# Patient Record
Sex: MALE | Race: WHITE | Employment: OTHER | URBAN - METROPOLITAN AREA
[De-identification: names, ages, dates, MRNs, and addresses within clinical notes are randomized per-mention and may not be internally consistent; named-entity substitution may affect disease eponyms.]

---

## 2017-01-02 PROBLEM — J18.9 PNEUMONIA: Status: ACTIVE | Noted: 2017-01-02

## 2017-01-03 PROBLEM — D50.9 IRON DEFICIENCY ANEMIA: Status: ACTIVE | Noted: 2017-01-03

## 2017-01-03 PROBLEM — J18.9 PNEUMONIA: Status: RESOLVED | Noted: 2017-01-02 | Resolved: 2017-01-03

## 2017-01-03 PROBLEM — E87.1 HYPONATREMIA: Status: ACTIVE | Noted: 2017-01-03

## 2017-01-04 ENCOUNTER — CARE COORDINATOR VISIT (OUTPATIENT)
Dept: CASE MANAGEMENT | Age: 62
End: 2017-01-04

## 2017-01-19 ENCOUNTER — EPISODE CHANGES (OUTPATIENT)
Dept: CASE MANAGEMENT | Age: 62
End: 2017-01-19

## 2017-02-06 ENCOUNTER — OFFICE VISIT (OUTPATIENT)
Dept: CARDIOLOGY CLINIC | Age: 62
End: 2017-02-06

## 2017-02-06 VITALS
HEIGHT: 72 IN | HEART RATE: 80 BPM | DIASTOLIC BLOOD PRESSURE: 70 MMHG | SYSTOLIC BLOOD PRESSURE: 110 MMHG | OXYGEN SATURATION: 99 %

## 2017-02-06 DIAGNOSIS — N28.9 RENAL INSUFFICIENCY: Chronic | ICD-10-CM

## 2017-02-06 DIAGNOSIS — I10 ESSENTIAL HYPERTENSION: Chronic | ICD-10-CM

## 2017-02-06 DIAGNOSIS — R06.02 SOB (SHORTNESS OF BREATH): Primary | ICD-10-CM

## 2017-02-06 DIAGNOSIS — R07.1 CHEST PAIN ON BREATHING: ICD-10-CM

## 2017-02-06 PROBLEM — N17.9 AKI (ACUTE KIDNEY INJURY) (HCC): Status: ACTIVE | Noted: 2017-02-06

## 2017-02-06 PROBLEM — R07.9 CHEST PAIN: Status: ACTIVE | Noted: 2017-02-06

## 2017-02-06 PROCEDURE — G8598 ASA/ANTIPLAT THER USED: HCPCS | Performed by: INTERNAL MEDICINE

## 2017-02-06 PROCEDURE — G8484 FLU IMMUNIZE NO ADMIN: HCPCS | Performed by: INTERNAL MEDICINE

## 2017-02-06 PROCEDURE — 3017F COLORECTAL CA SCREEN DOC REV: CPT | Performed by: INTERNAL MEDICINE

## 2017-02-06 PROCEDURE — 1036F TOBACCO NON-USER: CPT | Performed by: INTERNAL MEDICINE

## 2017-02-06 PROCEDURE — 99214 OFFICE O/P EST MOD 30 MIN: CPT | Performed by: INTERNAL MEDICINE

## 2017-02-06 PROCEDURE — G8419 CALC BMI OUT NRM PARAM NOF/U: HCPCS | Performed by: INTERNAL MEDICINE

## 2017-02-06 PROCEDURE — G8427 DOCREV CUR MEDS BY ELIG CLIN: HCPCS | Performed by: INTERNAL MEDICINE

## 2017-02-06 PROCEDURE — 1111F DSCHRG MED/CURRENT MED MERGE: CPT | Performed by: INTERNAL MEDICINE

## 2017-02-06 RX ORDER — ATORVASTATIN CALCIUM 10 MG/1
10 TABLET, FILM COATED ORAL DAILY
Status: ON HOLD | COMMUNITY
End: 2017-02-21 | Stop reason: HOSPADM

## 2017-02-06 RX ORDER — PANTOPRAZOLE SODIUM 40 MG/1
40 TABLET, DELAYED RELEASE ORAL DAILY
COMMUNITY
End: 2017-06-23 | Stop reason: SDUPTHER

## 2017-02-14 ENCOUNTER — TELEPHONE (OUTPATIENT)
Dept: CARDIOLOGY CLINIC | Age: 62
End: 2017-02-14

## 2017-02-24 ENCOUNTER — TELEPHONE (OUTPATIENT)
Dept: CARDIOLOGY CLINIC | Age: 62
End: 2017-02-24

## 2017-02-24 ENCOUNTER — CARE COORDINATION (OUTPATIENT)
Dept: INTERNAL MEDICINE | Age: 62
End: 2017-02-24

## 2017-02-24 ENCOUNTER — CARE COORDINATION (OUTPATIENT)
Dept: CARE COORDINATION | Age: 62
End: 2017-02-24

## 2017-02-24 ENCOUNTER — TELEPHONE (OUTPATIENT)
Dept: INTERNAL MEDICINE | Age: 62
End: 2017-02-24

## 2017-02-28 ENCOUNTER — OFFICE VISIT (OUTPATIENT)
Dept: CARDIOLOGY CLINIC | Age: 62
End: 2017-02-28

## 2017-02-28 VITALS
HEART RATE: 76 BPM | HEIGHT: 72 IN | RESPIRATION RATE: 22 BRPM | OXYGEN SATURATION: 90 % | WEIGHT: 189 LBS | SYSTOLIC BLOOD PRESSURE: 90 MMHG | DIASTOLIC BLOOD PRESSURE: 48 MMHG | BODY MASS INDEX: 25.6 KG/M2

## 2017-02-28 DIAGNOSIS — I50.22 CHRONIC SYSTOLIC HEART FAILURE (HCC): Primary | ICD-10-CM

## 2017-02-28 DIAGNOSIS — D50.0 IRON DEFICIENCY ANEMIA DUE TO CHRONIC BLOOD LOSS: ICD-10-CM

## 2017-02-28 DIAGNOSIS — I95.2 HYPOTENSION DUE TO DRUGS: ICD-10-CM

## 2017-02-28 DIAGNOSIS — I25.10 CORONARY ARTERY DISEASE INVOLVING NATIVE CORONARY ARTERY OF NATIVE HEART WITHOUT ANGINA PECTORIS: ICD-10-CM

## 2017-02-28 DIAGNOSIS — I42.9 CARDIOMYOPATHY (HCC): ICD-10-CM

## 2017-02-28 PROCEDURE — G8598 ASA/ANTIPLAT THER USED: HCPCS | Performed by: NURSE PRACTITIONER

## 2017-02-28 PROCEDURE — 1036F TOBACCO NON-USER: CPT | Performed by: NURSE PRACTITIONER

## 2017-02-28 PROCEDURE — 1111F DSCHRG MED/CURRENT MED MERGE: CPT | Performed by: NURSE PRACTITIONER

## 2017-02-28 PROCEDURE — G8427 DOCREV CUR MEDS BY ELIG CLIN: HCPCS | Performed by: NURSE PRACTITIONER

## 2017-02-28 PROCEDURE — 3017F COLORECTAL CA SCREEN DOC REV: CPT | Performed by: NURSE PRACTITIONER

## 2017-02-28 PROCEDURE — G8419 CALC BMI OUT NRM PARAM NOF/U: HCPCS | Performed by: NURSE PRACTITIONER

## 2017-02-28 PROCEDURE — G8484 FLU IMMUNIZE NO ADMIN: HCPCS | Performed by: NURSE PRACTITIONER

## 2017-02-28 PROCEDURE — 99214 OFFICE O/P EST MOD 30 MIN: CPT | Performed by: NURSE PRACTITIONER

## 2017-02-28 RX ORDER — ISOSORBIDE DINITRATE 20 MG/1
10 TABLET ORAL 3 TIMES DAILY
Qty: 90 TABLET | Refills: 3 | Status: SHIPPED
Start: 2017-02-28 | End: 2017-06-22 | Stop reason: SDUPTHER

## 2017-03-01 ENCOUNTER — HOSPITAL ENCOUNTER (OUTPATIENT)
Dept: OTHER | Age: 62
Discharge: HOME | End: 2017-03-31
Attending: INTERNAL MEDICINE | Admitting: INTERNAL MEDICINE

## 2017-03-01 ENCOUNTER — TELEPHONE (OUTPATIENT)
Dept: CARDIOLOGY CLINIC | Age: 62
End: 2017-03-01

## 2017-03-03 ENCOUNTER — TELEPHONE (OUTPATIENT)
Dept: CARDIOLOGY CLINIC | Age: 62
End: 2017-03-03

## 2017-03-03 RX ORDER — CEFUROXIME AXETIL 500 MG/1
500 TABLET ORAL 2 TIMES DAILY
Qty: 14 TABLET | Refills: 0 | Status: SHIPPED | OUTPATIENT
Start: 2017-03-03 | End: 2017-03-10

## 2017-03-03 RX ORDER — SPIRONOLACTONE 25 MG/1
12.5 TABLET ORAL DAILY
Qty: 30 TABLET | Refills: 3 | Status: SHIPPED
Start: 2017-03-03 | End: 2017-06-22 | Stop reason: SDUPTHER

## 2017-03-06 ENCOUNTER — TELEPHONE (OUTPATIENT)
Dept: CARDIOLOGY CLINIC | Age: 62
End: 2017-03-06

## 2017-03-06 LAB — PRO-BNP: 3073 PG/ML (ref 0–124)

## 2017-03-07 ENCOUNTER — TELEPHONE (OUTPATIENT)
Dept: CARDIOLOGY CLINIC | Age: 62
End: 2017-03-07

## 2017-03-08 ENCOUNTER — TELEPHONE (OUTPATIENT)
Dept: CARDIOLOGY CLINIC | Age: 62
End: 2017-03-08

## 2017-03-08 LAB
ANION GAP SERPL CALCULATED.3IONS-SCNC: 15 MMOL/L (ref 3–16)
BUN BLDV-MCNC: 91 MG/DL (ref 7–20)
CALCIUM SERPL-MCNC: 8.8 MG/DL (ref 8.3–10.6)
CHLORIDE BLD-SCNC: 90 MMOL/L (ref 99–110)
CO2: 26 MMOL/L (ref 21–32)
CREAT SERPL-MCNC: 1.3 MG/DL (ref 0.8–1.3)
GFR AFRICAN AMERICAN: >60
GFR NON-AFRICAN AMERICAN: 56
GLUCOSE BLD-MCNC: 213 MG/DL (ref 70–99)
POTASSIUM SERPL-SCNC: 4.5 MMOL/L (ref 3.5–5.1)
SODIUM BLD-SCNC: 131 MMOL/L (ref 136–145)

## 2017-03-09 ENCOUNTER — TELEPHONE (OUTPATIENT)
Dept: CARDIOLOGY CLINIC | Age: 62
End: 2017-03-09

## 2017-03-13 ENCOUNTER — HOSPITAL ENCOUNTER (OUTPATIENT)
Dept: ONCOLOGY | Age: 62
Discharge: HOME | End: 2017-03-31
Attending: NURSE PRACTITIONER | Admitting: NURSE PRACTITIONER

## 2017-03-13 ENCOUNTER — OFFICE VISIT (OUTPATIENT)
Dept: CARDIOLOGY CLINIC | Age: 62
End: 2017-03-13

## 2017-03-13 VITALS — HEART RATE: 80 BPM | SYSTOLIC BLOOD PRESSURE: 116 MMHG | TEMPERATURE: 95.8 F | DIASTOLIC BLOOD PRESSURE: 62 MMHG

## 2017-03-13 VITALS
OXYGEN SATURATION: 98 % | RESPIRATION RATE: 28 BRPM | SYSTOLIC BLOOD PRESSURE: 126 MMHG | HEART RATE: 96 BPM | DIASTOLIC BLOOD PRESSURE: 60 MMHG | HEIGHT: 72 IN

## 2017-03-13 DIAGNOSIS — I42.9 CARDIOMYOPATHY (HCC): ICD-10-CM

## 2017-03-13 DIAGNOSIS — D64.9 CHRONIC ANEMIA: ICD-10-CM

## 2017-03-13 DIAGNOSIS — I95.2 HYPOTENSION DUE TO DRUGS: ICD-10-CM

## 2017-03-13 DIAGNOSIS — I50.22 CHRONIC SYSTOLIC HEART FAILURE (HCC): Primary | ICD-10-CM

## 2017-03-13 DIAGNOSIS — I25.10 CORONARY ARTERY DISEASE INVOLVING NATIVE CORONARY ARTERY OF NATIVE HEART WITHOUT ANGINA PECTORIS: ICD-10-CM

## 2017-03-13 LAB
ANION GAP SERPL CALCULATED.3IONS-SCNC: 12 MMOL/L (ref 3–16)
BASOPHILS ABSOLUTE: 0.1 K/UL (ref 0–0.2)
BASOPHILS RELATIVE PERCENT: 0.3 %
BUN BLDV-MCNC: 88 MG/DL (ref 7–20)
CALCIUM SERPL-MCNC: 9.1 MG/DL (ref 8.3–10.6)
CHLORIDE BLD-SCNC: 91 MMOL/L (ref 99–110)
CO2: 28 MMOL/L (ref 21–32)
CREAT SERPL-MCNC: 1.2 MG/DL (ref 0.8–1.3)
EOSINOPHILS ABSOLUTE: 0.1 K/UL (ref 0–0.6)
EOSINOPHILS RELATIVE PERCENT: 0.6 %
GFR AFRICAN AMERICAN: >60
GFR NON-AFRICAN AMERICAN: >60
GLUCOSE BLD-MCNC: 199 MG/DL (ref 70–99)
HCT VFR BLD CALC: 38.9 % (ref 40.5–52.5)
HEMOGLOBIN: 12.8 G/DL (ref 13.5–17.5)
LYMPHOCYTES ABSOLUTE: 1.1 K/UL (ref 1–5.1)
LYMPHOCYTES RELATIVE PERCENT: 6.4 %
MCH RBC QN AUTO: 28.1 PG (ref 26–34)
MCHC RBC AUTO-ENTMCNC: 32.9 G/DL (ref 31–36)
MCV RBC AUTO: 85.4 FL (ref 80–100)
MONOCYTES ABSOLUTE: 0.6 K/UL (ref 0–1.3)
MONOCYTES RELATIVE PERCENT: 3.9 %
NEUTROPHILS ABSOLUTE: 14.9 K/UL (ref 1.7–7.7)
NEUTROPHILS RELATIVE PERCENT: 88.8 %
PDW BLD-RTO: 16.2 % (ref 12.4–15.4)
PLATELET # BLD: 186 K/UL (ref 135–450)
PMV BLD AUTO: 6.9 FL (ref 5–10.5)
POTASSIUM SERPL-SCNC: 5.1 MMOL/L (ref 3.5–5.1)
PRO-BNP: 2409 PG/ML (ref 0–124)
RBC # BLD: 4.55 M/UL (ref 4.2–5.9)
SODIUM BLD-SCNC: 131 MMOL/L (ref 136–145)
WBC # BLD: 16.8 K/UL (ref 4–11)

## 2017-03-13 PROCEDURE — G8427 DOCREV CUR MEDS BY ELIG CLIN: HCPCS | Performed by: NURSE PRACTITIONER

## 2017-03-13 PROCEDURE — 3017F COLORECTAL CA SCREEN DOC REV: CPT | Performed by: NURSE PRACTITIONER

## 2017-03-13 PROCEDURE — 1036F TOBACCO NON-USER: CPT | Performed by: NURSE PRACTITIONER

## 2017-03-13 PROCEDURE — G8484 FLU IMMUNIZE NO ADMIN: HCPCS | Performed by: NURSE PRACTITIONER

## 2017-03-13 PROCEDURE — 1111F DSCHRG MED/CURRENT MED MERGE: CPT | Performed by: NURSE PRACTITIONER

## 2017-03-13 PROCEDURE — G8419 CALC BMI OUT NRM PARAM NOF/U: HCPCS | Performed by: NURSE PRACTITIONER

## 2017-03-13 PROCEDURE — G8598 ASA/ANTIPLAT THER USED: HCPCS | Performed by: NURSE PRACTITIONER

## 2017-03-13 PROCEDURE — 99214 OFFICE O/P EST MOD 30 MIN: CPT | Performed by: NURSE PRACTITIONER

## 2017-03-13 RX ORDER — SODIUM CHLORIDE 0.9 % (FLUSH) 0.9 %
SYRINGE (ML) INJECTION
Status: DISPENSED
Start: 2017-03-13 | End: 2017-03-13

## 2017-03-13 RX ORDER — SODIUM CHLORIDE 9 MG/ML
INJECTION, SOLUTION INTRAVENOUS
Status: DISPENSED
Start: 2017-03-13 | End: 2017-03-13

## 2017-03-13 RX ORDER — CEFUROXIME AXETIL 500 MG/1
500 TABLET ORAL 2 TIMES DAILY
COMMUNITY
End: 2017-03-30 | Stop reason: ALTCHOICE

## 2017-03-14 ENCOUNTER — TELEPHONE (OUTPATIENT)
Dept: CARDIOLOGY CLINIC | Age: 62
End: 2017-03-14

## 2017-03-17 ENCOUNTER — TELEPHONE (OUTPATIENT)
Dept: INTERNAL MEDICINE | Age: 62
End: 2017-03-17

## 2017-03-20 ENCOUNTER — TELEPHONE (OUTPATIENT)
Dept: CARDIOLOGY CLINIC | Age: 62
End: 2017-03-20

## 2017-03-20 LAB
ANION GAP SERPL CALCULATED.3IONS-SCNC: 13 MMOL/L (ref 3–16)
BASOPHILS ABSOLUTE: 0 K/UL (ref 0–0.2)
BASOPHILS RELATIVE PERCENT: 0.1 %
BUN BLDV-MCNC: 69 MG/DL (ref 7–20)
CALCIUM SERPL-MCNC: 8.1 MG/DL (ref 8.3–10.6)
CHLORIDE BLD-SCNC: 90 MMOL/L (ref 99–110)
CO2: 28 MMOL/L (ref 21–32)
CREAT SERPL-MCNC: 1.2 MG/DL (ref 0.8–1.3)
EOSINOPHILS ABSOLUTE: 0 K/UL (ref 0–0.6)
EOSINOPHILS RELATIVE PERCENT: 0.1 %
GFR AFRICAN AMERICAN: >60
GFR NON-AFRICAN AMERICAN: >60
GLUCOSE BLD-MCNC: 227 MG/DL (ref 70–99)
HCT VFR BLD CALC: 34.5 % (ref 40.5–52.5)
HEMOGLOBIN: 11.2 G/DL (ref 13.5–17.5)
LYMPHOCYTES ABSOLUTE: 0.6 K/UL (ref 1–5.1)
LYMPHOCYTES RELATIVE PERCENT: 4.3 %
MCH RBC QN AUTO: 27.8 PG (ref 26–34)
MCHC RBC AUTO-ENTMCNC: 32.5 G/DL (ref 31–36)
MCV RBC AUTO: 85.6 FL (ref 80–100)
MONOCYTES ABSOLUTE: 0.5 K/UL (ref 0–1.3)
MONOCYTES RELATIVE PERCENT: 3.6 %
NEUTROPHILS ABSOLUTE: 13.5 K/UL (ref 1.7–7.7)
NEUTROPHILS RELATIVE PERCENT: 91.9 %
PDW BLD-RTO: 16.5 % (ref 12.4–15.4)
PLATELET # BLD: 154 K/UL (ref 135–450)
PMV BLD AUTO: 6.9 FL (ref 5–10.5)
POTASSIUM SERPL-SCNC: 4.6 MMOL/L (ref 3.5–5.1)
PRO-BNP: 3469 PG/ML (ref 0–124)
RBC # BLD: 4.04 M/UL (ref 4.2–5.9)
SODIUM BLD-SCNC: 131 MMOL/L (ref 136–145)
WBC # BLD: 14.7 K/UL (ref 4–11)

## 2017-03-21 ENCOUNTER — HOSPITAL ENCOUNTER (OUTPATIENT)
Dept: ONCOLOGY | Age: 62
Discharge: HOME | End: 2017-03-21
Admitting: NURSE PRACTITIONER

## 2017-03-21 ENCOUNTER — HOSPITAL ENCOUNTER (OUTPATIENT)
Dept: SURGERY | Age: 62
Discharge: HOME | End: 2017-03-21
Admitting: UROLOGY

## 2017-03-21 RX ORDER — SODIUM CHLORIDE 9 MG/ML
INJECTION, SOLUTION INTRAVENOUS
Status: DISPENSED
Start: 2017-03-21 | End: 2017-03-21

## 2017-03-21 RX ORDER — SODIUM CHLORIDE 0.9 % (FLUSH) 0.9 %
SYRINGE (ML) INJECTION
Status: DISPENSED
Start: 2017-03-21 | End: 2017-03-21

## 2017-03-22 ENCOUNTER — TELEPHONE (OUTPATIENT)
Dept: CARDIOLOGY CLINIC | Age: 62
End: 2017-03-22

## 2017-03-22 RX ORDER — FUROSEMIDE 40 MG/1
40 TABLET ORAL DAILY
Qty: 30 TABLET | Refills: 2 | COMMUNITY
Start: 2017-03-22 | End: 2017-06-22 | Stop reason: SDUPTHER

## 2017-03-23 ENCOUNTER — TELEPHONE (OUTPATIENT)
Dept: CARDIOLOGY CLINIC | Age: 62
End: 2017-03-23

## 2017-03-24 ENCOUNTER — TELEPHONE (OUTPATIENT)
Dept: CARDIOLOGY CLINIC | Age: 62
End: 2017-03-24

## 2017-03-24 ENCOUNTER — HOSPITAL ENCOUNTER (OUTPATIENT)
Dept: ONCOLOGY | Age: 62
Discharge: HOME | End: 2017-03-24
Admitting: NURSE PRACTITIONER

## 2017-03-24 VITALS
HEART RATE: 94 BPM | SYSTOLIC BLOOD PRESSURE: 132 MMHG | TEMPERATURE: 95.6 F | OXYGEN SATURATION: 99 % | RESPIRATION RATE: 26 BRPM | DIASTOLIC BLOOD PRESSURE: 38 MMHG

## 2017-04-01 ENCOUNTER — HOSPITAL ENCOUNTER (OUTPATIENT)
Dept: OTHER | Age: 62
Discharge: HOME | End: 2017-04-30
Attending: INTERNAL MEDICINE | Admitting: INTERNAL MEDICINE

## 2017-04-03 ENCOUNTER — TELEPHONE (OUTPATIENT)
Dept: CARDIOLOGY CLINIC | Age: 62
End: 2017-04-03

## 2017-04-04 PROBLEM — N31.9 NEUROGENIC BLADDER: Status: ACTIVE | Noted: 2017-04-04

## 2017-04-06 ENCOUNTER — CARE COORDINATION (OUTPATIENT)
Dept: CARE COORDINATION | Age: 62
End: 2017-04-06

## 2017-04-07 ENCOUNTER — TELEPHONE (OUTPATIENT)
Dept: CARDIOLOGY CLINIC | Age: 62
End: 2017-04-07

## 2017-04-10 LAB
ANION GAP SERPL CALCULATED.3IONS-SCNC: 17 MMOL/L (ref 3–16)
BUN BLDV-MCNC: 87 MG/DL (ref 7–20)
CALCIUM SERPL-MCNC: 8.6 MG/DL (ref 8.3–10.6)
CHLORIDE BLD-SCNC: 93 MMOL/L (ref 99–110)
CO2: 24 MMOL/L (ref 21–32)
CREAT SERPL-MCNC: 1.9 MG/DL (ref 0.8–1.3)
GFR AFRICAN AMERICAN: 43
GFR NON-AFRICAN AMERICAN: 36
GLUCOSE BLD-MCNC: 48 MG/DL (ref 70–99)
POTASSIUM SERPL-SCNC: 4.8 MMOL/L (ref 3.5–5.1)
PRO-BNP: 2172 PG/ML (ref 0–124)
SODIUM BLD-SCNC: 134 MMOL/L (ref 136–145)

## 2017-04-11 ENCOUNTER — HOSPITAL ENCOUNTER (OUTPATIENT)
Dept: SURGERY | Age: 62
Discharge: HOME | End: 2017-03-21
Attending: UROLOGY | Admitting: UROLOGY

## 2017-04-13 ENCOUNTER — TELEPHONE (OUTPATIENT)
Dept: CARDIOLOGY CLINIC | Age: 62
End: 2017-04-13

## 2017-04-17 LAB
ANION GAP SERPL CALCULATED.3IONS-SCNC: 14 MMOL/L (ref 3–16)
BUN BLDV-MCNC: 75 MG/DL (ref 7–20)
CALCIUM SERPL-MCNC: 8.6 MG/DL (ref 8.3–10.6)
CHLORIDE BLD-SCNC: 94 MMOL/L (ref 99–110)
CO2: 25 MMOL/L (ref 21–32)
CREAT SERPL-MCNC: 1.6 MG/DL (ref 0.8–1.3)
GFR AFRICAN AMERICAN: 53
GFR NON-AFRICAN AMERICAN: 44
GLUCOSE BLD-MCNC: 240 MG/DL (ref 70–99)
POTASSIUM SERPL-SCNC: 4.7 MMOL/L (ref 3.5–5.1)
PRO-BNP: 1303 PG/ML (ref 0–124)
SODIUM BLD-SCNC: 133 MMOL/L (ref 136–145)

## 2017-05-08 ENCOUNTER — TELEPHONE (OUTPATIENT)
Dept: CARDIOLOGY CLINIC | Age: 62
End: 2017-05-08

## 2017-05-09 ENCOUNTER — TELEPHONE (OUTPATIENT)
Dept: CARDIOLOGY CLINIC | Age: 62
End: 2017-05-09

## 2017-05-09 DIAGNOSIS — I10 ESSENTIAL HYPERTENSION: Primary | Chronic | ICD-10-CM

## 2017-05-09 DIAGNOSIS — N28.9 RENAL INSUFFICIENCY: Chronic | ICD-10-CM

## 2017-05-09 DIAGNOSIS — I25.10 CORONARY ARTERY DISEASE INVOLVING NATIVE CORONARY ARTERY OF NATIVE HEART WITHOUT ANGINA PECTORIS: ICD-10-CM

## 2017-05-09 DIAGNOSIS — I42.8 NON-ISCHEMIC CARDIOMYOPATHY (HCC): ICD-10-CM

## 2017-05-12 ENCOUNTER — TELEPHONE (OUTPATIENT)
Dept: INTERNAL MEDICINE | Age: 62
End: 2017-05-12

## 2017-05-15 ENCOUNTER — TELEPHONE (OUTPATIENT)
Dept: CARDIOLOGY CLINIC | Age: 62
End: 2017-05-15

## 2017-05-15 DIAGNOSIS — N28.9 RENAL INSUFFICIENCY: Primary | Chronic | ICD-10-CM

## 2017-05-15 DIAGNOSIS — I50.22 CHRONIC SYSTOLIC HEART FAILURE (HCC): ICD-10-CM

## 2017-05-16 ENCOUNTER — HOSPITAL ENCOUNTER (OUTPATIENT)
Dept: WOUND CARE | Age: 62
Discharge: HOME | End: 2017-05-16
Attending: SURGERY | Admitting: SURGERY

## 2017-05-16 ENCOUNTER — TELEPHONE (OUTPATIENT)
Dept: CARDIOLOGY CLINIC | Age: 62
End: 2017-05-16

## 2017-05-16 ENCOUNTER — HOSPITAL ENCOUNTER (OUTPATIENT)
Dept: ONCOLOGY | Age: 62
Discharge: HOME | End: 2017-05-31
Admitting: INTERNAL MEDICINE

## 2017-05-16 VITALS — DIASTOLIC BLOOD PRESSURE: 51 MMHG | RESPIRATION RATE: 20 BRPM | SYSTOLIC BLOOD PRESSURE: 102 MMHG | HEART RATE: 90 BPM

## 2017-05-16 PROCEDURE — 99203 OFFICE O/P NEW LOW 30 MIN: CPT | Performed by: SURGERY

## 2017-05-16 PROCEDURE — 11042 DBRDMT SUBQ TIS 1ST 20SQCM/<: CPT | Performed by: SURGERY

## 2017-05-16 RX ORDER — SODIUM CHLORIDE 0.9 % (FLUSH) 0.9 %
10 SYRINGE (ML) INJECTION PRN
Status: DISCONTINUED | OUTPATIENT
Start: 2017-05-16 | End: 2017-05-18 | Stop reason: HOSPADM

## 2017-05-16 RX ORDER — LIDOCAINE HYDROCHLORIDE 10 MG/ML
5 INJECTION, SOLUTION EPIDURAL; INFILTRATION; INTRACAUDAL; PERINEURAL ONCE
Status: DISCONTINUED | OUTPATIENT
Start: 2017-05-16 | End: 2017-05-18 | Stop reason: HOSPADM

## 2017-05-16 RX ORDER — LIDOCAINE HYDROCHLORIDE 40 MG/ML
SOLUTION TOPICAL ONCE
Status: DISCONTINUED | OUTPATIENT
Start: 2017-05-16 | End: 2017-05-17 | Stop reason: HOSPADM

## 2017-05-16 RX ORDER — SODIUM CHLORIDE 0.9 % (FLUSH) 0.9 %
10 SYRINGE (ML) INJECTION EVERY 12 HOURS SCHEDULED
Status: DISCONTINUED | OUTPATIENT
Start: 2017-05-16 | End: 2017-05-18 | Stop reason: HOSPADM

## 2017-05-23 ENCOUNTER — HOSPITAL ENCOUNTER (OUTPATIENT)
Dept: WOUND CARE | Age: 62
Discharge: HOME | End: 2017-05-23
Attending: SURGERY | Admitting: SURGERY

## 2017-05-23 VITALS — TEMPERATURE: 96.8 F | DIASTOLIC BLOOD PRESSURE: 59 MMHG | SYSTOLIC BLOOD PRESSURE: 115 MMHG

## 2017-05-23 PROCEDURE — 11042 DBRDMT SUBQ TIS 1ST 20SQCM/<: CPT | Performed by: SURGERY

## 2017-05-23 RX ORDER — LIDOCAINE HYDROCHLORIDE 40 MG/ML
SOLUTION TOPICAL ONCE
Status: DISCONTINUED | OUTPATIENT
Start: 2017-05-23 | End: 2017-05-24 | Stop reason: HOSPADM

## 2017-05-29 ENCOUNTER — HOSPITAL ENCOUNTER (OUTPATIENT)
Dept: OTHER | Age: 62
Discharge: HOME | End: 2017-05-29
Attending: INTERNAL MEDICINE | Admitting: INTERNAL MEDICINE

## 2017-05-29 LAB
ANION GAP SERPL CALCULATED.3IONS-SCNC: 16 MMOL/L (ref 3–16)
BUN BLDV-MCNC: 63 MG/DL (ref 7–20)
CALCIUM SERPL-MCNC: 8.6 MG/DL (ref 8.3–10.6)
CHLORIDE BLD-SCNC: 96 MMOL/L (ref 99–110)
CO2: 26 MMOL/L (ref 21–32)
CREAT SERPL-MCNC: 1.4 MG/DL (ref 0.8–1.3)
GFR AFRICAN AMERICAN: >60
GFR NON-AFRICAN AMERICAN: 51
GLUCOSE BLD-MCNC: 141 MG/DL (ref 70–99)
POTASSIUM SERPL-SCNC: 3.9 MMOL/L (ref 3.5–5.1)
PRO-BNP: 892 PG/ML (ref 0–124)
SODIUM BLD-SCNC: 138 MMOL/L (ref 136–145)

## 2017-06-01 ENCOUNTER — HOSPITAL ENCOUNTER (OUTPATIENT)
Dept: OTHER | Age: 62
Discharge: HOME | End: 2017-06-30
Attending: INTERNAL MEDICINE | Admitting: INTERNAL MEDICINE

## 2017-06-05 LAB
ANION GAP SERPL CALCULATED.3IONS-SCNC: 15 MMOL/L (ref 3–16)
BUN BLDV-MCNC: 66 MG/DL (ref 7–20)
CALCIUM SERPL-MCNC: 9 MG/DL (ref 8.3–10.6)
CHLORIDE BLD-SCNC: 91 MMOL/L (ref 99–110)
CO2: 27 MMOL/L (ref 21–32)
CREAT SERPL-MCNC: 1.7 MG/DL (ref 0.8–1.3)
GFR AFRICAN AMERICAN: 49
GFR NON-AFRICAN AMERICAN: 41
GLUCOSE BLD-MCNC: 172 MG/DL (ref 70–99)
POTASSIUM SERPL-SCNC: 4.7 MMOL/L (ref 3.5–5.1)
PRO-BNP: 829 PG/ML (ref 0–124)
SODIUM BLD-SCNC: 133 MMOL/L (ref 136–145)

## 2017-06-06 ENCOUNTER — HOSPITAL ENCOUNTER (OUTPATIENT)
Dept: WOUND CARE | Age: 62
Discharge: HOME | End: 2017-06-06
Attending: SURGERY | Admitting: SURGERY

## 2017-06-06 PROCEDURE — 11042 DBRDMT SUBQ TIS 1ST 20SQCM/<: CPT | Performed by: SURGERY

## 2017-06-06 RX ORDER — LIDOCAINE HYDROCHLORIDE 40 MG/ML
SOLUTION TOPICAL ONCE
Status: DISCONTINUED | OUTPATIENT
Start: 2017-06-06 | End: 2017-06-07 | Stop reason: HOSPADM

## 2017-06-12 LAB
ANION GAP SERPL CALCULATED.3IONS-SCNC: 15 MMOL/L (ref 3–16)
BUN BLDV-MCNC: 74 MG/DL (ref 7–20)
CALCIUM SERPL-MCNC: 9.1 MG/DL (ref 8.3–10.6)
CHLORIDE BLD-SCNC: 95 MMOL/L (ref 99–110)
CO2: 27 MMOL/L (ref 21–32)
CREAT SERPL-MCNC: 1.6 MG/DL (ref 0.8–1.3)
GFR AFRICAN AMERICAN: 53
GFR NON-AFRICAN AMERICAN: 44
GLUCOSE BLD-MCNC: 129 MG/DL (ref 70–99)
POTASSIUM SERPL-SCNC: 4.7 MMOL/L (ref 3.5–5.1)
PRO-BNP: 794 PG/ML (ref 0–124)
SODIUM BLD-SCNC: 137 MMOL/L (ref 136–145)

## 2017-06-19 LAB
ANION GAP SERPL CALCULATED.3IONS-SCNC: 14 MMOL/L (ref 3–16)
BUN BLDV-MCNC: 77 MG/DL (ref 7–20)
CALCIUM SERPL-MCNC: 8.7 MG/DL (ref 8.3–10.6)
CHLORIDE BLD-SCNC: 97 MMOL/L (ref 99–110)
CO2: 27 MMOL/L (ref 21–32)
CREAT SERPL-MCNC: 1.7 MG/DL (ref 0.8–1.3)
GFR AFRICAN AMERICAN: 49
GFR NON-AFRICAN AMERICAN: 41
GLUCOSE BLD-MCNC: 168 MG/DL (ref 70–99)
POTASSIUM SERPL-SCNC: 4.3 MMOL/L (ref 3.5–5.1)
PRO-BNP: 1001 PG/ML (ref 0–124)
SODIUM BLD-SCNC: 138 MMOL/L (ref 136–145)

## 2017-06-20 ENCOUNTER — HOSPITAL ENCOUNTER (OUTPATIENT)
Dept: WOUND CARE | Age: 62
Discharge: HOME | End: 2017-06-20
Attending: SURGERY | Admitting: SURGERY

## 2017-06-20 VITALS — DIASTOLIC BLOOD PRESSURE: 55 MMHG | HEART RATE: 76 BPM | SYSTOLIC BLOOD PRESSURE: 95 MMHG

## 2017-06-20 PROCEDURE — 11042 DBRDMT SUBQ TIS 1ST 20SQCM/<: CPT | Performed by: SURGERY

## 2017-06-20 RX ORDER — LIDOCAINE HYDROCHLORIDE 40 MG/ML
SOLUTION TOPICAL ONCE
Status: DISCONTINUED | OUTPATIENT
Start: 2017-06-20 | End: 2017-06-21 | Stop reason: HOSPADM

## 2017-06-23 RX ORDER — CITALOPRAM 20 MG/1
20 TABLET ORAL DAILY
Qty: 30 TABLET | Refills: 0
Start: 2017-06-23 | End: 2017-06-26 | Stop reason: SDUPTHER

## 2017-06-23 RX ORDER — PEN NEEDLE, DIABETIC 31 G X1/4"
1 NEEDLE, DISPOSABLE MISCELLANEOUS DAILY
Qty: 100 EACH | Refills: 3
Start: 2017-06-23 | End: 2017-06-26 | Stop reason: SDUPTHER

## 2017-06-23 RX ORDER — CLOPIDOGREL BISULFATE 75 MG/1
75 TABLET ORAL DAILY
Qty: 30 TABLET | Refills: 0 | Status: SHIPPED | OUTPATIENT
Start: 2017-06-23 | End: 2017-07-27

## 2017-06-23 RX ORDER — CARVEDILOL 25 MG/1
25 TABLET ORAL 2 TIMES DAILY WITH MEALS
Qty: 60 TABLET | Refills: 0 | Status: SHIPPED | OUTPATIENT
Start: 2017-06-23 | End: 2017-07-27 | Stop reason: SDUPTHER

## 2017-06-23 RX ORDER — FINASTERIDE 5 MG/1
5 TABLET, FILM COATED ORAL DAILY
Qty: 30 TABLET | Refills: 0
Start: 2017-06-23 | End: 2017-06-26 | Stop reason: SDUPTHER

## 2017-06-23 RX ORDER — FUROSEMIDE 40 MG/1
40 TABLET ORAL DAILY
Qty: 30 TABLET | Refills: 0 | Status: SHIPPED | OUTPATIENT
Start: 2017-06-23 | End: 2018-01-16 | Stop reason: DRUGHIGH

## 2017-06-23 RX ORDER — IPRATROPIUM BROMIDE AND ALBUTEROL SULFATE 2.5; .5 MG/3ML; MG/3ML
3 SOLUTION RESPIRATORY (INHALATION) EVERY 4 HOURS PRN
Qty: 360 ML | Refills: 0
Start: 2017-06-23 | End: 2017-06-26 | Stop reason: SDUPTHER

## 2017-06-23 RX ORDER — SPIRONOLACTONE 25 MG/1
12.5 TABLET ORAL DAILY
Qty: 30 TABLET | Refills: 0 | Status: SHIPPED | OUTPATIENT
Start: 2017-06-23 | End: 2017-07-27 | Stop reason: SDUPTHER

## 2017-06-23 RX ORDER — PRAVASTATIN SODIUM 40 MG
40 TABLET ORAL NIGHTLY
Qty: 30 TABLET | Refills: 0 | Status: SHIPPED | OUTPATIENT
Start: 2017-06-23 | End: 2017-07-27 | Stop reason: SDUPTHER

## 2017-06-23 RX ORDER — ISOSORBIDE DINITRATE 20 MG/1
10 TABLET ORAL 3 TIMES DAILY
Qty: 90 TABLET | Refills: 0 | Status: SHIPPED | OUTPATIENT
Start: 2017-06-23 | End: 2017-07-27 | Stop reason: SINTOL

## 2017-06-23 RX ORDER — INSULIN GLARGINE 100 [IU]/ML
40 INJECTION, SOLUTION SUBCUTANEOUS NIGHTLY
Qty: 1 VIAL | Refills: 0
Start: 2017-06-23 | End: 2017-06-26 | Stop reason: SDUPTHER

## 2017-06-23 RX ORDER — HYDRALAZINE HYDROCHLORIDE 50 MG/1
50 TABLET, FILM COATED ORAL EVERY 8 HOURS SCHEDULED
Qty: 90 TABLET | Refills: 0 | Status: SHIPPED | OUTPATIENT
Start: 2017-06-23 | End: 2017-07-27 | Stop reason: SDUPTHER

## 2017-06-23 RX ORDER — GABAPENTIN 300 MG/1
600 CAPSULE ORAL 2 TIMES DAILY
Qty: 90 CAPSULE | Refills: 0
Start: 2017-06-23 | End: 2017-06-26 | Stop reason: SDUPTHER

## 2017-06-23 RX ORDER — PANTOPRAZOLE SODIUM 40 MG/1
40 TABLET, DELAYED RELEASE ORAL DAILY
Qty: 30 TABLET | Refills: 0
Start: 2017-06-23 | End: 2017-06-26 | Stop reason: SDUPTHER

## 2017-06-26 LAB
BUN BLDV-MCNC: 74 MG/DL (ref 8–26)
CALCIUM SERPL-MCNC: 8.9 MG/DL (ref 8.5–10.5)
CHLORIDE BLD-SCNC: 95 MEQ/L (ref 101–111)
CO2: 26 MEQ/L (ref 24–36)
CREAT SERPL-MCNC: 1.45 MG/DL (ref 0.64–1.27)
GFR AFRICAN AMERICAN: 59 ML/MIN/1.73 SQ METER
GFR NON-AFRICAN AMERICAN: 49 ML/MIN/1.73 SQ METER
GLUCOSE BLD-MCNC: 184 MG/DL (ref 70–99)
OSMOLALITY CALCULATION: 295 MOSM/KG (ref 280–300)
POTASSIUM SERPL-SCNC: 4.8 MEQ/L (ref 3.6–5.1)
SODIUM BLD-SCNC: 134 MEQ/L (ref 135–145)

## 2017-06-26 RX ORDER — PEN NEEDLE, DIABETIC 31 G X1/4"
1 NEEDLE, DISPOSABLE MISCELLANEOUS DAILY
Qty: 100 EACH | Refills: 3 | Status: SHIPPED | OUTPATIENT
Start: 2017-06-26

## 2017-06-26 RX ORDER — PANTOPRAZOLE SODIUM 40 MG/1
40 TABLET, DELAYED RELEASE ORAL DAILY
Qty: 30 TABLET | Refills: 1 | Status: SHIPPED | OUTPATIENT
Start: 2017-06-26 | End: 2017-08-17 | Stop reason: SDUPTHER

## 2017-06-26 RX ORDER — FINASTERIDE 5 MG/1
5 TABLET, FILM COATED ORAL DAILY
Qty: 30 TABLET | Refills: 1 | Status: SHIPPED | OUTPATIENT
Start: 2017-06-26 | End: 2017-08-17 | Stop reason: SDUPTHER

## 2017-06-26 RX ORDER — INSULIN GLARGINE 100 [IU]/ML
40 INJECTION, SOLUTION SUBCUTANEOUS NIGHTLY
Qty: 1 VIAL | Refills: 1 | Status: SHIPPED | OUTPATIENT
Start: 2017-06-26 | End: 2018-04-17 | Stop reason: SDUPTHER

## 2017-06-26 RX ORDER — IPRATROPIUM BROMIDE AND ALBUTEROL SULFATE 2.5; .5 MG/3ML; MG/3ML
3 SOLUTION RESPIRATORY (INHALATION) EVERY 4 HOURS PRN
Qty: 360 ML | Refills: 1 | Status: SHIPPED | OUTPATIENT
Start: 2017-06-26 | End: 2017-10-24 | Stop reason: SDUPTHER

## 2017-06-26 RX ORDER — GABAPENTIN 300 MG/1
600 CAPSULE ORAL 2 TIMES DAILY
Qty: 90 CAPSULE | Refills: 1 | Status: SHIPPED | OUTPATIENT
Start: 2017-06-26 | End: 2017-08-17 | Stop reason: SDUPTHER

## 2017-06-26 RX ORDER — CITALOPRAM 20 MG/1
20 TABLET ORAL DAILY
Qty: 30 TABLET | Refills: 1 | Status: SHIPPED | OUTPATIENT
Start: 2017-06-26 | End: 2017-08-17 | Stop reason: SDUPTHER

## 2017-06-27 ENCOUNTER — HOSPITAL ENCOUNTER (OUTPATIENT)
Dept: WOUND CARE | Age: 62
Discharge: HOME | End: 2017-06-27
Attending: SURGERY | Admitting: SURGERY

## 2017-06-27 PROCEDURE — 99213 OFFICE O/P EST LOW 20 MIN: CPT | Performed by: SURGERY

## 2017-06-27 RX ORDER — LIDOCAINE HYDROCHLORIDE 40 MG/ML
SOLUTION TOPICAL ONCE
Status: DISCONTINUED | OUTPATIENT
Start: 2017-06-27 | End: 2017-06-28 | Stop reason: HOSPADM

## 2017-06-30 ENCOUNTER — OFFICE VISIT (OUTPATIENT)
Dept: VASCULAR SURGERY | Age: 62
End: 2017-06-30

## 2017-06-30 VITALS
WEIGHT: 170 LBS | SYSTOLIC BLOOD PRESSURE: 126 MMHG | BODY MASS INDEX: 23.03 KG/M2 | HEIGHT: 72 IN | DIASTOLIC BLOOD PRESSURE: 62 MMHG

## 2017-06-30 DIAGNOSIS — I70.235 ATHEROSCLEROSIS OF NATIVE ARTERIES OF RIGHT LEG WITH ULCERATION OF OTHER PART OF FOOT (HCC): Primary | ICD-10-CM

## 2017-06-30 PROCEDURE — 3017F COLORECTAL CA SCREEN DOC REV: CPT | Performed by: SURGERY

## 2017-06-30 PROCEDURE — G8420 CALC BMI NORM PARAMETERS: HCPCS | Performed by: SURGERY

## 2017-06-30 PROCEDURE — 1036F TOBACCO NON-USER: CPT | Performed by: SURGERY

## 2017-06-30 PROCEDURE — G8598 ASA/ANTIPLAT THER USED: HCPCS | Performed by: SURGERY

## 2017-06-30 PROCEDURE — 99213 OFFICE O/P EST LOW 20 MIN: CPT | Performed by: SURGERY

## 2017-06-30 PROCEDURE — G8427 DOCREV CUR MEDS BY ELIG CLIN: HCPCS | Performed by: SURGERY

## 2017-07-01 ENCOUNTER — HOSPITAL ENCOUNTER (OUTPATIENT)
Dept: OTHER | Age: 62
Discharge: HOME | End: 2017-07-31
Attending: INTERNAL MEDICINE | Admitting: INTERNAL MEDICINE

## 2017-07-03 ENCOUNTER — TELEPHONE (OUTPATIENT)
Dept: CARDIOLOGY CLINIC | Age: 62
End: 2017-07-03

## 2017-07-03 LAB
ANION GAP SERPL CALCULATED.3IONS-SCNC: 13 MMOL/L (ref 3–16)
BUN BLDV-MCNC: 84 MG/DL (ref 7–20)
CALCIUM SERPL-MCNC: 9 MG/DL (ref 8.3–10.6)
CHLORIDE BLD-SCNC: 96 MMOL/L (ref 99–110)
CO2: 28 MMOL/L (ref 21–32)
CREAT SERPL-MCNC: 1.4 MG/DL (ref 0.8–1.3)
GFR AFRICAN AMERICAN: >60
GFR NON-AFRICAN AMERICAN: 51
GLUCOSE BLD-MCNC: 114 MG/DL (ref 70–99)
POTASSIUM SERPL-SCNC: 4.1 MMOL/L (ref 3.5–5.1)
PRO-BNP: 651 PG/ML (ref 0–124)
SODIUM BLD-SCNC: 137 MMOL/L (ref 136–145)

## 2017-07-10 LAB
ANION GAP SERPL CALCULATED.3IONS-SCNC: 16 MMOL/L (ref 3–16)
BUN BLDV-MCNC: 75 MG/DL (ref 7–20)
CALCIUM SERPL-MCNC: 8.9 MG/DL (ref 8.3–10.6)
CHLORIDE BLD-SCNC: 93 MMOL/L (ref 99–110)
CO2: 25 MMOL/L (ref 21–32)
CREAT SERPL-MCNC: 2 MG/DL (ref 0.8–1.3)
GFR AFRICAN AMERICAN: 41
GFR NON-AFRICAN AMERICAN: 34
GLUCOSE BLD-MCNC: 233 MG/DL (ref 70–99)
POTASSIUM SERPL-SCNC: 4.2 MMOL/L (ref 3.5–5.1)
PRO-BNP: 547 PG/ML (ref 0–124)
SODIUM BLD-SCNC: 134 MMOL/L (ref 136–145)

## 2017-07-11 ENCOUNTER — HOSPITAL ENCOUNTER (OUTPATIENT)
Dept: WOUND CARE | Age: 62
Discharge: HOME | End: 2017-07-11
Attending: SURGERY | Admitting: SURGERY

## 2017-07-11 VITALS — RESPIRATION RATE: 20 BRPM | HEART RATE: 78 BPM | SYSTOLIC BLOOD PRESSURE: 118 MMHG | DIASTOLIC BLOOD PRESSURE: 63 MMHG

## 2017-07-11 RX ORDER — LIDOCAINE HYDROCHLORIDE 40 MG/ML
SOLUTION TOPICAL ONCE
Status: DISCONTINUED | OUTPATIENT
Start: 2017-07-11 | End: 2017-07-12 | Stop reason: HOSPADM

## 2017-07-14 ENCOUNTER — TELEPHONE (OUTPATIENT)
Dept: CARDIOLOGY CLINIC | Age: 62
End: 2017-07-14

## 2017-07-14 DIAGNOSIS — I50.22 CHRONIC SYSTOLIC HEART FAILURE (HCC): Primary | ICD-10-CM

## 2017-07-17 LAB
ANION GAP SERPL CALCULATED.3IONS-SCNC: 13 MMOL/L (ref 3–16)
BUN BLDV-MCNC: 80 MG/DL (ref 7–20)
CALCIUM SERPL-MCNC: 9.1 MG/DL (ref 8.3–10.6)
CHLORIDE BLD-SCNC: 95 MMOL/L (ref 99–110)
CO2: 28 MMOL/L (ref 21–32)
CREAT SERPL-MCNC: 1.8 MG/DL (ref 0.8–1.3)
GFR AFRICAN AMERICAN: 46
GFR NON-AFRICAN AMERICAN: 38
GLUCOSE BLD-MCNC: 118 MG/DL (ref 70–99)
POTASSIUM SERPL-SCNC: 4.5 MMOL/L (ref 3.5–5.1)
PRO-BNP: 689 PG/ML (ref 0–124)
SODIUM BLD-SCNC: 136 MMOL/L (ref 136–145)

## 2017-07-18 ENCOUNTER — HOSPITAL ENCOUNTER (OUTPATIENT)
Dept: WOUND CARE | Age: 62
Discharge: HOME | End: 2017-07-18
Attending: SURGERY | Admitting: SURGERY

## 2017-07-18 VITALS — DIASTOLIC BLOOD PRESSURE: 74 MMHG | HEART RATE: 78 BPM | SYSTOLIC BLOOD PRESSURE: 139 MMHG

## 2017-07-18 DIAGNOSIS — I50.21 ACUTE SYSTOLIC HEART FAILURE (HCC): Primary | ICD-10-CM

## 2017-07-18 PROCEDURE — 11042 DBRDMT SUBQ TIS 1ST 20SQCM/<: CPT | Performed by: SURGERY

## 2017-07-18 RX ORDER — LIDOCAINE HYDROCHLORIDE 40 MG/ML
SOLUTION TOPICAL ONCE
Status: DISCONTINUED | OUTPATIENT
Start: 2017-07-18 | End: 2017-07-19 | Stop reason: HOSPADM

## 2017-07-24 LAB
ANION GAP SERPL CALCULATED.3IONS-SCNC: 14 MMOL/L (ref 3–16)
BUN BLDV-MCNC: 71 MG/DL (ref 7–20)
CALCIUM SERPL-MCNC: 8.9 MG/DL (ref 8.3–10.6)
CHLORIDE BLD-SCNC: 93 MMOL/L (ref 99–110)
CO2: 27 MMOL/L (ref 21–32)
CREAT SERPL-MCNC: 1.5 MG/DL (ref 0.8–1.3)
GFR AFRICAN AMERICAN: 57
GFR NON-AFRICAN AMERICAN: 47
GLUCOSE BLD-MCNC: 202 MG/DL (ref 70–99)
POTASSIUM SERPL-SCNC: 4.8 MMOL/L (ref 3.5–5.1)
PRO-BNP: 666 PG/ML (ref 0–124)
SODIUM BLD-SCNC: 134 MMOL/L (ref 136–145)

## 2017-07-25 ENCOUNTER — HOSPITAL ENCOUNTER (OUTPATIENT)
Dept: WOUND CARE | Age: 62
Discharge: HOME | End: 2017-07-25
Attending: SURGERY | Admitting: SURGERY

## 2017-07-27 ENCOUNTER — OFFICE VISIT (OUTPATIENT)
Dept: CARDIOLOGY CLINIC | Age: 62
End: 2017-07-27

## 2017-07-27 VITALS
HEIGHT: 72 IN | RESPIRATION RATE: 28 BRPM | DIASTOLIC BLOOD PRESSURE: 50 MMHG | HEART RATE: 76 BPM | OXYGEN SATURATION: 99 % | SYSTOLIC BLOOD PRESSURE: 96 MMHG

## 2017-07-27 DIAGNOSIS — I95.2 HYPOTENSION DUE TO DRUGS: ICD-10-CM

## 2017-07-27 DIAGNOSIS — I50.22 CHRONIC SYSTOLIC HEART FAILURE (HCC): Primary | ICD-10-CM

## 2017-07-27 DIAGNOSIS — I42.9 CARDIOMYOPATHY (HCC): ICD-10-CM

## 2017-07-27 DIAGNOSIS — I25.10 CORONARY ARTERY DISEASE INVOLVING NATIVE CORONARY ARTERY OF NATIVE HEART WITHOUT ANGINA PECTORIS: ICD-10-CM

## 2017-07-27 PROCEDURE — G8427 DOCREV CUR MEDS BY ELIG CLIN: HCPCS | Performed by: NURSE PRACTITIONER

## 2017-07-27 PROCEDURE — G8420 CALC BMI NORM PARAMETERS: HCPCS | Performed by: NURSE PRACTITIONER

## 2017-07-27 PROCEDURE — 1036F TOBACCO NON-USER: CPT | Performed by: NURSE PRACTITIONER

## 2017-07-27 PROCEDURE — 99214 OFFICE O/P EST MOD 30 MIN: CPT | Performed by: NURSE PRACTITIONER

## 2017-07-27 PROCEDURE — 3017F COLORECTAL CA SCREEN DOC REV: CPT | Performed by: NURSE PRACTITIONER

## 2017-07-27 PROCEDURE — G8598 ASA/ANTIPLAT THER USED: HCPCS | Performed by: NURSE PRACTITIONER

## 2017-07-27 RX ORDER — SPIRONOLACTONE 25 MG/1
12.5 TABLET ORAL DAILY
Qty: 15 TABLET | Refills: 2 | Status: SHIPPED | OUTPATIENT
Start: 2017-07-27 | End: 2018-04-02 | Stop reason: SDUPTHER

## 2017-07-27 RX ORDER — PRAVASTATIN SODIUM 40 MG
40 TABLET ORAL NIGHTLY
Qty: 30 TABLET | Refills: 3 | Status: SHIPPED | OUTPATIENT
Start: 2017-07-27 | End: 2017-11-29 | Stop reason: SDUPTHER

## 2017-07-27 RX ORDER — HYDRALAZINE HYDROCHLORIDE 50 MG/1
50 TABLET, FILM COATED ORAL EVERY 8 HOURS SCHEDULED
Qty: 90 TABLET | Refills: 3 | Status: SHIPPED | OUTPATIENT
Start: 2017-07-27 | End: 2017-11-29 | Stop reason: SDUPTHER

## 2017-07-27 RX ORDER — CARVEDILOL 25 MG/1
25 TABLET ORAL 2 TIMES DAILY WITH MEALS
Qty: 60 TABLET | Refills: 3 | Status: SHIPPED | OUTPATIENT
Start: 2017-07-27 | End: 2017-11-29 | Stop reason: SDUPTHER

## 2017-07-27 RX ORDER — ISOSORBIDE DINITRATE 20 MG/1
10 TABLET ORAL 3 TIMES DAILY
Qty: 90 TABLET | Refills: 0 | Status: CANCELLED | OUTPATIENT
Start: 2017-07-27

## 2017-07-31 LAB
ANION GAP SERPL CALCULATED.3IONS-SCNC: 13 MMOL/L (ref 3–16)
BUN BLDV-MCNC: 65 MG/DL (ref 7–20)
CALCIUM SERPL-MCNC: 8.8 MG/DL (ref 8.3–10.6)
CHLORIDE BLD-SCNC: 97 MMOL/L (ref 99–110)
CO2: 28 MMOL/L (ref 21–32)
CREAT SERPL-MCNC: 1.6 MG/DL (ref 0.8–1.3)
GFR AFRICAN AMERICAN: 53
GFR NON-AFRICAN AMERICAN: 44
GLUCOSE BLD-MCNC: 73 MG/DL (ref 70–99)
POTASSIUM SERPL-SCNC: 4.6 MMOL/L (ref 3.5–5.1)
PRO-BNP: 805 PG/ML (ref 0–124)
SODIUM BLD-SCNC: 138 MMOL/L (ref 136–145)

## 2017-08-01 ENCOUNTER — HOSPITAL ENCOUNTER (OUTPATIENT)
Dept: OTHER | Age: 62
Discharge: HOME | End: 2017-08-31
Attending: INTERNAL MEDICINE | Admitting: INTERNAL MEDICINE

## 2017-08-01 ENCOUNTER — HOSPITAL ENCOUNTER (OUTPATIENT)
Dept: VASCULAR LAB | Age: 62
Discharge: HOME | End: 2017-08-01
Attending: SURGERY | Admitting: SURGERY

## 2017-08-01 ENCOUNTER — HOSPITAL ENCOUNTER (OUTPATIENT)
Dept: WOUND CARE | Age: 62
Discharge: HOME | End: 2017-08-02
Attending: SURGERY | Admitting: SURGERY

## 2017-08-01 VITALS — DIASTOLIC BLOOD PRESSURE: 62 MMHG | HEART RATE: 72 BPM | TEMPERATURE: 96.9 F | SYSTOLIC BLOOD PRESSURE: 105 MMHG

## 2017-08-01 DIAGNOSIS — I70.235 ATHEROSCLEROSIS OF NATIVE ARTERIES OF RIGHT LEG WITH ULCERATION OF OTHER PART OF FOOT (HCC): ICD-10-CM

## 2017-08-01 PROCEDURE — 99214 OFFICE O/P EST MOD 30 MIN: CPT | Performed by: SURGERY

## 2017-08-01 RX ORDER — LIDOCAINE HYDROCHLORIDE 40 MG/ML
SOLUTION TOPICAL ONCE
Status: DISCONTINUED | OUTPATIENT
Start: 2017-08-01 | End: 2017-08-03 | Stop reason: HOSPADM

## 2017-08-07 LAB
ANION GAP SERPL CALCULATED.3IONS-SCNC: 12 MMOL/L (ref 3–16)
BUN BLDV-MCNC: 66 MG/DL (ref 7–20)
CALCIUM SERPL-MCNC: 9 MG/DL (ref 8.3–10.6)
CHLORIDE BLD-SCNC: 95 MMOL/L (ref 99–110)
CO2: 28 MMOL/L (ref 21–32)
CREAT SERPL-MCNC: 1.5 MG/DL (ref 0.8–1.3)
GFR AFRICAN AMERICAN: 57
GFR NON-AFRICAN AMERICAN: 47
GLUCOSE BLD-MCNC: 180 MG/DL (ref 70–99)
POTASSIUM SERPL-SCNC: 4.7 MMOL/L (ref 3.5–5.1)
PRO-BNP: 492 PG/ML (ref 0–124)
SODIUM BLD-SCNC: 135 MMOL/L (ref 136–145)

## 2017-08-10 ENCOUNTER — TELEPHONE (OUTPATIENT)
Dept: CARDIOLOGY CLINIC | Age: 62
End: 2017-08-10

## 2017-08-10 DIAGNOSIS — I50.22 CHRONIC SYSTOLIC HEART FAILURE (HCC): Primary | ICD-10-CM

## 2017-08-14 ENCOUNTER — HOSPITAL ENCOUNTER (OUTPATIENT)
Dept: OTHER | Age: 62
Discharge: HOME | End: 2017-08-14
Attending: INTERNAL MEDICINE | Admitting: INTERNAL MEDICINE

## 2017-08-14 LAB
ANION GAP SERPL CALCULATED.3IONS-SCNC: 14 MMOL/L (ref 3–16)
BUN BLDV-MCNC: 68 MG/DL (ref 7–20)
CALCIUM SERPL-MCNC: 9 MG/DL (ref 8.3–10.6)
CHLORIDE BLD-SCNC: 91 MMOL/L (ref 99–110)
CO2: 28 MMOL/L (ref 21–32)
CREAT SERPL-MCNC: 1.4 MG/DL (ref 0.8–1.3)
GFR AFRICAN AMERICAN: >60
GFR NON-AFRICAN AMERICAN: 51
GLUCOSE BLD-MCNC: 145 MG/DL (ref 70–99)
POTASSIUM SERPL-SCNC: 4.4 MMOL/L (ref 3.5–5.1)
PRO-BNP: 570 PG/ML (ref 0–124)
SODIUM BLD-SCNC: 133 MMOL/L (ref 136–145)

## 2017-08-15 ENCOUNTER — HOSPITAL ENCOUNTER (OUTPATIENT)
Dept: ONCOLOGY | Age: 62
Discharge: HOME | End: 2017-08-31
Attending: INTERNAL MEDICINE | Admitting: INTERNAL MEDICINE

## 2017-08-15 ENCOUNTER — HOSPITAL ENCOUNTER (OUTPATIENT)
Dept: ONCOLOGY | Age: 62
Discharge: HOME | End: 2017-08-15
Admitting: INTERNAL MEDICINE

## 2017-08-15 ENCOUNTER — HOSPITAL ENCOUNTER (OUTPATIENT)
Dept: WOUND CARE | Age: 62
Discharge: HOME | End: 2017-08-15
Attending: SURGERY | Admitting: SURGERY

## 2017-08-15 VITALS
TEMPERATURE: 97.2 F | SYSTOLIC BLOOD PRESSURE: 135 MMHG | HEART RATE: 76 BPM | DIASTOLIC BLOOD PRESSURE: 65 MMHG | RESPIRATION RATE: 18 BRPM

## 2017-08-15 PROCEDURE — 99214 OFFICE O/P EST MOD 30 MIN: CPT | Performed by: SURGERY

## 2017-08-15 RX ORDER — SODIUM CHLORIDE 0.9 % (FLUSH) 0.9 %
10 SYRINGE (ML) INJECTION EVERY 12 HOURS SCHEDULED
Status: DISCONTINUED | OUTPATIENT
Start: 2017-08-15 | End: 2017-08-17 | Stop reason: HOSPADM

## 2017-08-15 RX ORDER — LIDOCAINE HYDROCHLORIDE 40 MG/ML
SOLUTION TOPICAL ONCE
Status: DISCONTINUED | OUTPATIENT
Start: 2017-08-15 | End: 2017-08-16 | Stop reason: HOSPADM

## 2017-08-15 RX ORDER — SODIUM CHLORIDE 0.9 % (FLUSH) 0.9 %
10 SYRINGE (ML) INJECTION PRN
Status: DISCONTINUED | OUTPATIENT
Start: 2017-08-15 | End: 2017-08-17 | Stop reason: HOSPADM

## 2017-08-17 RX ORDER — GABAPENTIN 300 MG/1
CAPSULE ORAL
Qty: 90 CAPSULE | Refills: 1 | Status: SHIPPED | OUTPATIENT
Start: 2017-08-17 | End: 2017-10-24 | Stop reason: SDUPTHER

## 2017-08-17 RX ORDER — FINASTERIDE 5 MG/1
TABLET, FILM COATED ORAL
Qty: 30 TABLET | Refills: 1 | Status: SHIPPED | OUTPATIENT
Start: 2017-08-17 | End: 2017-10-24 | Stop reason: SDUPTHER

## 2017-08-17 RX ORDER — PANTOPRAZOLE SODIUM 40 MG/1
TABLET, DELAYED RELEASE ORAL
Qty: 30 TABLET | Refills: 1 | Status: SHIPPED | OUTPATIENT
Start: 2017-08-17 | End: 2017-10-24 | Stop reason: SDUPTHER

## 2017-08-17 RX ORDER — CITALOPRAM 20 MG/1
TABLET ORAL
Qty: 30 TABLET | Refills: 1 | Status: SHIPPED | OUTPATIENT
Start: 2017-08-17 | End: 2017-10-24 | Stop reason: SDUPTHER

## 2017-08-22 LAB
ANION GAP SERPL CALCULATED.3IONS-SCNC: 15 MMOL/L (ref 3–16)
BUN BLDV-MCNC: 63 MG/DL (ref 7–20)
CALCIUM SERPL-MCNC: 9.2 MG/DL (ref 8.3–10.6)
CHLORIDE BLD-SCNC: 96 MMOL/L (ref 99–110)
CO2: 27 MMOL/L (ref 21–32)
CREAT SERPL-MCNC: 1.4 MG/DL (ref 0.8–1.3)
GFR AFRICAN AMERICAN: >60
GFR NON-AFRICAN AMERICAN: 51
GLUCOSE BLD-MCNC: 134 MG/DL (ref 70–99)
POTASSIUM SERPL-SCNC: 4.7 MMOL/L (ref 3.5–5.1)
PRO-BNP: 705 PG/ML (ref 0–124)
SODIUM BLD-SCNC: 138 MMOL/L (ref 136–145)

## 2017-08-25 ENCOUNTER — TELEPHONE (OUTPATIENT)
Dept: VASCULAR SURGERY | Age: 62
End: 2017-08-25

## 2017-08-28 ENCOUNTER — TELEPHONE (OUTPATIENT)
Dept: CARDIOLOGY CLINIC | Age: 62
End: 2017-08-28

## 2017-08-28 ENCOUNTER — TELEPHONE (OUTPATIENT)
Dept: VASCULAR SURGERY | Age: 62
End: 2017-08-28

## 2017-08-28 DIAGNOSIS — I70.90 ATHEROSCLEROSIS: ICD-10-CM

## 2017-08-28 DIAGNOSIS — I73.9 PERIPHERAL VASCULAR DISEASE, UNSPECIFIED (HCC): Primary | ICD-10-CM

## 2017-08-28 DIAGNOSIS — I74.4 EMBOLISM AND THROMBOSIS OF ARTERIES OF EXTREMITIES (HCC): ICD-10-CM

## 2017-09-01 LAB
APTT: 33.8 SECONDS (ref 23.6–34)
BASOPHILS ABSOLUTE: 0 %
BASOPHILS ABSOLUTE: 0 THOU/MCL (ref 0.01–0.07)
BUN BLDV-MCNC: 63 MG/DL (ref 8–26)
CALCIUM SERPL-MCNC: 8.6 MG/DL (ref 8.5–10.5)
CHLORIDE BLD-SCNC: 94 MEQ/L (ref 101–111)
CO2: 28 MEQ/L (ref 24–36)
CREAT SERPL-MCNC: 1.72 MG/DL (ref 0.64–1.27)
EOSINOPHILS ABSOLUTE: 0.4 THOU/MCL (ref 0.03–0.45)
EOSINOPHILS RELATIVE PERCENT: 4 %
GFR AFRICAN AMERICAN: 49 ML/MIN/1.73 SQ METER
GFR NON-AFRICAN AMERICAN: 40 ML/MIN/1.73 SQ METER
GLUCOSE BLD-MCNC: 166 MG/DL (ref 70–99)
HCT VFR BLD CALC: 36 % (ref 40–50)
HEMOGLOBIN: 12.3 G/DL (ref 13.5–16.5)
INR BLD: 1.1 (ref 0.8–1.2)
LYMPHOCYTES ABSOLUTE: 2 THOU/MCL (ref 1–4)
LYMPHOCYTES RELATIVE PERCENT: 21 %
MCH RBC QN AUTO: 31 PG (ref 27–33)
MCHC RBC AUTO-ENTMCNC: 35 G/DL (ref 32–36)
MCV RBC AUTO: 89 FL (ref 82–97)
MONOCYTES # BLD: 7 %
MONOCYTES ABSOLUTE: 0.7 THOU/MCL (ref 0.2–0.9)
NEUTROPHILS ABSOLUTE: 6.8 THOU/MCL (ref 1.8–7.7)
OSMOLALITY CALCULATION: 283 MOSM/KG (ref 280–300)
PDW BLD-RTO: 12.8 %
PLATELET # BLD: 162 THOU/MCL (ref 140–375)
POTASSIUM SERPL-SCNC: 4.8 MEQ/L (ref 3.6–5.1)
PROTHROMBIN TIME: 13.6 SECONDS (ref 11.7–14.2)
RBC # BLD: 3.98 MIL/MCL (ref 4.4–5.8)
SEG NEUTROPHILS: 68 %
SODIUM BLD-SCNC: 130 MEQ/L (ref 135–145)
WBC # BLD: 9.9 THOU/MCL (ref 3.6–10.5)

## 2017-09-12 ENCOUNTER — HOSPITAL ENCOUNTER (OUTPATIENT)
Dept: WOUND CARE | Age: 62
Discharge: HOME | End: 2017-09-12
Attending: SURGERY | Admitting: SURGERY

## 2017-09-12 VITALS — HEART RATE: 72 BPM | RESPIRATION RATE: 16 BRPM | SYSTOLIC BLOOD PRESSURE: 119 MMHG | DIASTOLIC BLOOD PRESSURE: 59 MMHG

## 2017-09-12 PROCEDURE — 99213 OFFICE O/P EST LOW 20 MIN: CPT | Performed by: SURGERY

## 2017-09-12 RX ORDER — LIDOCAINE HYDROCHLORIDE 40 MG/ML
SOLUTION TOPICAL ONCE
Status: DISCONTINUED | OUTPATIENT
Start: 2017-09-12 | End: 2017-09-13 | Stop reason: HOSPADM

## 2017-09-19 ENCOUNTER — HOSPITAL ENCOUNTER (OUTPATIENT)
Dept: WOUND CARE | Age: 62
Discharge: HOME | End: 2017-09-19
Attending: SURGERY | Admitting: SURGERY

## 2017-09-19 VITALS — HEART RATE: 79 BPM | TEMPERATURE: 98.5 F | SYSTOLIC BLOOD PRESSURE: 139 MMHG | DIASTOLIC BLOOD PRESSURE: 63 MMHG

## 2017-09-19 PROCEDURE — 15275 SKIN SUB GRAFT FACE/NK/HF/G: CPT | Performed by: SURGERY

## 2017-09-19 RX ORDER — LIDOCAINE HYDROCHLORIDE 40 MG/ML
SOLUTION TOPICAL ONCE
Status: DISCONTINUED | OUTPATIENT
Start: 2017-09-19 | End: 2017-09-20 | Stop reason: HOSPADM

## 2017-09-26 ENCOUNTER — TELEPHONE (OUTPATIENT)
Dept: CARDIOLOGY CLINIC | Age: 62
End: 2017-09-26

## 2017-09-26 ENCOUNTER — HOSPITAL ENCOUNTER (OUTPATIENT)
Dept: WOUND CARE | Age: 62
Discharge: HOME | End: 2017-09-26
Attending: SURGERY | Admitting: SURGERY

## 2017-09-26 VITALS — SYSTOLIC BLOOD PRESSURE: 112 MMHG | HEART RATE: 75 BPM | DIASTOLIC BLOOD PRESSURE: 68 MMHG | TEMPERATURE: 97.5 F

## 2017-09-26 PROCEDURE — 99212 OFFICE O/P EST SF 10 MIN: CPT | Performed by: SURGERY

## 2017-09-26 RX ORDER — LIDOCAINE HYDROCHLORIDE 40 MG/ML
SOLUTION TOPICAL ONCE
Status: DISCONTINUED | OUTPATIENT
Start: 2017-09-26 | End: 2017-09-27 | Stop reason: HOSPADM

## 2017-10-01 ENCOUNTER — HOSPITAL ENCOUNTER (OUTPATIENT)
Dept: OTHER | Age: 62
Discharge: HOME | End: 2017-10-31
Attending: INTERNAL MEDICINE | Admitting: INTERNAL MEDICINE

## 2017-10-03 ENCOUNTER — HOSPITAL ENCOUNTER (OUTPATIENT)
Dept: WOUND CARE | Age: 62
Discharge: HOME | End: 2017-10-03
Attending: SURGERY | Admitting: SURGERY

## 2017-10-03 VITALS — RESPIRATION RATE: 16 BRPM | HEART RATE: 67 BPM | SYSTOLIC BLOOD PRESSURE: 146 MMHG | DIASTOLIC BLOOD PRESSURE: 68 MMHG

## 2017-10-03 PROCEDURE — 15275 SKIN SUB GRAFT FACE/NK/HF/G: CPT | Performed by: SURGERY

## 2017-10-03 RX ORDER — LIDOCAINE HYDROCHLORIDE 40 MG/ML
SOLUTION TOPICAL ONCE
Status: DISCONTINUED | OUTPATIENT
Start: 2017-10-03 | End: 2017-10-04 | Stop reason: HOSPADM

## 2017-10-03 NOTE — IP AVS SNAPSHOT
After Visit Summary  (Discharge Instructions)    Medication List for Home    Based on the information you provided to us as well as any changes during this visit, the following is your updated medication list.  Compare this with your prescription bottles at home. If you have any questions or concerns, contact your primary care physician's office. Daily Medication List (This medication list can be shared with any healthcare provider who is helping you manage your medications)      ASK your doctor about these medications if you have questions        Last Dose    Next Dose Due AM NOON PM NIGHT    acetaminophen 325 MG tablet   Commonly known as:  TYLENOL   Take 2 tablets by mouth every 4 hours as needed for Pain or Fever                                         aspirin 81 MG chewable tablet   Take 81 mg by mouth daily.                                          carvedilol 25 MG tablet   Commonly known as:  COREG   Take 1 tablet by mouth 2 times daily (with meals)                                         citalopram 20 MG tablet   Commonly known as:  CELEXA   Take 1 tablet by mouth daily                                         ferrous sulfate 325 (65 Fe) MG tablet   Take 1 tablet by mouth daily (with breakfast)                                         finasteride 5 MG tablet   Commonly known as:  PROSCAR   Take 1 tablet by mouth daily                                         fluticasone 50 MCG/ACT nasal spray   Commonly known as:  FLONASE   1 spray by Nasal route daily                                         furosemide 40 MG tablet   Commonly known as:  LASIX   Take 1 tablet by mouth daily                                         gabapentin 300 MG capsule   Commonly known as:  NEURONTIN   Take 2 capsules by mouth 2 times daily                                         glucose 40 % Gel   Commonly known as:  GLUTOSE   Take 15 g by mouth as needed (low BS) hydrALAZINE 50 MG tablet   Commonly known as:  APRESOLINE   Take 1 tablet by mouth every 8 hours                                         insulin glargine 100 UNIT/ML injection vial   Commonly known as:  LANTUS   Inject 40 Units into the skin nightly                                         insulin lispro 100 UNIT/ML pen   Commonly known as:  HUMALOG   Inject 0-12 Units into the skin 3 times daily (with meals)                                         insulin lispro 100 UNIT/ML pen   Commonly known as:  HUMALOG   Inject 0-6 Units into the skin nightly                                         ipratropium-albuterol 0.5-2.5 (3) MG/3ML Soln nebulizer solution   Commonly known as:  DUONEB   Inhale 3 mLs into the lungs every 4 hours as needed for Shortness of Breath                                         nitroGLYCERIN 0.4 MG SL tablet   Commonly known as:  NITROSTAT   Place 0.4 mg under the tongue every 5 minutes as needed for Chest pain. OXYGEN   Inhale 2 L into the lungs as needed                                         pantoprazole 40 MG tablet   Commonly known as:  PROTONIX   Take 1 tablet by mouth daily                                         Pen Needles 31G X 6 MM Misc   1 each by Does not apply route daily                                         pravastatin 40 MG tablet   Commonly known as:  PRAVACHOL   Take 1 tablet by mouth nightly                                         spironolactone 25 MG tablet   Commonly known as:  ALDACTONE   Take 0.5 tablets by mouth daily                                         vitamin D 2000 units Caps capsule   Take  by mouth daily.                                          vitamin E 400 UNIT capsule   Take 400 Units by mouth daily                                                 Allergies as of 10/3/2017        Reactions    Lisinopril Other (See Comments)    Renal failure      Immunizations as of 10/3/2017     Name Date Dose VIS Date Route Influenza Virus Vaccine 10/1/2014 -- -- --    External: Patient reported    Influenza, Intradermal, Preservative free 2015 0.1 mL 2015 Intradermal    Influenza, Quadv, 3 yrs and older, IM, Preservative Free 2016 0.5 mL 2015 Intramuscular    Pneumococcal Polysaccharide ([id number]) 3/31/2017 0.5 mL 2015 Intramuscular      Last Vitals          Most Recent Value    Temp      Pulse  67    Resp  16    BP  (!)  146/68         After Visit Summary    This summary was created for you. Thank you for entrusting your care to us. The following information includes details about your hospital/visit stay along with steps you should take to help with your recovery once you leave the hospital.  In this packet, you will find information about the topics listed below:    · Instructions about your medications including a list of your home medications  · A summary of your hospital visit  · Follow-up appointments once you have left the hospital  · Your care plan at home      You may receive a survey regarding the care you received during your stay. Your input is valuable to us. We encourage you to complete and return your survey in the envelope provided. We hope you will choose us in the future for your healthcare needs. Patient Information     Patient Name CATHI Lancaster 1955      Care Provided at:     Name Address [address] [phone]            Your Visit    Here you will find information about your visit, including the reason for your visit. Please take this sheet with you when you visit your doctor or other health care provider in the future. It will help determine the best possible medical care for you at that time. If you have any questions once you leave the hospital, please call the department phone number listed below.         Why you were here     Your primary diagnosis was:  Not on File 3. If diabetic, good glucose control      Follow up with Dr Pramod Pagan In 1 week in the wound care center.       Call [phone] for any questions or concerns.       Your  is Ari: Alysia Amato Se Information: Should you experience any significant changes in your wound(s) or have questions about your wound care, please contact the [facility] IS [phone] Monday - Thursday 8:00 am - 4:00 pm and Friday 8:00 am - 1:00pm. If you need help with your wound outside these hours and cannot wait until we are again available, contact your PCP or go to the hospital emergency room.       PLEASE NOTE: IF YOU ARE UNABLE TO [address], CONTINUE TO USE THE SUPPLIES YOU HAVE AVAILABLE UNTIL YOU ARE AB[facility]. IT IS MOST IMPORTANT TO KEEP THE WOUND COVERED AT ALL TIMES.      You Next Appointment is:      Date__________ Time____________  Isidore Silence  Essential steps to Wound Healing       Arterial/Venous Studies ordered on:  Debridement (See Flowsheet)  Culture sent on:  Edema addressed:  Offloading addressed: See Nurses Note Plan of Care  Pain Control:  Host Factors Optimized (DM Control, nutrition, cardiac etc...): Paraplegic    Important information for a smoker       SMOKING: QUIT SMOKING. THIS IS THE MOST IMPORTANT ACTION YOU CAN TAKE TO IMPROVE YOUR CURRENT AND FUTURE HEALTH. [facility] ([phone])    Smoking harms nonsmokers. When nonsmokers are around people who smoke, they absorb nicotine, carbon monoxide, and other ingredients of tobacco smoke. DO NOT SMOKE AROUND CHILDREN     Children exposed to secondhand smoke are at an increased risk of:  Sudden Infant Death Syndrome (SIDS), acute respiratory infections, inflammation of the middle ear, and severe asthma.   Over a longer time, it causes heart Patient Signature/Responsible Adult:____________________    Clinician Signature:_____________________    Date:_____________________    Time:_____________________

## 2017-10-03 NOTE — PROGRESS NOTES
Gustavo Florentino  AGE: 57 y.o. GENDER: male  : 1955  TODAY'S DATE:  10/3/2017    Chief Complaint   Patient presents with    Wound Check     Pressure ulcer coccyx anf right heel          Skin Substitute Applied:       Theraskin 6 sq/cm            Performed by: Jose D Vital MD    Wound Type:diabetic    Consent obtained: Yes    Time out taken: Yes     Fenestrated: Yes    Instrument(s) N/A      [] Mesher Utilized    All  Guidelines Followed    Skin Substitute was Applied to Wound Number(s): Wound #: 4      Pressure Ulcer 17 Coccyx wound open (Active)   Number of days:186       Pressure Ulcer 17 Heel Right  on 17, it now appears stage II ->unstageable (Active)   Number of days:238       Pressure Ulcer 17 Heel Right #4 (Active)   Joanne-wound Assessment Clean 10/3/2017 11:08 AM   Joanne-Wound Moisture Dark edges;Dry 8/15/2017 10:55 AM   Joanne-Wound Color Pink 8/15/2017 10:55 AM   Pressure Ulcer Staging Stage III 8/15/2017 10:55 AM   Wound Assessment Granulation tissue; Red 10/3/2017 11:08 AM   Wound Length (cm) 1.5 cm 10/3/2017 11:08 AM   Wound Width (cm) 1.7 cm 10/3/2017 11:08 AM   Wound Depth (cm)  0.1 10/3/2017 11:08 AM   Calculated Wound Size (cm^2) (l*w) 2.55 cm^2 10/3/2017 11:08 AM   Change in Wound Size % (l*w) 62.05 10/3/2017 11:08 AM   Dressing Status Changed 2017 12:02 PM   Dressing/Treatment Dry dressing 2017 10:58 AM   Wound Cleansed Rinsed/Irrigated with saline 10/3/2017 11:08 AM   Necrotic Type Black Eschar 2017 10:53 AM   Necrotic Amount Large: % 2017 10:53 AM   Drainage Amount Scant 10/3/2017 11:08 AM   Drainage Description Serosanguinous 10/3/2017 11:08 AM   Odor Mild 10/3/2017 11:08 AM   [place]%Wound Bed 100 10/3/2017 11:08 AM   Red%Wound Bed 0 10/3/2017 11:08 AM   Yellow%Wound Bed 0 10/3/2017 11:08 AM   Black%Wound Bed 0 10/3/2017 11:08 AM   Purple%Wound Bed 0 2017 10:58 AM   Time out Yes 2017 11:42 AM   Op First Treatment Date 05/16/17 5/16/2017 10:54 AM   Number of days:140       Pressure Ulcer 05/16/17 Coccyx #5 (Active)   Joanne-wound Assessment Excoriated;Pink 10/3/2017 11:08 AM   Joanne-Wound Moisture Macerated 9/26/2017 10:58 AM   Pressure Ulcer Staging Stage III 10/3/2017 11:08 AM   Wound Assessment Granulation tissue; Red 10/3/2017 11:08 AM   Wound Length (cm) 0.5 cm 10/3/2017 11:08 AM   Wound Width (cm) 0.7 cm 10/3/2017 11:08 AM   Wound Depth (cm)  0.1 10/3/2017 11:08 AM   Calculated Wound Size (cm^2) (l*w) 0.35 cm^2 10/3/2017 11:08 AM   Change in Wound Size % (l*w) 96.2 10/3/2017 11:08 AM   Dressing Status Changed 8/1/2017 12:02 PM   Dressing/Treatment Dry dressing 8/15/2017 10:55 AM   Wound Cleansed Rinsed/Irrigated with saline 10/3/2017 11:08 AM   Drainage Amount Small 10/3/2017 11:08 AM   Drainage Description Serosanguinous 10/3/2017 11:08 AM   Odor None 9/19/2017 10:28 AM   [place]%Wound Bed 100 9/26/2017 10:58 AM   Red%Wound Bed 0 9/26/2017 10:58 AM   Yellow%Wound Bed 0 9/26/2017 10:58 AM   Black%Wound Bed 0 9/26/2017 10:58 AM   Purple%Wound Bed 0 9/26/2017 10:58 AM   Time out Yes 7/18/2017 11:42 AM   Op First Treatment Date 05/16/17 5/16/2017 10:54 AM   Number of days:140       Wound 03/30/17 Other (Comment) Scrotum Right area of necrotic appearing tissue, purulent drainage from lower scrotum. (Active)   Number of days:186         Total Surface Area Covered 2.55 sq/cm     Amount Wasted 0 sq/cm    Reason for Waste n/a      Was the Product Layered  No     Secured: Yes    Secured With: x [x]Steri Strips    []Sutures     []Staples []Other    Procedural Pain: 0/10     Post Procedural Pain: 0 / 10    Response to Treatment:  Well tolerated by patient. 57-year-old male paraplegic who is also diabetic and is seen in follow-up for her right calcaneal ulceration. The ulceration is making excellent progress recently. A new Theraskin graft was placed. Follow-up in 1 week.

## 2017-10-03 NOTE — PLAN OF CARE
NAME:  Chad Florentino  YOB: 1955  MEDICAL RECORD NUMBER:  [id number]  DATE:  10/3/2017    Dressing applied per orders. Discharge instructions given. Patient verbalized understanding. Return to [facility] in 1 week.   Called/faxed orders to [address] 1\" x 1\" placed by Dr Zaid Tee Diagnosis/Problem    [x] Knowledge deficit related to risk of pressure ulcer delopment    [x] Knowledge deficit related to disease process     [x] Potential for impaired skin integrity    [x] Impaired tissue integrity    Goals    [x] Pt will remain free from pressure ulcer     [] Goal met          [x] Goal not met     [x] Patient will verbalize understanding of importance of pressure relief devices         [x]  Goal met          [] Goal not met    Nursing Interventions/Evaluation    [x] Ensure appropriate offloading    [x] Assess offloading    [x] Assess immobility    [x] Offloading type prevalon boot, offloading mattress    Diagnostic/Treatment activity    [x] Pressure reduction device ordered/used   [] Debridement      Electronically signed by Zuleyma Escobedo RN on 10/3/2017 at 11:36 AM

## 2017-10-10 ENCOUNTER — HOSPITAL ENCOUNTER (OUTPATIENT)
Dept: WOUND CARE | Age: 62
Discharge: HOME | End: 2017-10-10
Attending: SURGERY | Admitting: SURGERY

## 2017-10-10 ENCOUNTER — OFFICE VISIT (OUTPATIENT)
Dept: CARDIOLOGY CLINIC | Age: 62
End: 2017-10-10

## 2017-10-10 VITALS
HEIGHT: 72 IN | WEIGHT: 180 LBS | BODY MASS INDEX: 24.38 KG/M2 | SYSTOLIC BLOOD PRESSURE: 114 MMHG | DIASTOLIC BLOOD PRESSURE: 58 MMHG | HEART RATE: 72 BPM

## 2017-10-10 VITALS — RESPIRATION RATE: 16 BRPM | DIASTOLIC BLOOD PRESSURE: 65 MMHG | SYSTOLIC BLOOD PRESSURE: 133 MMHG | HEART RATE: 68 BPM

## 2017-10-10 DIAGNOSIS — I10 ESSENTIAL HYPERTENSION: Chronic | ICD-10-CM

## 2017-10-10 DIAGNOSIS — R06.02 SOB (SHORTNESS OF BREATH): ICD-10-CM

## 2017-10-10 DIAGNOSIS — I50.22 CHRONIC SYSTOLIC HEART FAILURE (HCC): Primary | ICD-10-CM

## 2017-10-10 DIAGNOSIS — I42.8 NON-ISCHEMIC CARDIOMYOPATHY (HCC): ICD-10-CM

## 2017-10-10 DIAGNOSIS — I25.10 CORONARY ARTERY DISEASE INVOLVING NATIVE CORONARY ARTERY OF NATIVE HEART WITHOUT ANGINA PECTORIS: ICD-10-CM

## 2017-10-10 DIAGNOSIS — E78.00 PURE HYPERCHOLESTEROLEMIA: Chronic | ICD-10-CM

## 2017-10-10 DIAGNOSIS — N18.30 CKD (CHRONIC KIDNEY DISEASE) STAGE 3, GFR 30-59 ML/MIN (HCC): ICD-10-CM

## 2017-10-10 DIAGNOSIS — D50.9 IRON DEFICIENCY ANEMIA, UNSPECIFIED IRON DEFICIENCY ANEMIA TYPE: ICD-10-CM

## 2017-10-10 DIAGNOSIS — R73.9 HYPERGLYCEMIA: ICD-10-CM

## 2017-10-10 PROCEDURE — 99213 OFFICE O/P EST LOW 20 MIN: CPT | Performed by: SURGERY

## 2017-10-10 PROCEDURE — G8420 CALC BMI NORM PARAMETERS: HCPCS | Performed by: INTERNAL MEDICINE

## 2017-10-10 PROCEDURE — 99214 OFFICE O/P EST MOD 30 MIN: CPT | Performed by: INTERNAL MEDICINE

## 2017-10-10 PROCEDURE — 1036F TOBACCO NON-USER: CPT | Performed by: INTERNAL MEDICINE

## 2017-10-10 PROCEDURE — G8598 ASA/ANTIPLAT THER USED: HCPCS | Performed by: INTERNAL MEDICINE

## 2017-10-10 PROCEDURE — G8427 DOCREV CUR MEDS BY ELIG CLIN: HCPCS | Performed by: INTERNAL MEDICINE

## 2017-10-10 PROCEDURE — 3017F COLORECTAL CA SCREEN DOC REV: CPT | Performed by: INTERNAL MEDICINE

## 2017-10-10 PROCEDURE — G8484 FLU IMMUNIZE NO ADMIN: HCPCS | Performed by: INTERNAL MEDICINE

## 2017-10-10 RX ORDER — LIDOCAINE HYDROCHLORIDE 40 MG/ML
SOLUTION TOPICAL ONCE
Status: DISCONTINUED | OUTPATIENT
Start: 2017-10-10 | End: 2017-10-11 | Stop reason: HOSPADM
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
## 2017-10-10 NOTE — PROGRESS NOTES
mouth daily ) 30 tablet 0    glucose (GLUTOSE) 40 % GEL Take 15 g by mouth as needed (low BS) 45 g 1    OXYGEN Inhale 2 L into the lungs as needed      acetaminophen (TYLENOL) 325 MG tablet Take 2 tablets by mouth every 4 hours as needed for Pain or Fever 120 tablet 3    insulin lispro (HUMALOG) 100 UNIT/ML pen Inject 0-12 Units into the skin 3 times daily (with meals) 5 Pen 3    fluticasone (FLONASE) 50 MCG/ACT nasal spray 1 spray by Nasal route daily (Patient taking differently: 1 spray by Nasal route daily Indications: when needed ) 1 Bottle 3    ferrous sulfate 325 (65 FE) MG tablet Take 1 tablet by mouth daily (with breakfast) 30 tablet 3    vitamin E 400 UNIT capsule Take 400 Units by mouth daily      nitroGLYCERIN (NITROSTAT) 0.4 MG SL tablet Place 0.4 mg under the tongue every 5 minutes as needed for Chest pain.  Cholecalciferol (VITAMIN D) 2000 UNITS CAPS capsule Take  by mouth daily.  aspirin 81 MG chewable tablet Take 81 mg by mouth daily. No current facility-administered medications on file prior to encounter. REVIEW OF SYSTEMS    Pertinent items are noted in HPI.       Objective:      /65   Pulse 68   Resp 16     PHYSICAL EXAM    General Appearance: alert and oriented to person, place and time, well-developed and well-nourished, in no acute distress  Skin: warm and dry, no rash or erythema  Head: normocephalic and atraumatic  Neck: neck supple and non tender without mass, no thyromegaly or thyroid nodules, no cervical lymphadenopathy   Abdomen: soft, non-tender, non-distended, normal bowel sounds, no masses or organomegaly both ulcerations clean  Assessment:     Patient Active Problem List   Diagnosis    Arthritis    Diabetes type 2, uncontrolled ([facility] [address]    Essential hypertension    Paraplegia (HCC)    Hyperlipidemia    GERD (gastroesophageal reflux disease)    Loose stools    Renal insufficiency    Chronic anemia    Right hand pain    Renal stones    Colitis    Degenerative arthritis of finger    Hyperkalemia    Neck pain    Left shoulder pain    Arthritis of left acromioclavicular joint    Tendinitis of left rotator cuff    Complete tear of left rotator cuff    Pressure ulcer, stage 3 (HCC)    Sacral wound    Heel ulcer (HCC)    Pressure ulcer, heel    Atherosclerosis of native arteries of right leg with ulceration of other part of foot    Pressure ulcer of coccygeal region, stage 3 (HCC)    Pressure ulcer, sacrum    Pressure ulcer, stage 2    Chronic left shoulder pain    Urinary tract infection with hematuria due to chronic urinary catheter    CKD (chronic kidney disease) stage 3, GFR 30-59 ml/min    Chronic systolic heart failure (HCC)    Leukocytosis    Acute on chronic combined systolic (congestive) and diastolic (congestive) heart failure (HCC)    CHEMO (acute kidney injury) (HCC)    Elevated troponin    Acute right-sided CHF (congestive heart failure)    Acute cystitis with hematuria    Acute renal failure (HCC)    Acute pulmonary edema (HCC)    Pulmonary nodule    Mediastinal lymphadenopathy    Abnormal CT scan    Acute systolic heart failure (HCC)    Coronary artery disease involving native coronary artery of native heart without angina pectoris    SOB (shortness of breath)    Acute on chronic respiratory failure with hypoxia and hypercapnia (HCC)    Aspiration into airway    Coronary artery disease due to lipid rich plaque    Acute renal failure superimposed on stage 4 chronic kidney disease (HCC)    CHF (congestive heart failure), NYHA class I (HCC)    Pneumonia due to organism    Hyponatremia    Acute on chronic respiratory failure with hypoxia (HCC)    Acute on chronic diastolic CHF (congestive heart failure), NYHA class 1 (HCC)    Iron deficiency anemia    Pleural effusion    Collapsed lung    Non-ischemic cardiomyopathy (HCC)    Chest pain    CHEMO (acute kidney injury) ([address]    Diaphragm paralysis    (cm^2) (l*w) 0.16 cm^2 10/10/2017 10:29 AM   Change in Wound Size % (l*w) 98.26 10/10/2017 10:29 AM   Dressing Status Changed 8/1/2017 12:02 PM   Dressing/Treatment Dry dressing 8/15/2017 10:55 AM   Wound Cleansed Rinsed/Irrigated with saline 10/10/2017 10:29 AM   Drainage Amount Small 10/10/2017 10:29 AM   Drainage Description Serosanguinous 10/10/2017 10:29 AM   Odor None 9/19/2017 10:28 AM   [place]%Wound Bed 100 10/10/2017 10:29 AM   Red%Wound Bed 0 10/10/2017 10:29 AM   Yellow%Wound Bed 0 10/10/2017 10:29 AM   Black%Wound Bed 0 10/10/2017 10:29 AM   Purple%Wound Bed 0 10/10/2017 10:29 AM   Time out Yes 7/18/2017 11:42 AM   Op First Treatment Date 05/16/17 5/16/2017 10:54 AM   Number of days: 15     58-year-old male paraplegic seen in follow-up for a sacral ulceration as well as a right calcaneal ulceration. He had a Theraskin graft placed to the right heel last week. Both areas are progressing very well. Plan:     Continue current dressing changes. Follow-up in 1 week.     Discharge Treatment          Written Patient Discharge Instructions Given            Electronically signed by Srikanth Serna MD on 10/10/2017 at 10:51 AM

## 2017-10-13 ENCOUNTER — TELEPHONE (OUTPATIENT)
Dept: CARDIOLOGY CLINIC | Age: 62
End: 2017-10-13

## 2017-10-16 LAB
ANION GAP SERPL CALCULATED.3IONS-SCNC: 11 MMOL/L (ref 3–16)
BUN BLDV-MCNC: 51 MG/DL (ref 7–20)
CALCIUM SERPL-MCNC: 9.2 MG/DL (ref 8.3–10.6)
CHLORIDE BLD-SCNC: 97 MMOL/L (ref 99–110)
CHOLESTEROL, TOTAL: 166 MG/DL (ref 0–199)
CO2: 31 MMOL/L (ref 21–32)
CREAT SERPL-MCNC: 1.2 MG/DL (ref 0.8–1.3)
FERRITIN: 420.7 NG/ML (ref 30–400)
GFR AFRICAN AMERICAN: >60
GFR NON-AFRICAN AMERICAN: >60
GLUCOSE BLD-MCNC: 137 MG/DL (ref 70–99)
HDLC SERPL-MCNC: 43 MG/DL (ref 40–60)
IRON: 66 UG/DL (ref 59–158)
LDL CHOLESTEROL CALCULATED: 94 MG/DL
POTASSIUM SERPL-SCNC: 4.6 MMOL/L (ref 3.5–5.1)
PRO-BNP: 614 PG/ML (ref 0–124)
SODIUM BLD-SCNC: 139 MMOL/L (ref 136–145)
TOTAL IRON BINDING CAPACITY: 216 UG/DL (ref 260–445)
TRIGL SERPL-MCNC: 144 MG/DL (ref 0–150)
TSH REFLEX: 0.91 UIU/ML (ref 0.27–4.2)
VLDLC SERPL CALC-MCNC: 29 MG/DL

## 2017-10-17 ENCOUNTER — HOSPITAL ENCOUNTER (OUTPATIENT)
Dept: WOUND CARE | Age: 62
Discharge: HOME | End: 2017-10-17
Attending: SURGERY | Admitting: SURGERY

## 2017-10-17 VITALS — RESPIRATION RATE: 16 BRPM | DIASTOLIC BLOOD PRESSURE: 63 MMHG | HEART RATE: 72 BPM | SYSTOLIC BLOOD PRESSURE: 105 MMHG

## 2017-10-17 LAB
ESTIMATED AVERAGE GLUCOSE: 122.6 MG/DL
HBA1C MFR BLD: 5.9 %

## 2017-10-17 PROCEDURE — 99213 OFFICE O/P EST LOW 20 MIN: CPT | Performed by: SURGERY

## 2017-10-17 RX ORDER — LIDOCAINE HYDROCHLORIDE 40 MG/ML
SOLUTION TOPICAL ONCE
Status: DISCONTINUED | OUTPATIENT
Start: 2017-10-17 | End: 2017-10-18 | Stop reason: HOSPADM

## 2017-10-17 NOTE — PLAN OF CARE
Problem: Wound:  Goal: Will show signs of wound healing; wound closure and no evidence of infection  Will show signs of wound healing; wound closure and no evidence of infection   Outcome: Ongoing  Discharge instructions given. Patient verbalized understanding. Return to [facility] in 1 week.   Called/faxed orders to [facility]  Will continue to hold Theraskin    [] antibiotics    [] X-ray     [] Culture   [] Debridement      [] HBO Evaluation    [] LABS   [] Vascular Studies []
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
## 2017-10-24 ENCOUNTER — HOSPITAL ENCOUNTER (OUTPATIENT)
Dept: WOUND CARE | Age: 62
Discharge: HOME | End: 2017-10-24
Attending: SURGERY | Admitting: SURGERY

## 2017-10-24 VITALS
RESPIRATION RATE: 16 BRPM | TEMPERATURE: 97.4 F | SYSTOLIC BLOOD PRESSURE: 100 MMHG | HEART RATE: 79 BPM | DIASTOLIC BLOOD PRESSURE: 58 MMHG

## 2017-10-24 PROCEDURE — 11042 DBRDMT SUBQ TIS 1ST 20SQCM/<: CPT | Performed by: SURGERY

## 2017-10-24 RX ORDER — LIDOCAINE HYDROCHLORIDE 40 MG/ML
SOLUTION TOPICAL ONCE
Status: DISCONTINUED | OUTPATIENT
Start: 2017-10-24 | End: 2017-10-25 | Stop reason: HOSPADM

## 2017-10-24 NOTE — PROGRESS NOTES
Gustavo Florentino  AGE: 57 y.o. GENDER: male  : 1955  TODAY'S DATE:  10/24/2017    Chief Complaint   Patient presents with    Wound Check     coccyx         HISTORY of PRESENT ILLNESS HPI     Anita Ibanez is a 57 y.o. male who presents today for wound evaluation. History of Wound: Sacral wound  Wound Pain:  none  Severity:  0 / 10   Wound Type:  pressure  Modifying Factors:  none  Associated Signs/Symptoms:  none    Procedure Note    Performed by: Srikanth Serna MD    Consent obtained: Yes    Time out taken:  Yes    Pain Control: Anesthetic  Anesthetic: 4% Topical Xylocaine     Debridement:Excisional Debridement    Using curette the wound was sharply debrided    down through and including the removal of subcutaneous tissue.         Devitalized Tissue Debrided:  necrotic/eschar    Pre Debridement Measurements:  Are located in the Wound Documentation Flow Sheet    Wound #: 5     Post  Debridement Measurements:  Pressure Ulcer 17 Heel Right #4 (Active)   Joanne-wound Assessment Clean;Dry 10/24/2017  9:58 AM   Joanne-Wound Moisture Dry 10/24/2017  9:58 AM   Joanne-Wound Color Pink 10/24/2017  9:58 AM   Pressure Ulcer Staging Stage III 10/24/2017  9:58 AM   Wound Assessment Granulation tissue 10/24/2017  9:58 AM   Wound Length (cm) 0.5 cm 10/24/2017  9:58 AM   Wound Width (cm) 0.6 cm 10/24/2017  9:58 AM   Wound Depth (cm)  0.1 10/24/2017  9:58 AM   Calculated Wound Size (cm^2) (l*w) 0.3 cm^2 10/24/2017  9:58 AM   Change in Wound Size % (l*w) 95.54 10/24/2017  9:58 AM   Dressing Status Changed 2017 12:02 PM   Dressing/Treatment Dry dressing;Non adherent 10/10/2017 10:29 AM   Wound Cleansed Rinsed/Irrigated with saline 10/24/2017  9:58 AM   Necrotic Type Black Eschar 2017 10:53 AM   Necrotic Amount Large: % 2017 10:53 AM   Drainage Amount Scant 10/24/2017  9:58 AM   Drainage Description Serosanguinous 10/24/2017  9:58 AM   Odor None 10/24/2017  9:58 AM   [place]%Wound Bed 0 10/24/2017  9:58 AM Red%Wound Bed 100 10/24/2017  9:58 AM   Yellow%Wound Bed 0 10/24/2017  9:58 AM   Black%Wound Bed 0 10/24/2017  9:58 AM   Purple%Wound Bed 0 10/24/2017  9:58 AM   Time out Yes 7/18/2017 11:42 AM   Op First Treatment Date 05/16/17 5/16/2017 10:54 AM   Number of days: 160       Pressure Ulcer 05/16/17 Coccyx #5 (Active)   Joanne-wound Assessment White 10/24/2017  9:58 AM   Joanne-Wound Moisture Clean 10/24/2017  9:58 AM   Pressure Ulcer Staging Stage III 10/24/2017  9:58 AM   Wound Assessment Bleeding 10/24/2017 10:09 AM   Wound Length (cm) 3 cm 10/24/2017 10:09 AM   Wound Width (cm) 2 cm 10/24/2017 10:09 AM   Wound Depth (cm)  0.1 10/24/2017 10:09 AM   Calculated Wound Size (cm^2) (l*w) 6 cm^2 10/24/2017 10:09 AM   Change in Wound Size % (l*w) 34.78 10/24/2017 10:09 AM   Dressing Status Changed 8/1/2017 12:02 PM   Dressing/Treatment Dry dressing; Other (Comment) 10/10/2017 10:29 AM   Wound Cleansed Rinsed/Irrigated with saline 10/24/2017 10:09 AM   Drainage Amount Moderate 10/24/2017 10:09 AM   Drainage Description Serosanguinous 10/24/2017  9:58 AM   Odor None 10/24/2017  9:58 AM   [place]%Wound Bed 0 10/17/2017 10:43 AM   Red%Wound Bed 0 10/17/2017 10:43 AM   Yellow%Wound Bed 100 10/17/2017 10:43 AM   Black%Wound Bed 0 10/17/2017 10:43 AM   Purple%Wound Bed 0 10/17/2017 10:43 AM   Time out Yes 10/24/2017 10:09 AM   Op First Treatment Date 05/16/17 5/16/2017 10:54 AM   Number of days: 160           Total Surface Area Debrided:  6 sq cm     Bleeding:  Minimal    Hemostasis Achieved:  by pressure    Procedural Pain:  0  / 10     Post Procedural Pain:  0 / 10     Response to treatment:  Well tolerated by patient. The nature of the patient's condition was explained in depth. The patient was informed that their compliance to the treatment plan is paramount to successful healing and prevention of further ulceration and/or infection     Treatment Plan:   75-year-old male who is seen in follow-up for multiple wounds.   The

## 2017-10-25 RX ORDER — FINASTERIDE 5 MG/1
TABLET, FILM COATED ORAL
Qty: 30 TABLET | Refills: 1 | Status: SHIPPED | OUTPATIENT
Start: 2017-10-25 | End: 2017-12-26 | Stop reason: SDUPTHER

## 2017-10-25 RX ORDER — CITALOPRAM 20 MG/1
TABLET ORAL
Qty: 30 TABLET | Refills: 1 | Status: SHIPPED | OUTPATIENT
Start: 2017-10-25 | End: 2017-12-26 | Stop reason: SDUPTHER

## 2017-10-25 RX ORDER — PANTOPRAZOLE SODIUM 40 MG/1
TABLET, DELAYED RELEASE ORAL
Qty: 30 TABLET | Refills: 1 | Status: SHIPPED | OUTPATIENT
Start: 2017-10-25 | End: 2017-12-26 | Stop reason: SDUPTHER

## 2017-10-25 RX ORDER — GABAPENTIN 300 MG/1
CAPSULE ORAL
Qty: 90 CAPSULE | Refills: 1 | Status: SHIPPED | OUTPATIENT
Start: 2017-10-25 | End: 2017-12-26 | Stop reason: SDUPTHER

## 2017-10-25 RX ORDER — IPRATROPIUM BROMIDE AND ALBUTEROL SULFATE 2.5; .5 MG/3ML; MG/3ML
3 SOLUTION RESPIRATORY (INHALATION) EVERY 4 HOURS PRN
Qty: 360 ML | Refills: 1 | Status: SHIPPED | OUTPATIENT
Start: 2017-10-25 | End: 2018-02-06 | Stop reason: SDUPTHER

## 2017-10-30 ENCOUNTER — TELEPHONE (OUTPATIENT)
Dept: CARDIOLOGY CLINIC | Age: 62
End: 2017-10-30

## 2017-10-30 NOTE — TELEPHONE ENCOUNTER
Needs to have this office call [place] to re-certify his oxygen . He got set up with oxygen the last time he was in the hospital . He needs oxygen for when he sits up .

## 2017-10-31 ENCOUNTER — HOSPITAL ENCOUNTER (OUTPATIENT)
Dept: WOUND CARE | Age: 62
Discharge: HOME | End: 2017-10-31
Attending: SURGERY | Admitting: SURGERY

## 2017-10-31 VITALS
DIASTOLIC BLOOD PRESSURE: 62 MMHG | TEMPERATURE: 97 F | RESPIRATION RATE: 16 BRPM | HEART RATE: 72 BPM | SYSTOLIC BLOOD PRESSURE: 108 MMHG

## 2017-10-31 PROCEDURE — 15271 SKIN SUB GRAFT TRNK/ARM/LEG: CPT | Performed by: SURGERY

## 2017-10-31 RX ORDER — LIDOCAINE HYDROCHLORIDE 40 MG/ML
SOLUTION TOPICAL ONCE
Status: DISCONTINUED | OUTPATIENT
Start: 2017-10-31 | End: 2017-11-01 | Stop reason: HOSPADM

## 2017-10-31 NOTE — PLAN OF CARE
Problem: Wound:  Goal: Will show signs of wound healing; wound closure and no evidence of infection  Will show signs of wound healing; wound closure and no evidence of infection   Outcome: Ongoing  Discharge instructions given. Patient verbalized understanding. Return to [facility] in 1 week.   Called/faxed orders to  [phone] placed to coccyx 6 sq cm    [] antibiotics    [] X-ray     [] Culture   [] Debridement      [] HBO Evaluation    [] LABS   [] Vascular Studies []

## 2017-10-31 NOTE — TELEPHONE ENCOUNTER
When I spoke with [facility], I asked her if it was the normal marquez walk , she said yes.

## 2017-10-31 NOTE — PROGRESS NOTES
Gustavo MELVIN Chadd  AGE: 57 y.o. GENDER: male  : 1955  TODAY'S DATE:  10/31/2017    Chief Complaint   Patient presents with    Wound Check     buttock / right heel pressure           Skin Substitute Applied:       Theraskin 6 sq/cm            Performed by: Ellis Copeland MD    Wound Type:pressure    Consent obtained: Yes    Time out taken: Yes     Fenestrated: Yes    Instrument(s) N/A      [] Mesher Utilized    All  Guidelines Followed    Skin Substitute was Applied to Wound Number(s): Wound #: 5      Pressure Ulcer 17 Heel Right #4 (Active)   Joanne-wound Assessment Clean;Dry 10/31/2017 10:38 AM   Joanne-Wound Moisture Dry 10/31/2017 10:38 AM   Joanne-Wound Color Pink 10/31/2017 10:38 AM   Pressure Ulcer Staging Stage III 10/31/2017 10:38 AM   Wound Assessment Granulation tissue 10/31/2017 10:38 AM   Wound Length (cm) 0.3 cm 10/31/2017 10:38 AM   Wound Width (cm) 0.4 cm 10/31/2017 10:38 AM   Wound Depth (cm)  0.1 10/31/2017 10:38 AM   Calculated Wound Size (cm^2) (l*w) 0.12 cm^2 10/31/2017 10:38 AM   Change in Wound Size % (l*w) 98.21 10/31/2017 10:38 AM   Dressing Status Changed 2017 12:02 PM   Dressing/Treatment Dry dressing;Non adherent 10/10/2017 10:29 AM   Wound Cleansed Rinsed/Irrigated with saline 10/31/2017 10:38 AM   Necrotic Type Black Eschar 2017 10:53 AM   Necrotic Amount Large: % 2017 10:53 AM   Drainage Amount Small 10/31/2017 10:38 AM   Drainage Description Sanguinous 10/31/2017 10:38 AM   Odor None 10/24/2017  9:58 AM   [place]%Wound Bed 0 10/31/2017 10:38 AM   Red%Wound Bed 100 10/31/2017 10:38 AM   Yellow%Wound Bed 0 10/31/2017 10:38 AM   Black%Wound Bed 0 10/31/2017 10:38 AM   Purple%Wound Bed 0 10/31/2017 10:38 AM   Time out Yes 2017 11:42 AM   Op First Treatment Date 17 10:54 AM   Number of days: 168       Pressure Ulcer 17 Coccyx #5 (Active)   Joanne-wound Assessment Pink; White 10/31/2017 10:38 AM   Joanne-Wound Texture Scarring 10/31/2017 10:38 AM   Joanne-Wound Moisture Macerated 10/31/2017 10:38 AM   Pressure Ulcer Staging Stage III 10/31/2017 10:38 AM   Wound Assessment Pink;Yellow 10/31/2017 10:38 AM   Wound Length (cm) 2.5 cm 10/31/2017 10:38 AM   Wound Width (cm) 1.5 cm 10/31/2017 10:38 AM   Wound Depth (cm)  0.1 10/31/2017 10:38 AM   Calculated Wound Size (cm^2) (l*w) 3.75 cm^2 10/31/2017 10:38 AM   Change in Wound Size % (l*w) 59.24 10/31/2017 10:38 AM   Dressing Status Changed 8/1/2017 12:02 PM   Dressing/Treatment Dry dressing; Other (Comment) 10/10/2017 10:29 AM   Wound Cleansed Rinsed/Irrigated with saline 10/31/2017 10:38 AM   Drainage Amount Moderate 10/31/2017 10:38 AM   Drainage Description Serosanguinous 10/31/2017 10:38 AM   Odor None 10/31/2017 10:38 AM   [place]%Wound Bed 50 10/31/2017 10:38 AM   Red%Wound Bed 0 10/31/2017 10:38 AM   Yellow%Wound Bed 50 10/31/2017 10:38 AM   Black%Wound Bed 0 10/31/2017 10:38 AM   Purple%Wound Bed 0 10/31/2017 10:38 AM   Time out Yes 10/24/2017 10:09 AM   Op First Treatment Date 05/16/17 5/16/2017 10:54 AM   Number of days: 168         Total Surface Area Covered 6 sq/cm     Amount Wasted 0 sq/cm    Reason for Waste n/a      Was the Product Layered  No     Secured: Yes    Secured With: x [x]Steri Strips    []Sutures     []Staples []Other    Procedural Pain: 0/10     Post Procedural Pain: 0 / 10    Response to Treatment:  Well tolerated by patient. 57year old male with a pressure ulceration of the sacrum. A Theraskin graft was placed  The R calcaneal ulcer has healed. The R scrotal wound is still open. Purulent drainage was cultured. Will await culture results. Follow up in one week.

## 2017-10-31 NOTE — TELEPHONE ENCOUNTER
I spoke with Zhou Mckeon, she said that she could not addend MARIEL chart and mentioned that there was mention that pt is on O2 in chart. Muna Marinellio said that we will just wait for MARIEL to get back to address. When I told Ashleigh Laurent of what the chart said, she said that that wording would be sufficient and that I should fax it over to her. Faxed last 2 office notes- receipt confirmed. She sent new form with cheat sheet on how to fill out.  Will be in your folder

## 2017-10-31 NOTE — TELEPHONE ENCOUNTER
I spoke with Terrial Delay at [facility] and she verified that Chaparro Richard is one of their pts. She stated that she could fax over the form and she also said that testing is necessary. I will put in your folder upon receipt.

## 2017-11-01 ENCOUNTER — HOSPITAL ENCOUNTER (OUTPATIENT)
Dept: OTHER | Age: 62
Discharge: HOME | End: 2017-11-30
Attending: INTERNAL MEDICINE | Admitting: INTERNAL MEDICINE

## 2017-11-03 LAB
GRAM STAIN RESULT: ABNORMAL
ORGANISM: ABNORMAL
WOUND/ABSCESS: ABNORMAL

## 2017-11-07 ENCOUNTER — HOSPITAL ENCOUNTER (OUTPATIENT)
Dept: WOUND CARE | Age: 62
Discharge: HOME | End: 2017-11-07
Attending: SURGERY | Admitting: SURGERY

## 2017-11-07 VITALS
DIASTOLIC BLOOD PRESSURE: 61 MMHG | TEMPERATURE: 97 F | SYSTOLIC BLOOD PRESSURE: 126 MMHG | BODY MASS INDEX: 24.26 KG/M2 | HEART RATE: 76 BPM | WEIGHT: 178.9 LBS

## 2017-11-07 PROCEDURE — 99213 OFFICE O/P EST LOW 20 MIN: CPT | Performed by: SURGERY

## 2017-11-07 RX ORDER — LIDOCAINE HYDROCHLORIDE 40 MG/ML
SOLUTION TOPICAL ONCE
Status: DISCONTINUED | OUTPATIENT
Start: 2017-11-07 | End: 2017-11-08 | Stop reason: HOSPADM
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
## 2017-11-14 ENCOUNTER — HOSPITAL ENCOUNTER (OUTPATIENT)
Dept: WOUND CARE | Age: 62
Discharge: HOME | End: 2017-11-14
Attending: SURGERY | Admitting: SURGERY

## 2017-11-14 VITALS — HEART RATE: 77 BPM | SYSTOLIC BLOOD PRESSURE: 122 MMHG | TEMPERATURE: 97.7 F | DIASTOLIC BLOOD PRESSURE: 65 MMHG

## 2017-11-14 PROCEDURE — 99213 OFFICE O/P EST LOW 20 MIN: CPT | Performed by: SURGERY

## 2017-11-14 RX ORDER — LIDOCAINE HYDROCHLORIDE 40 MG/ML
SOLUTION TOPICAL ONCE
Status: DISCONTINUED | OUTPATIENT
Start: 2017-11-14 | End: 2017-11-15 | Stop reason: HOSPADM
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
## 2017-11-14 NOTE — PLAN OF CARE
Problem: Wound:  Goal: Will show signs of wound healing; wound closure and no evidence of infection  Will show signs of wound healing; wound closure and no evidence of infection   Outcome: Ongoing  Discharge instructions given. Patient verbalized understanding. Return to [facility] in 1 week.   [facility]    [] antibiotics    [] X-ray     [] Culture   [] Debridement      [] HBO Evaluation    [] LABS   [] Vascular Studies []

## 2017-11-21 ENCOUNTER — HOSPITAL ENCOUNTER (OUTPATIENT)
Dept: WOUND CARE | Age: 62
Discharge: HOME | End: 2017-11-21
Attending: SURGERY | Admitting: SURGERY

## 2017-11-21 VITALS
RESPIRATION RATE: 16 BRPM | HEART RATE: 77 BPM | SYSTOLIC BLOOD PRESSURE: 120 MMHG | DIASTOLIC BLOOD PRESSURE: 62 MMHG | TEMPERATURE: 98 F

## 2017-11-21 PROCEDURE — 99213 OFFICE O/P EST LOW 20 MIN: CPT | Performed by: SURGERY

## 2017-11-21 RX ORDER — LIDOCAINE HYDROCHLORIDE 40 MG/ML
SOLUTION TOPICAL ONCE
Status: DISCONTINUED | OUTPATIENT
Start: 2017-11-21 | End: 2017-11-22 | Stop reason: HOSPADM

## 2017-11-21 NOTE — PROGRESS NOTES
anemia    Pleural effusion    Collapsed lung    Non-ischemic cardiomyopathy (HCC)    Chest pain    CHEMO (acute kidney injury) ([address]    Diaphragm paralysis    Chronic pulmonary aspiration    Scrotal abscess    Neurogenic bladder    Diabetic ulcer of right heel associated with type 2 diabetes mellitus, with fat layer exposed ([address]    Wound, open, scrotum or testes    Skin ulcer of sacrum with fat layer exposed ([address]       Pressure Ulcer 05/16/17 Heel Right #4 (Active)   Joanne-wound Assessment Clean;Dry 11/7/2017 10:10 AM   Joanne-Wound Moisture Dry,Scaly 11/7/2017 10:10 AM   Joanne-Wound Color Pink 11/7/2017 10:10 AM   Pressure Ulcer Staging Stage III 11/21/2017  9:59 AM   Wound Assessment Dry;Epithelialization 11/21/2017  9:59 AM   Wound Length (cm) 0 cm 11/21/2017  9:59 AM   Wound Width (cm) 0 cm 11/21/2017  9:59 AM   Wound Depth (cm)  0 11/21/2017  9:59 AM   Calculated Wound Size (cm^2) (l*w) 0 cm^2 11/21/2017  9:59 AM   Change in Wound Size % (l*w) 100 11/21/2017  9:59 AM   Dressing Status Changed 8/1/2017 12:02 PM   Dressing/Treatment Dry dressing 11/14/2017 10:42 AM   Wound Cleansed Rinsed/Irrigated with saline 11/21/2017  9:59 AM   Necrotic Type Black Eschar 5/16/2017 10:53 AM   Necrotic Amount Large: % 5/16/2017 10:53 AM   Drainage Amount None 11/21/2017  9:59 AM   Drainage Description Serosanguinous 11/14/2017 10:42 AM   Odor None 10/24/2017  9:58 AM   [place]%Wound Bed 100 11/7/2017 10:10 AM   Red%Wound Bed 0 11/7/2017 10:10 AM   Yellow%Wound Bed 0 11/7/2017 10:10 AM   Black%Wound Bed 0 11/7/2017 10:10 AM   Purple%Wound Bed 0 11/7/2017 10:10 AM   Time out Yes 7/18/2017 11:42 AM   Op First Treatment Date 05/16/17 5/16/2017 10:54 AM   Number of days: 189       Pressure Ulcer 05/16/17 Coccyx #5 (Active)   Joanne-wound Assessment Pink 11/21/2017  9:59 AM   Joanne-Wound Texture Scarring 11/14/2017 10:42 AM   Joanne-Wound Moisture Macerated 11/14/2017 10:42 AM   Pressure Ulcer Staging Stage III 11/21/2017  9:59

## 2017-11-29 RX ORDER — PRAVASTATIN SODIUM 40 MG
40 TABLET ORAL NIGHTLY
Qty: 30 TABLET | Refills: 3 | Status: SHIPPED | OUTPATIENT
Start: 2017-11-29 | End: 2018-03-29 | Stop reason: SDUPTHER

## 2017-11-29 RX ORDER — HYDRALAZINE HYDROCHLORIDE 50 MG/1
50 TABLET, FILM COATED ORAL EVERY 8 HOURS SCHEDULED
Qty: 90 TABLET | Refills: 3 | Status: SHIPPED | OUTPATIENT
Start: 2017-11-29 | End: 2018-03-29 | Stop reason: SDUPTHER

## 2017-11-29 RX ORDER — CARVEDILOL 25 MG/1
25 TABLET ORAL 2 TIMES DAILY WITH MEALS
Qty: 60 TABLET | Refills: 3 | Status: ON HOLD | OUTPATIENT
Start: 2017-11-29 | End: 2018-01-15 | Stop reason: HOSPADM

## 2017-12-04 ENCOUNTER — HOSPITAL ENCOUNTER (OUTPATIENT)
Dept: OTHER | Age: 62
Discharge: HOME | End: 2017-12-31
Attending: INTERNAL MEDICINE | Admitting: INTERNAL MEDICINE

## 2017-12-04 LAB
ANION GAP SERPL CALCULATED.3IONS-SCNC: 11 MMOL/L (ref 3–16)
BUN BLDV-MCNC: 63 MG/DL (ref 7–20)
CALCIUM SERPL-MCNC: 8.8 MG/DL (ref 8.3–10.6)
CHLORIDE BLD-SCNC: 97 MMOL/L (ref 99–110)
CO2: 28 MMOL/L (ref 21–32)
CREAT SERPL-MCNC: 1.4 MG/DL (ref 0.8–1.3)
GFR AFRICAN AMERICAN: >60
GFR NON-AFRICAN AMERICAN: 51
GLUCOSE BLD-MCNC: 214 MG/DL (ref 70–99)
POTASSIUM SERPL-SCNC: 4.7 MMOL/L (ref 3.5–5.1)
PRO-BNP: 581 PG/ML (ref 0–124)
SODIUM BLD-SCNC: 136 MMOL/L (ref 136–145)

## 2017-12-05 ENCOUNTER — HOSPITAL ENCOUNTER (OUTPATIENT)
Dept: WOUND CARE | Age: 62
Discharge: HOME | End: 2017-12-05
Attending: SURGERY | Admitting: SURGERY

## 2017-12-05 VITALS
BODY MASS INDEX: 23.43 KG/M2 | DIASTOLIC BLOOD PRESSURE: 57 MMHG | HEART RATE: 73 BPM | WEIGHT: 173 LBS | TEMPERATURE: 97.6 F | HEIGHT: 72 IN | SYSTOLIC BLOOD PRESSURE: 103 MMHG

## 2017-12-05 PROCEDURE — 99213 OFFICE O/P EST LOW 20 MIN: CPT | Performed by: SURGERY

## 2017-12-05 RX ORDER — LIDOCAINE HYDROCHLORIDE 40 MG/ML
SOLUTION TOPICAL ONCE
Status: DISCONTINUED | OUTPATIENT
Start: 2017-12-05 | End: 2017-12-06 | Stop reason: HOSPADM
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
## 2017-12-19 ENCOUNTER — HOSPITAL ENCOUNTER (OUTPATIENT)
Dept: WOUND CARE | Age: 62
Discharge: HOME | End: 2017-12-19
Attending: SURGERY | Admitting: SURGERY

## 2017-12-19 VITALS
TEMPERATURE: 97 F | SYSTOLIC BLOOD PRESSURE: 127 MMHG | HEART RATE: 72 BPM | DIASTOLIC BLOOD PRESSURE: 63 MMHG | RESPIRATION RATE: 16 BRPM

## 2017-12-19 PROCEDURE — 99213 OFFICE O/P EST LOW 20 MIN: CPT | Performed by: SURGERY

## 2017-12-19 RX ORDER — LIDOCAINE HYDROCHLORIDE 40 MG/ML
SOLUTION TOPICAL ONCE
Status: DISCONTINUED | OUTPATIENT
Start: 2017-12-19 | End: 2017-12-20 | Stop reason: HOSPADM
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
## 2017-12-26 RX ORDER — FINASTERIDE 5 MG/1
TABLET, FILM COATED ORAL
Qty: 30 TABLET | Refills: 1 | Status: SHIPPED | OUTPATIENT
Start: 2017-12-26 | End: 2018-03-09 | Stop reason: SDUPTHER

## 2017-12-26 RX ORDER — GABAPENTIN 300 MG/1
CAPSULE ORAL
Qty: 90 CAPSULE | Refills: 1 | Status: SHIPPED | OUTPATIENT
Start: 2017-12-26 | End: 2018-03-09 | Stop reason: SDUPTHER

## 2017-12-26 RX ORDER — CITALOPRAM 20 MG/1
TABLET ORAL
Qty: 30 TABLET | Refills: 1 | Status: SHIPPED | OUTPATIENT
Start: 2017-12-26 | End: 2018-03-09 | Stop reason: SDUPTHER

## 2017-12-26 RX ORDER — PANTOPRAZOLE SODIUM 40 MG/1
TABLET, DELAYED RELEASE ORAL
Qty: 30 TABLET | Refills: 1 | Status: SHIPPED | OUTPATIENT
Start: 2017-12-26 | End: 2018-03-09 | Stop reason: SDUPTHER

## 2018-01-01 ENCOUNTER — HOSPITAL ENCOUNTER (OUTPATIENT)
Dept: OTHER | Age: 63
Discharge: HOME | End: 2018-01-31
Attending: INTERNAL MEDICINE | Admitting: INTERNAL MEDICINE

## 2018-01-07 PROBLEM — J10.1 INFLUENZA A: Status: ACTIVE | Noted: 2018-01-07

## 2018-01-09 ENCOUNTER — HOSPITAL ENCOUNTER (OUTPATIENT)
Dept: WOUND CARE | Age: 63
Discharge: HOME | End: 2018-01-02
Attending: SURGERY | Admitting: SURGERY

## 2018-01-19 ENCOUNTER — TELEPHONE (OUTPATIENT)
Dept: PULMONOLOGY | Age: 63
End: 2018-01-19

## 2018-01-19 NOTE — TELEPHONE ENCOUNTER
Ronnie Moreno wanted to inform radha that she called the patient but had to leave a message.  I did inform her that he was still admitted to the hospital. If u need to speak with her she canbe reached at [phone]

## 2018-01-19 NOTE — TELEPHONE ENCOUNTER
I spoke with Arben Conde and gave her the phone # for [facility] for the pt's Stacy Hale if they have any problems over the weekend

## 2018-01-26 ENCOUNTER — TELEPHONE (OUTPATIENT)
Dept: INTERNAL MEDICINE | Age: 63
End: 2018-01-26

## 2018-01-26 ENCOUNTER — TELEPHONE (OUTPATIENT)
Dept: PULMONOLOGY | Age: 63
End: 2018-01-26

## 2018-01-26 NOTE — TELEPHONE ENCOUNTER
Jens Lim from [facility] called to let you know that, the Mr. Florentino has the machine. She checked on them this morning and everything is fine.

## 2018-02-02 ENCOUNTER — OFFICE VISIT (OUTPATIENT)
Dept: PULMONOLOGY | Age: 63
End: 2018-02-02

## 2018-02-02 ENCOUNTER — HOSPITAL ENCOUNTER (OUTPATIENT)
Dept: OTHER | Age: 63
Discharge: HOME | End: 2018-02-02
Attending: INTERNAL MEDICINE | Admitting: INTERNAL MEDICINE

## 2018-02-02 VITALS
SYSTOLIC BLOOD PRESSURE: 108 MMHG | BODY MASS INDEX: 20.61 KG/M2 | WEIGHT: 152 LBS | RESPIRATION RATE: 26 BRPM | HEART RATE: 76 BPM | OXYGEN SATURATION: 95 % | DIASTOLIC BLOOD PRESSURE: 56 MMHG

## 2018-02-02 DIAGNOSIS — Z87.01 H/O: PNEUMONIA: ICD-10-CM

## 2018-02-02 DIAGNOSIS — T17.500A MUCUS PLUGGING OF BRONCHI: ICD-10-CM

## 2018-02-02 DIAGNOSIS — Z87.01 H/O: PNEUMONIA: Primary | ICD-10-CM

## 2018-02-02 PROCEDURE — G8427 DOCREV CUR MEDS BY ELIG CLIN: HCPCS | Performed by: INTERNAL MEDICINE

## 2018-02-02 PROCEDURE — 1036F TOBACCO NON-USER: CPT | Performed by: INTERNAL MEDICINE

## 2018-02-02 PROCEDURE — 3017F COLORECTAL CA SCREEN DOC REV: CPT | Performed by: INTERNAL MEDICINE

## 2018-02-02 PROCEDURE — G8484 FLU IMMUNIZE NO ADMIN: HCPCS | Performed by: INTERNAL MEDICINE

## 2018-02-02 PROCEDURE — G8420 CALC BMI NORM PARAMETERS: HCPCS | Performed by: INTERNAL MEDICINE

## 2018-02-02 PROCEDURE — 99214 OFFICE O/P EST MOD 30 MIN: CPT | Performed by: INTERNAL MEDICINE

## 2018-02-02 PROCEDURE — G8598 ASA/ANTIPLAT THER USED: HCPCS | Performed by: INTERNAL MEDICINE

## 2018-02-02 PROCEDURE — 1111F DSCHRG MED/CURRENT MED MERGE: CPT | Performed by: INTERNAL MEDICINE

## 2018-02-02 ASSESSMENT — ENCOUNTER SYMPTOMS
BLOOD IN STOOL: 0
WHEEZING: 0
CHEST TIGHTNESS: 0
ABDOMINAL PAIN: 0
VOICE CHANGE: 0
ABDOMINAL DISTENTION: 0
COUGH: 1
CONSTIPATION: 0
DIARRHEA: 0
SINUS PRESSURE: 0
SORE THROAT: 0
CHOKING: 0
STRIDOR: 0
SHORTNESS OF BREATH: 0
ANAL BLEEDING: 0
RHINORRHEA: 0
APNEA: 0
BACK PAIN: 0

## 2018-02-02 NOTE — PROGRESS NOTES
Procedure Laterality Date    APPENDECTOMY      BRONCHOSCOPY  01/17/2018    CARDIAC SURGERY      AORTA 1982 1995    CHOLECYSTECTOMY      CORONARY ANGIOPLASTY WITH STENT PLACEMENT      X1    CORONARY ANGIOPLASTY WITH STENT PLACEMENT      X2 FEMORAL    CYSTOSCOPY Bilateral 12/02/2016    cysto bilateral retrogrades, ball exchange    GASTROSTOMY TUBE PLACEMENT  01/17/2017    LITHOTRIPSY      OTHER SURGICAL HISTORY  2/24/15    right sided percutaneous nephrolithotomy     OTHER SURGICAL HISTORY  04/04/2017    suprapubic cath placed     SKIN GRAFT      MANY    TONSILLECTOMY       Family History   Problem Relation Age of Onset    Heart Disease Mother     Diabetes Father     Heart Disease Father     Cancer Father     Cancer Brother     Cancer Brother     Substance Abuse Brother        Review of Systems:  Review of Systems   Constitutional: Negative for activity change, appetite change, fatigue and fever. HENT: Negative for congestion, ear discharge, ear pain, postnasal drip, rhinorrhea, sinus pressure, sneezing, sore throat, tinnitus and voice change. Respiratory: Positive for cough. Negative for apnea, choking, chest tightness, shortness of breath, wheezing and stridor. Cardiovascular: Negative for chest pain, palpitations and leg swelling. Gastrointestinal: Negative for abdominal distention, abdominal pain, anal bleeding, blood in stool, constipation and diarrhea. Musculoskeletal: Negative for arthralgias, back pain and gait problem. Skin: Negative for pallor and rash. Allergic/Immunologic: Negative for environmental allergies. Neurological: Negative for dizziness, tremors, seizures, syncope, speech difficulty, weakness, light-headedness, numbness and headaches. Psychiatric/Behavioral: Negative for sleep disturbance. Vitals:    02/02/18 1103   BP: (!) 108/56   Pulse: 76   Resp: 26   SpO2: 95%   Weight: 152 lb (68.9 kg)     Body mass index is 20.61 kg/m².      Wt Readings from Last 3 Encounters:   02/02/18 152 lb (68.9 kg)   01/25/18 151 lb 14.4 oz (68.9 kg)   01/15/18 169 lb 1.5 oz (76.7 kg)     BP Readings from Last 3 Encounters:   02/02/18 (!) 108/56   01/25/18 (!) 143/74   01/15/18 126/61         Physical Exam   Constitutional: He is oriented to person, place, and time. He appears well-developed and well-nourished. No distress. HENT:   Mouth/Throat: Oropharynx is clear and moist. No oropharyngeal exudate. Cardiovascular: Normal rate, regular rhythm, normal heart sounds and intact distal pulses. No murmur heard. Pulmonary/Chest: Breath sounds normal. No respiratory distress. He has no wheezes. He has no rales. He exhibits no tenderness. Abdominal: He exhibits no distension and no mass. There is no tenderness. There is no rebound and no guarding. Musculoskeletal: He exhibits no edema or deformity. Neurological: He is alert and oriented to person, place, and time. He displays normal reflexes. No cranial nerve deficit. He exhibits normal muscle tone. Coordination normal.   Skin: No rash noted. He is not diaphoretic. No erythema. No pallor. Health Maintenance   Topic Date Due    Hepatitis C screen  1955    HIV screen  10/02/1968    DTaP/Tdap/Td vaccine (1 - Tdap) 10/02/1972    Diabetic foot exam  04/28/2015    Diabetic microalbuminuria test  04/28/2015    Diabetic retinal exam  04/28/2015    Flu vaccine (1) 09/01/2017    Lipid screen  10/16/2018    A1C test (Diabetic or Prediabetic)  01/16/2019    Potassium monitoring  01/25/2019    Creatinine monitoring  01/25/2019    Colon cancer screen colonoscopy  04/28/2024    Zostavax vaccine  Addressed    Pneumococcal med risk  Completed          Assessment/Plan:    1. H/O: pneumonia  XR CHEST STANDARD (2 VW)   2. Mucus plugging of bronchi  XR CHEST STANDARD (2 VW)      Patient noted to have aspiration pneumonia, now on a modified dysphagia 3 diet which he is tolerating well.   No evidence of bronchospasm

## 2018-02-07 RX ORDER — IPRATROPIUM BROMIDE AND ALBUTEROL SULFATE 2.5; .5 MG/3ML; MG/3ML
3 SOLUTION RESPIRATORY (INHALATION) EVERY 4 HOURS PRN
Qty: 360 ML | Refills: 1 | Status: SHIPPED | OUTPATIENT
Start: 2018-02-07 | End: 2018-04-24 | Stop reason: SDUPTHER

## 2018-03-02 ENCOUNTER — HOSPITAL ENCOUNTER (OUTPATIENT)
Dept: OTHER | Age: 63
Discharge: HOME | End: 2018-03-31
Attending: INTERNAL MEDICINE | Admitting: INTERNAL MEDICINE

## 2018-03-02 LAB
ANION GAP SERPL CALCULATED.3IONS-SCNC: 12 MMOL/L (ref 3–16)
BASOPHILS ABSOLUTE: 0 K/UL (ref 0–0.2)
BASOPHILS RELATIVE PERCENT: 0.4 %
BUN BLDV-MCNC: 59 MG/DL (ref 7–20)
CALCIUM SERPL-MCNC: 9 MG/DL (ref 8.3–10.6)
CHLORIDE BLD-SCNC: 98 MMOL/L (ref 99–110)
CO2: 26 MMOL/L (ref 21–32)
CREAT SERPL-MCNC: 1.4 MG/DL (ref 0.8–1.3)
EOSINOPHILS ABSOLUTE: 0.5 K/UL (ref 0–0.6)
EOSINOPHILS RELATIVE PERCENT: 5.5 %
GFR AFRICAN AMERICAN: >60
GFR NON-AFRICAN AMERICAN: 51
GLUCOSE BLD-MCNC: 197 MG/DL (ref 70–99)
HCT VFR BLD CALC: 35.1 % (ref 40.5–52.5)
HEMOGLOBIN: 11.8 G/DL (ref 13.5–17.5)
LYMPHOCYTES ABSOLUTE: 1.8 K/UL (ref 1–5.1)
LYMPHOCYTES RELATIVE PERCENT: 18.3 %
MCH RBC QN AUTO: 30 PG (ref 26–34)
MCHC RBC AUTO-ENTMCNC: 33.6 G/DL (ref 31–36)
MCV RBC AUTO: 89.3 FL (ref 80–100)
MONOCYTES ABSOLUTE: 0.7 K/UL (ref 0–1.3)
MONOCYTES RELATIVE PERCENT: 6.9 %
NEUTROPHILS ABSOLUTE: 6.6 K/UL (ref 1.7–7.7)
NEUTROPHILS RELATIVE PERCENT: 68.9 %
PDW BLD-RTO: 14.7 % (ref 12.4–15.4)
PLATELET # BLD: 171 K/UL (ref 135–450)
PMV BLD AUTO: 8 FL (ref 5–10.5)
POTASSIUM SERPL-SCNC: 4.7 MMOL/L (ref 3.5–5.1)
PRO-BNP: 571 PG/ML (ref 0–124)
RBC # BLD: 3.93 M/UL (ref 4.2–5.9)
SODIUM BLD-SCNC: 136 MMOL/L (ref 136–145)
WBC # BLD: 9.6 K/UL (ref 4–11)

## 2018-03-05 ENCOUNTER — TELEPHONE (OUTPATIENT)
Dept: INTERNAL MEDICINE | Age: 63
End: 2018-03-05

## 2018-03-05 NOTE — TELEPHONE ENCOUNTER
Shannon Whiting called to get a verbal order for OT. They will see the patient for 1 time weekly for 4 weeks for upper body weakness. Please call.

## 2018-03-09 RX ORDER — CITALOPRAM 20 MG/1
TABLET ORAL
Qty: 30 TABLET | Refills: 1 | Status: SHIPPED | OUTPATIENT
Start: 2018-03-09 | End: 2018-05-17 | Stop reason: SDUPTHER

## 2018-03-09 RX ORDER — GABAPENTIN 300 MG/1
CAPSULE ORAL
Qty: 90 CAPSULE | Refills: 1 | Status: SHIPPED | OUTPATIENT
Start: 2018-03-09 | End: 2018-05-17 | Stop reason: SDUPTHER

## 2018-03-09 RX ORDER — FINASTERIDE 5 MG/1
TABLET, FILM COATED ORAL
Qty: 30 TABLET | Refills: 1 | Status: SHIPPED | OUTPATIENT
Start: 2018-03-09 | End: 2018-05-17 | Stop reason: SDUPTHER

## 2018-03-09 RX ORDER — PANTOPRAZOLE SODIUM 40 MG/1
TABLET, DELAYED RELEASE ORAL
Qty: 30 TABLET | Refills: 1 | Status: SHIPPED | OUTPATIENT
Start: 2018-03-09 | End: 2018-05-17 | Stop reason: SDUPTHER

## 2018-03-29 ENCOUNTER — TELEPHONE (OUTPATIENT)
Dept: CARDIOLOGY CLINIC | Age: 63
End: 2018-03-29

## 2018-03-29 RX ORDER — HYDRALAZINE HYDROCHLORIDE 50 MG/1
50 TABLET, FILM COATED ORAL EVERY 8 HOURS SCHEDULED
Qty: 90 TABLET | Refills: 5 | Status: SHIPPED | OUTPATIENT
Start: 2018-03-29 | End: 2018-10-10 | Stop reason: SDUPTHER

## 2018-03-29 RX ORDER — PRAVASTATIN SODIUM 40 MG
40 TABLET ORAL NIGHTLY
Qty: 30 TABLET | Refills: 6 | Status: SHIPPED | OUTPATIENT
Start: 2018-03-29 | End: 2018-11-08 | Stop reason: SDUPTHER

## 2018-03-30 LAB
ANION GAP SERPL CALCULATED.3IONS-SCNC: 11 MMOL/L (ref 3–16)
ATYPICAL LYMPHOCYTE RELATIVE PERCENT: 4 % (ref 0–6)
BASOPHILS ABSOLUTE: 0 K/UL (ref 0–0.2)
BASOPHILS RELATIVE PERCENT: 0 %
BUN BLDV-MCNC: 62 MG/DL (ref 7–20)
CALCIUM SERPL-MCNC: 8.9 MG/DL (ref 8.3–10.6)
CHLORIDE BLD-SCNC: 96 MMOL/L (ref 99–110)
CO2: 29 MMOL/L (ref 21–32)
CREAT SERPL-MCNC: 1.5 MG/DL (ref 0.8–1.3)
EOSINOPHILS ABSOLUTE: 0.8 K/UL (ref 0–0.6)
EOSINOPHILS RELATIVE PERCENT: 7 %
GFR AFRICAN AMERICAN: 57
GFR NON-AFRICAN AMERICAN: 47
GLUCOSE BLD-MCNC: 183 MG/DL (ref 70–99)
HCT VFR BLD CALC: 38 % (ref 40.5–52.5)
HEMOGLOBIN: 12.6 G/DL (ref 13.5–17.5)
LYMPHOCYTES ABSOLUTE: 3.3 K/UL (ref 1–5.1)
LYMPHOCYTES RELATIVE PERCENT: 23 %
MCH RBC QN AUTO: 29.5 PG (ref 26–34)
MCHC RBC AUTO-ENTMCNC: 33.1 G/DL (ref 31–36)
MCV RBC AUTO: 89.2 FL (ref 80–100)
MONOCYTES ABSOLUTE: 0.2 K/UL (ref 0–1.3)
MONOCYTES RELATIVE PERCENT: 2 %
NEUTROPHILS ABSOLUTE: 7.7 K/UL (ref 1.7–7.7)
NEUTROPHILS RELATIVE PERCENT: 64 %
PDW BLD-RTO: 14.5 % (ref 12.4–15.4)
PLATELET # BLD: 206 K/UL (ref 135–450)
PLATELET SLIDE REVIEW: ADEQUATE
PMV BLD AUTO: 7.9 FL (ref 5–10.5)
POTASSIUM SERPL-SCNC: 4.9 MMOL/L (ref 3.5–5.1)
PRO-BNP: 770 PG/ML (ref 0–124)
RBC # BLD: 4.26 M/UL (ref 4.2–5.9)
RBC # BLD: NORMAL 10*6/UL
SLIDE REVIEW: ABNORMAL
SODIUM BLD-SCNC: 136 MMOL/L (ref 136–145)
WBC # BLD: 12.1 K/UL (ref 4–11)

## 2018-04-01 ENCOUNTER — HOSPITAL ENCOUNTER (OUTPATIENT)
Dept: OTHER | Age: 63
Discharge: HOME | End: 2018-04-30
Attending: INTERNAL MEDICINE | Admitting: INTERNAL MEDICINE

## 2018-04-02 RX ORDER — SPIRONOLACTONE 25 MG/1
12.5 TABLET ORAL DAILY
Qty: 15 TABLET | Refills: 0 | Status: SHIPPED | OUTPATIENT
Start: 2018-04-02 | End: 2018-05-17 | Stop reason: SDUPTHER

## 2018-04-02 RX ORDER — FUROSEMIDE 20 MG/1
20 TABLET ORAL DAILY
Qty: 30 TABLET | Refills: 0 | Status: SHIPPED | OUTPATIENT
Start: 2018-04-02 | End: 2018-05-17 | Stop reason: SDUPTHER

## 2018-04-11 PROBLEM — J10.1 INFLUENZA A: Status: RESOLVED | Noted: 2018-01-07 | Resolved: 2018-04-11

## 2018-04-17 RX ORDER — INSULIN GLARGINE 100 [IU]/ML
40 INJECTION, SOLUTION SUBCUTANEOUS NIGHTLY
Qty: 12 ML | Refills: 1 | Status: SHIPPED | OUTPATIENT
Start: 2018-04-17 | End: 2018-06-22 | Stop reason: SDUPTHER

## 2018-04-24 RX ORDER — IPRATROPIUM BROMIDE AND ALBUTEROL SULFATE 2.5; .5 MG/3ML; MG/3ML
3 SOLUTION RESPIRATORY (INHALATION) EVERY 4 HOURS PRN
Qty: 360 ML | Refills: 1 | Status: SHIPPED | OUTPATIENT
Start: 2018-04-24 | End: 2018-08-02 | Stop reason: SDUPTHER

## 2018-04-27 LAB
ANION GAP SERPL CALCULATED.3IONS-SCNC: 11 MMOL/L (ref 3–16)
BASOPHILS ABSOLUTE: 0.1 K/UL (ref 0–0.2)
BASOPHILS RELATIVE PERCENT: 0.5 %
BUN BLDV-MCNC: 56 MG/DL (ref 7–20)
CALCIUM SERPL-MCNC: 9.3 MG/DL (ref 8.3–10.6)
CHLORIDE BLD-SCNC: 96 MMOL/L (ref 99–110)
CO2: 30 MMOL/L (ref 21–32)
CREAT SERPL-MCNC: 1.5 MG/DL (ref 0.8–1.3)
EOSINOPHILS ABSOLUTE: 0.5 K/UL (ref 0–0.6)
EOSINOPHILS RELATIVE PERCENT: 4.4 %
GFR AFRICAN AMERICAN: 57
GFR NON-AFRICAN AMERICAN: 47
GLUCOSE BLD-MCNC: 199 MG/DL (ref 70–99)
HCT VFR BLD CALC: 39.3 % (ref 40.5–52.5)
HEMOGLOBIN: 13 G/DL (ref 13.5–17.5)
LYMPHOCYTES ABSOLUTE: 2 K/UL (ref 1–5.1)
LYMPHOCYTES RELATIVE PERCENT: 18.8 %
MCH RBC QN AUTO: 29.2 PG (ref 26–34)
MCHC RBC AUTO-ENTMCNC: 33 G/DL (ref 31–36)
MCV RBC AUTO: 88.5 FL (ref 80–100)
MONOCYTES ABSOLUTE: 0.8 K/UL (ref 0–1.3)
MONOCYTES RELATIVE PERCENT: 7.4 %
NEUTROPHILS ABSOLUTE: 7.4 K/UL (ref 1.7–7.7)
NEUTROPHILS RELATIVE PERCENT: 68.9 %
PDW BLD-RTO: 15.1 % (ref 12.4–15.4)
PLATELET # BLD: 172 K/UL (ref 135–450)
PMV BLD AUTO: 7.7 FL (ref 5–10.5)
POTASSIUM SERPL-SCNC: 5.5 MMOL/L (ref 3.5–5.1)
RBC # BLD: 4.44 M/UL (ref 4.2–5.9)
SODIUM BLD-SCNC: 137 MMOL/L (ref 136–145)
WBC # BLD: 10.7 K/UL (ref 4–11)

## 2018-05-01 ENCOUNTER — HOSPITAL ENCOUNTER (OUTPATIENT)
Dept: OTHER | Age: 63
Discharge: HOME | End: 2018-05-31
Attending: INTERNAL MEDICINE | Admitting: INTERNAL MEDICINE

## 2018-05-07 ENCOUNTER — OFFICE VISIT (OUTPATIENT)
Dept: CARDIOLOGY CLINIC | Age: 63
End: 2018-05-07

## 2018-05-07 VITALS
WEIGHT: 165 LBS | SYSTOLIC BLOOD PRESSURE: 118 MMHG | BODY MASS INDEX: 22.35 KG/M2 | HEART RATE: 80 BPM | HEIGHT: 72 IN | DIASTOLIC BLOOD PRESSURE: 52 MMHG

## 2018-05-07 DIAGNOSIS — I10 ESSENTIAL HYPERTENSION: ICD-10-CM

## 2018-05-07 DIAGNOSIS — N18.30 CKD (CHRONIC KIDNEY DISEASE) STAGE 3, GFR 30-59 ML/MIN (HCC): ICD-10-CM

## 2018-05-07 DIAGNOSIS — I42.8 NON-ISCHEMIC CARDIOMYOPATHY (HCC): ICD-10-CM

## 2018-05-07 DIAGNOSIS — I25.10 CORONARY ARTERY DISEASE INVOLVING NATIVE CORONARY ARTERY OF NATIVE HEART WITHOUT ANGINA PECTORIS: ICD-10-CM

## 2018-05-07 DIAGNOSIS — I50.22 CHRONIC SYSTOLIC HEART FAILURE (HCC): Primary | ICD-10-CM

## 2018-05-07 DIAGNOSIS — R06.02 SOB (SHORTNESS OF BREATH): ICD-10-CM

## 2018-05-07 PROCEDURE — 3017F COLORECTAL CA SCREEN DOC REV: CPT | Performed by: INTERNAL MEDICINE

## 2018-05-07 PROCEDURE — 1036F TOBACCO NON-USER: CPT | Performed by: INTERNAL MEDICINE

## 2018-05-07 PROCEDURE — G8598 ASA/ANTIPLAT THER USED: HCPCS | Performed by: INTERNAL MEDICINE

## 2018-05-07 PROCEDURE — G8427 DOCREV CUR MEDS BY ELIG CLIN: HCPCS | Performed by: INTERNAL MEDICINE

## 2018-05-07 PROCEDURE — 99214 OFFICE O/P EST MOD 30 MIN: CPT | Performed by: INTERNAL MEDICINE

## 2018-05-07 PROCEDURE — G8420 CALC BMI NORM PARAMETERS: HCPCS | Performed by: INTERNAL MEDICINE

## 2018-05-07 RX ORDER — GABAPENTIN 300 MG/1
600 CAPSULE ORAL 2 TIMES DAILY
COMMUNITY
End: 2018-07-17 | Stop reason: SDUPTHER

## 2018-05-11 ENCOUNTER — TELEPHONE (OUTPATIENT)
Dept: INTERNAL MEDICINE | Age: 63
End: 2018-05-11

## 2018-05-17 RX ORDER — GABAPENTIN 300 MG/1
CAPSULE ORAL
Qty: 90 CAPSULE | Refills: 1 | Status: SHIPPED | OUTPATIENT
Start: 2018-05-17 | End: 2018-07-17 | Stop reason: SDUPTHER

## 2018-05-17 RX ORDER — CITALOPRAM 20 MG/1
TABLET ORAL
Qty: 30 TABLET | Refills: 1 | Status: SHIPPED | OUTPATIENT
Start: 2018-05-17 | End: 2018-07-17 | Stop reason: SDUPTHER

## 2018-05-17 RX ORDER — SPIRONOLACTONE 25 MG/1
12.5 TABLET ORAL DAILY
Qty: 90 TABLET | Refills: 0 | Status: SHIPPED | OUTPATIENT
Start: 2018-05-17 | End: 2018-08-14 | Stop reason: SDUPTHER

## 2018-05-17 RX ORDER — PANTOPRAZOLE SODIUM 40 MG/1
TABLET, DELAYED RELEASE ORAL
Qty: 30 TABLET | Refills: 1 | Status: SHIPPED | OUTPATIENT
Start: 2018-05-17 | End: 2018-07-17 | Stop reason: SDUPTHER

## 2018-05-17 RX ORDER — FUROSEMIDE 20 MG/1
20 TABLET ORAL DAILY
Qty: 90 TABLET | Refills: 1 | Status: SHIPPED | OUTPATIENT
Start: 2018-05-17 | End: 2018-11-12 | Stop reason: ALTCHOICE

## 2018-05-17 RX ORDER — FINASTERIDE 5 MG/1
TABLET, FILM COATED ORAL
Qty: 30 TABLET | Refills: 1 | Status: SHIPPED | OUTPATIENT
Start: 2018-05-17 | End: 2018-07-17 | Stop reason: SDUPTHER

## 2018-05-25 LAB
ANION GAP SERPL CALCULATED.3IONS-SCNC: 12 MMOL/L (ref 3–16)
BUN BLDV-MCNC: 59 MG/DL (ref 7–20)
CALCIUM SERPL-MCNC: 8.7 MG/DL (ref 8.3–10.6)
CHLORIDE BLD-SCNC: 96 MMOL/L (ref 99–110)
CO2: 28 MMOL/L (ref 21–32)
CREAT SERPL-MCNC: 1.6 MG/DL (ref 0.8–1.3)
GFR AFRICAN AMERICAN: 53
GFR NON-AFRICAN AMERICAN: 44
GLUCOSE BLD-MCNC: 206 MG/DL (ref 70–99)
POTASSIUM SERPL-SCNC: 5.3 MMOL/L (ref 3.5–5.1)
PRO-BNP: 559 PG/ML (ref 0–124)
SODIUM BLD-SCNC: 136 MMOL/L (ref 136–145)

## 2018-06-01 ENCOUNTER — HOSPITAL ENCOUNTER (OUTPATIENT)
Dept: OTHER | Age: 63
Discharge: HOME | End: 2018-06-30
Attending: INTERNAL MEDICINE | Admitting: INTERNAL MEDICINE

## 2018-06-22 RX ORDER — INSULIN GLARGINE 100 [IU]/ML
40 INJECTION, SOLUTION SUBCUTANEOUS NIGHTLY
Qty: 12 ML | Refills: 1 | Status: SHIPPED | OUTPATIENT
Start: 2018-06-22 | End: 2018-08-24 | Stop reason: SDUPTHER

## 2018-07-02 ENCOUNTER — HOSPITAL ENCOUNTER (OUTPATIENT)
Dept: OTHER | Age: 63
Discharge: HOME | End: 2018-07-31
Attending: INTERNAL MEDICINE | Admitting: INTERNAL MEDICINE

## 2018-07-02 LAB
ANION GAP SERPL CALCULATED.3IONS-SCNC: 11 MMOL/L (ref 3–16)
BUN BLDV-MCNC: 62 MG/DL (ref 7–20)
CALCIUM SERPL-MCNC: 8.9 MG/DL (ref 8.3–10.6)
CHLORIDE BLD-SCNC: 99 MMOL/L (ref 99–110)
CO2: 29 MMOL/L (ref 21–32)
CREAT SERPL-MCNC: 1.8 MG/DL (ref 0.8–1.3)
GFR AFRICAN AMERICAN: 46
GFR NON-AFRICAN AMERICAN: 38
GLUCOSE BLD-MCNC: 242 MG/DL (ref 70–99)
POTASSIUM SERPL-SCNC: 4.7 MMOL/L (ref 3.5–5.1)
PRO-BNP: 684 PG/ML (ref 0–124)
SODIUM BLD-SCNC: 139 MMOL/L (ref 136–145)

## 2018-07-06 RX ORDER — GABAPENTIN 300 MG/1
CAPSULE ORAL
Qty: 90 CAPSULE | Refills: 1 | OUTPATIENT
Start: 2018-07-06 | End: 2018-08-05

## 2018-07-17 RX ORDER — CITALOPRAM 20 MG/1
TABLET ORAL
Qty: 30 TABLET | Refills: 5 | Status: SHIPPED | OUTPATIENT
Start: 2018-07-17 | End: 2019-03-14 | Stop reason: SDUPTHER

## 2018-07-17 RX ORDER — FINASTERIDE 5 MG/1
TABLET, FILM COATED ORAL
Qty: 30 TABLET | Refills: 5 | Status: SHIPPED | OUTPATIENT
Start: 2018-07-17 | End: 2019-03-14 | Stop reason: SDUPTHER

## 2018-07-17 RX ORDER — GABAPENTIN 300 MG/1
600 CAPSULE ORAL 2 TIMES DAILY
Qty: 120 CAPSULE | Refills: 5 | Status: SHIPPED | OUTPATIENT
Start: 2018-07-17 | End: 2018-12-05 | Stop reason: SDUPTHER

## 2018-07-17 RX ORDER — PANTOPRAZOLE SODIUM 40 MG/1
TABLET, DELAYED RELEASE ORAL
Qty: 30 TABLET | Refills: 5 | Status: SHIPPED | OUTPATIENT
Start: 2018-07-17 | End: 2019-03-14 | Stop reason: SDUPTHER

## 2018-07-20 RX ORDER — PEN NEEDLE, DIABETIC 31 GX5/16"
NEEDLE, DISPOSABLE MISCELLANEOUS
Refills: 3 | OUTPATIENT
Start: 2018-07-20

## 2018-08-01 ENCOUNTER — HOSPITAL ENCOUNTER (OUTPATIENT)
Dept: OTHER | Age: 63
Discharge: HOME | End: 2018-08-31
Attending: INTERNAL MEDICINE | Admitting: INTERNAL MEDICINE

## 2018-08-02 ENCOUNTER — OFFICE VISIT (OUTPATIENT)
Dept: PULMONOLOGY | Age: 63
End: 2018-08-02

## 2018-08-02 VITALS
OXYGEN SATURATION: 99 % | DIASTOLIC BLOOD PRESSURE: 59 MMHG | SYSTOLIC BLOOD PRESSURE: 118 MMHG | RESPIRATION RATE: 16 BRPM | HEART RATE: 75 BPM

## 2018-08-02 DIAGNOSIS — Z87.01 H/O: PNEUMONIA: ICD-10-CM

## 2018-08-02 DIAGNOSIS — T17.500A MUCUS PLUGGING OF BRONCHI: Primary | ICD-10-CM

## 2018-08-02 PROCEDURE — 3017F COLORECTAL CA SCREEN DOC REV: CPT | Performed by: INTERNAL MEDICINE

## 2018-08-02 PROCEDURE — G8598 ASA/ANTIPLAT THER USED: HCPCS | Performed by: INTERNAL MEDICINE

## 2018-08-02 PROCEDURE — 99213 OFFICE O/P EST LOW 20 MIN: CPT | Performed by: INTERNAL MEDICINE

## 2018-08-02 PROCEDURE — 1036F TOBACCO NON-USER: CPT | Performed by: INTERNAL MEDICINE

## 2018-08-02 PROCEDURE — G8420 CALC BMI NORM PARAMETERS: HCPCS | Performed by: INTERNAL MEDICINE

## 2018-08-02 PROCEDURE — G8427 DOCREV CUR MEDS BY ELIG CLIN: HCPCS | Performed by: INTERNAL MEDICINE

## 2018-08-02 RX ORDER — IPRATROPIUM BROMIDE AND ALBUTEROL SULFATE 2.5; .5 MG/3ML; MG/3ML
3 SOLUTION RESPIRATORY (INHALATION) EVERY 4 HOURS PRN
Qty: 360 ML | Refills: 5 | Status: SHIPPED | OUTPATIENT
Start: 2018-08-02 | End: 2019-06-20 | Stop reason: SDUPTHER

## 2018-08-02 ASSESSMENT — ENCOUNTER SYMPTOMS
ABDOMINAL DISTENTION: 0
STRIDOR: 0
SINUS PRESSURE: 0
ANAL BLEEDING: 0
ABDOMINAL PAIN: 0
BACK PAIN: 0
VOICE CHANGE: 0
SHORTNESS OF BREATH: 0
CONSTIPATION: 0
WHEEZING: 0
CHEST TIGHTNESS: 0
CHOKING: 0
BLOOD IN STOOL: 0
DIARRHEA: 0
RHINORRHEA: 0
APNEA: 0
SORE THROAT: 0
COUGH: 1

## 2018-08-03 LAB
ANION GAP SERPL CALCULATED.3IONS-SCNC: 14 MMOL/L (ref 3–16)
BUN BLDV-MCNC: 49 MG/DL (ref 7–20)
CALCIUM SERPL-MCNC: 8.8 MG/DL (ref 8.3–10.6)
CHLORIDE BLD-SCNC: 98 MMOL/L (ref 99–110)
CO2: 26 MMOL/L (ref 21–32)
CREAT SERPL-MCNC: 1.5 MG/DL (ref 0.8–1.3)
GFR AFRICAN AMERICAN: 57
GFR NON-AFRICAN AMERICAN: 47
GLUCOSE BLD-MCNC: 181 MG/DL (ref 70–99)
POTASSIUM SERPL-SCNC: 4.7 MMOL/L (ref 3.5–5.1)
PRO-BNP: 801 PG/ML (ref 0–124)
SODIUM BLD-SCNC: 138 MMOL/L (ref 136–145)

## 2018-08-07 ENCOUNTER — HOSPITAL ENCOUNTER (OUTPATIENT)
Dept: WOUND CARE | Age: 63
Discharge: HOME | End: 2018-08-07
Attending: SURGERY | Admitting: SURGERY

## 2018-08-07 VITALS — DIASTOLIC BLOOD PRESSURE: 62 MMHG | SYSTOLIC BLOOD PRESSURE: 121 MMHG | HEART RATE: 75 BPM

## 2018-08-07 DIAGNOSIS — G82.20 PARAPLEGIA (HCC): ICD-10-CM

## 2018-08-07 DIAGNOSIS — L89.893 PRESSURE ULCER OF RIGHT LEG, STAGE III (HCC): ICD-10-CM

## 2018-08-07 DIAGNOSIS — S31.000A WOUND OF SACRAL REGION, INITIAL ENCOUNTER: ICD-10-CM

## 2018-08-07 RX ORDER — LIDOCAINE HYDROCHLORIDE 40 MG/ML
SOLUTION TOPICAL ONCE
Status: DISCONTINUED | OUTPATIENT
Start: 2018-08-07 | End: 2018-08-08 | Stop reason: HOSPADM

## 2018-08-09 LAB
GRAM STAIN RESULT: ABNORMAL
ORGANISM: ABNORMAL
WOUND/ABSCESS: ABNORMAL
WOUND/ABSCESS: ABNORMAL

## 2018-08-10 RX ORDER — CEFUROXIME AXETIL 250 MG/1
250 TABLET ORAL EVERY 6 HOURS
Qty: 28 TABLET | Refills: 0 | Status: SHIPPED | OUTPATIENT
Start: 2018-08-10 | End: 2018-08-17

## 2018-08-14 ENCOUNTER — TELEPHONE (OUTPATIENT)
Dept: CARDIOLOGY CLINIC | Age: 63
End: 2018-08-14

## 2018-08-14 RX ORDER — CARVEDILOL 12.5 MG/1
12.5 TABLET ORAL 2 TIMES DAILY WITH MEALS
Qty: 60 TABLET | Refills: 11 | Status: ON HOLD | OUTPATIENT
Start: 2018-08-14 | End: 2018-12-20 | Stop reason: HOSPADM

## 2018-08-14 NOTE — TELEPHONE ENCOUNTER
Medication Refill    When was your last appointment with cardiology?  (if 1year or longer, please schedule an appointment)    Medication needing refilled:coreg     Doseage of the medication:12.5     How are you taking this medication (QD, BID, TID, QID, PRN):    Patient want a 30 or 90 day supply called in:60    Which Pharmacy are we sending the medication to:[facility]   Pharmacy Phone number:    Pharmacy Fax number:

## 2018-08-15 RX ORDER — SPIRONOLACTONE 25 MG/1
TABLET ORAL
Qty: 90 TABLET | Refills: 0 | Status: SHIPPED | OUTPATIENT
Start: 2018-08-15 | End: 2018-11-12 | Stop reason: ALTCHOICE

## 2018-08-16 ENCOUNTER — HOSPITAL ENCOUNTER (OUTPATIENT)
Dept: WOUND CARE | Age: 63
Discharge: HOME | End: 2018-08-16
Attending: INTERNAL MEDICINE | Admitting: INTERNAL MEDICINE

## 2018-08-16 VITALS — HEART RATE: 83 BPM | DIASTOLIC BLOOD PRESSURE: 58 MMHG | RESPIRATION RATE: 18 BRPM | SYSTOLIC BLOOD PRESSURE: 109 MMHG

## 2018-08-16 PROCEDURE — 11042 DBRDMT SUBQ TIS 1ST 20SQCM/<: CPT | Performed by: INTERNAL MEDICINE

## 2018-08-16 RX ORDER — LIDOCAINE HYDROCHLORIDE 40 MG/ML
SOLUTION TOPICAL ONCE
Status: DISCONTINUED | OUTPATIENT
Start: 2018-08-16 | End: 2018-08-17 | Stop reason: HOSPADM

## 2018-08-16 NOTE — PROGRESS NOTES
right sided percutaneous nephrolithotomy     OTHER SURGICAL HISTORY  04/04/2017    suprapubic cath placed     SKIN GRAFT      MANY    TONSILLECTOMY         FAMILY HISTORY    Family History   Problem Relation Age of Onset    Heart Disease Mother     Diabetes Father     Heart Disease Father     Cancer Father     Cancer Brother     Cancer Brother     Substance Abuse Brother        SOCIAL HISTORY    Social History   Substance Use Topics    Smoking status: Former Smoker     Years: 10.00     Quit date: 6/17/1982    Smokeless tobacco: Never Used    Alcohol use No       ALLERGIES    Allergies   Allergen Reactions    Lisinopril Other (See Comments)     Renal failure       MEDICATIONS    Current Outpatient Prescriptions on File Prior to Encounter   Medication Sig Dispense Refill    spironolactone (ALDACTONE) 25 MG tablet 0.5 tab po daily, 5 days a week 90 tablet 0    carvedilol (COREG) 12.5 MG tablet Take 1 tablet by mouth 2 times daily (with meals) 60 tablet 11    cefUROXime (CEFTIN) 250 MG tablet Take 1 tablet by mouth every 6 hours for 7 days 28 tablet 0    ipratropium-albuterol (DUONEB) 0.5-2.5 (3) MG/3ML SOLN nebulizer solution Inhale 3 mLs into the lungs every 4 hours as needed for Shortness of Breath DX code J47.1 bronchiectasis 360 mL 5    pantoprazole (PROTONIX) 40 MG tablet Take 1 tablet by mouth daily 30 tablet 5    finasteride (PROSCAR) 5 MG tablet Take 1 tablet by mouth daily 30 tablet 5    citalopram (CELEXA) 20 MG tablet Take 1 tablet by mouth daily 30 tablet 5    gabapentin (NEURONTIN) 300 MG capsule Take 2 capsules by mouth 2 times daily for 30 days. . 120 capsule 5    LANTUS SOLOSTAR 100 UNIT/ML injection pen Inject 40 Units into the skin nightly 12 mL 1    furosemide (LASIX) 20 MG tablet Take 1 tablet by mouth daily 90 tablet 1    pravastatin (PRAVACHOL) 40 MG tablet Take 1 tablet by mouth nightly 30 tablet 6    hydrALAZINE (APRESOLINE) 50 MG tablet Take 1 tablet by mouth every 8 hours 90 tablet 5    insulin lispro (HUMALOG) 100 UNIT/ML pen Inject 0-6 Units into the skin nightly 5 Pen 1    Insulin Pen Needle (PEN NEEDLES) 31G X 6 MM MISC 1 each by Does not apply route daily 100 each 3    glucose (GLUTOSE) 40 % GEL Take 15 g by mouth as needed (low BS) 45 g 1    OXYGEN Inhale 2 L into the lungs as needed      acetaminophen (TYLENOL) 325 MG tablet Take 2 tablets by mouth every 4 hours as needed for Pain or Fever 120 tablet 3    insulin lispro (HUMALOG) 100 UNIT/ML pen Inject 0-12 Units into the skin 3 times daily (with meals) 5 Pen 3    fluticasone (FLONASE) 50 MCG/ACT nasal spray 1 spray by Nasal route daily 1 Bottle 3    ferrous sulfate 325 (65 FE) MG tablet Take 1 tablet by mouth daily (with breakfast) 30 tablet 3    vitamin E 400 UNIT capsule Take 400 Units by mouth daily      Cholecalciferol (VITAMIN D) 2000 UNITS CAPS capsule Take  by mouth daily.  aspirin 81 MG chewable tablet Take 81 mg by mouth daily.  nitroGLYCERIN (NITROSTAT) 0.4 MG SL tablet Place 0.4 mg under the tongue every 5 minutes as needed for Chest pain. No current facility-administered medications on file prior to encounter. REVIEW OF SYSTEMS    Pertinent items are noted in HPI.       Objective:      BP (!) 109/58   Pulse 83   Resp 18     PHYSICAL EXAM    General Appearance: alert and oriented to person, place and time, well-developed and well-nourished, in no acute distress  Skin: warm and dry  Head: normocephalic and atraumatic  Eyes: extraocular eye movements intact and conjunctivae normal  Extremities: no cyanosis and no clubbing  Musculoskeletal: normal range of motion, no joint swelling, deformity or tenderness      Assessment:     Patient Active Problem List   Diagnosis    Arthritis    Diabetes type 2, uncontrolled ([address]    Essential hypertension    [facility]aplegia ([facility]    Hyperlipidemia    GERD (gastroesophageal reflux disease)    Loose stools    Renal insufficiency    Chronic anemia    Right hand pain    Renal stones    Colitis    Degenerative arthritis of finger    Hyperkalemia    Neck pain    Left shoulder pain    Arthritis of left acromioclavicular joint    Tendinitis of left rotator cuff    Complete tear of left rotator cuff    Pressure ulcer, stage 3 (HCC)    Sacral wound    Heel ulcer (HCC)    Decubitus ulcer of right heel, stage 3 (HCC)    Atherosclerosis of native arteries of right leg with ulceration of other part of foot    Pressure ulcer of coccygeal region, stage 3 (HCC)    Decubitus ulcer of sacral region, stage 3 (HCC)    Pressure ulcer, stage 2    Chronic left shoulder pain    Urinary tract infection with hematuria due to chronic urinary catheter    Chronic kidney disease    Chronic systolic heart failure (HCC)    Leukocytosis    Systolic and diastolic CHF, acute on chronic (HCC)    CHEMO (acute kidney injury) (HCC)    Elevated troponin    Acute right-sided CHF (congestive heart failure) (HCC)    Acute cystitis with hematuria    Acute renal failure (HCC)    Acute pulmonary edema (HCC)    Pulmonary nodule    Mediastinal lymphadenopathy    Abnormal CT scan    Acute systolic heart failure (HCC)    Coronary artery disease involving native coronary artery of native heart without angina pectoris    SOB (shortness of breath)    Acute on chronic respiratory failure with hypoxia and hypercapnia (HCC)    Aspiration into airway    Coronary artery disease due to lipid rich plaque    Acute renal failure superimposed on stage 4 chronic kidney disease (HCC)    CHF (congestive heart failure), NYHA class I (HCC)    Pneumonia due to organism    Hyponatremia    Acute on chronic respiratory failure with hypoxia (HCC)    Chronic diastolic heart failure (HCC)    Iron deficiency anemia    Pleural effusion    Collapsed lung    Non-ischemic cardiomyopathy (HCC)    Chest pain    CHEMO (acute kidney injury) ([address]    Diaphragm paralysis    Chronic pulmonary aspiration    Scrotal abscess    Neurogenic bladder    Diabetic ulcer of right heel associated with type 2 diabetes mellitus, with fat layer exposed ([address]    Wound, open, scrotum or testes    Skin ulcer of sacrum with fat layer exposed ([address]    Acute respiratory failure with hypoxia (HCC)    Hemidiaphragm paralysis    Mucus plugging of bronchi    Bronchiectasis with acute exacerbation (HCC)    Pressure ulcer of right leg, stage III ([address]       Procedure Note    Performed by: Magali Mccabe DO    Consent obtained: Yes    Time out taken:  Yes    Pain Control: Anesthetic  Anesthetic: 4% Lidocaine Liquid Topical     Debridement:Excisional Debridement    Using curette the wound was sharply debrided    down through and including the removal of subcutaneous tissue. Devitalized Tissue Debrided:  biofilm, slough and exudate    Pre Debridement Measurements:  Are located in the Wound Documentation Flow Sheet    Wound #: 9 and 10     Post  Debridement Measurements:  Pressure Ulcer 03/30/17 Coccyx wound open (Active)   Number of days: 503       Pressure Ulcer 02/06/17 Heel Right  on 03/30/17, it now appears stage II ->unstageable (Active)   Number of days: 555       Wound 03/30/17 Other (Comment) Scrotum Right area of necrotic appearing tissue, purulent drainage from lower scrotum. (Active)   Number of days: 503       Wound 08/07/18 Knee Right #9 (Active)   Wound Image   8/7/2018  2:38 PM   Wound Type Wound 8/16/2018 10:20 AM   Wound Other 8/16/2018 10:20 AM   Dressing/Treatment Alginate;Dry dressing;Silver dressing 8/7/2018  2:38 PM   Wound Cleansed Rinsed/Irrigated with saline 8/16/2018 10:20 AM   Wound Length (cm) 2 cm 8/16/2018 10:20 AM   Wound Width (cm) 2.4 cm 8/16/2018 10:20 AM   Wound Depth (cm)  0.1 8/16/2018 10:20 AM   Calculated Wound Size (cm^2) (l*w) 4.8 cm^2 8/16/2018 10:20 AM   Change in Wound Size % (l*w) 12.73 8/16/2018 10:20 AM   Wound Assessment Black; Brown;Pink 8/16/2018 10:20

## 2018-08-23 ENCOUNTER — HOSPITAL ENCOUNTER (OUTPATIENT)
Dept: WOUND CARE | Age: 63
Discharge: HOME | End: 2018-08-23
Attending: INTERNAL MEDICINE | Admitting: INTERNAL MEDICINE

## 2018-08-23 VITALS
RESPIRATION RATE: 18 BRPM | DIASTOLIC BLOOD PRESSURE: 65 MMHG | HEART RATE: 77 BPM | TEMPERATURE: 97.2 F | SYSTOLIC BLOOD PRESSURE: 134 MMHG

## 2018-08-23 PROCEDURE — 11042 DBRDMT SUBQ TIS 1ST 20SQCM/<: CPT | Performed by: INTERNAL MEDICINE

## 2018-08-23 RX ORDER — LIDOCAINE HYDROCHLORIDE 40 MG/ML
SOLUTION TOPICAL ONCE
Status: DISCONTINUED | OUTPATIENT
Start: 2018-08-23 | End: 2018-08-24 | Stop reason: HOSPADM

## 2018-08-23 NOTE — PLAN OF CARE
Problem: Wound:  Goal: Will show signs of wound healing; wound closure and no evidence of infection  Will show signs of wound healing; wound closure and no evidence of infection   Outcome: Ongoing  Discharge instructions given. Patient verbalized understanding. Return to [address] [place] [address],[facility] Floor in 2 weeks.     [] antibiotics    [] X-ray     [] Culture   [x] Debridement      [] HBO Evaluation    [] LABS   [] Vascular Studies []

## 2018-08-23 NOTE — PROGRESS NOTES
right sided percutaneous nephrolithotomy     OTHER SURGICAL HISTORY  04/04/2017    suprapubic cath placed     SKIN GRAFT      MANY    TONSILLECTOMY         FAMILY HISTORY    Family History   Problem Relation Age of Onset    Heart Disease Mother     Diabetes Father     Heart Disease Father     Cancer Father     Cancer Brother     Cancer Brother     Substance Abuse Brother        SOCIAL HISTORY    Social History   Substance Use Topics    Smoking status: Former Smoker     Years: 10.00     Quit date: 6/17/1982    Smokeless tobacco: Never Used    Alcohol use No       ALLERGIES    Allergies   Allergen Reactions    Lisinopril Other (See Comments)     Renal failure       MEDICATIONS    Current Outpatient Prescriptions on File Prior to Encounter   Medication Sig Dispense Refill    spironolactone (ALDACTONE) 25 MG tablet 0.5 tab po daily, 5 days a week 90 tablet 0    carvedilol (COREG) 12.5 MG tablet Take 1 tablet by mouth 2 times daily (with meals) 60 tablet 11    ipratropium-albuterol (DUONEB) 0.5-2.5 (3) MG/3ML SOLN nebulizer solution Inhale 3 mLs into the lungs every 4 hours as needed for Shortness of Breath DX code J47.1 bronchiectasis 360 mL 5    pantoprazole (PROTONIX) 40 MG tablet Take 1 tablet by mouth daily 30 tablet 5    finasteride (PROSCAR) 5 MG tablet Take 1 tablet by mouth daily 30 tablet 5    citalopram (CELEXA) 20 MG tablet Take 1 tablet by mouth daily 30 tablet 5    LANTUS SOLOSTAR 100 UNIT/ML injection pen Inject 40 Units into the skin nightly 12 mL 1    furosemide (LASIX) 20 MG tablet Take 1 tablet by mouth daily 90 tablet 1    pravastatin (PRAVACHOL) 40 MG tablet Take 1 tablet by mouth nightly 30 tablet 6    hydrALAZINE (APRESOLINE) 50 MG tablet Take 1 tablet by mouth every 8 hours 90 tablet 5    insulin lispro (HUMALOG) 100 UNIT/ML pen Inject 0-6 Units into the skin nightly 5 Pen 1    Insulin Pen Needle (PEN NEEDLES) 31G X 6 MM MISC 1 each by Does not apply route daily 100 each 3    OXYGEN Inhale 2 L into the lungs as needed      acetaminophen (TYLENOL) 325 MG tablet Take 2 tablets by mouth every 4 hours as needed for Pain or Fever 120 tablet 3    insulin lispro (HUMALOG) 100 UNIT/ML pen Inject 0-12 Units into the skin 3 times daily (with meals) 5 Pen 3    fluticasone (FLONASE) 50 MCG/ACT nasal spray 1 spray by Nasal route daily 1 Bottle 3    ferrous sulfate 325 (65 FE) MG tablet Take 1 tablet by mouth daily (with breakfast) 30 tablet 3    vitamin E 400 UNIT capsule Take 400 Units by mouth daily      Cholecalciferol (VITAMIN D) 2000 UNITS CAPS capsule Take  by mouth daily.  aspirin 81 MG chewable tablet Take 81 mg by mouth daily.  gabapentin (NEURONTIN) 300 MG capsule Take 2 capsules by mouth 2 times daily for 30 days. . 120 capsule 5    glucose (GLUTOSE) 40 % GEL Take 15 g by mouth as needed (low BS) 45 g 1    nitroGLYCERIN (NITROSTAT) 0.4 MG SL tablet Place 0.4 mg under the tongue every 5 minutes as needed for Chest pain. No current facility-administered medications on file prior to encounter. REVIEW OF SYSTEMS    Pertinent items are noted in HPI.       Objective:      /65   Pulse 77   Temp 97.2 °F (36.2 °C) (Oral)   Resp 18     PHYSICAL EXAM    General Appearance: alert and oriented to person, place and time, well-developed and well-nourished, in no acute distress  Skin: warm and dry  Head: normocephalic and atraumatic  Eyes: extraocular eye movements intact and conjunctivae normal  Extremities: no cyanosis and no clubbing  Musculoskeletal: normal range of motion, no joint swelling, deformity or tenderness      Assessment:     Patient Active Problem List   Diagnosis    Arthritis    Diabetes type 2, uncontrolled ([facility] [address] [facility]    Essential hypertension    Paraplegia (HCC)    Hyperlipidemia    GERD (gastroesophageal reflux disease)    Loose stools    Renal insufficiency    Chronic anemia    Right hand pain    Renal stones    Colitis    Degenerative arthritis of finger    Hyperkalemia    Neck pain    Left shoulder pain    Arthritis of left acromioclavicular joint    Tendinitis of left rotator cuff    Complete tear of left rotator cuff    Pressure ulcer, stage 3 (HCC)    Sacral wound    Heel ulcer (HCC)    Decubitus ulcer of right heel, stage 3 (HCC)    Atherosclerosis of native arteries of right leg with ulceration of other part of foot    Pressure ulcer of coccygeal region, stage 3 (HCC)    Decubitus ulcer of sacral region, stage 3 (HCC)    Pressure ulcer, stage 2    Chronic left shoulder pain    Urinary tract infection with hematuria due to chronic urinary catheter    Chronic kidney disease    Chronic systolic heart failure (HCC)    Leukocytosis    Systolic and diastolic CHF, acute on chronic (HCC)    CHEMO (acute kidney injury) (HCC)    Elevated troponin    Acute right-sided CHF (congestive heart failure) (HCC)    Acute cystitis with hematuria    Acute renal failure (HCC)    Acute pulmonary edema (HCC)    Pulmonary nodule    Mediastinal lymphadenopathy    Abnormal CT scan    Acute systolic heart failure (HCC)    Coronary artery disease involving native coronary artery of native heart without angina pectoris    SOB (shortness of breath)    Acute on chronic respiratory failure with hypoxia and hypercapnia (HCC)    Aspiration into airway    Coronary artery disease due to lipid rich plaque    Acute renal failure superimposed on stage 4 chronic kidney disease (HCC)    CHF (congestive heart failure), NYHA class I (HCC)    Pneumonia due to organism    Hyponatremia    Acute on chronic respiratory failure with hypoxia (HCC)    Chronic diastolic heart failure (HCC)    Iron deficiency anemia    Pleural effusion    Collapsed lung    Non-ischemic cardiomyopathy (HCC)    Chest pain    CHEMO (acute kidney injury) ([address]    Diaphragm paralysis    Chronic pulmonary aspiration    Scrotal abscess    Neurogenic bladder  Diabetic ulcer of right heel associated with type 2 diabetes mellitus, with fat layer exposed ([address]    Wound, open, scrotum or testes    Skin ulcer of sacrum with fat layer exposed ([address]    Acute respiratory failure with hypoxia (HCC)    Hemidiaphragm paralysis    Mucus plugging of bronchi    Bronchiectasis with acute exacerbation (HCC)    Pressure ulcer of right leg, stage III ([address]       Procedure Note    Performed by: Jeannette Choi DO    Consent obtained: Yes    Time out taken:  Yes    Pain Control: Anesthetic  Anesthetic: 4% Lidocaine Liquid Topical     Debridement:Excisional Debridement    Using curette the wound was sharply debrided    down through and including the removal of subcutaneous tissue. Devitalized Tissue Debrided:  biofilm, slough and exudate    Pre Debridement Measurements:  Are located in the Wound Documentation Flow Sheet    Wound #: 9 and 10     Post  Debridement Measurements:  Pressure Ulcer 03/30/17 Coccyx wound open (Active)   Number of days: 503       Pressure Ulcer 02/06/17 Heel Right  on 03/30/17, it now appears stage II ->unstageable (Active)   Number of days: 555       Wound 03/30/17 Other (Comment) Scrotum Right area of necrotic appearing tissue, purulent drainage from lower scrotum. (Active)   Number of days: 503       Wound 08/07/18 Knee Right #9 (Active)   Wound Image   8/7/2018  2:38 PM   Wound Type Wound 8/16/2018 10:20 AM   Wound Other 8/16/2018 10:20 AM   Dressing/Treatment Alginate;Dry dressing;Silver dressing 8/7/2018  2:38 PM   Wound Cleansed Rinsed/Irrigated with saline 8/16/2018 10:20 AM   Wound Length (cm) 2 cm 8/16/2018 10:20 AM   Wound Width (cm) 2.4 cm 8/16/2018 10:20 AM   Wound Depth (cm)  0.1 8/16/2018 10:20 AM   Calculated Wound Size (cm^2) (l*w) 4.8 cm^2 8/16/2018 10:20 AM   Change in Wound Size % (l*w) 12.73 8/16/2018 10:20 AM   Wound Assessment Black; Brown;Pink 8/16/2018 10:20 AM   Drainage Amount Moderate 8/16/2018 10:20 AM   Drainage

## 2018-08-24 ENCOUNTER — TELEPHONE (OUTPATIENT)
Dept: INTERNAL MEDICINE | Age: 63
End: 2018-08-24

## 2018-08-29 ENCOUNTER — HOSPITAL ENCOUNTER (OUTPATIENT)
Dept: WOUND CARE | Age: 63
Discharge: HOME | End: 2018-08-29
Attending: SURGERY | Admitting: SURGERY

## 2018-08-29 VITALS
RESPIRATION RATE: 18 BRPM | DIASTOLIC BLOOD PRESSURE: 69 MMHG | SYSTOLIC BLOOD PRESSURE: 135 MMHG | HEART RATE: 80 BPM | TEMPERATURE: 97 F

## 2018-08-29 PROCEDURE — 11045 DBRDMT SUBQ TISS EACH ADDL: CPT | Performed by: SURGERY

## 2018-08-29 PROCEDURE — 11042 DBRDMT SUBQ TIS 1ST 20SQCM/<: CPT | Performed by: SURGERY

## 2018-08-29 RX ORDER — LIDOCAINE HYDROCHLORIDE 40 MG/ML
SOLUTION TOPICAL ONCE
Status: DISCONTINUED | OUTPATIENT
Start: 2018-08-29 | End: 2018-08-30 | Stop reason: HOSPADM

## 2018-08-29 NOTE — PROGRESS NOTES
[address] Procedure Note    Sanjana Shyanne New  AGE: 57 y.o. GENDER: male    : 1955  TODAY'S DATE: 2018    Chief Complaint   Patient presents with    Wound Check     buttocks, right knee        History of Present Illness     Sasha Espinoza is a 57 y.o. male who presents today for wound evaluation. History of Wound: pressure wound located on the sacrum and right knee. Wound Pain:  none  Severity:  0 / 10   Wound Type:  pressure  Modifying Factors:  chronic pressure, decreased mobility, arterial insufficiency and paraplegia  Associated Signs/Symptoms:  numbness    Procedure Note:     Performed by: Soledad Jett MD    Consent obtained: Yes    Time out taken: Yes    Pain Control: Anesthetic  Anesthetic: 4% Lidocaine Liquid Topical     Debridement: Excisional Debridement    Using curette the wound was sharply debrided    down through and including the removal of epidermis, dermis and subcutaneous tissue. Devitalized Tissue Debrided: fibrin, biofilm and slough    Pre Debridement Measurements:  Are located in the Wound Documentation Flow Sheet     Wound #: 9 and 10     Post  Debridement Measurements:  Pressure Ulcer 17 Coccyx wound open (Active)   Number of days: 516       Pressure Ulcer 17 Heel Right  on 17, it now appears stage II ->unstageable (Active)   Number of days: 568       Wound 17 Other (Comment) Scrotum Right area of necrotic appearing tissue, purulent drainage from lower scrotum.   (Active)   Number of days: 516       Wound 18 Knee Right #9 (Active)   Wound Image   2018  2:38 PM   Wound Type Wound 2018 10:28 AM   Wound Other 2018 10:28 AM   Dressing/Treatment Dry dressing;Alginate 2018 11:28 AM   Wound Cleansed Rinsed/Irrigated with saline 2018 10:43 AM   Wound Length (cm) 2.5 cm 2018 10:43 AM   Wound Width (cm) 2.7 cm 2018 10:43 AM   Wound Depth (cm)  0.2 2018 10:43 AM   Calculated Wound Size

## 2018-09-01 ENCOUNTER — HOSPITAL ENCOUNTER (OUTPATIENT)
Dept: OTHER | Age: 63
Discharge: HOME | End: 2018-09-02
Attending: INTERNAL MEDICINE | Admitting: INTERNAL MEDICINE

## 2018-09-04 LAB
ANION GAP SERPL CALCULATED.3IONS-SCNC: 12 MMOL/L (ref 3–16)
BUN BLDV-MCNC: 66 MG/DL (ref 7–20)
CALCIUM SERPL-MCNC: 9.2 MG/DL (ref 8.3–10.6)
CHLORIDE BLD-SCNC: 98 MMOL/L (ref 99–110)
CO2: 26 MMOL/L (ref 21–32)
CREAT SERPL-MCNC: 1.6 MG/DL (ref 0.8–1.3)
GFR AFRICAN AMERICAN: 53
GFR NON-AFRICAN AMERICAN: 44
GLUCOSE BLD-MCNC: 200 MG/DL (ref 70–99)
POTASSIUM SERPL-SCNC: 5.3 MMOL/L (ref 3.5–5.1)
PRO-BNP: 918 PG/ML (ref 0–124)
SODIUM BLD-SCNC: 136 MMOL/L (ref 136–145)

## 2018-09-05 ENCOUNTER — HOSPITAL ENCOUNTER (OUTPATIENT)
Dept: WOUND CARE | Age: 63
Discharge: HOME | End: 2018-09-05
Attending: SURGERY | Admitting: SURGERY

## 2018-09-05 VITALS
SYSTOLIC BLOOD PRESSURE: 125 MMHG | TEMPERATURE: 97.3 F | DIASTOLIC BLOOD PRESSURE: 66 MMHG | RESPIRATION RATE: 16 BRPM | HEART RATE: 77 BPM

## 2018-09-05 PROCEDURE — 11042 DBRDMT SUBQ TIS 1ST 20SQCM/<: CPT | Performed by: SURGERY

## 2018-09-05 PROCEDURE — 11045 DBRDMT SUBQ TISS EACH ADDL: CPT | Performed by: SURGERY

## 2018-09-05 RX ORDER — LIDOCAINE HYDROCHLORIDE 40 MG/ML
SOLUTION TOPICAL ONCE
Status: DISCONTINUED | OUTPATIENT
Start: 2018-09-05 | End: 2018-09-06 | Stop reason: HOSPADM

## 2018-09-05 ASSESSMENT — PAIN SCALES - GENERAL: PAINLEVEL_OUTOF10: 0

## 2018-09-05 NOTE — PROGRESS NOTES
9/5/2018 11:05 AM   Change in Wound Size % (l*w) 9.09 9/5/2018 11:05 AM   Wound Assessment Bleeding 9/5/2018 11:05 AM   Drainage Amount Moderate 9/5/2018 11:05 AM   Drainage Description Serosanguinous 9/5/2018 10:34 AM   Joanne-wound Assessment Dry 9/5/2018 10:34 AM   [place]%Wound Bed 0 9/5/2018 10:34 AM   Red%Wound Bed 90 9/5/2018 10:34 AM   Yellow%Wound Bed 10 9/5/2018 10:34 AM   Black%Wound Bed 0 9/5/2018 10:34 AM   Purple%Wound Bed 0 9/5/2018 10:34 AM   Other%Wound Bed 0 9/5/2018 10:34 AM   Time out Yes 9/5/2018 11:05 AM   Op First Treatment Date 08/07/18 8/7/2018  2:38 PM   Number of days: 29       Wound 08/07/18 Coccyx Mid #10 (Active)   Wound Image   9/5/2018 10:34 AM   Wound Type Wound 9/5/2018 10:34 AM   Wound Pressure Stage  3 9/5/2018 10:34 AM   Dressing/Treatment Alginate;Dry dressing 9/5/2018 11:46 AM   Wound Cleansed Rinsed/Irrigated with saline 9/5/2018 11:05 AM   Wound Length (cm) 4.5 cm 9/5/2018 11:05 AM   Wound Width (cm) 3.5 cm 9/5/2018 11:05 AM   Wound Depth (cm)  1 9/5/2018 11:05 AM   Calculated Wound Size (cm^2) (l*w) 15.75 cm^2 9/5/2018 11:05 AM   Change in Wound Size % (l*w) -31.25 9/5/2018 11:05 AM   Distance Tunneling (cm) 2 cm 9/5/2018 10:34 AM   Tunneling Position ___ O'Clock 1100 9/5/2018 10:34 AM   Wound Assessment Bleeding 9/5/2018 11:05 AM   Drainage Amount Moderate 9/5/2018 11:05 AM   Drainage Description Serosanguinous; Yellow 9/5/2018 10:34 AM   Odor None 9/5/2018 10:34 AM   Joanne-wound Assessment White 8/29/2018 10:28 AM   [place]%Wound Bed 30 9/5/2018 10:34 AM   Red%Wound Bed 0 9/5/2018 10:34 AM   Yellow%Wound Bed 70 9/5/2018 10:34 AM   Black%Wound Bed 0 9/5/2018 10:34 AM   Purple%Wound Bed 0 9/5/2018 10:34 AM   Other%Wound Bed 0 9/5/2018 10:34 AM   Time out Yes 9/5/2018 11:05 AM   Op First Treatment Date 08/07/18 8/7/2018  2:41 PM   Number of days: 29       Total Surface Area Debrided:  20.75 sq cm     Bleeding:  Minimal    Hemostasis Achieved:  by pressure    Procedural Pain:  1  / 10 Post Procedural Pain:  1 / 10     Response to treatment:  Well tolerated by patient. Assessment:     Wound looks stable. (improved, worse or stable)    Patient tolerated procedure well and was given proper instruction. The nature of the patient's condition was explained in depth. The patient was informed that their compliance to the treatment plan is paramount to successful healing and prevention of further ulceration and/or infection       Plan:     Treatment Plan: With each dressing change, rinse wounds with 0.9% Saline. (May use wound wash or soft contact solution. Both can be purchased at a local drug store). If unable to obtain saline, may use a gentle soap and water. Dressing care: Right knee, Coccyx-  Alginate ag (loosely pack into deep part of Coccyx wound), mepilex border- change every 2 days. We have ordered a NPWT for your Coccyx wound. Once you receive the NPWT start using it on  Your Coccyx- Skin prep to param-wound, drape around wound to protect good skin, NPWT continuous at 120 mmHg or 125 mmHg (depending on the type of NPWT device), change Monday, Wednesday, & Friday. Home care to change. Keep pillow between knees when lying on you side. Rotate position frequently. Drink Glucerna. We went an order to [facility] for a Low air loss mattress, they may be contacting you regarding this. Derick Banda 6  Melissa Leong MD, FACS  9/5/2018  4:37 PM

## 2018-09-05 NOTE — PLAN OF CARE
Problem: Wound:  Goal: Will show signs of wound healing; wound closure and no evidence of infection  Will show signs of wound healing; wound closure and no evidence of infection   Outcome: Ongoing  Discharge instructions given. Patient verbalized understanding. Return to [facility] in 1 week.   Called/faxed orders to [facility], Patti'MITALI singh  Pt now has a pressure alternating mattress, order sent today to [facility] for low air loss mattress    [] antibiotics    [] X-ray     [] Culture   [x] Debridement      [] HBO Evaluation    [] LABS   [] Vascular Studies []

## 2018-09-12 ENCOUNTER — HOSPITAL ENCOUNTER (OUTPATIENT)
Dept: WOUND CARE | Age: 63
Discharge: HOME | End: 2018-09-12
Attending: SURGERY | Admitting: SURGERY

## 2018-09-12 VITALS — DIASTOLIC BLOOD PRESSURE: 75 MMHG | SYSTOLIC BLOOD PRESSURE: 143 MMHG | HEART RATE: 86 BPM | RESPIRATION RATE: 16 BRPM

## 2018-09-12 PROCEDURE — 11042 DBRDMT SUBQ TIS 1ST 20SQCM/<: CPT | Performed by: SURGERY

## 2018-09-12 RX ORDER — LIDOCAINE HYDROCHLORIDE 40 MG/ML
SOLUTION TOPICAL ONCE
Status: DISCONTINUED | OUTPATIENT
Start: 2018-09-12 | End: 2018-09-13 | Stop reason: HOSPADM

## 2018-09-19 ENCOUNTER — HOSPITAL ENCOUNTER (OUTPATIENT)
Dept: WOUND CARE | Age: 63
Discharge: HOME | End: 2018-09-19
Attending: SURGERY | Admitting: SURGERY

## 2018-09-19 VITALS — DIASTOLIC BLOOD PRESSURE: 68 MMHG | RESPIRATION RATE: 16 BRPM | SYSTOLIC BLOOD PRESSURE: 145 MMHG | HEART RATE: 92 BPM

## 2018-09-19 PROCEDURE — 11045 DBRDMT SUBQ TISS EACH ADDL: CPT | Performed by: SURGERY

## 2018-09-19 PROCEDURE — 11042 DBRDMT SUBQ TIS 1ST 20SQCM/<: CPT | Performed by: SURGERY

## 2018-09-19 RX ORDER — LIDOCAINE HYDROCHLORIDE 40 MG/ML
SOLUTION TOPICAL ONCE
Status: DISCONTINUED | OUTPATIENT
Start: 2018-09-19 | End: 2018-09-20 | Stop reason: HOSPADM

## 2018-09-19 NOTE — PLAN OF CARE
Problem: Wound:  Goal: Will show signs of wound healing; wound closure and no evidence of infection  Will show signs of wound healing; wound closure and no evidence of infection   Outcome: Ongoing  Discharge instructions given. Patient verbalized understanding. Return to [facility] in 1 week.   Called/faxed orders to [facility]- LMN for low air loss mattress and notes    [] antibiotics    [] X-ray     [] Culture   [x] Debridement      [] HBO Evaluation    [] LABS   [] Vascular Studies []

## 2018-09-19 NOTE — PROGRESS NOTES
[address] Procedure Note    Adithya Oiler New  AGE: 57 y.o. GENDER: male    : 1955  TODAY'S DATE: 2018    Chief Complaint   Patient presents with    Wound Check     coccyx, leg        History of Present Illness     Eldridge Jennifer is a 57 y.o. male who presents today for wound evaluation. History of Wound: pressure wound located on the sacrum and right knee. .   Wound Pain:  none  Severity:  0 / 10   Wound Type:  pressure  Modifying Factors:  chronic pressure, decreased mobility, arterial insufficiency and paraplegia  Associated Signs/Symptoms:  numbness    Procedure Note:     Performed by: Arsalan Moreno MD    Consent obtained: Yes    Time out taken: Yes    Pain Control: Anesthetic  Anesthetic: 4% Lidocaine Liquid Topical     Debridement: Excisional Debridement    Using curette the wound was sharply debrided    down through and including the removal of epidermis, dermis and subcutaneous tissue.         Devitalized Tissue Debrided: fibrin, biofilm and slough    Pre Debridement Measurements:  Are located in the Wound Documentation Flow Sheet     Wound #: 9 and 10     Post  Debridement Measurements:  Pressure Ulcer 17 Coccyx wound open (Active)   Number of days: 537       Pressure Ulcer 17 Heel Right  on 17, it now appears stage II ->unstageable (Active)   Number of days: 589       Wound 18 Knee Right #9 (Active)   Wound Image   2018  2:38 PM   Wound Type Wound 2018  9:33 AM   Wound Pressure Stage  3 2018  9:33 AM   Dressing/Treatment Dry dressing;Alginate 2018 11:46 AM   Wound Cleansed Rinsed/Irrigated with saline 2018  9:45 AM   Wound Length (cm) 2.1 cm 2018  9:45 AM   Wound Width (cm) 2.3 cm 2018  9:45 AM   Wound Depth (cm)  0.1 2018  9:45 AM   Calculated Wound Size (cm^2) (l*w) 4.83 cm^2 2018  9:45 AM   Change in Wound Size % (l*w) 12.18 2018  9:45 AM   Wound Assessment Bleeding 2018  9:45 AM   Drainage Amount Moderate 9/19/2018  9:45 AM   Drainage Description Sanguinous 9/19/2018  9:33 AM   Odor Mild 9/19/2018  9:33 AM   Joanne-wound Assessment Dry;Clean 9/19/2018  9:33 AM   [place]%Wound Bed 0 9/19/2018  9:33 AM   Red%Wound Bed 100 9/19/2018  9:33 AM   Yellow%Wound Bed 0 9/19/2018  9:33 AM   Black%Wound Bed 0 9/19/2018  9:33 AM   Purple%Wound Bed 0 9/19/2018  9:33 AM   Other%Wound Bed 0 9/19/2018  9:33 AM   Time out Yes 9/19/2018  9:45 AM   Op First Treatment Date 08/07/18 8/7/2018  2:38 PM   Number of days: 42       Wound 08/07/18 Coccyx Mid #10 (Active)   Wound Image   9/5/2018 10:34 AM   Wound Type Wound 9/19/2018  9:33 AM   Wound Pressure Stage  4 9/19/2018  9:33 AM   Dressing/Treatment Alginate;Dry dressing 9/5/2018 11:46 AM   Wound Cleansed Rinsed/Irrigated with saline 9/19/2018  9:45 AM   Wound Length (cm) 7 cm 9/19/2018  9:45 AM   Wound Width (cm) 3.5 cm 9/19/2018  9:45 AM   Wound Depth (cm)  1 9/19/2018  9:45 AM   Calculated Wound Size (cm^2) (l*w) 24.5 cm^2 9/19/2018  9:45 AM   Change in Wound Size % (l*w) -104.17 9/19/2018  9:45 AM   Distance Tunneling (cm) 2 cm 9/5/2018 10:34 AM   Tunneling Position ___ O'Clock 1100 9/5/2018 10:34 AM   Undermining Starts ___ O'Clock 0 9/19/2018  9:33 AM   Undermining Ends___ O'Clock 0 9/19/2018  9:33 AM   Undermining Maxium Distance (cm) 0 9/19/2018  9:33 AM   Wound Assessment Bleeding 9/19/2018  9:45 AM   Drainage Amount Moderate 9/19/2018  9:45 AM   Drainage Description Purulent 9/19/2018  9:33 AM   Odor Strong 9/19/2018  9:33 AM   Joanne-wound Assessment White 9/19/2018  9:33 AM   [place]%Wound Bed 0 9/19/2018  9:33 AM   Red%Wound Bed 0 9/19/2018  9:33 AM   Yellow%Wound Bed 0 9/19/2018  9:33 AM   Black%Wound Bed 0 9/19/2018  9:33 AM   Purple%Wound Bed 0 9/19/2018  9:33 AM   Other%Wound Bed 100 9/19/2018  9:33 AM   Time out Yes 9/19/2018  9:45 AM   Op First Treatment Date 08/07/18 8/7/2018  2:41 PM   Number of days: 42       Total Surface Area Debrided:  29.33 sq cm

## 2018-09-26 ENCOUNTER — HOSPITAL ENCOUNTER (OUTPATIENT)
Dept: WOUND CARE | Age: 63
Discharge: HOME | End: 2018-09-26
Payer: MEDICARE

## 2018-09-26 VITALS
DIASTOLIC BLOOD PRESSURE: 67 MMHG | RESPIRATION RATE: 16 BRPM | TEMPERATURE: 97.3 F | HEART RATE: 89 BPM | SYSTOLIC BLOOD PRESSURE: 133 MMHG

## 2018-09-26 PROCEDURE — 11045 DBRDMT SUBQ TISS EACH ADDL: CPT

## 2018-09-26 PROCEDURE — 11042 DBRDMT SUBQ TIS 1ST 20SQCM/<: CPT

## 2018-09-26 PROCEDURE — 11045 DBRDMT SUBQ TISS EACH ADDL: CPT | Performed by: SURGERY

## 2018-09-26 PROCEDURE — 11042 DBRDMT SUBQ TIS 1ST 20SQCM/<: CPT | Performed by: SURGERY

## 2018-09-26 RX ORDER — LIDOCAINE HYDROCHLORIDE 40 MG/ML
SOLUTION TOPICAL ONCE
Status: DISCONTINUED | OUTPATIENT
Start: 2018-09-26 | End: 2018-09-27 | Stop reason: HOSPADM

## 2018-09-26 NOTE — PROGRESS NOTES
[address] Procedure Note    Adithya Oiler New  AGE: 57 y.o. GENDER: male    : 1955  TODAY'S DATE: 2018    Chief Complaint   Patient presents with    Wound Check     coccyx, knee        History of Present Illness     Shickshinny Jennifer is a 57 y.o. male who presents today for wound evaluation. History of Wound: pressure wound located on the sacrum and right knee. Wound Pain:  none  Severity:  1 / 10   Wound Type:  pressure  Modifying Factors:  chronic pressure, decreased mobility, arterial insufficiency and paraplegia  Associated Signs/Symptoms:  numbness    Procedure Note:     Performed by: Arsalan Moreno MD    Consent obtained: Yes    Time out taken: Yes    Pain Control:   none necessary, insensate    Debridement: Excisional Debridement    Using curette the wound was sharply debrided    down through and including the removal of epidermis, dermis and subcutaneous tissue.         Devitalized Tissue Debrided: fibrin, biofilm and slough    Pre Debridement Measurements:  Are located in the Wound Documentation Flow Sheet     Wound #: 9 and 10     Post  Debridement Measurements:  Pressure Ulcer 17 Coccyx wound open (Active)   Number of days: 544       Pressure Ulcer 17 Heel Right  on 17, it now appears stage II ->unstageable (Active)   Number of days: 596       Wound 18 Knee Right #9 (Active)   Wound Image   2018  2:38 PM   Wound Type Wound 2018  9:36 AM   Wound Pressure Stage  3 2018  9:36 AM   Dressing/Treatment Dry dressing;Silver dressing 2018 10:25 AM   Wound Cleansed Rinsed/Irrigated with saline 2018  9:36 AM   Wound Length (cm) 1.5 cm 2018 10:05 AM   Wound Width (cm) 2 cm 2018 10:05 AM   Wound Depth (cm)  0.1 2018 10:05 AM   Calculated Wound Size (cm^2) (l*w) 3 cm^2 2018 10:05 AM   Change in Wound Size % (l*w) 45.45 2018 10:05 AM   Wound Assessment Bleeding 2018 10:05 AM   Drainage Amount Moderate

## 2018-10-03 ENCOUNTER — HOSPITAL ENCOUNTER (OUTPATIENT)
Age: 63
Setting detail: SPECIMEN
Discharge: HOME | End: 2018-10-03
Payer: MEDICARE

## 2018-10-03 ENCOUNTER — HOSPITAL ENCOUNTER (OUTPATIENT)
Dept: WOUND CARE | Age: 63
Discharge: HOME | End: 2018-10-03
Payer: MEDICARE

## 2018-10-03 VITALS
DIASTOLIC BLOOD PRESSURE: 69 MMHG | BODY MASS INDEX: 23.46 KG/M2 | WEIGHT: 173 LBS | TEMPERATURE: 96.9 F | HEART RATE: 70 BPM | SYSTOLIC BLOOD PRESSURE: 134 MMHG

## 2018-10-03 LAB
ANION GAP SERPL CALCULATED.3IONS-SCNC: 8 MMOL/L (ref 3–16)
BUN BLDV-MCNC: 62 MG/DL (ref 7–20)
CALCIUM SERPL-MCNC: 9 MG/DL (ref 8.3–10.6)
CHLORIDE BLD-SCNC: 100 MMOL/L (ref 99–110)
CO2: 26 MMOL/L (ref 21–32)
CREAT SERPL-MCNC: 2.1 MG/DL (ref 0.8–1.3)
GFR AFRICAN AMERICAN: 39
GFR NON-AFRICAN AMERICAN: 32
GLUCOSE BLD-MCNC: 229 MG/DL (ref 70–99)
POTASSIUM SERPL-SCNC: 5.9 MMOL/L (ref 3.5–5.1)
PRO-BNP: 1456 PG/ML (ref 0–124)
SODIUM BLD-SCNC: 134 MMOL/L (ref 136–145)

## 2018-10-03 PROCEDURE — 80048 BASIC METABOLIC PNL TOTAL CA: CPT

## 2018-10-03 PROCEDURE — 11042 DBRDMT SUBQ TIS 1ST 20SQCM/<: CPT

## 2018-10-03 PROCEDURE — 36415 COLL VENOUS BLD VENIPUNCTURE: CPT

## 2018-10-03 PROCEDURE — 11045 DBRDMT SUBQ TISS EACH ADDL: CPT

## 2018-10-03 PROCEDURE — 83880 ASSAY OF NATRIURETIC PEPTIDE: CPT

## 2018-10-03 PROCEDURE — 11045 DBRDMT SUBQ TISS EACH ADDL: CPT | Performed by: SURGERY

## 2018-10-03 PROCEDURE — 11042 DBRDMT SUBQ TIS 1ST 20SQCM/<: CPT | Performed by: SURGERY

## 2018-10-03 RX ORDER — LIDOCAINE HYDROCHLORIDE 40 MG/ML
SOLUTION TOPICAL ONCE
Status: DISCONTINUED | OUTPATIENT
Start: 2018-10-03 | End: 2018-10-04 | Stop reason: HOSPADM

## 2018-10-03 NOTE — PROGRESS NOTES
Minimal    Hemostasis Achieved:  by pressure    Procedural Pain:  1  / 10     Post Procedural Pain:  1 / 10     Response to treatment:  Well tolerated by patient. Assessment:     Wound looks improved. (improved, worse or stable)    Patient tolerated procedure well and was given proper instruction. The nature of the patient's condition was explained in depth. The patient was informed that their compliance to the treatment plan is paramount to successful healing and prevention of further ulceration and/or infection       Plan:     Treatment Plan: With each dressing change, rinse wounds with 0.9% Saline. (May use wound wash or soft contact solution. Both can be purchased at a local drug store). If unable to obtain saline, may use a gentle soap and water. Dressing care: Right knee- Alginate ag, dry dressing- change every 2 days. Coccyx- Wet to dry in wound center. At home- Coccyx- Skin prep to param-wound, drape around wound and under foam for bridge to protect good skin, NPWT continuous at 120 mmHg or 125 mmHg (depending on the type of NPWT device), change Monday, Wednesday, & Friday. Home care to change. Keep pillow between knees when lying on you side. Rotate position frequently. Drink Glucerna. Continue to use the new low air loss mattress. Derick Banda 6  Aparna Washington MD, FACS  10/3/2018  9:56 AM

## 2018-10-05 ENCOUNTER — TELEPHONE (OUTPATIENT)
Dept: CARDIOLOGY CLINIC | Age: 63
End: 2018-10-05

## 2018-10-08 ENCOUNTER — TELEPHONE (OUTPATIENT)
Dept: CARDIOLOGY CLINIC | Age: 63
End: 2018-10-08

## 2018-10-08 NOTE — TELEPHONE ENCOUNTER
Calling to adv that pt bp was high today. Wife adv trhat [facility] called her at work to adv that the first reading was 207/? and last reading was 160/80. Wife would like to know what MARIEL would like pt to do.  Please call to adv thank you

## 2018-10-10 ENCOUNTER — HOSPITAL ENCOUNTER (OUTPATIENT)
Dept: WOUND CARE | Age: 63
Discharge: HOME | End: 2018-10-10
Attending: SURGERY
Payer: MEDICARE

## 2018-10-10 VITALS
DIASTOLIC BLOOD PRESSURE: 66 MMHG | BODY MASS INDEX: 23.46 KG/M2 | TEMPERATURE: 96.8 F | SYSTOLIC BLOOD PRESSURE: 134 MMHG | HEART RATE: 81 BPM | WEIGHT: 173 LBS

## 2018-10-10 PROCEDURE — 11045 DBRDMT SUBQ TISS EACH ADDL: CPT

## 2018-10-10 PROCEDURE — 11042 DBRDMT SUBQ TIS 1ST 20SQCM/<: CPT | Performed by: SURGERY

## 2018-10-10 PROCEDURE — 11042 DBRDMT SUBQ TIS 1ST 20SQCM/<: CPT

## 2018-10-10 RX ORDER — HYDRALAZINE HYDROCHLORIDE 50 MG/1
50 TABLET, FILM COATED ORAL EVERY 8 HOURS SCHEDULED
Qty: 90 TABLET | Refills: 5 | Status: SHIPPED | OUTPATIENT
Start: 2018-10-10 | End: 2019-04-03 | Stop reason: ALTCHOICE

## 2018-10-10 RX ORDER — LIDOCAINE HYDROCHLORIDE 40 MG/ML
SOLUTION TOPICAL ONCE
Status: DISCONTINUED | OUTPATIENT
Start: 2018-10-10 | End: 2018-10-11 | Stop reason: HOSPADM

## 2018-10-10 NOTE — PROGRESS NOTES
[address] Procedure Note    Mark Gonzalez New  AGE: 70 y.o. GENDER: male    : 1955  TODAY'S DATE: 10/10/2018    Chief Complaint   Patient presents with    Wound Check     right knee, buttock  F/U        History of Present Illness     James Arriaga is a 70 y.o. male who presents today for wound evaluation. History of Wound: pressure wound located on the sacrum and right knee   Wound Pain:  none  Severity:  1 / 10   Wound Type:  pressure  Modifying Factors: chronic pressure, decreased mobility, arterial insufficiency and paraplegia  Associated Signs/Symptoms:  numbness    Procedure Note:     Performed by: Jacobo Chamberlain MD    Consent obtained: Yes    Time out taken: Yes    Pain Control: Anesthetic  Anesthetic: 4% Lidocaine Liquid Topical     Debridement: Excisional Debridement    Using curette the wound was sharply debrided    down through and including the removal of epidermis, dermis and subcutaneous tissue.         Devitalized Tissue Debrided: fibrin, biofilm and slough    Pre Debridement Measurements:  Are located in the Wound Documentation Flow Sheet     Wound #: 9 and 10     Post  Debridement Measurements:  Pressure Ulcer 17 Coccyx wound open (Active)   Number of days: 558       Pressure Ulcer 17 Heel Right  on 17, it now appears stage II ->unstageable (Active)   Number of days: 610       Wound 18 Knee Right #9 (Active)   Wound Image   2018  2:38 PM   Wound Type Wound 10/10/2018  9:30 AM   Wound Pressure Stage  3 10/10/2018  9:30 AM   Dressing/Treatment Dry dressing;Silicone dressing  10:18 AM   Wound Cleansed Rinsed/Irrigated with saline 10/10/2018  9:45 AM   Wound Length (cm) 1.5 cm 10/10/2018  9:45 AM   Wound Width (cm) 1.8 cm 10/10/2018  9:45 AM   Wound Depth (cm)  0.1 10/10/2018  9:45 AM   Calculated Wound Size (cm^2) (l*w) 2.7 cm^2 10/10/2018  9:45 AM   Change in Wound Size % (l*w) 50.91 10/10/2018  9:45 AM   Wound Assessment Bleeding 8/7/2018  2:41 PM   Number of days: 63       Total Surface Area Debrided:  20.72 sq cm     Bleeding:  Minimal    Hemostasis Achieved:  by pressure    Procedural Pain:  1  / 10     Post Procedural Pain:  1 / 10     Response to treatment:  Well tolerated by patient. Assessment:     Wound looks improved. (improved, worse or stable)    Patient tolerated procedure well and was given proper instruction. The nature of the patient's condition was explained in depth. The patient was informed that their compliance to the treatment plan is paramount to successful healing and prevention of further ulceration and/or infection       Plan:     Treatment Plan: With each dressing change, rinse wounds with 0.9% Saline. (May use wound wash or soft contact solution. Both can be purchased at a local drug store). If unable to obtain saline, may use a gentle soap and water. Dressing care: Right knee- Alginate ag, dry dressing- change every 2 days. Coccyx- Wet to dry in wound center. At home- Coccyx- Skin prep to param-wound, drape around wound and under foam for bridge to protect good skin, NPWT continuous at 120 mmHg or 125 mmHg (depending on the type of NPWT device), change Monday, Wednesday, & Friday. Home care to change. Keep pillow between knees when lying on you side. Rotate position frequently. Drink Glucerna. Continue to use the new low air loss mattress. Derick Banda 6  Maye Henderson MD, FACS  10/10/2018  9:52 AM

## 2018-10-11 RX ORDER — INSULIN GLARGINE 100 [IU]/ML
40 INJECTION, SOLUTION SUBCUTANEOUS NIGHTLY
Qty: 12 ML | Refills: 1 | Status: SHIPPED | OUTPATIENT
Start: 2018-10-11 | End: 2019-03-06 | Stop reason: SDUPTHER

## 2018-10-17 ENCOUNTER — HOSPITAL ENCOUNTER (OUTPATIENT)
Dept: WOUND CARE | Age: 63
Discharge: HOME | End: 2018-10-17
Attending: SURGERY
Payer: MEDICARE

## 2018-10-17 VITALS — DIASTOLIC BLOOD PRESSURE: 62 MMHG | HEART RATE: 81 BPM | RESPIRATION RATE: 16 BRPM | SYSTOLIC BLOOD PRESSURE: 116 MMHG

## 2018-10-17 PROCEDURE — 11045 DBRDMT SUBQ TISS EACH ADDL: CPT

## 2018-10-17 PROCEDURE — 11042 DBRDMT SUBQ TIS 1ST 20SQCM/<: CPT | Performed by: SURGERY

## 2018-10-17 PROCEDURE — 11042 DBRDMT SUBQ TIS 1ST 20SQCM/<: CPT

## 2018-10-17 PROCEDURE — 11045 DBRDMT SUBQ TISS EACH ADDL: CPT | Performed by: SURGERY

## 2018-10-17 RX ORDER — LIDOCAINE HYDROCHLORIDE 40 MG/ML
SOLUTION TOPICAL ONCE
Status: DISCONTINUED | OUTPATIENT
Start: 2018-10-17 | End: 2018-10-18 | Stop reason: HOSPADM

## 2018-10-17 NOTE — PROGRESS NOTES
AM   Drainage Amount Moderate 10/17/2018 10:02 AM   Drainage Description Serosanguinous 10/17/2018  9:26 AM   Odor None 10/17/2018  9:26 AM   Joanne-wound Assessment Dry 10/17/2018  9:26 AM   [place]%Wound Bed 0 10/17/2018  9:26 AM   Red%Wound Bed 100 10/17/2018  9:26 AM   Yellow%Wound Bed 0 10/17/2018  9:26 AM   Black%Wound Bed 0 10/17/2018  9:26 AM   Purple%Wound Bed 0 10/17/2018  9:26 AM   Other%Wound Bed 0 10/17/2018  9:26 AM   Time out Yes 10/17/2018 10:02 AM   Op First Treatment Date 08/07/18 8/7/2018  2:38 PM   Number of days: 70       Wound 08/07/18 Coccyx Mid #10 (Active)   Wound Image   9/5/2018 10:34 AM   Wound Type Wound 10/17/2018  9:26 AM   Wound Pressure Stage  4 10/17/2018  9:26 AM   Dressing/Treatment Dry dressing;Moist to dry 10/3/2018 10:18 AM   Wound Cleansed Rinsed/Irrigated with saline 10/17/2018 10:02 AM   Wound Length (cm) 4.9 cm 10/17/2018 10:02 AM   Wound Width (cm) 4.1 cm 10/17/2018 10:02 AM   Wound Depth (cm)  1.5 10/17/2018 10:02 AM   Calculated Wound Size (cm^2) (l*w) 20.09 cm^2 10/17/2018 10:02 AM   Change in Wound Size % (l*w) -67.42 10/17/2018 10:02 AM   Distance Tunneling (cm) 0.8 cm 10/3/2018  9:36 AM   Tunneling Position ___ O'Clock 0900 10/3/2018  9:36 AM   Undermining Starts ___ O'Clock 12 10/17/2018  9:26 AM   Undermining Ends___ O'Clock 12 10/17/2018  9:26 AM   Undermining Maxium Distance (cm) 0.5 10/17/2018  9:26 AM   Wound Assessment Bleeding 10/17/2018 10:02 AM   Drainage Amount Moderate 10/17/2018 10:02 AM   Drainage Description Serosanguinous 10/17/2018  9:26 AM   Odor Strong 9/26/2018  9:36 AM   Joanne-wound Assessment Excoriated;Pink 10/17/2018  9:26 AM   [place]%Wound Bed 90 10/17/2018  9:26 AM   Red%Wound Bed 0 10/17/2018  9:26 AM   Yellow%Wound Bed 10 10/17/2018  9:26 AM   Black%Wound Bed 0 10/17/2018  9:26 AM   Purple%Wound Bed 0 10/17/2018  9:26 AM   Other%Wound Bed 0 10/17/2018  9:26 AM   Time out Yes 10/17/2018 10:02 AM   Op First Treatment Date 08/07/18 8/7/2018  2:41 PM Number of days: 70       Total Surface Area Debrided:  23.09 sq cm     Bleeding:  Minimal    Hemostasis Achieved:  by pressure    Procedural Pain:  1  / 10     Post Procedural Pain:  1 / 10     Response to treatment:  Well tolerated by patient. Assessment:     Wound looks stable. (improved, worse or stable)    Patient tolerated procedure well and was given proper instruction. The nature of the patient's condition was explained in depth. The patient was informed that their compliance to the treatment plan is paramount to successful healing and prevention of further ulceration and/or infection       Plan:     Treatment Plan: With each dressing change, rinse wounds with 0.9% Saline. (May use wound wash or soft contact solution. Both can be purchased at a local drug store). If unable to obtain saline, may use a gentle soap and water. Dressing care: Right knee- Alginate ag, dry dressing- change every 2 days. Coccyx- Wet to dry in wound center. At home- Coccyx- Skin prep to param-wound, drape around wound and under foam for bridge to protect good skin, NPWT continuous at 120 mmHg or 125 mmHg (depending on the type of NPWT device), change Monday, Wednesday, & Friday. Home care to change. Keep pillow between knees when lying on you side. Rotate position frequently. Drink Glucerna. Continue to use the new low air loss mattress. Derick Banda 6  [facility] MD, FACS  10/17/2018  10:07 AM

## 2018-10-18 ENCOUNTER — TELEPHONE (OUTPATIENT)
Dept: CARDIOLOGY CLINIC | Age: 63
End: 2018-10-18

## 2018-10-22 LAB
ANION GAP SERPL CALCULATED.3IONS-SCNC: 16 MMOL/L (ref 3–16)
BUN BLDV-MCNC: 49 MG/DL (ref 7–20)
CALCIUM SERPL-MCNC: 9 MG/DL (ref 8.3–10.6)
CHLORIDE BLD-SCNC: 100 MMOL/L (ref 99–110)
CO2: 21 MMOL/L (ref 21–32)
CREAT SERPL-MCNC: 1.9 MG/DL (ref 0.8–1.3)
GFR AFRICAN AMERICAN: 43
GFR NON-AFRICAN AMERICAN: 36
GLUCOSE BLD-MCNC: 137 MG/DL (ref 70–99)
POTASSIUM SERPL-SCNC: 5.8 MMOL/L (ref 3.5–5.1)
PRO-BNP: 992 PG/ML (ref 0–124)
SODIUM BLD-SCNC: 137 MMOL/L (ref 136–145)

## 2018-10-24 ENCOUNTER — HOSPITAL ENCOUNTER (OUTPATIENT)
Dept: WOUND CARE | Age: 63
Discharge: HOME | End: 2018-10-24
Attending: SURGERY
Payer: MEDICARE

## 2018-10-24 PROCEDURE — 11042 DBRDMT SUBQ TIS 1ST 20SQCM/<: CPT

## 2018-10-24 PROCEDURE — 11045 DBRDMT SUBQ TISS EACH ADDL: CPT

## 2018-10-24 PROCEDURE — 11045 DBRDMT SUBQ TISS EACH ADDL: CPT | Performed by: SURGERY

## 2018-10-24 PROCEDURE — 11042 DBRDMT SUBQ TIS 1ST 20SQCM/<: CPT | Performed by: SURGERY

## 2018-10-24 RX ORDER — LIDOCAINE HYDROCHLORIDE 40 MG/ML
SOLUTION TOPICAL ONCE
Status: DISCONTINUED | OUTPATIENT
Start: 2018-10-24 | End: 2018-10-25 | Stop reason: HOSPADM

## 2018-10-24 NOTE — PROGRESS NOTES
9: 57 AM   Drainage Amount Moderate 10/24/2018  9:57 AM   Drainage Description Serosanguinous 10/24/2018  9:43 AM   Odor None 10/24/2018  9:43 AM   Joanne-wound Assessment Dry 10/17/2018  9:26 AM   [place]%Wound Bed 0 10/24/2018  9:43 AM   Red%Wound Bed 100 10/24/2018  9:43 AM   Yellow%Wound Bed 0 10/24/2018  9:43 AM   Black%Wound Bed 0 10/24/2018  9:43 AM   Purple%Wound Bed 0 10/24/2018  9:43 AM   Other%Wound Bed 0 10/24/2018  9:43 AM   Time out Yes 10/24/2018  9:57 AM   Op First Treatment Date 08/07/18 8/7/2018  2:38 PM   Number of days: 77       Wound 08/07/18 Coccyx Mid #10 (Active)   Wound Image   9/5/2018 10:34 AM   Wound Type Wound 10/24/2018  9:43 AM   Wound Pressure Stage  4 10/24/2018  9:43 AM   Dressing/Treatment Dry dressing;Moist to dry 10/3/2018 10:18 AM   Wound Cleansed Rinsed/Irrigated with saline 10/24/2018  9:57 AM   Wound Length (cm) 5.1 cm 10/24/2018  9:57 AM   Wound Width (cm) 3.5 cm 10/24/2018  9:57 AM   Wound Depth (cm)  1 10/24/2018  9:57 AM   Calculated Wound Size (cm^2) (l*w) 17.85 cm^2 10/24/2018  9:57 AM   Change in Wound Size % (l*w) -48.75 10/24/2018  9:57 AM   Distance Tunneling (cm) 0.8 cm 10/3/2018  9:36 AM   Tunneling Position ___ O'Clock 0900 10/3/2018  9:36 AM   Undermining Starts ___ O'Clock 1300 10/24/2018  9:43 AM   Undermining Ends___ O'Clock 1500 10/24/2018  9:43 AM   Undermining Maxium Distance (cm) 2.0 10/24/2018  9:43 AM   Wound Assessment Bleeding 10/24/2018  9:57 AM   Drainage Amount Moderate 10/24/2018  9:57 AM   Drainage Description Serosanguinous 10/17/2018  9:26 AM   Odor Strong 9/26/2018  9:36 AM   Joanne-wound Assessment Excoriated;Pink 10/24/2018  9:43 AM   [place]%Wound Bed 90 10/24/2018  9:43 AM   Red%Wound Bed 0 10/24/2018  9:43 AM   Yellow%Wound Bed 10 10/24/2018  9:43 AM   Black%Wound Bed 0 10/24/2018  9:43 AM   Purple%Wound Bed 0 10/24/2018  9:43 AM   Other%Wound Bed 0 10/24/2018  9:43 AM   Time out Yes 10/24/2018  9:57 AM   Op First Treatment Date 08/07/18 8/7/2018

## 2018-10-25 ENCOUNTER — TELEPHONE (OUTPATIENT)
Dept: CARDIOLOGY CLINIC | Age: 63
End: 2018-10-25

## 2018-10-31 ENCOUNTER — HOSPITAL ENCOUNTER (OUTPATIENT)
Dept: WOUND CARE | Age: 63
Discharge: HOME | End: 2018-10-31
Attending: SURGERY
Payer: MEDICARE

## 2018-10-31 VITALS — DIASTOLIC BLOOD PRESSURE: 62 MMHG | HEART RATE: 75 BPM | RESPIRATION RATE: 16 BRPM | SYSTOLIC BLOOD PRESSURE: 118 MMHG

## 2018-10-31 PROCEDURE — 11042 DBRDMT SUBQ TIS 1ST 20SQCM/<: CPT | Performed by: SURGERY

## 2018-10-31 PROCEDURE — 11042 DBRDMT SUBQ TIS 1ST 20SQCM/<: CPT

## 2018-10-31 RX ORDER — LIDOCAINE HYDROCHLORIDE 40 MG/ML
SOLUTION TOPICAL ONCE
Status: DISCONTINUED | OUTPATIENT
Start: 2018-10-31 | End: 2018-11-01 | Stop reason: HOSPADM

## 2018-11-07 ENCOUNTER — HOSPITAL ENCOUNTER (OUTPATIENT)
Age: 63
Setting detail: SPECIMEN
Discharge: HOME | End: 2018-11-07
Payer: MEDICARE

## 2018-11-07 LAB
ANION GAP SERPL CALCULATED.3IONS-SCNC: 11 MMOL/L (ref 3–16)
BUN BLDV-MCNC: 60 MG/DL (ref 7–20)
CALCIUM SERPL-MCNC: 8.9 MG/DL (ref 8.3–10.6)
CHLORIDE BLD-SCNC: 102 MMOL/L (ref 99–110)
CO2: 25 MMOL/L (ref 21–32)
CREAT SERPL-MCNC: 2 MG/DL (ref 0.8–1.3)
GFR AFRICAN AMERICAN: 41
GFR NON-AFRICAN AMERICAN: 34
GLUCOSE BLD-MCNC: 105 MG/DL (ref 70–99)
POTASSIUM SERPL-SCNC: 5.5 MMOL/L (ref 3.5–5.1)
PRO-BNP: 948 PG/ML (ref 0–124)
SODIUM BLD-SCNC: 138 MMOL/L (ref 136–145)

## 2018-11-07 PROCEDURE — 80048 BASIC METABOLIC PNL TOTAL CA: CPT

## 2018-11-07 PROCEDURE — 83880 ASSAY OF NATRIURETIC PEPTIDE: CPT

## 2018-11-07 PROCEDURE — 36415 COLL VENOUS BLD VENIPUNCTURE: CPT

## 2018-11-08 RX ORDER — PRAVASTATIN SODIUM 40 MG
40 TABLET ORAL NIGHTLY
Qty: 30 TABLET | Refills: 0 | Status: SHIPPED | OUTPATIENT
Start: 2018-11-08 | End: 2018-12-24 | Stop reason: SDUPTHER

## 2018-11-08 NOTE — TELEPHONE ENCOUNTER
RX APPROVAL:      Refill:   Requested Prescriptions     Pending Prescriptions Disp Refills    pravastatin (PRAVACHOL) 40 MG tablet [Pharmacy Med Name: PRAVASTATIN NA 40 MG TAB 40 TAB] 30 tablet 6     Sig: Take 1 tablet by mouth nightly      Last OV: 5/7/18  Last EKG:    Last Labs:  Lab Results   Component Value Date    CHOL 166 10/16/2017    TRIG 144 10/16/2017    HDL 43 10/16/2017    HDL 43 01/06/2012    LDLCALC 94 10/16/2017    LABVLDL 29 10/16/2017     Lab Results   Component Value Date    ALT 15 01/07/2018    AST 43 01/07/2018       Plan and labs reviewed

## 2018-11-12 ENCOUNTER — OFFICE VISIT (OUTPATIENT)
Dept: CARDIOLOGY CLINIC | Age: 63
End: 2018-11-12
Payer: MEDICARE

## 2018-11-12 VITALS
HEART RATE: 72 BPM | SYSTOLIC BLOOD PRESSURE: 100 MMHG | WEIGHT: 165 LBS | DIASTOLIC BLOOD PRESSURE: 50 MMHG | BODY MASS INDEX: 22.38 KG/M2

## 2018-11-12 DIAGNOSIS — I42.8 NON-ISCHEMIC CARDIOMYOPATHY (HCC): Chronic | ICD-10-CM

## 2018-11-12 DIAGNOSIS — E78.00 PURE HYPERCHOLESTEROLEMIA: Chronic | ICD-10-CM

## 2018-11-12 DIAGNOSIS — R06.02 SOB (SHORTNESS OF BREATH): Chronic | ICD-10-CM

## 2018-11-12 DIAGNOSIS — I10 ESSENTIAL HYPERTENSION: Chronic | ICD-10-CM

## 2018-11-12 DIAGNOSIS — I50.32 CHRONIC DIASTOLIC HEART FAILURE (HCC): Primary | ICD-10-CM

## 2018-11-12 DIAGNOSIS — I25.10 CORONARY ARTERY DISEASE INVOLVING NATIVE CORONARY ARTERY OF NATIVE HEART WITHOUT ANGINA PECTORIS: Chronic | ICD-10-CM

## 2018-11-12 PROBLEM — N17.9 AKI (ACUTE KIDNEY INJURY) (HCC): Status: RESOLVED | Noted: 2017-02-06 | Resolved: 2018-11-12

## 2018-11-12 PROCEDURE — 1101F PT FALLS ASSESS-DOCD LE1/YR: CPT | Performed by: INTERNAL MEDICINE

## 2018-11-12 PROCEDURE — G8598 ASA/ANTIPLAT THER USED: HCPCS | Performed by: INTERNAL MEDICINE

## 2018-11-12 PROCEDURE — 1036F TOBACCO NON-USER: CPT | Performed by: INTERNAL MEDICINE

## 2018-11-12 PROCEDURE — 99214 OFFICE O/P EST MOD 30 MIN: CPT | Performed by: INTERNAL MEDICINE

## 2018-11-12 PROCEDURE — 1123F ACP DISCUSS/DSCN MKR DOCD: CPT | Performed by: INTERNAL MEDICINE

## 2018-11-12 PROCEDURE — G8420 CALC BMI NORM PARAMETERS: HCPCS | Performed by: INTERNAL MEDICINE

## 2018-11-12 PROCEDURE — G8484 FLU IMMUNIZE NO ADMIN: HCPCS | Performed by: INTERNAL MEDICINE

## 2018-11-12 PROCEDURE — 3017F COLORECTAL CA SCREEN DOC REV: CPT | Performed by: INTERNAL MEDICINE

## 2018-11-12 PROCEDURE — G8427 DOCREV CUR MEDS BY ELIG CLIN: HCPCS | Performed by: INTERNAL MEDICINE

## 2018-11-12 PROCEDURE — 4040F PNEUMOC VAC/ADMIN/RCVD: CPT | Performed by: INTERNAL MEDICINE

## 2018-11-12 RX ORDER — GABAPENTIN 300 MG/1
600 CAPSULE ORAL 2 TIMES DAILY
Status: ON HOLD | COMMUNITY
End: 2019-02-15 | Stop reason: HOSPADM

## 2018-11-12 NOTE — PATIENT INSTRUCTIONS
Stable cardiac status at this time. RTO in 2-6 months with echo  Stop lasix and spironolactone and we will re consider them after 2 weeks labs. Bmp, bnp standing labs every 2 weeks for 2 months hen go back to monthly.

## 2018-11-12 NOTE — PROGRESS NOTES
Noted  Neurological/Psychiatric:  · Alert and oriented in all spheres  · Moves all extremities well  · Exhibits normal gait balance and coordination  · No abnormalities of mood, affect, memory, mentation, or behavior are noted    Current Outpatient Prescriptions   Medication Sig Dispense Refill    gabapentin (NEURONTIN) 300 MG capsule Take 600 mg by mouth 2 times daily. Robbi Torres pravastatin (PRAVACHOL) 40 MG tablet Take 1 tablet by mouth nightly 30 tablet 0    LANTUS SOLOSTAR 100 UNIT/ML injection pen Inject 40 Units into the skin nightly 12 mL 1    hydrALAZINE (APRESOLINE) 50 MG tablet Take 1 tablet by mouth every 8 hours 90 tablet 5    spironolactone (ALDACTONE) 25 MG tablet 0.5 tab po daily, 5 days a week (Patient taking differently: Take 12.5 mg by mouth Twice a Week ) 90 tablet 0    carvedilol (COREG) 12.5 MG tablet Take 1 tablet by mouth 2 times daily (with meals) 60 tablet 11    ipratropium-albuterol (DUONEB) 0.5-2.5 (3) MG/3ML SOLN nebulizer solution Inhale 3 mLs into the lungs every 4 hours as needed for Shortness of Breath DX code J47.1 bronchiectasis 360 mL 5    pantoprazole (PROTONIX) 40 MG tablet Take 1 tablet by mouth daily 30 tablet 5    finasteride (PROSCAR) 5 MG tablet Take 1 tablet by mouth daily 30 tablet 5    citalopram (CELEXA) 20 MG tablet Take 1 tablet by mouth daily 30 tablet 5    furosemide (LASIX) 20 MG tablet Take 1 tablet by mouth daily (Patient taking differently: Take 20 mg by mouth Twice a Week ) 90 tablet 1    glucose (GLUTOSE) 40 % GEL Take 15 g by mouth as needed (low BS) 45 g 1    OXYGEN Inhale 2 L into the lungs as needed      acetaminophen (TYLENOL) 325 MG tablet Take 2 tablets by mouth every 4 hours as needed for Pain or Fever 120 tablet 3    insulin lispro (HUMALOG) 100 UNIT/ML pen Inject 0-12 Units into the skin 3 times daily (with meals) 5 Pen 3    fluticasone (FLONASE) 50 MCG/ACT nasal spray 1 spray by Nasal route daily 1 Bottle 3    ferrous sulfate 325 (65 FE)

## 2018-11-14 ENCOUNTER — HOSPITAL ENCOUNTER (OUTPATIENT)
Dept: WOUND CARE | Age: 63
Discharge: HOME | End: 2018-11-14
Attending: SURGERY

## 2018-11-19 ENCOUNTER — HOSPITAL ENCOUNTER (OUTPATIENT)
Age: 63
Setting detail: SPECIMEN
Discharge: HOME | End: 2018-11-19
Payer: MEDICARE

## 2018-11-19 LAB
ANION GAP SERPL CALCULATED.3IONS-SCNC: 11 MMOL/L (ref 3–16)
BUN BLDV-MCNC: 56 MG/DL (ref 7–20)
CALCIUM SERPL-MCNC: 8.7 MG/DL (ref 8.3–10.6)
CHLORIDE BLD-SCNC: 100 MMOL/L (ref 99–110)
CO2: 25 MMOL/L (ref 21–32)
CREAT SERPL-MCNC: 1.8 MG/DL (ref 0.8–1.3)
GFR AFRICAN AMERICAN: 46
GFR NON-AFRICAN AMERICAN: 38
GLUCOSE BLD-MCNC: 151 MG/DL (ref 70–99)
POTASSIUM SERPL-SCNC: 5.6 MMOL/L (ref 3.5–5.1)
PRO-BNP: 989 PG/ML (ref 0–124)
SODIUM BLD-SCNC: 136 MMOL/L (ref 136–145)

## 2018-11-19 PROCEDURE — 83880 ASSAY OF NATRIURETIC PEPTIDE: CPT

## 2018-11-19 PROCEDURE — 36415 COLL VENOUS BLD VENIPUNCTURE: CPT

## 2018-11-19 PROCEDURE — 80048 BASIC METABOLIC PNL TOTAL CA: CPT

## 2018-11-21 ENCOUNTER — HOSPITAL ENCOUNTER (OUTPATIENT)
Dept: WOUND CARE | Age: 63
Discharge: HOME | End: 2018-11-21
Attending: SURGERY
Payer: MEDICARE

## 2018-11-21 VITALS
RESPIRATION RATE: 16 BRPM | TEMPERATURE: 97 F | DIASTOLIC BLOOD PRESSURE: 64 MMHG | HEART RATE: 82 BPM | SYSTOLIC BLOOD PRESSURE: 133 MMHG

## 2018-11-21 PROCEDURE — 11045 DBRDMT SUBQ TISS EACH ADDL: CPT | Performed by: SURGERY

## 2018-11-21 PROCEDURE — 15272 SKIN SUB GRAFT T/A/L ADD-ON: CPT

## 2018-11-21 PROCEDURE — 11042 DBRDMT SUBQ TIS 1ST 20SQCM/<: CPT | Performed by: SURGERY

## 2018-11-21 PROCEDURE — 11042 DBRDMT SUBQ TIS 1ST 20SQCM/<: CPT

## 2018-11-21 RX ORDER — LIDOCAINE HYDROCHLORIDE 40 MG/ML
SOLUTION TOPICAL ONCE
Status: DISCONTINUED | OUTPATIENT
Start: 2018-11-21 | End: 2018-11-22 | Stop reason: HOSPADM

## 2018-11-28 ENCOUNTER — HOSPITAL ENCOUNTER (OUTPATIENT)
Dept: WOUND CARE | Age: 63
Discharge: HOME | End: 2018-11-28
Attending: SURGERY

## 2018-11-28 ENCOUNTER — TELEPHONE (OUTPATIENT)
Dept: CARDIOLOGY CLINIC | Age: 63
End: 2018-11-28

## 2018-11-28 LAB
ANION GAP SERPL CALCULATED.3IONS-SCNC: 11 MMOL/L (ref 6–18)
B-TYPE NATRIURETIC PEPTIDE: 115 PG/ML (ref 0–72)
BASOPHILS ABSOLUTE: 0 THOU/MCL (ref 0–0.2)
BASOPHILS ABSOLUTE: 1 %
BUN BLDV-MCNC: 54 MG/DL (ref 8–26)
CALCIUM SERPL-MCNC: 8.3 MG/DL (ref 8.5–10.5)
CHLORIDE BLD-SCNC: 100 MEQ/L (ref 101–111)
CO2: 25 MMOL/L (ref 24–36)
CREAT SERPL-MCNC: 2 MG/DL (ref 0.64–1.27)
EOSINOPHILS ABSOLUTE: 0.3 THOU/MCL (ref 0.03–0.45)
EOSINOPHILS RELATIVE PERCENT: 4 %
GFR AFRICAN AMERICAN: 38 ML/MIN/1.73 M2
GFR NON-AFRICAN AMERICAN: 33 ML/MIN/1.73 M2
GLUCOSE BLD-MCNC: 94 MG/DL (ref 70–99)
HCT VFR BLD CALC: 31.4 % (ref 40–50)
HEMOGLOBIN: 10.7 G/DL (ref 13.5–16.5)
LYMPHOCYTES ABSOLUTE: 1.3 THOU/MCL (ref 1–4)
LYMPHOCYTES RELATIVE PERCENT: 18 %
MCH RBC QN AUTO: 30.5 PG (ref 27–33)
MCHC RBC AUTO-ENTMCNC: 34.2 G/DL (ref 32–36)
MCV RBC AUTO: 89.1 FL (ref 82–97)
MONOCYTES # BLD: 7 %
MONOCYTES ABSOLUTE: 0.5 THOU/MCL (ref 0.2–0.9)
NEUTROPHILS ABSOLUTE: 5.4 THOU/MCL (ref 1.8–7.7)
PDW BLD-RTO: 16.8 %
PLATELET # BLD: 173 THOU/MCL (ref 140–375)
PMV BLD AUTO: 7.7 FL (ref 7.4–11.5)
POTASSIUM SERPL-SCNC: 5.1 MEQ/L (ref 3.6–5.1)
RBC # BLD: 3.52 MIL/MCL (ref 4.4–5.8)
SEG NEUTROPHILS: 70 %
SODIUM BLD-SCNC: 136 MEQ/L (ref 135–145)
WBC # BLD: 7.5 THOU/MCL (ref 3.6–10.5)

## 2018-12-03 ENCOUNTER — HOSPITAL ENCOUNTER (OUTPATIENT)
Age: 63
Setting detail: SPECIMEN
Discharge: HOME | DRG: 291 | End: 2018-12-03
Payer: MEDICARE

## 2018-12-03 LAB
ANION GAP SERPL CALCULATED.3IONS-SCNC: 9 MMOL/L (ref 3–16)
BUN BLDV-MCNC: 69 MG/DL (ref 7–20)
CALCIUM SERPL-MCNC: 9.1 MG/DL (ref 8.3–10.6)
CHLORIDE BLD-SCNC: 100 MMOL/L (ref 99–110)
CO2: 28 MMOL/L (ref 21–32)
CREAT SERPL-MCNC: 2.1 MG/DL (ref 0.8–1.3)
GFR AFRICAN AMERICAN: 39
GFR NON-AFRICAN AMERICAN: 32
GLUCOSE BLD-MCNC: 289 MG/DL (ref 70–99)
POTASSIUM SERPL-SCNC: 5.9 MMOL/L (ref 3.5–5.1)
PRO-BNP: 2589 PG/ML (ref 0–124)
SODIUM BLD-SCNC: 137 MMOL/L (ref 136–145)

## 2018-12-03 PROCEDURE — 80048 BASIC METABOLIC PNL TOTAL CA: CPT

## 2018-12-03 PROCEDURE — 83880 ASSAY OF NATRIURETIC PEPTIDE: CPT

## 2018-12-03 PROCEDURE — 36415 COLL VENOUS BLD VENIPUNCTURE: CPT

## 2018-12-05 ENCOUNTER — HOSPITAL ENCOUNTER (OUTPATIENT)
Dept: WOUND CARE | Age: 63
Discharge: HOME | DRG: 291 | End: 2018-12-05
Payer: MEDICARE

## 2018-12-05 ENCOUNTER — APPOINTMENT (OUTPATIENT)
Dept: GENERAL RADIOLOGY | Age: 63
DRG: 291 | End: 2018-12-05
Payer: MEDICARE

## 2018-12-05 ENCOUNTER — HOSPITAL ENCOUNTER (INPATIENT)
Age: 63
LOS: 2 days | Discharge: NURSING, REHAB OR LONG-TERM CARE | DRG: 291 | End: 2018-12-07
Attending: EMERGENCY MEDICINE | Admitting: INTERNAL MEDICINE
Payer: MEDICARE

## 2018-12-05 VITALS
SYSTOLIC BLOOD PRESSURE: 118 MMHG | HEART RATE: 82 BPM | DIASTOLIC BLOOD PRESSURE: 53 MMHG | OXYGEN SATURATION: 98 % | RESPIRATION RATE: 20 BRPM

## 2018-12-05 DIAGNOSIS — I50.9 CONGESTIVE HEART FAILURE, UNSPECIFIED HF CHRONICITY, UNSPECIFIED HEART FAILURE TYPE (HCC): Primary | ICD-10-CM

## 2018-12-05 PROBLEM — R13.10 DYSPHAGIA: Status: ACTIVE | Noted: 2018-12-05

## 2018-12-05 PROBLEM — Z93.1 S/P PERCUTANEOUS ENDOSCOPIC GASTROSTOMY (PEG) TUBE PLACEMENT (HCC): Status: ACTIVE | Noted: 2018-12-05

## 2018-12-05 PROBLEM — I25.2 HISTORY OF MYOCARDIAL INFARCTION: Status: ACTIVE | Noted: 2018-12-05

## 2018-12-05 PROBLEM — N18.30 CKD (CHRONIC KIDNEY DISEASE) STAGE 3, GFR 30-59 ML/MIN (HCC): Status: ACTIVE | Noted: 2018-12-05

## 2018-12-05 PROBLEM — N18.9 ACUTE KIDNEY INJURY SUPERIMPOSED ON CHRONIC KIDNEY DISEASE (HCC): Status: ACTIVE | Noted: 2018-12-05

## 2018-12-05 PROBLEM — Z86.718 HISTORY OF DVT (DEEP VEIN THROMBOSIS): Status: ACTIVE | Noted: 2018-12-05

## 2018-12-05 PROBLEM — N17.9 ACUTE KIDNEY INJURY SUPERIMPOSED ON CHRONIC KIDNEY DISEASE (HCC): Status: ACTIVE | Noted: 2018-12-05

## 2018-12-05 PROBLEM — I50.33 ACUTE ON CHRONIC DIASTOLIC CHF (CONGESTIVE HEART FAILURE), NYHA CLASS 3 (HCC): Status: ACTIVE | Noted: 2018-12-05

## 2018-12-05 LAB
ALBUMIN SERPL-MCNC: 3.5 G/DL (ref 3.4–5)
ANION GAP SERPL CALCULATED.3IONS-SCNC: 10 MMOL/L (ref 3–16)
ANION GAP SERPL CALCULATED.3IONS-SCNC: 11 MMOL/L (ref 3–16)
BASOPHILS ABSOLUTE: 0 K/UL (ref 0–0.2)
BASOPHILS RELATIVE PERCENT: 0.6 %
BUN BLDV-MCNC: 68 MG/DL (ref 7–20)
BUN BLDV-MCNC: 69 MG/DL (ref 7–20)
CALCIUM SERPL-MCNC: 8.8 MG/DL (ref 8.3–10.6)
CALCIUM SERPL-MCNC: 9 MG/DL (ref 8.3–10.6)
CHLORIDE BLD-SCNC: 96 MMOL/L (ref 99–110)
CHLORIDE BLD-SCNC: 99 MMOL/L (ref 99–110)
CO2: 27 MMOL/L (ref 21–32)
CO2: 27 MMOL/L (ref 21–32)
CREAT SERPL-MCNC: 2 MG/DL (ref 0.8–1.3)
CREAT SERPL-MCNC: 2.2 MG/DL (ref 0.8–1.3)
EKG ATRIAL RATE: 75 BPM
EKG DIAGNOSIS: NORMAL
EKG P AXIS: 29 DEGREES
EKG P-R INTERVAL: 262 MS
EKG Q-T INTERVAL: 410 MS
EKG QRS DURATION: 104 MS
EKG QTC CALCULATION (BAZETT): 457 MS
EKG R AXIS: 34 DEGREES
EKG T AXIS: 112 DEGREES
EKG VENTRICULAR RATE: 75 BPM
EOSINOPHILS ABSOLUTE: 0.3 K/UL (ref 0–0.6)
EOSINOPHILS RELATIVE PERCENT: 3.6 %
GFR AFRICAN AMERICAN: 37
GFR AFRICAN AMERICAN: 41
GFR NON-AFRICAN AMERICAN: 30
GFR NON-AFRICAN AMERICAN: 34
GLUCOSE BLD-MCNC: 187 MG/DL (ref 70–99)
GLUCOSE BLD-MCNC: 191 MG/DL (ref 70–99)
GLUCOSE BLD-MCNC: 205 MG/DL (ref 70–99)
GLUCOSE BLD-MCNC: 228 MG/DL (ref 70–99)
HCT VFR BLD CALC: 32.3 % (ref 40.5–52.5)
HEMOGLOBIN: 10.4 G/DL (ref 13.5–17.5)
IRON SATURATION: 30 % (ref 20–50)
IRON: 42 UG/DL (ref 59–158)
LACTIC ACID: 1.4 MMOL/L (ref 0.4–2)
LYMPHOCYTES ABSOLUTE: 1.1 K/UL (ref 1–5.1)
LYMPHOCYTES RELATIVE PERCENT: 15 %
MCH RBC QN AUTO: 29.2 PG (ref 26–34)
MCHC RBC AUTO-ENTMCNC: 32.4 G/DL (ref 31–36)
MCV RBC AUTO: 90.4 FL (ref 80–100)
MONOCYTES ABSOLUTE: 0.5 K/UL (ref 0–1.3)
MONOCYTES RELATIVE PERCENT: 7.2 %
NEUTROPHILS ABSOLUTE: 5.5 K/UL (ref 1.7–7.7)
NEUTROPHILS RELATIVE PERCENT: 73.6 %
PDW BLD-RTO: 16.6 % (ref 12.4–15.4)
PERFORMED ON: ABNORMAL
PERFORMED ON: ABNORMAL
PLATELET # BLD: 160 K/UL (ref 135–450)
PMV BLD AUTO: 7 FL (ref 5–10.5)
POTASSIUM SERPL-SCNC: 5 MMOL/L (ref 3.5–5.1)
POTASSIUM SERPL-SCNC: 5.4 MMOL/L (ref 3.5–5.1)
PRO-BNP: 2121 PG/ML (ref 0–124)
RBC # BLD: 3.57 M/UL (ref 4.2–5.9)
SODIUM BLD-SCNC: 134 MMOL/L (ref 136–145)
SODIUM BLD-SCNC: 136 MMOL/L (ref 136–145)
TOTAL IRON BINDING CAPACITY: 142 UG/DL (ref 260–445)
TROPONIN: 0.05 NG/ML
WBC # BLD: 7.5 K/UL (ref 4–11)

## 2018-12-05 PROCEDURE — 36415 COLL VENOUS BLD VENIPUNCTURE: CPT

## 2018-12-05 PROCEDURE — 71046 X-RAY EXAM CHEST 2 VIEWS: CPT

## 2018-12-05 PROCEDURE — 83605 ASSAY OF LACTIC ACID: CPT

## 2018-12-05 PROCEDURE — 1200000000 HC SEMI PRIVATE

## 2018-12-05 PROCEDURE — 99212 OFFICE O/P EST SF 10 MIN: CPT | Performed by: SURGERY

## 2018-12-05 PROCEDURE — 6360000002 HC RX W HCPCS: Performed by: INTERNAL MEDICINE

## 2018-12-05 PROCEDURE — 85025 COMPLETE CBC W/AUTO DIFF WBC: CPT

## 2018-12-05 PROCEDURE — 80048 BASIC METABOLIC PNL TOTAL CA: CPT

## 2018-12-05 PROCEDURE — 99285 EMERGENCY DEPT VISIT HI MDM: CPT

## 2018-12-05 PROCEDURE — 2580000003 HC RX 258: Performed by: INTERNAL MEDICINE

## 2018-12-05 PROCEDURE — 6370000000 HC RX 637 (ALT 250 FOR IP): Performed by: INTERNAL MEDICINE

## 2018-12-05 PROCEDURE — 83540 ASSAY OF IRON: CPT

## 2018-12-05 PROCEDURE — 93010 ELECTROCARDIOGRAM REPORT: CPT | Performed by: INTERNAL MEDICINE

## 2018-12-05 PROCEDURE — 87040 BLOOD CULTURE FOR BACTERIA: CPT

## 2018-12-05 PROCEDURE — 93005 ELECTROCARDIOGRAM TRACING: CPT | Performed by: EMERGENCY MEDICINE

## 2018-12-05 PROCEDURE — 2700000000 HC OXYGEN THERAPY PER DAY

## 2018-12-05 PROCEDURE — 83550 IRON BINDING TEST: CPT

## 2018-12-05 PROCEDURE — 84484 ASSAY OF TROPONIN QUANT: CPT

## 2018-12-05 PROCEDURE — 94640 AIRWAY INHALATION TREATMENT: CPT

## 2018-12-05 PROCEDURE — 83880 ASSAY OF NATRIURETIC PEPTIDE: CPT

## 2018-12-05 PROCEDURE — 94760 N-INVAS EAR/PLS OXIMETRY 1: CPT

## 2018-12-05 PROCEDURE — 82040 ASSAY OF SERUM ALBUMIN: CPT

## 2018-12-05 PROCEDURE — 83036 HEMOGLOBIN GLYCOSYLATED A1C: CPT

## 2018-12-05 PROCEDURE — 99223 1ST HOSP IP/OBS HIGH 75: CPT | Performed by: INTERNAL MEDICINE

## 2018-12-05 PROCEDURE — 99211 OFF/OP EST MAY X REQ PHY/QHP: CPT

## 2018-12-05 PROCEDURE — 6360000002 HC RX W HCPCS: Performed by: PHYSICIAN ASSISTANT

## 2018-12-05 RX ORDER — FERROUS SULFATE 325(65) MG
325 TABLET ORAL
Status: DISCONTINUED | OUTPATIENT
Start: 2018-12-06 | End: 2018-12-07 | Stop reason: HOSPADM

## 2018-12-05 RX ORDER — PRAVASTATIN SODIUM 40 MG
40 TABLET ORAL NIGHTLY
Status: DISCONTINUED | OUTPATIENT
Start: 2018-12-05 | End: 2018-12-07 | Stop reason: HOSPADM

## 2018-12-05 RX ORDER — VITAMIN E 268 MG
400 CAPSULE ORAL DAILY
Status: DISCONTINUED | OUTPATIENT
Start: 2018-12-06 | End: 2018-12-07 | Stop reason: HOSPADM

## 2018-12-05 RX ORDER — DEXTROSE MONOHYDRATE 50 MG/ML
100 INJECTION, SOLUTION INTRAVENOUS PRN
Status: DISCONTINUED | OUTPATIENT
Start: 2018-12-05 | End: 2018-12-07 | Stop reason: HOSPADM

## 2018-12-05 RX ORDER — DEXTROSE MONOHYDRATE 25 G/50ML
12.5 INJECTION, SOLUTION INTRAVENOUS PRN
Status: DISCONTINUED | OUTPATIENT
Start: 2018-12-05 | End: 2018-12-07 | Stop reason: HOSPADM

## 2018-12-05 RX ORDER — FLUTICASONE PROPIONATE 50 MCG
1 SPRAY, SUSPENSION (ML) NASAL DAILY
Status: DISCONTINUED | OUTPATIENT
Start: 2018-12-05 | End: 2018-12-07 | Stop reason: HOSPADM

## 2018-12-05 RX ORDER — SODIUM CHLORIDE 0.9 % (FLUSH) 0.9 %
10 SYRINGE (ML) INJECTION EVERY 12 HOURS SCHEDULED
Status: DISCONTINUED | OUTPATIENT
Start: 2018-12-05 | End: 2018-12-07 | Stop reason: HOSPADM

## 2018-12-05 RX ORDER — ONDANSETRON 2 MG/ML
4 INJECTION INTRAMUSCULAR; INTRAVENOUS EVERY 6 HOURS PRN
Status: DISCONTINUED | OUTPATIENT
Start: 2018-12-05 | End: 2018-12-07 | Stop reason: HOSPADM

## 2018-12-05 RX ORDER — FUROSEMIDE 10 MG/ML
40 INJECTION INTRAMUSCULAR; INTRAVENOUS 2 TIMES DAILY
Status: DISCONTINUED | OUTPATIENT
Start: 2018-12-05 | End: 2018-12-07

## 2018-12-05 RX ORDER — IPRATROPIUM BROMIDE AND ALBUTEROL SULFATE 2.5; .5 MG/3ML; MG/3ML
1 SOLUTION RESPIRATORY (INHALATION)
Status: DISCONTINUED | OUTPATIENT
Start: 2018-12-05 | End: 2018-12-05

## 2018-12-05 RX ORDER — IPRATROPIUM BROMIDE AND ALBUTEROL SULFATE 2.5; .5 MG/3ML; MG/3ML
3 SOLUTION RESPIRATORY (INHALATION) EVERY 4 HOURS PRN
Status: DISCONTINUED | OUTPATIENT
Start: 2018-12-05 | End: 2018-12-07 | Stop reason: HOSPADM

## 2018-12-05 RX ORDER — CITALOPRAM 20 MG/1
20 TABLET ORAL DAILY
Status: DISCONTINUED | OUTPATIENT
Start: 2018-12-06 | End: 2018-12-07 | Stop reason: HOSPADM

## 2018-12-05 RX ORDER — CARVEDILOL 6.25 MG/1
12.5 TABLET ORAL 2 TIMES DAILY WITH MEALS
Status: DISCONTINUED | OUTPATIENT
Start: 2018-12-05 | End: 2018-12-07 | Stop reason: HOSPADM

## 2018-12-05 RX ORDER — FINASTERIDE 5 MG/1
5 TABLET, FILM COATED ORAL DAILY
Status: DISCONTINUED | OUTPATIENT
Start: 2018-12-06 | End: 2018-12-07 | Stop reason: HOSPADM

## 2018-12-05 RX ORDER — GABAPENTIN 300 MG/1
600 CAPSULE ORAL 2 TIMES DAILY
Status: DISCONTINUED | OUTPATIENT
Start: 2018-12-05 | End: 2018-12-07 | Stop reason: HOSPADM

## 2018-12-05 RX ORDER — ACETAMINOPHEN 325 MG/1
650 TABLET ORAL EVERY 4 HOURS PRN
Status: DISCONTINUED | OUTPATIENT
Start: 2018-12-05 | End: 2018-12-05

## 2018-12-05 RX ORDER — SODIUM CHLORIDE 0.9 % (FLUSH) 0.9 %
10 SYRINGE (ML) INJECTION PRN
Status: DISCONTINUED | OUTPATIENT
Start: 2018-12-05 | End: 2018-12-07 | Stop reason: HOSPADM

## 2018-12-05 RX ORDER — DOCUSATE SODIUM 100 MG/1
100 CAPSULE, LIQUID FILLED ORAL DAILY
COMMUNITY

## 2018-12-05 RX ORDER — HEPARIN SODIUM 5000 [USP'U]/ML
5000 INJECTION, SOLUTION INTRAVENOUS; SUBCUTANEOUS EVERY 8 HOURS SCHEDULED
Status: DISCONTINUED | OUTPATIENT
Start: 2018-12-05 | End: 2018-12-07 | Stop reason: HOSPADM

## 2018-12-05 RX ORDER — NICOTINE POLACRILEX 4 MG
15 LOZENGE BUCCAL PRN
Status: DISCONTINUED | OUTPATIENT
Start: 2018-12-05 | End: 2018-12-07 | Stop reason: HOSPADM

## 2018-12-05 RX ORDER — ACETAMINOPHEN 325 MG/1
650 TABLET ORAL EVERY 4 HOURS PRN
Status: DISCONTINUED | OUTPATIENT
Start: 2018-12-05 | End: 2018-12-07 | Stop reason: HOSPADM

## 2018-12-05 RX ORDER — LORAZEPAM 0.5 MG/1
0.5 TABLET ORAL EVERY 8 HOURS PRN
Status: ON HOLD | COMMUNITY
End: 2018-12-20 | Stop reason: HOSPADM

## 2018-12-05 RX ORDER — IPRATROPIUM BROMIDE AND ALBUTEROL SULFATE 2.5; .5 MG/3ML; MG/3ML
1 SOLUTION RESPIRATORY (INHALATION) 3 TIMES DAILY
Status: DISCONTINUED | OUTPATIENT
Start: 2018-12-05 | End: 2018-12-07 | Stop reason: HOSPADM

## 2018-12-05 RX ORDER — ASPIRIN 81 MG/1
81 TABLET, CHEWABLE ORAL DAILY
Status: DISCONTINUED | OUTPATIENT
Start: 2018-12-05 | End: 2018-12-07 | Stop reason: HOSPADM

## 2018-12-05 RX ORDER — FUROSEMIDE 10 MG/ML
40 INJECTION INTRAMUSCULAR; INTRAVENOUS ONCE
Status: COMPLETED | OUTPATIENT
Start: 2018-12-05 | End: 2018-12-05

## 2018-12-05 RX ORDER — HYDRALAZINE HYDROCHLORIDE 25 MG/1
50 TABLET, FILM COATED ORAL EVERY 8 HOURS SCHEDULED
Status: DISCONTINUED | OUTPATIENT
Start: 2018-12-05 | End: 2018-12-07 | Stop reason: HOSPADM

## 2018-12-05 RX ORDER — NITROGLYCERIN 0.4 MG/1
0.4 TABLET SUBLINGUAL EVERY 5 MIN PRN
Status: DISCONTINUED | OUTPATIENT
Start: 2018-12-05 | End: 2018-12-07 | Stop reason: HOSPADM

## 2018-12-05 RX ADMIN — FUROSEMIDE 40 MG: 10 INJECTION, SOLUTION INTRAMUSCULAR; INTRAVENOUS at 11:43

## 2018-12-05 RX ADMIN — IPRATROPIUM BROMIDE AND ALBUTEROL SULFATE 3 ML: .5; 3 SOLUTION RESPIRATORY (INHALATION) at 17:18

## 2018-12-05 RX ADMIN — Medication 10 ML: at 22:08

## 2018-12-05 RX ADMIN — IPRATROPIUM BROMIDE AND ALBUTEROL SULFATE 1 AMPULE: .5; 3 SOLUTION RESPIRATORY (INHALATION) at 21:12

## 2018-12-05 RX ADMIN — ASPIRIN 81 MG 81 MG: 81 TABLET ORAL at 16:50

## 2018-12-05 RX ADMIN — INSULIN LISPRO 2 UNITS: 100 INJECTION, SOLUTION INTRAVENOUS; SUBCUTANEOUS at 16:51

## 2018-12-05 RX ADMIN — GABAPENTIN 600 MG: 300 CAPSULE ORAL at 21:59

## 2018-12-05 RX ADMIN — PRAVASTATIN SODIUM 40 MG: 40 TABLET ORAL at 21:59

## 2018-12-05 RX ADMIN — CARVEDILOL 12.5 MG: 6.25 TABLET, FILM COATED ORAL at 16:50

## 2018-12-05 RX ADMIN — FUROSEMIDE 40 MG: 10 INJECTION, SOLUTION INTRAMUSCULAR; INTRAVENOUS at 17:45

## 2018-12-05 RX ADMIN — HYDRALAZINE HYDROCHLORIDE 50 MG: 25 TABLET, FILM COATED ORAL at 21:59

## 2018-12-05 RX ADMIN — HYDRALAZINE HYDROCHLORIDE 50 MG: 25 TABLET, FILM COATED ORAL at 16:50

## 2018-12-05 RX ADMIN — INSULIN LISPRO 2 UNITS: 100 INJECTION, SOLUTION INTRAVENOUS; SUBCUTANEOUS at 22:00

## 2018-12-05 ASSESSMENT — ENCOUNTER SYMPTOMS
VOMITING: 0
NAUSEA: 0
DIARRHEA: 0
CONSTIPATION: 0
CHEST TIGHTNESS: 0
SHORTNESS OF BREATH: 1
COUGH: 0
ABDOMINAL PAIN: 0

## 2018-12-05 ASSESSMENT — PAIN SCALES - GENERAL
PAINLEVEL_OUTOF10: 0
PAINLEVEL_OUTOF10: 0
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
## 2018-12-06 LAB
ALBUMIN SERPL-MCNC: 3.2 G/DL (ref 3.4–5)
ANION GAP SERPL CALCULATED.3IONS-SCNC: 8 MMOL/L (ref 3–16)
BACTERIA: ABNORMAL /HPF
BASOPHILS ABSOLUTE: 0 K/UL (ref 0–0.2)
BASOPHILS RELATIVE PERCENT: 0.4 %
BILIRUBIN URINE: NEGATIVE
BLOOD, URINE: NEGATIVE
BUN BLDV-MCNC: 74 MG/DL (ref 7–20)
CALCIUM SERPL-MCNC: 8.8 MG/DL (ref 8.3–10.6)
CHLORIDE BLD-SCNC: 103 MMOL/L (ref 99–110)
CLARITY: ABNORMAL
CO2: 27 MMOL/L (ref 21–32)
COLOR: YELLOW
CREAT SERPL-MCNC: 2 MG/DL (ref 0.8–1.3)
EOSINOPHILS ABSOLUTE: 0.3 K/UL (ref 0–0.6)
EOSINOPHILS RELATIVE PERCENT: 4.5 %
EPITHELIAL CELLS, UA: 1 /HPF (ref 0–5)
ESTIMATED AVERAGE GLUCOSE: 108.3 MG/DL
GFR AFRICAN AMERICAN: 41
GFR NON-AFRICAN AMERICAN: 34
GLUCOSE BLD-MCNC: 169 MG/DL (ref 70–99)
GLUCOSE BLD-MCNC: 172 MG/DL (ref 70–99)
GLUCOSE BLD-MCNC: 228 MG/DL (ref 70–99)
GLUCOSE BLD-MCNC: 252 MG/DL (ref 70–99)
GLUCOSE BLD-MCNC: 262 MG/DL (ref 70–99)
GLUCOSE BLD-MCNC: 285 MG/DL (ref 70–99)
GLUCOSE URINE: NEGATIVE MG/DL
HBA1C MFR BLD: 5.4 %
HCT VFR BLD CALC: 30.7 % (ref 40.5–52.5)
HEMOGLOBIN: 10.3 G/DL (ref 13.5–17.5)
HYALINE CASTS: 6 /LPF (ref 0–8)
KETONES, URINE: NEGATIVE MG/DL
LEFT VENTRICULAR EJECTION FRACTION HIGH VALUE: 65 %
LEFT VENTRICULAR EJECTION FRACTION MODE: NORMAL
LEUKOCYTE ESTERASE, URINE: ABNORMAL
LV EF: 60 %
LVEF MODALITY: NORMAL
LYMPHOCYTES ABSOLUTE: 1.5 K/UL (ref 1–5.1)
LYMPHOCYTES RELATIVE PERCENT: 20.6 %
MCH RBC QN AUTO: 30 PG (ref 26–34)
MCHC RBC AUTO-ENTMCNC: 33.6 G/DL (ref 31–36)
MCV RBC AUTO: 89.5 FL (ref 80–100)
MICROSCOPIC EXAMINATION: YES
MONOCYTES ABSOLUTE: 0.6 K/UL (ref 0–1.3)
MONOCYTES RELATIVE PERCENT: 7.7 %
NEUTROPHILS ABSOLUTE: 4.8 K/UL (ref 1.7–7.7)
NEUTROPHILS RELATIVE PERCENT: 66.8 %
NITRITE, URINE: POSITIVE
PDW BLD-RTO: 16.8 % (ref 12.4–15.4)
PERFORMED ON: ABNORMAL
PH UA: 5
PLATELET # BLD: 154 K/UL (ref 135–450)
PMV BLD AUTO: 7.1 FL (ref 5–10.5)
POTASSIUM REFLEX MAGNESIUM: 4.9 MMOL/L (ref 3.5–5.1)
PROTEIN UA: NEGATIVE MG/DL
RBC # BLD: 3.43 M/UL (ref 4.2–5.9)
RBC UA: 2 /HPF (ref 0–4)
SODIUM BLD-SCNC: 138 MMOL/L (ref 136–145)
SPECIFIC GRAVITY UA: 1.01
UROBILINOGEN, URINE: 0.2 E.U./DL
WBC # BLD: 7.2 K/UL (ref 4–11)
WBC UA: 67 /HPF (ref 0–5)

## 2018-12-06 PROCEDURE — 6370000000 HC RX 637 (ALT 250 FOR IP): Performed by: INTERNAL MEDICINE

## 2018-12-06 PROCEDURE — 2580000003 HC RX 258: Performed by: INTERNAL MEDICINE

## 2018-12-06 PROCEDURE — 94640 AIRWAY INHALATION TREATMENT: CPT

## 2018-12-06 PROCEDURE — 92526 ORAL FUNCTION THERAPY: CPT

## 2018-12-06 PROCEDURE — 6360000002 HC RX W HCPCS: Performed by: INTERNAL MEDICINE

## 2018-12-06 PROCEDURE — 80048 BASIC METABOLIC PNL TOTAL CA: CPT

## 2018-12-06 PROCEDURE — 93306 TTE W/DOPPLER COMPLETE: CPT

## 2018-12-06 PROCEDURE — G8978 MOBILITY CURRENT STATUS: HCPCS

## 2018-12-06 PROCEDURE — 36415 COLL VENOUS BLD VENIPUNCTURE: CPT

## 2018-12-06 PROCEDURE — 81001 URINALYSIS AUTO W/SCOPE: CPT

## 2018-12-06 PROCEDURE — 82040 ASSAY OF SERUM ALBUMIN: CPT

## 2018-12-06 PROCEDURE — 97162 PT EVAL MOD COMPLEX 30 MIN: CPT

## 2018-12-06 PROCEDURE — 97530 THERAPEUTIC ACTIVITIES: CPT

## 2018-12-06 PROCEDURE — 99233 SBSQ HOSP IP/OBS HIGH 50: CPT | Performed by: INTERNAL MEDICINE

## 2018-12-06 PROCEDURE — G8987 SELF CARE CURRENT STATUS: HCPCS

## 2018-12-06 PROCEDURE — 1200000000 HC SEMI PRIVATE

## 2018-12-06 PROCEDURE — 2700000000 HC OXYGEN THERAPY PER DAY

## 2018-12-06 PROCEDURE — G8996 SWALLOW CURRENT STATUS: HCPCS

## 2018-12-06 PROCEDURE — G8979 MOBILITY GOAL STATUS: HCPCS

## 2018-12-06 PROCEDURE — 94760 N-INVAS EAR/PLS OXIMETRY 1: CPT

## 2018-12-06 PROCEDURE — G8988 SELF CARE GOAL STATUS: HCPCS

## 2018-12-06 PROCEDURE — G8997 SWALLOW GOAL STATUS: HCPCS

## 2018-12-06 PROCEDURE — 85025 COMPLETE CBC W/AUTO DIFF WBC: CPT

## 2018-12-06 PROCEDURE — 92610 EVALUATE SWALLOWING FUNCTION: CPT

## 2018-12-06 RX ADMIN — ASPIRIN 81 MG 81 MG: 81 TABLET ORAL at 10:16

## 2018-12-06 RX ADMIN — CITALOPRAM HYDROBROMIDE 20 MG: 20 TABLET ORAL at 10:15

## 2018-12-06 RX ADMIN — IPRATROPIUM BROMIDE AND ALBUTEROL SULFATE 1 AMPULE: .5; 3 SOLUTION RESPIRATORY (INHALATION) at 08:20

## 2018-12-06 RX ADMIN — IPRATROPIUM BROMIDE AND ALBUTEROL SULFATE 1 AMPULE: .5; 3 SOLUTION RESPIRATORY (INHALATION) at 19:30

## 2018-12-06 RX ADMIN — INSULIN LISPRO 3 UNITS: 100 INJECTION, SOLUTION INTRAVENOUS; SUBCUTANEOUS at 22:07

## 2018-12-06 RX ADMIN — CARVEDILOL 12.5 MG: 6.25 TABLET, FILM COATED ORAL at 10:15

## 2018-12-06 RX ADMIN — INSULIN LISPRO 4 UNITS: 100 INJECTION, SOLUTION INTRAVENOUS; SUBCUTANEOUS at 12:51

## 2018-12-06 RX ADMIN — GABAPENTIN 600 MG: 300 CAPSULE ORAL at 22:05

## 2018-12-06 RX ADMIN — HYDRALAZINE HYDROCHLORIDE 50 MG: 25 TABLET, FILM COATED ORAL at 22:05

## 2018-12-06 RX ADMIN — FUROSEMIDE 40 MG: 10 INJECTION, SOLUTION INTRAMUSCULAR; INTRAVENOUS at 17:20

## 2018-12-06 RX ADMIN — CARVEDILOL 12.5 MG: 6.25 TABLET, FILM COATED ORAL at 17:19

## 2018-12-06 RX ADMIN — FINASTERIDE 5 MG: 5 TABLET, FILM COATED ORAL at 10:15

## 2018-12-06 RX ADMIN — HYDRALAZINE HYDROCHLORIDE 50 MG: 25 TABLET, FILM COATED ORAL at 15:35

## 2018-12-06 RX ADMIN — IRON SUCROSE 200 MG: 20 INJECTION, SOLUTION INTRAVENOUS at 15:35

## 2018-12-06 RX ADMIN — VITAMIN E CAP 100 UNIT 400 UNITS: 100 CAP at 10:15

## 2018-12-06 RX ADMIN — HYDRALAZINE HYDROCHLORIDE 50 MG: 25 TABLET, FILM COATED ORAL at 06:31

## 2018-12-06 RX ADMIN — Medication 10 ML: at 10:17

## 2018-12-06 RX ADMIN — FLUTICASONE PROPIONATE 1 SPRAY: 50 SPRAY, METERED NASAL at 10:14

## 2018-12-06 RX ADMIN — INSULIN LISPRO 2 UNITS: 100 INJECTION, SOLUTION INTRAVENOUS; SUBCUTANEOUS at 10:17

## 2018-12-06 RX ADMIN — Medication 10 ML: at 22:07

## 2018-12-06 RX ADMIN — INSULIN LISPRO 6 UNITS: 100 INJECTION, SOLUTION INTRAVENOUS; SUBCUTANEOUS at 17:32

## 2018-12-06 RX ADMIN — PRAVASTATIN SODIUM 40 MG: 40 TABLET ORAL at 22:05

## 2018-12-06 RX ADMIN — FERROUS SULFATE TAB 325 MG (65 MG ELEMENTAL FE) 325 MG: 325 (65 FE) TAB at 10:14

## 2018-12-06 RX ADMIN — GABAPENTIN 600 MG: 300 CAPSULE ORAL at 10:15

## 2018-12-06 RX ADMIN — FUROSEMIDE 40 MG: 10 INJECTION, SOLUTION INTRAMUSCULAR; INTRAVENOUS at 10:16

## 2018-12-06 ASSESSMENT — PAIN SCALES - GENERAL
PAINLEVEL_OUTOF10: 0

## 2018-12-07 ENCOUNTER — HOSPITAL ENCOUNTER (INPATIENT)
Age: 63
LOS: 14 days | Discharge: HOME HEALTH | DRG: 947 | End: 2018-12-21
Attending: PHYSICAL MEDICINE & REHABILITATION | Admitting: PHYSICAL MEDICINE & REHABILITATION
Payer: MEDICARE

## 2018-12-07 VITALS
SYSTOLIC BLOOD PRESSURE: 143 MMHG | HEIGHT: 72 IN | WEIGHT: 171.9 LBS | DIASTOLIC BLOOD PRESSURE: 74 MMHG | BODY MASS INDEX: 23.28 KG/M2 | TEMPERATURE: 97.4 F | RESPIRATION RATE: 18 BRPM | OXYGEN SATURATION: 95 % | HEART RATE: 89 BPM

## 2018-12-07 PROBLEM — R53.81 DEBILITY: Status: ACTIVE | Noted: 2018-12-07

## 2018-12-07 LAB
ANION GAP SERPL CALCULATED.3IONS-SCNC: 9 MMOL/L (ref 3–16)
BASOPHILS ABSOLUTE: 0 K/UL (ref 0–0.2)
BASOPHILS RELATIVE PERCENT: 0.6 %
BUN BLDV-MCNC: 78 MG/DL (ref 7–20)
CALCIUM SERPL-MCNC: 8.7 MG/DL (ref 8.3–10.6)
CHLORIDE BLD-SCNC: 101 MMOL/L (ref 99–110)
CO2: 27 MMOL/L (ref 21–32)
CREAT SERPL-MCNC: 2 MG/DL (ref 0.8–1.3)
EOSINOPHILS ABSOLUTE: 0.4 K/UL (ref 0–0.6)
EOSINOPHILS RELATIVE PERCENT: 5.5 %
GFR AFRICAN AMERICAN: 41
GFR NON-AFRICAN AMERICAN: 34
GLUCOSE BLD-MCNC: 132 MG/DL (ref 70–99)
GLUCOSE BLD-MCNC: 145 MG/DL (ref 70–99)
GLUCOSE BLD-MCNC: 237 MG/DL (ref 70–99)
GLUCOSE BLD-MCNC: 240 MG/DL (ref 70–99)
HCT VFR BLD CALC: 30.6 % (ref 40.5–52.5)
HEMOGLOBIN: 10.2 G/DL (ref 13.5–17.5)
LYMPHOCYTES ABSOLUTE: 1.4 K/UL (ref 1–5.1)
LYMPHOCYTES RELATIVE PERCENT: 18.8 %
MCH RBC QN AUTO: 29.8 PG (ref 26–34)
MCHC RBC AUTO-ENTMCNC: 33.3 G/DL (ref 31–36)
MCV RBC AUTO: 89.5 FL (ref 80–100)
MONOCYTES ABSOLUTE: 0.6 K/UL (ref 0–1.3)
MONOCYTES RELATIVE PERCENT: 8.2 %
NEUTROPHILS ABSOLUTE: 4.9 K/UL (ref 1.7–7.7)
NEUTROPHILS RELATIVE PERCENT: 66.9 %
PDW BLD-RTO: 16.5 % (ref 12.4–15.4)
PERFORMED ON: ABNORMAL
PLATELET # BLD: 162 K/UL (ref 135–450)
PMV BLD AUTO: 7 FL (ref 5–10.5)
POTASSIUM REFLEX MAGNESIUM: 4.7 MMOL/L (ref 3.5–5.1)
RBC # BLD: 3.42 M/UL (ref 4.2–5.9)
SODIUM BLD-SCNC: 137 MMOL/L (ref 136–145)
WBC # BLD: 7.3 K/UL (ref 4–11)

## 2018-12-07 PROCEDURE — 6370000000 HC RX 637 (ALT 250 FOR IP): Performed by: PHYSICAL MEDICINE & REHABILITATION

## 2018-12-07 PROCEDURE — 85025 COMPLETE CBC W/AUTO DIFF WBC: CPT

## 2018-12-07 PROCEDURE — 6370000000 HC RX 637 (ALT 250 FOR IP): Performed by: INTERNAL MEDICINE

## 2018-12-07 PROCEDURE — 94640 AIRWAY INHALATION TREATMENT: CPT

## 2018-12-07 PROCEDURE — 6360000002 HC RX W HCPCS: Performed by: INTERNAL MEDICINE

## 2018-12-07 PROCEDURE — 99233 SBSQ HOSP IP/OBS HIGH 50: CPT | Performed by: INTERNAL MEDICINE

## 2018-12-07 PROCEDURE — 94760 N-INVAS EAR/PLS OXIMETRY 1: CPT

## 2018-12-07 PROCEDURE — 36415 COLL VENOUS BLD VENIPUNCTURE: CPT

## 2018-12-07 PROCEDURE — 2700000000 HC OXYGEN THERAPY PER DAY

## 2018-12-07 PROCEDURE — 80048 BASIC METABOLIC PNL TOTAL CA: CPT

## 2018-12-07 PROCEDURE — 2580000003 HC RX 258: Performed by: INTERNAL MEDICINE

## 2018-12-07 PROCEDURE — 1280000000 HC REHAB R&B

## 2018-12-07 RX ORDER — IPRATROPIUM BROMIDE AND ALBUTEROL SULFATE 2.5; .5 MG/3ML; MG/3ML
1 SOLUTION RESPIRATORY (INHALATION) 3 TIMES DAILY
Status: DISCONTINUED | OUTPATIENT
Start: 2018-12-07 | End: 2018-12-11

## 2018-12-07 RX ORDER — HEPARIN SODIUM 5000 [USP'U]/ML
5000 INJECTION, SOLUTION INTRAVENOUS; SUBCUTANEOUS EVERY 8 HOURS SCHEDULED
Status: CANCELLED | OUTPATIENT
Start: 2018-12-07

## 2018-12-07 RX ORDER — IPRATROPIUM BROMIDE AND ALBUTEROL SULFATE 2.5; .5 MG/3ML; MG/3ML
1 SOLUTION RESPIRATORY (INHALATION) 3 TIMES DAILY
Status: CANCELLED | OUTPATIENT
Start: 2018-12-07

## 2018-12-07 RX ORDER — SODIUM CHLORIDE 0.9 % (FLUSH) 0.9 %
10 SYRINGE (ML) INJECTION PRN
Status: CANCELLED | OUTPATIENT
Start: 2018-12-07

## 2018-12-07 RX ORDER — TRAZODONE HYDROCHLORIDE 50 MG/1
50 TABLET ORAL NIGHTLY PRN
Status: CANCELLED | OUTPATIENT
Start: 2018-12-07

## 2018-12-07 RX ORDER — IPRATROPIUM BROMIDE AND ALBUTEROL SULFATE 2.5; .5 MG/3ML; MG/3ML
3 SOLUTION RESPIRATORY (INHALATION) EVERY 4 HOURS PRN
Status: DISCONTINUED | OUTPATIENT
Start: 2018-12-07 | End: 2018-12-18

## 2018-12-07 RX ORDER — VITAMIN E 268 MG
400 CAPSULE ORAL DAILY
Status: DISCONTINUED | OUTPATIENT
Start: 2018-12-08 | End: 2018-12-21 | Stop reason: HOSPADM

## 2018-12-07 RX ORDER — FERROUS SULFATE 325(65) MG
325 TABLET ORAL
Status: CANCELLED | OUTPATIENT
Start: 2018-12-08

## 2018-12-07 RX ORDER — HYDRALAZINE HYDROCHLORIDE 25 MG/1
50 TABLET, FILM COATED ORAL EVERY 8 HOURS PRN
Status: DISCONTINUED | OUTPATIENT
Start: 2018-12-07 | End: 2018-12-21 | Stop reason: HOSPADM

## 2018-12-07 RX ORDER — FUROSEMIDE 20 MG/1
20 TABLET ORAL 2 TIMES DAILY
Status: DISCONTINUED | OUTPATIENT
Start: 2018-12-07 | End: 2018-12-07 | Stop reason: HOSPADM

## 2018-12-07 RX ORDER — GABAPENTIN 300 MG/1
600 CAPSULE ORAL 2 TIMES DAILY
Status: CANCELLED | OUTPATIENT
Start: 2018-12-07

## 2018-12-07 RX ORDER — DEXTROSE MONOHYDRATE 50 MG/ML
100 INJECTION, SOLUTION INTRAVENOUS PRN
Status: DISCONTINUED | OUTPATIENT
Start: 2018-12-07 | End: 2018-12-21 | Stop reason: HOSPADM

## 2018-12-07 RX ORDER — DIPHENHYDRAMINE HCL 25 MG
25 TABLET ORAL EVERY 6 HOURS PRN
Status: DISCONTINUED | OUTPATIENT
Start: 2018-12-07 | End: 2018-12-21 | Stop reason: HOSPADM

## 2018-12-07 RX ORDER — NITROGLYCERIN 0.4 MG/1
0.4 TABLET SUBLINGUAL EVERY 5 MIN PRN
Status: DISCONTINUED | OUTPATIENT
Start: 2018-12-07 | End: 2018-12-21 | Stop reason: HOSPADM

## 2018-12-07 RX ORDER — IPRATROPIUM BROMIDE AND ALBUTEROL SULFATE 2.5; .5 MG/3ML; MG/3ML
3 SOLUTION RESPIRATORY (INHALATION) EVERY 4 HOURS PRN
Status: CANCELLED | OUTPATIENT
Start: 2018-12-07

## 2018-12-07 RX ORDER — SODIUM CHLORIDE 0.9 % (FLUSH) 0.9 %
10 SYRINGE (ML) INJECTION EVERY 12 HOURS SCHEDULED
Status: DISCONTINUED | OUTPATIENT
Start: 2018-12-07 | End: 2018-12-08

## 2018-12-07 RX ORDER — SODIUM CHLORIDE 0.9 % (FLUSH) 0.9 %
10 SYRINGE (ML) INJECTION EVERY 12 HOURS SCHEDULED
Status: CANCELLED | OUTPATIENT
Start: 2018-12-07

## 2018-12-07 RX ORDER — HYDRALAZINE HYDROCHLORIDE 25 MG/1
50 TABLET, FILM COATED ORAL EVERY 8 HOURS SCHEDULED
Status: CANCELLED | OUTPATIENT
Start: 2018-12-07

## 2018-12-07 RX ORDER — DEXTROSE MONOHYDRATE 50 MG/ML
100 INJECTION, SOLUTION INTRAVENOUS PRN
Status: CANCELLED | OUTPATIENT
Start: 2018-12-07

## 2018-12-07 RX ORDER — CARVEDILOL 6.25 MG/1
12.5 TABLET ORAL 2 TIMES DAILY WITH MEALS
Status: CANCELLED | OUTPATIENT
Start: 2018-12-07

## 2018-12-07 RX ORDER — FINASTERIDE 5 MG/1
5 TABLET, FILM COATED ORAL DAILY
Status: DISCONTINUED | OUTPATIENT
Start: 2018-12-08 | End: 2018-12-21 | Stop reason: HOSPADM

## 2018-12-07 RX ORDER — PRAVASTATIN SODIUM 40 MG
40 TABLET ORAL NIGHTLY
Status: CANCELLED | OUTPATIENT
Start: 2018-12-07

## 2018-12-07 RX ORDER — NICOTINE POLACRILEX 4 MG
15 LOZENGE BUCCAL PRN
Status: DISCONTINUED | OUTPATIENT
Start: 2018-12-07 | End: 2018-12-21 | Stop reason: HOSPADM

## 2018-12-07 RX ORDER — ASPIRIN 81 MG/1
81 TABLET, CHEWABLE ORAL DAILY
Status: CANCELLED | OUTPATIENT
Start: 2018-12-08

## 2018-12-07 RX ORDER — CITALOPRAM 20 MG/1
20 TABLET ORAL DAILY
Status: CANCELLED | OUTPATIENT
Start: 2018-12-08

## 2018-12-07 RX ORDER — CITALOPRAM 20 MG/1
20 TABLET ORAL DAILY
Status: DISCONTINUED | OUTPATIENT
Start: 2018-12-08 | End: 2018-12-21 | Stop reason: HOSPADM

## 2018-12-07 RX ORDER — FINASTERIDE 5 MG/1
5 TABLET, FILM COATED ORAL DAILY
Status: CANCELLED | OUTPATIENT
Start: 2018-12-08

## 2018-12-07 RX ORDER — TRAZODONE HYDROCHLORIDE 50 MG/1
50 TABLET ORAL NIGHTLY PRN
Status: DISCONTINUED | OUTPATIENT
Start: 2018-12-07 | End: 2018-12-21 | Stop reason: HOSPADM

## 2018-12-07 RX ORDER — FUROSEMIDE 20 MG/1
20 TABLET ORAL 2 TIMES DAILY
Status: CANCELLED | OUTPATIENT
Start: 2018-12-07

## 2018-12-07 RX ORDER — DEXTROSE MONOHYDRATE 25 G/50ML
12.5 INJECTION, SOLUTION INTRAVENOUS PRN
Status: CANCELLED | OUTPATIENT
Start: 2018-12-07

## 2018-12-07 RX ORDER — HEPARIN SODIUM 5000 [USP'U]/ML
5000 INJECTION, SOLUTION INTRAVENOUS; SUBCUTANEOUS EVERY 8 HOURS SCHEDULED
Status: DISCONTINUED | OUTPATIENT
Start: 2018-12-07 | End: 2018-12-07

## 2018-12-07 RX ORDER — ACETAMINOPHEN 325 MG/1
650 TABLET ORAL EVERY 4 HOURS PRN
Status: CANCELLED | OUTPATIENT
Start: 2018-12-07

## 2018-12-07 RX ORDER — NICOTINE POLACRILEX 4 MG
15 LOZENGE BUCCAL PRN
Status: CANCELLED | OUTPATIENT
Start: 2018-12-07

## 2018-12-07 RX ORDER — ACETAMINOPHEN 325 MG/1
650 TABLET ORAL EVERY 4 HOURS PRN
Status: DISCONTINUED | OUTPATIENT
Start: 2018-12-07 | End: 2018-12-21 | Stop reason: HOSPADM

## 2018-12-07 RX ORDER — FUROSEMIDE 20 MG/1
20 TABLET ORAL 2 TIMES DAILY
Status: DISCONTINUED | OUTPATIENT
Start: 2018-12-08 | End: 2018-12-15

## 2018-12-07 RX ORDER — ONDANSETRON 4 MG/1
4 TABLET, ORALLY DISINTEGRATING ORAL EVERY 8 HOURS PRN
Status: DISCONTINUED | OUTPATIENT
Start: 2018-12-07 | End: 2018-12-21 | Stop reason: HOSPADM

## 2018-12-07 RX ORDER — SPIRONOLACTONE 25 MG/1
25 TABLET ORAL DAILY
Status: ON HOLD | COMMUNITY
End: 2018-12-20 | Stop reason: HOSPADM

## 2018-12-07 RX ORDER — SODIUM CHLORIDE 0.9 % (FLUSH) 0.9 %
10 SYRINGE (ML) INJECTION PRN
Status: DISCONTINUED | OUTPATIENT
Start: 2018-12-07 | End: 2018-12-21 | Stop reason: HOSPADM

## 2018-12-07 RX ORDER — PRAVASTATIN SODIUM 40 MG
40 TABLET ORAL NIGHTLY
Status: DISCONTINUED | OUTPATIENT
Start: 2018-12-07 | End: 2018-12-21 | Stop reason: HOSPADM

## 2018-12-07 RX ORDER — HYDRALAZINE HYDROCHLORIDE 25 MG/1
50 TABLET, FILM COATED ORAL EVERY 8 HOURS PRN
Status: CANCELLED | OUTPATIENT
Start: 2018-12-07

## 2018-12-07 RX ORDER — CARVEDILOL 6.25 MG/1
12.5 TABLET ORAL 2 TIMES DAILY WITH MEALS
Status: DISCONTINUED | OUTPATIENT
Start: 2018-12-08 | End: 2018-12-17

## 2018-12-07 RX ORDER — DIPHENHYDRAMINE HCL 25 MG
25 TABLET ORAL EVERY 6 HOURS PRN
Status: CANCELLED | OUTPATIENT
Start: 2018-12-07

## 2018-12-07 RX ORDER — ASPIRIN 81 MG/1
81 TABLET, CHEWABLE ORAL DAILY
Status: DISCONTINUED | OUTPATIENT
Start: 2018-12-08 | End: 2018-12-21 | Stop reason: HOSPADM

## 2018-12-07 RX ORDER — FUROSEMIDE 20 MG/1
20 TABLET ORAL 2 TIMES DAILY
Qty: 2 TABLET | Refills: 0 | Status: ON HOLD
Start: 2018-12-07 | End: 2018-12-20 | Stop reason: HOSPADM

## 2018-12-07 RX ORDER — GABAPENTIN 300 MG/1
600 CAPSULE ORAL 2 TIMES DAILY
Status: DISCONTINUED | OUTPATIENT
Start: 2018-12-07 | End: 2018-12-21 | Stop reason: HOSPADM

## 2018-12-07 RX ORDER — FERROUS SULFATE 325(65) MG
325 TABLET ORAL
Status: DISCONTINUED | OUTPATIENT
Start: 2018-12-08 | End: 2018-12-21 | Stop reason: HOSPADM

## 2018-12-07 RX ORDER — ONDANSETRON 4 MG/1
4 TABLET, ORALLY DISINTEGRATING ORAL EVERY 8 HOURS PRN
Status: CANCELLED | OUTPATIENT
Start: 2018-12-07

## 2018-12-07 RX ORDER — DEXTROSE MONOHYDRATE 25 G/50ML
12.5 INJECTION, SOLUTION INTRAVENOUS PRN
Status: DISCONTINUED | OUTPATIENT
Start: 2018-12-07 | End: 2018-12-21 | Stop reason: HOSPADM

## 2018-12-07 RX ORDER — FLUTICASONE PROPIONATE 50 MCG
1 SPRAY, SUSPENSION (ML) NASAL DAILY
Status: CANCELLED | OUTPATIENT
Start: 2018-12-08

## 2018-12-07 RX ORDER — HYDRALAZINE HYDROCHLORIDE 25 MG/1
50 TABLET, FILM COATED ORAL EVERY 8 HOURS SCHEDULED
Status: DISCONTINUED | OUTPATIENT
Start: 2018-12-07 | End: 2018-12-21 | Stop reason: HOSPADM

## 2018-12-07 RX ORDER — FLUTICASONE PROPIONATE 50 MCG
1 SPRAY, SUSPENSION (ML) NASAL DAILY
Status: DISCONTINUED | OUTPATIENT
Start: 2018-12-08 | End: 2018-12-08

## 2018-12-07 RX ORDER — NITROGLYCERIN 0.4 MG/1
0.4 TABLET SUBLINGUAL EVERY 5 MIN PRN
Status: CANCELLED | OUTPATIENT
Start: 2018-12-07

## 2018-12-07 RX ADMIN — FINASTERIDE 5 MG: 5 TABLET, FILM COATED ORAL at 09:16

## 2018-12-07 RX ADMIN — VITAMIN E CAP 100 UNIT 400 UNITS: 100 CAP at 09:15

## 2018-12-07 RX ADMIN — FLUTICASONE PROPIONATE 1 SPRAY: 50 SPRAY, METERED NASAL at 09:41

## 2018-12-07 RX ADMIN — INSULIN LISPRO 2 UNITS: 100 INJECTION, SOLUTION INTRAVENOUS; SUBCUTANEOUS at 09:19

## 2018-12-07 RX ADMIN — IPRATROPIUM BROMIDE AND ALBUTEROL SULFATE 1 AMPULE: .5; 3 SOLUTION RESPIRATORY (INHALATION) at 22:01

## 2018-12-07 RX ADMIN — CARVEDILOL 12.5 MG: 6.25 TABLET, FILM COATED ORAL at 09:41

## 2018-12-07 RX ADMIN — IPRATROPIUM BROMIDE AND ALBUTEROL SULFATE 1 AMPULE: .5; 3 SOLUTION RESPIRATORY (INHALATION) at 14:09

## 2018-12-07 RX ADMIN — CITALOPRAM HYDROBROMIDE 20 MG: 20 TABLET ORAL at 09:41

## 2018-12-07 RX ADMIN — Medication 10 ML: at 09:16

## 2018-12-07 RX ADMIN — GABAPENTIN 600 MG: 300 CAPSULE ORAL at 09:16

## 2018-12-07 RX ADMIN — INSULIN LISPRO 4 UNITS: 100 INJECTION, SOLUTION INTRAVENOUS; SUBCUTANEOUS at 13:07

## 2018-12-07 RX ADMIN — FUROSEMIDE 40 MG: 10 INJECTION, SOLUTION INTRAMUSCULAR; INTRAVENOUS at 09:41

## 2018-12-07 RX ADMIN — GABAPENTIN 600 MG: 300 CAPSULE ORAL at 21:15

## 2018-12-07 RX ADMIN — ASPIRIN 81 MG 81 MG: 81 TABLET ORAL at 09:16

## 2018-12-07 RX ADMIN — FERROUS SULFATE TAB 325 MG (65 MG ELEMENTAL FE) 325 MG: 325 (65 FE) TAB at 09:41

## 2018-12-07 RX ADMIN — HYDRALAZINE HYDROCHLORIDE 50 MG: 25 TABLET, FILM COATED ORAL at 15:12

## 2018-12-07 RX ADMIN — PRAVASTATIN SODIUM 40 MG: 40 TABLET ORAL at 21:15

## 2018-12-07 RX ADMIN — HYDRALAZINE HYDROCHLORIDE 50 MG: 25 TABLET, FILM COATED ORAL at 21:15

## 2018-12-07 RX ADMIN — INSULIN LISPRO 2 UNITS: 100 INJECTION, SOLUTION INTRAVENOUS; SUBCUTANEOUS at 21:11

## 2018-12-07 RX ADMIN — IPRATROPIUM BROMIDE AND ALBUTEROL SULFATE 1 AMPULE: .5; 3 SOLUTION RESPIRATORY (INHALATION) at 07:08

## 2018-12-07 RX ADMIN — HYDRALAZINE HYDROCHLORIDE 50 MG: 25 TABLET, FILM COATED ORAL at 06:40

## 2018-12-07 ASSESSMENT — PAIN SCALES - GENERAL
PAINLEVEL_OUTOF10: 0

## 2018-12-08 LAB
ANION GAP SERPL CALCULATED.3IONS-SCNC: 11 MMOL/L (ref 3–16)
BASOPHILS ABSOLUTE: 0 K/UL (ref 0–0.2)
BASOPHILS RELATIVE PERCENT: 0.6 %
BUN BLDV-MCNC: 80 MG/DL (ref 7–20)
CALCIUM SERPL-MCNC: 8.8 MG/DL (ref 8.3–10.6)
CHLORIDE BLD-SCNC: 99 MMOL/L (ref 99–110)
CO2: 28 MMOL/L (ref 21–32)
CREAT SERPL-MCNC: 2.1 MG/DL (ref 0.8–1.3)
EOSINOPHILS ABSOLUTE: 0.4 K/UL (ref 0–0.6)
EOSINOPHILS RELATIVE PERCENT: 5.9 %
GFR AFRICAN AMERICAN: 39
GFR NON-AFRICAN AMERICAN: 32
GLUCOSE BLD-MCNC: 196 MG/DL (ref 70–99)
GLUCOSE BLD-MCNC: 203 MG/DL (ref 70–99)
GLUCOSE BLD-MCNC: 256 MG/DL (ref 70–99)
GLUCOSE BLD-MCNC: 283 MG/DL (ref 70–99)
GLUCOSE BLD-MCNC: 292 MG/DL (ref 70–99)
HCT VFR BLD CALC: 31.3 % (ref 40.5–52.5)
HEMOGLOBIN: 10.3 G/DL (ref 13.5–17.5)
LYMPHOCYTES ABSOLUTE: 1.3 K/UL (ref 1–5.1)
LYMPHOCYTES RELATIVE PERCENT: 18.9 %
MCH RBC QN AUTO: 29.1 PG (ref 26–34)
MCHC RBC AUTO-ENTMCNC: 32.8 G/DL (ref 31–36)
MCV RBC AUTO: 89 FL (ref 80–100)
MONOCYTES ABSOLUTE: 0.6 K/UL (ref 0–1.3)
MONOCYTES RELATIVE PERCENT: 8.8 %
NEUTROPHILS ABSOLUTE: 4.7 K/UL (ref 1.7–7.7)
NEUTROPHILS RELATIVE PERCENT: 65.8 %
PDW BLD-RTO: 16.5 % (ref 12.4–15.4)
PERFORMED ON: ABNORMAL
PLATELET # BLD: 159 K/UL (ref 135–450)
PMV BLD AUTO: 6.6 FL (ref 5–10.5)
POTASSIUM REFLEX MAGNESIUM: 4.7 MMOL/L (ref 3.5–5.1)
RBC # BLD: 3.52 M/UL (ref 4.2–5.9)
SODIUM BLD-SCNC: 138 MMOL/L (ref 136–145)
WBC # BLD: 7.1 K/UL (ref 4–11)

## 2018-12-08 PROCEDURE — 97166 OT EVAL MOD COMPLEX 45 MIN: CPT

## 2018-12-08 PROCEDURE — 1280000000 HC REHAB R&B

## 2018-12-08 PROCEDURE — 80048 BASIC METABOLIC PNL TOTAL CA: CPT

## 2018-12-08 PROCEDURE — 92526 ORAL FUNCTION THERAPY: CPT

## 2018-12-08 PROCEDURE — 97530 THERAPEUTIC ACTIVITIES: CPT

## 2018-12-08 PROCEDURE — 36415 COLL VENOUS BLD VENIPUNCTURE: CPT

## 2018-12-08 PROCEDURE — 97163 PT EVAL HIGH COMPLEX 45 MIN: CPT

## 2018-12-08 PROCEDURE — 94760 N-INVAS EAR/PLS OXIMETRY 1: CPT

## 2018-12-08 PROCEDURE — 92523 SPEECH SOUND LANG COMPREHEN: CPT

## 2018-12-08 PROCEDURE — 6370000000 HC RX 637 (ALT 250 FOR IP): Performed by: PHYSICAL MEDICINE & REHABILITATION

## 2018-12-08 PROCEDURE — 94640 AIRWAY INHALATION TREATMENT: CPT

## 2018-12-08 PROCEDURE — 85025 COMPLETE CBC W/AUTO DIFF WBC: CPT

## 2018-12-08 PROCEDURE — 92610 EVALUATE SWALLOWING FUNCTION: CPT

## 2018-12-08 RX ORDER — FLUTICASONE PROPIONATE 50 MCG
1 SPRAY, SUSPENSION (ML) NASAL NIGHTLY
Status: DISCONTINUED | OUTPATIENT
Start: 2018-12-08 | End: 2018-12-21 | Stop reason: HOSPADM

## 2018-12-08 RX ADMIN — GABAPENTIN 600 MG: 300 CAPSULE ORAL at 11:02

## 2018-12-08 RX ADMIN — CARVEDILOL 12.5 MG: 6.25 TABLET, FILM COATED ORAL at 11:02

## 2018-12-08 RX ADMIN — CITALOPRAM HYDROBROMIDE 20 MG: 20 TABLET ORAL at 11:02

## 2018-12-08 RX ADMIN — INSULIN LISPRO 6 UNITS: 100 INJECTION, SOLUTION INTRAVENOUS; SUBCUTANEOUS at 17:35

## 2018-12-08 RX ADMIN — IPRATROPIUM BROMIDE AND ALBUTEROL SULFATE 1 AMPULE: .5; 3 SOLUTION RESPIRATORY (INHALATION) at 14:01

## 2018-12-08 RX ADMIN — FLUTICASONE PROPIONATE 1 SPRAY: 50 SPRAY, METERED NASAL at 21:00

## 2018-12-08 RX ADMIN — CARVEDILOL 12.5 MG: 6.25 TABLET, FILM COATED ORAL at 17:34

## 2018-12-08 RX ADMIN — HYDRALAZINE HYDROCHLORIDE 50 MG: 25 TABLET, FILM COATED ORAL at 22:22

## 2018-12-08 RX ADMIN — HYDRALAZINE HYDROCHLORIDE 50 MG: 25 TABLET, FILM COATED ORAL at 14:25

## 2018-12-08 RX ADMIN — INSULIN LISPRO 4 UNITS: 100 INJECTION, SOLUTION INTRAVENOUS; SUBCUTANEOUS at 08:34

## 2018-12-08 RX ADMIN — INSULIN LISPRO 3 UNITS: 100 INJECTION, SOLUTION INTRAVENOUS; SUBCUTANEOUS at 22:23

## 2018-12-08 RX ADMIN — FINASTERIDE 5 MG: 5 TABLET, FILM COATED ORAL at 11:02

## 2018-12-08 RX ADMIN — ASPIRIN 81 MG CHEWABLE TABLET 81 MG: 81 TABLET CHEWABLE at 11:01

## 2018-12-08 RX ADMIN — VITAMIN E CAP 400 UNIT 400 UNITS: 400 CAP at 11:01

## 2018-12-08 RX ADMIN — GABAPENTIN 600 MG: 300 CAPSULE ORAL at 22:14

## 2018-12-08 RX ADMIN — FERROUS SULFATE TAB 325 MG (65 MG ELEMENTAL FE) 325 MG: 325 (65 FE) TAB at 11:02

## 2018-12-08 RX ADMIN — IPRATROPIUM BROMIDE AND ALBUTEROL SULFATE 1 AMPULE: .5; 3 SOLUTION RESPIRATORY (INHALATION) at 20:48

## 2018-12-08 RX ADMIN — FUROSEMIDE 20 MG: 20 TABLET ORAL at 11:02

## 2018-12-08 RX ADMIN — INSULIN LISPRO 6 UNITS: 100 INJECTION, SOLUTION INTRAVENOUS; SUBCUTANEOUS at 12:23

## 2018-12-08 RX ADMIN — FUROSEMIDE 20 MG: 20 TABLET ORAL at 17:34

## 2018-12-08 RX ADMIN — PRAVASTATIN SODIUM 40 MG: 40 TABLET ORAL at 22:14

## 2018-12-08 RX ADMIN — IPRATROPIUM BROMIDE AND ALBUTEROL SULFATE 1 AMPULE: .5; 3 SOLUTION RESPIRATORY (INHALATION) at 07:31

## 2018-12-08 ASSESSMENT — PAIN DESCRIPTION - ORIENTATION: ORIENTATION: RIGHT

## 2018-12-08 ASSESSMENT — PAIN DESCRIPTION - PAIN TYPE: TYPE: CHRONIC PAIN

## 2018-12-08 ASSESSMENT — PAIN SCALES - GENERAL: PAINLEVEL_OUTOF10: 2

## 2018-12-08 ASSESSMENT — PAIN DESCRIPTION - LOCATION: LOCATION: SHOULDER
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
## 2018-12-08 NOTE — PROGRESS NOTES
Speech Language Pathology  Facility/Department: [facility] ACUTE REHAB UNIT   BEDSIDE SWALLOW EVALUATION    NAME: Apolonio Brunner  : 1955  MRN: [id number]    ADMISSION DATE: 2018  ADMITTING DIAGNOSIS: has Arthritis; Diabetes type 2, uncontrolled ([address] Essential hypertension; Paraplegia ([address] Hyperlipidemia; GERD (gastroesophageal reflux disease); Loose stools; Renal insufficiency; Chronic anemia; Right hand pain; Renal stones; Colitis; Degenerative arthritis of finger; Hyperkalemia; Neck pain; Left shoulder pain; Arthritis of left acromioclavicular joint; Tendinitis of left rotator cuff; Complete tear of left rotator cuff; Pressure ulcer, stage 3 ([address] Sacral wound; Heel ulcer ([address] Decubitus ulcer of right heel, stage 3 ([address] Atherosclerosis of native arteries of right leg with ulceration of other part of foot; Pressure ulcer of coccygeal region, stage 3 ([address] Sacral decubitus ulcer, stage IV ([address] Pressure ulcer, stage 2; Chronic left shoulder pain; Urinary tract infection with hematuria due to chronic urinary catheter; Chronic kidney disease; Chronic systolic heart failure ([address] Leukocytosis; CHEMO (acute kidney injury) ([address] Acute cystitis with hematuria; Acute pulmonary edema ([address] Pulmonary nodule; Mediastinal lymphadenopathy; Abnormal CT scan; Coronary artery disease involving native coronary artery of native heart without angina pectoris; SOB (shortness of breath); Aspiration into airway; Coronary artery disease due to lipid rich plaque; Pneumonia due to organism; Hyponatremia; Acute on chronic respiratory failure with hypoxia ([address] Chronic diastolic heart failure ([address] Iron deficiency anemia; Non-ischemic cardiomyopathy ([address] Chest pain; Diaphragm paralysis; Chronic pulmonary aspiration; Neurogenic bladder; Diabetic ulcer of right heel associated with type 2 diabetes mellitus, with fat layer exposed ([address]  Wound, open, scrotum or testes; Skin ulcer of sacrum with fat layer exposed environment. Impression  Dysphagia Diagnosis: Mild oral stage dysphagia;Mild pharyngeal stage dysphagia  Dysphagia Outcome Severity Scale: Level 5: Mild dysphagia- Distant supervision. May need one diet consistency restricted     Pt greeted upright in chair, alert and cooperative. Pt is oriented x5. Pt follows commands. [facility] reveals some missing teeth, adequate strength and coordination. Volitional swallow is mildly delayed with mildly reduced laryngeal elevation/excursion via digital palpation. Volitional cough is strong. Pt is on 1L via NC with no noted s/s of respiratory distress. Pt vocal quality is hoarse/breathy. Pt reports this is baseline. Pt provided a variety of PO trials to assess swallow function. Thin and nectar thick liquids provided each revealing s/s of premature bolus loss to pharynx prior to delayed swallow initiation. No observed s/s of aspiration noted. Puree, soft and regular solids were tolerated well with noted prolonged mastication of regular solids. With increased time, pt able to effectively form bolus for transit. Pt has no new PNA despite significant dysphagia history. Pt also reports tolerating meds well with puree. SLP recommends Regular textured diet with Thin liquids, no straws, meds whole in puree. If respiratory status declines, repeat MBS will be indicated. Treatment to assess tolerance 1-3 sessions as needed. Treatment Plan  Requires SLP Intervention: Yes  Duration/Frequency of Treatment: 30 mins daily for 1-3 follow up to ensure diet tolerance. No cog treatment indicated. D/C Recommendations: To be determined       Recommended Diet and Intervention  Diet Solids Recommendation: Regular  Liquid Consistency Recommendation:  Thin  Recommended Form of Meds: Whole with puree  Therapeutic Interventions: Patient/Family education;Diet tolerance monitoring    Compensatory Swallowing Strategies  Compensatory Swallowing Strategies: No straws;Eat/Feed slowly;Upright as Pharyngeal Phase Dysfunction   Pharyngeal Phase  Pharyngeal Phase: Exceptions  Indicators of Pharyngeal Phase Dysfunction  Delayed Swallow: All  Decreased Laryngeal Elevation: All  Pharyngeal Phase   Pharyngeal: Pt presents with a mild pharyngeal dysphagia characterized by delayed swallow initiation and reduced laryngeal elevation/excursion via digital palpation. Prognosis  Prognosis  Prognosis for safe diet advancement: good  Individuals consulted  Consulted and agree with results and recommendations: Patient    Education  Patient Education: Pt educated on role of SLP in swallow rehab and recommendations for 1-3 followup for diet tolerance.   Patient Education Response: Verbalizes understanding         Therapy Time  SLP Individual Minutes  Time In: 1000  Time Out: 1675  Minutes: [address] [facility] #[id number]  Speech Language Pathologist  12/8/2018 3:32 PM
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
## 2018-12-09 LAB
ANION GAP SERPL CALCULATED.3IONS-SCNC: 10 MMOL/L (ref 3–16)
BASOPHILS ABSOLUTE: 0 K/UL (ref 0–0.2)
BASOPHILS RELATIVE PERCENT: 0.6 %
BUN BLDV-MCNC: 89 MG/DL (ref 7–20)
CALCIUM SERPL-MCNC: 8.8 MG/DL (ref 8.3–10.6)
CHLORIDE BLD-SCNC: 100 MMOL/L (ref 99–110)
CO2: 28 MMOL/L (ref 21–32)
CREAT SERPL-MCNC: 1.9 MG/DL (ref 0.8–1.3)
EOSINOPHILS ABSOLUTE: 0.4 K/UL (ref 0–0.6)
EOSINOPHILS RELATIVE PERCENT: 5 %
GFR AFRICAN AMERICAN: 43
GFR NON-AFRICAN AMERICAN: 36
GLUCOSE BLD-MCNC: 198 MG/DL (ref 70–99)
GLUCOSE BLD-MCNC: 214 MG/DL (ref 70–99)
GLUCOSE BLD-MCNC: 219 MG/DL (ref 70–99)
GLUCOSE BLD-MCNC: 220 MG/DL (ref 70–99)
GLUCOSE BLD-MCNC: 248 MG/DL (ref 70–99)
HCT VFR BLD CALC: 31 % (ref 40.5–52.5)
HEMOGLOBIN: 10.3 G/DL (ref 13.5–17.5)
LYMPHOCYTES ABSOLUTE: 1.3 K/UL (ref 1–5.1)
LYMPHOCYTES RELATIVE PERCENT: 16.9 %
MCH RBC QN AUTO: 29.3 PG (ref 26–34)
MCHC RBC AUTO-ENTMCNC: 33.1 G/DL (ref 31–36)
MCV RBC AUTO: 88.4 FL (ref 80–100)
MONOCYTES ABSOLUTE: 0.7 K/UL (ref 0–1.3)
MONOCYTES RELATIVE PERCENT: 8.9 %
NEUTROPHILS ABSOLUTE: 5.2 K/UL (ref 1.7–7.7)
NEUTROPHILS RELATIVE PERCENT: 68.6 %
PDW BLD-RTO: 16.6 % (ref 12.4–15.4)
PERFORMED ON: ABNORMAL
PLATELET # BLD: 156 K/UL (ref 135–450)
PMV BLD AUTO: 7 FL (ref 5–10.5)
POTASSIUM REFLEX MAGNESIUM: 4.4 MMOL/L (ref 3.5–5.1)
RBC # BLD: 3.5 M/UL (ref 4.2–5.9)
SODIUM BLD-SCNC: 138 MMOL/L (ref 136–145)
WBC # BLD: 7.7 K/UL (ref 4–11)

## 2018-12-09 PROCEDURE — 85025 COMPLETE CBC W/AUTO DIFF WBC: CPT

## 2018-12-09 PROCEDURE — 2700000000 HC OXYGEN THERAPY PER DAY

## 2018-12-09 PROCEDURE — 94760 N-INVAS EAR/PLS OXIMETRY 1: CPT

## 2018-12-09 PROCEDURE — 36415 COLL VENOUS BLD VENIPUNCTURE: CPT

## 2018-12-09 PROCEDURE — 1280000000 HC REHAB R&B

## 2018-12-09 PROCEDURE — 6370000000 HC RX 637 (ALT 250 FOR IP): Performed by: PHYSICAL MEDICINE & REHABILITATION

## 2018-12-09 PROCEDURE — 80048 BASIC METABOLIC PNL TOTAL CA: CPT

## 2018-12-09 PROCEDURE — 94640 AIRWAY INHALATION TREATMENT: CPT

## 2018-12-09 RX ADMIN — FUROSEMIDE 20 MG: 20 TABLET ORAL at 17:48

## 2018-12-09 RX ADMIN — HYDRALAZINE HYDROCHLORIDE 50 MG: 25 TABLET, FILM COATED ORAL at 13:31

## 2018-12-09 RX ADMIN — ASPIRIN 81 MG CHEWABLE TABLET 81 MG: 81 TABLET CHEWABLE at 08:31

## 2018-12-09 RX ADMIN — CARVEDILOL 12.5 MG: 6.25 TABLET, FILM COATED ORAL at 17:48

## 2018-12-09 RX ADMIN — GABAPENTIN 600 MG: 300 CAPSULE ORAL at 08:31

## 2018-12-09 RX ADMIN — FINASTERIDE 5 MG: 5 TABLET, FILM COATED ORAL at 08:31

## 2018-12-09 RX ADMIN — INSULIN LISPRO 4 UNITS: 100 INJECTION, SOLUTION INTRAVENOUS; SUBCUTANEOUS at 12:03

## 2018-12-09 RX ADMIN — IPRATROPIUM BROMIDE AND ALBUTEROL SULFATE 1 AMPULE: .5; 3 SOLUTION RESPIRATORY (INHALATION) at 20:07

## 2018-12-09 RX ADMIN — HYDRALAZINE HYDROCHLORIDE 50 MG: 25 TABLET, FILM COATED ORAL at 22:45

## 2018-12-09 RX ADMIN — FUROSEMIDE 20 MG: 20 TABLET ORAL at 08:31

## 2018-12-09 RX ADMIN — IPRATROPIUM BROMIDE AND ALBUTEROL SULFATE 1 AMPULE: .5; 3 SOLUTION RESPIRATORY (INHALATION) at 15:30

## 2018-12-09 RX ADMIN — INSULIN LISPRO 4 UNITS: 100 INJECTION, SOLUTION INTRAVENOUS; SUBCUTANEOUS at 08:32

## 2018-12-09 RX ADMIN — FERROUS SULFATE TAB 325 MG (65 MG ELEMENTAL FE) 325 MG: 325 (65 FE) TAB at 08:31

## 2018-12-09 RX ADMIN — GABAPENTIN 600 MG: 300 CAPSULE ORAL at 22:45

## 2018-12-09 RX ADMIN — INSULIN LISPRO 4 UNITS: 100 INJECTION, SOLUTION INTRAVENOUS; SUBCUTANEOUS at 17:31

## 2018-12-09 RX ADMIN — CARVEDILOL 12.5 MG: 6.25 TABLET, FILM COATED ORAL at 08:30

## 2018-12-09 RX ADMIN — HYDRALAZINE HYDROCHLORIDE 50 MG: 25 TABLET, FILM COATED ORAL at 06:09

## 2018-12-09 RX ADMIN — PRAVASTATIN SODIUM 40 MG: 40 TABLET ORAL at 22:49

## 2018-12-09 RX ADMIN — VITAMIN E CAP 400 UNIT 400 UNITS: 400 CAP at 08:31

## 2018-12-09 RX ADMIN — IPRATROPIUM BROMIDE AND ALBUTEROL SULFATE 1 AMPULE: .5; 3 SOLUTION RESPIRATORY (INHALATION) at 08:40

## 2018-12-09 RX ADMIN — CITALOPRAM HYDROBROMIDE 20 MG: 20 TABLET ORAL at 08:31

## 2018-12-09 RX ADMIN — INSULIN LISPRO 2 UNITS: 100 INJECTION, SOLUTION INTRAVENOUS; SUBCUTANEOUS at 22:47

## 2018-12-09 RX ADMIN — FLUTICASONE PROPIONATE 1 SPRAY: 50 SPRAY, METERED NASAL at 22:45

## 2018-12-09 ASSESSMENT — PAIN SCALES - GENERAL: PAINLEVEL_OUTOF10: 0

## 2018-12-10 LAB
ANION GAP SERPL CALCULATED.3IONS-SCNC: 12 MMOL/L (ref 3–16)
BASOPHILS ABSOLUTE: 0 K/UL (ref 0–0.2)
BASOPHILS RELATIVE PERCENT: 0.7 %
BLOOD CULTURE, ROUTINE: NORMAL
BUN BLDV-MCNC: 95 MG/DL (ref 7–20)
CALCIUM SERPL-MCNC: 8.8 MG/DL (ref 8.3–10.6)
CHLORIDE BLD-SCNC: 101 MMOL/L (ref 99–110)
CO2: 27 MMOL/L (ref 21–32)
CREAT SERPL-MCNC: 1.8 MG/DL (ref 0.8–1.3)
CULTURE, BLOOD 2: NORMAL
EOSINOPHILS ABSOLUTE: 0.3 K/UL (ref 0–0.6)
EOSINOPHILS RELATIVE PERCENT: 5 %
GFR AFRICAN AMERICAN: 46
GFR NON-AFRICAN AMERICAN: 38
GLUCOSE BLD-MCNC: 215 MG/DL (ref 70–99)
GLUCOSE BLD-MCNC: 220 MG/DL (ref 70–99)
GLUCOSE BLD-MCNC: 238 MG/DL (ref 70–99)
GLUCOSE BLD-MCNC: 293 MG/DL (ref 70–99)
GLUCOSE BLD-MCNC: 319 MG/DL (ref 70–99)
HCT VFR BLD CALC: 29.3 % (ref 40.5–52.5)
HEMOGLOBIN: 9.8 G/DL (ref 13.5–17.5)
LYMPHOCYTES ABSOLUTE: 1.3 K/UL (ref 1–5.1)
LYMPHOCYTES RELATIVE PERCENT: 20.3 %
MCH RBC QN AUTO: 30.1 PG (ref 26–34)
MCHC RBC AUTO-ENTMCNC: 33.3 G/DL (ref 31–36)
MCV RBC AUTO: 90.2 FL (ref 80–100)
MONOCYTES ABSOLUTE: 0.6 K/UL (ref 0–1.3)
MONOCYTES RELATIVE PERCENT: 8.7 %
NEUTROPHILS ABSOLUTE: 4.3 K/UL (ref 1.7–7.7)
NEUTROPHILS RELATIVE PERCENT: 65.3 %
PDW BLD-RTO: 16.9 % (ref 12.4–15.4)
PERFORMED ON: ABNORMAL
PLATELET # BLD: 147 K/UL (ref 135–450)
PMV BLD AUTO: 7.3 FL (ref 5–10.5)
POTASSIUM REFLEX MAGNESIUM: 4.4 MMOL/L (ref 3.5–5.1)
RBC # BLD: 3.24 M/UL (ref 4.2–5.9)
SODIUM BLD-SCNC: 140 MMOL/L (ref 136–145)
WBC # BLD: 6.6 K/UL (ref 4–11)

## 2018-12-10 PROCEDURE — 97530 THERAPEUTIC ACTIVITIES: CPT

## 2018-12-10 PROCEDURE — 6370000000 HC RX 637 (ALT 250 FOR IP): Performed by: PHYSICAL MEDICINE & REHABILITATION

## 2018-12-10 PROCEDURE — 1280000000 HC REHAB R&B

## 2018-12-10 PROCEDURE — 94640 AIRWAY INHALATION TREATMENT: CPT

## 2018-12-10 PROCEDURE — 36415 COLL VENOUS BLD VENIPUNCTURE: CPT

## 2018-12-10 PROCEDURE — 97535 SELF CARE MNGMENT TRAINING: CPT

## 2018-12-10 PROCEDURE — 2700000000 HC OXYGEN THERAPY PER DAY

## 2018-12-10 PROCEDURE — 85025 COMPLETE CBC W/AUTO DIFF WBC: CPT

## 2018-12-10 PROCEDURE — 94760 N-INVAS EAR/PLS OXIMETRY 1: CPT

## 2018-12-10 PROCEDURE — 92526 ORAL FUNCTION THERAPY: CPT

## 2018-12-10 PROCEDURE — 97110 THERAPEUTIC EXERCISES: CPT

## 2018-12-10 PROCEDURE — 80048 BASIC METABOLIC PNL TOTAL CA: CPT

## 2018-12-10 RX ADMIN — ACETAMINOPHEN 650 MG: 325 TABLET, FILM COATED ORAL at 22:07

## 2018-12-10 RX ADMIN — FLUTICASONE PROPIONATE 1 SPRAY: 50 SPRAY, METERED NASAL at 22:06

## 2018-12-10 RX ADMIN — CARVEDILOL 12.5 MG: 6.25 TABLET, FILM COATED ORAL at 17:26

## 2018-12-10 RX ADMIN — INSULIN LISPRO 6 UNITS: 100 INJECTION, SOLUTION INTRAVENOUS; SUBCUTANEOUS at 17:25

## 2018-12-10 RX ADMIN — IPRATROPIUM BROMIDE AND ALBUTEROL SULFATE 1 AMPULE: .5; 3 SOLUTION RESPIRATORY (INHALATION) at 09:39

## 2018-12-10 RX ADMIN — CARVEDILOL 12.5 MG: 6.25 TABLET, FILM COATED ORAL at 08:22

## 2018-12-10 RX ADMIN — HYDRALAZINE HYDROCHLORIDE 50 MG: 25 TABLET, FILM COATED ORAL at 14:58

## 2018-12-10 RX ADMIN — GABAPENTIN 600 MG: 300 CAPSULE ORAL at 08:22

## 2018-12-10 RX ADMIN — FUROSEMIDE 20 MG: 20 TABLET ORAL at 08:22

## 2018-12-10 RX ADMIN — VITAMIN E CAP 400 UNIT 400 UNITS: 400 CAP at 08:22

## 2018-12-10 RX ADMIN — GABAPENTIN 600 MG: 300 CAPSULE ORAL at 22:07

## 2018-12-10 RX ADMIN — FERROUS SULFATE TAB 325 MG (65 MG ELEMENTAL FE) 325 MG: 325 (65 FE) TAB at 08:22

## 2018-12-10 RX ADMIN — PRAVASTATIN SODIUM 40 MG: 40 TABLET ORAL at 22:08

## 2018-12-10 RX ADMIN — INSULIN LISPRO 9 UNITS: 100 INJECTION, SOLUTION INTRAVENOUS; SUBCUTANEOUS at 12:12

## 2018-12-10 RX ADMIN — CITALOPRAM HYDROBROMIDE 20 MG: 20 TABLET ORAL at 08:22

## 2018-12-10 RX ADMIN — IPRATROPIUM BROMIDE AND ALBUTEROL SULFATE 1 AMPULE: .5; 3 SOLUTION RESPIRATORY (INHALATION) at 21:40

## 2018-12-10 RX ADMIN — ASPIRIN 81 MG CHEWABLE TABLET 81 MG: 81 TABLET CHEWABLE at 08:22

## 2018-12-10 RX ADMIN — IPRATROPIUM BROMIDE AND ALBUTEROL SULFATE 1 AMPULE: .5; 3 SOLUTION RESPIRATORY (INHALATION) at 14:05

## 2018-12-10 RX ADMIN — INSULIN GLARGINE 10 UNITS: 100 INJECTION, SOLUTION SUBCUTANEOUS at 22:11

## 2018-12-10 RX ADMIN — INSULIN LISPRO 4 UNITS: 100 INJECTION, SOLUTION INTRAVENOUS; SUBCUTANEOUS at 08:25

## 2018-12-10 RX ADMIN — FINASTERIDE 5 MG: 5 TABLET, FILM COATED ORAL at 08:22

## 2018-12-10 RX ADMIN — INSULIN LISPRO 6 UNITS: 100 INJECTION, SOLUTION INTRAVENOUS; SUBCUTANEOUS at 22:12

## 2018-12-10 RX ADMIN — HYDRALAZINE HYDROCHLORIDE 50 MG: 25 TABLET, FILM COATED ORAL at 06:54

## 2018-12-10 RX ADMIN — HYDRALAZINE HYDROCHLORIDE 50 MG: 25 TABLET, FILM COATED ORAL at 22:08

## 2018-12-10 RX ADMIN — FUROSEMIDE 20 MG: 20 TABLET ORAL at 17:26

## 2018-12-10 ASSESSMENT — PAIN DESCRIPTION - DESCRIPTORS: DESCRIPTORS: TENDER

## 2018-12-10 ASSESSMENT — PAIN SCALES - GENERAL
PAINLEVEL_OUTOF10: 0
PAINLEVEL_OUTOF10: 2

## 2018-12-10 ASSESSMENT — PAIN DESCRIPTION - LOCATION: LOCATION: SHOULDER

## 2018-12-10 ASSESSMENT — PAIN DESCRIPTION - PAIN TYPE: TYPE: ACUTE PAIN
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
## 2018-12-10 NOTE — PROGRESS NOTES
on 2L O2 NC, pt saturations marco to 96%  Safety Devices  Safety Devices in place: Yes  Type of devices: Call light within reach;Nurse notified; All fall risk precautions in place; Left in chair;Left in bed;Bed alarm in place       Plan   Plan  Times per week: 75 minutes 5 out of 7 days per week  Times per day: Daily  Specific instructions for Next Treatment: co tx transfers  Current Treatment Recommendations: Functional Mobility Training, Endurance Training, Safety Education & Training, Self-Care / ADL, Neuromuscular Re-education, Balance Training, Patient/Caregiver Education & Training, Equipment Evaluation, Education, & procurement, Strengthening     Goals  Short term goals  Time Frame for Short term goals: 10 days  Short term goal 1: Mod A x1 for supine>sit  Short term goal 2: Min A for sitting balance in order to dress sitting EOB  Short term goal 3: Max A x1 for slide baord transfer  Short term goal 4: Min A for sponge bathing seated EOB  Long term goals  Time Frame for Long term goals : 3 weeks  Long term goal 1: Mod I sitting balance for ADL completion steated EOB  Long term goal 2: Min A functional transfers using slide board  Long term goal 3: CGA for bed mobility to decrease caregiver burden   Long term goal 4: Min A bathing from shower chair  Patient Goals   Patient goals : to improve mobility and decrease burden on wife       Therapy Time   Individual Concurrent Group Co-treatment   Time In       1018   Time Out       1103   Minutes       45        Timed Code Treatment Minutes: 45 minutes     Second Session Therapy Time:   Individual Concurrent Group Co-treatment   Time In    2135   Time Out    1445   Minutes    30     Timed Code Treatment Minutes:  45 + 30     Total Treatment Minutes:  75 minutes       Michael GRANADOS/L, [place] #[id number]

## 2018-12-10 NOTE — PROGRESS NOTES
Nicki Masters  12/10/2018  [id number]    Chief Complaint: Debility    Subjective:   No significant weekend events. No current complaints. Labs reviewed. Glucose above goal.    ROS: No CP, SOB, dyspnea    Objective:  Patient Vitals for the past 24 hrs:   BP Temp Temp src Pulse Resp SpO2 Weight   12/10/18 0940 - - - - 16 - -   12/10/18 0815 (!) 141/60 98 °F (36.7 °C) Oral 82 16 93 % -   12/10/18 0652 (!) 141/76 - - 82 - - -   12/10/18 0608 - - - - - - 181 lb 7 oz (82.3 kg)   12/09/18 2230 (!) 157/64 98.9 °F (37.2 °C) Oral 84 18 94 % -   12/09/18 2008 - - - - - 92 % -   12/09/18 1530 - - - - 16 93 % -     Gen: No distress, pleasant. Resting in bed  HEENT: Normocephalic, atraumatic. CV: Regular rate and rhythm. No MRG  Resp: No respiratory distress. CTAB  Abd: Soft, nontender nondistended  Ext: No edema. Chronic BLE atrophy  Neuro: Alert, oriented, appropriately interactive. Laboratory data: Available via EMR. 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  Per reports, he was recently adjusted on his home diuretic medications. Cipriano Gabriel was noted to have a CHF exacerbation and has been diuresing well.  Over the past 2 years, he has had difficulty transferring due to a left rotator cuff injury and has remained relatively bedbound and Ettie [place] lift for transfers. Brand In[place] to his decline, he was transferring independently, driving, and actively volunteering.   Objective     Bed to Chair: Dependent/Total (Mod. A x2 with use of the slideboard)     OT  PT Equipment Recommendations  Equipment Needed: Yes  Other: D/t torn RTC on the L, CHF with stents placed, Pt. increaseing in age, would consider possibility for a power w/c     Assessment ulcer of right heel associated with type 2 diabetes mellitus, with fat layer exposed ([address]    Wound, open, scrotum or testes    Skin ulcer of sacrum with fat layer exposed ([address]    Mucus plugging of bronchi    Pressure ulcer of right leg, stage III ([facility])    CKD (chronic kidney disease) stage 3, GFR 30-59 ml/min ([facility])    Acute kidney injury superimposed on chronic kidney disease ([address]    History of myocardial infarction    History of DVT (deep vein thrombosis)    Dysphagia    S/P percutaneous endoscopic gastrostomy (PEG) tube placement ([facility])    Acute on chronic diastolic CHF (congestive heart failure), NYHA class 4 ([facility])    Congestive heart failure ([address]    Debility       Plan:   Debility: 2/2 CHF exacerbation. PT/OT     T7 paraplegia: Pressure relief, bladder care for SPT, daily digital stimulation at night for bowels.     CHF exacerbation: Diuresis per cardiology.  ASA, Coreg, Lasix     Decubitus ulcers: Wound care following.  Wound VAC to sacrum.  Pressure relief.     CHEMO on CKD: Nephro following       HTN: Coreg, hydralazine     DM: SSI, - lantus 10 (40)     Neuropathy: Gabapentin     HLD: Pravastatin     Depression: Celexa     Bowels: Per protocol  Bladder: Per protocol   Sleep: Trazodone provided prn. Pain: Tramadol  DVT PPx: None indicated    Kerrie Isbell MD 12/10/2018, 9:55 AM    * This document was created using dictation software. While all precautions were taken to ensure accuracy, errors may have occurred. Please disregard any typographical errors.

## 2018-12-11 LAB
GLUCOSE BLD-MCNC: 220 MG/DL (ref 70–99)
GLUCOSE BLD-MCNC: 223 MG/DL (ref 70–99)
GLUCOSE BLD-MCNC: 269 MG/DL (ref 70–99)
GLUCOSE BLD-MCNC: 292 MG/DL (ref 70–99)
PERFORMED ON: ABNORMAL

## 2018-12-11 PROCEDURE — 97535 SELF CARE MNGMENT TRAINING: CPT

## 2018-12-11 PROCEDURE — 6370000000 HC RX 637 (ALT 250 FOR IP): Performed by: PHYSICAL MEDICINE & REHABILITATION

## 2018-12-11 PROCEDURE — 92526 ORAL FUNCTION THERAPY: CPT

## 2018-12-11 PROCEDURE — 97530 THERAPEUTIC ACTIVITIES: CPT

## 2018-12-11 PROCEDURE — 94640 AIRWAY INHALATION TREATMENT: CPT

## 2018-12-11 PROCEDURE — 2700000000 HC OXYGEN THERAPY PER DAY

## 2018-12-11 PROCEDURE — 1280000000 HC REHAB R&B

## 2018-12-11 PROCEDURE — 94760 N-INVAS EAR/PLS OXIMETRY 1: CPT

## 2018-12-11 RX ORDER — IPRATROPIUM BROMIDE AND ALBUTEROL SULFATE 2.5; .5 MG/3ML; MG/3ML
1 SOLUTION RESPIRATORY (INHALATION) 3 TIMES DAILY
Status: DISCONTINUED | OUTPATIENT
Start: 2018-12-11 | End: 2018-12-18

## 2018-12-11 RX ADMIN — IPRATROPIUM BROMIDE AND ALBUTEROL SULFATE 1 AMPULE: .5; 3 SOLUTION RESPIRATORY (INHALATION) at 13:42

## 2018-12-11 RX ADMIN — FINASTERIDE 5 MG: 5 TABLET, FILM COATED ORAL at 08:53

## 2018-12-11 RX ADMIN — INSULIN LISPRO 6 UNITS: 100 INJECTION, SOLUTION INTRAVENOUS; SUBCUTANEOUS at 08:54

## 2018-12-11 RX ADMIN — FLUTICASONE PROPIONATE 1 SPRAY: 50 SPRAY, METERED NASAL at 22:58

## 2018-12-11 RX ADMIN — INSULIN LISPRO 6 UNITS: 100 INJECTION, SOLUTION INTRAVENOUS; SUBCUTANEOUS at 17:07

## 2018-12-11 RX ADMIN — ACETAMINOPHEN 650 MG: 325 TABLET, FILM COATED ORAL at 23:09

## 2018-12-11 RX ADMIN — CITALOPRAM HYDROBROMIDE 20 MG: 20 TABLET ORAL at 08:52

## 2018-12-11 RX ADMIN — FUROSEMIDE 20 MG: 20 TABLET ORAL at 17:07

## 2018-12-11 RX ADMIN — IPRATROPIUM BROMIDE AND ALBUTEROL SULFATE 1 AMPULE: .5; 3 SOLUTION RESPIRATORY (INHALATION) at 22:16

## 2018-12-11 RX ADMIN — PRAVASTATIN SODIUM 40 MG: 40 TABLET ORAL at 22:58

## 2018-12-11 RX ADMIN — HYDRALAZINE HYDROCHLORIDE 50 MG: 25 TABLET, FILM COATED ORAL at 06:26

## 2018-12-11 RX ADMIN — FUROSEMIDE 20 MG: 20 TABLET ORAL at 08:53

## 2018-12-11 RX ADMIN — GABAPENTIN 600 MG: 300 CAPSULE ORAL at 08:53

## 2018-12-11 RX ADMIN — CARVEDILOL 12.5 MG: 6.25 TABLET, FILM COATED ORAL at 08:53

## 2018-12-11 RX ADMIN — HYDRALAZINE HYDROCHLORIDE 50 MG: 25 TABLET, FILM COATED ORAL at 15:19

## 2018-12-11 RX ADMIN — HYDRALAZINE HYDROCHLORIDE 50 MG: 25 TABLET, FILM COATED ORAL at 22:58

## 2018-12-11 RX ADMIN — IPRATROPIUM BROMIDE AND ALBUTEROL SULFATE 1 AMPULE: .5; 3 SOLUTION RESPIRATORY (INHALATION) at 05:53

## 2018-12-11 RX ADMIN — VITAMIN E CAP 400 UNIT 400 UNITS: 400 CAP at 08:53

## 2018-12-11 RX ADMIN — GABAPENTIN 600 MG: 300 CAPSULE ORAL at 22:58

## 2018-12-11 RX ADMIN — CARVEDILOL 12.5 MG: 6.25 TABLET, FILM COATED ORAL at 17:07

## 2018-12-11 RX ADMIN — FERROUS SULFATE TAB 325 MG (65 MG ELEMENTAL FE) 325 MG: 325 (65 FE) TAB at 08:52

## 2018-12-11 RX ADMIN — INSULIN LISPRO 3 UNITS: 100 INJECTION, SOLUTION INTRAVENOUS; SUBCUTANEOUS at 23:06

## 2018-12-11 RX ADMIN — ASPIRIN 81 MG CHEWABLE TABLET 81 MG: 81 TABLET CHEWABLE at 08:52

## 2018-12-11 RX ADMIN — INSULIN LISPRO 9 UNITS: 100 INJECTION, SOLUTION INTRAVENOUS; SUBCUTANEOUS at 12:40

## 2018-12-11 ASSESSMENT — PAIN SCALES - GENERAL
PAINLEVEL_OUTOF10: 0
PAINLEVEL_OUTOF10: 3

## 2018-12-11 NOTE — PROGRESS NOTES
puree  Compensatory Swallowing Strategies: No straws, Eat/Feed slowly, Upright as possible for all oral intake, Small bites/sips     Plan:     Frequency:  5days/week   30 minutes/day  Discharge Recommendations:   Barriers: Impulsivity with meals  Discharge Recommendations:  [] Home independently  [] Home with assistance []  24 hour supervision  [] SNF [x] Other: See PT/OT  Continued SLP Treatment:  [] Yes [x] No [] TBD based on progress while on ARU [] Vital Stim indicated [] Other:   Estimated discharge date: TBD    Type of Total Treatment Minutes   Session 1     Time In 0815   Time Out 0845   Timed Code Minutes  0   Individual Treatment Minutes  30   Co-Treatment Minutes     Group Treatment Minutes     Concurrent Treatment Minutes       TOTAL DAILY MINUTES:  30    Electronically Signed by     Patricia Conway #[id number]  Speech Language Pathologist

## 2018-12-11 NOTE — PROGRESS NOTES
2 diabetes mellitus, with fat layer exposed ([address]    Wound, open, scrotum or testes    Skin ulcer of sacrum with fat layer exposed ([address]    Mucus plugging of bronchi    Pressure ulcer of right leg, stage III [facility]    CKD (chronic kidney disease) stage 3, GFR 30-59 ml/min ([facility]    Acute kidney injury superimposed on chronic kidney disease ([address]    History of myocardial infarction    History of DVT (deep vein thrombosis)    Dysphagia    S/P percutaneous endoscopic gastrostomy (PEG) tube placement ([facility])    Acute on chronic diastolic CHF (congestive heart failure), NYHA class 4 ([facility])    Congestive heart failure ([address]    Debility       Plan:   Debility: 2/2 CHF exacerbation. PT/OT     T7 paraplegia: Pressure relief, bladder care for SPT, daily digital stimulation at night for bowels.     CHF exacerbation: Diuresis per cardiology.  ASA, Coreg, Lasix     Decubitus ulcers: Wound care following.  Wound VAC to sacrum.  Pressure relief.     CHEMO on CKD: Nephro following       HTN: Coreg, hydralazine     DM: SSI, restarted lantus 10->15 (40)     Neuropathy: Gabapentin     HLD: Pravastatin     Depression: Celexa     Bowels: Per protocol  Bladder: Per protocol   Sleep: Trazodone provided prn. Pain: Tramadol  DVT PPx: None indicated    Josue Coles MD 12/11/2018, 9:11 AM    * This document was created using dictation software. While all precautions were taken to ensure accuracy, errors may have occurred. Please disregard any typographical errors.
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
## 2018-12-12 ENCOUNTER — HOSPITAL ENCOUNTER (OUTPATIENT)
Dept: WOUND CARE | Age: 63
Discharge: HOME | End: 2018-12-12

## 2018-12-12 LAB
ANION GAP SERPL CALCULATED.3IONS-SCNC: 9 MMOL/L (ref 3–16)
BUN BLDV-MCNC: 98 MG/DL (ref 7–20)
CALCIUM SERPL-MCNC: 8.8 MG/DL (ref 8.3–10.6)
CHLORIDE BLD-SCNC: 98 MMOL/L (ref 99–110)
CO2: 29 MMOL/L (ref 21–32)
CREAT SERPL-MCNC: 1.6 MG/DL (ref 0.8–1.3)
GFR AFRICAN AMERICAN: 53
GFR NON-AFRICAN AMERICAN: 44
GLUCOSE BLD-MCNC: 191 MG/DL (ref 70–99)
GLUCOSE BLD-MCNC: 225 MG/DL (ref 70–99)
GLUCOSE BLD-MCNC: 226 MG/DL (ref 70–99)
GLUCOSE BLD-MCNC: 265 MG/DL (ref 70–99)
GLUCOSE BLD-MCNC: 276 MG/DL (ref 70–99)
PERFORMED ON: ABNORMAL
POTASSIUM SERPL-SCNC: 4.3 MMOL/L (ref 3.5–5.1)
SODIUM BLD-SCNC: 136 MMOL/L (ref 136–145)

## 2018-12-12 PROCEDURE — 6370000000 HC RX 637 (ALT 250 FOR IP): Performed by: PHYSICAL MEDICINE & REHABILITATION

## 2018-12-12 PROCEDURE — 80048 BASIC METABOLIC PNL TOTAL CA: CPT

## 2018-12-12 PROCEDURE — 94640 AIRWAY INHALATION TREATMENT: CPT

## 2018-12-12 PROCEDURE — 97530 THERAPEUTIC ACTIVITIES: CPT

## 2018-12-12 PROCEDURE — 97535 SELF CARE MNGMENT TRAINING: CPT

## 2018-12-12 PROCEDURE — 1280000000 HC REHAB R&B

## 2018-12-12 PROCEDURE — 90670 PCV13 VACCINE IM: CPT | Performed by: PHYSICAL MEDICINE & REHABILITATION

## 2018-12-12 PROCEDURE — 94760 N-INVAS EAR/PLS OXIMETRY 1: CPT

## 2018-12-12 PROCEDURE — 97110 THERAPEUTIC EXERCISES: CPT

## 2018-12-12 PROCEDURE — G0009 ADMIN PNEUMOCOCCAL VACCINE: HCPCS | Performed by: PHYSICAL MEDICINE & REHABILITATION

## 2018-12-12 PROCEDURE — 6360000002 HC RX W HCPCS: Performed by: PHYSICAL MEDICINE & REHABILITATION

## 2018-12-12 PROCEDURE — 36415 COLL VENOUS BLD VENIPUNCTURE: CPT

## 2018-12-12 PROCEDURE — 2700000000 HC OXYGEN THERAPY PER DAY

## 2018-12-12 RX ADMIN — INSULIN LISPRO 5 UNITS: 100 INJECTION, SOLUTION INTRAVENOUS; SUBCUTANEOUS at 20:38

## 2018-12-12 RX ADMIN — FUROSEMIDE 20 MG: 20 TABLET ORAL at 17:20

## 2018-12-12 RX ADMIN — GABAPENTIN 600 MG: 300 CAPSULE ORAL at 20:25

## 2018-12-12 RX ADMIN — ACETAMINOPHEN 650 MG: 325 TABLET, FILM COATED ORAL at 20:30

## 2018-12-12 RX ADMIN — ASPIRIN 81 MG CHEWABLE TABLET 81 MG: 81 TABLET CHEWABLE at 08:02

## 2018-12-12 RX ADMIN — INSULIN LISPRO 6 UNITS: 100 INJECTION, SOLUTION INTRAVENOUS; SUBCUTANEOUS at 08:12

## 2018-12-12 RX ADMIN — CITALOPRAM HYDROBROMIDE 20 MG: 20 TABLET ORAL at 08:01

## 2018-12-12 RX ADMIN — HYDRALAZINE HYDROCHLORIDE 50 MG: 25 TABLET, FILM COATED ORAL at 05:48

## 2018-12-12 RX ADMIN — CARVEDILOL 12.5 MG: 6.25 TABLET, FILM COATED ORAL at 08:01

## 2018-12-12 RX ADMIN — CARVEDILOL 12.5 MG: 6.25 TABLET, FILM COATED ORAL at 17:20

## 2018-12-12 RX ADMIN — INSULIN LISPRO 6 UNITS: 100 INJECTION, SOLUTION INTRAVENOUS; SUBCUTANEOUS at 17:28

## 2018-12-12 RX ADMIN — IPRATROPIUM BROMIDE AND ALBUTEROL SULFATE 1 AMPULE: .5; 3 SOLUTION RESPIRATORY (INHALATION) at 20:49

## 2018-12-12 RX ADMIN — GABAPENTIN 600 MG: 300 CAPSULE ORAL at 08:01

## 2018-12-12 RX ADMIN — HYDRALAZINE HYDROCHLORIDE 50 MG: 25 TABLET, FILM COATED ORAL at 15:07

## 2018-12-12 RX ADMIN — PNEUMOCOCCAL 13-VALENT CONJUGATE VACCINE 0.5 ML: 2.2; 2.2; 2.2; 2.2; 2.2; 4.4; 2.2; 2.2; 2.2; 2.2; 2.2; 2.2; 2.2 INJECTION, SUSPENSION INTRAMUSCULAR at 08:05

## 2018-12-12 RX ADMIN — IPRATROPIUM BROMIDE AND ALBUTEROL SULFATE 1 AMPULE: .5; 3 SOLUTION RESPIRATORY (INHALATION) at 13:03

## 2018-12-12 RX ADMIN — FERROUS SULFATE TAB 325 MG (65 MG ELEMENTAL FE) 325 MG: 325 (65 FE) TAB at 08:02

## 2018-12-12 RX ADMIN — HYDRALAZINE HYDROCHLORIDE 50 MG: 25 TABLET, FILM COATED ORAL at 20:25

## 2018-12-12 RX ADMIN — TRAZODONE HYDROCHLORIDE 50 MG: 50 TABLET ORAL at 22:12

## 2018-12-12 RX ADMIN — INSULIN LISPRO 3 UNITS: 100 INJECTION, SOLUTION INTRAVENOUS; SUBCUTANEOUS at 12:35

## 2018-12-12 RX ADMIN — FLUTICASONE PROPIONATE 1 SPRAY: 50 SPRAY, METERED NASAL at 20:25

## 2018-12-12 RX ADMIN — IPRATROPIUM BROMIDE AND ALBUTEROL SULFATE 1 AMPULE: .5; 3 SOLUTION RESPIRATORY (INHALATION) at 05:25

## 2018-12-12 RX ADMIN — PRAVASTATIN SODIUM 40 MG: 40 TABLET ORAL at 20:30

## 2018-12-12 RX ADMIN — FUROSEMIDE 20 MG: 20 TABLET ORAL at 08:02

## 2018-12-12 RX ADMIN — VITAMIN E CAP 400 UNIT 400 UNITS: 400 CAP at 08:02

## 2018-12-12 RX ADMIN — FINASTERIDE 5 MG: 5 TABLET, FILM COATED ORAL at 08:01

## 2018-12-12 ASSESSMENT — PAIN SCALES - GENERAL
PAINLEVEL_OUTOF10: 3
PAINLEVEL_OUTOF10: 0

## 2018-12-12 NOTE — PROGRESS NOTES
Patient resting in bed eating breakfast. Assessment completed. Vital signs stable. Denies any pain. Will continue to monitor. Call light in reach.

## 2018-12-12 NOTE — PROGRESS NOTES
Nephrology Attending  Progress Note        SUMMARY :  We are following this patient for CHEMO  on CKD stage 1  70 y.o. male with CKD 3 (presumed DN/HTN/previous CHEMO; baseline Cr 1.8), admitted for acute on chronic CHF exacerbation after stopping diuretics, complicated by CHEMO on CKD    SUBJECTIVE:   Patient progress reviewed.  The patient feels better , no dyspnea     Physical Exam:    VITALS:  /62   Pulse 70   Temp 97.6 °F (36.4 °C) (Oral)   Resp 16   Ht 6' (1.829 m)   Wt 181 lb 7 oz (82.3 kg)   SpO2 97%   BMI 24.61 kg/m²   BLOOD PRESSURE RANGE:  Systolic (99SOM), WGV:944 , Min:122 , ORX:034   ; Diastolic (63VEF), HBI:27, Min:61, Max:73    24HR INTAKE/OUTPUT:      Intake/Output Summary (Last 24 hours) at 12/12/18 1058  Last data filed at 12/12/18 0733   Gross per 24 hour   Intake              120 ml   Output             1950 ml   Net            -1830 ml       Constitutional:  Alert, oriented x 3  Pallor +, No icterus   JVP not raised   Cardiovascular/Edema: RRR, No murmur/ rub   Respiratory:  B/ L air entry, Normal breath sounds, No crackles   Gastrointestinal: soft, bowel sounds +, Suprapubic catheter   Other: Paraparesis     DATA:    CBC with Differential:    Lab Results   Component Value Date    WBC 6.6 12/10/2018    RBC 3.24 12/10/2018    HGB 9.8 12/10/2018    HCT 29.3 12/10/2018     12/10/2018    MCV 90.2 12/10/2018    MCH 30.1 12/10/2018    MCHC 33.3 12/10/2018    RDW 16.9 12/10/2018    NRBC CANCELED 10/22/2014    NRBC CANCELED 10/22/2014    SEGSPCT 76.1 10/22/2014    BANDSPCT 2 01/16/2018    BLASTSPCT CANCELED 10/22/2014    METASPCT 1 04/03/2017    LYMPHOPCT 20.3 12/10/2018    PROMYELOPCT CANCELED 10/22/2014    MONOPCT 8.7 12/10/2018    MYELOPCT 1 03/31/2017    EOSPCT 4.6 07/11/2011    BASOPCT 0.7 12/10/2018    MONOSABS 0.6 12/10/2018    LYMPHSABS 1.3 12/10/2018    EOSABS 0.3 12/10/2018    BASOSABS 0.0 12/10/2018    DIFFTYPE Auto 07/11/2011     CMP:    Lab Results   Component Value Date  12/12/2018    K 4.3 12/12/2018    K 4.4 12/10/2018    CL 98 12/12/2018    CO2 29 12/12/2018    BUN 98 12/12/2018    CREATININE 1.6 12/12/2018    GFRAA 53 12/12/2018    GFRAA >60 05/13/2013    AGRATIO 0.9 01/07/2018    LABGLOM 44 12/12/2018    LABGLOM 58 10/15/2015    GLUCOSE 276 12/12/2018    PROT 7.4 01/07/2018    PROT 7.2 01/06/2012    LABALBU 3.2 12/06/2018    CALCIUM 8.8 12/12/2018    BILITOT 0.5 01/07/2018    ALKPHOS 69 01/07/2018    AST 43 01/07/2018    ALT 15 01/07/2018     Magnesium:    Lab Results   Component Value Date    MG 2.20 01/11/2018     Phosphorus:    Lab Results   Component Value Date    PHOS 2.7 01/14/2018     Uric Acid:  No results found for: LABURIC, URICACID  Last 3 Troponin:    Lab Results   Component Value Date    TROPONINI 0.05 12/05/2018    TROPONINI 0.09 01/16/2018    TROPONINI 0.07 01/16/2018     U/A:    Lab Results   Component Value Date    COLORU YELLOW 12/06/2018    PROTEINU Negative 12/06/2018    PHUR 5.0 12/06/2018    WBCUA 67 12/06/2018    RBCUA 2 12/06/2018    RBCUA 3+ 05/12/2016    YEAST Present 01/10/2018    BACTERIA 2+ 12/06/2018    CLARITYU CLOUDY 12/06/2018    SPECGRAV 1.011 12/06/2018    LEUKOCYTESUR LARGE 12/06/2018    UROBILINOGEN 0.2 12/06/2018    BILIRUBINUR Negative 12/06/2018    BILIRUBINUR NEGATIVE 05/12/2016    BLOODU Negative 12/06/2018    GLUCOSEU Negative 12/06/2018    GLUCOSEU >=1000 05/17/2011    AMORPHOUS 4+ 11/18/2016         IMPRESSION/RECOMMENDATIONS:      Diagnosis and Plan     1. CHEMO  Cr 1.6, lower   2. CKD stage 3  Baseline Cr 1.5   3. HTN   Stable   4.  Paraparesis   T 7 vertebrae   Check to see if he really needs to be in isolation       Emanuel Fix

## 2018-12-12 NOTE — PROGRESS NOTES
goals  Time Frame for Short term goals: 10 days  Short term goal 1: Supine to sit with Min A  Short term goal 2: Sit to supine with Mod A  Short term goal 3: EOB to w/c via slideboard with Mod A x 1  Short term goal 4: Propel w/c x 50' with SBA: goal met  Long term goals  Time Frame for Long term goals : 3 weeks  Long term goal 1: Bed to/from w/c with SBA via slideboard  Long term goal 2: Propel w/c x 150' with Mod I  Long term goal 3: Examine the possibility of power w/c (d/t improved seating needed, CHF, RTC tear, aging)  Long term goal 4: Car transfer with Min A  Long term goal 5: Pressure reliefs with Jose Carlos  Patient Goals   Patient goals :      Plan    Plan  Times per week: Pt. to be seen 90 minutes, 5 out of the 7 days/week  Current Treatment Recommendations: Strengthening, Balance Training, Functional Mobility Training, Transfer Training, Endurance Training, Wheelchair Mobility Training, Safety Education & Training, Patient/Caregiver Education & Training, Equipment Evaluation, Education, & procurement, Home Exercise Program, Neuromuscular Re-education, ROM  Safety Devices  Type of devices: Call light within reach, Chair alarm in place  Restraints  Initially in place: No     Therapy Time   Individual Concurrent Group Co-treatment   Time In       0830   Time Out       0915   Minutes       45        Second Session Therapy Time:   Individual Concurrent Group Co-treatment   Time In       1115   Time Out       1200   Minutes       45     Timed Code Treatment Minutes:  45,45    Total Treatment Minutes:  Jordan [address] PT [id number],  C/NDT  Dyana Deshpande, PT

## 2018-12-13 LAB
GLUCOSE BLD-MCNC: 177 MG/DL (ref 70–99)
GLUCOSE BLD-MCNC: 202 MG/DL (ref 70–99)
GLUCOSE BLD-MCNC: 240 MG/DL (ref 70–99)
GLUCOSE BLD-MCNC: 338 MG/DL (ref 70–99)
PERFORMED ON: ABNORMAL

## 2018-12-13 PROCEDURE — 6370000000 HC RX 637 (ALT 250 FOR IP): Performed by: PHYSICAL MEDICINE & REHABILITATION

## 2018-12-13 PROCEDURE — 97110 THERAPEUTIC EXERCISES: CPT

## 2018-12-13 PROCEDURE — 97535 SELF CARE MNGMENT TRAINING: CPT

## 2018-12-13 PROCEDURE — 97530 THERAPEUTIC ACTIVITIES: CPT

## 2018-12-13 PROCEDURE — 94760 N-INVAS EAR/PLS OXIMETRY 1: CPT

## 2018-12-13 PROCEDURE — 2700000000 HC OXYGEN THERAPY PER DAY

## 2018-12-13 PROCEDURE — 94640 AIRWAY INHALATION TREATMENT: CPT

## 2018-12-13 PROCEDURE — 1280000000 HC REHAB R&B

## 2018-12-13 RX ADMIN — CARVEDILOL 12.5 MG: 6.25 TABLET, FILM COATED ORAL at 10:58

## 2018-12-13 RX ADMIN — GABAPENTIN 600 MG: 300 CAPSULE ORAL at 10:32

## 2018-12-13 RX ADMIN — INSULIN LISPRO 6 UNITS: 100 INJECTION, SOLUTION INTRAVENOUS; SUBCUTANEOUS at 17:25

## 2018-12-13 RX ADMIN — IPRATROPIUM BROMIDE AND ALBUTEROL SULFATE 1 AMPULE: .5; 3 SOLUTION RESPIRATORY (INHALATION) at 20:24

## 2018-12-13 RX ADMIN — FUROSEMIDE 20 MG: 20 TABLET ORAL at 10:58

## 2018-12-13 RX ADMIN — HYDRALAZINE HYDROCHLORIDE 50 MG: 25 TABLET, FILM COATED ORAL at 14:06

## 2018-12-13 RX ADMIN — ACETAMINOPHEN 650 MG: 325 TABLET, FILM COATED ORAL at 21:42

## 2018-12-13 RX ADMIN — HYDRALAZINE HYDROCHLORIDE 50 MG: 25 TABLET, FILM COATED ORAL at 06:03

## 2018-12-13 RX ADMIN — HYDRALAZINE HYDROCHLORIDE 50 MG: 25 TABLET, FILM COATED ORAL at 21:35

## 2018-12-13 RX ADMIN — TRAZODONE HYDROCHLORIDE 50 MG: 50 TABLET ORAL at 22:00

## 2018-12-13 RX ADMIN — CARVEDILOL 12.5 MG: 6.25 TABLET, FILM COATED ORAL at 17:24

## 2018-12-13 RX ADMIN — INSULIN LISPRO 6 UNITS: 100 INJECTION, SOLUTION INTRAVENOUS; SUBCUTANEOUS at 12:39

## 2018-12-13 RX ADMIN — ASPIRIN 81 MG CHEWABLE TABLET 81 MG: 81 TABLET CHEWABLE at 10:33

## 2018-12-13 RX ADMIN — FUROSEMIDE 20 MG: 20 TABLET ORAL at 17:24

## 2018-12-13 RX ADMIN — IPRATROPIUM BROMIDE AND ALBUTEROL SULFATE 1 AMPULE: .5; 3 SOLUTION RESPIRATORY (INHALATION) at 05:29

## 2018-12-13 RX ADMIN — GABAPENTIN 600 MG: 300 CAPSULE ORAL at 22:00

## 2018-12-13 RX ADMIN — FLUTICASONE PROPIONATE 1 SPRAY: 50 SPRAY, METERED NASAL at 21:34

## 2018-12-13 RX ADMIN — IPRATROPIUM BROMIDE AND ALBUTEROL SULFATE 1 AMPULE: .5; 3 SOLUTION RESPIRATORY (INHALATION) at 12:31

## 2018-12-13 RX ADMIN — PRAVASTATIN SODIUM 40 MG: 40 TABLET ORAL at 21:34

## 2018-12-13 RX ADMIN — FINASTERIDE 5 MG: 5 TABLET, FILM COATED ORAL at 10:32

## 2018-12-13 RX ADMIN — CITALOPRAM HYDROBROMIDE 20 MG: 20 TABLET ORAL at 10:32

## 2018-12-13 RX ADMIN — INSULIN LISPRO 6 UNITS: 100 INJECTION, SOLUTION INTRAVENOUS; SUBCUTANEOUS at 21:45

## 2018-12-13 RX ADMIN — FERROUS SULFATE TAB 325 MG (65 MG ELEMENTAL FE) 325 MG: 325 (65 FE) TAB at 10:32

## 2018-12-13 RX ADMIN — VITAMIN E CAP 400 UNIT 400 UNITS: 400 CAP at 10:32

## 2018-12-13 ASSESSMENT — PAIN SCALES - GENERAL: PAINLEVEL_OUTOF10: 4

## 2018-12-13 NOTE — PROGRESS NOTES
mobility  Supine to Sit: Contact guard assistance  Sit to Supine: Dependent/Total (Mod A  + Min A )  Scooting: Minimal assistance    Transfers  Slide Board: Dependent/Total (Min A x2)      Additional Activities Comment  Additional Activities: Pt completed 6 min on seated stepper UE use only      Second Session: Pt seated in w/c upon entry, pleasant and agreeable to OT session. Pt self propelled w/c to therapy gym for further activity. Pt completed slide transfer from w/c>EOM w/ Mod A x2 (verbal cues for hand placement during transfer). Pt completed seated push ups w/ push up blocks at EOM 5 reps x2 to address UB strength for transfer completion. Pt completed UE stretching w/ therapy ball (trunk extension stretch, shoulder abduction/adduction stretch). Pt transferred from EOM>w/c w/ Mod A x2. Self propelled w/c back to room, completed slide board transfer w/ Max A x1 + Mod A x1 (pt continues to require verbal cues for hand placement during transfer). Pt supine in bed at end of session, heel protector boots applied, bed alarm on, needs within reach. Pt remained on 2L O2 throughout session, pt educated on pursed lip breathing techniques. Assessment   Performance deficits / Impairments: Decreased functional mobility ; Decreased ADL status; Decreased strength;Decreased high-level IADLs;Decreased sensation;Decreased endurance  Assessment: Pt presents with the above deficit which are impacting his occupational performance. Pt would benefit from intensive rehabilitation program to improve pt independence. and decrease caregiver burden.    Treatment Diagnosis: decreased independence with ADL related to T7 spinal cord injury  Patient Education: Role of OT, ARU schedule, safe completion of transfers, pressure relief   REQUIRES OT FOLLOW UP: Yes  Activity Tolerance  Activity Tolerance: Patient Tolerated treatment well  Activity Tolerance: Pt on 2L O2 throughout session  Safety Devices  Safety Devices in place: Yes  Type of

## 2018-12-13 NOTE — PROGRESS NOTES
Nephrology Attending  Progress Note        SUMMARY :  We are following this patient for CHEMO  on CKD stage 1  70 y.o. male with CKD 3 (presumed DN/HTN/previous CHEMO; baseline Cr 1.8), admitted for acute on chronic CHF exacerbation after stopping diuretics, complicated by CHEMO on CKD    SUBJECTIVE:   Patient progress reviewed.  The patient feels better , no dyspnea     Physical Exam:    VITALS:  /69   Pulse 80   Temp 98 °F (36.7 °C)   Resp 18   Ht 6' (1.829 m)   Wt 166 lb 8 oz (75.5 kg)   SpO2 91%   BMI 22.58 kg/m²   BLOOD PRESSURE RANGE:  Systolic (06NNB), XBT:100 , Min:131 , CCS:345   ; Diastolic (67BTS), NCT:70, Min:67, Max:84    24HR INTAKE/OUTPUT:      Intake/Output Summary (Last 24 hours) at 12/13/18 1129  Last data filed at 12/13/18 0542   Gross per 24 hour   Intake                0 ml   Output             1100 ml   Net            -1100 ml       Constitutional:  Alert, oriented x 3  Pallor +, No icterus   JVP not raised   Cardiovascular/Edema: RRR, No murmur/ rub   Respiratory:  B/ L air entry, Normal breath sounds, No crackles   Gastrointestinal: soft, bowel sounds +, Suprapubic catheter   Other: Paraparesis     DATA:    CBC with Differential:    Lab Results   Component Value Date    WBC 6.6 12/10/2018    RBC 3.24 12/10/2018    HGB 9.8 12/10/2018    HCT 29.3 12/10/2018     12/10/2018    MCV 90.2 12/10/2018    MCH 30.1 12/10/2018    MCHC 33.3 12/10/2018    RDW 16.9 12/10/2018    NRBC CANCELED 10/22/2014    NRBC CANCELED 10/22/2014    SEGSPCT 76.1 10/22/2014    BANDSPCT 2 01/16/2018    BLASTSPCT CANCELED 10/22/2014    METASPCT 1 04/03/2017    LYMPHOPCT 20.3 12/10/2018    PROMYELOPCT CANCELED 10/22/2014    MONOPCT 8.7 12/10/2018    MYELOPCT 1 03/31/2017    EOSPCT 4.6 07/11/2011    BASOPCT 0.7 12/10/2018    MONOSABS 0.6 12/10/2018    LYMPHSABS 1.3 12/10/2018    EOSABS 0.3 12/10/2018    BASOSABS 0.0 12/10/2018    DIFFTYPE Auto 07/11/2011     CMP:    Lab Results   Component Value Date     12/12/2018    K 4.3 12/12/2018    K 4.4 12/10/2018    CL 98 12/12/2018    CO2 29 12/12/2018    BUN 98 12/12/2018    CREATININE 1.6 12/12/2018    GFRAA 53 12/12/2018    GFRAA >60 05/13/2013    AGRATIO 0.9 01/07/2018    LABGLOM 44 12/12/2018    LABGLOM 58 10/15/2015    GLUCOSE 276 12/12/2018    PROT 7.4 01/07/2018    PROT 7.2 01/06/2012    LABALBU 3.2 12/06/2018    CALCIUM 8.8 12/12/2018    BILITOT 0.5 01/07/2018    ALKPHOS 69 01/07/2018    AST 43 01/07/2018    ALT 15 01/07/2018     Magnesium:    Lab Results   Component Value Date    MG 2.20 01/11/2018     Phosphorus:    Lab Results   Component Value Date    PHOS 2.7 01/14/2018     Uric Acid:  No results found for: LABURIC, URICACID  Last 3 Troponin:    Lab Results   Component Value Date    TROPONINI 0.05 12/05/2018    TROPONINI 0.09 01/16/2018    TROPONINI 0.07 01/16/2018     U/A:    Lab Results   Component Value Date    COLORU YELLOW 12/06/2018    PROTEINU Negative 12/06/2018    PHUR 5.0 12/06/2018    WBCUA 67 12/06/2018    RBCUA 2 12/06/2018    RBCUA 3+ 05/12/2016    YEAST Present 01/10/2018    BACTERIA 2+ 12/06/2018    CLARITYU CLOUDY 12/06/2018    SPECGRAV 1.011 12/06/2018    LEUKOCYTESUR LARGE 12/06/2018    UROBILINOGEN 0.2 12/06/2018    BILIRUBINUR Negative 12/06/2018    BILIRUBINUR NEGATIVE 05/12/2016    BLOODU Negative 12/06/2018    GLUCOSEU Negative 12/06/2018    GLUCOSEU >=1000 05/17/2011    AMORPHOUS 4+ 11/18/2016         IMPRESSION/RECOMMENDATIONS:      Diagnosis and Plan     1. CHEMO  Cr 1.6,  2. CKD stage 3  Baseline Cr 1.5   3. HTN   Stable   4.  Paraparesis   T 7 vertebrae   Check to see if he really needs to be in isolation   Next lab 12/14      Moreno Dooley

## 2018-12-13 NOTE — PROGRESS NOTES
Cris Aguiar  12/13/2018  [id number]    Chief Complaint: Debility    Subjective:   No overnight events. No current complaints. Asking about getting another WC.     ROS: No CP, SOB, dyspnea    Objective:  Patient Vitals for the past 24 hrs:   BP Temp Pulse Resp SpO2 Weight   12/13/18 0603 (!) 146/77 - - - - -   12/13/18 0529 - - - 18 98 % -   12/13/18 0500 - - - - - 166 lb 8 oz (75.5 kg)   12/12/18 2049 - - - 18 96 % -   12/12/18 1958 (!) 164/67 97.8 °F (36.6 °C) 83 18 98 % -   12/12/18 1720 (!) 160/84 - 78 - - -   12/12/18 1500 (!) 148/80 - 76 - - -   12/12/18 1307 - - - - 97 % -     Gen: No distress, pleasant. Resting in bed  HEENT: Normocephalic, atraumatic. CV: Regular rate and rhythm. No MRG  Resp: No respiratory distress. CTAB. Decreased inspiratory volume. O2 per NC. Abd: Soft, nontender nondistended  GI: WV with appropriate seal and minimal drainage. Ext: No edema. Chronic BLE atrophy  Neuro: Alert, oriented, appropriately interactive. Laboratory data: Available via EMR. 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  Per reports, he was recently adjusted on his home diuretic medications. Mu Sy was noted to have a CHF exacerbation and has been diuresing well.  Over the past 2 years, he has had difficulty transferring due to a left rotator cuff injury and has remained relatively bedbound and Delmus Feather lift for transfers. Charo Martinezffer to his decline, he was transferring independently, driving, and actively volunteering.   Objective     Bed to Chair: Dependent/Total (Mod A 1 and Min A 1)     OT  PT Equipment Recommendations  Equipment Needed: Yes  Other: Would benefit from repairs to manual w/c brakes, cushion cover     Assessment ulcer of right heel associated with type 2 diabetes mellitus, with fat layer exposed ([address]    Wound, open, scrotum or testes    Skin ulcer of sacrum with fat layer exposed ([address]    Mucus plugging of bronchi    Pressure ulcer of right leg, stage III ([facility])    CKD (chronic kidney disease) stage 3, GFR 30-59 ml/min ([facility])    Acute kidney injury superimposed on chronic kidney disease ([address]    History of myocardial infarction    History of DVT (deep vein thrombosis)    Dysphagia    S/P percutaneous endoscopic gastrostomy (PEG) tube placement ([facility])    Acute on chronic diastolic CHF (congestive heart failure), NYHA class 4 ([facility])    Congestive heart failure ([address]    Debility       Plan:   Debility: 2/2 CHF exacerbation. PT/OT     T7 paraplegia: Pressure relief, bladder care for SPT, digital stimulation at night for bowels. - No WC in last 5 years. Pt would be appropriate for new custom chair but need to further delineate manual vs power chair      CHF exacerbation: Diuresis per nephro.  ASA, Coreg, Lasix 20 BID     Decubitus ulcers: Wound care following.  Wound VAC to sacrum.  Pressure relief.     CHEMO on CKD: Nephro following       HTN: Coreg, hydralazine     DM: SSI, restarted lantus and increased to 20 ([email or web address])     Neuropathy: Gabapentin     HLD: Pravastatin     Depression: Celexa     Bowels: Per protocol  Bladder: Per protocol   Sleep: Trazodone provided prn. Pain: Tramadol  DVT PPx: None indicated    Team conference was held today on the patient and discussed directly with the patient utilizing their entire treatment team. Please see separate team note for details. Total treatment time for today's care >35 min. Mane Deal MD 12/13/2018, 9:01 AM    * This document was created using dictation software. While all precautions were taken to ensure accuracy, errors may have occurred. Please disregard any typographical errors.

## 2018-12-13 NOTE — PROGRESS NOTES
has remained relatively bedbound and Irena Quiet lift for transfers. Brayden Grieve to his decline, he was transferring independently, driving, and actively volunteering. Subjective   General  Chart Reviewed: Yes  Additional Pertinent Hx: PMH:  Aortic dissection yielding T7 SCI in '82 d/t spinal cord infarction, Wound vac for sacral wound,CHF, angioplasty with stent placement, DVT, L RTC tear approx 2 years ago  Response To Previous Treatment: Patient with no complaints from previous session. Family / Caregiver Present: No  Subjective  Subjective: Pt reports his muscles are a little sore \"muscles I haven't used for awhile\". Denies need for pain medication. General Comment  Comments: Pt in bed upon arrival.       Orientation  Orientation  Overall Orientation Status: Within Functional Limits      Objective   Bed mobility  Rolling to Left: Contact guard assistance  Supine to Sit: Contact guard assistance  Comment: Performed on hospital bed with bed rails (pt uses at home/baseline)     Transfers  Bed to Chair: Dependent/Total (Min A 2)  Lateral Transfers: Dependent/Total  Wheelchair Activities  Left Brakes Level of Assistance: Supervision  Right Brakes Level of Assistance: Supervision  Propulsion 1  Propulsion: Manual  Level: Level Tile  Method: RUE;LUE  Level of Assistance: Supervision  Description/ Details: Progressing with problem solving to propel w/c to destination such as to position for transfers, back up and line up at stationary bike  Distance: 270'     1st session: Bed moblity. Sat EOB with min A for tooth brushing and face washing. slide board transfer and w/c mobility as above. Performed seated stepper - UEs only x 6 minutes maintaining 93% oxygen. 2nd session: Pt up in w/c with Oxygen on 1 liter with SpO2 = 95%. Performed slide board transfer onto a mat with min A 2, SpO2 to 87%; increased to 2 liters.   Performed 4 then 2 push ups with blocks and min A 2 for posture with minimal clearance and rest Functional Mobility Training, Transfer Training, Endurance Training, Wheelchair Mobility Training, Safety Education & Training, Patient/Caregiver Education & Training, Equipment Evaluation, Education, & procurement, Home Exercise Program, Neuromuscular Re-education, ROM  Safety Devices  Type of devices: Call light within reach, Chair alarm in place  Restraints  Initially in place: No     Therapy Time   Individual Concurrent Group Co-treatment   Time In       0930   Time Out       1015   Minutes       45        Second Session Therapy Time:   Individual Concurrent Group Co-treatment   Time In      1315   Time Out      1400   Minutes      45     Timed Code Treatment Minutes:  45, 45    Total Treatment Minutes:  [address] [facility],  C/NDT  Lizzy Ricci, PT

## 2018-12-14 LAB
ANION GAP SERPL CALCULATED.3IONS-SCNC: 8 MMOL/L (ref 3–16)
BUN BLDV-MCNC: 108 MG/DL (ref 7–20)
CALCIUM SERPL-MCNC: 8.7 MG/DL (ref 8.3–10.6)
CHLORIDE BLD-SCNC: 103 MMOL/L (ref 99–110)
CO2: 28 MMOL/L (ref 21–32)
CREAT SERPL-MCNC: 1.6 MG/DL (ref 0.8–1.3)
GFR AFRICAN AMERICAN: 53
GFR NON-AFRICAN AMERICAN: 44
GLUCOSE BLD-MCNC: 225 MG/DL (ref 70–99)
GLUCOSE BLD-MCNC: 228 MG/DL (ref 70–99)
GLUCOSE BLD-MCNC: 233 MG/DL (ref 70–99)
GLUCOSE BLD-MCNC: 273 MG/DL (ref 70–99)
GLUCOSE BLD-MCNC: 312 MG/DL (ref 70–99)
PERFORMED ON: ABNORMAL
POTASSIUM SERPL-SCNC: 4.5 MMOL/L (ref 3.5–5.1)
SODIUM BLD-SCNC: 139 MMOL/L (ref 136–145)

## 2018-12-14 PROCEDURE — 97530 THERAPEUTIC ACTIVITIES: CPT

## 2018-12-14 PROCEDURE — 94760 N-INVAS EAR/PLS OXIMETRY 1: CPT

## 2018-12-14 PROCEDURE — 6370000000 HC RX 637 (ALT 250 FOR IP): Performed by: PHYSICAL MEDICINE & REHABILITATION

## 2018-12-14 PROCEDURE — 94640 AIRWAY INHALATION TREATMENT: CPT

## 2018-12-14 PROCEDURE — 2700000000 HC OXYGEN THERAPY PER DAY

## 2018-12-14 PROCEDURE — 97110 THERAPEUTIC EXERCISES: CPT

## 2018-12-14 PROCEDURE — 80048 BASIC METABOLIC PNL TOTAL CA: CPT

## 2018-12-14 PROCEDURE — 1280000000 HC REHAB R&B

## 2018-12-14 PROCEDURE — 36415 COLL VENOUS BLD VENIPUNCTURE: CPT

## 2018-12-14 RX ADMIN — INSULIN LISPRO 12 UNITS: 100 INJECTION, SOLUTION INTRAVENOUS; SUBCUTANEOUS at 12:16

## 2018-12-14 RX ADMIN — IPRATROPIUM BROMIDE AND ALBUTEROL SULFATE 1 AMPULE: .5; 3 SOLUTION RESPIRATORY (INHALATION) at 04:55

## 2018-12-14 RX ADMIN — FINASTERIDE 5 MG: 5 TABLET, FILM COATED ORAL at 08:58

## 2018-12-14 RX ADMIN — IPRATROPIUM BROMIDE AND ALBUTEROL SULFATE 1 AMPULE: .5; 3 SOLUTION RESPIRATORY (INHALATION) at 14:13

## 2018-12-14 RX ADMIN — FLUTICASONE PROPIONATE 1 SPRAY: 50 SPRAY, METERED NASAL at 22:35

## 2018-12-14 RX ADMIN — INSULIN LISPRO 6 UNITS: 100 INJECTION, SOLUTION INTRAVENOUS; SUBCUTANEOUS at 17:38

## 2018-12-14 RX ADMIN — INSULIN LISPRO 4 UNITS: 100 INJECTION, SOLUTION INTRAVENOUS; SUBCUTANEOUS at 17:39

## 2018-12-14 RX ADMIN — CARVEDILOL 12.5 MG: 6.25 TABLET, FILM COATED ORAL at 08:58

## 2018-12-14 RX ADMIN — FERROUS SULFATE TAB 325 MG (65 MG ELEMENTAL FE) 325 MG: 325 (65 FE) TAB at 08:58

## 2018-12-14 RX ADMIN — CARVEDILOL 12.5 MG: 6.25 TABLET, FILM COATED ORAL at 17:41

## 2018-12-14 RX ADMIN — INSULIN LISPRO 5 UNITS: 100 INJECTION, SOLUTION INTRAVENOUS; SUBCUTANEOUS at 22:18

## 2018-12-14 RX ADMIN — HYDRALAZINE HYDROCHLORIDE 50 MG: 25 TABLET, FILM COATED ORAL at 14:41

## 2018-12-14 RX ADMIN — FUROSEMIDE 20 MG: 20 TABLET ORAL at 08:58

## 2018-12-14 RX ADMIN — GABAPENTIN 600 MG: 300 CAPSULE ORAL at 08:58

## 2018-12-14 RX ADMIN — IPRATROPIUM BROMIDE AND ALBUTEROL SULFATE 1 AMPULE: .5; 3 SOLUTION RESPIRATORY (INHALATION) at 19:28

## 2018-12-14 RX ADMIN — TRAZODONE HYDROCHLORIDE 50 MG: 50 TABLET ORAL at 22:16

## 2018-12-14 RX ADMIN — FUROSEMIDE 20 MG: 20 TABLET ORAL at 17:41

## 2018-12-14 RX ADMIN — CITALOPRAM HYDROBROMIDE 20 MG: 20 TABLET ORAL at 08:58

## 2018-12-14 RX ADMIN — ASPIRIN 81 MG CHEWABLE TABLET 81 MG: 81 TABLET CHEWABLE at 08:58

## 2018-12-14 RX ADMIN — GABAPENTIN 600 MG: 300 CAPSULE ORAL at 22:17

## 2018-12-14 RX ADMIN — VITAMIN E CAP 400 UNIT 400 UNITS: 400 CAP at 08:58

## 2018-12-14 RX ADMIN — HYDRALAZINE HYDROCHLORIDE 50 MG: 25 TABLET, FILM COATED ORAL at 22:17

## 2018-12-14 RX ADMIN — HYDRALAZINE HYDROCHLORIDE 50 MG: 25 TABLET, FILM COATED ORAL at 06:44

## 2018-12-14 RX ADMIN — PRAVASTATIN SODIUM 40 MG: 40 TABLET ORAL at 22:16

## 2018-12-14 NOTE — PROGRESS NOTES
remained relatively bedbound and Eudelia Horsfall lift for transfers. Angy Meehan to his decline, he was transferring independently, driving, and actively volunteering. Subjective   General  Chart Reviewed: Yes  Additional Pertinent Hx: PMH:  Aortic dissection yielding T7 SCI in '82 d/t spinal cord infarction, Wound vac for sacral wound,CHF, angioplasty with stent placement, DVT, L RTC tear approx 2 years ago  Response To Previous Treatment: Patient with no complaints from previous session. Family / Caregiver Present: No  Referring Practitioner: Allison  Subjective  Subjective: pt without complaints, agreeable to PT  General Comment  Comments: supine on bed on arrival     Orientation  Orientation  Overall Orientation Status: Within Functional Limits  Cognition      Objective   Bed mobility  Rolling to Left: Contact guard assistance  Rolling to Right: Minimal assistance  Supine to Sit: Moderate assistance  Sit to Supine: Dependent/Total (min x 1 + mod x 1)  Scooting: Moderate assistance  Transfers  Bed to Chair: Dependent/Total (varied between min x 2 and minx1/mod x 1)  Wheelchair Activities  Wheelchair Type: Standard  Wheelchair Cushion: Pressure Relieving  Pressure Relief Type: Push up;Lateral lean;Self Adjusts  Level of Assistance for pressure relief activities: Stand by assistance  Left Brakes Level of Assistance: Supervision  Right Brakes Level of Assistance: Supervision  Propulsion 1  Propulsion: Manual  Level: Level Tile  Method: RUE;LUE  Level of Assistance: Supervision  Description/ Details: Progressing with problem solving to propel w/c to destination, no cues required this date  Distance: 270' + 36 ft        1st session: pt completed bed mobility, transfers, and w/c mobility as stated above. Pt also completed 5 w/c push ups x 5 and UE seated stepper x 7 minutes. w/c mobility back to room. 2nd session: pt propelled w/c 40 ft to gym.  completed transfer to mat table via slide board mod x 1 + min x 1 with

## 2018-12-14 NOTE — PROGRESS NOTES
w/c brakes, cushion cover     Assessment        SLP  Current Diet : Regular  Current Liquid Diet : Thin  Diet Solids Recommendation: Regular  Liquid Consistency Recommendation: Thin    Body mass index is 24.01 kg/m².     Assessment:  Patient Active Problem List   Diagnosis    Arthritis    Diabetes type 2, uncontrolled ([address]    Essential hypertension    Paraplegia (HCC)    Hyperlipidemia    GERD (gastroesophageal reflux disease)    Loose stools    Renal insufficiency    Chronic anemia    Right hand pain    Renal stones    Colitis    Degenerative arthritis of finger    Hyperkalemia    Neck pain    Left shoulder pain    Arthritis of left acromioclavicular joint    Tendinitis of left rotator cuff    Complete tear of left rotator cuff    Pressure ulcer, stage 3 ([address]    Sacral wound    Heel ulcer ([address]    Decubitus ulcer of right heel, stage 3 ([address]    Atherosclerosis of native arteries of right leg with ulceration of other part of foot    Pressure ulcer of coccygeal region, stage 3 ([address]    Sacral decubitus ulcer, stage IV ([address]    Pressure ulcer, stage 2    Chronic left shoulder pain    Urinary tract infection with hematuria due to chronic urinary catheter    Chronic kidney disease    Chronic systolic heart failure (HCC)    Leukocytosis    CHEMO (acute kidney injury) ([address]    Acute cystitis with hematuria    Acute pulmonary edema (HCC)    Pulmonary nodule    Mediastinal lymphadenopathy    Abnormal CT scan    Coronary artery disease involving native coronary artery of native heart without angina pectoris    SOB (shortness of breath)    Aspiration into airway    Coronary artery disease due to lipid rich plaque    Pneumonia due to organism    Hyponatremia    Acute on chronic respiratory failure with hypoxia (HCC)    Chronic diastolic heart failure (HCC)    Iron deficiency anemia    Non-ischemic cardiomyopathy ([address]    Chest pain    Diaphragm paralysis    Chronic pulmonary aspiration    Neurogenic bladder    Diabetic ulcer of right heel associated with type 2 diabetes mellitus, with fat layer exposed ([address]    Wound, open, scrotum or testes    Skin ulcer of sacrum with fat layer exposed ([address]    Mucus plugging of bronchi    Pressure ulcer of right leg, stage III ([facility])    CKD (chronic kidney disease) stage 3, GFR 30-59 ml/min ([facility])    Acute kidney injury superimposed on chronic kidney disease ([address]    History of myocardial infarction    History of DVT (deep vein thrombosis)    Dysphagia    S/P percutaneous endoscopic gastrostomy (PEG) tube placement ([facility])    Acute on chronic diastolic CHF (congestive heart failure), NYHA class 4 ([facility])    [facility] failure [facility]    Debility       Plan:   Debility: 2/2 CHF exacerbation. PT/OT     T7 paraplegia: Pressure relief, bladder care for SPT, digital stimulation at night for bowels. No WC in last 5 years. Pt would be appropriate for new custom chair but need to further delineate manual vs power chair      CHF exacerbation: Diuresis per nephro.  ASA, Coreg, Lasix 20 BID - edema increased     Decubitus ulcers: Wound care following.  Wound VAC to sacrum.  Pressure relief.     CHEMO on CKD: Nephro following       HTN: Coreg, hydralazine     DM: SSI, restarted lantus and increased to 20->30 ([email or web address]), - start prandial 4     Neuropathy: Gabapentin     HLD: Pravastatin     Depression: Celexa     Bowels: Per protocol  Bladder: Per protocol   Sleep: Trazodone provided prn. Pain: Tramadol  DVT PPx: None indicated    Karl Prather MD 12/14/2018, 4:20 PM    * This document was created using dictation software. While all precautions were taken to ensure accuracy, errors may have occurred. Please disregard any typographical errors.

## 2018-12-15 LAB
GLUCOSE BLD-MCNC: 193 MG/DL (ref 70–99)
GLUCOSE BLD-MCNC: 243 MG/DL (ref 70–99)
GLUCOSE BLD-MCNC: 277 MG/DL (ref 70–99)
GLUCOSE BLD-MCNC: 286 MG/DL (ref 70–99)
PERFORMED ON: ABNORMAL

## 2018-12-15 PROCEDURE — 94760 N-INVAS EAR/PLS OXIMETRY 1: CPT

## 2018-12-15 PROCEDURE — 1280000000 HC REHAB R&B

## 2018-12-15 PROCEDURE — 94640 AIRWAY INHALATION TREATMENT: CPT

## 2018-12-15 PROCEDURE — 6370000000 HC RX 637 (ALT 250 FOR IP): Performed by: PHYSICAL MEDICINE & REHABILITATION

## 2018-12-15 PROCEDURE — 6370000000 HC RX 637 (ALT 250 FOR IP): Performed by: INTERNAL MEDICINE

## 2018-12-15 PROCEDURE — 2700000000 HC OXYGEN THERAPY PER DAY

## 2018-12-15 RX ORDER — FUROSEMIDE 40 MG/1
40 TABLET ORAL 2 TIMES DAILY
Status: DISCONTINUED | OUTPATIENT
Start: 2018-12-15 | End: 2018-12-19

## 2018-12-15 RX ADMIN — FUROSEMIDE 20 MG: 20 TABLET ORAL at 08:19

## 2018-12-15 RX ADMIN — CARVEDILOL 12.5 MG: 6.25 TABLET, FILM COATED ORAL at 08:19

## 2018-12-15 RX ADMIN — FERROUS SULFATE TAB 325 MG (65 MG ELEMENTAL FE) 325 MG: 325 (65 FE) TAB at 08:19

## 2018-12-15 RX ADMIN — GABAPENTIN 600 MG: 300 CAPSULE ORAL at 09:14

## 2018-12-15 RX ADMIN — INSULIN LISPRO 9 UNITS: 100 INJECTION, SOLUTION INTRAVENOUS; SUBCUTANEOUS at 17:17

## 2018-12-15 RX ADMIN — INSULIN LISPRO 4 UNITS: 100 INJECTION, SOLUTION INTRAVENOUS; SUBCUTANEOUS at 09:13

## 2018-12-15 RX ADMIN — FUROSEMIDE 40 MG: 40 TABLET ORAL at 17:16

## 2018-12-15 RX ADMIN — INSULIN LISPRO 4 UNITS: 100 INJECTION, SOLUTION INTRAVENOUS; SUBCUTANEOUS at 12:13

## 2018-12-15 RX ADMIN — IPRATROPIUM BROMIDE AND ALBUTEROL SULFATE 1 AMPULE: .5; 3 SOLUTION RESPIRATORY (INHALATION) at 19:37

## 2018-12-15 RX ADMIN — HYDRALAZINE HYDROCHLORIDE 50 MG: 25 TABLET, FILM COATED ORAL at 22:18

## 2018-12-15 RX ADMIN — IPRATROPIUM BROMIDE AND ALBUTEROL SULFATE 1 AMPULE: .5; 3 SOLUTION RESPIRATORY (INHALATION) at 07:41

## 2018-12-15 RX ADMIN — CITALOPRAM HYDROBROMIDE 20 MG: 20 TABLET ORAL at 09:14

## 2018-12-15 RX ADMIN — VITAMIN E CAP 400 UNIT 400 UNITS: 400 CAP at 09:14

## 2018-12-15 RX ADMIN — FLUTICASONE PROPIONATE 1 SPRAY: 50 SPRAY, METERED NASAL at 22:20

## 2018-12-15 RX ADMIN — TRAZODONE HYDROCHLORIDE 50 MG: 50 TABLET ORAL at 22:17

## 2018-12-15 RX ADMIN — HYDRALAZINE HYDROCHLORIDE 50 MG: 25 TABLET, FILM COATED ORAL at 13:58

## 2018-12-15 RX ADMIN — HYDRALAZINE HYDROCHLORIDE 50 MG: 25 TABLET, FILM COATED ORAL at 06:55

## 2018-12-15 RX ADMIN — FINASTERIDE 5 MG: 5 TABLET, FILM COATED ORAL at 09:14

## 2018-12-15 RX ADMIN — PRAVASTATIN SODIUM 40 MG: 40 TABLET ORAL at 22:17

## 2018-12-15 RX ADMIN — GABAPENTIN 600 MG: 300 CAPSULE ORAL at 22:18

## 2018-12-15 RX ADMIN — INSULIN LISPRO 6 UNITS: 100 INJECTION, SOLUTION INTRAVENOUS; SUBCUTANEOUS at 12:10

## 2018-12-15 RX ADMIN — CARVEDILOL 12.5 MG: 6.25 TABLET, FILM COATED ORAL at 17:16

## 2018-12-15 RX ADMIN — IPRATROPIUM BROMIDE AND ALBUTEROL SULFATE 1 AMPULE: .5; 3 SOLUTION RESPIRATORY (INHALATION) at 15:00

## 2018-12-15 RX ADMIN — ASPIRIN 81 MG CHEWABLE TABLET 81 MG: 81 TABLET CHEWABLE at 09:14

## 2018-12-15 RX ADMIN — INSULIN LISPRO 3 UNITS: 100 INJECTION, SOLUTION INTRAVENOUS; SUBCUTANEOUS at 08:20

## 2018-12-15 RX ADMIN — INSULIN LISPRO 5 UNITS: 100 INJECTION, SOLUTION INTRAVENOUS; SUBCUTANEOUS at 22:23

## 2018-12-15 RX ADMIN — INSULIN LISPRO 4 UNITS: 100 INJECTION, SOLUTION INTRAVENOUS; SUBCUTANEOUS at 17:23

## 2018-12-15 ASSESSMENT — PAIN SCALES - GENERAL: PAINLEVEL_OUTOF10: 0

## 2018-12-16 LAB
ANION GAP SERPL CALCULATED.3IONS-SCNC: 9 MMOL/L (ref 3–16)
BUN BLDV-MCNC: 106 MG/DL (ref 7–20)
CALCIUM SERPL-MCNC: 8.6 MG/DL (ref 8.3–10.6)
CHLORIDE BLD-SCNC: 100 MMOL/L (ref 99–110)
CO2: 27 MMOL/L (ref 21–32)
CREAT SERPL-MCNC: 1.6 MG/DL (ref 0.8–1.3)
GFR AFRICAN AMERICAN: 53
GFR NON-AFRICAN AMERICAN: 44
GLUCOSE BLD-MCNC: 195 MG/DL (ref 70–99)
GLUCOSE BLD-MCNC: 223 MG/DL (ref 70–99)
GLUCOSE BLD-MCNC: 235 MG/DL (ref 70–99)
GLUCOSE BLD-MCNC: 240 MG/DL (ref 70–99)
GLUCOSE BLD-MCNC: 265 MG/DL (ref 70–99)
PERFORMED ON: ABNORMAL
POTASSIUM SERPL-SCNC: 4.2 MMOL/L (ref 3.5–5.1)
SODIUM BLD-SCNC: 136 MMOL/L (ref 136–145)

## 2018-12-16 PROCEDURE — 6370000000 HC RX 637 (ALT 250 FOR IP): Performed by: PHYSICAL MEDICINE & REHABILITATION

## 2018-12-16 PROCEDURE — 94760 N-INVAS EAR/PLS OXIMETRY 1: CPT

## 2018-12-16 PROCEDURE — 80048 BASIC METABOLIC PNL TOTAL CA: CPT

## 2018-12-16 PROCEDURE — 94640 AIRWAY INHALATION TREATMENT: CPT

## 2018-12-16 PROCEDURE — 36415 COLL VENOUS BLD VENIPUNCTURE: CPT

## 2018-12-16 PROCEDURE — 1280000000 HC REHAB R&B

## 2018-12-16 PROCEDURE — 2700000000 HC OXYGEN THERAPY PER DAY

## 2018-12-16 PROCEDURE — 6370000000 HC RX 637 (ALT 250 FOR IP): Performed by: INTERNAL MEDICINE

## 2018-12-16 RX ADMIN — HYDRALAZINE HYDROCHLORIDE 50 MG: 25 TABLET, FILM COATED ORAL at 13:59

## 2018-12-16 RX ADMIN — CITALOPRAM HYDROBROMIDE 20 MG: 20 TABLET ORAL at 09:24

## 2018-12-16 RX ADMIN — TRAZODONE HYDROCHLORIDE 50 MG: 50 TABLET ORAL at 22:51

## 2018-12-16 RX ADMIN — INSULIN LISPRO 4 UNITS: 100 INJECTION, SOLUTION INTRAVENOUS; SUBCUTANEOUS at 17:25

## 2018-12-16 RX ADMIN — INSULIN LISPRO 6 UNITS: 100 INJECTION, SOLUTION INTRAVENOUS; SUBCUTANEOUS at 12:49

## 2018-12-16 RX ADMIN — INSULIN LISPRO 6 UNITS: 100 INJECTION, SOLUTION INTRAVENOUS; SUBCUTANEOUS at 17:21

## 2018-12-16 RX ADMIN — GABAPENTIN 600 MG: 300 CAPSULE ORAL at 22:51

## 2018-12-16 RX ADMIN — FUROSEMIDE 40 MG: 40 TABLET ORAL at 09:23

## 2018-12-16 RX ADMIN — INSULIN LISPRO 3 UNITS: 100 INJECTION, SOLUTION INTRAVENOUS; SUBCUTANEOUS at 09:22

## 2018-12-16 RX ADMIN — ASPIRIN 81 MG CHEWABLE TABLET 81 MG: 81 TABLET CHEWABLE at 09:23

## 2018-12-16 RX ADMIN — CARVEDILOL 12.5 MG: 6.25 TABLET, FILM COATED ORAL at 17:21

## 2018-12-16 RX ADMIN — VITAMIN E CAP 400 UNIT 400 UNITS: 400 CAP at 09:24

## 2018-12-16 RX ADMIN — CARVEDILOL 12.5 MG: 6.25 TABLET, FILM COATED ORAL at 09:23

## 2018-12-16 RX ADMIN — FUROSEMIDE 40 MG: 40 TABLET ORAL at 17:20

## 2018-12-16 RX ADMIN — FERROUS SULFATE TAB 325 MG (65 MG ELEMENTAL FE) 325 MG: 325 (65 FE) TAB at 09:23

## 2018-12-16 RX ADMIN — IPRATROPIUM BROMIDE AND ALBUTEROL SULFATE 1 AMPULE: .5; 3 SOLUTION RESPIRATORY (INHALATION) at 14:32

## 2018-12-16 RX ADMIN — HYDRALAZINE HYDROCHLORIDE 50 MG: 25 TABLET, FILM COATED ORAL at 22:51

## 2018-12-16 RX ADMIN — FLUTICASONE PROPIONATE 1 SPRAY: 50 SPRAY, METERED NASAL at 22:54

## 2018-12-16 RX ADMIN — HYDRALAZINE HYDROCHLORIDE 50 MG: 25 TABLET, FILM COATED ORAL at 06:27

## 2018-12-16 RX ADMIN — INSULIN LISPRO 3 UNITS: 100 INJECTION, SOLUTION INTRAVENOUS; SUBCUTANEOUS at 22:59

## 2018-12-16 RX ADMIN — INSULIN LISPRO 4 UNITS: 100 INJECTION, SOLUTION INTRAVENOUS; SUBCUTANEOUS at 09:25

## 2018-12-16 RX ADMIN — PRAVASTATIN SODIUM 40 MG: 40 TABLET ORAL at 22:51

## 2018-12-16 RX ADMIN — GABAPENTIN 600 MG: 300 CAPSULE ORAL at 09:24

## 2018-12-16 RX ADMIN — IPRATROPIUM BROMIDE AND ALBUTEROL SULFATE 1 AMPULE: .5; 3 SOLUTION RESPIRATORY (INHALATION) at 09:00

## 2018-12-16 RX ADMIN — IPRATROPIUM BROMIDE AND ALBUTEROL SULFATE 1 AMPULE: .5; 3 SOLUTION RESPIRATORY (INHALATION) at 21:36

## 2018-12-16 RX ADMIN — FINASTERIDE 5 MG: 5 TABLET, FILM COATED ORAL at 09:23

## 2018-12-16 RX ADMIN — INSULIN LISPRO 4 UNITS: 100 INJECTION, SOLUTION INTRAVENOUS; SUBCUTANEOUS at 12:48

## 2018-12-16 ASSESSMENT — PAIN SCALES - GENERAL: PAINLEVEL_OUTOF10: 0

## 2018-12-17 LAB
GLUCOSE BLD-MCNC: 141 MG/DL (ref 70–99)
GLUCOSE BLD-MCNC: 158 MG/DL (ref 70–99)
GLUCOSE BLD-MCNC: 229 MG/DL (ref 70–99)
GLUCOSE BLD-MCNC: 233 MG/DL (ref 70–99)
PERFORMED ON: ABNORMAL
PHOSPHORUS: 3.9 MG/DL (ref 2.5–4.9)

## 2018-12-17 PROCEDURE — 97535 SELF CARE MNGMENT TRAINING: CPT

## 2018-12-17 PROCEDURE — 1280000000 HC REHAB R&B

## 2018-12-17 PROCEDURE — 94760 N-INVAS EAR/PLS OXIMETRY 1: CPT

## 2018-12-17 PROCEDURE — 36415 COLL VENOUS BLD VENIPUNCTURE: CPT

## 2018-12-17 PROCEDURE — 84100 ASSAY OF PHOSPHORUS: CPT

## 2018-12-17 PROCEDURE — 97530 THERAPEUTIC ACTIVITIES: CPT

## 2018-12-17 PROCEDURE — 6370000000 HC RX 637 (ALT 250 FOR IP): Performed by: INTERNAL MEDICINE

## 2018-12-17 PROCEDURE — 2700000000 HC OXYGEN THERAPY PER DAY

## 2018-12-17 PROCEDURE — 99222 1ST HOSP IP/OBS MODERATE 55: CPT | Performed by: INTERNAL MEDICINE

## 2018-12-17 PROCEDURE — 6370000000 HC RX 637 (ALT 250 FOR IP): Performed by: PHYSICAL MEDICINE & REHABILITATION

## 2018-12-17 PROCEDURE — 94640 AIRWAY INHALATION TREATMENT: CPT

## 2018-12-17 RX ORDER — CARVEDILOL 25 MG/1
25 TABLET ORAL 2 TIMES DAILY WITH MEALS
Status: DISCONTINUED | OUTPATIENT
Start: 2018-12-17 | End: 2018-12-21 | Stop reason: HOSPADM

## 2018-12-17 RX ADMIN — IPRATROPIUM BROMIDE AND ALBUTEROL SULFATE 1 AMPULE: .5; 3 SOLUTION RESPIRATORY (INHALATION) at 14:31

## 2018-12-17 RX ADMIN — HYDRALAZINE HYDROCHLORIDE 50 MG: 25 TABLET, FILM COATED ORAL at 20:33

## 2018-12-17 RX ADMIN — PRAVASTATIN SODIUM 40 MG: 40 TABLET ORAL at 20:33

## 2018-12-17 RX ADMIN — INSULIN LISPRO 3 UNITS: 100 INJECTION, SOLUTION INTRAVENOUS; SUBCUTANEOUS at 17:13

## 2018-12-17 RX ADMIN — INSULIN LISPRO 3 UNITS: 100 INJECTION, SOLUTION INTRAVENOUS; SUBCUTANEOUS at 20:36

## 2018-12-17 RX ADMIN — ASPIRIN 81 MG CHEWABLE TABLET 81 MG: 81 TABLET CHEWABLE at 09:25

## 2018-12-17 RX ADMIN — GABAPENTIN 600 MG: 300 CAPSULE ORAL at 09:24

## 2018-12-17 RX ADMIN — FLUTICASONE PROPIONATE 1 SPRAY: 50 SPRAY, METERED NASAL at 20:32

## 2018-12-17 RX ADMIN — FUROSEMIDE 40 MG: 40 TABLET ORAL at 09:24

## 2018-12-17 RX ADMIN — CARVEDILOL 12.5 MG: 6.25 TABLET, FILM COATED ORAL at 09:24

## 2018-12-17 RX ADMIN — IPRATROPIUM BROMIDE AND ALBUTEROL SULFATE 1 AMPULE: .5; 3 SOLUTION RESPIRATORY (INHALATION) at 21:24

## 2018-12-17 RX ADMIN — IPRATROPIUM BROMIDE AND ALBUTEROL SULFATE 1 AMPULE: .5; 3 SOLUTION RESPIRATORY (INHALATION) at 05:51

## 2018-12-17 RX ADMIN — CARVEDILOL 25 MG: 25 TABLET, FILM COATED ORAL at 17:14

## 2018-12-17 RX ADMIN — TRAZODONE HYDROCHLORIDE 50 MG: 50 TABLET ORAL at 22:25

## 2018-12-17 RX ADMIN — INSULIN LISPRO 3 UNITS: 100 INJECTION, SOLUTION INTRAVENOUS; SUBCUTANEOUS at 03:00

## 2018-12-17 RX ADMIN — GABAPENTIN 600 MG: 300 CAPSULE ORAL at 20:32

## 2018-12-17 RX ADMIN — ACETAMINOPHEN 650 MG: 325 TABLET, FILM COATED ORAL at 21:38

## 2018-12-17 RX ADMIN — HYDRALAZINE HYDROCHLORIDE 50 MG: 25 TABLET, FILM COATED ORAL at 14:29

## 2018-12-17 RX ADMIN — INSULIN LISPRO 6 UNITS: 100 INJECTION, SOLUTION INTRAVENOUS; SUBCUTANEOUS at 12:36

## 2018-12-17 RX ADMIN — VITAMIN E CAP 400 UNIT 400 UNITS: 400 CAP at 09:24

## 2018-12-17 RX ADMIN — HYDRALAZINE HYDROCHLORIDE 50 MG: 25 TABLET, FILM COATED ORAL at 06:31

## 2018-12-17 RX ADMIN — FINASTERIDE 5 MG: 5 TABLET, FILM COATED ORAL at 09:25

## 2018-12-17 RX ADMIN — FERROUS SULFATE TAB 325 MG (65 MG ELEMENTAL FE) 325 MG: 325 (65 FE) TAB at 09:25

## 2018-12-17 RX ADMIN — FUROSEMIDE 40 MG: 40 TABLET ORAL at 17:14

## 2018-12-17 RX ADMIN — CITALOPRAM HYDROBROMIDE 20 MG: 20 TABLET ORAL at 09:25

## 2018-12-17 ASSESSMENT — PAIN SCALES - GENERAL: PAINLEVEL_OUTOF10: 4
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
## 2018-12-17 NOTE — PROGRESS NOTES
3: Examine the possibility of power w/c (d/t improved seating needed, CHF, RTC tear, aging)  Long term goal 4: Car transfer with Min A  Long term goal 5: Pressure reliefs with Jose Carlos  Patient Goals   Patient goals :      Plan    Plan  Times per week: Pt. to be seen 90 minutes, 5 out of the 7 days/week  Times per day: Daily  Current Treatment Recommendations: Strengthening, Balance Training, Functional Mobility Training, Transfer Training, Endurance Training, Wheelchair Mobility Training, Safety Education & Training, Patient/Caregiver Education & Training, Equipment Evaluation, Education, & procurement, Home Exercise Program, Neuromuscular Re-education, ROM  Safety Devices  Type of devices: Call light within reach, Chair alarm in place  Restraints  Initially in place: No     Therapy Time   Individual Concurrent Group Co-treatment   Time In       830   Time Out       915   Minutes       45   Timed Code Treatment Minutes: [address] [facility] Time:   [address] [facility]   Time In      1330   Time Out      1415   Minutes      45     Timed Code Treatment Minutes:  45    Total Treatment Minutes:  y [address], PT     Thanks, Mercy Griffith, PT, DPT [id number]

## 2018-12-17 NOTE — PROGRESS NOTES
Apolonio Brunner  12/17/2018  [id number]    Chief Complaint: Debility    Subjective:   Mild increase in O2 over weekend. Feels stable overall. No current complaints. Labs reviewed. Suspect inaccurate weights. ROS: No CP, SOB, dyspnea    Objective:  Patient Vitals for the past 24 hrs:   BP Temp Temp src Pulse Resp SpO2 Weight   12/17/18 0730 (!) 149/77 97.5 °F (36.4 °C) Oral 83 16 93 % -   12/17/18 0631 (!) 145/68 - - - - - -   12/17/18 0613 - - - - - - 209 lb (94.8 kg)   12/17/18 0551 - - - - 18 92 % -   12/16/18 2245 (!) 161/71 98.1 °F (36.7 °C) Oral 91 18 91 % -   12/16/18 2136 - - - - - 91 % -   12/16/18 1432 - - - - - 91 % -   12/16/18 1345 (!) 143/71 - - - 16 92 % -   12/16/18 0900 - - - - 18 90 % -     Gen: No distress, pleasant. Resting in bed  HEENT: Normocephalic, atraumatic. CV: Regular rate and rhythm. No MRG  Resp: No respiratory distress. CTAB. Decreased inspiratory volume. O2 per NC. Abd: Soft, nontender nondistended  GI: WV with appropriate seal and minimal drainage. Ext: 1+ BLE edema. Chronic BLE atrophy  Neuro: Alert, oriented, appropriately interactive. Laboratory data: Available via EMR. 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  Per reports, he was recently adjusted on his home diuretic medications. Karen Modi was noted to have a CHF exacerbation and has been diuresing well.  Over the past 2 years, he has had difficulty transferring due to a left rotator cuff injury and has remained relatively bedbound and Efren Broody lift for transfers. Teryl [place] to his decline, he was transferring independently, driving, and actively volunteering.   Objective     Bed to Chair: Dependent/Total (varied between min x 2 and minx1/mod x 1)     OT  PT Equipment Recommendations  Equipment Needed: Yes  Other: Would benefit from repairs to manual w/c brakes, cushion cover     Assessment        SLP  Current Diet : Regular  Current Liquid Diet : Thin  Diet Solids Recommendation: Regular  Liquid Consistency Recommendation: Thin    Body mass index is 28.35 kg/m².     Assessment:  Patient Active Problem List   Diagnosis    Arthritis    Diabetes type 2, uncontrolled ([address]    Essential hypertension    Paraplegia (HCC)    Hyperlipidemia    GERD (gastroesophageal reflux disease)    Loose stools    Renal insufficiency    Chronic anemia    Right hand pain    Renal stones    Colitis    Degenerative arthritis of finger    Hyperkalemia    Neck pain    Left shoulder pain    Arthritis of left acromioclavicular joint    Tendinitis of left rotator cuff    Complete tear of left rotator cuff    Pressure ulcer, stage 3 ([address]    Sacral wound    Heel ulcer ([address]    Decubitus ulcer of right heel, stage 3 ([address]    Atherosclerosis of native arteries of right leg with ulceration of other part of foot    Pressure ulcer of coccygeal region, stage 3 ([address]    Sacral decubitus ulcer, stage IV ([address]    Pressure ulcer, stage 2    Chronic left shoulder pain    Urinary tract infection with hematuria due to chronic urinary catheter    Chronic kidney disease    Chronic systolic heart failure (HCC)    Leukocytosis    CHEMO (acute kidney injury) ([address]    Acute cystitis with hematuria    Acute pulmonary edema (HCC)    Pulmonary nodule    Mediastinal lymphadenopathy    Abnormal CT scan    Coronary artery disease involving native coronary artery of native heart without angina pectoris    SOB (shortness of breath)    Aspiration into airway    Coronary artery disease due to lipid rich plaque    Pneumonia due to organism    Hyponatremia    Acute on chronic respiratory failure with hypoxia (HCC)    Chronic diastolic heart failure (HCC)    Iron deficiency

## 2018-12-18 LAB
GLUCOSE BLD-MCNC: 124 MG/DL (ref 70–99)
GLUCOSE BLD-MCNC: 141 MG/DL (ref 70–99)
GLUCOSE BLD-MCNC: 179 MG/DL (ref 70–99)
GLUCOSE BLD-MCNC: 186 MG/DL (ref 70–99)
PERFORMED ON: ABNORMAL

## 2018-12-18 PROCEDURE — 6370000000 HC RX 637 (ALT 250 FOR IP): Performed by: PHYSICAL MEDICINE & REHABILITATION

## 2018-12-18 PROCEDURE — 94760 N-INVAS EAR/PLS OXIMETRY 1: CPT

## 2018-12-18 PROCEDURE — 99232 SBSQ HOSP IP/OBS MODERATE 35: CPT | Performed by: CLINICAL NURSE SPECIALIST

## 2018-12-18 PROCEDURE — 6370000000 HC RX 637 (ALT 250 FOR IP): Performed by: INTERNAL MEDICINE

## 2018-12-18 PROCEDURE — 97530 THERAPEUTIC ACTIVITIES: CPT

## 2018-12-18 PROCEDURE — 97535 SELF CARE MNGMENT TRAINING: CPT

## 2018-12-18 PROCEDURE — 1280000000 HC REHAB R&B

## 2018-12-18 PROCEDURE — 2700000000 HC OXYGEN THERAPY PER DAY

## 2018-12-18 PROCEDURE — 94640 AIRWAY INHALATION TREATMENT: CPT

## 2018-12-18 RX ORDER — IPRATROPIUM BROMIDE AND ALBUTEROL SULFATE 2.5; .5 MG/3ML; MG/3ML
1 SOLUTION RESPIRATORY (INHALATION) 3 TIMES DAILY
Status: DISCONTINUED | OUTPATIENT
Start: 2018-12-18 | End: 2018-12-21 | Stop reason: HOSPADM

## 2018-12-18 RX ORDER — IPRATROPIUM BROMIDE AND ALBUTEROL SULFATE 2.5; .5 MG/3ML; MG/3ML
1 SOLUTION RESPIRATORY (INHALATION) EVERY 4 HOURS PRN
Status: DISCONTINUED | OUTPATIENT
Start: 2018-12-18 | End: 2018-12-21 | Stop reason: HOSPADM

## 2018-12-18 RX ADMIN — HYDRALAZINE HYDROCHLORIDE 50 MG: 25 TABLET, FILM COATED ORAL at 21:07

## 2018-12-18 RX ADMIN — ACETAMINOPHEN 650 MG: 325 TABLET, FILM COATED ORAL at 21:06

## 2018-12-18 RX ADMIN — VITAMIN E CAP 400 UNIT 400 UNITS: 400 CAP at 08:59

## 2018-12-18 RX ADMIN — INSULIN LISPRO 3 UNITS: 100 INJECTION, SOLUTION INTRAVENOUS; SUBCUTANEOUS at 08:58

## 2018-12-18 RX ADMIN — GABAPENTIN 600 MG: 300 CAPSULE ORAL at 08:59

## 2018-12-18 RX ADMIN — FUROSEMIDE 40 MG: 40 TABLET ORAL at 08:59

## 2018-12-18 RX ADMIN — IPRATROPIUM BROMIDE AND ALBUTEROL SULFATE 1 AMPULE: .5; 3 SOLUTION RESPIRATORY (INHALATION) at 06:10

## 2018-12-18 RX ADMIN — CARVEDILOL 25 MG: 25 TABLET, FILM COATED ORAL at 09:00

## 2018-12-18 RX ADMIN — ASPIRIN 81 MG CHEWABLE TABLET 81 MG: 81 TABLET CHEWABLE at 08:59

## 2018-12-18 RX ADMIN — GABAPENTIN 600 MG: 300 CAPSULE ORAL at 21:07

## 2018-12-18 RX ADMIN — CARVEDILOL 25 MG: 25 TABLET, FILM COATED ORAL at 16:53

## 2018-12-18 RX ADMIN — HYDRALAZINE HYDROCHLORIDE 50 MG: 25 TABLET, FILM COATED ORAL at 14:54

## 2018-12-18 RX ADMIN — HYDRALAZINE HYDROCHLORIDE 50 MG: 25 TABLET, FILM COATED ORAL at 07:04

## 2018-12-18 RX ADMIN — FERROUS SULFATE TAB 325 MG (65 MG ELEMENTAL FE) 325 MG: 325 (65 FE) TAB at 08:59

## 2018-12-18 RX ADMIN — IPRATROPIUM BROMIDE AND ALBUTEROL SULFATE 1 AMPULE: .5; 3 SOLUTION RESPIRATORY (INHALATION) at 22:39

## 2018-12-18 RX ADMIN — CITALOPRAM HYDROBROMIDE 20 MG: 20 TABLET ORAL at 08:59

## 2018-12-18 RX ADMIN — FINASTERIDE 5 MG: 5 TABLET, FILM COATED ORAL at 08:59

## 2018-12-18 RX ADMIN — FLUTICASONE PROPIONATE 1 SPRAY: 50 SPRAY, METERED NASAL at 21:37

## 2018-12-18 RX ADMIN — INSULIN LISPRO 3 UNITS: 100 INJECTION, SOLUTION INTRAVENOUS; SUBCUTANEOUS at 12:33

## 2018-12-18 RX ADMIN — PRAVASTATIN SODIUM 40 MG: 40 TABLET ORAL at 21:07

## 2018-12-18 RX ADMIN — INSULIN LISPRO 2 UNITS: 100 INJECTION, SOLUTION INTRAVENOUS; SUBCUTANEOUS at 21:08

## 2018-12-18 RX ADMIN — FUROSEMIDE 40 MG: 40 TABLET ORAL at 16:53

## 2018-12-18 RX ADMIN — TRAZODONE HYDROCHLORIDE 50 MG: 50 TABLET ORAL at 21:07

## 2018-12-18 ASSESSMENT — PAIN SCALES - GENERAL
PAINLEVEL_OUTOF10: 0
PAINLEVEL_OUTOF10: 0
PAINLEVEL_OUTOF10: 5

## 2018-12-18 ASSESSMENT — PAIN DESCRIPTION - PAIN TYPE: TYPE: ACUTE PAIN

## 2018-12-18 ASSESSMENT — PAIN DESCRIPTION - DESCRIPTORS: DESCRIPTORS: SORE

## 2018-12-18 ASSESSMENT — PAIN DESCRIPTION - FREQUENCY: FREQUENCY: INTERMITTENT

## 2018-12-18 ASSESSMENT — PAIN DESCRIPTION - ONSET: ONSET: ON-GOING

## 2018-12-18 ASSESSMENT — PAIN DESCRIPTION - PROGRESSION: CLINICAL_PROGRESSION: NOT CHANGED

## 2018-12-18 ASSESSMENT — PAIN DESCRIPTION - LOCATION: LOCATION: SHOULDER

## 2018-12-18 ASSESSMENT — PAIN DESCRIPTION - ORIENTATION: ORIENTATION: RIGHT

## 2018-12-18 NOTE — PROGRESS NOTES
Oral BID    ipratropium-albuterol  1 ampule Inhalation TID    insulin lispro  0-18 Units Subcutaneous TID WC    insulin lispro  0-9 Units Subcutaneous Nightly    fluticasone  1 spray Nasal Nightly    aspirin  81 mg Oral Daily    citalopram  20 mg Oral Daily    ferrous sulfate  325 mg Oral Daily with breakfast    finasteride  5 mg Oral Daily    gabapentin  600 mg Oral BID    hydrALAZINE  50 mg Oral 3 times per day    pravastatin  40 mg Oral Nightly    vitamin E  400 Units Oral Daily     Continuous Infusions:   dextrose       PRN Meds:.magnesium hydroxide, sodium chloride flush, dextrose, dextrose, glucagon (rDNA), glucose, acetaminophen, diphenhydrAMINE, hydrALAZINE, ipratropium-albuterol, nitroGLYCERIN, ondansetron, traZODone        Vitals:  BP (!) 115/54   Pulse 81   Temp 97.6 °F (36.4 °C) (Oral)   Resp 16   Ht 6' (1.829 m)   Wt 197 lb 4.8 oz (89.5 kg)   SpO2 97%   BMI 26.76 kg/m²   I/O last 3 completed shifts: In: 480 [P.O.:480]  Out: 1800 [Urine:1800]  No intake/output data recorded. Physical Exam:  Gen: NAD  HEENT: Sclera anicteric, MMM  Neck: Supple. No JVD  CV: RRR, S1/S2, No R/M/G  Pulm: CTAB/L  Abd: Soft, NT, ND, (+)BS  Ext: 1+ B/L pedal edema (improving)  : (+) Chronic suprapubic ball  Neuro: Awake/Alert, Answers questions appropriately. Paralyzed below T7  Skin: Warm.  No visible rashes appreciated      Labs:  CBC with Differential:    Lab Results   Component Value Date    WBC 6.6 12/10/2018    RBC 3.24 12/10/2018    HGB 9.8 12/10/2018    HCT 29.3 12/10/2018     12/10/2018    MCV 90.2 12/10/2018    MCH 30.1 12/10/2018    MCHC 33.3 12/10/2018    RDW 16.9 12/10/2018    NRBC CANCELED 10/22/2014    NRBC CANCELED 10/22/2014    SEGSPCT 76.1 10/22/2014    BANDSPCT 2 01/16/2018    BLASTSPCT CANCELED 10/22/2014    METASPCT 1 04/03/2017    LYMPHOPCT 20.3 12/10/2018    PROMYELOPCT CANCELED 10/22/2014    MONOPCT 8.7 12/10/2018    MYELOPCT 1 03/31/2017    EOSPCT 4.6 07/11/2011

## 2018-12-18 NOTE — PROGRESS NOTES
cardiomyopathy ([address]    Chest pain    Diaphragm paralysis    Chronic pulmonary aspiration    Neurogenic bladder    Diabetic ulcer of right heel associated with type 2 diabetes mellitus, with fat layer exposed ([address]    Wound, open, scrotum or testes    Skin ulcer of sacrum with fat layer exposed ([address]    Mucus plugging of bronchi    Pressure ulcer of right leg, stage III ([facility]    CKD (chronic kidney disease) stage 3, GFR 30-59 ml/min ([facility])    Acute kidney injury superimposed on chronic kidney disease ([facility])    History of myocardial infarction    History of DVT (deep vein thrombosis)    Dysphagia    S/P percutaneous endoscopic gastrostomy (PEG) tube placement ([facility])    Acute on chronic diastolic CHF (congestive heart failure), NYHA class 4 ([facility])    Congestive heart failure ([address]    Debility       Plan:   Debility: 2/2 CHF exacerbation. PT/OT     T7 paraplegia: Pressure relief, bladder care for SPT, digital stimulation at night for bowels. WC eval today     CHF exacerbation: Diuresis per nephro.  ASA, Coreg, Lasix 40 BID. Cards following per family request.     Decubitus ulcers: Wound care following.  Wound VAC to sacrum.  Pressure relief.     CHEMO on CKD: Nephro following       HTN: Coreg, hydralazine     DM: SSI, restarted lantus and increased to 35 ([email or web address]), Started prandial 7     Neuropathy: Gabapentin     HLD: Pravastatin     Depression: Celexa     Bowels: Per protocol  Bladder: Per protocol   Sleep: Trazodone provided prn. Pain: Tramadol  DVT PPx: None indicated    Lv Pennington MD 12/18/2018, 8:40 AM    * This document was created using dictation software. While all precautions were taken to ensure accuracy, errors may have occurred. Please disregard any typographical errors.
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
## 2018-12-18 NOTE — PROGRESS NOTES
well.  Over the past 2 years, he has had difficulty transferring due to a left rotator cuff injury and has remained relatively bedbound and Efren Broody lift for transfers. Valeryjamari Shahia to his decline, he was transferring independently, driving, and actively volunteering. Subjective   General  Chart Reviewed: Yes  Additional Pertinent Hx: PMH:  Aortic dissection yielding T7 SCI in '82 d/t spinal cord infarction, Wound vac for sacral wound,CHF, angioplasty with stent placement, DVT, L RTC tear approx 2 years ago  Response To Previous Treatment: Patient with no complaints from previous session. Family / Caregiver Present: No  Subjective  Subjective: Agreeable to Therapy; states shoulders feel \"muscle sore\"  General Comment  Comments: supine in bed upon arrival. On 2.5 liters. SpO2 = 97%       Orientation  Orientation  Overall Orientation Status: Within Functional Limits     Objective   Bed mobility  Rolling to Left: Supervision (with use of bed rail)  Rolling to Right: Supervision (with use of bed rail)  Supine to Sit: Dependent/Total (Mod A and Min A)     Transfers  Bed to Chair: Dependent/Total (Via slide board; Min A 2 out of bed; mod A 2 out of bed)  Ambulation  Ambulation?: No  WB Status: Paraplegic T 7  Stairs/Curb  Stairs?: No  Wheelchair Activities  Right Leg Rest Level of Assistance: Supervision  Left Brakes Level of Assistance: Supervision  Propulsion: Yes  Propulsion 1  Propulsion: Manual  Level: Level Tile  Method: RUE;LUE  Level of Assistance: Modified independent  Description/ Details: Slow propulsion with frequent rest breaks. Distance: 240' x 1      1st session: Rolling to assist with clothing management. Supine to sit as above. Slide board to w/c as above. Performed grooming at w/c level in bathroom without assist. W/c propulsion as above. Seated stepper UEs x 6 minutes; SpO2 = 96% on 3 liters. Biofreeze applied to Bilateral shoulders, upper traps, posterior cervical muscles.    2nd session:

## 2018-12-19 LAB
ANION GAP SERPL CALCULATED.3IONS-SCNC: 8 MMOL/L (ref 3–16)
BUN BLDV-MCNC: 109 MG/DL (ref 7–20)
CALCIUM SERPL-MCNC: 8.7 MG/DL (ref 8.3–10.6)
CHLORIDE BLD-SCNC: 101 MMOL/L (ref 99–110)
CO2: 32 MMOL/L (ref 21–32)
CREAT SERPL-MCNC: 1.7 MG/DL (ref 0.8–1.3)
GFR AFRICAN AMERICAN: 49
GFR NON-AFRICAN AMERICAN: 41
GLUCOSE BLD-MCNC: 122 MG/DL (ref 70–99)
GLUCOSE BLD-MCNC: 123 MG/DL (ref 70–99)
GLUCOSE BLD-MCNC: 136 MG/DL (ref 70–99)
GLUCOSE BLD-MCNC: 161 MG/DL (ref 70–99)
GLUCOSE BLD-MCNC: 208 MG/DL (ref 70–99)
HCT VFR BLD CALC: 30.5 % (ref 40.5–52.5)
HEMOGLOBIN: 9.9 G/DL (ref 13.5–17.5)
MCH RBC QN AUTO: 29.7 PG (ref 26–34)
MCHC RBC AUTO-ENTMCNC: 32.5 G/DL (ref 31–36)
MCV RBC AUTO: 91.5 FL (ref 80–100)
PDW BLD-RTO: 15.9 % (ref 12.4–15.4)
PERFORMED ON: ABNORMAL
PLATELET # BLD: 156 K/UL (ref 135–450)
PMV BLD AUTO: 7.2 FL (ref 5–10.5)
POTASSIUM SERPL-SCNC: 4.6 MMOL/L (ref 3.5–5.1)
PRO-BNP: 2089 PG/ML (ref 0–124)
RBC # BLD: 3.33 M/UL (ref 4.2–5.9)
SODIUM BLD-SCNC: 141 MMOL/L (ref 136–145)
WBC # BLD: 6.4 K/UL (ref 4–11)

## 2018-12-19 PROCEDURE — 94640 AIRWAY INHALATION TREATMENT: CPT

## 2018-12-19 PROCEDURE — 83880 ASSAY OF NATRIURETIC PEPTIDE: CPT

## 2018-12-19 PROCEDURE — 97530 THERAPEUTIC ACTIVITIES: CPT

## 2018-12-19 PROCEDURE — 6370000000 HC RX 637 (ALT 250 FOR IP)

## 2018-12-19 PROCEDURE — 1280000000 HC REHAB R&B

## 2018-12-19 PROCEDURE — 6370000000 HC RX 637 (ALT 250 FOR IP): Performed by: PHYSICAL MEDICINE & REHABILITATION

## 2018-12-19 PROCEDURE — 94760 N-INVAS EAR/PLS OXIMETRY 1: CPT

## 2018-12-19 PROCEDURE — 85027 COMPLETE CBC AUTOMATED: CPT

## 2018-12-19 PROCEDURE — 99233 SBSQ HOSP IP/OBS HIGH 50: CPT | Performed by: INTERNAL MEDICINE

## 2018-12-19 PROCEDURE — 97535 SELF CARE MNGMENT TRAINING: CPT

## 2018-12-19 PROCEDURE — 6370000000 HC RX 637 (ALT 250 FOR IP): Performed by: INTERNAL MEDICINE

## 2018-12-19 PROCEDURE — 80048 BASIC METABOLIC PNL TOTAL CA: CPT

## 2018-12-19 PROCEDURE — 36415 COLL VENOUS BLD VENIPUNCTURE: CPT

## 2018-12-19 PROCEDURE — 6360000002 HC RX W HCPCS: Performed by: INTERNAL MEDICINE

## 2018-12-19 PROCEDURE — 97110 THERAPEUTIC EXERCISES: CPT

## 2018-12-19 RX ORDER — FUROSEMIDE 40 MG/1
40 TABLET ORAL DAILY
Status: DISCONTINUED | OUTPATIENT
Start: 2018-12-20 | End: 2018-12-21

## 2018-12-19 RX ORDER — IPRATROPIUM BROMIDE AND ALBUTEROL SULFATE 2.5; .5 MG/3ML; MG/3ML
SOLUTION RESPIRATORY (INHALATION)
Status: COMPLETED
Start: 2018-12-19 | End: 2018-12-19

## 2018-12-19 RX ADMIN — HYDRALAZINE HYDROCHLORIDE 50 MG: 25 TABLET, FILM COATED ORAL at 06:42

## 2018-12-19 RX ADMIN — GABAPENTIN 600 MG: 300 CAPSULE ORAL at 08:23

## 2018-12-19 RX ADMIN — FERROUS SULFATE TAB 325 MG (65 MG ELEMENTAL FE) 325 MG: 325 (65 FE) TAB at 08:23

## 2018-12-19 RX ADMIN — CARVEDILOL 25 MG: 25 TABLET, FILM COATED ORAL at 15:55

## 2018-12-19 RX ADMIN — ASPIRIN 81 MG CHEWABLE TABLET 81 MG: 81 TABLET CHEWABLE at 08:23

## 2018-12-19 RX ADMIN — DARBEPOETIN ALFA 60 MCG: 60 SOLUTION INTRAVENOUS; SUBCUTANEOUS at 14:08

## 2018-12-19 RX ADMIN — IPRATROPIUM BROMIDE AND ALBUTEROL SULFATE 1 AMPULE: .5; 3 SOLUTION RESPIRATORY (INHALATION) at 20:08

## 2018-12-19 RX ADMIN — GABAPENTIN 600 MG: 300 CAPSULE ORAL at 21:21

## 2018-12-19 RX ADMIN — VITAMIN E CAP 400 UNIT 400 UNITS: 400 CAP at 08:23

## 2018-12-19 RX ADMIN — ACETAMINOPHEN 650 MG: 325 TABLET, FILM COATED ORAL at 06:42

## 2018-12-19 RX ADMIN — FUROSEMIDE 40 MG: 40 TABLET ORAL at 08:24

## 2018-12-19 RX ADMIN — HYDRALAZINE HYDROCHLORIDE 50 MG: 25 TABLET, FILM COATED ORAL at 21:21

## 2018-12-19 RX ADMIN — CARVEDILOL 25 MG: 25 TABLET, FILM COATED ORAL at 08:23

## 2018-12-19 RX ADMIN — FLUTICASONE PROPIONATE 1 SPRAY: 50 SPRAY, METERED NASAL at 21:21

## 2018-12-19 RX ADMIN — INSULIN LISPRO 3 UNITS: 100 INJECTION, SOLUTION INTRAVENOUS; SUBCUTANEOUS at 21:22

## 2018-12-19 RX ADMIN — IPRATROPIUM BROMIDE AND ALBUTEROL SULFATE 1 AMPULE: .5; 3 SOLUTION RESPIRATORY (INHALATION) at 13:33

## 2018-12-19 RX ADMIN — CITALOPRAM HYDROBROMIDE 20 MG: 20 TABLET ORAL at 08:24

## 2018-12-19 RX ADMIN — HYDRALAZINE HYDROCHLORIDE 50 MG: 25 TABLET, FILM COATED ORAL at 13:51

## 2018-12-19 RX ADMIN — TRAZODONE HYDROCHLORIDE 50 MG: 50 TABLET ORAL at 21:21

## 2018-12-19 RX ADMIN — FINASTERIDE 5 MG: 5 TABLET, FILM COATED ORAL at 08:23

## 2018-12-19 RX ADMIN — PRAVASTATIN SODIUM 40 MG: 40 TABLET ORAL at 21:21

## 2018-12-19 RX ADMIN — IPRATROPIUM BROMIDE AND ALBUTEROL SULFATE 1 AMPULE: .5; 3 SOLUTION RESPIRATORY (INHALATION) at 04:44

## 2018-12-19 ASSESSMENT — PAIN SCALES - GENERAL
PAINLEVEL_OUTOF10: 5
PAINLEVEL_OUTOF10: 0

## 2018-12-19 NOTE — PROGRESS NOTES
week: 90 min; 5/7x week   Times per day: Daily  Specific instructions for Next Treatment: co tx transfers, pt with 4 L 02 during OOB activity  Current Treatment Recommendations: Functional Mobility Training, Endurance Training, Safety Education & Training, Self-Care / ADL, Neuromuscular Re-education, Balance Training, Patient/Caregiver Education & Training, Equipment Evaluation, Education, & procurement, Strengthening    Goals  Short term goals  Time Frame for Short term goals: 10 days  Short term goal 1: Mod A x1 for supine>sit-Goal met 12/12 (Min A)  Short term goal 2: Min A for sitting balance in order to dress sitting EOB - ongoin, progressing  Short term goal 3: Max A x1 for slide baord transfer - goal met 12/17  Short term goal 4: Min A for sponge bathing seated EOB - ongoing, progressing  Short term goal 5: modified independent pressure relief in bed and in chair  Long term goals  Time Frame for Long term goals : 3 weeks  Long term goal 1: Mod I sitting balance for ADL completion steated EOB  Long term goal 2: Min A functional transfers using slide board  Long term goal 3: CGA for bed mobility to decrease caregiver burden   Long term goal 4: Min A bathing from shower chair  Patient Goals   Patient goals : to improve mobility and decrease burden on wife       Therapy Time   Individual Concurrent Group Co-treatment   Time In       0730   Time Out       0820   Minutes       50        Timed Code Treatment Minutes:  50       Second Session Therapy Time:   Individual Concurrent Group Co-treatment   Time In    1015   Time Out    1058   Minutes    43     Timed Code Treatment Minutes:  50 + 43    Total Treatment Minutes:  93 minutes       Zigmund Sprout, OT   Zigmund Sprout OTR/BHARATH, [place] #[id number]

## 2018-12-19 NOTE — PROGRESS NOTES
 carvedilol  25 mg Oral BID WC    furosemide  40 mg Oral BID    insulin lispro  0-18 Units Subcutaneous TID WC    insulin lispro  0-9 Units Subcutaneous Nightly    fluticasone  1 spray Nasal Nightly    aspirin  81 mg Oral Daily    citalopram  20 mg Oral Daily    ferrous sulfate  325 mg Oral Daily with breakfast    finasteride  5 mg Oral Daily    gabapentin  600 mg Oral BID    hydrALAZINE  50 mg Oral 3 times per day    pravastatin  40 mg Oral Nightly    vitamin E  400 Units Oral Daily     Continuous Infusions:   dextrose       PRN Meds:.ipratropium-albuterol, magnesium hydroxide, sodium chloride flush, dextrose, dextrose, glucagon (rDNA), glucose, acetaminophen, diphenhydrAMINE, hydrALAZINE, nitroGLYCERIN, ondansetron, traZODone        Vitals:  BP (!) 118/56   Pulse 77   Temp 98.2 °F (36.8 °C) (Oral)   Resp 18   Ht 6' (1.829 m)   Wt 197 lb 12.8 oz (89.7 kg)   SpO2 95%   BMI 26.83 kg/m²   I/O last 3 completed shifts: In: 240 [P.O.:240]  Out: 2425 [Urine:2425]  I/O this shift:  In: -   Out: 750 [Urine:750]      Physical Exam:  Gen: NAD  HEENT: Sclera anicteric, MMM  Neck: Supple. No JVD  CV: RRR, S1/S2, No R/M/G  Pulm: CTAB/L  Abd: Soft, NT, ND, (+)BS  Ext: 1+ B/L pedal edema (improving)  : (+) Chronic suprapubic ball  Neuro: Awake/Alert, Answers questions appropriately. Paralyzed below T7  Skin: Warm.  No visible rashes appreciated      Labs:  CBC with Differential:    Lab Results   Component Value Date    WBC 6.4 12/19/2018    RBC 3.33 12/19/2018    HGB 9.9 12/19/2018    HCT 30.5 12/19/2018     12/19/2018    MCV 91.5 12/19/2018    MCH 29.7 12/19/2018    MCHC 32.5 12/19/2018    RDW 15.9 12/19/2018    NRBC CANCELED 10/22/2014    NRBC CANCELED 10/22/2014    SEGSPCT 76.1 10/22/2014    BANDSPCT 2 01/16/2018    BLASTSPCT CANCELED 10/22/2014    METASPCT 1 04/03/2017    LYMPHOPCT 20.3 12/10/2018    PROMYELOPCT CANCELED 10/22/2014    MONOPCT 8.7 12/10/2018    MYELOPCT 1 03/31/2017    EOSPCT 4.6 07/11/2011    BASOPCT 0.7 12/10/2018    MONOSABS 0.6 12/10/2018    LYMPHSABS 1.3 12/10/2018    EOSABS 0.3 12/10/2018    BASOSABS 0.0 12/10/2018    DIFFTYPE Auto 07/11/2011     BMP:    Lab Results   Component Value Date     12/19/2018    K 4.6 12/19/2018    K 4.4 12/10/2018     12/19/2018    CO2 32 12/19/2018     12/19/2018    LABALBU 3.2 12/06/2018    CREATININE 1.7 12/19/2018    CALCIUM 8.7 12/19/2018    GFRAA 49 12/19/2018    GFRAA >60 05/13/2013    LABGLOM 41 12/19/2018    LABGLOM 58 10/15/2015    GLUCOSE 123 12/19/2018

## 2018-12-19 NOTE — PATIENT CARE CONFERENCE
[facility]  Inpatient Rehabilitation  Weekly Team Conference Note    Patient Name: Rajat Browning        MRN: [id number]    : 1955  (70 y.o.)  Gender: male   Referring Practitioner: William Felix  Diagnosis: CHF    The team conference for this patient was held on 18 at 11:00am by:  Gabino Joshua MD    CASE MANAGEMENT:  Assessment: Patient lives at home with wife whom works but has a flexible job. Patient is active with home care through [facility]. Patient has a [place] lift, manual wheel chair, and power wheel chair at home already. Patient has a wound vac from [facility] at home already. Patient does not have advance directives in place, pt to speak with wife if they want to create them or not.      PSYCHOLOGY:  Assessment:     PHYSICAL THERAPY:  Bed Mobility:   Scooting: Minimal assistance    Transfers:  Bed to Chair: Dependent/Total (Min A 2)    Propels w/c 270 - 540' varying with fatigue and oxygen levels    WB Status: Paraplegic T 7         FIMS:  Bed, Chair, Wheel Chair: 1 - Requires >75% assitance to transfer  Walk: 0 - Activity Not Assessed/Does Not Occur  Wheel Chair: 6 - Modified [place] Operates wheelchair at least 150 feet with an ambulatory device, orthosis or prosthesis OR requires extra amount of time OR there is concern for safety  Distance Traveled in Wheel Chair: 244'  Stairs: 0 - Activity Does not Occur ( 0 only for the admission assessment)    PT Equipment Recommendations  Equipment Needed: Yes  Other: W/c brakes have been repaired; Exploring w/c options manual with tilt feature vs electric  - to be determined based on pt/spouse goals. MD recommending power due to [place] weakness, hx of immobility. Assessment: Continues to require mod cues for safety, transfer technique. Pt continues to joke when presented with education with continued difficulty processing options and making decisions.        SPEECH THERAPY:  Diet Level:      OCCUPATIONAL THERAPY:  FIMS:  [place] - Education provided by team:  Slide board transfers, DME options      Discharge Plan   Estimated Length of Stay:1 days  Destination: Home w/ assist PRN   Pass:No  Services at Discharge: [place] OT S3, [place] PT S3  Equipment at Discharge: none   Factors facilitating achievement of predicted outcomes: Family support, Has needed Durable Medical Equipment at home and Home is wheelchair accessible  Barriers to the achievement of predicted outcomes: Cognitive deficit, Limited safety awareness, Decreased endurance, Upper extremity weakness, Lower extremity weakness, Long standing deficits, Incontinence of bowel, Incontinence of bladder, Skin Care and Wound Care  Patient Goals: , to improve mobility and decrease burden on wife,     Rehab Team Members in attendance for Team Conference:  Gifty Milan, MSW, LSW  Herb Beatty RD, LD    Sam Luther OTR/BHARATH Pulido, PT, CLT, NDT    Alli Hill, MSN, RN, [address],       I approve the established interdisciplinary plan of care as documented within the medical record of Tammy Millan.     Sabrina Putnam MD  Electronically signed by Sabrina Putnam MD on 12/20/2018 at 11:26 AM

## 2018-12-19 NOTE — PROGRESS NOTES
Tory Reyes  12/19/2018  [id number]    Chief Complaint: Debility    Subjective:   No overnight events. No current complaints. Discussing home wheel chair options. Glucose improving well. ROS: No CP, SOB, dyspnea    Objective:  Patient Vitals for the past 24 hrs:   BP Temp Temp src Pulse Resp SpO2 Weight   12/19/18 0730 (!) 118/56 98.2 °F (36.8 °C) Oral 77 18 - -   12/19/18 0642 119/65 - - 76 - - -   12/19/18 0500 - - - - - - 197 lb 12.8 oz (89.7 kg)   12/19/18 0446 - - - - 18 95 % -   12/18/18 2243 - - - - 20 96 % -   12/18/18 2047 (!) 145/71 97.6 °F (36.4 °C) Oral 71 20 97 % -   12/18/18 1445 138/75 - - - - - -     Gen: No distress, pleasant. Resting in bed  HEENT: Normocephalic, atraumatic. CV: Regular rate and rhythm. No MRG  Resp: No respiratory distress. CTAB. Decreased inspiratory volume. O2 per NC. Abd: Soft, nontender nondistended  GI: WV with appropriate seal and minimal drainage. Ext: 1+ BLE edema. Chronic BLE atrophy. R foot dorsum and heel wounds healing well, R knee wound healing but bleeding at medial site  Neuro: Alert, oriented, appropriately interactive. Laboratory data: Available via EMR. 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  Per reports, he was recently adjusted on his home diuretic medications. Cammy Lua was noted to have a CHF exacerbation and has been diuresing well.  Over the past 2 years, he has had difficulty transferring due to a left rotator cuff injury and has remained relatively bedbound and Carlean Pickles lift for transfers. Promise Narrow to his decline, he was transferring independently, driving, and actively volunteering.   Objective     Bed to Chair: Dependent/Total (Min A 2)     OT  PT Equipment

## 2018-12-20 LAB
GLUCOSE BLD-MCNC: 106 MG/DL (ref 70–99)
GLUCOSE BLD-MCNC: 118 MG/DL (ref 70–99)
GLUCOSE BLD-MCNC: 132 MG/DL (ref 70–99)
GLUCOSE BLD-MCNC: 198 MG/DL (ref 70–99)
PERFORMED ON: ABNORMAL

## 2018-12-20 PROCEDURE — 99232 SBSQ HOSP IP/OBS MODERATE 35: CPT | Performed by: CLINICAL NURSE SPECIALIST

## 2018-12-20 PROCEDURE — 2700000000 HC OXYGEN THERAPY PER DAY

## 2018-12-20 PROCEDURE — 97530 THERAPEUTIC ACTIVITIES: CPT

## 2018-12-20 PROCEDURE — 94760 N-INVAS EAR/PLS OXIMETRY 1: CPT

## 2018-12-20 PROCEDURE — 1280000000 HC REHAB R&B

## 2018-12-20 PROCEDURE — 6370000000 HC RX 637 (ALT 250 FOR IP): Performed by: INTERNAL MEDICINE

## 2018-12-20 PROCEDURE — 6370000000 HC RX 637 (ALT 250 FOR IP): Performed by: PHYSICAL MEDICINE & REHABILITATION

## 2018-12-20 PROCEDURE — 94640 AIRWAY INHALATION TREATMENT: CPT

## 2018-12-20 PROCEDURE — 0HBRXZZ EXCISION OF TOE NAIL, EXTERNAL APPROACH: ICD-10-PCS | Performed by: PODIATRIST

## 2018-12-20 PROCEDURE — 97110 THERAPEUTIC EXERCISES: CPT

## 2018-12-20 PROCEDURE — 97535 SELF CARE MNGMENT TRAINING: CPT

## 2018-12-20 RX ORDER — FUROSEMIDE 40 MG/1
40 TABLET ORAL DAILY
Qty: 30 TABLET | Refills: 3 | Status: ON HOLD | OUTPATIENT
Start: 2018-12-21 | End: 2019-02-15 | Stop reason: HOSPADM

## 2018-12-20 RX ORDER — TRAZODONE HYDROCHLORIDE 50 MG/1
50 TABLET ORAL NIGHTLY PRN
Qty: 30 TABLET | Refills: 0 | Status: SHIPPED | OUTPATIENT
Start: 2018-12-20 | End: 2019-08-20

## 2018-12-20 RX ORDER — CARVEDILOL 25 MG/1
25 TABLET ORAL 2 TIMES DAILY WITH MEALS
Qty: 60 TABLET | Refills: 3 | Status: ON HOLD | OUTPATIENT
Start: 2018-12-20 | End: 2019-02-15 | Stop reason: HOSPADM

## 2018-12-20 RX ADMIN — FINASTERIDE 5 MG: 5 TABLET, FILM COATED ORAL at 08:19

## 2018-12-20 RX ADMIN — HYDRALAZINE HYDROCHLORIDE 50 MG: 25 TABLET, FILM COATED ORAL at 14:50

## 2018-12-20 RX ADMIN — HYDRALAZINE HYDROCHLORIDE 50 MG: 25 TABLET, FILM COATED ORAL at 05:59

## 2018-12-20 RX ADMIN — GABAPENTIN 600 MG: 300 CAPSULE ORAL at 22:32

## 2018-12-20 RX ADMIN — PRAVASTATIN SODIUM 40 MG: 40 TABLET ORAL at 22:32

## 2018-12-20 RX ADMIN — IPRATROPIUM BROMIDE AND ALBUTEROL SULFATE 1 AMPULE: .5; 3 SOLUTION RESPIRATORY (INHALATION) at 15:18

## 2018-12-20 RX ADMIN — FUROSEMIDE 40 MG: 40 TABLET ORAL at 08:19

## 2018-12-20 RX ADMIN — CARVEDILOL 25 MG: 25 TABLET, FILM COATED ORAL at 14:51

## 2018-12-20 RX ADMIN — IPRATROPIUM BROMIDE AND ALBUTEROL SULFATE 1 AMPULE: .5; 3 SOLUTION RESPIRATORY (INHALATION) at 21:07

## 2018-12-20 RX ADMIN — ASPIRIN 81 MG CHEWABLE TABLET 81 MG: 81 TABLET CHEWABLE at 08:19

## 2018-12-20 RX ADMIN — HYDRALAZINE HYDROCHLORIDE 50 MG: 25 TABLET, FILM COATED ORAL at 22:32

## 2018-12-20 RX ADMIN — GABAPENTIN 600 MG: 300 CAPSULE ORAL at 08:19

## 2018-12-20 RX ADMIN — INSULIN LISPRO 2 UNITS: 100 INJECTION, SOLUTION INTRAVENOUS; SUBCUTANEOUS at 22:37

## 2018-12-20 RX ADMIN — CARVEDILOL 25 MG: 25 TABLET, FILM COATED ORAL at 08:19

## 2018-12-20 RX ADMIN — IPRATROPIUM BROMIDE AND ALBUTEROL SULFATE 1 AMPULE: .5; 3 SOLUTION RESPIRATORY (INHALATION) at 06:07

## 2018-12-20 RX ADMIN — FLUTICASONE PROPIONATE 1 SPRAY: 50 SPRAY, METERED NASAL at 22:31

## 2018-12-20 RX ADMIN — CITALOPRAM HYDROBROMIDE 20 MG: 20 TABLET ORAL at 08:19

## 2018-12-20 RX ADMIN — TRAZODONE HYDROCHLORIDE 50 MG: 50 TABLET ORAL at 22:32

## 2018-12-20 RX ADMIN — ACETAMINOPHEN 650 MG: 325 TABLET, FILM COATED ORAL at 22:31

## 2018-12-20 RX ADMIN — VITAMIN E CAP 400 UNIT 400 UNITS: 400 CAP at 08:19

## 2018-12-20 RX ADMIN — FERROUS SULFATE TAB 325 MG (65 MG ELEMENTAL FE) 325 MG: 325 (65 FE) TAB at 08:18

## 2018-12-20 ASSESSMENT — PAIN SCALES - GENERAL
PAINLEVEL_OUTOF10: 3
PAINLEVEL_OUTOF10: 0

## 2018-12-20 ASSESSMENT — PAIN DESCRIPTION - LOCATION: LOCATION: SHOULDER

## 2018-12-20 ASSESSMENT — PAIN DESCRIPTION - PAIN TYPE: TYPE: ACUTE PAIN

## 2018-12-20 ASSESSMENT — PAIN DESCRIPTION - ORIENTATION: ORIENTATION: RIGHT;LEFT

## 2018-12-20 NOTE — PROGRESS NOTES
transferred to bed with mod A 1. Assessment   Body structures, Functions, Activity limitations: Decreased functional mobility ; Decreased strength;Decreased endurance;Decreased balance;Decreased sensation;Decreased ROM; Decreased safe awareness;Decreased coordination  Assessment: Pt demonstrates improved endurance and functional transfers. Pt remains with low endurance and limited by skin issues and reliance on Oxygen. Pt needs reinforcement and encouragement for safety with functional transfers. Treatment Diagnosis: decreased functional mobility and decreased endurance  Prognosis: Good  Patient Education: Safety, transfers, DME options  REQUIRES PT FOLLOW UP: Yes  Activity Tolerance  Activity Tolerance: Patient Tolerated treatment well     Goals  Short term goals  Time Frame for Short term goals: 10 days  Short term goal 1: Supine to sit with Min A: goal met  Short term goal 2: Sit to supine with Mod A: goal met  Short term goal 3: EOB to w/c via slideboard with Mod A x 1: goal met  Short term goal 4: Propel w/c x 50' with SBA: goal met  Long term goals  Time Frame for Long term goals : 3 weeks  Long term goal 1: Bed to/from w/c with SBA via slideboard: not met, requires min-mod A 1 depending on level of fatigue  Long term goal 2: Propel w/c x 150' with Mod I - MET 12/17  Long term goal 3: Examine the possibility of power w/c (d/t improved seating needed, CHF, RTC tear, aging): conversations initiated and pt presented with options and DME vendor consulted; pt unable to make a conclusion at this time. Pt/wife given contact information for vendor should they decide to pursue in futuer.    Long term goal 4: Car transfer with Min A: not performed; probable discharge via transport  Long term goal 5: Pressure reliefs with Jose Carlos; needs reminders due to cognition  Patient Goals   Patient goals :      Plan    Plan  Times per week: Pt. to be seen 90 minutes, 5 out of the 7 days/week  Times per day: Daily  Current Treatment Recommendations: Strengthening, Balance Training, Functional Mobility Training, Transfer Training, Endurance Training, Wheelchair Mobility Training, Safety Education & Training, Patient/Caregiver Education & Training, Equipment Evaluation, Education, & procurement, Home Exercise Program, Neuromuscular Re-education, ROM  Safety Devices  Type of devices: Call light within reach, Chair alarm in place  Restraints  Initially in place: No     Therapy Time   Individual Concurrent Group Co-treatment   Time In       0730   Time Out       0815   Minutes       39      Second Session Therapy Time:   Individual Concurrent Group Co-treatment   Time In      1315   Time Out      1400   Minutes      45     Timed Code Treatment Minutes:   45, 45    Total Treatment Minutes:  Jordan 86 PT [id number],  C/NDT  Jimmy Dexter, PT

## 2018-12-20 NOTE — PROGRESS NOTES
Apolonio Brunner  12/20/2018  [id number]    Chief Complaint: Debility    Subjective:   No overnight events. No current complaints. Confirmation of type of chair yet to be determined. Breathing stable. ROS: No CP, SOB, dyspnea    Objective:  Patient Vitals for the past 24 hrs:   BP Temp Temp src Pulse Resp SpO2 Weight   12/20/18 0609 - - - - - 92 % -   12/20/18 0559 139/61 - - 80 - - -   12/20/18 0430 - - - - - - 198 lb 4.8 oz (89.9 kg)   12/19/18 2115 130/74 97.8 °F (36.6 °C) Oral 82 16 94 % -   12/19/18 2011 - - - - - 90 % -   12/19/18 1334 - - - - 16 96 % -     Gen: No distress, pleasant. Resting in bed  HEENT: Normocephalic, atraumatic. CV: Regular rate and rhythm. No MRG  Resp: No respiratory distress. CTAB. Decreased inspiratory volume. O2 per NC. Abd: Soft, nontender nondistended  GI: WV with appropriate seal and minimal drainage. Ext: 1+ BLE edema. Chronic BLE atrophy. R foot dorsum and heel wounds healing well, R knee wound healing but bleeding at medial site  Neuro: Alert, oriented, appropriately interactive. Laboratory data: Available via EMR. 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  Per reports, he was recently adjusted on his home diuretic medications. Karen Modi was noted to have a CHF exacerbation and has been diuresing well.  Over the past 2 years, he has had difficulty transferring due to a left rotator cuff injury and has remained relatively bedbound and Efren Broody lift for transfers. Teryl [place] to his decline, he was transferring independently, driving, and actively volunteering.   Objective     Bed to Chair: Minimal assistance (via slide board OOB to w/c. )     OT  PT Equipment Recommendations  Equipment Needed:

## 2018-12-20 NOTE — CONSULTS
I/O:  Intake/Output Summary (Last 24 hours) at 12/20/18 1824  Last data filed at 12/20/18 1341   Gross per 24 hour   Intake              480 ml   Output             2450 ml   Net            -1970 ml              Wt Readings from Last 3 Encounters:   12/20/18 198 lb 4.8 oz (89.9 kg)   12/07/18 171 lb 14.4 oz (78 kg)   11/12/18 165 lb (74.8 kg)       LABS:    Recent Labs      12/19/18   0621   WBC  6.4   HGB  9.9*   HCT  30.5*   PLT  156      Recent Labs      12/19/18   0621   NA  141   K  4.6   CL  101   CO2  32   BUN  109*   CREATININE  1.7*      No results for input(s): PROT, INR, APTT in the last 72 hours. No results for input(s): CKTOTAL, CKMB, CKMBINDEX, TROPONINI in the last 72 hours. Exam:     DERMATOLOGIC:   Nails 1-5 bilaterally are thickened with dystrophic changes. Positive subungual debris  Positive onycholysis  Negative erythema  Positive incurvation  Positive open lesions    VASCULAR:  1+ edema  positive findings: dorsalis pedis 1+/1+, posterior tibial 1+/1+     NEUROLOGIC:  joint position sense, light touch, a pin prick, proprioception, temperature or vibration absent    ORTHOPEDIC:  2nd toe amp right    ASSESSMENT/PLAN:   Pt. is a 70 y.o. male with elongated painful hard to cut toenails    - All nails debrided without incident.  - Debris cleaned from between toes. Recommend that the patient follow up outpatient further podiatric care to prevent or care for the many podiatric problems that can arise from Diabetes and His multiple medical problems. Thank you for this consult. This requires no further inpatient care and can be followed outpatient. The patient is safe to d/c from our standpoint.     Kristin Prather 12/20/2018 6:24 PM

## 2018-12-20 NOTE — DISCHARGE SUMMARY
pt/spouse goals. MD recommending power due to [place] weakness, hx of immobility. , Assessment: Continues to require mod cues for safety, transfer technique. Pt continues to joke when presented with education with continued difficulty processing options and making decisions. Occupational therapy: Eatin - Feeds self with adaptive equipment/dentures and/or feeds self with modified diet and/or performs own tube feeding  Groomin - Patient independent with all grooming tasks  Bathing: 3 - Able to bathe 5-7 areas  Dressing-Upper: 5 - Requires setup/supervision/cues and/or requires assist with presthesis/brace only  Dressing-Lower: 1 - Total assist with lower body dressing  Toiletin - Total assist  Toilet Transfer: 0 - Did not occur  Shower Transfer: 0 - Activity does not occur,  , Assessment: Pt has made functional progress during inpatient stay. Pt now completes bed mobility and transfers w/ Min A. UB ADLs w/ Min A and set up. Pt would benefit from continued therapy after d/c.  Recommend HH OT S3 and assist PRN at d/c.     Speech therapy:  Comprehension: 5 - Patient understands basic needs (hungry/hot/pain)  Expression: 7 - Patient expresses complex ideas/needs  Social Interaction: 6 - Patient requires medication for mood and/or effect  Problem Solvin - Patient able to solve simple/routine tasks  Memory: 6 - Patient requires device to recall (e.g. 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  Per reports, he was recently adjusted on his home diuretic medications. Pierre Jerome was noted to have a CHF exacerbation and has been diuresing well.  Over the past 2 years, he has had difficulty transferring due to a left rotator cuff injury and has remained relatively bedbound

## 2018-12-21 VITALS
BODY MASS INDEX: 27.9 KG/M2 | TEMPERATURE: 97.7 F | HEIGHT: 72 IN | SYSTOLIC BLOOD PRESSURE: 123 MMHG | DIASTOLIC BLOOD PRESSURE: 69 MMHG | RESPIRATION RATE: 16 BRPM | HEART RATE: 77 BPM | OXYGEN SATURATION: 91 % | WEIGHT: 206 LBS

## 2018-12-21 LAB
ANION GAP SERPL CALCULATED.3IONS-SCNC: 11 MMOL/L (ref 3–16)
BUN BLDV-MCNC: 109 MG/DL (ref 7–20)
CALCIUM SERPL-MCNC: 8.7 MG/DL (ref 8.3–10.6)
CHLORIDE BLD-SCNC: 98 MMOL/L (ref 99–110)
CO2: 31 MMOL/L (ref 21–32)
CREAT SERPL-MCNC: 1.8 MG/DL (ref 0.8–1.3)
GFR AFRICAN AMERICAN: 46
GFR NON-AFRICAN AMERICAN: 38
GLUCOSE BLD-MCNC: 158 MG/DL (ref 70–99)
GLUCOSE BLD-MCNC: 78 MG/DL (ref 70–99)
GLUCOSE BLD-MCNC: 86 MG/DL (ref 70–99)
HCT VFR BLD CALC: 31 % (ref 40.5–52.5)
HEMOGLOBIN: 9.9 G/DL (ref 13.5–17.5)
MCH RBC QN AUTO: 29.4 PG (ref 26–34)
MCHC RBC AUTO-ENTMCNC: 32 G/DL (ref 31–36)
MCV RBC AUTO: 91.9 FL (ref 80–100)
PDW BLD-RTO: 16.4 % (ref 12.4–15.4)
PERFORMED ON: ABNORMAL
PERFORMED ON: NORMAL
PLATELET # BLD: 149 K/UL (ref 135–450)
PMV BLD AUTO: 7.6 FL (ref 5–10.5)
POTASSIUM SERPL-SCNC: 4.6 MMOL/L (ref 3.5–5.1)
RBC # BLD: 3.37 M/UL (ref 4.2–5.9)
SODIUM BLD-SCNC: 140 MMOL/L (ref 136–145)
WBC # BLD: 6.8 K/UL (ref 4–11)

## 2018-12-21 PROCEDURE — 6370000000 HC RX 637 (ALT 250 FOR IP): Performed by: INTERNAL MEDICINE

## 2018-12-21 PROCEDURE — 94640 AIRWAY INHALATION TREATMENT: CPT

## 2018-12-21 PROCEDURE — 6370000000 HC RX 637 (ALT 250 FOR IP): Performed by: PHYSICAL MEDICINE & REHABILITATION

## 2018-12-21 PROCEDURE — 85027 COMPLETE CBC AUTOMATED: CPT

## 2018-12-21 PROCEDURE — 94760 N-INVAS EAR/PLS OXIMETRY 1: CPT

## 2018-12-21 PROCEDURE — 80048 BASIC METABOLIC PNL TOTAL CA: CPT

## 2018-12-21 PROCEDURE — 2700000000 HC OXYGEN THERAPY PER DAY

## 2018-12-21 PROCEDURE — 36415 COLL VENOUS BLD VENIPUNCTURE: CPT

## 2018-12-21 RX ORDER — FUROSEMIDE 20 MG/1
20 TABLET ORAL DAILY
Status: DISCONTINUED | OUTPATIENT
Start: 2018-12-22 | End: 2018-12-21 | Stop reason: HOSPADM

## 2018-12-21 RX ADMIN — HYDRALAZINE HYDROCHLORIDE 50 MG: 25 TABLET, FILM COATED ORAL at 13:31

## 2018-12-21 RX ADMIN — INSULIN LISPRO 3 UNITS: 100 INJECTION, SOLUTION INTRAVENOUS; SUBCUTANEOUS at 12:46

## 2018-12-21 RX ADMIN — FERROUS SULFATE TAB 325 MG (65 MG ELEMENTAL FE) 325 MG: 325 (65 FE) TAB at 09:16

## 2018-12-21 RX ADMIN — GABAPENTIN 600 MG: 300 CAPSULE ORAL at 09:16

## 2018-12-21 RX ADMIN — FUROSEMIDE 40 MG: 40 TABLET ORAL at 09:16

## 2018-12-21 RX ADMIN — CITALOPRAM HYDROBROMIDE 20 MG: 20 TABLET ORAL at 09:16

## 2018-12-21 RX ADMIN — IPRATROPIUM BROMIDE AND ALBUTEROL SULFATE 1 AMPULE: .5; 3 SOLUTION RESPIRATORY (INHALATION) at 14:10

## 2018-12-21 RX ADMIN — IPRATROPIUM BROMIDE AND ALBUTEROL SULFATE 1 AMPULE: .5; 3 SOLUTION RESPIRATORY (INHALATION) at 04:57

## 2018-12-21 RX ADMIN — FINASTERIDE 5 MG: 5 TABLET, FILM COATED ORAL at 09:16

## 2018-12-21 RX ADMIN — VITAMIN E CAP 400 UNIT 400 UNITS: 400 CAP at 09:16

## 2018-12-21 RX ADMIN — HYDRALAZINE HYDROCHLORIDE 50 MG: 25 TABLET, FILM COATED ORAL at 06:17

## 2018-12-21 RX ADMIN — ASPIRIN 81 MG CHEWABLE TABLET 81 MG: 81 TABLET CHEWABLE at 09:16

## 2018-12-21 RX ADMIN — CARVEDILOL 25 MG: 25 TABLET, FILM COATED ORAL at 09:16

## 2018-12-21 ASSESSMENT — PAIN SCALES - GENERAL: PAINLEVEL_OUTOF10: 0

## 2018-12-21 NOTE — PLAN OF CARE
Problem: Falls - Risk of:  Goal: Will remain free from falls  Will remain free from falls   Outcome: Ongoing  Patient remains free from falls or accidental injury. Room free from clutter. All fall precautions in place. Bed in lowest position with wheels locked and alarm on, call light and belongings within reach, and door open. Problem: Risk for Impaired Skin Integrity  Goal: Tissue integrity - skin and mucous membranes  Structural intactness and normal physiological function of skin and  mucous membranes.    Outcome: Ongoing  Patient's skin and mucous membranes will remain free from signs and symptoms of tissue breakdown    Problem: Pain:  Goal: Pain level will decrease  Pain level will decrease   Outcome: Ongoing  Patient's pain level will decrease with the administration of PRN pain medication when needed
Problem: Falls - Risk of:  Goal: Will remain free from falls  Will remain free from falls   Outcome: Ongoing  Pt will be free from falls , pt aware of ARU policy on falls, will call for needs/assistance. Fall risk precautions in place, call light in reach, bed in lowest position, 2/2 bed rails up, bed wheels locked, bed side table within reach, bed alarm on, will continue to monitor.
Problem: Neurological  Intervention: Speech Evaluation/treatment  Speech-Language/Cognition evaluation completed this date. All further notes may be found in EMR.       Patricia Escobedo MA CCC-SLP #[id number]  Speech Language Pathologist
Problem: Nutrition  Goal: Optimal nutrition therapy  Outcome: Ongoing  Nutrition Problem: Increased nutrient needs  Intervention: Food and/or Nutrient Delivery: Continue current diet, Continue current ONS  Nutritional Goals: po intake of meals greater than 75%; po intake of Andrew %
Problem: Nutrition  Intervention: Swallowing evaluation  Bedside swallow evaluation completed this date. All further notes may be found in EMR. Guillermo Medina MA CCC-SLP #[id number]  Speech Language Pathologist      Intervention: Aspiration precautions  Bedside swallow evaluation completed this date. All further notes may be found in EMR.       Guillermo Medina MA CCC-SLP #[id number]  Speech Language Pathologist
Problem: Pain:  Goal: Control of acute pain  Control of acute pain   Outcome: Ongoing  Pt medicated with tylenol for bilat shoulder pain.
Problem: SAFETY  Goal: LTG - patient will utilize safety techniques  Patient calls out for help appropriately
speech/language/communication therapy   []  group therapy    [x]  patient/family education    [] Other:    If the patient is admitted with a diagnosis of 'CVA' and is appropriate for group therapy, their treatment plan will include educational group therapy interventions in the form of \"Stroke Education Group Therapy Class\". CASE MANAGEMENT:  Goals:   Assist patient/family with discharge planning, patient/family counseling,   and coordination with insurance during ARU stay. Admission Period/Goal FIM SCORES  FIM Admit/Goal Score   Eating/Swallowing 6/6   Grooming 0/5   Bathing 0/3   Upper Body Dressing 0/5   Lower Body Dressing 1/3   Toileting 1/2   Bladder Raul, Accidents = FIM 1/6   Bowel  Raul, Accidents = FIM 1/1   Bed/Chair Transfer 1/5   Toilet Transfer 0   Tub/Shower Transfer  0   Locomotion:  Ambulation (Walk) / Wheelchair:  W = walk , w/c = wheelchair  Distance:   1= 0-49 ft , 2=  ft, 3= 150+ ft Distance: 1   Level of Assist: 1   Mode:    FIM: 1/6      Stairs 0/1   Comprehension 7   Expression 7   Social Interaction 6   Problem Solving 6   Memory 6        Gustavo Florentino will be seen a minimum of 3 hours of therapy per day, a minimum of 5 out of 7 days per week  (please see above for specific treatment plan per PT/OT/SLP). [] In this rare instance due to the nature of this patient's medical involvement, this patient will be seen 15 hours per week (900 minutes within a 7 day period). In addition, dietician/nutritionist may monitor calorie count as well as intake and collaboratively work with SLP on dietary upgrades. Neuropsychology/Psychology may evaluate and provide necessary support.     Medical issues being managed closely and that require 24 hour availability of a physician:   [] Swallowing Precautions  [x] Bowel/Bladder Fx  [] Weight bearing precautions   [x] Wound Care    [x] Pain Mgmt   [] Infection Protection   [x] DVT Prophylaxis   [x] Fall Precautions  [x] Fluid/Electrolyte/Nutrition

## 2018-12-26 ENCOUNTER — TELEPHONE (OUTPATIENT)
Dept: CARDIOLOGY CLINIC | Age: 63
End: 2018-12-26

## 2018-12-26 DIAGNOSIS — R06.02 SHORTNESS OF BREATH: ICD-10-CM

## 2018-12-26 DIAGNOSIS — I42.8 NON-ISCHEMIC CARDIOMYOPATHY (HCC): Primary | Chronic | ICD-10-CM

## 2018-12-26 NOTE — TELEPHONE ENCOUNTER
Pt wife calling pt hasn't been feeling well and wants to know if MARIEL could order labs? Pt has Home Health RN coming out today that could draw the labs.  Pls call to advise Thank you

## 2018-12-26 NOTE — TELEPHONE ENCOUNTER
Called  to draw labs tomorrow- notified the wife of our plans to draw labs then go from there - she understood

## 2018-12-27 NOTE — CARE COORDINATION
Called Lili Rush at [facility] and left a voicemail for a power chair referral for pt. Await call back.  Electronically signed by AYAAN Jain LSW on 12/10/2018 at 4:28 PM
Teaching:  Level of patient/caregiver understanding able to:   [] Indicates understanding       [] Needs reinforcement  [] Unsuccessful      [x] Verbal Understanding  [] Demonstrated understanding       [] No evidence of learning  [] Refused teaching         [] N/A       Electronically signed by GALLO Witt on 12/27/2018 at 10:13 AM

## 2018-12-28 ENCOUNTER — TELEPHONE (OUTPATIENT)
Dept: INTERNAL MEDICINE CLINIC | Age: 63
End: 2018-12-28

## 2018-12-28 ENCOUNTER — HOSPITAL ENCOUNTER (OUTPATIENT)
Age: 63
Setting detail: SPECIMEN
Discharge: HOME | End: 2018-12-28
Payer: MEDICARE

## 2018-12-28 ENCOUNTER — TELEPHONE (OUTPATIENT)
Dept: CARDIOLOGY CLINIC | Age: 63
End: 2018-12-28

## 2018-12-28 LAB
A/G RATIO: 0.9 (ref 1.1–2.2)
ALBUMIN SERPL-MCNC: 3.4 G/DL (ref 3.4–5)
ALP BLD-CCNC: 87 U/L (ref 40–129)
ALT SERPL-CCNC: 9 U/L (ref 10–40)
ANION GAP SERPL CALCULATED.3IONS-SCNC: 9 MMOL/L (ref 3–16)
AST SERPL-CCNC: 23 U/L (ref 15–37)
BILIRUB SERPL-MCNC: 0.3 MG/DL (ref 0–1)
BUN BLDV-MCNC: 79 MG/DL (ref 7–20)
CALCIUM SERPL-MCNC: 8.9 MG/DL (ref 8.3–10.6)
CHLORIDE BLD-SCNC: 99 MMOL/L (ref 99–110)
CO2: 29 MMOL/L (ref 21–32)
CREAT SERPL-MCNC: 2.3 MG/DL (ref 0.8–1.3)
GFR AFRICAN AMERICAN: 35
GFR NON-AFRICAN AMERICAN: 29
GLOBULIN: 3.9 G/DL
GLUCOSE BLD-MCNC: 111 MG/DL (ref 70–99)
HCT VFR BLD CALC: 28.7 % (ref 40.5–52.5)
HEMOGLOBIN: 9.2 G/DL (ref 13.5–17.5)
MCH RBC QN AUTO: 29.7 PG (ref 26–34)
MCHC RBC AUTO-ENTMCNC: 32.1 G/DL (ref 31–36)
MCV RBC AUTO: 92.4 FL (ref 80–100)
PDW BLD-RTO: 16.3 % (ref 12.4–15.4)
PLATELET # BLD: 159 K/UL (ref 135–450)
PMV BLD AUTO: 7.3 FL (ref 5–10.5)
POTASSIUM SERPL-SCNC: 5.2 MMOL/L (ref 3.5–5.1)
PRO-BNP: 3212 PG/ML (ref 0–124)
RBC # BLD: 3.11 M/UL (ref 4.2–5.9)
SODIUM BLD-SCNC: 137 MMOL/L (ref 136–145)
TOTAL PROTEIN: 7.3 G/DL (ref 6.4–8.2)
WBC # BLD: 6.8 K/UL (ref 4–11)

## 2018-12-28 PROCEDURE — 85027 COMPLETE CBC AUTOMATED: CPT

## 2018-12-28 PROCEDURE — 83880 ASSAY OF NATRIURETIC PEPTIDE: CPT

## 2018-12-28 PROCEDURE — 36415 COLL VENOUS BLD VENIPUNCTURE: CPT

## 2018-12-28 PROCEDURE — 80053 COMPREHEN METABOLIC PANEL: CPT

## 2018-12-28 NOTE — TELEPHONE ENCOUNTER
Wife stated that pt Lasix was decreased to 20 mg daily when released from hospital.     I spoke with MARIEL again and she said go back to the original 40 mg daily. Wife verbalized understanding.

## 2019-01-02 ENCOUNTER — HOSPITAL ENCOUNTER (OUTPATIENT)
Dept: WOUND CARE | Age: 64
Discharge: HOME | End: 2019-01-02
Attending: SURGERY
Payer: MEDICARE

## 2019-01-02 VITALS
SYSTOLIC BLOOD PRESSURE: 124 MMHG | DIASTOLIC BLOOD PRESSURE: 58 MMHG | TEMPERATURE: 95.7 F | HEART RATE: 77 BPM | RESPIRATION RATE: 20 BRPM

## 2019-01-02 PROCEDURE — 11042 DBRDMT SUBQ TIS 1ST 20SQCM/<: CPT

## 2019-01-02 PROCEDURE — 11042 DBRDMT SUBQ TIS 1ST 20SQCM/<: CPT | Performed by: SURGERY

## 2019-01-02 RX ORDER — LIDOCAINE HYDROCHLORIDE 40 MG/ML
SOLUTION TOPICAL ONCE
Status: DISCONTINUED | OUTPATIENT
Start: 2019-01-02 | End: 2019-01-03 | Stop reason: HOSPADM

## 2019-01-09 ENCOUNTER — HOSPITAL ENCOUNTER (OUTPATIENT)
Age: 64
Setting detail: SPECIMEN
Discharge: HOME | End: 2019-01-09
Payer: MEDICARE

## 2019-01-09 ENCOUNTER — HOSPITAL ENCOUNTER (OUTPATIENT)
Dept: WOUND CARE | Age: 64
Discharge: HOME | End: 2019-01-09
Attending: SURGERY
Payer: MEDICARE

## 2019-01-09 VITALS — SYSTOLIC BLOOD PRESSURE: 125 MMHG | RESPIRATION RATE: 22 BRPM | HEART RATE: 87 BPM | DIASTOLIC BLOOD PRESSURE: 57 MMHG

## 2019-01-09 PROCEDURE — 11042 DBRDMT SUBQ TIS 1ST 20SQCM/<: CPT

## 2019-01-09 PROCEDURE — 36415 COLL VENOUS BLD VENIPUNCTURE: CPT

## 2019-01-09 PROCEDURE — 80048 BASIC METABOLIC PNL TOTAL CA: CPT

## 2019-01-09 PROCEDURE — 83880 ASSAY OF NATRIURETIC PEPTIDE: CPT

## 2019-01-09 PROCEDURE — 11042 DBRDMT SUBQ TIS 1ST 20SQCM/<: CPT | Performed by: SURGERY

## 2019-01-09 RX ORDER — LIDOCAINE HYDROCHLORIDE 40 MG/ML
SOLUTION TOPICAL ONCE
Status: DISCONTINUED | OUTPATIENT
Start: 2019-01-09 | End: 2019-01-10 | Stop reason: HOSPADM

## 2019-01-10 LAB
ANION GAP SERPL CALCULATED.3IONS-SCNC: 9 MMOL/L (ref 3–16)
ANION GAP SERPL CALCULATED.3IONS-SCNC: ABNORMAL MMOL/L (ref 3–16)
BUN BLDV-MCNC: 90 MG/DL (ref 7–20)
BUN BLDV-MCNC: ABNORMAL MG/DL (ref 7–20)
CALCIUM SERPL-MCNC: 8.4 MG/DL (ref 8.3–10.6)
CALCIUM SERPL-MCNC: ABNORMAL MG/DL (ref 8.3–10.6)
CHLORIDE BLD-SCNC: 93 MMOL/L (ref 99–110)
CHLORIDE BLD-SCNC: ABNORMAL MMOL/L (ref 99–110)
CO2: 31 MMOL/L (ref 21–32)
CO2: ABNORMAL MMOL/L (ref 21–32)
CREAT SERPL-MCNC: 2.2 MG/DL (ref 0.8–1.3)
CREAT SERPL-MCNC: ABNORMAL MG/DL (ref 0.8–1.3)
GFR AFRICAN AMERICAN: 36
GFR AFRICAN AMERICAN: ABNORMAL
GFR NON-AFRICAN AMERICAN: 30
GFR NON-AFRICAN AMERICAN: ABNORMAL
GLUCOSE BLD-MCNC: 105 MG/DL (ref 70–99)
GLUCOSE BLD-MCNC: ABNORMAL MG/DL (ref 70–99)
POTASSIUM SERPL-SCNC: 4.8 MMOL/L (ref 3.5–5.1)
POTASSIUM SERPL-SCNC: ABNORMAL MMOL/L (ref 3.5–5.1)
PRO-BNP: 3150 PG/ML (ref 0–124)
PRO-BNP: ABNORMAL PG/ML (ref 0–124)
SODIUM BLD-SCNC: 133 MMOL/L (ref 136–145)
SODIUM BLD-SCNC: ABNORMAL MMOL/L (ref 136–145)

## 2019-01-11 ENCOUNTER — TELEPHONE (OUTPATIENT)
Dept: CARDIOLOGY CLINIC | Age: 64
End: 2019-01-11

## 2019-01-16 ENCOUNTER — HOSPITAL ENCOUNTER (OUTPATIENT)
Dept: WOUND CARE | Age: 64
Discharge: HOME | End: 2019-01-16
Attending: SURGERY
Payer: MEDICARE

## 2019-01-16 VITALS — SYSTOLIC BLOOD PRESSURE: 112 MMHG | HEART RATE: 74 BPM | DIASTOLIC BLOOD PRESSURE: 58 MMHG | RESPIRATION RATE: 22 BRPM

## 2019-01-16 PROCEDURE — 11042 DBRDMT SUBQ TIS 1ST 20SQCM/<: CPT | Performed by: SURGERY

## 2019-01-16 PROCEDURE — 11042 DBRDMT SUBQ TIS 1ST 20SQCM/<: CPT

## 2019-01-16 PROCEDURE — 11045 DBRDMT SUBQ TISS EACH ADDL: CPT

## 2019-01-16 PROCEDURE — 11045 DBRDMT SUBQ TISS EACH ADDL: CPT | Performed by: SURGERY

## 2019-01-16 RX ORDER — LIDOCAINE HYDROCHLORIDE 40 MG/ML
SOLUTION TOPICAL ONCE
Status: DISCONTINUED | OUTPATIENT
Start: 2019-01-16 | End: 2019-01-17 | Stop reason: HOSPADM

## 2019-01-21 ENCOUNTER — TELEPHONE (OUTPATIENT)
Dept: CARDIOLOGY CLINIC | Age: 64
End: 2019-01-21

## 2019-01-21 DIAGNOSIS — I50.22 CHRONIC SYSTOLIC HEART FAILURE (HCC): Primary | ICD-10-CM

## 2019-01-22 ENCOUNTER — TELEPHONE (OUTPATIENT)
Dept: INTERNAL MEDICINE CLINIC | Age: 64
End: 2019-01-22

## 2019-01-23 ENCOUNTER — HOSPITAL ENCOUNTER (OUTPATIENT)
Dept: WOUND CARE | Age: 64
Discharge: HOME | End: 2019-01-23
Attending: SURGERY
Payer: MEDICARE

## 2019-01-23 VITALS
TEMPERATURE: 96.9 F | SYSTOLIC BLOOD PRESSURE: 110 MMHG | DIASTOLIC BLOOD PRESSURE: 47 MMHG | RESPIRATION RATE: 22 BRPM | HEART RATE: 75 BPM

## 2019-01-23 PROCEDURE — 11042 DBRDMT SUBQ TIS 1ST 20SQCM/<: CPT | Performed by: SURGERY

## 2019-01-23 PROCEDURE — 11042 DBRDMT SUBQ TIS 1ST 20SQCM/<: CPT

## 2019-01-23 RX ORDER — LIDOCAINE HYDROCHLORIDE 40 MG/ML
SOLUTION TOPICAL ONCE
Status: DISCONTINUED | OUTPATIENT
Start: 2019-01-23 | End: 2019-01-24 | Stop reason: HOSPADM

## 2019-01-25 ENCOUNTER — TELEPHONE (OUTPATIENT)
Dept: CARDIOLOGY CLINIC | Age: 64
End: 2019-01-25

## 2019-01-28 ENCOUNTER — APPOINTMENT (OUTPATIENT)
Dept: GENERAL RADIOLOGY | Age: 64
DRG: 264 | End: 2019-01-28
Payer: MEDICARE

## 2019-01-28 ENCOUNTER — HOSPITAL ENCOUNTER (INPATIENT)
Age: 64
LOS: 18 days | Discharge: HOME HEALTH | DRG: 264 | End: 2019-02-15
Attending: EMERGENCY MEDICINE | Admitting: INTERNAL MEDICINE
Payer: MEDICARE

## 2019-01-28 DIAGNOSIS — J96.01 ACUTE RESPIRATORY FAILURE WITH HYPOXEMIA (HCC): ICD-10-CM

## 2019-01-28 DIAGNOSIS — R06.00 DYSPNEA, UNSPECIFIED TYPE: Primary | ICD-10-CM

## 2019-01-28 DIAGNOSIS — R77.8 ELEVATED TROPONIN: ICD-10-CM

## 2019-01-28 DIAGNOSIS — I50.21 ACUTE SYSTOLIC CONGESTIVE HEART FAILURE (HCC): ICD-10-CM

## 2019-01-28 PROBLEM — I50.23 ACUTE ON CHRONIC SYSTOLIC CHF (CONGESTIVE HEART FAILURE) (HCC): Status: ACTIVE | Noted: 2019-01-28

## 2019-01-28 LAB
A/G RATIO: 0.9 (ref 1.1–2.2)
ALBUMIN SERPL-MCNC: 3.3 G/DL (ref 3.4–5)
ALP BLD-CCNC: 69 U/L (ref 40–129)
ALT SERPL-CCNC: 10 U/L (ref 10–40)
ANION GAP SERPL CALCULATED.3IONS-SCNC: 9 MMOL/L (ref 3–16)
AST SERPL-CCNC: 21 U/L (ref 15–37)
BACTERIA: ABNORMAL /HPF
BASE EXCESS VENOUS: 4.8 MMOL/L (ref -3–3)
BASOPHILS ABSOLUTE: 0 K/UL (ref 0–0.2)
BASOPHILS RELATIVE PERCENT: 0.8 %
BILIRUB SERPL-MCNC: <0.2 MG/DL (ref 0–1)
BILIRUBIN URINE: NEGATIVE
BLOOD, URINE: ABNORMAL
BUN BLDV-MCNC: 104 MG/DL (ref 7–20)
CALCIUM SERPL-MCNC: 8.4 MG/DL (ref 8.3–10.6)
CARBOXYHEMOGLOBIN: 3 % (ref 0–1.5)
CHLORIDE BLD-SCNC: 91 MMOL/L (ref 99–110)
CLARITY: ABNORMAL
CO2: 30 MMOL/L (ref 21–32)
COLOR: YELLOW
COMMENT UA: ABNORMAL
CREAT SERPL-MCNC: 2.6 MG/DL (ref 0.8–1.3)
EOSINOPHILS ABSOLUTE: 0.2 K/UL (ref 0–0.6)
EOSINOPHILS RELATIVE PERCENT: 2.7 %
EPITHELIAL CELLS, UA: 1 /HPF (ref 0–5)
GFR AFRICAN AMERICAN: 30
GFR NON-AFRICAN AMERICAN: 25
GLOBULIN: 3.6 G/DL
GLUCOSE BLD-MCNC: 211 MG/DL (ref 70–99)
GLUCOSE URINE: NEGATIVE MG/DL
HCO3 VENOUS: 31.8 MMOL/L (ref 23–29)
HCT VFR BLD CALC: 27.5 % (ref 40.5–52.5)
HEMOGLOBIN: 8.7 G/DL (ref 13.5–17.5)
HYALINE CASTS: 4 /LPF (ref 0–8)
INR BLD: 1.22 (ref 0.86–1.14)
KETONES, URINE: NEGATIVE MG/DL
LEUKOCYTE ESTERASE, URINE: ABNORMAL
LIPASE: 27 U/L (ref 13–60)
LYMPHOCYTES ABSOLUTE: 0.9 K/UL (ref 1–5.1)
LYMPHOCYTES RELATIVE PERCENT: 14.1 %
MCH RBC QN AUTO: 29.5 PG (ref 26–34)
MCHC RBC AUTO-ENTMCNC: 31.8 G/DL (ref 31–36)
MCV RBC AUTO: 92.9 FL (ref 80–100)
METHEMOGLOBIN VENOUS: 0.3 %
MICROSCOPIC EXAMINATION: YES
MONOCYTES ABSOLUTE: 0.5 K/UL (ref 0–1.3)
MONOCYTES RELATIVE PERCENT: 7.8 %
NEUTROPHILS ABSOLUTE: 4.7 K/UL (ref 1.7–7.7)
NEUTROPHILS RELATIVE PERCENT: 74.6 %
NITRITE, URINE: NEGATIVE
O2 CONTENT, VEN: 12 VOL %
O2 SAT, VEN: 98 %
O2 THERAPY: ABNORMAL
PCO2, VEN: 60.9 MMHG (ref 40–50)
PDW BLD-RTO: 16.2 % (ref 12.4–15.4)
PH UA: 6.5
PH VENOUS: 7.33 (ref 7.35–7.45)
PLATELET # BLD: 139 K/UL (ref 135–450)
PMV BLD AUTO: 7.2 FL (ref 5–10.5)
PO2, VEN: 118 MMHG (ref 25–40)
POTASSIUM REFLEX MAGNESIUM: 4.6 MMOL/L (ref 3.5–5.1)
PRO-BNP: 5956 PG/ML (ref 0–124)
PROTEIN UA: NEGATIVE MG/DL
PROTHROMBIN TIME: 13.9 SEC (ref 9.8–13)
RBC # BLD: 2.96 M/UL (ref 4.2–5.9)
RBC UA: 4 /HPF (ref 0–4)
SODIUM BLD-SCNC: 130 MMOL/L (ref 136–145)
SPECIFIC GRAVITY UA: 1.01
TCO2 CALC VENOUS: 75 MMOL/L
TOTAL PROTEIN: 6.9 G/DL (ref 6.4–8.2)
TROPONIN: 0.11 NG/ML
URINE TYPE: ABNORMAL
UROBILINOGEN, URINE: 1 E.U./DL
WBC # BLD: 6.3 K/UL (ref 4–11)
WBC UA: 92 /HPF (ref 0–5)
YEAST: PRESENT /HPF

## 2019-01-28 PROCEDURE — 80053 COMPREHEN METABOLIC PANEL: CPT

## 2019-01-28 PROCEDURE — 99291 CRITICAL CARE FIRST HOUR: CPT

## 2019-01-28 PROCEDURE — 71045 X-RAY EXAM CHEST 1 VIEW: CPT

## 2019-01-28 PROCEDURE — 82803 BLOOD GASES ANY COMBINATION: CPT

## 2019-01-28 PROCEDURE — 6370000000 HC RX 637 (ALT 250 FOR IP): Performed by: EMERGENCY MEDICINE

## 2019-01-28 PROCEDURE — 81001 URINALYSIS AUTO W/SCOPE: CPT

## 2019-01-28 PROCEDURE — 93010 ELECTROCARDIOGRAM REPORT: CPT | Performed by: INTERNAL MEDICINE

## 2019-01-28 PROCEDURE — 93005 ELECTROCARDIOGRAM TRACING: CPT | Performed by: INTERNAL MEDICINE

## 2019-01-28 PROCEDURE — 1200000000 HC SEMI PRIVATE

## 2019-01-28 PROCEDURE — 83690 ASSAY OF LIPASE: CPT

## 2019-01-28 PROCEDURE — 85025 COMPLETE CBC W/AUTO DIFF WBC: CPT

## 2019-01-28 PROCEDURE — 83880 ASSAY OF NATRIURETIC PEPTIDE: CPT

## 2019-01-28 PROCEDURE — 85610 PROTHROMBIN TIME: CPT

## 2019-01-28 PROCEDURE — 84484 ASSAY OF TROPONIN QUANT: CPT

## 2019-01-28 PROCEDURE — 6360000002 HC RX W HCPCS: Performed by: EMERGENCY MEDICINE

## 2019-01-28 PROCEDURE — 96374 THER/PROPH/DIAG INJ IV PUSH: CPT

## 2019-01-28 RX ORDER — ASPIRIN 81 MG/1
324 TABLET, CHEWABLE ORAL ONCE
Status: COMPLETED | OUTPATIENT
Start: 2019-01-28 | End: 2019-01-28

## 2019-01-28 RX ORDER — FUROSEMIDE 10 MG/ML
40 INJECTION INTRAMUSCULAR; INTRAVENOUS ONCE
Status: COMPLETED | OUTPATIENT
Start: 2019-01-28 | End: 2019-01-28

## 2019-01-28 RX ADMIN — ASPIRIN 81 MG 324 MG: 81 TABLET ORAL at 23:25

## 2019-01-28 RX ADMIN — FUROSEMIDE 40 MG: 10 INJECTION, SOLUTION INTRAMUSCULAR; INTRAVENOUS at 23:25

## 2019-01-29 PROBLEM — I50.33 ACUTE ON CHRONIC DIASTOLIC HEART FAILURE (HCC): Status: ACTIVE | Noted: 2019-01-28

## 2019-01-29 LAB
ANION GAP SERPL CALCULATED.3IONS-SCNC: 10 MMOL/L (ref 3–16)
BUN BLDV-MCNC: 100 MG/DL (ref 7–20)
CALCIUM SERPL-MCNC: 8.4 MG/DL (ref 8.3–10.6)
CHLORIDE BLD-SCNC: 94 MMOL/L (ref 99–110)
CO2: 31 MMOL/L (ref 21–32)
CREAT SERPL-MCNC: 2.4 MG/DL (ref 0.8–1.3)
EKG ATRIAL RATE: 75 BPM
EKG DIAGNOSIS: NORMAL
EKG P AXIS: 31 DEGREES
EKG P-R INTERVAL: 268 MS
EKG Q-T INTERVAL: 418 MS
EKG QRS DURATION: 104 MS
EKG QTC CALCULATION (BAZETT): 466 MS
EKG R AXIS: 41 DEGREES
EKG T AXIS: 152 DEGREES
EKG VENTRICULAR RATE: 75 BPM
GFR AFRICAN AMERICAN: 33
GFR NON-AFRICAN AMERICAN: 27
GLUCOSE BLD-MCNC: 154 MG/DL (ref 70–99)
GLUCOSE BLD-MCNC: 179 MG/DL (ref 70–99)
GLUCOSE BLD-MCNC: 181 MG/DL (ref 70–99)
GLUCOSE BLD-MCNC: 210 MG/DL (ref 70–99)
GLUCOSE BLD-MCNC: 219 MG/DL (ref 70–99)
GLUCOSE BLD-MCNC: 249 MG/DL (ref 70–99)
HCT VFR BLD CALC: 28.3 % (ref 40.5–52.5)
HEMOGLOBIN: 9.1 G/DL (ref 13.5–17.5)
MCH RBC QN AUTO: 29.5 PG (ref 26–34)
MCHC RBC AUTO-ENTMCNC: 32.3 G/DL (ref 31–36)
MCV RBC AUTO: 91.4 FL (ref 80–100)
PDW BLD-RTO: 15.8 % (ref 12.4–15.4)
PERFORMED ON: ABNORMAL
PLATELET # BLD: 144 K/UL (ref 135–450)
PMV BLD AUTO: 7.1 FL (ref 5–10.5)
POTASSIUM REFLEX MAGNESIUM: 4.7 MMOL/L (ref 3.5–5.1)
RBC # BLD: 3.09 M/UL (ref 4.2–5.9)
SODIUM BLD-SCNC: 135 MMOL/L (ref 136–145)
TROPONIN: 0.11 NG/ML
TROPONIN: 0.12 NG/ML
WBC # BLD: 6.6 K/UL (ref 4–11)

## 2019-01-29 PROCEDURE — 85027 COMPLETE CBC AUTOMATED: CPT

## 2019-01-29 PROCEDURE — 6370000000 HC RX 637 (ALT 250 FOR IP): Performed by: NURSE PRACTITIONER

## 2019-01-29 PROCEDURE — 80048 BASIC METABOLIC PNL TOTAL CA: CPT

## 2019-01-29 PROCEDURE — 6360000002 HC RX W HCPCS: Performed by: NURSE PRACTITIONER

## 2019-01-29 PROCEDURE — 84484 ASSAY OF TROPONIN QUANT: CPT

## 2019-01-29 PROCEDURE — 2580000003 HC RX 258: Performed by: NURSE PRACTITIONER

## 2019-01-29 PROCEDURE — 36415 COLL VENOUS BLD VENIPUNCTURE: CPT

## 2019-01-29 PROCEDURE — 94760 N-INVAS EAR/PLS OXIMETRY 1: CPT

## 2019-01-29 PROCEDURE — 94640 AIRWAY INHALATION TREATMENT: CPT

## 2019-01-29 PROCEDURE — 97161 PT EVAL LOW COMPLEX 20 MIN: CPT

## 2019-01-29 PROCEDURE — 94664 DEMO&/EVAL PT USE INHALER: CPT

## 2019-01-29 PROCEDURE — 97530 THERAPEUTIC ACTIVITIES: CPT

## 2019-01-29 PROCEDURE — 11042 DBRDMT SUBQ TIS 1ST 20SQCM/<: CPT | Performed by: SURGERY

## 2019-01-29 PROCEDURE — APPNB30 APP NON BILLABLE TIME 0-30 MINS: Performed by: NURSE PRACTITIONER

## 2019-01-29 PROCEDURE — 97165 OT EVAL LOW COMPLEX 30 MIN: CPT

## 2019-01-29 PROCEDURE — 0JB70ZZ EXCISION OF BACK SUBCUTANEOUS TISSUE AND FASCIA, OPEN APPROACH: ICD-10-PCS | Performed by: SURGERY

## 2019-01-29 PROCEDURE — 99222 1ST HOSP IP/OBS MODERATE 55: CPT | Performed by: INTERNAL MEDICINE

## 2019-01-29 PROCEDURE — 6370000000 HC RX 637 (ALT 250 FOR IP): Performed by: INTERNAL MEDICINE

## 2019-01-29 PROCEDURE — 6370000000 HC RX 637 (ALT 250 FOR IP): Performed by: HOSPITALIST

## 2019-01-29 PROCEDURE — 1200000000 HC SEMI PRIVATE

## 2019-01-29 PROCEDURE — 2700000000 HC OXYGEN THERAPY PER DAY

## 2019-01-29 RX ORDER — FERROUS SULFATE 325(65) MG
325 TABLET ORAL
Status: DISCONTINUED | OUTPATIENT
Start: 2019-01-29 | End: 2019-01-30

## 2019-01-29 RX ORDER — GABAPENTIN 300 MG/1
600 CAPSULE ORAL 2 TIMES DAILY
Status: DISCONTINUED | OUTPATIENT
Start: 2019-01-29 | End: 2019-01-29

## 2019-01-29 RX ORDER — ASPIRIN 81 MG/1
81 TABLET, CHEWABLE ORAL DAILY
Status: DISCONTINUED | OUTPATIENT
Start: 2019-01-29 | End: 2019-02-15 | Stop reason: HOSPADM

## 2019-01-29 RX ORDER — ONDANSETRON 2 MG/ML
4 INJECTION INTRAMUSCULAR; INTRAVENOUS EVERY 6 HOURS PRN
Status: DISCONTINUED | OUTPATIENT
Start: 2019-01-29 | End: 2019-02-15 | Stop reason: HOSPADM

## 2019-01-29 RX ORDER — PRAVASTATIN SODIUM 40 MG
40 TABLET ORAL NIGHTLY
Status: DISCONTINUED | OUTPATIENT
Start: 2019-01-29 | End: 2019-02-15 | Stop reason: HOSPADM

## 2019-01-29 RX ORDER — DOCUSATE SODIUM 100 MG/1
100 CAPSULE, LIQUID FILLED ORAL DAILY
Status: DISCONTINUED | OUTPATIENT
Start: 2019-01-29 | End: 2019-02-15 | Stop reason: HOSPADM

## 2019-01-29 RX ORDER — TRAZODONE HYDROCHLORIDE 50 MG/1
50 TABLET ORAL NIGHTLY PRN
Status: DISCONTINUED | OUTPATIENT
Start: 2019-01-29 | End: 2019-02-15 | Stop reason: HOSPADM

## 2019-01-29 RX ORDER — PANTOPRAZOLE SODIUM 40 MG/1
40 TABLET, DELAYED RELEASE ORAL
Status: DISCONTINUED | OUTPATIENT
Start: 2019-01-29 | End: 2019-02-15 | Stop reason: HOSPADM

## 2019-01-29 RX ORDER — NITROGLYCERIN 0.4 MG/1
0.4 TABLET SUBLINGUAL EVERY 5 MIN PRN
Status: DISCONTINUED | OUTPATIENT
Start: 2019-01-29 | End: 2019-02-15 | Stop reason: HOSPADM

## 2019-01-29 RX ORDER — ACETAMINOPHEN 325 MG/1
650 TABLET ORAL EVERY 4 HOURS PRN
Status: DISCONTINUED | OUTPATIENT
Start: 2019-01-29 | End: 2019-02-15 | Stop reason: HOSPADM

## 2019-01-29 RX ORDER — SODIUM CHLORIDE 0.9 % (FLUSH) 0.9 %
10 SYRINGE (ML) INJECTION EVERY 12 HOURS SCHEDULED
Status: DISCONTINUED | OUTPATIENT
Start: 2019-01-29 | End: 2019-02-15 | Stop reason: HOSPADM

## 2019-01-29 RX ORDER — FINASTERIDE 5 MG/1
5 TABLET, FILM COATED ORAL DAILY
Status: DISCONTINUED | OUTPATIENT
Start: 2019-01-29 | End: 2019-02-15 | Stop reason: HOSPADM

## 2019-01-29 RX ORDER — IPRATROPIUM BROMIDE AND ALBUTEROL SULFATE 2.5; .5 MG/3ML; MG/3ML
1 SOLUTION RESPIRATORY (INHALATION) 3 TIMES DAILY
Status: DISCONTINUED | OUTPATIENT
Start: 2019-01-29 | End: 2019-02-15 | Stop reason: HOSPADM

## 2019-01-29 RX ORDER — DEXTROSE MONOHYDRATE 25 G/50ML
12.5 INJECTION, SOLUTION INTRAVENOUS PRN
Status: DISCONTINUED | OUTPATIENT
Start: 2019-01-29 | End: 2019-02-15 | Stop reason: HOSPADM

## 2019-01-29 RX ORDER — FUROSEMIDE 10 MG/ML
40 INJECTION INTRAMUSCULAR; INTRAVENOUS 2 TIMES DAILY
Status: DISCONTINUED | OUTPATIENT
Start: 2019-01-29 | End: 2019-01-30

## 2019-01-29 RX ORDER — CITALOPRAM 20 MG/1
20 TABLET ORAL DAILY
Status: DISCONTINUED | OUTPATIENT
Start: 2019-01-29 | End: 2019-02-15 | Stop reason: HOSPADM

## 2019-01-29 RX ORDER — CARVEDILOL 25 MG/1
25 TABLET ORAL 2 TIMES DAILY WITH MEALS
Status: DISCONTINUED | OUTPATIENT
Start: 2019-01-29 | End: 2019-02-08

## 2019-01-29 RX ORDER — FLUTICASONE PROPIONATE 50 MCG
1 SPRAY, SUSPENSION (ML) NASAL DAILY
Status: DISCONTINUED | OUTPATIENT
Start: 2019-01-29 | End: 2019-02-15 | Stop reason: HOSPADM

## 2019-01-29 RX ORDER — SODIUM CHLORIDE 0.9 % (FLUSH) 0.9 %
10 SYRINGE (ML) INJECTION PRN
Status: DISCONTINUED | OUTPATIENT
Start: 2019-01-29 | End: 2019-02-15 | Stop reason: HOSPADM

## 2019-01-29 RX ORDER — DEXTROSE MONOHYDRATE 50 MG/ML
100 INJECTION, SOLUTION INTRAVENOUS PRN
Status: DISCONTINUED | OUTPATIENT
Start: 2019-01-29 | End: 2019-02-15 | Stop reason: HOSPADM

## 2019-01-29 RX ORDER — GABAPENTIN 100 MG/1
200 CAPSULE ORAL 2 TIMES DAILY
Status: DISCONTINUED | OUTPATIENT
Start: 2019-01-29 | End: 2019-02-09

## 2019-01-29 RX ORDER — HYDRALAZINE HYDROCHLORIDE 100 MG/1
50 TABLET, FILM COATED ORAL EVERY 8 HOURS SCHEDULED
Status: DISCONTINUED | OUTPATIENT
Start: 2019-01-29 | End: 2019-02-08

## 2019-01-29 RX ORDER — NICOTINE POLACRILEX 4 MG
15 LOZENGE BUCCAL PRN
Status: DISCONTINUED | OUTPATIENT
Start: 2019-01-29 | End: 2019-02-15 | Stop reason: HOSPADM

## 2019-01-29 RX ORDER — IPRATROPIUM BROMIDE AND ALBUTEROL SULFATE 2.5; .5 MG/3ML; MG/3ML
3 SOLUTION RESPIRATORY (INHALATION) EVERY 4 HOURS PRN
Status: DISCONTINUED | OUTPATIENT
Start: 2019-01-29 | End: 2019-02-15 | Stop reason: HOSPADM

## 2019-01-29 RX ADMIN — HYDRALAZINE HYDROCHLORIDE 50 MG: 25 TABLET, FILM COATED ORAL at 13:19

## 2019-01-29 RX ADMIN — IPRATROPIUM BROMIDE AND ALBUTEROL SULFATE 1 AMPULE: .5; 3 SOLUTION RESPIRATORY (INHALATION) at 21:37

## 2019-01-29 RX ADMIN — GABAPENTIN 200 MG: 100 CAPSULE ORAL at 20:32

## 2019-01-29 RX ADMIN — GABAPENTIN 600 MG: 300 CAPSULE ORAL at 01:48

## 2019-01-29 RX ADMIN — HYDRALAZINE HYDROCHLORIDE 50 MG: 25 TABLET, FILM COATED ORAL at 21:18

## 2019-01-29 RX ADMIN — Medication 10 ML: at 20:37

## 2019-01-29 RX ADMIN — ASPIRIN 81 MG CHEWABLE TABLET 81 MG: 81 TABLET CHEWABLE at 09:07

## 2019-01-29 RX ADMIN — INSULIN LISPRO 2 UNITS: 100 INJECTION, SOLUTION INTRAVENOUS; SUBCUTANEOUS at 17:15

## 2019-01-29 RX ADMIN — FINASTERIDE 5 MG: 5 TABLET, FILM COATED ORAL at 09:07

## 2019-01-29 RX ADMIN — FERROUS SULFATE TAB 325 MG (65 MG ELEMENTAL FE) 325 MG: 325 (65 FE) TAB at 09:07

## 2019-01-29 RX ADMIN — GABAPENTIN 600 MG: 300 CAPSULE ORAL at 09:07

## 2019-01-29 RX ADMIN — INSULIN LISPRO 1 UNITS: 100 INJECTION, SOLUTION INTRAVENOUS; SUBCUTANEOUS at 20:33

## 2019-01-29 RX ADMIN — CARVEDILOL 25 MG: 25 TABLET, FILM COATED ORAL at 09:07

## 2019-01-29 RX ADMIN — HYDRALAZINE HYDROCHLORIDE 50 MG: 25 TABLET, FILM COATED ORAL at 05:33

## 2019-01-29 RX ADMIN — INSULIN LISPRO 1 UNITS: 100 INJECTION, SOLUTION INTRAVENOUS; SUBCUTANEOUS at 09:10

## 2019-01-29 RX ADMIN — IPRATROPIUM BROMIDE AND ALBUTEROL SULFATE 1 AMPULE: .5; 3 SOLUTION RESPIRATORY (INHALATION) at 15:00

## 2019-01-29 RX ADMIN — PRAVASTATIN SODIUM 40 MG: 40 TABLET ORAL at 01:48

## 2019-01-29 RX ADMIN — CITALOPRAM HYDROBROMIDE 20 MG: 20 TABLET ORAL at 09:07

## 2019-01-29 RX ADMIN — INSULIN GLARGINE 40 UNITS: 100 INJECTION, SOLUTION SUBCUTANEOUS at 20:33

## 2019-01-29 RX ADMIN — PRAVASTATIN SODIUM 40 MG: 40 TABLET ORAL at 20:32

## 2019-01-29 RX ADMIN — Medication 10 ML: at 09:08

## 2019-01-29 RX ADMIN — CARVEDILOL 25 MG: 25 TABLET, FILM COATED ORAL at 17:15

## 2019-01-29 RX ADMIN — PANTOPRAZOLE SODIUM 40 MG: 40 TABLET, DELAYED RELEASE ORAL at 05:33

## 2019-01-29 RX ADMIN — INSULIN LISPRO 1 UNITS: 100 INJECTION, SOLUTION INTRAVENOUS; SUBCUTANEOUS at 12:00

## 2019-01-29 RX ADMIN — INSULIN GLARGINE 40 UNITS: 100 INJECTION, SOLUTION SUBCUTANEOUS at 01:44

## 2019-01-29 RX ADMIN — FLUTICASONE PROPIONATE 1 SPRAY: 50 SPRAY, METERED NASAL at 09:42

## 2019-01-29 RX ADMIN — IPRATROPIUM BROMIDE AND ALBUTEROL SULFATE 1 AMPULE: .5; 3 SOLUTION RESPIRATORY (INHALATION) at 08:57

## 2019-01-29 RX ADMIN — FUROSEMIDE 40 MG: 10 INJECTION, SOLUTION INTRAVENOUS at 17:15

## 2019-01-29 RX ADMIN — DOCUSATE SODIUM 100 MG: 100 CAPSULE, LIQUID FILLED ORAL at 09:07

## 2019-01-29 RX ADMIN — FUROSEMIDE 40 MG: 10 INJECTION, SOLUTION INTRAVENOUS at 15:29

## 2019-01-29 ASSESSMENT — PAIN SCALES - GENERAL
PAINLEVEL_OUTOF10: 0

## 2019-01-30 ENCOUNTER — HOSPITAL ENCOUNTER (OUTPATIENT)
Dept: WOUND CARE | Age: 64
Discharge: HOME | End: 2019-01-30
Attending: SURGERY

## 2019-01-30 LAB
ANION GAP SERPL CALCULATED.3IONS-SCNC: 10 MMOL/L (ref 3–16)
BASOPHILS ABSOLUTE: 0 K/UL (ref 0–0.2)
BASOPHILS RELATIVE PERCENT: 0.6 %
BUN BLDV-MCNC: 102 MG/DL (ref 7–20)
CALCIUM SERPL-MCNC: 8.4 MG/DL (ref 8.3–10.6)
CHLORIDE BLD-SCNC: 95 MMOL/L (ref 99–110)
CO2: 32 MMOL/L (ref 21–32)
CREAT SERPL-MCNC: 2.6 MG/DL (ref 0.8–1.3)
EOSINOPHILS ABSOLUTE: 0.2 K/UL (ref 0–0.6)
EOSINOPHILS RELATIVE PERCENT: 3.6 %
FERRITIN: 308 NG/ML (ref 30–400)
GFR AFRICAN AMERICAN: 30
GFR NON-AFRICAN AMERICAN: 25
GLUCOSE BLD-MCNC: 101 MG/DL (ref 70–99)
GLUCOSE BLD-MCNC: 122 MG/DL (ref 70–99)
GLUCOSE BLD-MCNC: 133 MG/DL (ref 70–99)
GLUCOSE BLD-MCNC: 146 MG/DL (ref 70–99)
GLUCOSE BLD-MCNC: 190 MG/DL (ref 70–99)
HCT VFR BLD CALC: 27.7 % (ref 40.5–52.5)
HEMOGLOBIN: 8.9 G/DL (ref 13.5–17.5)
IRON SATURATION: 16 % (ref 20–50)
IRON: 28 UG/DL (ref 59–158)
LYMPHOCYTES ABSOLUTE: 0.8 K/UL (ref 1–5.1)
LYMPHOCYTES RELATIVE PERCENT: 11.7 %
MAGNESIUM: 2.4 MG/DL (ref 1.8–2.4)
MCH RBC QN AUTO: 29.4 PG (ref 26–34)
MCHC RBC AUTO-ENTMCNC: 32.1 G/DL (ref 31–36)
MCV RBC AUTO: 91.4 FL (ref 80–100)
MONOCYTES ABSOLUTE: 0.6 K/UL (ref 0–1.3)
MONOCYTES RELATIVE PERCENT: 8.3 %
NEUTROPHILS ABSOLUTE: 5.2 K/UL (ref 1.7–7.7)
NEUTROPHILS RELATIVE PERCENT: 75.8 %
PDW BLD-RTO: 16.3 % (ref 12.4–15.4)
PERFORMED ON: ABNORMAL
PLATELET # BLD: 143 K/UL (ref 135–450)
PMV BLD AUTO: 7 FL (ref 5–10.5)
POTASSIUM SERPL-SCNC: 4 MMOL/L (ref 3.5–5.1)
RBC # BLD: 3.03 M/UL (ref 4.2–5.9)
SODIUM BLD-SCNC: 137 MMOL/L (ref 136–145)
TOTAL IRON BINDING CAPACITY: 175 UG/DL (ref 260–445)
WBC # BLD: 6.8 K/UL (ref 4–11)

## 2019-01-30 PROCEDURE — 99232 SBSQ HOSP IP/OBS MODERATE 35: CPT | Performed by: CLINICAL NURSE SPECIALIST

## 2019-01-30 PROCEDURE — 83550 IRON BINDING TEST: CPT

## 2019-01-30 PROCEDURE — 6360000002 HC RX W HCPCS: Performed by: NURSE PRACTITIONER

## 2019-01-30 PROCEDURE — 6370000000 HC RX 637 (ALT 250 FOR IP): Performed by: INTERNAL MEDICINE

## 2019-01-30 PROCEDURE — 80048 BASIC METABOLIC PNL TOTAL CA: CPT

## 2019-01-30 PROCEDURE — 85025 COMPLETE CBC W/AUTO DIFF WBC: CPT

## 2019-01-30 PROCEDURE — 83540 ASSAY OF IRON: CPT

## 2019-01-30 PROCEDURE — 6370000000 HC RX 637 (ALT 250 FOR IP): Performed by: NURSE PRACTITIONER

## 2019-01-30 PROCEDURE — 2580000003 HC RX 258: Performed by: NURSE PRACTITIONER

## 2019-01-30 PROCEDURE — 6360000002 HC RX W HCPCS: Performed by: INTERNAL MEDICINE

## 2019-01-30 PROCEDURE — 83735 ASSAY OF MAGNESIUM: CPT

## 2019-01-30 PROCEDURE — 36415 COLL VENOUS BLD VENIPUNCTURE: CPT

## 2019-01-30 PROCEDURE — 6370000000 HC RX 637 (ALT 250 FOR IP): Performed by: HOSPITALIST

## 2019-01-30 PROCEDURE — 94640 AIRWAY INHALATION TREATMENT: CPT

## 2019-01-30 PROCEDURE — 82728 ASSAY OF FERRITIN: CPT

## 2019-01-30 PROCEDURE — 2700000000 HC OXYGEN THERAPY PER DAY

## 2019-01-30 PROCEDURE — 94760 N-INVAS EAR/PLS OXIMETRY 1: CPT

## 2019-01-30 PROCEDURE — 1200000000 HC SEMI PRIVATE

## 2019-01-30 RX ORDER — GUAIFENESIN 600 MG/1
600 TABLET, EXTENDED RELEASE ORAL 2 TIMES DAILY
Status: DISCONTINUED | OUTPATIENT
Start: 2019-01-30 | End: 2019-02-15 | Stop reason: HOSPADM

## 2019-01-30 RX ORDER — FERROUS SULFATE 325(65) MG
325 TABLET ORAL 2 TIMES DAILY WITH MEALS
Status: DISCONTINUED | OUTPATIENT
Start: 2019-01-30 | End: 2019-02-09

## 2019-01-30 RX ORDER — FUROSEMIDE 10 MG/ML
40 INJECTION INTRAMUSCULAR; INTRAVENOUS DAILY
Status: DISCONTINUED | OUTPATIENT
Start: 2019-01-30 | End: 2019-02-01

## 2019-01-30 RX ADMIN — IPRATROPIUM BROMIDE AND ALBUTEROL SULFATE 1 AMPULE: .5; 3 SOLUTION RESPIRATORY (INHALATION) at 13:26

## 2019-01-30 RX ADMIN — TRAZODONE HYDROCHLORIDE 50 MG: 50 TABLET ORAL at 22:47

## 2019-01-30 RX ADMIN — GUAIFENESIN 600 MG: 600 TABLET, EXTENDED RELEASE ORAL at 22:46

## 2019-01-30 RX ADMIN — CARVEDILOL 25 MG: 25 TABLET, FILM COATED ORAL at 07:57

## 2019-01-30 RX ADMIN — ASPIRIN 81 MG CHEWABLE TABLET 81 MG: 81 TABLET CHEWABLE at 07:57

## 2019-01-30 RX ADMIN — ENOXAPARIN SODIUM 30 MG: 30 INJECTION SUBCUTANEOUS at 07:57

## 2019-01-30 RX ADMIN — FERROUS SULFATE TAB 325 MG (65 MG ELEMENTAL FE) 325 MG: 325 (65 FE) TAB at 07:57

## 2019-01-30 RX ADMIN — HYDRALAZINE HYDROCHLORIDE 50 MG: 25 TABLET, FILM COATED ORAL at 22:47

## 2019-01-30 RX ADMIN — FUROSEMIDE 40 MG: 10 INJECTION, SOLUTION INTRAMUSCULAR; INTRAVENOUS at 11:21

## 2019-01-30 RX ADMIN — DOCUSATE SODIUM 100 MG: 100 CAPSULE, LIQUID FILLED ORAL at 07:57

## 2019-01-30 RX ADMIN — INSULIN LISPRO 1 UNITS: 100 INJECTION, SOLUTION INTRAVENOUS; SUBCUTANEOUS at 16:58

## 2019-01-30 RX ADMIN — Medication 10 ML: at 23:30

## 2019-01-30 RX ADMIN — GABAPENTIN 200 MG: 100 CAPSULE ORAL at 22:46

## 2019-01-30 RX ADMIN — GABAPENTIN 200 MG: 100 CAPSULE ORAL at 07:57

## 2019-01-30 RX ADMIN — FLUTICASONE PROPIONATE 1 SPRAY: 50 SPRAY, METERED NASAL at 07:58

## 2019-01-30 RX ADMIN — HYDRALAZINE HYDROCHLORIDE 50 MG: 25 TABLET, FILM COATED ORAL at 06:05

## 2019-01-30 RX ADMIN — INSULIN GLARGINE 40 UNITS: 100 INJECTION, SOLUTION SUBCUTANEOUS at 22:53

## 2019-01-30 RX ADMIN — FINASTERIDE 5 MG: 5 TABLET, FILM COATED ORAL at 07:57

## 2019-01-30 RX ADMIN — HYDRALAZINE HYDROCHLORIDE 50 MG: 25 TABLET, FILM COATED ORAL at 12:57

## 2019-01-30 RX ADMIN — PANTOPRAZOLE SODIUM 40 MG: 40 TABLET, DELAYED RELEASE ORAL at 06:05

## 2019-01-30 RX ADMIN — CARVEDILOL 25 MG: 25 TABLET, FILM COATED ORAL at 16:58

## 2019-01-30 RX ADMIN — CITALOPRAM HYDROBROMIDE 20 MG: 20 TABLET ORAL at 07:57

## 2019-01-30 RX ADMIN — ACETAMINOPHEN 650 MG: 325 TABLET, FILM COATED ORAL at 22:47

## 2019-01-30 RX ADMIN — IPRATROPIUM BROMIDE AND ALBUTEROL SULFATE 1 AMPULE: .5; 3 SOLUTION RESPIRATORY (INHALATION) at 08:23

## 2019-01-30 RX ADMIN — IPRATROPIUM BROMIDE AND ALBUTEROL SULFATE 1 AMPULE: .5; 3 SOLUTION RESPIRATORY (INHALATION) at 20:17

## 2019-01-30 RX ADMIN — Medication 10 ML: at 07:58

## 2019-01-30 RX ADMIN — PRAVASTATIN SODIUM 40 MG: 40 TABLET ORAL at 22:46

## 2019-01-30 RX ADMIN — INSULIN LISPRO 1 UNITS: 100 INJECTION, SOLUTION INTRAVENOUS; SUBCUTANEOUS at 22:52

## 2019-01-30 RX ADMIN — FERROUS SULFATE TAB 325 MG (65 MG ELEMENTAL FE) 325 MG: 325 (65 FE) TAB at 16:58

## 2019-01-30 ASSESSMENT — PAIN SCALES - GENERAL
PAINLEVEL_OUTOF10: 0
PAINLEVEL_OUTOF10: 4

## 2019-01-31 PROBLEM — N18.9 ACUTE KIDNEY INJURY SUPERIMPOSED ON CHRONIC KIDNEY DISEASE (HCC): Status: RESOLVED | Noted: 2018-12-05 | Resolved: 2019-01-31

## 2019-01-31 PROBLEM — I50.33 ACUTE ON CHRONIC DIASTOLIC CHF (CONGESTIVE HEART FAILURE), NYHA CLASS 4 (HCC): Status: RESOLVED | Noted: 2018-12-05 | Resolved: 2019-01-31

## 2019-01-31 PROBLEM — N17.9 ACUTE KIDNEY INJURY SUPERIMPOSED ON CHRONIC KIDNEY DISEASE (HCC): Status: RESOLVED | Noted: 2018-12-05 | Resolved: 2019-01-31

## 2019-01-31 LAB
ANION GAP SERPL CALCULATED.3IONS-SCNC: 8 MMOL/L (ref 3–16)
BASOPHILS ABSOLUTE: 0 K/UL (ref 0–0.2)
BASOPHILS RELATIVE PERCENT: 0.6 %
BUN BLDV-MCNC: 105 MG/DL (ref 7–20)
CALCIUM SERPL-MCNC: 8.2 MG/DL (ref 8.3–10.6)
CHLORIDE BLD-SCNC: 97 MMOL/L (ref 99–110)
CO2: 32 MMOL/L (ref 21–32)
CREAT SERPL-MCNC: 2.2 MG/DL (ref 0.8–1.3)
EOSINOPHILS ABSOLUTE: 0.2 K/UL (ref 0–0.6)
EOSINOPHILS RELATIVE PERCENT: 3.2 %
GFR AFRICAN AMERICAN: 36
GFR NON-AFRICAN AMERICAN: 30
GLUCOSE BLD-MCNC: 153 MG/DL (ref 70–99)
GLUCOSE BLD-MCNC: 154 MG/DL (ref 70–99)
GLUCOSE BLD-MCNC: 179 MG/DL (ref 70–99)
GLUCOSE BLD-MCNC: 204 MG/DL (ref 70–99)
GLUCOSE BLD-MCNC: 235 MG/DL (ref 70–99)
GLUCOSE BLD-MCNC: 277 MG/DL (ref 70–99)
HCT VFR BLD CALC: 29 % (ref 40.5–52.5)
HEMOGLOBIN: 9 G/DL (ref 13.5–17.5)
LYMPHOCYTES ABSOLUTE: 0.8 K/UL (ref 1–5.1)
LYMPHOCYTES RELATIVE PERCENT: 12.4 %
MAGNESIUM: 2.4 MG/DL (ref 1.8–2.4)
MCH RBC QN AUTO: 28.6 PG (ref 26–34)
MCHC RBC AUTO-ENTMCNC: 31.1 G/DL (ref 31–36)
MCV RBC AUTO: 91.8 FL (ref 80–100)
MONOCYTES ABSOLUTE: 0.6 K/UL (ref 0–1.3)
MONOCYTES RELATIVE PERCENT: 9.1 %
NEUTROPHILS ABSOLUTE: 4.6 K/UL (ref 1.7–7.7)
NEUTROPHILS RELATIVE PERCENT: 74.7 %
PDW BLD-RTO: 16.1 % (ref 12.4–15.4)
PERFORMED ON: ABNORMAL
PLATELET # BLD: 130 K/UL (ref 135–450)
PMV BLD AUTO: 6.7 FL (ref 5–10.5)
POTASSIUM SERPL-SCNC: 4.3 MMOL/L (ref 3.5–5.1)
RBC # BLD: 3.16 M/UL (ref 4.2–5.9)
SODIUM BLD-SCNC: 137 MMOL/L (ref 136–145)
WBC # BLD: 6.2 K/UL (ref 4–11)

## 2019-01-31 PROCEDURE — 80048 BASIC METABOLIC PNL TOTAL CA: CPT

## 2019-01-31 PROCEDURE — 2700000000 HC OXYGEN THERAPY PER DAY

## 2019-01-31 PROCEDURE — 36415 COLL VENOUS BLD VENIPUNCTURE: CPT

## 2019-01-31 PROCEDURE — 83735 ASSAY OF MAGNESIUM: CPT

## 2019-01-31 PROCEDURE — 6370000000 HC RX 637 (ALT 250 FOR IP): Performed by: INTERNAL MEDICINE

## 2019-01-31 PROCEDURE — 6370000000 HC RX 637 (ALT 250 FOR IP): Performed by: NURSE PRACTITIONER

## 2019-01-31 PROCEDURE — 97530 THERAPEUTIC ACTIVITIES: CPT

## 2019-01-31 PROCEDURE — 85025 COMPLETE CBC W/AUTO DIFF WBC: CPT

## 2019-01-31 PROCEDURE — 94640 AIRWAY INHALATION TREATMENT: CPT

## 2019-01-31 PROCEDURE — 2580000003 HC RX 258: Performed by: NURSE PRACTITIONER

## 2019-01-31 PROCEDURE — 94760 N-INVAS EAR/PLS OXIMETRY 1: CPT

## 2019-01-31 PROCEDURE — 6360000002 HC RX W HCPCS: Performed by: NURSE PRACTITIONER

## 2019-01-31 PROCEDURE — 1200000000 HC SEMI PRIVATE

## 2019-01-31 PROCEDURE — 6360000002 HC RX W HCPCS: Performed by: INTERNAL MEDICINE

## 2019-01-31 PROCEDURE — 99232 SBSQ HOSP IP/OBS MODERATE 35: CPT | Performed by: CLINICAL NURSE SPECIALIST

## 2019-01-31 PROCEDURE — 6370000000 HC RX 637 (ALT 250 FOR IP): Performed by: HOSPITALIST

## 2019-01-31 RX ADMIN — HYDRALAZINE HYDROCHLORIDE 50 MG: 25 TABLET, FILM COATED ORAL at 21:36

## 2019-01-31 RX ADMIN — FERROUS SULFATE TAB 325 MG (65 MG ELEMENTAL FE) 325 MG: 325 (65 FE) TAB at 17:02

## 2019-01-31 RX ADMIN — IPRATROPIUM BROMIDE AND ALBUTEROL SULFATE 1 AMPULE: .5; 3 SOLUTION RESPIRATORY (INHALATION) at 15:00

## 2019-01-31 RX ADMIN — Medication 10 ML: at 09:10

## 2019-01-31 RX ADMIN — CITALOPRAM HYDROBROMIDE 20 MG: 20 TABLET ORAL at 09:10

## 2019-01-31 RX ADMIN — INSULIN GLARGINE 40 UNITS: 100 INJECTION, SOLUTION SUBCUTANEOUS at 21:38

## 2019-01-31 RX ADMIN — FERROUS SULFATE TAB 325 MG (65 MG ELEMENTAL FE) 325 MG: 325 (65 FE) TAB at 09:09

## 2019-01-31 RX ADMIN — PRAVASTATIN SODIUM 40 MG: 40 TABLET ORAL at 21:35

## 2019-01-31 RX ADMIN — HYDRALAZINE HYDROCHLORIDE 50 MG: 25 TABLET, FILM COATED ORAL at 14:39

## 2019-01-31 RX ADMIN — FUROSEMIDE 40 MG: 10 INJECTION, SOLUTION INTRAMUSCULAR; INTRAVENOUS at 09:16

## 2019-01-31 RX ADMIN — INSULIN LISPRO 1 UNITS: 100 INJECTION, SOLUTION INTRAVENOUS; SUBCUTANEOUS at 09:11

## 2019-01-31 RX ADMIN — INSULIN LISPRO 2 UNITS: 100 INJECTION, SOLUTION INTRAVENOUS; SUBCUTANEOUS at 18:10

## 2019-01-31 RX ADMIN — INSULIN LISPRO 2 UNITS: 100 INJECTION, SOLUTION INTRAVENOUS; SUBCUTANEOUS at 21:38

## 2019-01-31 RX ADMIN — FLUTICASONE PROPIONATE 1 SPRAY: 50 SPRAY, METERED NASAL at 09:11

## 2019-01-31 RX ADMIN — PANTOPRAZOLE SODIUM 40 MG: 40 TABLET, DELAYED RELEASE ORAL at 06:42

## 2019-01-31 RX ADMIN — GUAIFENESIN 600 MG: 600 TABLET, EXTENDED RELEASE ORAL at 21:35

## 2019-01-31 RX ADMIN — HYDRALAZINE HYDROCHLORIDE 50 MG: 25 TABLET, FILM COATED ORAL at 06:43

## 2019-01-31 RX ADMIN — GUAIFENESIN 600 MG: 600 TABLET, EXTENDED RELEASE ORAL at 09:09

## 2019-01-31 RX ADMIN — ASPIRIN 81 MG CHEWABLE TABLET 81 MG: 81 TABLET CHEWABLE at 09:09

## 2019-01-31 RX ADMIN — CARVEDILOL 25 MG: 25 TABLET, FILM COATED ORAL at 17:02

## 2019-01-31 RX ADMIN — IPRATROPIUM BROMIDE AND ALBUTEROL SULFATE 1 AMPULE: .5; 3 SOLUTION RESPIRATORY (INHALATION) at 20:28

## 2019-01-31 RX ADMIN — GABAPENTIN 200 MG: 100 CAPSULE ORAL at 21:35

## 2019-01-31 RX ADMIN — DOCUSATE SODIUM 100 MG: 100 CAPSULE, LIQUID FILLED ORAL at 09:10

## 2019-01-31 RX ADMIN — INSULIN LISPRO 1 UNITS: 100 INJECTION, SOLUTION INTRAVENOUS; SUBCUTANEOUS at 12:55

## 2019-01-31 RX ADMIN — GABAPENTIN 200 MG: 100 CAPSULE ORAL at 09:09

## 2019-01-31 RX ADMIN — FINASTERIDE 5 MG: 5 TABLET, FILM COATED ORAL at 09:09

## 2019-01-31 RX ADMIN — CARVEDILOL 25 MG: 25 TABLET, FILM COATED ORAL at 09:09

## 2019-01-31 RX ADMIN — ENOXAPARIN SODIUM 30 MG: 30 INJECTION SUBCUTANEOUS at 09:09

## 2019-01-31 RX ADMIN — IPRATROPIUM BROMIDE AND ALBUTEROL SULFATE 1 AMPULE: .5; 3 SOLUTION RESPIRATORY (INHALATION) at 08:18

## 2019-01-31 ASSESSMENT — PAIN SCALES - GENERAL
PAINLEVEL_OUTOF10: 0

## 2019-01-31 ASSESSMENT — PULMONARY FUNCTION TESTS
PEFR_L/MIN: 18
PEFR_L/MIN: 18

## 2019-02-01 LAB
ANION GAP SERPL CALCULATED.3IONS-SCNC: 9 MMOL/L (ref 3–16)
BASOPHILS ABSOLUTE: 0 K/UL (ref 0–0.2)
BASOPHILS RELATIVE PERCENT: 0.6 %
BUN BLDV-MCNC: 107 MG/DL (ref 7–20)
CALCIUM SERPL-MCNC: 8.4 MG/DL (ref 8.3–10.6)
CHLORIDE BLD-SCNC: 95 MMOL/L (ref 99–110)
CO2: 31 MMOL/L (ref 21–32)
CREAT SERPL-MCNC: 2 MG/DL (ref 0.8–1.3)
EOSINOPHILS ABSOLUTE: 0.2 K/UL (ref 0–0.6)
EOSINOPHILS RELATIVE PERCENT: 2.9 %
GFR AFRICAN AMERICAN: 41
GFR NON-AFRICAN AMERICAN: 34
GLUCOSE BLD-MCNC: 140 MG/DL (ref 70–99)
GLUCOSE BLD-MCNC: 143 MG/DL (ref 70–99)
GLUCOSE BLD-MCNC: 238 MG/DL (ref 70–99)
GLUCOSE BLD-MCNC: 296 MG/DL (ref 70–99)
GLUCOSE BLD-MCNC: 313 MG/DL (ref 70–99)
HCT VFR BLD CALC: 27.5 % (ref 40.5–52.5)
HEMOGLOBIN: 8.8 G/DL (ref 13.5–17.5)
LYMPHOCYTES ABSOLUTE: 0.7 K/UL (ref 1–5.1)
LYMPHOCYTES RELATIVE PERCENT: 11.9 %
MAGNESIUM: 2.5 MG/DL (ref 1.8–2.4)
MCH RBC QN AUTO: 29 PG (ref 26–34)
MCHC RBC AUTO-ENTMCNC: 31.8 G/DL (ref 31–36)
MCV RBC AUTO: 91 FL (ref 80–100)
MONOCYTES ABSOLUTE: 0.6 K/UL (ref 0–1.3)
MONOCYTES RELATIVE PERCENT: 9.2 %
NEUTROPHILS ABSOLUTE: 4.5 K/UL (ref 1.7–7.7)
NEUTROPHILS RELATIVE PERCENT: 75.4 %
PDW BLD-RTO: 16.1 % (ref 12.4–15.4)
PERFORMED ON: ABNORMAL
PLATELET # BLD: 132 K/UL (ref 135–450)
PMV BLD AUTO: 6.8 FL (ref 5–10.5)
POTASSIUM SERPL-SCNC: 4.5 MMOL/L (ref 3.5–5.1)
RBC # BLD: 3.02 M/UL (ref 4.2–5.9)
SODIUM BLD-SCNC: 135 MMOL/L (ref 136–145)
WBC # BLD: 6 K/UL (ref 4–11)

## 2019-02-01 PROCEDURE — 36415 COLL VENOUS BLD VENIPUNCTURE: CPT

## 2019-02-01 PROCEDURE — 85025 COMPLETE CBC W/AUTO DIFF WBC: CPT

## 2019-02-01 PROCEDURE — 99232 SBSQ HOSP IP/OBS MODERATE 35: CPT | Performed by: CLINICAL NURSE SPECIALIST

## 2019-02-01 PROCEDURE — 80048 BASIC METABOLIC PNL TOTAL CA: CPT

## 2019-02-01 PROCEDURE — 1200000000 HC SEMI PRIVATE

## 2019-02-01 PROCEDURE — 6370000000 HC RX 637 (ALT 250 FOR IP): Performed by: NURSE PRACTITIONER

## 2019-02-01 PROCEDURE — 6370000000 HC RX 637 (ALT 250 FOR IP): Performed by: INTERNAL MEDICINE

## 2019-02-01 PROCEDURE — 6360000002 HC RX W HCPCS: Performed by: NURSE PRACTITIONER

## 2019-02-01 PROCEDURE — 94640 AIRWAY INHALATION TREATMENT: CPT

## 2019-02-01 PROCEDURE — 2700000000 HC OXYGEN THERAPY PER DAY

## 2019-02-01 PROCEDURE — 6370000000 HC RX 637 (ALT 250 FOR IP): Performed by: HOSPITALIST

## 2019-02-01 PROCEDURE — 83735 ASSAY OF MAGNESIUM: CPT

## 2019-02-01 PROCEDURE — 2580000003 HC RX 258: Performed by: NURSE PRACTITIONER

## 2019-02-01 PROCEDURE — 94760 N-INVAS EAR/PLS OXIMETRY 1: CPT

## 2019-02-01 RX ORDER — FUROSEMIDE 40 MG/1
40 TABLET ORAL 2 TIMES DAILY
Status: DISCONTINUED | OUTPATIENT
Start: 2019-02-02 | End: 2019-02-04

## 2019-02-01 RX ORDER — ACETAMINOPHEN 80 MG
TABLET,CHEWABLE ORAL
Status: COMPLETED
Start: 2019-02-01 | End: 2019-02-02

## 2019-02-01 RX ADMIN — GABAPENTIN 200 MG: 100 CAPSULE ORAL at 09:42

## 2019-02-01 RX ADMIN — IPRATROPIUM BROMIDE AND ALBUTEROL SULFATE 1 AMPULE: .5; 3 SOLUTION RESPIRATORY (INHALATION) at 19:58

## 2019-02-01 RX ADMIN — CARVEDILOL 25 MG: 25 TABLET, FILM COATED ORAL at 18:32

## 2019-02-01 RX ADMIN — HYDRALAZINE HYDROCHLORIDE 50 MG: 25 TABLET, FILM COATED ORAL at 14:54

## 2019-02-01 RX ADMIN — HYDRALAZINE HYDROCHLORIDE 50 MG: 25 TABLET, FILM COATED ORAL at 06:50

## 2019-02-01 RX ADMIN — DOCUSATE SODIUM 100 MG: 100 CAPSULE, LIQUID FILLED ORAL at 09:42

## 2019-02-01 RX ADMIN — INSULIN LISPRO 4 UNITS: 100 INJECTION, SOLUTION INTRAVENOUS; SUBCUTANEOUS at 12:05

## 2019-02-01 RX ADMIN — CITALOPRAM HYDROBROMIDE 20 MG: 20 TABLET ORAL at 09:42

## 2019-02-01 RX ADMIN — ACETAMINOPHEN 650 MG: 325 TABLET, FILM COATED ORAL at 22:56

## 2019-02-01 RX ADMIN — Medication 10 ML: at 22:57

## 2019-02-01 RX ADMIN — INSULIN LISPRO 2 UNITS: 100 INJECTION, SOLUTION INTRAVENOUS; SUBCUTANEOUS at 17:45

## 2019-02-01 RX ADMIN — IPRATROPIUM BROMIDE AND ALBUTEROL SULFATE 1 AMPULE: .5; 3 SOLUTION RESPIRATORY (INHALATION) at 13:07

## 2019-02-01 RX ADMIN — FINASTERIDE 5 MG: 5 TABLET, FILM COATED ORAL at 09:42

## 2019-02-01 RX ADMIN — GUAIFENESIN 600 MG: 600 TABLET, EXTENDED RELEASE ORAL at 22:56

## 2019-02-01 RX ADMIN — Medication 10 ML: at 09:44

## 2019-02-01 RX ADMIN — FERROUS SULFATE TAB 325 MG (65 MG ELEMENTAL FE) 325 MG: 325 (65 FE) TAB at 09:42

## 2019-02-01 RX ADMIN — GABAPENTIN 200 MG: 100 CAPSULE ORAL at 22:56

## 2019-02-01 RX ADMIN — ASPIRIN 81 MG CHEWABLE TABLET 81 MG: 81 TABLET CHEWABLE at 09:42

## 2019-02-01 RX ADMIN — TRAZODONE HYDROCHLORIDE 50 MG: 50 TABLET ORAL at 22:56

## 2019-02-01 RX ADMIN — INSULIN LISPRO 1 UNITS: 100 INJECTION, SOLUTION INTRAVENOUS; SUBCUTANEOUS at 09:47

## 2019-02-01 RX ADMIN — INSULIN GLARGINE 40 UNITS: 100 INJECTION, SOLUTION SUBCUTANEOUS at 23:03

## 2019-02-01 RX ADMIN — GUAIFENESIN 600 MG: 600 TABLET, EXTENDED RELEASE ORAL at 09:43

## 2019-02-01 RX ADMIN — IPRATROPIUM BROMIDE AND ALBUTEROL SULFATE 1 AMPULE: .5; 3 SOLUTION RESPIRATORY (INHALATION) at 07:58

## 2019-02-01 RX ADMIN — INSULIN LISPRO 2 UNITS: 100 INJECTION, SOLUTION INTRAVENOUS; SUBCUTANEOUS at 23:02

## 2019-02-01 RX ADMIN — ENOXAPARIN SODIUM 30 MG: 30 INJECTION SUBCUTANEOUS at 09:43

## 2019-02-01 RX ADMIN — HYDRALAZINE HYDROCHLORIDE 50 MG: 25 TABLET, FILM COATED ORAL at 23:19

## 2019-02-01 RX ADMIN — FERROUS SULFATE TAB 325 MG (65 MG ELEMENTAL FE) 325 MG: 325 (65 FE) TAB at 18:38

## 2019-02-01 RX ADMIN — PRAVASTATIN SODIUM 40 MG: 40 TABLET ORAL at 22:56

## 2019-02-01 RX ADMIN — PANTOPRAZOLE SODIUM 40 MG: 40 TABLET, DELAYED RELEASE ORAL at 06:50

## 2019-02-01 RX ADMIN — FLUTICASONE PROPIONATE 1 SPRAY: 50 SPRAY, METERED NASAL at 09:44

## 2019-02-01 RX ADMIN — CARVEDILOL 25 MG: 25 TABLET, FILM COATED ORAL at 09:43

## 2019-02-01 ASSESSMENT — PAIN SCALES - GENERAL
PAINLEVEL_OUTOF10: 3
PAINLEVEL_OUTOF10: 4
PAINLEVEL_OUTOF10: 0
PAINLEVEL_OUTOF10: 0
PAINLEVEL_OUTOF10: 1
PAINLEVEL_OUTOF10: 0

## 2019-02-01 ASSESSMENT — PAIN DESCRIPTION - LOCATION
LOCATION: CHEST
LOCATION: CHEST

## 2019-02-01 ASSESSMENT — PAIN DESCRIPTION - DESCRIPTORS: DESCRIPTORS: TIGHTNESS

## 2019-02-01 ASSESSMENT — PAIN DESCRIPTION - ORIENTATION: ORIENTATION: LEFT

## 2019-02-01 ASSESSMENT — PAIN DESCRIPTION - PAIN TYPE
TYPE: ACUTE PAIN
TYPE: ACUTE PAIN

## 2019-02-02 LAB
ANION GAP SERPL CALCULATED.3IONS-SCNC: 9 MMOL/L (ref 3–16)
BASOPHILS ABSOLUTE: 0 K/UL (ref 0–0.2)
BASOPHILS RELATIVE PERCENT: 0.8 %
BILIRUBIN URINE: NEGATIVE
BLOOD, URINE: ABNORMAL
BUN BLDV-MCNC: 108 MG/DL (ref 7–20)
CALCIUM SERPL-MCNC: 8.6 MG/DL (ref 8.3–10.6)
CHLORIDE BLD-SCNC: 94 MMOL/L (ref 99–110)
CLARITY: ABNORMAL
CO2: 33 MMOL/L (ref 21–32)
COLOR: YELLOW
COMMENT UA: ABNORMAL
CREAT SERPL-MCNC: 2 MG/DL (ref 0.8–1.3)
EOSINOPHILS ABSOLUTE: 0.2 K/UL (ref 0–0.6)
EOSINOPHILS RELATIVE PERCENT: 3.8 %
EPITHELIAL CELLS, UA: 1 /HPF (ref 0–5)
GFR AFRICAN AMERICAN: 41
GFR NON-AFRICAN AMERICAN: 34
GLUCOSE BLD-MCNC: 187 MG/DL (ref 70–99)
GLUCOSE BLD-MCNC: 202 MG/DL (ref 70–99)
GLUCOSE BLD-MCNC: 214 MG/DL (ref 70–99)
GLUCOSE BLD-MCNC: 214 MG/DL (ref 70–99)
GLUCOSE BLD-MCNC: 238 MG/DL (ref 70–99)
GLUCOSE URINE: NEGATIVE MG/DL
HCT VFR BLD CALC: 28.1 % (ref 40.5–52.5)
HEMOGLOBIN: 8.8 G/DL (ref 13.5–17.5)
HYALINE CASTS: 5 /LPF (ref 0–8)
KETONES, URINE: NEGATIVE MG/DL
LEUKOCYTE ESTERASE, URINE: ABNORMAL
LYMPHOCYTES ABSOLUTE: 0.8 K/UL (ref 1–5.1)
LYMPHOCYTES RELATIVE PERCENT: 15.6 %
MAGNESIUM: 2.7 MG/DL (ref 1.8–2.4)
MCH RBC QN AUTO: 28.3 PG (ref 26–34)
MCHC RBC AUTO-ENTMCNC: 31.2 G/DL (ref 31–36)
MCV RBC AUTO: 90.8 FL (ref 80–100)
MICROSCOPIC EXAMINATION: YES
MONOCYTES ABSOLUTE: 0.5 K/UL (ref 0–1.3)
MONOCYTES RELATIVE PERCENT: 9.4 %
NEUTROPHILS ABSOLUTE: 3.8 K/UL (ref 1.7–7.7)
NEUTROPHILS RELATIVE PERCENT: 70.4 %
NITRITE, URINE: NEGATIVE
PDW BLD-RTO: 15.8 % (ref 12.4–15.4)
PERFORMED ON: ABNORMAL
PH UA: 5
PLATELET # BLD: 132 K/UL (ref 135–450)
PMV BLD AUTO: 6.9 FL (ref 5–10.5)
POTASSIUM SERPL-SCNC: 4.3 MMOL/L (ref 3.5–5.1)
PROTEIN UA: NEGATIVE MG/DL
RBC # BLD: 3.09 M/UL (ref 4.2–5.9)
RBC UA: 18 /HPF (ref 0–4)
SODIUM BLD-SCNC: 136 MMOL/L (ref 136–145)
SPECIFIC GRAVITY UA: 1.02
UROBILINOGEN, URINE: 1 E.U./DL
WBC # BLD: 5.4 K/UL (ref 4–11)
WBC UA: 94 /HPF (ref 0–5)

## 2019-02-02 PROCEDURE — 94640 AIRWAY INHALATION TREATMENT: CPT

## 2019-02-02 PROCEDURE — 81001 URINALYSIS AUTO W/SCOPE: CPT

## 2019-02-02 PROCEDURE — 6360000002 HC RX W HCPCS: Performed by: HOSPITALIST

## 2019-02-02 PROCEDURE — 85025 COMPLETE CBC W/AUTO DIFF WBC: CPT

## 2019-02-02 PROCEDURE — 6360000002 HC RX W HCPCS: Performed by: NURSE PRACTITIONER

## 2019-02-02 PROCEDURE — 2700000000 HC OXYGEN THERAPY PER DAY

## 2019-02-02 PROCEDURE — 2580000003 HC RX 258: Performed by: NURSE PRACTITIONER

## 2019-02-02 PROCEDURE — 83935 ASSAY OF URINE OSMOLALITY: CPT

## 2019-02-02 PROCEDURE — 99232 SBSQ HOSP IP/OBS MODERATE 35: CPT | Performed by: NURSE PRACTITIONER

## 2019-02-02 PROCEDURE — 6370000000 HC RX 637 (ALT 250 FOR IP): Performed by: INTERNAL MEDICINE

## 2019-02-02 PROCEDURE — 6370000000 HC RX 637 (ALT 250 FOR IP): Performed by: HOSPITALIST

## 2019-02-02 PROCEDURE — 87205 SMEAR GRAM STAIN: CPT

## 2019-02-02 PROCEDURE — 1200000000 HC SEMI PRIVATE

## 2019-02-02 PROCEDURE — 6370000000 HC RX 637 (ALT 250 FOR IP): Performed by: NURSE PRACTITIONER

## 2019-02-02 PROCEDURE — 80048 BASIC METABOLIC PNL TOTAL CA: CPT

## 2019-02-02 PROCEDURE — 84156 ASSAY OF PROTEIN URINE: CPT

## 2019-02-02 PROCEDURE — 94760 N-INVAS EAR/PLS OXIMETRY 1: CPT

## 2019-02-02 PROCEDURE — 83735 ASSAY OF MAGNESIUM: CPT

## 2019-02-02 PROCEDURE — 84133 ASSAY OF URINE POTASSIUM: CPT

## 2019-02-02 PROCEDURE — 36415 COLL VENOUS BLD VENIPUNCTURE: CPT

## 2019-02-02 PROCEDURE — 84540 ASSAY OF URINE/UREA-N: CPT

## 2019-02-02 PROCEDURE — 82570 ASSAY OF URINE CREATININE: CPT

## 2019-02-02 RX ADMIN — Medication 10 ML: at 20:50

## 2019-02-02 RX ADMIN — FERROUS SULFATE TAB 325 MG (65 MG ELEMENTAL FE) 325 MG: 325 (65 FE) TAB at 10:06

## 2019-02-02 RX ADMIN — HYDRALAZINE HYDROCHLORIDE 50 MG: 25 TABLET, FILM COATED ORAL at 15:06

## 2019-02-02 RX ADMIN — INSULIN LISPRO 2 UNITS: 100 INJECTION, SOLUTION INTRAVENOUS; SUBCUTANEOUS at 18:24

## 2019-02-02 RX ADMIN — Medication 10 ML: at 10:07

## 2019-02-02 RX ADMIN — GABAPENTIN 200 MG: 100 CAPSULE ORAL at 10:06

## 2019-02-02 RX ADMIN — PRAVASTATIN SODIUM 40 MG: 40 TABLET ORAL at 20:48

## 2019-02-02 RX ADMIN — ONDANSETRON HYDROCHLORIDE 4 MG: 2 INJECTION, SOLUTION INTRAMUSCULAR; INTRAVENOUS at 15:35

## 2019-02-02 RX ADMIN — TRAZODONE HYDROCHLORIDE 50 MG: 50 TABLET ORAL at 23:15

## 2019-02-02 RX ADMIN — ASPIRIN 81 MG CHEWABLE TABLET 81 MG: 81 TABLET CHEWABLE at 10:06

## 2019-02-02 RX ADMIN — CARVEDILOL 25 MG: 25 TABLET, FILM COATED ORAL at 10:05

## 2019-02-02 RX ADMIN — IPRATROPIUM BROMIDE AND ALBUTEROL SULFATE 1 AMPULE: .5; 3 SOLUTION RESPIRATORY (INHALATION) at 15:38

## 2019-02-02 RX ADMIN — PANTOPRAZOLE SODIUM 40 MG: 40 TABLET, DELAYED RELEASE ORAL at 06:57

## 2019-02-02 RX ADMIN — INSULIN LISPRO 1 UNITS: 100 INJECTION, SOLUTION INTRAVENOUS; SUBCUTANEOUS at 23:17

## 2019-02-02 RX ADMIN — INSULIN LISPRO 2 UNITS: 100 INJECTION, SOLUTION INTRAVENOUS; SUBCUTANEOUS at 12:21

## 2019-02-02 RX ADMIN — Medication: at 01:39

## 2019-02-02 RX ADMIN — IPRATROPIUM BROMIDE AND ALBUTEROL SULFATE 1 AMPULE: .5; 3 SOLUTION RESPIRATORY (INHALATION) at 22:25

## 2019-02-02 RX ADMIN — FERROUS SULFATE TAB 325 MG (65 MG ELEMENTAL FE) 325 MG: 325 (65 FE) TAB at 17:17

## 2019-02-02 RX ADMIN — HYDRALAZINE HYDROCHLORIDE 50 MG: 25 TABLET, FILM COATED ORAL at 06:57

## 2019-02-02 RX ADMIN — INSULIN LISPRO 1 UNITS: 100 INJECTION, SOLUTION INTRAVENOUS; SUBCUTANEOUS at 10:09

## 2019-02-02 RX ADMIN — GUAIFENESIN 600 MG: 600 TABLET, EXTENDED RELEASE ORAL at 20:48

## 2019-02-02 RX ADMIN — ENOXAPARIN SODIUM 30 MG: 30 INJECTION SUBCUTANEOUS at 10:06

## 2019-02-02 RX ADMIN — HYDRALAZINE HYDROCHLORIDE 50 MG: 25 TABLET, FILM COATED ORAL at 20:49

## 2019-02-02 RX ADMIN — IPRATROPIUM BROMIDE AND ALBUTEROL SULFATE 1 AMPULE: .5; 3 SOLUTION RESPIRATORY (INHALATION) at 08:09

## 2019-02-02 RX ADMIN — FLUTICASONE PROPIONATE 1 SPRAY: 50 SPRAY, METERED NASAL at 10:07

## 2019-02-02 RX ADMIN — FINASTERIDE 5 MG: 5 TABLET, FILM COATED ORAL at 10:06

## 2019-02-02 RX ADMIN — CITALOPRAM HYDROBROMIDE 20 MG: 20 TABLET ORAL at 10:06

## 2019-02-02 RX ADMIN — CARVEDILOL 25 MG: 25 TABLET, FILM COATED ORAL at 17:17

## 2019-02-02 RX ADMIN — FUROSEMIDE 40 MG: 40 TABLET ORAL at 10:06

## 2019-02-02 RX ADMIN — DOCUSATE SODIUM 100 MG: 100 CAPSULE, LIQUID FILLED ORAL at 10:06

## 2019-02-02 RX ADMIN — INSULIN GLARGINE 40 UNITS: 100 INJECTION, SOLUTION SUBCUTANEOUS at 23:16

## 2019-02-02 RX ADMIN — GUAIFENESIN 600 MG: 600 TABLET, EXTENDED RELEASE ORAL at 10:06

## 2019-02-02 RX ADMIN — GABAPENTIN 200 MG: 100 CAPSULE ORAL at 20:48

## 2019-02-02 RX ADMIN — FUROSEMIDE 40 MG: 40 TABLET ORAL at 17:17

## 2019-02-02 ASSESSMENT — PAIN SCALES - GENERAL
PAINLEVEL_OUTOF10: 0

## 2019-02-03 ENCOUNTER — APPOINTMENT (OUTPATIENT)
Dept: GENERAL RADIOLOGY | Age: 64
DRG: 264 | End: 2019-02-03
Payer: MEDICARE

## 2019-02-03 LAB
ALBUMIN SERPL-MCNC: 3.5 G/DL (ref 3.4–5)
ANION GAP SERPL CALCULATED.3IONS-SCNC: 8 MMOL/L (ref 3–16)
BASOPHILS ABSOLUTE: 0 K/UL (ref 0–0.2)
BASOPHILS RELATIVE PERCENT: 0.7 %
BUN BLDV-MCNC: 119 MG/DL (ref 7–20)
CALCIUM SERPL-MCNC: 8.8 MG/DL (ref 8.3–10.6)
CHLORIDE BLD-SCNC: 97 MMOL/L (ref 99–110)
CO2: 34 MMOL/L (ref 21–32)
CREAT SERPL-MCNC: 2.3 MG/DL (ref 0.8–1.3)
CREATININE URINE: 76.1 MG/DL (ref 39–259)
EOSINOPHIL,URINE: NORMAL
EOSINOPHILS ABSOLUTE: 0.2 K/UL (ref 0–0.6)
EOSINOPHILS RELATIVE PERCENT: 3.5 %
GFR AFRICAN AMERICAN: 35
GFR NON-AFRICAN AMERICAN: 29
GLUCOSE BLD-MCNC: 147 MG/DL (ref 70–99)
GLUCOSE BLD-MCNC: 154 MG/DL (ref 70–99)
GLUCOSE BLD-MCNC: 167 MG/DL (ref 70–99)
GLUCOSE BLD-MCNC: 174 MG/DL (ref 70–99)
GLUCOSE BLD-MCNC: 258 MG/DL (ref 70–99)
HCT VFR BLD CALC: 28.1 % (ref 40.5–52.5)
HEMOGLOBIN: 8.9 G/DL (ref 13.5–17.5)
LYMPHOCYTES ABSOLUTE: 0.8 K/UL (ref 1–5.1)
LYMPHOCYTES RELATIVE PERCENT: 13.8 %
MAGNESIUM: 2.7 MG/DL (ref 1.8–2.4)
MCH RBC QN AUTO: 29.1 PG (ref 26–34)
MCHC RBC AUTO-ENTMCNC: 31.5 G/DL (ref 31–36)
MCV RBC AUTO: 92.5 FL (ref 80–100)
MONOCYTES ABSOLUTE: 0.6 K/UL (ref 0–1.3)
MONOCYTES RELATIVE PERCENT: 10.4 %
NEUTROPHILS ABSOLUTE: 4.2 K/UL (ref 1.7–7.7)
NEUTROPHILS RELATIVE PERCENT: 71.6 %
OSMOLALITY URINE: 393 MOSM/KG (ref 390–1070)
PDW BLD-RTO: 15.9 % (ref 12.4–15.4)
PERFORMED ON: ABNORMAL
PHOSPHORUS: 4.6 MG/DL (ref 2.5–4.9)
PLATELET # BLD: 130 K/UL (ref 135–450)
PMV BLD AUTO: 7.1 FL (ref 5–10.5)
POTASSIUM SERPL-SCNC: 4.7 MMOL/L (ref 3.5–5.1)
POTASSIUM, UR: 33.8 MMOL/L
PROTEIN PROTEIN: 19 MG/DL
RBC # BLD: 3.04 M/UL (ref 4.2–5.9)
SODIUM BLD-SCNC: 139 MMOL/L (ref 136–145)
UREA NITROGEN, UR: 698.6 MG/DL (ref 800–1666)
URIC ACID, SERUM: 9.8 MG/DL (ref 3.5–7.2)
WBC # BLD: 5.8 K/UL (ref 4–11)

## 2019-02-03 PROCEDURE — 36415 COLL VENOUS BLD VENIPUNCTURE: CPT

## 2019-02-03 PROCEDURE — 71045 X-RAY EXAM CHEST 1 VIEW: CPT

## 2019-02-03 PROCEDURE — 6360000002 HC RX W HCPCS: Performed by: INTERNAL MEDICINE

## 2019-02-03 PROCEDURE — 94640 AIRWAY INHALATION TREATMENT: CPT

## 2019-02-03 PROCEDURE — 6370000000 HC RX 637 (ALT 250 FOR IP): Performed by: INTERNAL MEDICINE

## 2019-02-03 PROCEDURE — 1200000000 HC SEMI PRIVATE

## 2019-02-03 PROCEDURE — 94664 DEMO&/EVAL PT USE INHALER: CPT

## 2019-02-03 PROCEDURE — 80069 RENAL FUNCTION PANEL: CPT

## 2019-02-03 PROCEDURE — 6360000002 HC RX W HCPCS: Performed by: HOSPITALIST

## 2019-02-03 PROCEDURE — 99233 SBSQ HOSP IP/OBS HIGH 50: CPT | Performed by: INTERNAL MEDICINE

## 2019-02-03 PROCEDURE — 6370000000 HC RX 637 (ALT 250 FOR IP): Performed by: NURSE PRACTITIONER

## 2019-02-03 PROCEDURE — 2700000000 HC OXYGEN THERAPY PER DAY

## 2019-02-03 PROCEDURE — 85025 COMPLETE CBC W/AUTO DIFF WBC: CPT

## 2019-02-03 PROCEDURE — 83735 ASSAY OF MAGNESIUM: CPT

## 2019-02-03 PROCEDURE — 94760 N-INVAS EAR/PLS OXIMETRY 1: CPT

## 2019-02-03 PROCEDURE — 2580000003 HC RX 258: Performed by: NURSE PRACTITIONER

## 2019-02-03 PROCEDURE — 84550 ASSAY OF BLOOD/URIC ACID: CPT

## 2019-02-03 PROCEDURE — 6370000000 HC RX 637 (ALT 250 FOR IP): Performed by: HOSPITALIST

## 2019-02-03 RX ORDER — FUROSEMIDE 10 MG/ML
80 INJECTION INTRAMUSCULAR; INTRAVENOUS ONCE
Status: COMPLETED | OUTPATIENT
Start: 2019-02-03 | End: 2019-02-03

## 2019-02-03 RX ADMIN — GABAPENTIN 200 MG: 100 CAPSULE ORAL at 21:54

## 2019-02-03 RX ADMIN — INSULIN LISPRO 1 UNITS: 100 INJECTION, SOLUTION INTRAVENOUS; SUBCUTANEOUS at 08:50

## 2019-02-03 RX ADMIN — FINASTERIDE 5 MG: 5 TABLET, FILM COATED ORAL at 08:51

## 2019-02-03 RX ADMIN — GUAIFENESIN 600 MG: 600 TABLET, EXTENDED RELEASE ORAL at 08:51

## 2019-02-03 RX ADMIN — ENOXAPARIN SODIUM 30 MG: 30 INJECTION SUBCUTANEOUS at 08:51

## 2019-02-03 RX ADMIN — Medication 10 ML: at 22:08

## 2019-02-03 RX ADMIN — CARVEDILOL 25 MG: 25 TABLET, FILM COATED ORAL at 17:03

## 2019-02-03 RX ADMIN — FUROSEMIDE 80 MG: 10 INJECTION, SOLUTION INTRAMUSCULAR; INTRAVENOUS at 17:04

## 2019-02-03 RX ADMIN — FLUTICASONE PROPIONATE 1 SPRAY: 50 SPRAY, METERED NASAL at 09:00

## 2019-02-03 RX ADMIN — PRAVASTATIN SODIUM 40 MG: 40 TABLET ORAL at 21:54

## 2019-02-03 RX ADMIN — FUROSEMIDE 40 MG: 40 TABLET ORAL at 08:51

## 2019-02-03 RX ADMIN — IPRATROPIUM BROMIDE AND ALBUTEROL SULFATE 1 AMPULE: .5; 3 SOLUTION RESPIRATORY (INHALATION) at 16:37

## 2019-02-03 RX ADMIN — GABAPENTIN 200 MG: 100 CAPSULE ORAL at 08:51

## 2019-02-03 RX ADMIN — INSULIN LISPRO 1 UNITS: 100 INJECTION, SOLUTION INTRAVENOUS; SUBCUTANEOUS at 12:33

## 2019-02-03 RX ADMIN — DOCUSATE SODIUM 100 MG: 100 CAPSULE, LIQUID FILLED ORAL at 08:51

## 2019-02-03 RX ADMIN — HYDRALAZINE HYDROCHLORIDE 50 MG: 25 TABLET, FILM COATED ORAL at 21:53

## 2019-02-03 RX ADMIN — TRAZODONE HYDROCHLORIDE 50 MG: 50 TABLET ORAL at 21:53

## 2019-02-03 RX ADMIN — HYDRALAZINE HYDROCHLORIDE 50 MG: 25 TABLET, FILM COATED ORAL at 06:56

## 2019-02-03 RX ADMIN — FERROUS SULFATE TAB 325 MG (65 MG ELEMENTAL FE) 325 MG: 325 (65 FE) TAB at 08:51

## 2019-02-03 RX ADMIN — INSULIN LISPRO 1 UNITS: 100 INJECTION, SOLUTION INTRAVENOUS; SUBCUTANEOUS at 17:05

## 2019-02-03 RX ADMIN — FERROUS SULFATE TAB 325 MG (65 MG ELEMENTAL FE) 325 MG: 325 (65 FE) TAB at 17:03

## 2019-02-03 RX ADMIN — PANTOPRAZOLE SODIUM 40 MG: 40 TABLET, DELAYED RELEASE ORAL at 06:56

## 2019-02-03 RX ADMIN — HYDRALAZINE HYDROCHLORIDE 50 MG: 25 TABLET, FILM COATED ORAL at 15:27

## 2019-02-03 RX ADMIN — Medication 10 ML: at 08:52

## 2019-02-03 RX ADMIN — INSULIN GLARGINE 40 UNITS: 100 INJECTION, SOLUTION SUBCUTANEOUS at 22:01

## 2019-02-03 RX ADMIN — IPRATROPIUM BROMIDE AND ALBUTEROL SULFATE 1 AMPULE: .5; 3 SOLUTION RESPIRATORY (INHALATION) at 19:54

## 2019-02-03 RX ADMIN — GUAIFENESIN 600 MG: 600 TABLET, EXTENDED RELEASE ORAL at 21:54

## 2019-02-03 RX ADMIN — CARVEDILOL 25 MG: 25 TABLET, FILM COATED ORAL at 08:51

## 2019-02-03 RX ADMIN — ASPIRIN 81 MG CHEWABLE TABLET 81 MG: 81 TABLET CHEWABLE at 08:51

## 2019-02-03 RX ADMIN — INSULIN LISPRO 2 UNITS: 100 INJECTION, SOLUTION INTRAVENOUS; SUBCUTANEOUS at 22:02

## 2019-02-03 ASSESSMENT — PULMONARY FUNCTION TESTS: PEFR_L/MIN: 18

## 2019-02-04 ENCOUNTER — APPOINTMENT (OUTPATIENT)
Dept: GENERAL RADIOLOGY | Age: 64
DRG: 264 | End: 2019-02-04
Payer: MEDICARE

## 2019-02-04 ENCOUNTER — TELEPHONE (OUTPATIENT)
Dept: CARDIOLOGY CLINIC | Age: 64
End: 2019-02-04

## 2019-02-04 LAB
ALBUMIN SERPL-MCNC: 3.1 G/DL (ref 3.4–5)
ANION GAP SERPL CALCULATED.3IONS-SCNC: 10 MMOL/L (ref 3–16)
BASOPHILS ABSOLUTE: 0 K/UL (ref 0–0.2)
BASOPHILS RELATIVE PERCENT: 0.8 %
BUN BLDV-MCNC: 129 MG/DL (ref 7–20)
CALCIUM SERPL-MCNC: 8.5 MG/DL (ref 8.3–10.6)
CHLORIDE BLD-SCNC: 96 MMOL/L (ref 99–110)
CO2: 29 MMOL/L (ref 21–32)
CREAT SERPL-MCNC: 2.1 MG/DL (ref 0.8–1.3)
EOSINOPHILS ABSOLUTE: 0.2 K/UL (ref 0–0.6)
EOSINOPHILS RELATIVE PERCENT: 3.5 %
GFR AFRICAN AMERICAN: 39
GFR NON-AFRICAN AMERICAN: 32
GLUCOSE BLD-MCNC: 105 MG/DL (ref 70–99)
GLUCOSE BLD-MCNC: 130 MG/DL (ref 70–99)
GLUCOSE BLD-MCNC: 141 MG/DL (ref 70–99)
GLUCOSE BLD-MCNC: 217 MG/DL (ref 70–99)
GLUCOSE BLD-MCNC: 257 MG/DL (ref 70–99)
HCT VFR BLD CALC: 28.9 % (ref 40.5–52.5)
HEMOGLOBIN: 9 G/DL (ref 13.5–17.5)
LYMPHOCYTES ABSOLUTE: 0.8 K/UL (ref 1–5.1)
LYMPHOCYTES RELATIVE PERCENT: 14.6 %
MAGNESIUM: 2.5 MG/DL (ref 1.8–2.4)
MCH RBC QN AUTO: 28.7 PG (ref 26–34)
MCHC RBC AUTO-ENTMCNC: 31.2 G/DL (ref 31–36)
MCV RBC AUTO: 92 FL (ref 80–100)
MONOCYTES ABSOLUTE: 0.5 K/UL (ref 0–1.3)
MONOCYTES RELATIVE PERCENT: 9.7 %
NEUTROPHILS ABSOLUTE: 3.9 K/UL (ref 1.7–7.7)
NEUTROPHILS RELATIVE PERCENT: 71.4 %
PDW BLD-RTO: 15.7 % (ref 12.4–15.4)
PERFORMED ON: ABNORMAL
PHOSPHORUS: 4.7 MG/DL (ref 2.5–4.9)
PLATELET # BLD: 122 K/UL (ref 135–450)
PMV BLD AUTO: 7.4 FL (ref 5–10.5)
POTASSIUM SERPL-SCNC: 4.5 MMOL/L (ref 3.5–5.1)
RBC # BLD: 3.14 M/UL (ref 4.2–5.9)
SODIUM BLD-SCNC: 135 MMOL/L (ref 136–145)
WBC # BLD: 5.5 K/UL (ref 4–11)

## 2019-02-04 PROCEDURE — 97530 THERAPEUTIC ACTIVITIES: CPT

## 2019-02-04 PROCEDURE — 71045 X-RAY EXAM CHEST 1 VIEW: CPT

## 2019-02-04 PROCEDURE — 80069 RENAL FUNCTION PANEL: CPT

## 2019-02-04 PROCEDURE — 83735 ASSAY OF MAGNESIUM: CPT

## 2019-02-04 PROCEDURE — 85025 COMPLETE CBC W/AUTO DIFF WBC: CPT

## 2019-02-04 PROCEDURE — 6370000000 HC RX 637 (ALT 250 FOR IP): Performed by: INTERNAL MEDICINE

## 2019-02-04 PROCEDURE — 2580000003 HC RX 258: Performed by: NURSE PRACTITIONER

## 2019-02-04 PROCEDURE — 94640 AIRWAY INHALATION TREATMENT: CPT

## 2019-02-04 PROCEDURE — 6370000000 HC RX 637 (ALT 250 FOR IP): Performed by: NURSE PRACTITIONER

## 2019-02-04 PROCEDURE — 6360000002 HC RX W HCPCS: Performed by: INTERNAL MEDICINE

## 2019-02-04 PROCEDURE — 94760 N-INVAS EAR/PLS OXIMETRY 1: CPT

## 2019-02-04 PROCEDURE — 2700000000 HC OXYGEN THERAPY PER DAY

## 2019-02-04 PROCEDURE — 1200000000 HC SEMI PRIVATE

## 2019-02-04 PROCEDURE — 6360000002 HC RX W HCPCS: Performed by: HOSPITALIST

## 2019-02-04 PROCEDURE — 36415 COLL VENOUS BLD VENIPUNCTURE: CPT

## 2019-02-04 PROCEDURE — 99232 SBSQ HOSP IP/OBS MODERATE 35: CPT | Performed by: NURSE PRACTITIONER

## 2019-02-04 PROCEDURE — P9046 ALBUMIN (HUMAN), 25%, 20 ML: HCPCS | Performed by: INTERNAL MEDICINE

## 2019-02-04 PROCEDURE — 6370000000 HC RX 637 (ALT 250 FOR IP): Performed by: HOSPITALIST

## 2019-02-04 RX ORDER — FUROSEMIDE 10 MG/ML
20 INJECTION INTRAMUSCULAR; INTRAVENOUS ONCE
Status: COMPLETED | OUTPATIENT
Start: 2019-02-04 | End: 2019-02-04

## 2019-02-04 RX ORDER — ALLOPURINOL 100 MG/1
100 TABLET ORAL DAILY
Status: DISCONTINUED | OUTPATIENT
Start: 2019-02-04 | End: 2019-02-05

## 2019-02-04 RX ORDER — ALBUMIN (HUMAN) 12.5 G/50ML
50 SOLUTION INTRAVENOUS 2 TIMES DAILY
Status: DISCONTINUED | OUTPATIENT
Start: 2019-02-04 | End: 2019-02-07

## 2019-02-04 RX ADMIN — GUAIFENESIN 600 MG: 600 TABLET, EXTENDED RELEASE ORAL at 10:28

## 2019-02-04 RX ADMIN — FLUTICASONE PROPIONATE 1 SPRAY: 50 SPRAY, METERED NASAL at 10:31

## 2019-02-04 RX ADMIN — FINASTERIDE 5 MG: 5 TABLET, FILM COATED ORAL at 10:28

## 2019-02-04 RX ADMIN — CARVEDILOL 25 MG: 25 TABLET, FILM COATED ORAL at 17:08

## 2019-02-04 RX ADMIN — ENOXAPARIN SODIUM 30 MG: 30 INJECTION SUBCUTANEOUS at 10:29

## 2019-02-04 RX ADMIN — GUAIFENESIN 600 MG: 600 TABLET, EXTENDED RELEASE ORAL at 20:30

## 2019-02-04 RX ADMIN — INSULIN GLARGINE 40 UNITS: 100 INJECTION, SOLUTION SUBCUTANEOUS at 20:38

## 2019-02-04 RX ADMIN — Medication 10 ML: at 20:51

## 2019-02-04 RX ADMIN — ALBUMIN (HUMAN) 50 G: 0.25 INJECTION, SOLUTION INTRAVENOUS at 17:09

## 2019-02-04 RX ADMIN — ALBUMIN (HUMAN) 50 G: 0.25 INJECTION, SOLUTION INTRAVENOUS at 12:59

## 2019-02-04 RX ADMIN — FUROSEMIDE 40 MG: 40 TABLET ORAL at 10:28

## 2019-02-04 RX ADMIN — HYDRALAZINE HYDROCHLORIDE 50 MG: 25 TABLET, FILM COATED ORAL at 06:23

## 2019-02-04 RX ADMIN — HYDRALAZINE HYDROCHLORIDE 50 MG: 25 TABLET, FILM COATED ORAL at 20:50

## 2019-02-04 RX ADMIN — GABAPENTIN 200 MG: 100 CAPSULE ORAL at 10:28

## 2019-02-04 RX ADMIN — INSULIN LISPRO 2 UNITS: 100 INJECTION, SOLUTION INTRAVENOUS; SUBCUTANEOUS at 17:10

## 2019-02-04 RX ADMIN — ALLOPURINOL 100 MG: 100 TABLET ORAL at 12:36

## 2019-02-04 RX ADMIN — Medication 10 ML: at 10:30

## 2019-02-04 RX ADMIN — FERROUS SULFATE TAB 325 MG (65 MG ELEMENTAL FE) 325 MG: 325 (65 FE) TAB at 17:08

## 2019-02-04 RX ADMIN — GABAPENTIN 200 MG: 100 CAPSULE ORAL at 20:30

## 2019-02-04 RX ADMIN — INSULIN LISPRO 1 UNITS: 100 INJECTION, SOLUTION INTRAVENOUS; SUBCUTANEOUS at 12:36

## 2019-02-04 RX ADMIN — PANTOPRAZOLE SODIUM 40 MG: 40 TABLET, DELAYED RELEASE ORAL at 06:24

## 2019-02-04 RX ADMIN — IPRATROPIUM BROMIDE AND ALBUTEROL SULFATE 1 AMPULE: .5; 3 SOLUTION RESPIRATORY (INHALATION) at 15:03

## 2019-02-04 RX ADMIN — HYDRALAZINE HYDROCHLORIDE 50 MG: 25 TABLET, FILM COATED ORAL at 14:04

## 2019-02-04 RX ADMIN — TRAZODONE HYDROCHLORIDE 50 MG: 50 TABLET ORAL at 23:17

## 2019-02-04 RX ADMIN — INSULIN LISPRO 1 UNITS: 100 INJECTION, SOLUTION INTRAVENOUS; SUBCUTANEOUS at 20:39

## 2019-02-04 RX ADMIN — PRAVASTATIN SODIUM 40 MG: 40 TABLET ORAL at 20:30

## 2019-02-04 RX ADMIN — IPRATROPIUM BROMIDE AND ALBUTEROL SULFATE 1 AMPULE: .5; 3 SOLUTION RESPIRATORY (INHALATION) at 19:50

## 2019-02-04 RX ADMIN — CARVEDILOL 25 MG: 25 TABLET, FILM COATED ORAL at 10:29

## 2019-02-04 RX ADMIN — FERROUS SULFATE TAB 325 MG (65 MG ELEMENTAL FE) 325 MG: 325 (65 FE) TAB at 10:28

## 2019-02-04 RX ADMIN — ASPIRIN 81 MG CHEWABLE TABLET 81 MG: 81 TABLET CHEWABLE at 10:28

## 2019-02-04 RX ADMIN — DOCUSATE SODIUM 100 MG: 100 CAPSULE, LIQUID FILLED ORAL at 10:29

## 2019-02-04 RX ADMIN — FUROSEMIDE 20 MG: 10 INJECTION, SOLUTION INTRAVENOUS at 15:41

## 2019-02-04 RX ADMIN — CITALOPRAM HYDROBROMIDE 20 MG: 20 TABLET ORAL at 10:28

## 2019-02-04 RX ADMIN — IPRATROPIUM BROMIDE AND ALBUTEROL SULFATE 1 AMPULE: .5; 3 SOLUTION RESPIRATORY (INHALATION) at 09:29

## 2019-02-04 ASSESSMENT — PAIN SCALES - GENERAL
PAINLEVEL_OUTOF10: 0

## 2019-02-05 ENCOUNTER — APPOINTMENT (OUTPATIENT)
Dept: ULTRASOUND IMAGING | Age: 64
DRG: 264 | End: 2019-02-05
Payer: MEDICARE

## 2019-02-05 ENCOUNTER — APPOINTMENT (OUTPATIENT)
Dept: CT IMAGING | Age: 64
DRG: 264 | End: 2019-02-05
Payer: MEDICARE

## 2019-02-05 LAB
ANION GAP SERPL CALCULATED.3IONS-SCNC: 13 MMOL/L (ref 3–16)
BASOPHILS ABSOLUTE: 0 K/UL (ref 0–0.2)
BASOPHILS RELATIVE PERCENT: 0.7 %
BILIRUBIN URINE: NEGATIVE
BLOOD, URINE: NEGATIVE
BUN BLDV-MCNC: 133 MG/DL (ref 7–20)
CALCIUM SERPL-MCNC: 9.1 MG/DL (ref 8.3–10.6)
CHLORIDE BLD-SCNC: 94 MMOL/L (ref 99–110)
CHLORIDE URINE RANDOM: <20 MMOL/L
CLARITY: ABNORMAL
CO2: 32 MMOL/L (ref 21–32)
COLOR: YELLOW
COMMENT UA: ABNORMAL
CREAT SERPL-MCNC: 2.1 MG/DL (ref 0.8–1.3)
CREATININE URINE: 57.5 MG/DL (ref 39–259)
EOSINOPHIL,URINE: NORMAL
EOSINOPHILS ABSOLUTE: 0.2 K/UL (ref 0–0.6)
EOSINOPHILS RELATIVE PERCENT: 3.1 %
EPITHELIAL CELLS, UA: 9 /HPF (ref 0–5)
GFR AFRICAN AMERICAN: 39
GFR NON-AFRICAN AMERICAN: 32
GLUCOSE BLD-MCNC: 162 MG/DL (ref 70–99)
GLUCOSE BLD-MCNC: 166 MG/DL (ref 70–99)
GLUCOSE BLD-MCNC: 180 MG/DL (ref 70–99)
GLUCOSE URINE: NEGATIVE MG/DL
HCT VFR BLD CALC: 26.8 % (ref 40.5–52.5)
HEMOGLOBIN: 8.5 G/DL (ref 13.5–17.5)
HYALINE CASTS: 4 /LPF (ref 0–8)
KETONES, URINE: NEGATIVE MG/DL
LEUKOCYTE ESTERASE, URINE: ABNORMAL
LYMPHOCYTES ABSOLUTE: 0.7 K/UL (ref 1–5.1)
LYMPHOCYTES RELATIVE PERCENT: 12.8 %
MAGNESIUM: 2.6 MG/DL (ref 1.8–2.4)
MCH RBC QN AUTO: 28.8 PG (ref 26–34)
MCHC RBC AUTO-ENTMCNC: 31.8 G/DL (ref 31–36)
MCV RBC AUTO: 90.4 FL (ref 80–100)
MICROSCOPIC EXAMINATION: YES
MONOCYTES ABSOLUTE: 0.5 K/UL (ref 0–1.3)
MONOCYTES RELATIVE PERCENT: 10.2 %
NEUTROPHILS ABSOLUTE: 3.7 K/UL (ref 1.7–7.7)
NEUTROPHILS RELATIVE PERCENT: 73.2 %
NITRITE, URINE: NEGATIVE
PDW BLD-RTO: 15.8 % (ref 12.4–15.4)
PERFORMED ON: ABNORMAL
PERFORMED ON: ABNORMAL
PH UA: 5
PHOSPHORUS: 4.4 MG/DL (ref 2.5–4.9)
PLATELET # BLD: 129 K/UL (ref 135–450)
PMV BLD AUTO: 7.6 FL (ref 5–10.5)
POTASSIUM SERPL-SCNC: 4.1 MMOL/L (ref 3.5–5.1)
POTASSIUM, UR: 29.1 MMOL/L
PROTEIN PROTEIN: 17 MG/DL
PROTEIN UA: NEGATIVE MG/DL
RBC # BLD: 2.96 M/UL (ref 4.2–5.9)
RBC UA: 3 /HPF (ref 0–4)
SODIUM BLD-SCNC: 139 MMOL/L (ref 136–145)
SODIUM URINE: <20 MMOL/L
SPECIFIC GRAVITY UA: 1.01
UREA NITROGEN, UR: 858.5 MG/DL (ref 800–1666)
URIC ACID, SERUM: 10.2 MG/DL (ref 3.5–7.2)
UROBILINOGEN, URINE: 1 E.U./DL
WBC # BLD: 5.1 K/UL (ref 4–11)
WBC UA: 81 /HPF (ref 0–5)

## 2019-02-05 PROCEDURE — 83935 ASSAY OF URINE OSMOLALITY: CPT

## 2019-02-05 PROCEDURE — 36415 COLL VENOUS BLD VENIPUNCTURE: CPT

## 2019-02-05 PROCEDURE — 99232 SBSQ HOSP IP/OBS MODERATE 35: CPT | Performed by: NURSE PRACTITIONER

## 2019-02-05 PROCEDURE — 84156 ASSAY OF PROTEIN URINE: CPT

## 2019-02-05 PROCEDURE — 80048 BASIC METABOLIC PNL TOTAL CA: CPT

## 2019-02-05 PROCEDURE — 84550 ASSAY OF BLOOD/URIC ACID: CPT

## 2019-02-05 PROCEDURE — 6370000000 HC RX 637 (ALT 250 FOR IP): Performed by: INTERNAL MEDICINE

## 2019-02-05 PROCEDURE — 2580000003 HC RX 258: Performed by: INTERNAL MEDICINE

## 2019-02-05 PROCEDURE — 81001 URINALYSIS AUTO W/SCOPE: CPT

## 2019-02-05 PROCEDURE — 94667 MNPJ CHEST WALL 1ST: CPT

## 2019-02-05 PROCEDURE — 84133 ASSAY OF URINE POTASSIUM: CPT

## 2019-02-05 PROCEDURE — 2580000003 HC RX 258: Performed by: NURSE PRACTITIONER

## 2019-02-05 PROCEDURE — 83735 ASSAY OF MAGNESIUM: CPT

## 2019-02-05 PROCEDURE — 99222 1ST HOSP IP/OBS MODERATE 55: CPT | Performed by: INTERNAL MEDICINE

## 2019-02-05 PROCEDURE — 94640 AIRWAY INHALATION TREATMENT: CPT

## 2019-02-05 PROCEDURE — 2060000000 HC ICU INTERMEDIATE R&B

## 2019-02-05 PROCEDURE — 84300 ASSAY OF URINE SODIUM: CPT

## 2019-02-05 PROCEDURE — 6360000002 HC RX W HCPCS: Performed by: NURSE PRACTITIONER

## 2019-02-05 PROCEDURE — 71250 CT THORAX DX C-: CPT

## 2019-02-05 PROCEDURE — 94760 N-INVAS EAR/PLS OXIMETRY 1: CPT

## 2019-02-05 PROCEDURE — 6360000002 HC RX W HCPCS: Performed by: HOSPITALIST

## 2019-02-05 PROCEDURE — 2700000000 HC OXYGEN THERAPY PER DAY

## 2019-02-05 PROCEDURE — 85025 COMPLETE CBC W/AUTO DIFF WBC: CPT

## 2019-02-05 PROCEDURE — 76770 US EXAM ABDO BACK WALL COMP: CPT

## 2019-02-05 PROCEDURE — 6360000002 HC RX W HCPCS: Performed by: INTERNAL MEDICINE

## 2019-02-05 PROCEDURE — 82570 ASSAY OF URINE CREATININE: CPT

## 2019-02-05 PROCEDURE — 94664 DEMO&/EVAL PT USE INHALER: CPT

## 2019-02-05 PROCEDURE — 6370000000 HC RX 637 (ALT 250 FOR IP): Performed by: HOSPITALIST

## 2019-02-05 PROCEDURE — 92610 EVALUATE SWALLOWING FUNCTION: CPT

## 2019-02-05 PROCEDURE — 84540 ASSAY OF URINE/UREA-N: CPT

## 2019-02-05 PROCEDURE — 6370000000 HC RX 637 (ALT 250 FOR IP): Performed by: NURSE PRACTITIONER

## 2019-02-05 PROCEDURE — P9046 ALBUMIN (HUMAN), 25%, 20 ML: HCPCS | Performed by: INTERNAL MEDICINE

## 2019-02-05 PROCEDURE — 87205 SMEAR GRAM STAIN: CPT

## 2019-02-05 PROCEDURE — 92526 ORAL FUNCTION THERAPY: CPT

## 2019-02-05 PROCEDURE — 84100 ASSAY OF PHOSPHORUS: CPT

## 2019-02-05 PROCEDURE — 82436 ASSAY OF URINE CHLORIDE: CPT

## 2019-02-05 PROCEDURE — P9047 ALBUMIN (HUMAN), 25%, 50ML: HCPCS | Performed by: INTERNAL MEDICINE

## 2019-02-05 RX ORDER — ALLOPURINOL 300 MG/1
300 TABLET ORAL DAILY
Status: DISCONTINUED | OUTPATIENT
Start: 2019-02-06 | End: 2019-02-08

## 2019-02-05 RX ORDER — ACETAMINOPHEN 80 MG
TABLET,CHEWABLE ORAL
Status: COMPLETED
Start: 2019-02-05 | End: 2019-02-06

## 2019-02-05 RX ORDER — ALLOPURINOL 100 MG/1
200 TABLET ORAL ONCE
Status: COMPLETED | OUTPATIENT
Start: 2019-02-05 | End: 2019-02-05

## 2019-02-05 RX ADMIN — FUROSEMIDE 5 MG/HR: 10 INJECTION, SOLUTION INTRAVENOUS at 21:59

## 2019-02-05 RX ADMIN — HYDRALAZINE HYDROCHLORIDE 50 MG: 25 TABLET, FILM COATED ORAL at 05:46

## 2019-02-05 RX ADMIN — FERROUS SULFATE TAB 325 MG (65 MG ELEMENTAL FE) 325 MG: 325 (65 FE) TAB at 09:01

## 2019-02-05 RX ADMIN — IPRATROPIUM BROMIDE AND ALBUTEROL SULFATE 1 AMPULE: .5; 3 SOLUTION RESPIRATORY (INHALATION) at 08:13

## 2019-02-05 RX ADMIN — INSULIN GLARGINE 40 UNITS: 100 INJECTION, SOLUTION SUBCUTANEOUS at 23:17

## 2019-02-05 RX ADMIN — FINASTERIDE 5 MG: 5 TABLET, FILM COATED ORAL at 09:01

## 2019-02-05 RX ADMIN — HYDRALAZINE HYDROCHLORIDE 50 MG: 25 TABLET, FILM COATED ORAL at 22:51

## 2019-02-05 RX ADMIN — TRAZODONE HYDROCHLORIDE 50 MG: 50 TABLET ORAL at 22:58

## 2019-02-05 RX ADMIN — INSULIN LISPRO 1 UNITS: 100 INJECTION, SOLUTION INTRAVENOUS; SUBCUTANEOUS at 13:09

## 2019-02-05 RX ADMIN — ALBUMIN (HUMAN) 50 G: 0.25 INJECTION, SOLUTION INTRAVENOUS at 09:04

## 2019-02-05 RX ADMIN — INSULIN LISPRO 1 UNITS: 100 INJECTION, SOLUTION INTRAVENOUS; SUBCUTANEOUS at 09:04

## 2019-02-05 RX ADMIN — Medication 10 ML: at 11:56

## 2019-02-05 RX ADMIN — ALLOPURINOL 100 MG: 100 TABLET ORAL at 09:01

## 2019-02-05 RX ADMIN — IPRATROPIUM BROMIDE AND ALBUTEROL SULFATE 3 ML: .5; 3 SOLUTION RESPIRATORY (INHALATION) at 00:29

## 2019-02-05 RX ADMIN — CITALOPRAM HYDROBROMIDE 20 MG: 20 TABLET ORAL at 09:01

## 2019-02-05 RX ADMIN — ASPIRIN 81 MG CHEWABLE TABLET 81 MG: 81 TABLET CHEWABLE at 09:01

## 2019-02-05 RX ADMIN — FERROUS SULFATE TAB 325 MG (65 MG ELEMENTAL FE) 325 MG: 325 (65 FE) TAB at 18:49

## 2019-02-05 RX ADMIN — Medication 10 ML: at 22:51

## 2019-02-05 RX ADMIN — Medication 10 ML: at 18:57

## 2019-02-05 RX ADMIN — IPRATROPIUM BROMIDE AND ALBUTEROL SULFATE 1 AMPULE: .5; 3 SOLUTION RESPIRATORY (INHALATION) at 22:01

## 2019-02-05 RX ADMIN — CARVEDILOL 25 MG: 25 TABLET, FILM COATED ORAL at 09:01

## 2019-02-05 RX ADMIN — PANTOPRAZOLE SODIUM 40 MG: 40 TABLET, DELAYED RELEASE ORAL at 05:45

## 2019-02-05 RX ADMIN — PRAVASTATIN SODIUM 40 MG: 40 TABLET ORAL at 22:50

## 2019-02-05 RX ADMIN — CARVEDILOL 25 MG: 25 TABLET, FILM COATED ORAL at 18:49

## 2019-02-05 RX ADMIN — GUAIFENESIN 600 MG: 600 TABLET, EXTENDED RELEASE ORAL at 09:00

## 2019-02-05 RX ADMIN — GABAPENTIN 200 MG: 100 CAPSULE ORAL at 09:00

## 2019-02-05 RX ADMIN — DOCUSATE SODIUM 100 MG: 100 CAPSULE, LIQUID FILLED ORAL at 09:01

## 2019-02-05 RX ADMIN — ONDANSETRON HYDROCHLORIDE 4 MG: 2 INJECTION, SOLUTION INTRAMUSCULAR; INTRAVENOUS at 18:56

## 2019-02-05 RX ADMIN — GUAIFENESIN 600 MG: 600 TABLET, EXTENDED RELEASE ORAL at 22:50

## 2019-02-05 RX ADMIN — ALLOPURINOL 200 MG: 100 TABLET ORAL at 18:49

## 2019-02-05 RX ADMIN — ALBUMIN (HUMAN) 50 G: 0.25 INJECTION, SOLUTION INTRAVENOUS at 18:50

## 2019-02-05 RX ADMIN — FLUTICASONE PROPIONATE 1 SPRAY: 50 SPRAY, METERED NASAL at 09:03

## 2019-02-05 RX ADMIN — GABAPENTIN 200 MG: 100 CAPSULE ORAL at 22:51

## 2019-02-05 RX ADMIN — ENOXAPARIN SODIUM 30 MG: 30 INJECTION SUBCUTANEOUS at 09:01

## 2019-02-05 ASSESSMENT — PAIN SCALES - GENERAL
PAINLEVEL_OUTOF10: 0

## 2019-02-06 LAB
ANION GAP SERPL CALCULATED.3IONS-SCNC: 13 MMOL/L (ref 3–16)
BASOPHILS ABSOLUTE: 0 K/UL (ref 0–0.2)
BASOPHILS RELATIVE PERCENT: 0.8 %
BUN BLDV-MCNC: 133 MG/DL (ref 7–20)
CALCIUM SERPL-MCNC: 9.1 MG/DL (ref 8.3–10.6)
CHLORIDE BLD-SCNC: 97 MMOL/L (ref 99–110)
CO2: 31 MMOL/L (ref 21–32)
CREAT SERPL-MCNC: 1.9 MG/DL (ref 0.8–1.3)
EOSINOPHILS ABSOLUTE: 0.1 K/UL (ref 0–0.6)
EOSINOPHILS RELATIVE PERCENT: 2.6 %
GFR AFRICAN AMERICAN: 43
GFR NON-AFRICAN AMERICAN: 36
GLUCOSE BLD-MCNC: 132 MG/DL (ref 70–99)
GLUCOSE BLD-MCNC: 157 MG/DL (ref 70–99)
GLUCOSE BLD-MCNC: 188 MG/DL (ref 70–99)
GLUCOSE BLD-MCNC: 220 MG/DL (ref 70–99)
GLUCOSE BLD-MCNC: 221 MG/DL (ref 70–99)
GLUCOSE BLD-MCNC: 233 MG/DL (ref 70–99)
GLUCOSE BLD-MCNC: 233 MG/DL (ref 70–99)
GLUCOSE BLD-MCNC: 249 MG/DL (ref 70–99)
HCT VFR BLD CALC: 25.7 % (ref 40.5–52.5)
HEMOGLOBIN: 8.2 G/DL (ref 13.5–17.5)
LYMPHOCYTES ABSOLUTE: 0.6 K/UL (ref 1–5.1)
LYMPHOCYTES RELATIVE PERCENT: 12.5 %
MAGNESIUM: 2.6 MG/DL (ref 1.8–2.4)
MCH RBC QN AUTO: 28.8 PG (ref 26–34)
MCHC RBC AUTO-ENTMCNC: 32 G/DL (ref 31–36)
MCV RBC AUTO: 89.9 FL (ref 80–100)
MONOCYTES ABSOLUTE: 0.5 K/UL (ref 0–1.3)
MONOCYTES RELATIVE PERCENT: 9.8 %
NEUTROPHILS ABSOLUTE: 3.7 K/UL (ref 1.7–7.7)
NEUTROPHILS RELATIVE PERCENT: 74.3 %
OSMOLALITY URINE: 413 MOSM/KG (ref 390–1070)
PDW BLD-RTO: 16 % (ref 12.4–15.4)
PERFORMED ON: ABNORMAL
PLATELET # BLD: 133 K/UL (ref 135–450)
PMV BLD AUTO: 6.9 FL (ref 5–10.5)
POTASSIUM SERPL-SCNC: 4.4 MMOL/L (ref 3.5–5.1)
RBC # BLD: 2.86 M/UL (ref 4.2–5.9)
SODIUM BLD-SCNC: 141 MMOL/L (ref 136–145)
URIC ACID, SERUM: 9.6 MG/DL (ref 3.5–7.2)
WBC # BLD: 5 K/UL (ref 4–11)

## 2019-02-06 PROCEDURE — 94640 AIRWAY INHALATION TREATMENT: CPT

## 2019-02-06 PROCEDURE — 94668 MNPJ CHEST WALL SBSQ: CPT

## 2019-02-06 PROCEDURE — 94760 N-INVAS EAR/PLS OXIMETRY 1: CPT

## 2019-02-06 PROCEDURE — 99232 SBSQ HOSP IP/OBS MODERATE 35: CPT | Performed by: NURSE PRACTITIONER

## 2019-02-06 PROCEDURE — 2060000000 HC ICU INTERMEDIATE R&B

## 2019-02-06 PROCEDURE — 97530 THERAPEUTIC ACTIVITIES: CPT

## 2019-02-06 PROCEDURE — 6360000002 HC RX W HCPCS: Performed by: HOSPITALIST

## 2019-02-06 PROCEDURE — 80048 BASIC METABOLIC PNL TOTAL CA: CPT

## 2019-02-06 PROCEDURE — 99232 SBSQ HOSP IP/OBS MODERATE 35: CPT | Performed by: INTERNAL MEDICINE

## 2019-02-06 PROCEDURE — 6360000002 HC RX W HCPCS: Performed by: INTERNAL MEDICINE

## 2019-02-06 PROCEDURE — 6370000000 HC RX 637 (ALT 250 FOR IP): Performed by: INTERNAL MEDICINE

## 2019-02-06 PROCEDURE — 6360000002 HC RX W HCPCS: Performed by: NURSE PRACTITIONER

## 2019-02-06 PROCEDURE — 2700000000 HC OXYGEN THERAPY PER DAY

## 2019-02-06 PROCEDURE — P9047 ALBUMIN (HUMAN), 25%, 50ML: HCPCS | Performed by: INTERNAL MEDICINE

## 2019-02-06 PROCEDURE — 84550 ASSAY OF BLOOD/URIC ACID: CPT

## 2019-02-06 PROCEDURE — 83735 ASSAY OF MAGNESIUM: CPT

## 2019-02-06 PROCEDURE — 2580000003 HC RX 258: Performed by: NURSE PRACTITIONER

## 2019-02-06 PROCEDURE — 6370000000 HC RX 637 (ALT 250 FOR IP): Performed by: HOSPITALIST

## 2019-02-06 PROCEDURE — 85025 COMPLETE CBC W/AUTO DIFF WBC: CPT

## 2019-02-06 PROCEDURE — 6370000000 HC RX 637 (ALT 250 FOR IP): Performed by: NURSE PRACTITIONER

## 2019-02-06 PROCEDURE — 36415 COLL VENOUS BLD VENIPUNCTURE: CPT

## 2019-02-06 RX ADMIN — HYDRALAZINE HYDROCHLORIDE 50 MG: 25 TABLET, FILM COATED ORAL at 07:07

## 2019-02-06 RX ADMIN — CARVEDILOL 25 MG: 25 TABLET, FILM COATED ORAL at 09:47

## 2019-02-06 RX ADMIN — ALBUMIN (HUMAN) 50 G: 0.25 INJECTION, SOLUTION INTRAVENOUS at 18:36

## 2019-02-06 RX ADMIN — GABAPENTIN 200 MG: 100 CAPSULE ORAL at 09:48

## 2019-02-06 RX ADMIN — GUAIFENESIN 600 MG: 600 TABLET, EXTENDED RELEASE ORAL at 23:00

## 2019-02-06 RX ADMIN — HYDRALAZINE HYDROCHLORIDE 50 MG: 25 TABLET, FILM COATED ORAL at 14:02

## 2019-02-06 RX ADMIN — ALLOPURINOL 300 MG: 300 TABLET ORAL at 09:47

## 2019-02-06 RX ADMIN — DOCUSATE SODIUM 100 MG: 100 CAPSULE, LIQUID FILLED ORAL at 09:48

## 2019-02-06 RX ADMIN — GABAPENTIN 200 MG: 100 CAPSULE ORAL at 23:00

## 2019-02-06 RX ADMIN — INSULIN LISPRO 1 UNITS: 100 INJECTION, SOLUTION INTRAVENOUS; SUBCUTANEOUS at 23:06

## 2019-02-06 RX ADMIN — INSULIN LISPRO 2 UNITS: 100 INJECTION, SOLUTION INTRAVENOUS; SUBCUTANEOUS at 13:35

## 2019-02-06 RX ADMIN — Medication 10 ML: at 23:01

## 2019-02-06 RX ADMIN — FINASTERIDE 5 MG: 5 TABLET, FILM COATED ORAL at 09:47

## 2019-02-06 RX ADMIN — Medication: at 00:00

## 2019-02-06 RX ADMIN — ALBUMIN (HUMAN) 50 G: 0.25 INJECTION, SOLUTION INTRAVENOUS at 09:49

## 2019-02-06 RX ADMIN — ENOXAPARIN SODIUM 30 MG: 30 INJECTION SUBCUTANEOUS at 09:48

## 2019-02-06 RX ADMIN — HYDRALAZINE HYDROCHLORIDE 50 MG: 25 TABLET, FILM COATED ORAL at 23:00

## 2019-02-06 RX ADMIN — GUAIFENESIN 600 MG: 600 TABLET, EXTENDED RELEASE ORAL at 09:48

## 2019-02-06 RX ADMIN — ONDANSETRON HYDROCHLORIDE 4 MG: 2 INJECTION, SOLUTION INTRAMUSCULAR; INTRAVENOUS at 19:00

## 2019-02-06 RX ADMIN — Medication 10 ML: at 19:00

## 2019-02-06 RX ADMIN — CARVEDILOL 25 MG: 25 TABLET, FILM COATED ORAL at 18:34

## 2019-02-06 RX ADMIN — TRAZODONE HYDROCHLORIDE 50 MG: 50 TABLET ORAL at 23:00

## 2019-02-06 RX ADMIN — IPRATROPIUM BROMIDE AND ALBUTEROL SULFATE 1 AMPULE: .5; 3 SOLUTION RESPIRATORY (INHALATION) at 08:47

## 2019-02-06 RX ADMIN — PANTOPRAZOLE SODIUM 40 MG: 40 TABLET, DELAYED RELEASE ORAL at 07:07

## 2019-02-06 RX ADMIN — INSULIN GLARGINE 40 UNITS: 100 INJECTION, SOLUTION SUBCUTANEOUS at 23:07

## 2019-02-06 RX ADMIN — FLUTICASONE PROPIONATE 1 SPRAY: 50 SPRAY, METERED NASAL at 09:48

## 2019-02-06 RX ADMIN — IPRATROPIUM BROMIDE AND ALBUTEROL SULFATE 1 AMPULE: .5; 3 SOLUTION RESPIRATORY (INHALATION) at 14:40

## 2019-02-06 RX ADMIN — PRAVASTATIN SODIUM 40 MG: 40 TABLET ORAL at 23:00

## 2019-02-06 RX ADMIN — CITALOPRAM HYDROBROMIDE 20 MG: 20 TABLET ORAL at 09:48

## 2019-02-06 RX ADMIN — FERROUS SULFATE TAB 325 MG (65 MG ELEMENTAL FE) 325 MG: 325 (65 FE) TAB at 18:34

## 2019-02-06 RX ADMIN — FERROUS SULFATE TAB 325 MG (65 MG ELEMENTAL FE) 325 MG: 325 (65 FE) TAB at 09:47

## 2019-02-06 RX ADMIN — INSULIN LISPRO 2 UNITS: 100 INJECTION, SOLUTION INTRAVENOUS; SUBCUTANEOUS at 18:34

## 2019-02-06 RX ADMIN — ASPIRIN 81 MG CHEWABLE TABLET 81 MG: 81 TABLET CHEWABLE at 09:48

## 2019-02-06 RX ADMIN — Medication 10 ML: at 09:49

## 2019-02-06 RX ADMIN — IPRATROPIUM BROMIDE AND ALBUTEROL SULFATE 1 AMPULE: .5; 3 SOLUTION RESPIRATORY (INHALATION) at 20:10

## 2019-02-06 ASSESSMENT — PAIN DESCRIPTION - PAIN TYPE: TYPE: ACUTE PAIN

## 2019-02-06 ASSESSMENT — PAIN DESCRIPTION - LOCATION: LOCATION: GENERALIZED

## 2019-02-06 ASSESSMENT — PAIN SCALES - GENERAL: PAINLEVEL_OUTOF10: 5

## 2019-02-07 ENCOUNTER — APPOINTMENT (OUTPATIENT)
Dept: GENERAL RADIOLOGY | Age: 64
DRG: 264 | End: 2019-02-07
Payer: MEDICARE

## 2019-02-07 LAB
ALBUMIN SERPL-MCNC: 4.9 G/DL (ref 3.4–5)
ANION GAP SERPL CALCULATED.3IONS-SCNC: 14 MMOL/L (ref 3–16)
BASOPHILS ABSOLUTE: 0 K/UL (ref 0–0.2)
BASOPHILS RELATIVE PERCENT: 0.7 %
BUN BLDV-MCNC: 139 MG/DL (ref 7–20)
CALCIUM SERPL-MCNC: 9.3 MG/DL (ref 8.3–10.6)
CHLORIDE BLD-SCNC: 93 MMOL/L (ref 99–110)
CO2: 31 MMOL/L (ref 21–32)
CREAT SERPL-MCNC: 2.3 MG/DL (ref 0.8–1.3)
EOSINOPHILS ABSOLUTE: 0.1 K/UL (ref 0–0.6)
EOSINOPHILS RELATIVE PERCENT: 2.2 %
GFR AFRICAN AMERICAN: 35
GFR NON-AFRICAN AMERICAN: 29
GLUCOSE BLD-MCNC: 132 MG/DL (ref 70–99)
GLUCOSE BLD-MCNC: 135 MG/DL (ref 70–99)
GLUCOSE BLD-MCNC: 168 MG/DL (ref 70–99)
GLUCOSE BLD-MCNC: 199 MG/DL (ref 70–99)
GLUCOSE BLD-MCNC: 235 MG/DL (ref 70–99)
HCT VFR BLD CALC: 25.9 % (ref 40.5–52.5)
HEMOGLOBIN: 8.2 G/DL (ref 13.5–17.5)
LYMPHOCYTES ABSOLUTE: 0.4 K/UL (ref 1–5.1)
LYMPHOCYTES RELATIVE PERCENT: 7.8 %
MAGNESIUM: 2.7 MG/DL (ref 1.8–2.4)
MCH RBC QN AUTO: 28.7 PG (ref 26–34)
MCHC RBC AUTO-ENTMCNC: 31.5 G/DL (ref 31–36)
MCV RBC AUTO: 91.1 FL (ref 80–100)
MONOCYTES ABSOLUTE: 0.4 K/UL (ref 0–1.3)
MONOCYTES RELATIVE PERCENT: 8.2 %
NEUTROPHILS ABSOLUTE: 4.3 K/UL (ref 1.7–7.7)
NEUTROPHILS RELATIVE PERCENT: 81.1 %
PDW BLD-RTO: 15.8 % (ref 12.4–15.4)
PERFORMED ON: ABNORMAL
PLATELET # BLD: 133 K/UL (ref 135–450)
PMV BLD AUTO: 7.2 FL (ref 5–10.5)
POTASSIUM SERPL-SCNC: 4.8 MMOL/L (ref 3.5–5.1)
RBC # BLD: 2.85 M/UL (ref 4.2–5.9)
SODIUM BLD-SCNC: 138 MMOL/L (ref 136–145)
URIC ACID, SERUM: 8.8 MG/DL (ref 3.5–7.2)
WBC # BLD: 5.3 K/UL (ref 4–11)

## 2019-02-07 PROCEDURE — 6370000000 HC RX 637 (ALT 250 FOR IP): Performed by: INTERNAL MEDICINE

## 2019-02-07 PROCEDURE — 80048 BASIC METABOLIC PNL TOTAL CA: CPT

## 2019-02-07 PROCEDURE — 2580000003 HC RX 258: Performed by: INTERNAL MEDICINE

## 2019-02-07 PROCEDURE — 6370000000 HC RX 637 (ALT 250 FOR IP): Performed by: NURSE PRACTITIONER

## 2019-02-07 PROCEDURE — 97530 THERAPEUTIC ACTIVITIES: CPT

## 2019-02-07 PROCEDURE — 85025 COMPLETE CBC W/AUTO DIFF WBC: CPT

## 2019-02-07 PROCEDURE — 83735 ASSAY OF MAGNESIUM: CPT

## 2019-02-07 PROCEDURE — 86704 HEP B CORE ANTIBODY TOTAL: CPT

## 2019-02-07 PROCEDURE — 94760 N-INVAS EAR/PLS OXIMETRY 1: CPT

## 2019-02-07 PROCEDURE — 6360000002 HC RX W HCPCS: Performed by: HOSPITALIST

## 2019-02-07 PROCEDURE — 99232 SBSQ HOSP IP/OBS MODERATE 35: CPT | Performed by: NURSE PRACTITIONER

## 2019-02-07 PROCEDURE — 2580000003 HC RX 258: Performed by: NURSE PRACTITIONER

## 2019-02-07 PROCEDURE — 71045 X-RAY EXAM CHEST 1 VIEW: CPT

## 2019-02-07 PROCEDURE — 94640 AIRWAY INHALATION TREATMENT: CPT

## 2019-02-07 PROCEDURE — 94667 MNPJ CHEST WALL 1ST: CPT

## 2019-02-07 PROCEDURE — 97110 THERAPEUTIC EXERCISES: CPT

## 2019-02-07 PROCEDURE — 82040 ASSAY OF SERUM ALBUMIN: CPT

## 2019-02-07 PROCEDURE — 87340 HEPATITIS B SURFACE AG IA: CPT

## 2019-02-07 PROCEDURE — 86706 HEP B SURFACE ANTIBODY: CPT

## 2019-02-07 PROCEDURE — 2060000000 HC ICU INTERMEDIATE R&B

## 2019-02-07 PROCEDURE — 84550 ASSAY OF BLOOD/URIC ACID: CPT

## 2019-02-07 PROCEDURE — 92526 ORAL FUNCTION THERAPY: CPT

## 2019-02-07 PROCEDURE — 94668 MNPJ CHEST WALL SBSQ: CPT

## 2019-02-07 PROCEDURE — 6360000002 HC RX W HCPCS: Performed by: INTERNAL MEDICINE

## 2019-02-07 PROCEDURE — 36415 COLL VENOUS BLD VENIPUNCTURE: CPT

## 2019-02-07 PROCEDURE — 2700000000 HC OXYGEN THERAPY PER DAY

## 2019-02-07 PROCEDURE — P9047 ALBUMIN (HUMAN), 25%, 50ML: HCPCS | Performed by: INTERNAL MEDICINE

## 2019-02-07 PROCEDURE — 99232 SBSQ HOSP IP/OBS MODERATE 35: CPT | Performed by: INTERNAL MEDICINE

## 2019-02-07 PROCEDURE — 6370000000 HC RX 637 (ALT 250 FOR IP): Performed by: HOSPITALIST

## 2019-02-07 PROCEDURE — 6360000002 HC RX W HCPCS: Performed by: NURSE PRACTITIONER

## 2019-02-07 RX ORDER — CHLOROTHIAZIDE SODIUM 500 MG/1
500 INJECTION INTRAVENOUS ONCE
Status: DISCONTINUED | OUTPATIENT
Start: 2019-02-07 | End: 2019-02-07

## 2019-02-07 RX ADMIN — ASPIRIN 81 MG CHEWABLE TABLET 81 MG: 81 TABLET CHEWABLE at 09:48

## 2019-02-07 RX ADMIN — ONDANSETRON HYDROCHLORIDE 4 MG: 2 INJECTION, SOLUTION INTRAMUSCULAR; INTRAVENOUS at 20:14

## 2019-02-07 RX ADMIN — Medication 10 ML: at 09:47

## 2019-02-07 RX ADMIN — ALBUMIN (HUMAN) 50 G: 0.25 INJECTION, SOLUTION INTRAVENOUS at 09:51

## 2019-02-07 RX ADMIN — GABAPENTIN 200 MG: 100 CAPSULE ORAL at 21:59

## 2019-02-07 RX ADMIN — Medication 10 ML: at 20:14

## 2019-02-07 RX ADMIN — GABAPENTIN 200 MG: 100 CAPSULE ORAL at 09:47

## 2019-02-07 RX ADMIN — ENOXAPARIN SODIUM 30 MG: 30 INJECTION SUBCUTANEOUS at 09:46

## 2019-02-07 RX ADMIN — HYDRALAZINE HYDROCHLORIDE 50 MG: 25 TABLET, FILM COATED ORAL at 06:34

## 2019-02-07 RX ADMIN — INSULIN LISPRO 2 UNITS: 100 INJECTION, SOLUTION INTRAVENOUS; SUBCUTANEOUS at 17:26

## 2019-02-07 RX ADMIN — FERROUS SULFATE TAB 325 MG (65 MG ELEMENTAL FE) 325 MG: 325 (65 FE) TAB at 17:09

## 2019-02-07 RX ADMIN — CHLOROTHIAZIDE SODIUM 500 MG: 500 INJECTION, POWDER, LYOPHILIZED, FOR SOLUTION INTRAVENOUS at 17:06

## 2019-02-07 RX ADMIN — FERROUS SULFATE TAB 325 MG (65 MG ELEMENTAL FE) 325 MG: 325 (65 FE) TAB at 09:47

## 2019-02-07 RX ADMIN — DARBEPOETIN ALFA 60 MCG: 60 SOLUTION INTRAVENOUS; SUBCUTANEOUS at 09:44

## 2019-02-07 RX ADMIN — HYDRALAZINE HYDROCHLORIDE 50 MG: 25 TABLET, FILM COATED ORAL at 21:59

## 2019-02-07 RX ADMIN — FINASTERIDE 5 MG: 5 TABLET, FILM COATED ORAL at 09:48

## 2019-02-07 RX ADMIN — IPRATROPIUM BROMIDE AND ALBUTEROL SULFATE 1 AMPULE: .5; 3 SOLUTION RESPIRATORY (INHALATION) at 13:46

## 2019-02-07 RX ADMIN — IPRATROPIUM BROMIDE AND ALBUTEROL SULFATE 1 AMPULE: .5; 3 SOLUTION RESPIRATORY (INHALATION) at 22:10

## 2019-02-07 RX ADMIN — GUAIFENESIN 600 MG: 600 TABLET, EXTENDED RELEASE ORAL at 21:59

## 2019-02-07 RX ADMIN — IPRATROPIUM BROMIDE AND ALBUTEROL SULFATE 1 AMPULE: .5; 3 SOLUTION RESPIRATORY (INHALATION) at 07:51

## 2019-02-07 RX ADMIN — CARVEDILOL 25 MG: 25 TABLET, FILM COATED ORAL at 09:48

## 2019-02-07 RX ADMIN — CARVEDILOL 25 MG: 25 TABLET, FILM COATED ORAL at 17:09

## 2019-02-07 RX ADMIN — PRAVASTATIN SODIUM 40 MG: 40 TABLET ORAL at 21:59

## 2019-02-07 RX ADMIN — GUAIFENESIN 600 MG: 600 TABLET, EXTENDED RELEASE ORAL at 09:48

## 2019-02-07 RX ADMIN — PANTOPRAZOLE SODIUM 40 MG: 40 TABLET, DELAYED RELEASE ORAL at 06:34

## 2019-02-07 RX ADMIN — CITALOPRAM HYDROBROMIDE 20 MG: 20 TABLET ORAL at 09:48

## 2019-02-07 RX ADMIN — FLUTICASONE PROPIONATE 1 SPRAY: 50 SPRAY, METERED NASAL at 09:48

## 2019-02-07 RX ADMIN — INSULIN LISPRO 2 UNITS: 100 INJECTION, SOLUTION INTRAVENOUS; SUBCUTANEOUS at 22:18

## 2019-02-07 RX ADMIN — ALLOPURINOL 300 MG: 300 TABLET ORAL at 09:48

## 2019-02-07 RX ADMIN — INSULIN LISPRO 1 UNITS: 100 INJECTION, SOLUTION INTRAVENOUS; SUBCUTANEOUS at 12:25

## 2019-02-07 RX ADMIN — INSULIN GLARGINE 40 UNITS: 100 INJECTION, SOLUTION SUBCUTANEOUS at 22:19

## 2019-02-07 RX ADMIN — DOCUSATE SODIUM 100 MG: 100 CAPSULE, LIQUID FILLED ORAL at 09:48

## 2019-02-07 RX ADMIN — TRAZODONE HYDROCHLORIDE 50 MG: 50 TABLET ORAL at 22:26

## 2019-02-07 ASSESSMENT — PAIN DESCRIPTION - LOCATION
LOCATION: GENERALIZED
LOCATION: GENERALIZED

## 2019-02-07 ASSESSMENT — PAIN SCALES - GENERAL
PAINLEVEL_OUTOF10: 0

## 2019-02-07 ASSESSMENT — PAIN SCALES - WONG BAKER
WONGBAKER_NUMERICALRESPONSE: 4
WONGBAKER_NUMERICALRESPONSE: 4

## 2019-02-08 ENCOUNTER — APPOINTMENT (OUTPATIENT)
Dept: INTERVENTIONAL RADIOLOGY/VASCULAR | Age: 64
DRG: 264 | End: 2019-02-08
Payer: MEDICARE

## 2019-02-08 LAB
ALBUMIN SERPL-MCNC: 4.5 G/DL (ref 3.4–5)
ANION GAP SERPL CALCULATED.3IONS-SCNC: 12 MMOL/L (ref 3–16)
BASOPHILS ABSOLUTE: 0 K/UL (ref 0–0.2)
BASOPHILS RELATIVE PERCENT: 0.6 %
BUN BLDV-MCNC: 172 MG/DL (ref 7–20)
CALCIUM SERPL-MCNC: 9.1 MG/DL (ref 8.3–10.6)
CHLORIDE BLD-SCNC: 95 MMOL/L (ref 99–110)
CO2: 31 MMOL/L (ref 21–32)
CREAT SERPL-MCNC: 2.7 MG/DL (ref 0.8–1.3)
EOSINOPHILS ABSOLUTE: 0.1 K/UL (ref 0–0.6)
EOSINOPHILS RELATIVE PERCENT: 2 %
GFR AFRICAN AMERICAN: 29
GFR NON-AFRICAN AMERICAN: 24
GLUCOSE BLD-MCNC: 122 MG/DL (ref 70–99)
GLUCOSE BLD-MCNC: 130 MG/DL (ref 70–99)
GLUCOSE BLD-MCNC: 139 MG/DL (ref 70–99)
GLUCOSE BLD-MCNC: 184 MG/DL (ref 70–99)
GLUCOSE BLD-MCNC: 250 MG/DL (ref 70–99)
HBV SURFACE AB TITR SER: <3.5 MIU/ML
HCT VFR BLD CALC: 25 % (ref 40.5–52.5)
HEMOGLOBIN: 7.9 G/DL (ref 13.5–17.5)
HEPATITIS B SURFACE ANTIGEN INTERPRETATION: NORMAL
INR BLD: 1.31 (ref 0.86–1.14)
LYMPHOCYTES ABSOLUTE: 0.7 K/UL (ref 1–5.1)
LYMPHOCYTES RELATIVE PERCENT: 12.3 %
MAGNESIUM: 2.6 MG/DL (ref 1.8–2.4)
MCH RBC QN AUTO: 28.7 PG (ref 26–34)
MCHC RBC AUTO-ENTMCNC: 31.5 G/DL (ref 31–36)
MCV RBC AUTO: 91.2 FL (ref 80–100)
MONOCYTES ABSOLUTE: 0.5 K/UL (ref 0–1.3)
MONOCYTES RELATIVE PERCENT: 9.1 %
NEUTROPHILS ABSOLUTE: 4.4 K/UL (ref 1.7–7.7)
NEUTROPHILS RELATIVE PERCENT: 76 %
PDW BLD-RTO: 15.5 % (ref 12.4–15.4)
PERFORMED ON: ABNORMAL
PLATELET # BLD: 127 K/UL (ref 135–450)
PMV BLD AUTO: 7.2 FL (ref 5–10.5)
POTASSIUM SERPL-SCNC: 4.5 MMOL/L (ref 3.5–5.1)
PROTHROMBIN TIME: 14.9 SEC (ref 9.8–13)
RBC # BLD: 2.74 M/UL (ref 4.2–5.9)
SODIUM BLD-SCNC: 138 MMOL/L (ref 136–145)
URIC ACID, SERUM: 8.6 MG/DL (ref 3.5–7.2)
WBC # BLD: 5.9 K/UL (ref 4–11)

## 2019-02-08 PROCEDURE — 76937 US GUIDE VASCULAR ACCESS: CPT

## 2019-02-08 PROCEDURE — 5A1D70Z PERFORMANCE OF URINARY FILTRATION, INTERMITTENT, LESS THAN 6 HOURS PER DAY: ICD-10-PCS | Performed by: RADIOLOGY

## 2019-02-08 PROCEDURE — 6370000000 HC RX 637 (ALT 250 FOR IP): Performed by: INTERNAL MEDICINE

## 2019-02-08 PROCEDURE — 94640 AIRWAY INHALATION TREATMENT: CPT

## 2019-02-08 PROCEDURE — C1752 CATH,HEMODIALYSIS,SHORT-TERM: HCPCS

## 2019-02-08 PROCEDURE — 77001 FLUOROGUIDE FOR VEIN DEVICE: CPT

## 2019-02-08 PROCEDURE — 6360000002 HC RX W HCPCS: Performed by: INTERNAL MEDICINE

## 2019-02-08 PROCEDURE — 6370000000 HC RX 637 (ALT 250 FOR IP): Performed by: NURSE PRACTITIONER

## 2019-02-08 PROCEDURE — 2700000000 HC OXYGEN THERAPY PER DAY

## 2019-02-08 PROCEDURE — 85610 PROTHROMBIN TIME: CPT

## 2019-02-08 PROCEDURE — 99232 SBSQ HOSP IP/OBS MODERATE 35: CPT | Performed by: NURSE PRACTITIONER

## 2019-02-08 PROCEDURE — 2580000003 HC RX 258: Performed by: NURSE PRACTITIONER

## 2019-02-08 PROCEDURE — 94760 N-INVAS EAR/PLS OXIMETRY 1: CPT

## 2019-02-08 PROCEDURE — 2500000003 HC RX 250 WO HCPCS: Performed by: RADIOLOGY

## 2019-02-08 PROCEDURE — 80048 BASIC METABOLIC PNL TOTAL CA: CPT

## 2019-02-08 PROCEDURE — 84550 ASSAY OF BLOOD/URIC ACID: CPT

## 2019-02-08 PROCEDURE — 2060000000 HC ICU INTERMEDIATE R&B

## 2019-02-08 PROCEDURE — 85025 COMPLETE CBC W/AUTO DIFF WBC: CPT

## 2019-02-08 PROCEDURE — 6360000002 HC RX W HCPCS: Performed by: RADIOLOGY

## 2019-02-08 PROCEDURE — 36415 COLL VENOUS BLD VENIPUNCTURE: CPT

## 2019-02-08 PROCEDURE — 82040 ASSAY OF SERUM ALBUMIN: CPT

## 2019-02-08 PROCEDURE — 6360000002 HC RX W HCPCS: Performed by: HOSPITALIST

## 2019-02-08 PROCEDURE — B518ZZA FLUOROSCOPY OF SUPERIOR VENA CAVA, GUIDANCE: ICD-10-PCS | Performed by: RADIOLOGY

## 2019-02-08 PROCEDURE — 02HV33Z INSERTION OF INFUSION DEVICE INTO SUPERIOR VENA CAVA, PERCUTANEOUS APPROACH: ICD-10-PCS | Performed by: RADIOLOGY

## 2019-02-08 PROCEDURE — 36556 INSERT NON-TUNNEL CV CATH: CPT

## 2019-02-08 PROCEDURE — 90937 HEMODIALYSIS REPEATED EVAL: CPT

## 2019-02-08 PROCEDURE — 83735 ASSAY OF MAGNESIUM: CPT

## 2019-02-08 PROCEDURE — 94667 MNPJ CHEST WALL 1ST: CPT

## 2019-02-08 PROCEDURE — 6370000000 HC RX 637 (ALT 250 FOR IP): Performed by: HOSPITALIST

## 2019-02-08 PROCEDURE — 6360000002 HC RX W HCPCS: Performed by: NURSE PRACTITIONER

## 2019-02-08 RX ORDER — CARVEDILOL 6.25 MG/1
12.5 TABLET ORAL 2 TIMES DAILY WITH MEALS
Status: DISCONTINUED | OUTPATIENT
Start: 2019-02-08 | End: 2019-02-15 | Stop reason: HOSPADM

## 2019-02-08 RX ORDER — ALLOPURINOL 100 MG/1
100 TABLET ORAL DAILY
Status: DISCONTINUED | OUTPATIENT
Start: 2019-02-09 | End: 2019-02-15 | Stop reason: HOSPADM

## 2019-02-08 RX ORDER — SODIUM CHLORIDE 0.9 % (FLUSH) 0.9 %
SYRINGE (ML) INJECTION
Status: DISPENSED
Start: 2019-02-08 | End: 2019-02-08

## 2019-02-08 RX ORDER — LIDOCAINE HYDROCHLORIDE 10 MG/ML
10 INJECTION, SOLUTION EPIDURAL; INFILTRATION; INTRACAUDAL; PERINEURAL ONCE
Status: COMPLETED | OUTPATIENT
Start: 2019-02-08 | End: 2019-02-08

## 2019-02-08 RX ORDER — SODIUM CHLORIDE 9 MG/ML
INJECTION, SOLUTION INTRAVENOUS
Status: DISCONTINUED
Start: 2019-02-08 | End: 2019-02-08 | Stop reason: WASHOUT

## 2019-02-08 RX ORDER — HEPARIN SODIUM 1000 [USP'U]/ML
2600 INJECTION, SOLUTION INTRAVENOUS; SUBCUTANEOUS PRN
Status: DISCONTINUED | OUTPATIENT
Start: 2019-02-08 | End: 2019-02-15 | Stop reason: HOSPADM

## 2019-02-08 RX ORDER — HYDRALAZINE HYDROCHLORIDE 10 MG/1
10 TABLET, FILM COATED ORAL EVERY 8 HOURS SCHEDULED
Status: DISCONTINUED | OUTPATIENT
Start: 2019-02-08 | End: 2019-02-12

## 2019-02-08 RX ORDER — HEPARIN SODIUM 1000 [USP'U]/ML
6000 INJECTION, SOLUTION INTRAVENOUS; SUBCUTANEOUS ONCE
Status: COMPLETED | OUTPATIENT
Start: 2019-02-08 | End: 2019-02-08

## 2019-02-08 RX ADMIN — INSULIN GLARGINE 40 UNITS: 100 INJECTION, SOLUTION SUBCUTANEOUS at 22:19

## 2019-02-08 RX ADMIN — HEPARIN SODIUM IN SODIUM CHLORIDE 30 ML/HR: 200 INJECTION INTRAVENOUS at 11:12

## 2019-02-08 RX ADMIN — GUAIFENESIN 600 MG: 600 TABLET, EXTENDED RELEASE ORAL at 10:17

## 2019-02-08 RX ADMIN — CITALOPRAM HYDROBROMIDE 20 MG: 20 TABLET ORAL at 10:18

## 2019-02-08 RX ADMIN — ACETAMINOPHEN 650 MG: 325 TABLET, FILM COATED ORAL at 00:24

## 2019-02-08 RX ADMIN — ALLOPURINOL 300 MG: 300 TABLET ORAL at 10:17

## 2019-02-08 RX ADMIN — GABAPENTIN 200 MG: 100 CAPSULE ORAL at 22:16

## 2019-02-08 RX ADMIN — ONDANSETRON HYDROCHLORIDE 4 MG: 2 INJECTION, SOLUTION INTRAMUSCULAR; INTRAVENOUS at 12:23

## 2019-02-08 RX ADMIN — FLUTICASONE PROPIONATE 1 SPRAY: 50 SPRAY, METERED NASAL at 10:18

## 2019-02-08 RX ADMIN — PANTOPRAZOLE SODIUM 40 MG: 40 TABLET, DELAYED RELEASE ORAL at 06:54

## 2019-02-08 RX ADMIN — HEPARIN SODIUM 2.6 UNITS/ML: 1000 INJECTION INTRAVENOUS; SUBCUTANEOUS at 11:10

## 2019-02-08 RX ADMIN — CARVEDILOL 12.5 MG: 6.25 TABLET, FILM COATED ORAL at 17:51

## 2019-02-08 RX ADMIN — FERROUS SULFATE TAB 325 MG (65 MG ELEMENTAL FE) 325 MG: 325 (65 FE) TAB at 10:17

## 2019-02-08 RX ADMIN — IPRATROPIUM BROMIDE AND ALBUTEROL SULFATE 1 AMPULE: .5; 3 SOLUTION RESPIRATORY (INHALATION) at 21:22

## 2019-02-08 RX ADMIN — Medication 10 ML: at 22:17

## 2019-02-08 RX ADMIN — HEPARIN SODIUM 2600 UNITS: 1000 INJECTION INTRAVENOUS; SUBCUTANEOUS at 14:50

## 2019-02-08 RX ADMIN — ACETAMINOPHEN 650 MG: 325 TABLET, FILM COATED ORAL at 14:08

## 2019-02-08 RX ADMIN — FINASTERIDE 5 MG: 5 TABLET, FILM COATED ORAL at 10:18

## 2019-02-08 RX ADMIN — LIDOCAINE HYDROCHLORIDE 10 ML: 10 INJECTION, SOLUTION EPIDURAL; INFILTRATION; INTRACAUDAL; PERINEURAL at 11:12

## 2019-02-08 RX ADMIN — PRAVASTATIN SODIUM 40 MG: 40 TABLET ORAL at 22:15

## 2019-02-08 RX ADMIN — HYDRALAZINE HYDROCHLORIDE 50 MG: 25 TABLET, FILM COATED ORAL at 06:54

## 2019-02-08 RX ADMIN — TRAZODONE HYDROCHLORIDE 50 MG: 50 TABLET ORAL at 22:16

## 2019-02-08 RX ADMIN — DOCUSATE SODIUM 100 MG: 100 CAPSULE, LIQUID FILLED ORAL at 10:18

## 2019-02-08 RX ADMIN — GABAPENTIN 200 MG: 100 CAPSULE ORAL at 10:18

## 2019-02-08 RX ADMIN — IPRATROPIUM BROMIDE AND ALBUTEROL SULFATE 1 AMPULE: .5; 3 SOLUTION RESPIRATORY (INHALATION) at 08:12

## 2019-02-08 RX ADMIN — FERROUS SULFATE TAB 325 MG (65 MG ELEMENTAL FE) 325 MG: 325 (65 FE) TAB at 17:51

## 2019-02-08 RX ADMIN — INSULIN LISPRO 1 UNITS: 100 INJECTION, SOLUTION INTRAVENOUS; SUBCUTANEOUS at 22:19

## 2019-02-08 RX ADMIN — GUAIFENESIN 600 MG: 600 TABLET, EXTENDED RELEASE ORAL at 22:15

## 2019-02-08 RX ADMIN — HYDRALAZINE HYDROCHLORIDE 10 MG: 10 TABLET, FILM COATED ORAL at 22:15

## 2019-02-08 ASSESSMENT — PAIN SCALES - GENERAL
PAINLEVEL_OUTOF10: 0
PAINLEVEL_OUTOF10: 4
PAINLEVEL_OUTOF10: 2
PAINLEVEL_OUTOF10: 3
PAINLEVEL_OUTOF10: 5
PAINLEVEL_OUTOF10: 0

## 2019-02-08 ASSESSMENT — PAIN DESCRIPTION - DESCRIPTORS
DESCRIPTORS: HEADACHE

## 2019-02-08 ASSESSMENT — PAIN DESCRIPTION - PAIN TYPE: TYPE: ACUTE PAIN

## 2019-02-08 ASSESSMENT — PAIN DESCRIPTION - LOCATION
LOCATION: HEAD

## 2019-02-09 LAB
ALBUMIN SERPL-MCNC: 4.4 G/DL (ref 3.4–5)
ANION GAP SERPL CALCULATED.3IONS-SCNC: 13 MMOL/L (ref 3–16)
BASOPHILS ABSOLUTE: 0 K/UL (ref 0–0.2)
BASOPHILS RELATIVE PERCENT: 0.5 %
BUN BLDV-MCNC: 101 MG/DL (ref 7–20)
CALCIUM SERPL-MCNC: 9.3 MG/DL (ref 8.3–10.6)
CALCIUM SERPL-MCNC: 9.3 MG/DL (ref 8.3–10.6)
CHLORIDE BLD-SCNC: 97 MMOL/L (ref 99–110)
CO2: 27 MMOL/L (ref 21–32)
CREAT SERPL-MCNC: 2.5 MG/DL (ref 0.8–1.3)
EOSINOPHILS ABSOLUTE: 0.1 K/UL (ref 0–0.6)
EOSINOPHILS RELATIVE PERCENT: 1.7 %
GFR AFRICAN AMERICAN: 31
GFR NON-AFRICAN AMERICAN: 26
GLUCOSE BLD-MCNC: 100 MG/DL (ref 70–99)
GLUCOSE BLD-MCNC: 150 MG/DL (ref 70–99)
GLUCOSE BLD-MCNC: 197 MG/DL (ref 70–99)
GLUCOSE BLD-MCNC: 235 MG/DL (ref 70–99)
GLUCOSE BLD-MCNC: 247 MG/DL (ref 70–99)
HCT VFR BLD CALC: 26.5 % (ref 40.5–52.5)
HEMOGLOBIN: 8.5 G/DL (ref 13.5–17.5)
HEPATITIS B CORE TOTAL ANTIBODY: NEGATIVE
LYMPHOCYTES ABSOLUTE: 0.6 K/UL (ref 1–5.1)
LYMPHOCYTES RELATIVE PERCENT: 9.1 %
MAGNESIUM: 2.5 MG/DL (ref 1.8–2.4)
MCH RBC QN AUTO: 29.4 PG (ref 26–34)
MCHC RBC AUTO-ENTMCNC: 32 G/DL (ref 31–36)
MCV RBC AUTO: 92 FL (ref 80–100)
MONOCYTES ABSOLUTE: 0.7 K/UL (ref 0–1.3)
MONOCYTES RELATIVE PERCENT: 9.7 %
NEUTROPHILS ABSOLUTE: 5.4 K/UL (ref 1.7–7.7)
NEUTROPHILS RELATIVE PERCENT: 79 %
PDW BLD-RTO: 15.7 % (ref 12.4–15.4)
PERFORMED ON: ABNORMAL
PHOSPHORUS: 4.6 MG/DL (ref 2.5–4.9)
PLATELET # BLD: 140 K/UL (ref 135–450)
PMV BLD AUTO: 7.9 FL (ref 5–10.5)
POTASSIUM SERPL-SCNC: 4.6 MMOL/L (ref 3.5–5.1)
RBC # BLD: 2.87 M/UL (ref 4.2–5.9)
SODIUM BLD-SCNC: 137 MMOL/L (ref 136–145)
URIC ACID, SERUM: 5.3 MG/DL (ref 3.5–7.2)
WBC # BLD: 6.8 K/UL (ref 4–11)

## 2019-02-09 PROCEDURE — 6370000000 HC RX 637 (ALT 250 FOR IP): Performed by: NURSE PRACTITIONER

## 2019-02-09 PROCEDURE — 83735 ASSAY OF MAGNESIUM: CPT

## 2019-02-09 PROCEDURE — 94640 AIRWAY INHALATION TREATMENT: CPT

## 2019-02-09 PROCEDURE — 6370000000 HC RX 637 (ALT 250 FOR IP): Performed by: INTERNAL MEDICINE

## 2019-02-09 PROCEDURE — 85025 COMPLETE CBC W/AUTO DIFF WBC: CPT

## 2019-02-09 PROCEDURE — 6360000002 HC RX W HCPCS: Performed by: NURSE PRACTITIONER

## 2019-02-09 PROCEDURE — 94760 N-INVAS EAR/PLS OXIMETRY 1: CPT

## 2019-02-09 PROCEDURE — 6360000002 HC RX W HCPCS: Performed by: INTERNAL MEDICINE

## 2019-02-09 PROCEDURE — 82310 ASSAY OF CALCIUM: CPT

## 2019-02-09 PROCEDURE — 6360000002 HC RX W HCPCS: Performed by: HOSPITALIST

## 2019-02-09 PROCEDURE — 99232 SBSQ HOSP IP/OBS MODERATE 35: CPT | Performed by: INTERNAL MEDICINE

## 2019-02-09 PROCEDURE — 80069 RENAL FUNCTION PANEL: CPT

## 2019-02-09 PROCEDURE — 2060000000 HC ICU INTERMEDIATE R&B

## 2019-02-09 PROCEDURE — 90937 HEMODIALYSIS REPEATED EVAL: CPT

## 2019-02-09 PROCEDURE — 84550 ASSAY OF BLOOD/URIC ACID: CPT

## 2019-02-09 PROCEDURE — 2580000003 HC RX 258: Performed by: NURSE PRACTITIONER

## 2019-02-09 PROCEDURE — 36415 COLL VENOUS BLD VENIPUNCTURE: CPT

## 2019-02-09 RX ORDER — MIDODRINE HYDROCHLORIDE 5 MG/1
5 TABLET ORAL ONCE
Status: COMPLETED | OUTPATIENT
Start: 2019-02-09 | End: 2019-02-09

## 2019-02-09 RX ORDER — GABAPENTIN 100 MG/1
100 CAPSULE ORAL 3 TIMES DAILY
Status: DISCONTINUED | OUTPATIENT
Start: 2019-02-09 | End: 2019-02-15 | Stop reason: HOSPADM

## 2019-02-09 RX ADMIN — ONDANSETRON HYDROCHLORIDE 4 MG: 2 INJECTION, SOLUTION INTRAMUSCULAR; INTRAVENOUS at 19:07

## 2019-02-09 RX ADMIN — GABAPENTIN 100 MG: 100 CAPSULE ORAL at 13:35

## 2019-02-09 RX ADMIN — FLUTICASONE PROPIONATE 1 SPRAY: 50 SPRAY, METERED NASAL at 11:37

## 2019-02-09 RX ADMIN — TRAZODONE HYDROCHLORIDE 50 MG: 50 TABLET ORAL at 21:18

## 2019-02-09 RX ADMIN — HYDRALAZINE HYDROCHLORIDE 10 MG: 10 TABLET, FILM COATED ORAL at 21:18

## 2019-02-09 RX ADMIN — IPRATROPIUM BROMIDE AND ALBUTEROL SULFATE 1 AMPULE: .5; 3 SOLUTION RESPIRATORY (INHALATION) at 15:50

## 2019-02-09 RX ADMIN — HYDRALAZINE HYDROCHLORIDE 10 MG: 10 TABLET, FILM COATED ORAL at 13:35

## 2019-02-09 RX ADMIN — Medication 10 ML: at 11:36

## 2019-02-09 RX ADMIN — INSULIN GLARGINE 40 UNITS: 100 INJECTION, SOLUTION SUBCUTANEOUS at 20:50

## 2019-02-09 RX ADMIN — ACETAMINOPHEN 650 MG: 325 TABLET, FILM COATED ORAL at 13:35

## 2019-02-09 RX ADMIN — CARVEDILOL 12.5 MG: 6.25 TABLET, FILM COATED ORAL at 17:26

## 2019-02-09 RX ADMIN — DOCUSATE SODIUM 100 MG: 100 CAPSULE, LIQUID FILLED ORAL at 11:34

## 2019-02-09 RX ADMIN — GUAIFENESIN 600 MG: 600 TABLET, EXTENDED RELEASE ORAL at 11:34

## 2019-02-09 RX ADMIN — CITALOPRAM HYDROBROMIDE 20 MG: 20 TABLET ORAL at 11:35

## 2019-02-09 RX ADMIN — GUAIFENESIN 600 MG: 600 TABLET, EXTENDED RELEASE ORAL at 21:18

## 2019-02-09 RX ADMIN — ALLOPURINOL 100 MG: 100 TABLET ORAL at 11:35

## 2019-02-09 RX ADMIN — Medication 10 ML: at 21:18

## 2019-02-09 RX ADMIN — INSULIN LISPRO 2 UNITS: 100 INJECTION, SOLUTION INTRAVENOUS; SUBCUTANEOUS at 18:09

## 2019-02-09 RX ADMIN — GABAPENTIN 100 MG: 100 CAPSULE ORAL at 11:35

## 2019-02-09 RX ADMIN — GABAPENTIN 100 MG: 100 CAPSULE ORAL at 21:18

## 2019-02-09 RX ADMIN — Medication 10 ML: at 19:07

## 2019-02-09 RX ADMIN — ENOXAPARIN SODIUM 30 MG: 30 INJECTION SUBCUTANEOUS at 11:36

## 2019-02-09 RX ADMIN — MIDODRINE HYDROCHLORIDE 5 MG: 5 TABLET ORAL at 11:35

## 2019-02-09 RX ADMIN — IPRATROPIUM BROMIDE AND ALBUTEROL SULFATE 1 AMPULE: .5; 3 SOLUTION RESPIRATORY (INHALATION) at 20:16

## 2019-02-09 RX ADMIN — ASPIRIN 81 MG CHEWABLE TABLET 81 MG: 81 TABLET CHEWABLE at 11:35

## 2019-02-09 RX ADMIN — IRON SUCROSE 50 MG: 20 INJECTION, SOLUTION INTRAVENOUS at 08:52

## 2019-02-09 RX ADMIN — INSULIN LISPRO 1 UNITS: 100 INJECTION, SOLUTION INTRAVENOUS; SUBCUTANEOUS at 20:51

## 2019-02-09 RX ADMIN — HEPARIN SODIUM 2600 UNITS: 1000 INJECTION INTRAVENOUS; SUBCUTANEOUS at 10:17

## 2019-02-09 RX ADMIN — CARVEDILOL 12.5 MG: 6.25 TABLET, FILM COATED ORAL at 11:35

## 2019-02-09 RX ADMIN — FINASTERIDE 5 MG: 5 TABLET, FILM COATED ORAL at 11:34

## 2019-02-09 RX ADMIN — PRAVASTATIN SODIUM 40 MG: 40 TABLET ORAL at 21:18

## 2019-02-09 ASSESSMENT — PAIN DESCRIPTION - DESCRIPTORS
DESCRIPTORS: CRAMPING
DESCRIPTORS: CRAMPING

## 2019-02-09 ASSESSMENT — PAIN DESCRIPTION - PAIN TYPE
TYPE: ACUTE PAIN
TYPE: ACUTE PAIN

## 2019-02-09 ASSESSMENT — PAIN DESCRIPTION - LOCATION
LOCATION: ARM
LOCATION: ARM

## 2019-02-09 ASSESSMENT — PAIN SCALES - GENERAL
PAINLEVEL_OUTOF10: 5
PAINLEVEL_OUTOF10: 6

## 2019-02-09 ASSESSMENT — PAIN DESCRIPTION - ORIENTATION
ORIENTATION: RIGHT
ORIENTATION: RIGHT

## 2019-02-10 LAB
ALBUMIN SERPL-MCNC: 4.3 G/DL (ref 3.4–5)
ANION GAP SERPL CALCULATED.3IONS-SCNC: 10 MMOL/L (ref 3–16)
BASOPHILS ABSOLUTE: 0.1 K/UL (ref 0–0.2)
BASOPHILS RELATIVE PERCENT: 0.8 %
BUN BLDV-MCNC: 66 MG/DL (ref 7–20)
CALCIUM SERPL-MCNC: 9 MG/DL (ref 8.3–10.6)
CHLORIDE BLD-SCNC: 99 MMOL/L (ref 99–110)
CO2: 26 MMOL/L (ref 21–32)
CREAT SERPL-MCNC: 2.4 MG/DL (ref 0.8–1.3)
EOSINOPHILS ABSOLUTE: 0.1 K/UL (ref 0–0.6)
EOSINOPHILS RELATIVE PERCENT: 1.5 %
GFR AFRICAN AMERICAN: 33
GFR NON-AFRICAN AMERICAN: 27
GLUCOSE BLD-MCNC: 161 MG/DL (ref 70–99)
GLUCOSE BLD-MCNC: 165 MG/DL (ref 70–99)
GLUCOSE BLD-MCNC: 189 MG/DL (ref 70–99)
GLUCOSE BLD-MCNC: 229 MG/DL (ref 70–99)
GLUCOSE BLD-MCNC: 273 MG/DL (ref 70–99)
GLUCOSE BLD-MCNC: 292 MG/DL (ref 70–99)
HCT VFR BLD CALC: 27.8 % (ref 40.5–52.5)
HEMOGLOBIN: 8.7 G/DL (ref 13.5–17.5)
LYMPHOCYTES ABSOLUTE: 0.6 K/UL (ref 1–5.1)
LYMPHOCYTES RELATIVE PERCENT: 9.3 %
MAGNESIUM: 2.3 MG/DL (ref 1.8–2.4)
MCH RBC QN AUTO: 28.6 PG (ref 26–34)
MCHC RBC AUTO-ENTMCNC: 31.1 G/DL (ref 31–36)
MCV RBC AUTO: 91.9 FL (ref 80–100)
MONOCYTES ABSOLUTE: 0.8 K/UL (ref 0–1.3)
MONOCYTES RELATIVE PERCENT: 11.5 %
NEUTROPHILS ABSOLUTE: 5.3 K/UL (ref 1.7–7.7)
NEUTROPHILS RELATIVE PERCENT: 76.9 %
PDW BLD-RTO: 15.8 % (ref 12.4–15.4)
PERFORMED ON: ABNORMAL
PLATELET # BLD: 143 K/UL (ref 135–450)
PMV BLD AUTO: 7.3 FL (ref 5–10.5)
POTASSIUM SERPL-SCNC: 4.5 MMOL/L (ref 3.5–5.1)
RBC # BLD: 3.03 M/UL (ref 4.2–5.9)
SODIUM BLD-SCNC: 135 MMOL/L (ref 136–145)
URIC ACID, SERUM: 3.3 MG/DL (ref 3.5–7.2)
WBC # BLD: 6.9 K/UL (ref 4–11)

## 2019-02-10 PROCEDURE — 2700000000 HC OXYGEN THERAPY PER DAY

## 2019-02-10 PROCEDURE — 6370000000 HC RX 637 (ALT 250 FOR IP): Performed by: INTERNAL MEDICINE

## 2019-02-10 PROCEDURE — 85025 COMPLETE CBC W/AUTO DIFF WBC: CPT

## 2019-02-10 PROCEDURE — 6370000000 HC RX 637 (ALT 250 FOR IP): Performed by: NURSE PRACTITIONER

## 2019-02-10 PROCEDURE — 6360000002 HC RX W HCPCS: Performed by: HOSPITALIST

## 2019-02-10 PROCEDURE — 99232 SBSQ HOSP IP/OBS MODERATE 35: CPT | Performed by: NURSE PRACTITIONER

## 2019-02-10 PROCEDURE — 2060000000 HC ICU INTERMEDIATE R&B

## 2019-02-10 PROCEDURE — 82040 ASSAY OF SERUM ALBUMIN: CPT

## 2019-02-10 PROCEDURE — 6360000002 HC RX W HCPCS: Performed by: NURSE PRACTITIONER

## 2019-02-10 PROCEDURE — 2580000003 HC RX 258: Performed by: NURSE PRACTITIONER

## 2019-02-10 PROCEDURE — 94760 N-INVAS EAR/PLS OXIMETRY 1: CPT

## 2019-02-10 PROCEDURE — 94640 AIRWAY INHALATION TREATMENT: CPT

## 2019-02-10 PROCEDURE — 83735 ASSAY OF MAGNESIUM: CPT

## 2019-02-10 PROCEDURE — 84550 ASSAY OF BLOOD/URIC ACID: CPT

## 2019-02-10 PROCEDURE — 94668 MNPJ CHEST WALL SBSQ: CPT

## 2019-02-10 PROCEDURE — 80048 BASIC METABOLIC PNL TOTAL CA: CPT

## 2019-02-10 PROCEDURE — 36415 COLL VENOUS BLD VENIPUNCTURE: CPT

## 2019-02-10 PROCEDURE — 99232 SBSQ HOSP IP/OBS MODERATE 35: CPT | Performed by: INTERNAL MEDICINE

## 2019-02-10 RX ADMIN — INSULIN LISPRO 3 UNITS: 100 INJECTION, SOLUTION INTRAVENOUS; SUBCUTANEOUS at 16:49

## 2019-02-10 RX ADMIN — PANTOPRAZOLE SODIUM 40 MG: 40 TABLET, DELAYED RELEASE ORAL at 06:15

## 2019-02-10 RX ADMIN — INSULIN LISPRO 2 UNITS: 100 INJECTION, SOLUTION INTRAVENOUS; SUBCUTANEOUS at 13:49

## 2019-02-10 RX ADMIN — GABAPENTIN 100 MG: 100 CAPSULE ORAL at 21:48

## 2019-02-10 RX ADMIN — GABAPENTIN 100 MG: 100 CAPSULE ORAL at 08:54

## 2019-02-10 RX ADMIN — Medication 10 ML: at 21:49

## 2019-02-10 RX ADMIN — DOCUSATE SODIUM 100 MG: 100 CAPSULE, LIQUID FILLED ORAL at 08:54

## 2019-02-10 RX ADMIN — FINASTERIDE 5 MG: 5 TABLET, FILM COATED ORAL at 08:54

## 2019-02-10 RX ADMIN — FLUTICASONE PROPIONATE 1 SPRAY: 50 SPRAY, METERED NASAL at 08:55

## 2019-02-10 RX ADMIN — ASPIRIN 81 MG CHEWABLE TABLET 81 MG: 81 TABLET CHEWABLE at 08:54

## 2019-02-10 RX ADMIN — CARVEDILOL 12.5 MG: 6.25 TABLET, FILM COATED ORAL at 16:48

## 2019-02-10 RX ADMIN — Medication 10 ML: at 08:55

## 2019-02-10 RX ADMIN — GUAIFENESIN 600 MG: 600 TABLET, EXTENDED RELEASE ORAL at 08:54

## 2019-02-10 RX ADMIN — ONDANSETRON HYDROCHLORIDE 4 MG: 2 INJECTION, SOLUTION INTRAMUSCULAR; INTRAVENOUS at 20:51

## 2019-02-10 RX ADMIN — HYDRALAZINE HYDROCHLORIDE 10 MG: 10 TABLET, FILM COATED ORAL at 13:49

## 2019-02-10 RX ADMIN — IPRATROPIUM BROMIDE AND ALBUTEROL SULFATE 1 AMPULE: .5; 3 SOLUTION RESPIRATORY (INHALATION) at 13:15

## 2019-02-10 RX ADMIN — HYDRALAZINE HYDROCHLORIDE 10 MG: 10 TABLET, FILM COATED ORAL at 06:15

## 2019-02-10 RX ADMIN — TRAZODONE HYDROCHLORIDE 50 MG: 50 TABLET ORAL at 21:48

## 2019-02-10 RX ADMIN — INSULIN LISPRO 2 UNITS: 100 INJECTION, SOLUTION INTRAVENOUS; SUBCUTANEOUS at 20:51

## 2019-02-10 RX ADMIN — CITALOPRAM HYDROBROMIDE 20 MG: 20 TABLET ORAL at 08:54

## 2019-02-10 RX ADMIN — GABAPENTIN 100 MG: 100 CAPSULE ORAL at 13:49

## 2019-02-10 RX ADMIN — INSULIN GLARGINE 40 UNITS: 100 INJECTION, SOLUTION SUBCUTANEOUS at 20:52

## 2019-02-10 RX ADMIN — IPRATROPIUM BROMIDE AND ALBUTEROL SULFATE 1 AMPULE: .5; 3 SOLUTION RESPIRATORY (INHALATION) at 09:27

## 2019-02-10 RX ADMIN — ALLOPURINOL 100 MG: 100 TABLET ORAL at 08:54

## 2019-02-10 RX ADMIN — HYDRALAZINE HYDROCHLORIDE 10 MG: 10 TABLET, FILM COATED ORAL at 21:48

## 2019-02-10 RX ADMIN — INSULIN LISPRO 1 UNITS: 100 INJECTION, SOLUTION INTRAVENOUS; SUBCUTANEOUS at 08:56

## 2019-02-10 RX ADMIN — PRAVASTATIN SODIUM 40 MG: 40 TABLET ORAL at 21:48

## 2019-02-10 RX ADMIN — GUAIFENESIN 600 MG: 600 TABLET, EXTENDED RELEASE ORAL at 21:48

## 2019-02-10 RX ADMIN — CARVEDILOL 12.5 MG: 6.25 TABLET, FILM COATED ORAL at 08:54

## 2019-02-10 RX ADMIN — IPRATROPIUM BROMIDE AND ALBUTEROL SULFATE 1 AMPULE: .5; 3 SOLUTION RESPIRATORY (INHALATION) at 21:54

## 2019-02-11 ENCOUNTER — APPOINTMENT (OUTPATIENT)
Dept: INTERVENTIONAL RADIOLOGY/VASCULAR | Age: 64
DRG: 264 | End: 2019-02-11
Payer: MEDICARE

## 2019-02-11 LAB
ALBUMIN SERPL-MCNC: 4 G/DL (ref 3.4–5)
ANION GAP SERPL CALCULATED.3IONS-SCNC: 14 MMOL/L (ref 3–16)
BASOPHILS ABSOLUTE: 0.1 K/UL (ref 0–0.2)
BASOPHILS RELATIVE PERCENT: 0.8 %
BUN BLDV-MCNC: 96 MG/DL (ref 7–20)
CALCIUM SERPL-MCNC: 9 MG/DL (ref 8.3–10.6)
CHLORIDE BLD-SCNC: 96 MMOL/L (ref 99–110)
CO2: 22 MMOL/L (ref 21–32)
CREAT SERPL-MCNC: 3 MG/DL (ref 0.8–1.3)
EOSINOPHILS ABSOLUTE: 0.1 K/UL (ref 0–0.6)
EOSINOPHILS RELATIVE PERCENT: 2.2 %
GFR AFRICAN AMERICAN: 26
GFR NON-AFRICAN AMERICAN: 21
GLUCOSE BLD-MCNC: 128 MG/DL (ref 70–99)
GLUCOSE BLD-MCNC: 214 MG/DL (ref 70–99)
GLUCOSE BLD-MCNC: 223 MG/DL (ref 70–99)
HCT VFR BLD CALC: 27.5 % (ref 40.5–52.5)
HEMOGLOBIN: 8.5 G/DL (ref 13.5–17.5)
LYMPHOCYTES ABSOLUTE: 0.8 K/UL (ref 1–5.1)
LYMPHOCYTES RELATIVE PERCENT: 11.6 %
MAGNESIUM: 2.4 MG/DL (ref 1.8–2.4)
MCH RBC QN AUTO: 28.2 PG (ref 26–34)
MCHC RBC AUTO-ENTMCNC: 31.1 G/DL (ref 31–36)
MCV RBC AUTO: 90.8 FL (ref 80–100)
MONOCYTES ABSOLUTE: 0.7 K/UL (ref 0–1.3)
MONOCYTES RELATIVE PERCENT: 10 %
NEUTROPHILS ABSOLUTE: 5.2 K/UL (ref 1.7–7.7)
NEUTROPHILS RELATIVE PERCENT: 75.4 %
PDW BLD-RTO: 15.9 % (ref 12.4–15.4)
PERFORMED ON: ABNORMAL
PERFORMED ON: ABNORMAL
PLATELET # BLD: 144 K/UL (ref 135–450)
PMV BLD AUTO: 7.8 FL (ref 5–10.5)
POTASSIUM SERPL-SCNC: 4.6 MMOL/L (ref 3.5–5.1)
RBC # BLD: 3.03 M/UL (ref 4.2–5.9)
SODIUM BLD-SCNC: 132 MMOL/L (ref 136–145)
URIC ACID, SERUM: 3.7 MG/DL (ref 3.5–7.2)
WBC # BLD: 6.8 K/UL (ref 4–11)

## 2019-02-11 PROCEDURE — 2060000000 HC ICU INTERMEDIATE R&B

## 2019-02-11 PROCEDURE — 84550 ASSAY OF BLOOD/URIC ACID: CPT

## 2019-02-11 PROCEDURE — 6370000000 HC RX 637 (ALT 250 FOR IP): Performed by: INTERNAL MEDICINE

## 2019-02-11 PROCEDURE — 85025 COMPLETE CBC W/AUTO DIFF WBC: CPT

## 2019-02-11 PROCEDURE — 94760 N-INVAS EAR/PLS OXIMETRY 1: CPT

## 2019-02-11 PROCEDURE — 82040 ASSAY OF SERUM ALBUMIN: CPT

## 2019-02-11 PROCEDURE — 83735 ASSAY OF MAGNESIUM: CPT

## 2019-02-11 PROCEDURE — 36415 COLL VENOUS BLD VENIPUNCTURE: CPT

## 2019-02-11 PROCEDURE — 02PY33Z REMOVAL OF INFUSION DEVICE FROM GREAT VESSEL, PERCUTANEOUS APPROACH: ICD-10-PCS | Performed by: RADIOLOGY

## 2019-02-11 PROCEDURE — 2700000000 HC OXYGEN THERAPY PER DAY

## 2019-02-11 PROCEDURE — 02HV33Z INSERTION OF INFUSION DEVICE INTO SUPERIOR VENA CAVA, PERCUTANEOUS APPROACH: ICD-10-PCS | Performed by: RADIOLOGY

## 2019-02-11 PROCEDURE — 6360000002 HC RX W HCPCS: Performed by: HOSPITALIST

## 2019-02-11 PROCEDURE — 80048 BASIC METABOLIC PNL TOTAL CA: CPT

## 2019-02-11 PROCEDURE — 6370000000 HC RX 637 (ALT 250 FOR IP): Performed by: NURSE PRACTITIONER

## 2019-02-11 PROCEDURE — 6360000002 HC RX W HCPCS: Performed by: INTERNAL MEDICINE

## 2019-02-11 PROCEDURE — 77001 FLUOROGUIDE FOR VEIN DEVICE: CPT

## 2019-02-11 PROCEDURE — 94640 AIRWAY INHALATION TREATMENT: CPT

## 2019-02-11 PROCEDURE — C1769 GUIDE WIRE: HCPCS

## 2019-02-11 PROCEDURE — 2580000003 HC RX 258

## 2019-02-11 PROCEDURE — 2500000003 HC RX 250 WO HCPCS: Performed by: INTERNAL MEDICINE

## 2019-02-11 PROCEDURE — 6360000002 HC RX W HCPCS: Performed by: NURSE PRACTITIONER

## 2019-02-11 PROCEDURE — 2580000003 HC RX 258: Performed by: NURSE PRACTITIONER

## 2019-02-11 PROCEDURE — 0JH63XZ INSERTION OF TUNNELED VASCULAR ACCESS DEVICE INTO CHEST SUBCUTANEOUS TISSUE AND FASCIA, PERCUTANEOUS APPROACH: ICD-10-PCS | Performed by: RADIOLOGY

## 2019-02-11 PROCEDURE — 99232 SBSQ HOSP IP/OBS MODERATE 35: CPT | Performed by: INTERNAL MEDICINE

## 2019-02-11 PROCEDURE — 36558 INSERT TUNNELED CV CATH: CPT

## 2019-02-11 PROCEDURE — 51705 CHANGE OF BLADDER TUBE: CPT

## 2019-02-11 RX ORDER — HEPARIN SODIUM 5000 [USP'U]/ML
6000 INJECTION, SOLUTION INTRAVENOUS; SUBCUTANEOUS ONCE
Status: COMPLETED | OUTPATIENT
Start: 2019-02-11 | End: 2019-02-11

## 2019-02-11 RX ORDER — LIDOCAINE HYDROCHLORIDE 10 MG/ML
10 INJECTION, SOLUTION EPIDURAL; INFILTRATION; INTRACAUDAL; PERINEURAL ONCE
Status: COMPLETED | OUTPATIENT
Start: 2019-02-11 | End: 2019-02-11

## 2019-02-11 RX ORDER — SODIUM CHLORIDE 9 MG/ML
INJECTION, SOLUTION INTRAVENOUS
Status: COMPLETED
Start: 2019-02-11 | End: 2019-02-11

## 2019-02-11 RX ORDER — SODIUM CHLORIDE 0.9 % (FLUSH) 0.9 %
SYRINGE (ML) INJECTION
Status: COMPLETED
Start: 2019-02-11 | End: 2019-02-11

## 2019-02-11 RX ORDER — LIDOCAINE HYDROCHLORIDE AND EPINEPHRINE 10; 10 MG/ML; UG/ML
7.5 INJECTION, SOLUTION INFILTRATION; PERINEURAL ONCE
Status: COMPLETED | OUTPATIENT
Start: 2019-02-11 | End: 2019-02-11

## 2019-02-11 RX ADMIN — IPRATROPIUM BROMIDE AND ALBUTEROL SULFATE 1 AMPULE: .5; 3 SOLUTION RESPIRATORY (INHALATION) at 22:39

## 2019-02-11 RX ADMIN — LIDOCAINE HYDROCHLORIDE AND EPINEPHRINE 7.5 ML: 10; 10 INJECTION, SOLUTION INFILTRATION; PERINEURAL at 12:42

## 2019-02-11 RX ADMIN — INSULIN LISPRO 1 UNITS: 100 INJECTION, SOLUTION INTRAVENOUS; SUBCUTANEOUS at 19:05

## 2019-02-11 RX ADMIN — HEPARIN SODIUM IN SODIUM CHLORIDE 30 ML/HR: 200 INJECTION INTRAVENOUS at 11:50

## 2019-02-11 RX ADMIN — CITALOPRAM HYDROBROMIDE 20 MG: 20 TABLET ORAL at 14:28

## 2019-02-11 RX ADMIN — GABAPENTIN 100 MG: 100 CAPSULE ORAL at 22:04

## 2019-02-11 RX ADMIN — FINASTERIDE 5 MG: 5 TABLET, FILM COATED ORAL at 14:28

## 2019-02-11 RX ADMIN — PRAVASTATIN SODIUM 40 MG: 40 TABLET ORAL at 22:04

## 2019-02-11 RX ADMIN — ONDANSETRON HYDROCHLORIDE 4 MG: 2 INJECTION, SOLUTION INTRAMUSCULAR; INTRAVENOUS at 09:10

## 2019-02-11 RX ADMIN — INSULIN GLARGINE 40 UNITS: 100 INJECTION, SOLUTION SUBCUTANEOUS at 22:07

## 2019-02-11 RX ADMIN — GABAPENTIN 100 MG: 100 CAPSULE ORAL at 14:30

## 2019-02-11 RX ADMIN — Medication: at 12:42

## 2019-02-11 RX ADMIN — SODIUM CHLORIDE: 9 INJECTION, SOLUTION INTRAVENOUS at 11:47

## 2019-02-11 RX ADMIN — ASPIRIN 81 MG CHEWABLE TABLET 81 MG: 81 TABLET CHEWABLE at 14:28

## 2019-02-11 RX ADMIN — Medication 10 ML: at 11:46

## 2019-02-11 RX ADMIN — ACETAMINOPHEN 650 MG: 325 TABLET, FILM COATED ORAL at 19:08

## 2019-02-11 RX ADMIN — HYDRALAZINE HYDROCHLORIDE 10 MG: 10 TABLET, FILM COATED ORAL at 14:27

## 2019-02-11 RX ADMIN — Medication 10 ML: at 22:30

## 2019-02-11 RX ADMIN — INSULIN LISPRO 1 UNITS: 100 INJECTION, SOLUTION INTRAVENOUS; SUBCUTANEOUS at 22:07

## 2019-02-11 RX ADMIN — ALLOPURINOL 100 MG: 100 TABLET ORAL at 14:28

## 2019-02-11 RX ADMIN — DOCUSATE SODIUM 100 MG: 100 CAPSULE, LIQUID FILLED ORAL at 14:27

## 2019-02-11 RX ADMIN — CARVEDILOL 12.5 MG: 6.25 TABLET, FILM COATED ORAL at 19:12

## 2019-02-11 RX ADMIN — LIDOCAINE HYDROCHLORIDE 10 ML: 10 INJECTION, SOLUTION EPIDURAL; INFILTRATION; INTRACAUDAL; PERINEURAL at 12:41

## 2019-02-11 RX ADMIN — HEPARIN SODIUM 3800 UNITS: 5000 INJECTION INTRAVENOUS; SUBCUTANEOUS at 12:33

## 2019-02-11 RX ADMIN — GUAIFENESIN 600 MG: 600 TABLET, EXTENDED RELEASE ORAL at 14:27

## 2019-02-11 RX ADMIN — IPRATROPIUM BROMIDE AND ALBUTEROL SULFATE 1 AMPULE: .5; 3 SOLUTION RESPIRATORY (INHALATION) at 13:07

## 2019-02-11 RX ADMIN — HEPARIN SODIUM 2600 UNITS: 1000 INJECTION INTRAVENOUS; SUBCUTANEOUS at 11:15

## 2019-02-11 ASSESSMENT — PAIN SCALES - GENERAL
PAINLEVEL_OUTOF10: 0
PAINLEVEL_OUTOF10: 3
PAINLEVEL_OUTOF10: 0

## 2019-02-12 LAB
ALBUMIN SERPL-MCNC: 4.1 G/DL (ref 3.4–5)
ANION GAP SERPL CALCULATED.3IONS-SCNC: 14 MMOL/L (ref 3–16)
BASOPHILS ABSOLUTE: 0 K/UL (ref 0–0.2)
BASOPHILS RELATIVE PERCENT: 0.7 %
BUN BLDV-MCNC: 55 MG/DL (ref 7–20)
CALCIUM SERPL-MCNC: 8.7 MG/DL (ref 8.3–10.6)
CHLORIDE BLD-SCNC: 99 MMOL/L (ref 99–110)
CO2: 21 MMOL/L (ref 21–32)
CREAT SERPL-MCNC: 2.7 MG/DL (ref 0.8–1.3)
EOSINOPHILS ABSOLUTE: 0.2 K/UL (ref 0–0.6)
EOSINOPHILS RELATIVE PERCENT: 2.8 %
GFR AFRICAN AMERICAN: 29
GFR NON-AFRICAN AMERICAN: 24
GLUCOSE BLD-MCNC: 132 MG/DL (ref 70–99)
GLUCOSE BLD-MCNC: 138 MG/DL (ref 70–99)
GLUCOSE BLD-MCNC: 193 MG/DL (ref 70–99)
GLUCOSE BLD-MCNC: 195 MG/DL (ref 70–99)
GLUCOSE BLD-MCNC: 202 MG/DL (ref 70–99)
GLUCOSE BLD-MCNC: 214 MG/DL (ref 70–99)
GLUCOSE BLD-MCNC: 215 MG/DL (ref 70–99)
HCT VFR BLD CALC: 28 % (ref 40.5–52.5)
HEMOGLOBIN: 8.7 G/DL (ref 13.5–17.5)
LYMPHOCYTES ABSOLUTE: 0.9 K/UL (ref 1–5.1)
LYMPHOCYTES RELATIVE PERCENT: 12.5 %
MAGNESIUM: 2.3 MG/DL (ref 1.8–2.4)
MCH RBC QN AUTO: 28.4 PG (ref 26–34)
MCHC RBC AUTO-ENTMCNC: 31.3 G/DL (ref 31–36)
MCV RBC AUTO: 90.8 FL (ref 80–100)
MONOCYTES ABSOLUTE: 0.6 K/UL (ref 0–1.3)
MONOCYTES RELATIVE PERCENT: 9 %
NEUTROPHILS ABSOLUTE: 5.3 K/UL (ref 1.7–7.7)
NEUTROPHILS RELATIVE PERCENT: 75 %
PDW BLD-RTO: 16 % (ref 12.4–15.4)
PERFORMED ON: ABNORMAL
PLATELET # BLD: 150 K/UL (ref 135–450)
PMV BLD AUTO: 7.2 FL (ref 5–10.5)
POTASSIUM SERPL-SCNC: 4.9 MMOL/L (ref 3.5–5.1)
RBC # BLD: 3.08 M/UL (ref 4.2–5.9)
SODIUM BLD-SCNC: 134 MMOL/L (ref 136–145)
URIC ACID, SERUM: 2.9 MG/DL (ref 3.5–7.2)
WBC # BLD: 7.1 K/UL (ref 4–11)

## 2019-02-12 PROCEDURE — 2700000000 HC OXYGEN THERAPY PER DAY

## 2019-02-12 PROCEDURE — 84550 ASSAY OF BLOOD/URIC ACID: CPT

## 2019-02-12 PROCEDURE — 2580000003 HC RX 258: Performed by: NURSE PRACTITIONER

## 2019-02-12 PROCEDURE — 6370000000 HC RX 637 (ALT 250 FOR IP): Performed by: INTERNAL MEDICINE

## 2019-02-12 PROCEDURE — 6360000002 HC RX W HCPCS: Performed by: NURSE PRACTITIONER

## 2019-02-12 PROCEDURE — 6370000000 HC RX 637 (ALT 250 FOR IP): Performed by: NURSE PRACTITIONER

## 2019-02-12 PROCEDURE — 92526 ORAL FUNCTION THERAPY: CPT

## 2019-02-12 PROCEDURE — 94640 AIRWAY INHALATION TREATMENT: CPT

## 2019-02-12 PROCEDURE — 80048 BASIC METABOLIC PNL TOTAL CA: CPT

## 2019-02-12 PROCEDURE — 94668 MNPJ CHEST WALL SBSQ: CPT

## 2019-02-12 PROCEDURE — 83735 ASSAY OF MAGNESIUM: CPT

## 2019-02-12 PROCEDURE — 85025 COMPLETE CBC W/AUTO DIFF WBC: CPT

## 2019-02-12 PROCEDURE — 2060000000 HC ICU INTERMEDIATE R&B

## 2019-02-12 PROCEDURE — 82040 ASSAY OF SERUM ALBUMIN: CPT

## 2019-02-12 PROCEDURE — 36415 COLL VENOUS BLD VENIPUNCTURE: CPT

## 2019-02-12 PROCEDURE — 94760 N-INVAS EAR/PLS OXIMETRY 1: CPT

## 2019-02-12 PROCEDURE — 6370000000 HC RX 637 (ALT 250 FOR IP): Performed by: HOSPITALIST

## 2019-02-12 RX ORDER — HYDRALAZINE HYDROCHLORIDE 10 MG/1
10 TABLET, FILM COATED ORAL 2 TIMES DAILY
Status: DISCONTINUED | OUTPATIENT
Start: 2019-02-12 | End: 2019-02-14

## 2019-02-12 RX ADMIN — ACETAMINOPHEN 650 MG: 325 TABLET, FILM COATED ORAL at 20:39

## 2019-02-12 RX ADMIN — FINASTERIDE 5 MG: 5 TABLET, FILM COATED ORAL at 09:04

## 2019-02-12 RX ADMIN — ALLOPURINOL 100 MG: 100 TABLET ORAL at 09:04

## 2019-02-12 RX ADMIN — HYDRALAZINE HYDROCHLORIDE 10 MG: 10 TABLET, FILM COATED ORAL at 20:39

## 2019-02-12 RX ADMIN — IPRATROPIUM BROMIDE AND ALBUTEROL SULFATE 1 AMPULE: .5; 3 SOLUTION RESPIRATORY (INHALATION) at 22:50

## 2019-02-12 RX ADMIN — Medication 10 ML: at 20:52

## 2019-02-12 RX ADMIN — CARVEDILOL 12.5 MG: 6.25 TABLET, FILM COATED ORAL at 18:05

## 2019-02-12 RX ADMIN — PANTOPRAZOLE SODIUM 40 MG: 40 TABLET, DELAYED RELEASE ORAL at 05:05

## 2019-02-12 RX ADMIN — PRAVASTATIN SODIUM 40 MG: 40 TABLET ORAL at 20:40

## 2019-02-12 RX ADMIN — GABAPENTIN 100 MG: 100 CAPSULE ORAL at 09:04

## 2019-02-12 RX ADMIN — GUAIFENESIN 600 MG: 600 TABLET, EXTENDED RELEASE ORAL at 09:04

## 2019-02-12 RX ADMIN — GABAPENTIN 100 MG: 100 CAPSULE ORAL at 18:06

## 2019-02-12 RX ADMIN — CITALOPRAM HYDROBROMIDE 20 MG: 20 TABLET ORAL at 09:05

## 2019-02-12 RX ADMIN — ASPIRIN 81 MG CHEWABLE TABLET 81 MG: 81 TABLET CHEWABLE at 09:05

## 2019-02-12 RX ADMIN — IPRATROPIUM BROMIDE AND ALBUTEROL SULFATE 3 ML: .5; 3 SOLUTION RESPIRATORY (INHALATION) at 04:57

## 2019-02-12 RX ADMIN — CARVEDILOL 12.5 MG: 6.25 TABLET, FILM COATED ORAL at 09:03

## 2019-02-12 RX ADMIN — Medication 10 ML: at 09:07

## 2019-02-12 RX ADMIN — INSULIN LISPRO 2 UNITS: 100 INJECTION, SOLUTION INTRAVENOUS; SUBCUTANEOUS at 12:14

## 2019-02-12 RX ADMIN — DOCUSATE SODIUM 100 MG: 100 CAPSULE, LIQUID FILLED ORAL at 09:05

## 2019-02-12 RX ADMIN — FLUTICASONE PROPIONATE 1 SPRAY: 50 SPRAY, METERED NASAL at 09:02

## 2019-02-12 RX ADMIN — INSULIN LISPRO 2 UNITS: 100 INJECTION, SOLUTION INTRAVENOUS; SUBCUTANEOUS at 17:47

## 2019-02-12 RX ADMIN — GABAPENTIN 100 MG: 100 CAPSULE ORAL at 20:40

## 2019-02-12 RX ADMIN — TRAZODONE HYDROCHLORIDE 50 MG: 50 TABLET ORAL at 00:11

## 2019-02-12 RX ADMIN — GUAIFENESIN 600 MG: 600 TABLET, EXTENDED RELEASE ORAL at 20:40

## 2019-02-12 RX ADMIN — INSULIN LISPRO 1 UNITS: 100 INJECTION, SOLUTION INTRAVENOUS; SUBCUTANEOUS at 20:55

## 2019-02-12 RX ADMIN — ONDANSETRON HYDROCHLORIDE 4 MG: 2 INJECTION, SOLUTION INTRAMUSCULAR; INTRAVENOUS at 19:21

## 2019-02-12 RX ADMIN — IPRATROPIUM BROMIDE AND ALBUTEROL SULFATE 1 AMPULE: .5; 3 SOLUTION RESPIRATORY (INHALATION) at 09:26

## 2019-02-12 RX ADMIN — INSULIN GLARGINE 40 UNITS: 100 INJECTION, SOLUTION SUBCUTANEOUS at 20:56

## 2019-02-12 RX ADMIN — GUAIFENESIN 600 MG: 600 TABLET, EXTENDED RELEASE ORAL at 00:09

## 2019-02-12 ASSESSMENT — PAIN SCALES - GENERAL
PAINLEVEL_OUTOF10: 0
PAINLEVEL_OUTOF10: 3

## 2019-02-13 LAB
ALBUMIN SERPL-MCNC: 3.8 G/DL (ref 3.4–5)
ANION GAP SERPL CALCULATED.3IONS-SCNC: 13 MMOL/L (ref 3–16)
BASOPHILS ABSOLUTE: 0 K/UL (ref 0–0.2)
BASOPHILS RELATIVE PERCENT: 0.7 %
BUN BLDV-MCNC: 77 MG/DL (ref 7–20)
CALCIUM SERPL-MCNC: 8.7 MG/DL (ref 8.3–10.6)
CHLORIDE BLD-SCNC: 97 MMOL/L (ref 99–110)
CO2: 20 MMOL/L (ref 21–32)
CREAT SERPL-MCNC: 3.4 MG/DL (ref 0.8–1.3)
EOSINOPHILS ABSOLUTE: 0.2 K/UL (ref 0–0.6)
EOSINOPHILS RELATIVE PERCENT: 2.9 %
GFR AFRICAN AMERICAN: 22
GFR NON-AFRICAN AMERICAN: 18
GLUCOSE BLD-MCNC: 126 MG/DL (ref 70–99)
GLUCOSE BLD-MCNC: 144 MG/DL (ref 70–99)
GLUCOSE BLD-MCNC: 165 MG/DL (ref 70–99)
GLUCOSE BLD-MCNC: 200 MG/DL (ref 70–99)
GLUCOSE BLD-MCNC: 80 MG/DL (ref 70–99)
HCT VFR BLD CALC: 27.1 % (ref 40.5–52.5)
HEMOGLOBIN: 8.6 G/DL (ref 13.5–17.5)
LYMPHOCYTES ABSOLUTE: 0.8 K/UL (ref 1–5.1)
LYMPHOCYTES RELATIVE PERCENT: 11.4 %
MAGNESIUM: 2.4 MG/DL (ref 1.8–2.4)
MCH RBC QN AUTO: 29.1 PG (ref 26–34)
MCHC RBC AUTO-ENTMCNC: 31.8 G/DL (ref 31–36)
MCV RBC AUTO: 91.5 FL (ref 80–100)
MONOCYTES ABSOLUTE: 0.6 K/UL (ref 0–1.3)
MONOCYTES RELATIVE PERCENT: 9.6 %
NEUTROPHILS ABSOLUTE: 5.1 K/UL (ref 1.7–7.7)
NEUTROPHILS RELATIVE PERCENT: 75.4 %
PDW BLD-RTO: 15.8 % (ref 12.4–15.4)
PERFORMED ON: ABNORMAL
PERFORMED ON: NORMAL
PLATELET # BLD: 141 K/UL (ref 135–450)
PMV BLD AUTO: 7.4 FL (ref 5–10.5)
POTASSIUM SERPL-SCNC: 4.9 MMOL/L (ref 3.5–5.1)
RBC # BLD: 2.96 M/UL (ref 4.2–5.9)
SODIUM BLD-SCNC: 130 MMOL/L (ref 136–145)
URIC ACID, SERUM: 3.7 MG/DL (ref 3.5–7.2)
WBC # BLD: 6.8 K/UL (ref 4–11)

## 2019-02-13 PROCEDURE — 85025 COMPLETE CBC W/AUTO DIFF WBC: CPT

## 2019-02-13 PROCEDURE — 6370000000 HC RX 637 (ALT 250 FOR IP): Performed by: INTERNAL MEDICINE

## 2019-02-13 PROCEDURE — 84550 ASSAY OF BLOOD/URIC ACID: CPT

## 2019-02-13 PROCEDURE — 94640 AIRWAY INHALATION TREATMENT: CPT

## 2019-02-13 PROCEDURE — 2580000003 HC RX 258: Performed by: NURSE PRACTITIONER

## 2019-02-13 PROCEDURE — 82040 ASSAY OF SERUM ALBUMIN: CPT

## 2019-02-13 PROCEDURE — 80048 BASIC METABOLIC PNL TOTAL CA: CPT

## 2019-02-13 PROCEDURE — 2060000000 HC ICU INTERMEDIATE R&B

## 2019-02-13 PROCEDURE — 83735 ASSAY OF MAGNESIUM: CPT

## 2019-02-13 PROCEDURE — 90937 HEMODIALYSIS REPEATED EVAL: CPT

## 2019-02-13 PROCEDURE — 36415 COLL VENOUS BLD VENIPUNCTURE: CPT

## 2019-02-13 PROCEDURE — 94760 N-INVAS EAR/PLS OXIMETRY 1: CPT

## 2019-02-13 PROCEDURE — 6370000000 HC RX 637 (ALT 250 FOR IP): Performed by: NURSE PRACTITIONER

## 2019-02-13 PROCEDURE — 2700000000 HC OXYGEN THERAPY PER DAY

## 2019-02-13 RX ADMIN — INSULIN LISPRO 1 UNITS: 100 INJECTION, SOLUTION INTRAVENOUS; SUBCUTANEOUS at 21:06

## 2019-02-13 RX ADMIN — HYDRALAZINE HYDROCHLORIDE 10 MG: 10 TABLET, FILM COATED ORAL at 21:04

## 2019-02-13 RX ADMIN — GUAIFENESIN 600 MG: 600 TABLET, EXTENDED RELEASE ORAL at 21:04

## 2019-02-13 RX ADMIN — ALLOPURINOL 100 MG: 100 TABLET ORAL at 12:29

## 2019-02-13 RX ADMIN — TRAZODONE HYDROCHLORIDE 50 MG: 50 TABLET ORAL at 00:13

## 2019-02-13 RX ADMIN — IPRATROPIUM BROMIDE AND ALBUTEROL SULFATE 1 AMPULE: .5; 3 SOLUTION RESPIRATORY (INHALATION) at 13:37

## 2019-02-13 RX ADMIN — GUAIFENESIN 600 MG: 600 TABLET, EXTENDED RELEASE ORAL at 12:29

## 2019-02-13 RX ADMIN — IPRATROPIUM BROMIDE AND ALBUTEROL SULFATE 1 AMPULE: .5; 3 SOLUTION RESPIRATORY (INHALATION) at 20:35

## 2019-02-13 RX ADMIN — ACETAMINOPHEN 650 MG: 325 TABLET, FILM COATED ORAL at 13:31

## 2019-02-13 RX ADMIN — INSULIN LISPRO 1 UNITS: 100 INJECTION, SOLUTION INTRAVENOUS; SUBCUTANEOUS at 17:58

## 2019-02-13 RX ADMIN — PRAVASTATIN SODIUM 40 MG: 40 TABLET ORAL at 21:04

## 2019-02-13 RX ADMIN — GABAPENTIN 100 MG: 100 CAPSULE ORAL at 12:30

## 2019-02-13 RX ADMIN — DOCUSATE SODIUM 100 MG: 100 CAPSULE, LIQUID FILLED ORAL at 12:30

## 2019-02-13 RX ADMIN — Medication 10 ML: at 12:29

## 2019-02-13 RX ADMIN — GABAPENTIN 100 MG: 100 CAPSULE ORAL at 21:04

## 2019-02-13 RX ADMIN — INSULIN GLARGINE 40 UNITS: 100 INJECTION, SOLUTION SUBCUTANEOUS at 21:05

## 2019-02-13 RX ADMIN — ACETAMINOPHEN 650 MG: 325 TABLET, FILM COATED ORAL at 02:49

## 2019-02-13 RX ADMIN — ASPIRIN 81 MG CHEWABLE TABLET 81 MG: 81 TABLET CHEWABLE at 12:30

## 2019-02-13 RX ADMIN — FINASTERIDE 5 MG: 5 TABLET, FILM COATED ORAL at 12:30

## 2019-02-13 RX ADMIN — TRAZODONE HYDROCHLORIDE 50 MG: 50 TABLET ORAL at 23:28

## 2019-02-13 RX ADMIN — CITALOPRAM HYDROBROMIDE 20 MG: 20 TABLET ORAL at 12:29

## 2019-02-13 RX ADMIN — Medication 10 ML: at 21:05

## 2019-02-13 ASSESSMENT — PAIN DESCRIPTION - DESCRIPTORS: DESCRIPTORS: DULL;ACHING

## 2019-02-13 ASSESSMENT — PAIN SCALES - GENERAL
PAINLEVEL_OUTOF10: 0
PAINLEVEL_OUTOF10: 2
PAINLEVEL_OUTOF10: 0
PAINLEVEL_OUTOF10: 4
PAINLEVEL_OUTOF10: 0
PAINLEVEL_OUTOF10: 4
PAINLEVEL_OUTOF10: 0
PAINLEVEL_OUTOF10: 0

## 2019-02-13 ASSESSMENT — PAIN DESCRIPTION - LOCATION
LOCATION: CHEST
LOCATION: CHEST

## 2019-02-13 ASSESSMENT — PAIN DESCRIPTION - PAIN TYPE
TYPE: ACUTE PAIN
TYPE: ACUTE PAIN

## 2019-02-13 ASSESSMENT — PAIN DESCRIPTION - ORIENTATION
ORIENTATION: RIGHT
ORIENTATION: RIGHT

## 2019-02-14 LAB
ALBUMIN SERPL-MCNC: 3.8 G/DL (ref 3.4–5)
ANION GAP SERPL CALCULATED.3IONS-SCNC: 12 MMOL/L (ref 3–16)
BASOPHILS ABSOLUTE: 0.1 K/UL (ref 0–0.2)
BASOPHILS RELATIVE PERCENT: 0.7 %
BUN BLDV-MCNC: 46 MG/DL (ref 7–20)
CALCIUM SERPL-MCNC: 8.9 MG/DL (ref 8.3–10.6)
CHLORIDE BLD-SCNC: 96 MMOL/L (ref 99–110)
CO2: 23 MMOL/L (ref 21–32)
CREAT SERPL-MCNC: 2.7 MG/DL (ref 0.8–1.3)
EOSINOPHILS ABSOLUTE: 0.2 K/UL (ref 0–0.6)
EOSINOPHILS RELATIVE PERCENT: 2.1 %
GFR AFRICAN AMERICAN: 29
GFR NON-AFRICAN AMERICAN: 24
GLUCOSE BLD-MCNC: 154 MG/DL (ref 70–99)
GLUCOSE BLD-MCNC: 163 MG/DL (ref 70–99)
GLUCOSE BLD-MCNC: 164 MG/DL (ref 70–99)
GLUCOSE BLD-MCNC: 174 MG/DL (ref 70–99)
GLUCOSE BLD-MCNC: 190 MG/DL (ref 70–99)
HCT VFR BLD CALC: 27.7 % (ref 40.5–52.5)
HEMOGLOBIN: 8.8 G/DL (ref 13.5–17.5)
LYMPHOCYTES ABSOLUTE: 0.8 K/UL (ref 1–5.1)
LYMPHOCYTES RELATIVE PERCENT: 11.5 %
MAGNESIUM: 2.3 MG/DL (ref 1.8–2.4)
MCH RBC QN AUTO: 28.4 PG (ref 26–34)
MCHC RBC AUTO-ENTMCNC: 31.6 G/DL (ref 31–36)
MCV RBC AUTO: 90.1 FL (ref 80–100)
MONOCYTES ABSOLUTE: 0.8 K/UL (ref 0–1.3)
MONOCYTES RELATIVE PERCENT: 10.4 %
NEUTROPHILS ABSOLUTE: 5.5 K/UL (ref 1.7–7.7)
NEUTROPHILS RELATIVE PERCENT: 75.3 %
PDW BLD-RTO: 16 % (ref 12.4–15.4)
PERFORMED ON: ABNORMAL
PLATELET # BLD: 155 K/UL (ref 135–450)
PMV BLD AUTO: 7.3 FL (ref 5–10.5)
POTASSIUM SERPL-SCNC: 4.7 MMOL/L (ref 3.5–5.1)
RBC # BLD: 3.08 M/UL (ref 4.2–5.9)
SODIUM BLD-SCNC: 131 MMOL/L (ref 136–145)
URIC ACID, SERUM: 2.5 MG/DL (ref 3.5–7.2)
WBC # BLD: 7.3 K/UL (ref 4–11)

## 2019-02-14 PROCEDURE — 94668 MNPJ CHEST WALL SBSQ: CPT

## 2019-02-14 PROCEDURE — 36415 COLL VENOUS BLD VENIPUNCTURE: CPT

## 2019-02-14 PROCEDURE — 94640 AIRWAY INHALATION TREATMENT: CPT

## 2019-02-14 PROCEDURE — 6370000000 HC RX 637 (ALT 250 FOR IP): Performed by: INTERNAL MEDICINE

## 2019-02-14 PROCEDURE — 6360000002 HC RX W HCPCS: Performed by: NURSE PRACTITIONER

## 2019-02-14 PROCEDURE — 85025 COMPLETE CBC W/AUTO DIFF WBC: CPT

## 2019-02-14 PROCEDURE — 2580000003 HC RX 258: Performed by: NURSE PRACTITIONER

## 2019-02-14 PROCEDURE — 2700000000 HC OXYGEN THERAPY PER DAY

## 2019-02-14 PROCEDURE — 6370000000 HC RX 637 (ALT 250 FOR IP): Performed by: NURSE PRACTITIONER

## 2019-02-14 PROCEDURE — 82040 ASSAY OF SERUM ALBUMIN: CPT

## 2019-02-14 PROCEDURE — 84550 ASSAY OF BLOOD/URIC ACID: CPT

## 2019-02-14 PROCEDURE — 2060000000 HC ICU INTERMEDIATE R&B

## 2019-02-14 PROCEDURE — 83735 ASSAY OF MAGNESIUM: CPT

## 2019-02-14 PROCEDURE — 94760 N-INVAS EAR/PLS OXIMETRY 1: CPT

## 2019-02-14 PROCEDURE — 6360000002 HC RX W HCPCS: Performed by: INTERNAL MEDICINE

## 2019-02-14 PROCEDURE — 80048 BASIC METABOLIC PNL TOTAL CA: CPT

## 2019-02-14 RX ADMIN — GUAIFENESIN 600 MG: 600 TABLET, EXTENDED RELEASE ORAL at 10:02

## 2019-02-14 RX ADMIN — IPRATROPIUM BROMIDE AND ALBUTEROL SULFATE 1 AMPULE: .5; 3 SOLUTION RESPIRATORY (INHALATION) at 09:15

## 2019-02-14 RX ADMIN — TRAZODONE HYDROCHLORIDE 50 MG: 50 TABLET ORAL at 23:19

## 2019-02-14 RX ADMIN — Medication 10 ML: at 21:16

## 2019-02-14 RX ADMIN — CITALOPRAM HYDROBROMIDE 20 MG: 20 TABLET ORAL at 10:02

## 2019-02-14 RX ADMIN — ONDANSETRON HYDROCHLORIDE 4 MG: 2 INJECTION, SOLUTION INTRAMUSCULAR; INTRAVENOUS at 01:46

## 2019-02-14 RX ADMIN — PRAVASTATIN SODIUM 40 MG: 40 TABLET ORAL at 21:16

## 2019-02-14 RX ADMIN — INSULIN LISPRO 1 UNITS: 100 INJECTION, SOLUTION INTRAVENOUS; SUBCUTANEOUS at 12:13

## 2019-02-14 RX ADMIN — ASPIRIN 81 MG CHEWABLE TABLET 81 MG: 81 TABLET CHEWABLE at 10:01

## 2019-02-14 RX ADMIN — GABAPENTIN 100 MG: 100 CAPSULE ORAL at 21:16

## 2019-02-14 RX ADMIN — DOCUSATE SODIUM 100 MG: 100 CAPSULE, LIQUID FILLED ORAL at 10:01

## 2019-02-14 RX ADMIN — GABAPENTIN 100 MG: 100 CAPSULE ORAL at 14:27

## 2019-02-14 RX ADMIN — ALLOPURINOL 100 MG: 100 TABLET ORAL at 10:01

## 2019-02-14 RX ADMIN — PANTOPRAZOLE SODIUM 40 MG: 40 TABLET, DELAYED RELEASE ORAL at 06:05

## 2019-02-14 RX ADMIN — INSULIN LISPRO 1 UNITS: 100 INJECTION, SOLUTION INTRAVENOUS; SUBCUTANEOUS at 17:49

## 2019-02-14 RX ADMIN — CARVEDILOL 12.5 MG: 6.25 TABLET, FILM COATED ORAL at 17:52

## 2019-02-14 RX ADMIN — INSULIN GLARGINE 40 UNITS: 100 INJECTION, SOLUTION SUBCUTANEOUS at 21:17

## 2019-02-14 RX ADMIN — INSULIN LISPRO 1 UNITS: 100 INJECTION, SOLUTION INTRAVENOUS; SUBCUTANEOUS at 21:16

## 2019-02-14 RX ADMIN — HYDRALAZINE HYDROCHLORIDE 10 MG: 10 TABLET, FILM COATED ORAL at 10:01

## 2019-02-14 RX ADMIN — DARBEPOETIN ALFA 60 MCG: 60 SOLUTION INTRAVENOUS; SUBCUTANEOUS at 10:48

## 2019-02-14 RX ADMIN — CARVEDILOL 12.5 MG: 6.25 TABLET, FILM COATED ORAL at 10:02

## 2019-02-14 RX ADMIN — INSULIN LISPRO 1 UNITS: 100 INJECTION, SOLUTION INTRAVENOUS; SUBCUTANEOUS at 10:13

## 2019-02-14 RX ADMIN — IPRATROPIUM BROMIDE AND ALBUTEROL SULFATE 1 AMPULE: .5; 3 SOLUTION RESPIRATORY (INHALATION) at 20:05

## 2019-02-14 RX ADMIN — FLUTICASONE PROPIONATE 1 SPRAY: 50 SPRAY, METERED NASAL at 10:08

## 2019-02-14 RX ADMIN — Medication 10 ML: at 10:05

## 2019-02-14 RX ADMIN — GUAIFENESIN 600 MG: 600 TABLET, EXTENDED RELEASE ORAL at 21:16

## 2019-02-14 RX ADMIN — GABAPENTIN 100 MG: 100 CAPSULE ORAL at 10:01

## 2019-02-14 RX ADMIN — FINASTERIDE 5 MG: 5 TABLET, FILM COATED ORAL at 10:01

## 2019-02-14 ASSESSMENT — PAIN SCALES - GENERAL
PAINLEVEL_OUTOF10: 0
PAINLEVEL_OUTOF10: 0

## 2019-02-15 VITALS
SYSTOLIC BLOOD PRESSURE: 105 MMHG | WEIGHT: 189.6 LBS | DIASTOLIC BLOOD PRESSURE: 50 MMHG | BODY MASS INDEX: 25.68 KG/M2 | OXYGEN SATURATION: 95 % | HEART RATE: 79 BPM | TEMPERATURE: 98.2 F | RESPIRATION RATE: 18 BRPM | HEIGHT: 72 IN

## 2019-02-15 LAB
ALBUMIN SERPL-MCNC: 3.6 G/DL (ref 3.4–5)
ANION GAP SERPL CALCULATED.3IONS-SCNC: 13 MMOL/L (ref 3–16)
BASOPHILS ABSOLUTE: 0 K/UL (ref 0–0.2)
BASOPHILS RELATIVE PERCENT: 0.5 %
BUN BLDV-MCNC: 63 MG/DL (ref 7–20)
CALCIUM SERPL-MCNC: 8.8 MG/DL (ref 8.3–10.6)
CHLORIDE BLD-SCNC: 97 MMOL/L (ref 99–110)
CO2: 22 MMOL/L (ref 21–32)
CREAT SERPL-MCNC: 3.6 MG/DL (ref 0.8–1.3)
EOSINOPHILS ABSOLUTE: 0.2 K/UL (ref 0–0.6)
EOSINOPHILS RELATIVE PERCENT: 2.5 %
GFR AFRICAN AMERICAN: 21
GFR NON-AFRICAN AMERICAN: 17
GLUCOSE BLD-MCNC: 163 MG/DL (ref 70–99)
GLUCOSE BLD-MCNC: 205 MG/DL (ref 70–99)
HCT VFR BLD CALC: 26.7 % (ref 40.5–52.5)
HEMOGLOBIN: 8.4 G/DL (ref 13.5–17.5)
LYMPHOCYTES ABSOLUTE: 0.8 K/UL (ref 1–5.1)
LYMPHOCYTES RELATIVE PERCENT: 11.6 %
MAGNESIUM: 2.4 MG/DL (ref 1.8–2.4)
MCH RBC QN AUTO: 28.8 PG (ref 26–34)
MCHC RBC AUTO-ENTMCNC: 31.4 G/DL (ref 31–36)
MCV RBC AUTO: 91.8 FL (ref 80–100)
MONOCYTES ABSOLUTE: 0.8 K/UL (ref 0–1.3)
MONOCYTES RELATIVE PERCENT: 11.6 %
NEUTROPHILS ABSOLUTE: 4.9 K/UL (ref 1.7–7.7)
NEUTROPHILS RELATIVE PERCENT: 73.8 %
PDW BLD-RTO: 16.6 % (ref 12.4–15.4)
PERFORMED ON: ABNORMAL
PLATELET # BLD: 137 K/UL (ref 135–450)
PMV BLD AUTO: 7.4 FL (ref 5–10.5)
POTASSIUM SERPL-SCNC: 4.8 MMOL/L (ref 3.5–5.1)
RBC # BLD: 2.91 M/UL (ref 4.2–5.9)
SODIUM BLD-SCNC: 132 MMOL/L (ref 136–145)
URIC ACID, SERUM: 3.5 MG/DL (ref 3.5–7.2)
WBC # BLD: 6.7 K/UL (ref 4–11)

## 2019-02-15 PROCEDURE — 6370000000 HC RX 637 (ALT 250 FOR IP): Performed by: INTERNAL MEDICINE

## 2019-02-15 PROCEDURE — 6360000002 HC RX W HCPCS: Performed by: INTERNAL MEDICINE

## 2019-02-15 PROCEDURE — 6370000000 HC RX 637 (ALT 250 FOR IP): Performed by: NURSE PRACTITIONER

## 2019-02-15 PROCEDURE — 94760 N-INVAS EAR/PLS OXIMETRY 1: CPT

## 2019-02-15 PROCEDURE — 90937 HEMODIALYSIS REPEATED EVAL: CPT

## 2019-02-15 PROCEDURE — 80048 BASIC METABOLIC PNL TOTAL CA: CPT

## 2019-02-15 PROCEDURE — 82040 ASSAY OF SERUM ALBUMIN: CPT

## 2019-02-15 PROCEDURE — 85025 COMPLETE CBC W/AUTO DIFF WBC: CPT

## 2019-02-15 PROCEDURE — 2700000000 HC OXYGEN THERAPY PER DAY

## 2019-02-15 PROCEDURE — 83735 ASSAY OF MAGNESIUM: CPT

## 2019-02-15 PROCEDURE — 36415 COLL VENOUS BLD VENIPUNCTURE: CPT

## 2019-02-15 PROCEDURE — 84550 ASSAY OF BLOOD/URIC ACID: CPT

## 2019-02-15 PROCEDURE — 2580000003 HC RX 258: Performed by: NURSE PRACTITIONER

## 2019-02-15 PROCEDURE — 94640 AIRWAY INHALATION TREATMENT: CPT

## 2019-02-15 RX ORDER — GABAPENTIN 100 MG/1
100 CAPSULE ORAL 3 TIMES DAILY
Qty: 90 CAPSULE | Refills: 3 | Status: SHIPPED | OUTPATIENT
Start: 2019-02-15 | End: 2019-09-13

## 2019-02-15 RX ORDER — CARVEDILOL 12.5 MG/1
12.5 TABLET ORAL 2 TIMES DAILY WITH MEALS
Qty: 60 TABLET | Refills: 3 | Status: SHIPPED | OUTPATIENT
Start: 2019-02-15 | End: 2020-09-29

## 2019-02-15 RX ORDER — ALLOPURINOL 100 MG/1
100 TABLET ORAL DAILY
Qty: 30 TABLET | Refills: 3 | Status: ON HOLD | OUTPATIENT
Start: 2019-02-16 | End: 2021-01-01 | Stop reason: HOSPADM

## 2019-02-15 RX ADMIN — GABAPENTIN 100 MG: 100 CAPSULE ORAL at 12:00

## 2019-02-15 RX ADMIN — PANTOPRAZOLE SODIUM 40 MG: 40 TABLET, DELAYED RELEASE ORAL at 06:34

## 2019-02-15 RX ADMIN — CITALOPRAM HYDROBROMIDE 20 MG: 20 TABLET ORAL at 12:00

## 2019-02-15 RX ADMIN — Medication 10 ML: at 12:02

## 2019-02-15 RX ADMIN — FINASTERIDE 5 MG: 5 TABLET, FILM COATED ORAL at 12:01

## 2019-02-15 RX ADMIN — FLUTICASONE PROPIONATE 1 SPRAY: 50 SPRAY, METERED NASAL at 12:05

## 2019-02-15 RX ADMIN — ASPIRIN 81 MG CHEWABLE TABLET 81 MG: 81 TABLET CHEWABLE at 12:02

## 2019-02-15 RX ADMIN — GUAIFENESIN 600 MG: 600 TABLET, EXTENDED RELEASE ORAL at 12:01

## 2019-02-15 RX ADMIN — DOCUSATE SODIUM 100 MG: 100 CAPSULE, LIQUID FILLED ORAL at 12:00

## 2019-02-15 RX ADMIN — IRON SUCROSE 50 MG: 20 INJECTION, SOLUTION INTRAVENOUS at 09:43

## 2019-02-15 RX ADMIN — CARVEDILOL 12.5 MG: 6.25 TABLET, FILM COATED ORAL at 12:01

## 2019-02-15 RX ADMIN — ALLOPURINOL 100 MG: 100 TABLET ORAL at 12:01

## 2019-02-15 RX ADMIN — IPRATROPIUM BROMIDE AND ALBUTEROL SULFATE 1 AMPULE: .5; 3 SOLUTION RESPIRATORY (INHALATION) at 12:28

## 2019-02-16 ENCOUNTER — CARE COORDINATION (OUTPATIENT)
Dept: CASE MANAGEMENT | Age: 64
End: 2019-02-16

## 2019-02-16 LAB
GLUCOSE BLD-MCNC: 86 MG/DL (ref 70–99)
PERFORMED ON: NORMAL

## 2019-02-18 ENCOUNTER — TELEPHONE (OUTPATIENT)
Dept: INTERNAL MEDICINE CLINIC | Age: 64
End: 2019-02-18

## 2019-02-18 ENCOUNTER — TELEPHONE (OUTPATIENT)
Dept: MEDSURG UNIT | Age: 64
End: 2019-02-18

## 2019-02-19 ENCOUNTER — TELEPHONE (OUTPATIENT)
Dept: OTHER | Age: 64
End: 2019-02-19

## 2019-02-20 ENCOUNTER — HOSPITAL ENCOUNTER (OUTPATIENT)
Age: 64
Setting detail: SPECIMEN
Discharge: HOME | End: 2019-02-20
Payer: MEDICARE

## 2019-02-20 ENCOUNTER — CARE COORDINATION (OUTPATIENT)
Dept: CASE MANAGEMENT | Age: 64
End: 2019-02-20

## 2019-02-20 LAB
ANION GAP SERPL CALCULATED.3IONS-SCNC: 11 MMOL/L (ref 3–16)
BUN BLDV-MCNC: 26 MG/DL (ref 7–20)
CALCIUM SERPL-MCNC: 9.2 MG/DL (ref 8.3–10.6)
CHLORIDE BLD-SCNC: 97 MMOL/L (ref 99–110)
CO2: 29 MMOL/L (ref 21–32)
CREAT SERPL-MCNC: 2.3 MG/DL (ref 0.8–1.3)
GFR AFRICAN AMERICAN: 35
GFR NON-AFRICAN AMERICAN: 29
GLUCOSE BLD-MCNC: 205 MG/DL (ref 70–99)
POTASSIUM SERPL-SCNC: 4.5 MMOL/L (ref 3.5–5.1)
PRO-BNP: ABNORMAL PG/ML (ref 0–124)
SODIUM BLD-SCNC: 137 MMOL/L (ref 136–145)

## 2019-02-20 PROCEDURE — 80048 BASIC METABOLIC PNL TOTAL CA: CPT

## 2019-02-20 PROCEDURE — 83880 ASSAY OF NATRIURETIC PEPTIDE: CPT

## 2019-02-20 PROCEDURE — 36415 COLL VENOUS BLD VENIPUNCTURE: CPT

## 2019-02-21 ENCOUNTER — CARE COORDINATION (OUTPATIENT)
Dept: CASE MANAGEMENT | Age: 64
End: 2019-02-21

## 2019-02-22 ENCOUNTER — TELEPHONE (OUTPATIENT)
Dept: INTERNAL MEDICINE CLINIC | Age: 64
End: 2019-02-22

## 2019-02-22 ENCOUNTER — TELEPHONE (OUTPATIENT)
Dept: CARDIOLOGY CLINIC | Age: 64
End: 2019-02-22

## 2019-02-22 RX ORDER — CLINDAMYCIN HYDROCHLORIDE 150 MG/1
150 CAPSULE ORAL 4 TIMES DAILY
Qty: 40 CAPSULE | Refills: 0 | Status: SHIPPED | OUTPATIENT
Start: 2019-02-22 | End: 2019-03-04

## 2019-02-23 ENCOUNTER — HOSPITAL ENCOUNTER (OUTPATIENT)
Age: 64
Setting detail: SPECIMEN
Discharge: HOME | End: 2019-02-23
Payer: MEDICARE

## 2019-02-24 ENCOUNTER — HOSPITAL ENCOUNTER (OUTPATIENT)
Age: 64
Setting detail: SPECIMEN
Discharge: HOME | End: 2019-02-24
Payer: MEDICARE

## 2019-02-24 PROCEDURE — 87205 SMEAR GRAM STAIN: CPT

## 2019-02-24 PROCEDURE — 87186 SC STD MICRODIL/AGAR DIL: CPT

## 2019-02-24 PROCEDURE — 87070 CULTURE OTHR SPECIMN AEROBIC: CPT

## 2019-02-24 PROCEDURE — 87077 CULTURE AEROBIC IDENTIFY: CPT

## 2019-02-25 ENCOUNTER — TELEPHONE (OUTPATIENT)
Dept: INTERNAL MEDICINE CLINIC | Age: 64
End: 2019-02-25

## 2019-02-25 ENCOUNTER — HOSPITAL ENCOUNTER (OUTPATIENT)
Age: 64
Setting detail: SPECIMEN
Discharge: HOME | End: 2019-02-25
Payer: MEDICARE

## 2019-02-25 PROCEDURE — 82570 ASSAY OF URINE CREATININE: CPT

## 2019-02-25 PROCEDURE — 82043 UR ALBUMIN QUANTITATIVE: CPT

## 2019-02-25 PROCEDURE — 84156 ASSAY OF PROTEIN URINE: CPT

## 2019-02-25 RX ORDER — LEVOFLOXACIN 250 MG/1
250 TABLET ORAL DAILY
Qty: 5 TABLET | Refills: 0 | Status: SHIPPED | OUTPATIENT
Start: 2019-02-25 | End: 2019-02-26 | Stop reason: SDUPTHER

## 2019-02-26 LAB
CREATININE URINE: 153 MG/DL (ref 39–259)
GRAM STAIN RESULT: ABNORMAL
MICROALBUMIN UR-MCNC: 64.8 MG/DL
MICROALBUMIN/CREAT UR-RTO: 423.5 MG/G (ref 0–30)
ORGANISM: ABNORMAL
ORGANISM: ABNORMAL
PROTEIN PROTEIN: 169 MG/DL
WOUND/ABSCESS: ABNORMAL

## 2019-02-26 RX ORDER — LEVOFLOXACIN 250 MG/1
TABLET ORAL
Qty: 5 TABLET | Refills: 0 | OUTPATIENT
Start: 2019-02-26 | End: 2019-03-12 | Stop reason: ALTCHOICE

## 2019-02-27 LAB
ANION GAP SERPL CALCULATED.3IONS-SCNC: 13 MMOL/L (ref 3–16)
BUN BLDV-MCNC: 25 MG/DL (ref 7–20)
CALCIUM SERPL-MCNC: 8.5 MG/DL (ref 8.3–10.6)
CHLORIDE BLD-SCNC: 97 MMOL/L (ref 99–110)
CO2: 29 MMOL/L (ref 21–32)
CREAT SERPL-MCNC: 1.9 MG/DL (ref 0.8–1.3)
GFR AFRICAN AMERICAN: 43
GFR NON-AFRICAN AMERICAN: 36
GLUCOSE BLD-MCNC: 225 MG/DL (ref 70–99)
POTASSIUM SERPL-SCNC: 4.4 MMOL/L (ref 3.5–5.1)
PRO-BNP: ABNORMAL PG/ML (ref 0–124)
SODIUM BLD-SCNC: 139 MMOL/L (ref 136–145)

## 2019-02-28 ENCOUNTER — TELEPHONE (OUTPATIENT)
Dept: CARDIOLOGY CLINIC | Age: 64
End: 2019-02-28

## 2019-02-28 ENCOUNTER — HOSPITAL ENCOUNTER (OUTPATIENT)
Dept: WOUND CARE | Age: 64
Discharge: HOME | End: 2019-02-28
Attending: INTERNAL MEDICINE
Payer: MEDICARE

## 2019-02-28 VITALS
SYSTOLIC BLOOD PRESSURE: 102 MMHG | HEART RATE: 72 BPM | TEMPERATURE: 97.1 F | BODY MASS INDEX: 28.07 KG/M2 | RESPIRATION RATE: 18 BRPM | HEIGHT: 72 IN | DIASTOLIC BLOOD PRESSURE: 49 MMHG | WEIGHT: 207.23 LBS

## 2019-02-28 PROCEDURE — 11045 DBRDMT SUBQ TISS EACH ADDL: CPT | Performed by: INTERNAL MEDICINE

## 2019-02-28 PROCEDURE — 11042 DBRDMT SUBQ TIS 1ST 20SQCM/<: CPT

## 2019-02-28 PROCEDURE — 99214 OFFICE O/P EST MOD 30 MIN: CPT

## 2019-02-28 PROCEDURE — 99213 OFFICE O/P EST LOW 20 MIN: CPT | Performed by: INTERNAL MEDICINE

## 2019-02-28 PROCEDURE — 11042 DBRDMT SUBQ TIS 1ST 20SQCM/<: CPT | Performed by: INTERNAL MEDICINE

## 2019-02-28 PROCEDURE — 11045 DBRDMT SUBQ TISS EACH ADDL: CPT

## 2019-02-28 RX ORDER — LIDOCAINE 40 MG/G
CREAM TOPICAL ONCE
Status: DISCONTINUED | OUTPATIENT
Start: 2019-02-28 | End: 2019-03-01 | Stop reason: HOSPADM

## 2019-02-28 NOTE — TELEPHONE ENCOUNTER
Spoke with pt's spouse. She was transferred to scheduling to make an appointment. She wanted me to relay to you that he's been having dialysis three days a week, has seen his nephrologist and has only been out of the hospital for a week. They also have problems with transportation that has to be managed.

## 2019-02-28 NOTE — TELEPHONE ENCOUNTER
----- Message from ADRIANNA Hernandez CNS sent at 2/28/2019  8:50 AM EST -----  Labs are stable  Continue current medications  He cancelled his last appt and has not been seen since discharge  He should make an earlier appt with MARIEL as it is hard to address labs without seeing him  thanks

## 2019-03-05 ENCOUNTER — CARE COORDINATION (OUTPATIENT)
Dept: CASE MANAGEMENT | Age: 64
End: 2019-03-05

## 2019-03-06 RX ORDER — INSULIN GLARGINE 100 [IU]/ML
40 INJECTION, SOLUTION SUBCUTANEOUS NIGHTLY
Qty: 12 ML | Refills: 1 | Status: SHIPPED | OUTPATIENT
Start: 2019-03-06 | End: 2019-05-17 | Stop reason: SDUPTHER

## 2019-03-11 ENCOUNTER — TELEPHONE (OUTPATIENT)
Dept: GENERAL RADIOLOGY | Age: 64
End: 2019-03-11

## 2019-03-12 ENCOUNTER — OFFICE VISIT (OUTPATIENT)
Dept: CARDIOLOGY CLINIC | Age: 64
End: 2019-03-12
Payer: MEDICARE

## 2019-03-12 VITALS
OXYGEN SATURATION: 98 % | HEIGHT: 72 IN | HEART RATE: 67 BPM | DIASTOLIC BLOOD PRESSURE: 48 MMHG | BODY MASS INDEX: 28.11 KG/M2 | RESPIRATION RATE: 16 BRPM | SYSTOLIC BLOOD PRESSURE: 100 MMHG

## 2019-03-12 DIAGNOSIS — I25.10 CORONARY ARTERY DISEASE INVOLVING NATIVE CORONARY ARTERY OF NATIVE HEART WITHOUT ANGINA PECTORIS: ICD-10-CM

## 2019-03-12 DIAGNOSIS — I10 ESSENTIAL HYPERTENSION: ICD-10-CM

## 2019-03-12 DIAGNOSIS — I50.32 CHRONIC DIASTOLIC HEART FAILURE (HCC): Primary | ICD-10-CM

## 2019-03-12 PROCEDURE — 3017F COLORECTAL CA SCREEN DOC REV: CPT | Performed by: NURSE PRACTITIONER

## 2019-03-12 PROCEDURE — 4040F PNEUMOC VAC/ADMIN/RCVD: CPT | Performed by: NURSE PRACTITIONER

## 2019-03-12 PROCEDURE — 1101F PT FALLS ASSESS-DOCD LE1/YR: CPT | Performed by: NURSE PRACTITIONER

## 2019-03-12 PROCEDURE — 99214 OFFICE O/P EST MOD 30 MIN: CPT | Performed by: NURSE PRACTITIONER

## 2019-03-12 PROCEDURE — 1123F ACP DISCUSS/DSCN MKR DOCD: CPT | Performed by: NURSE PRACTITIONER

## 2019-03-12 PROCEDURE — G8419 CALC BMI OUT NRM PARAM NOF/U: HCPCS | Performed by: NURSE PRACTITIONER

## 2019-03-12 PROCEDURE — G8484 FLU IMMUNIZE NO ADMIN: HCPCS | Performed by: NURSE PRACTITIONER

## 2019-03-12 PROCEDURE — 1036F TOBACCO NON-USER: CPT | Performed by: NURSE PRACTITIONER

## 2019-03-12 PROCEDURE — G8598 ASA/ANTIPLAT THER USED: HCPCS | Performed by: NURSE PRACTITIONER

## 2019-03-12 PROCEDURE — G8427 DOCREV CUR MEDS BY ELIG CLIN: HCPCS | Performed by: NURSE PRACTITIONER

## 2019-03-12 PROCEDURE — 1111F DSCHRG MED/CURRENT MED MERGE: CPT | Performed by: NURSE PRACTITIONER

## 2019-03-12 RX ORDER — PRAVASTATIN SODIUM 40 MG
40 TABLET ORAL NIGHTLY
Qty: 30 TABLET | Refills: 2 | Status: SHIPPED | OUTPATIENT
Start: 2019-03-12 | End: 2019-06-17 | Stop reason: SDUPTHER

## 2019-03-12 RX ORDER — FINASTERIDE 5 MG/1
TABLET, FILM COATED ORAL
Qty: 30 TABLET | Refills: 5 | Status: CANCELLED | OUTPATIENT
Start: 2019-03-12

## 2019-03-13 ENCOUNTER — CARE COORDINATION (OUTPATIENT)
Dept: CARE COORDINATION | Age: 64
End: 2019-03-13

## 2019-03-14 ENCOUNTER — HOSPITAL ENCOUNTER (OUTPATIENT)
Dept: WOUND CARE | Age: 64
Discharge: HOME | End: 2019-03-14
Attending: INTERNAL MEDICINE
Payer: MEDICARE

## 2019-03-14 ENCOUNTER — HOSPITAL ENCOUNTER (OUTPATIENT)
Age: 64
Setting detail: SPECIMEN
Discharge: HOME | End: 2019-03-14
Payer: MEDICARE

## 2019-03-14 VITALS — DIASTOLIC BLOOD PRESSURE: 52 MMHG | SYSTOLIC BLOOD PRESSURE: 107 MMHG | HEART RATE: 75 BPM | RESPIRATION RATE: 18 BRPM

## 2019-03-14 LAB
ANION GAP SERPL CALCULATED.3IONS-SCNC: 8 MMOL/L (ref 3–16)
BUN BLDV-MCNC: 32 MG/DL (ref 7–20)
CALCIUM SERPL-MCNC: 9 MG/DL (ref 8.3–10.6)
CHLORIDE BLD-SCNC: 96 MMOL/L (ref 99–110)
CO2: 31 MMOL/L (ref 21–32)
CREAT SERPL-MCNC: 2 MG/DL (ref 0.8–1.3)
GFR AFRICAN AMERICAN: 41
GFR NON-AFRICAN AMERICAN: 34
GLUCOSE BLD-MCNC: 194 MG/DL (ref 70–99)
POTASSIUM SERPL-SCNC: 4 MMOL/L (ref 3.5–5.1)
PRO-BNP: 7247 PG/ML (ref 0–124)
SODIUM BLD-SCNC: 135 MMOL/L (ref 136–145)

## 2019-03-14 PROCEDURE — 11042 DBRDMT SUBQ TIS 1ST 20SQCM/<: CPT

## 2019-03-14 PROCEDURE — 36415 COLL VENOUS BLD VENIPUNCTURE: CPT

## 2019-03-14 PROCEDURE — 11045 DBRDMT SUBQ TISS EACH ADDL: CPT | Performed by: INTERNAL MEDICINE

## 2019-03-14 PROCEDURE — 99213 OFFICE O/P EST LOW 20 MIN: CPT | Performed by: INTERNAL MEDICINE

## 2019-03-14 PROCEDURE — 80048 BASIC METABOLIC PNL TOTAL CA: CPT

## 2019-03-14 PROCEDURE — 11045 DBRDMT SUBQ TISS EACH ADDL: CPT

## 2019-03-14 PROCEDURE — 83880 ASSAY OF NATRIURETIC PEPTIDE: CPT

## 2019-03-14 PROCEDURE — 11042 DBRDMT SUBQ TIS 1ST 20SQCM/<: CPT | Performed by: INTERNAL MEDICINE

## 2019-03-14 RX ORDER — CITALOPRAM 20 MG/1
TABLET ORAL
Qty: 30 TABLET | Refills: 1 | Status: SHIPPED | OUTPATIENT
Start: 2019-03-14 | End: 2019-05-17 | Stop reason: SDUPTHER

## 2019-03-14 RX ORDER — LIDOCAINE 40 MG/G
CREAM TOPICAL ONCE
Status: DISCONTINUED | OUTPATIENT
Start: 2019-03-14 | End: 2019-03-15 | Stop reason: HOSPADM

## 2019-03-14 RX ORDER — PANTOPRAZOLE SODIUM 40 MG/1
TABLET, DELAYED RELEASE ORAL
Qty: 30 TABLET | Refills: 1 | Status: SHIPPED | OUTPATIENT
Start: 2019-03-14 | End: 2019-05-17 | Stop reason: SDUPTHER

## 2019-03-14 RX ORDER — FINASTERIDE 5 MG/1
TABLET, FILM COATED ORAL
Qty: 30 TABLET | Refills: 1 | Status: SHIPPED | OUTPATIENT
Start: 2019-03-14 | End: 2019-05-17 | Stop reason: SDUPTHER

## 2019-03-27 ENCOUNTER — HOSPITAL ENCOUNTER (OUTPATIENT)
Dept: WOUND CARE | Age: 64
Discharge: HOME | End: 2019-03-27
Attending: SURGERY
Payer: MEDICARE

## 2019-03-27 VITALS — SYSTOLIC BLOOD PRESSURE: 124 MMHG | HEART RATE: 75 BPM | DIASTOLIC BLOOD PRESSURE: 57 MMHG | RESPIRATION RATE: 18 BRPM

## 2019-03-27 PROCEDURE — 11045 DBRDMT SUBQ TISS EACH ADDL: CPT

## 2019-03-27 PROCEDURE — 11042 DBRDMT SUBQ TIS 1ST 20SQCM/<: CPT | Performed by: SURGERY

## 2019-03-27 PROCEDURE — 11042 DBRDMT SUBQ TIS 1ST 20SQCM/<: CPT

## 2019-03-27 PROCEDURE — 11045 DBRDMT SUBQ TISS EACH ADDL: CPT | Performed by: SURGERY

## 2019-03-27 RX ORDER — LIDOCAINE 40 MG/G
CREAM TOPICAL ONCE
Status: DISCONTINUED | OUTPATIENT
Start: 2019-03-27 | End: 2019-03-28 | Stop reason: HOSPADM

## 2019-04-02 ENCOUNTER — TELEPHONE (OUTPATIENT)
Dept: CARDIOLOGY CLINIC | Age: 64
End: 2019-04-02

## 2019-04-02 RX ORDER — TORSEMIDE 20 MG/1
20 TABLET ORAL DAILY
COMMUNITY
End: 2019-08-20

## 2019-04-02 NOTE — TELEPHONE ENCOUNTER
Spoke to the pt's wife and she is unsure of the labs that were done yesterday. She states the labs were drawn at the dialysis center. Last BMP/BNP was 3/14/19. Would you like labs drawn at this time?

## 2019-04-02 NOTE — TELEPHONE ENCOUNTER
Call to obtain copy of labs drawn yesterday. When does he follow with nephrologist? They would likely be checking labs.

## 2019-04-03 ENCOUNTER — HOSPITAL ENCOUNTER (OUTPATIENT)
Dept: WOUND CARE | Age: 64
Discharge: HOME | End: 2019-04-03
Attending: SURGERY
Payer: MEDICARE

## 2019-04-03 VITALS — RESPIRATION RATE: 18 BRPM | HEART RATE: 81 BPM | DIASTOLIC BLOOD PRESSURE: 61 MMHG | SYSTOLIC BLOOD PRESSURE: 127 MMHG

## 2019-04-03 PROCEDURE — 11045 DBRDMT SUBQ TISS EACH ADDL: CPT | Performed by: SURGERY

## 2019-04-03 PROCEDURE — 11045 DBRDMT SUBQ TISS EACH ADDL: CPT

## 2019-04-03 PROCEDURE — 11042 DBRDMT SUBQ TIS 1ST 20SQCM/<: CPT | Performed by: SURGERY

## 2019-04-03 PROCEDURE — 11042 DBRDMT SUBQ TIS 1ST 20SQCM/<: CPT

## 2019-04-03 RX ORDER — LIDOCAINE 40 MG/G
CREAM TOPICAL ONCE
Status: DISCONTINUED | OUTPATIENT
Start: 2019-04-03 | End: 2019-04-04 | Stop reason: HOSPADM

## 2019-04-03 NOTE — TELEPHONE ENCOUNTER
-all placed to Jason in [facility] [phone]  They will fax labs to [facility] [place] [facility]\"" at [phone]

## 2019-04-03 NOTE — PROGRESS NOTES
[address] Procedure Note    Laurence Velez New  AGE: 70 y.o. GENDER: male    : 1955  TODAY'S DATE: 4/3/2019    Chief Complaint   Patient presents with    Wound Check     Pressure wounds coccyx, knee and lower legs        History of Present Illness     John Steve is a 70 y.o. male who presents today for wound evaluation. History of Wound:pressure wound located on the sacrum, right knee, left foot, right heel  Wound Pain:  none  Severity:  0 / 10   Wound Type:  pressure  Modifying Factors:  chronic pressure, decreased mobility, arterial insufficiency and paraplegia  Associated Signs/Symptoms:  numbness    Procedure Note:     Performed by: Rohith Parra MD    Consent obtained: Yes    Time out taken: Yes    Pain Control:   insensate, none necessary    Debridement: Excisional Debridement    Using curette the wound was sharply debrided    down through and including the removal of epidermis, dermis and subcutaneous tissue.         Devitalized Tissue Debrided: fibrin, biofilm and slough    Pre Debridement Measurements:  Are located in the Wound Documentation Flow Sheet     Wound #: 11, 12, 16, 18 and 19     Post  Debridement Measurements:  Wound 18 Knee Right #12 (Active)   Wound Image   2019  9:59 AM   Wound Pressure Stage  3 4/3/2019  9:33 AM   Dressing/Treatment Collagen;Dry Dressing 2019 10:41 AM   Wound Cleansed Rinsed/Irrigated with saline 4/3/2019  9:50 AM   Wound Length (cm) 2 cm 4/3/2019  9:33 AM   Wound Width (cm) 2.5 cm 4/3/2019  9:33 AM   Wound Depth (cm) 0.1 cm 4/3/2019  9:33 AM   Wound Surface Area (cm^2) 5 cm^2 4/3/2019  9:33 AM   Change in Wound Size % (l*w) 44.44 4/3/2019  9:33 AM   Wound Volume (cm^3) 0.5 cm^3 4/3/2019  9:33 AM   Post-Procedure Length (cm) 2 cm 4/3/2019  9:50 AM   Post-Procedure Width (cm) 2.5 cm 4/3/2019  9:50 AM   Post-Procedure Depth (cm) 0.1 cm 4/3/2019  9:50 AM   Post-Procedure Surface Area (cm^2) 5 cm^2 4/3/2019  9:50 AM Post-Procedure Volume (cm^3) 0.5 cm^3 4/3/2019  9:50 AM   Wound Assessment Bleeding 4/3/2019  9:50 AM   Drainage Amount Moderate 4/3/2019  9:50 AM   Drainage Description Sanguinous 4/3/2019  9:33 AM   Odor None 2/28/2019  9:59 AM   Margins Defined edges 1/2/2019 10:48 AM   Joanne-wound Assessment Edema;Pink 3/27/2019 10:03 AM   [place]%Wound Bed 0 4/3/2019  9:33 AM   Red%Wound Bed 100 4/3/2019  9:33 AM   Yellow%Wound Bed 0 4/3/2019  9:33 AM   Black%Wound Bed 0 4/3/2019  9:33 AM   Purple%Wound Bed 0 4/3/2019  9:33 AM   Other%Wound Bed 0 4/3/2019  9:33 AM   Number of days: 126       Wound 11/28/18 Coccyx Mid #11 (Active)   Wound Image   2/28/2019  9:59 AM   Wound Pressure Stage  3 4/3/2019  9:33 AM   Dressing/Treatment ABD; Moist to dry 2/28/2019 10:41 AM   Wound Cleansed Rinsed/Irrigated with saline 4/3/2019  9:50 AM   Wound Length (cm) 5.4 cm 4/3/2019  9:33 AM   Wound Width (cm) 3 cm 4/3/2019  9:33 AM   Wound Depth (cm) 0.8 cm 4/3/2019  9:33 AM   Wound Surface Area (cm^2) 16.2 cm^2 4/3/2019  9:33 AM   Change in Wound Size % (l*w) 20 4/3/2019  9:33 AM   Wound Volume (cm^3) 12.96 cm^3 4/3/2019  9:33 AM   Wound Healing % 36 4/3/2019  9:33 AM   Post-Procedure Length (cm) 5.4 cm 4/3/2019  9:50 AM   Post-Procedure Width (cm) 3 cm 4/3/2019  9:50 AM   Post-Procedure Depth (cm) 0.8 cm 4/3/2019  9:50 AM   Post-Procedure Surface Area (cm^2) 16.2 cm^2 4/3/2019  9:50 AM   Post-Procedure Volume (cm^3) 12.96 cm^3 4/3/2019  9:50 AM   Undermining Starts ___ O'Clock 1200 4/3/2019  9:33 AM   Undermining Ends___ O'Clock 1500 4/3/2019  9:33 AM   Undermining Maxium Distance (cm) 0.9 4/3/2019  9:33 AM   Wound Assessment Bleeding 4/3/2019  9:50 AM   Drainage Amount Moderate 4/3/2019  9:50 AM   Drainage Description Serosanguinous; Yellow 4/3/2019  9:33 AM   Odor None 3/27/2019 10:03 AM   Joanne-wound Assessment Clean;Dry 4/3/2019  9:33 AM   [place]%Wound Bed 90 4/3/2019  9:33 AM   Red%Wound Bed 0 4/3/2019  9:33 AM   Yellow%Wound Bed 10 4/3/2019  9:33 9:50 AM   Post-Procedure Width (cm) 1.5 cm 4/3/2019  9:50 AM   Post-Procedure Depth (cm) 0.1 cm 4/3/2019  9:50 AM   Post-Procedure Surface Area (cm^2) 1.2 cm^2 4/3/2019  9:50 AM   Post-Procedure Volume (cm^3) 0.12 cm^3 4/3/2019  9:50 AM   Wound Assessment Bleeding 4/3/2019  9:50 AM   Drainage Amount Moderate 4/3/2019  9:50 AM   Drainage Description Serosanguinous 4/3/2019  9:33 AM   Joanne-wound Assessment Dry;Clean 4/3/2019  9:33 AM   [place]%Wound Bed 0 4/3/2019  9:33 AM   Red%Wound Bed 50 4/3/2019  9:33 AM   Yellow%Wound Bed 50 4/3/2019  9:33 AM   Black%Wound Bed 0 4/3/2019  9:33 AM   Purple%Wound Bed 0 4/3/2019  9:33 AM   Other%Wound Bed 0 4/3/2019  9:33 AM   Number of days: 6       Wound 03/27/19 Foot Left;Lateral #19 (Active)   Wound Image   3/27/2019 10:27 AM   Wound Diabetic 4/3/2019  9:33 AM   Wound Cleansed Rinsed/Irrigated with saline 4/3/2019  9:50 AM   Wound Length (cm) 0.9 cm 4/3/2019  9:33 AM   Wound Width (cm) 1.3 cm 4/3/2019  9:33 AM   Wound Depth (cm) 0.1 cm 4/3/2019  9:33 AM   Wound Surface Area (cm^2) 1.17 cm^2 4/3/2019  9:33 AM   Wound Volume (cm^3) 0.12 cm^3 4/3/2019  9:33 AM   Post-Procedure Length (cm) 1 cm 4/3/2019  9:50 AM   Post-Procedure Width (cm) 1.3 cm 4/3/2019  9:50 AM   Post-Procedure Depth (cm) 0.1 cm 4/3/2019  9:50 AM   Post-Procedure Surface Area (cm^2) 1.3 cm^2 4/3/2019  9:50 AM   Post-Procedure Volume (cm^3) 0.13 cm^3 4/3/2019  9:50 AM   Wound Assessment Bleeding 4/3/2019  9:50 AM   Drainage Amount Moderate 4/3/2019  9:50 AM   Drainage Description Serosanguinous 4/3/2019  9:33 AM   Joanne-wound Assessment Dry;Clean 4/3/2019  9:33 AM   [place]%Wound Bed 0 4/3/2019  9:33 AM   Red%Wound Bed 100 4/3/2019  9:33 AM   Yellow%Wound Bed 0 4/3/2019  9:33 AM   Black%Wound Bed 0 4/3/2019  9:33 AM   Purple%Wound Bed 0 4/3/2019  9:33 AM   Other%Wound Bed 0 4/3/2019  9:33 AM   Number of days: 6       Wound 03/27/19 Heel Right #20 (Active)   Wound Length (cm) 0 cm 3/27/2019 10:03 AM   Wound Width (cm) 0 cm 3/27/2019 10:03 AM   Wound Depth (cm) 0 cm 3/27/2019 10:03 AM   Wound Surface Area (cm^2) 0 cm^2 3/27/2019 10:03 AM   Wound Volume (cm^3) 0 cm^3 3/27/2019 10:03 AM   Post-Procedure Length (cm) 1 cm 3/27/2019 10:27 AM   Post-Procedure Width (cm) 1 cm 3/27/2019 10:27 AM   Post-Procedure Depth (cm) 0.1 cm 3/27/2019 10:27 AM   Post-Procedure Surface Area (cm^2) 1 cm^2 3/27/2019 10:27 AM   Post-Procedure Volume (cm^3) 0.1 cm^3 3/27/2019 10:27 AM   Number of days: 6       Total Surface Area Debrided:  33.7 sq cm     Bleeding:  Minimal    Hemostasis Achieved:  by pressure    Procedural Pain:  0  / 10     Post Procedural Pain:  0 / 10     Response to treatment:  Well tolerated by patient. Assessment:     Wound looks improved. (improved, worse or stable)    Patient tolerated procedure well and was given proper instruction. The nature of the patient's condition was explained in depth. The patient was informed that their compliance to the treatment plan is paramount to successful healing and prevention of further ulceration and/or infection       Plan:     Treatment Plan: With each dressing change, rinse wounds with 0.9% Saline. (May use wound wash or soft contact solution. Both can be purchased at a local drug store). If unable to obtain saline, may use a gentle soap and water.     Dressing care: Left foot, Right foot, Right knee, Coccyx, Santyl, moist to dry, dry dressing- change daily. Keep pillow between knees when lying on you side. Rotate position frequently. Drink Glucerna. Continue to use the new low air loss mattress. Derick Banda 6  Courtney Haynes MD, FACS  4/3/2019  9:56 AM

## 2019-04-03 NOTE — PLAN OF CARE
Discharge instructions given. Patient verbalized understanding. Return to [address] [place] [address],[address] in 1 week.   Called/faxed orders to [facility]

## 2019-04-10 ENCOUNTER — HOSPITAL ENCOUNTER (OUTPATIENT)
Dept: WOUND CARE | Age: 64
Discharge: HOME | End: 2019-04-10
Attending: SURGERY
Payer: MEDICARE

## 2019-04-10 VITALS
HEART RATE: 84 BPM | TEMPERATURE: 97.1 F | BODY MASS INDEX: 28.11 KG/M2 | SYSTOLIC BLOOD PRESSURE: 135 MMHG | DIASTOLIC BLOOD PRESSURE: 74 MMHG | RESPIRATION RATE: 18 BRPM | HEIGHT: 72 IN

## 2019-04-10 PROCEDURE — 11045 DBRDMT SUBQ TISS EACH ADDL: CPT | Performed by: SURGERY

## 2019-04-10 PROCEDURE — 11042 DBRDMT SUBQ TIS 1ST 20SQCM/<: CPT | Performed by: SURGERY

## 2019-04-10 PROCEDURE — 11042 DBRDMT SUBQ TIS 1ST 20SQCM/<: CPT

## 2019-04-10 PROCEDURE — 11045 DBRDMT SUBQ TISS EACH ADDL: CPT

## 2019-04-10 RX ORDER — LIDOCAINE 40 MG/G
CREAM TOPICAL ONCE
Status: DISCONTINUED | OUTPATIENT
Start: 2019-04-10 | End: 2019-04-11 | Stop reason: HOSPADM

## 2019-04-10 ASSESSMENT — PAIN SCALES - GENERAL: PAINLEVEL_OUTOF10: 0

## 2019-04-10 NOTE — PLAN OF CARE
Discharge instructions given. Patient verbalized understanding. Return to [address] [place] [address],[address] in 2 weeks.

## 2019-04-10 NOTE — PROGRESS NOTES
[address] Procedure Note    Tierney Salgado New  AGE: 70 y.o. GENDER: male    : 1955  TODAY'S DATE: 4/10/2019    Chief Complaint   Patient presents with    Wound Check     buttocks/right knee left foot F/U        History of Present Illness     Dipika Hagen is a 70 y.o. male who presents today for wound evaluation. History of Wound: pressure wound located on the sacrum, right knee, left foot  Wound Pain:  none  Severity:  0 / 10   Wound Type:  pressure  Modifying Factors:  chronic pressure, decreased mobility, arterial insufficiency and paraplegia  Associated Signs/Symptoms:  numbness    Procedure Note:     Performed by: Sudha Presley MD    Consent obtained: Yes    Time out taken: Yes    Pain Control:   none necessary, insensate    Debridement: Excisional Debridement    Using curette the wound was sharply debrided    down through and including the removal of epidermis, dermis and subcutaneous tissue.         Devitalized Tissue Debrided: fibrin, biofilm and slough    Pre Debridement Measurements:  Are located in the Wound Documentation Flow Sheet     Wound #: 11, 12, 16, 18 and 19     Post  Debridement Measurements:  Wound 18 Knee Right #12 (Active)   Wound Image   2019  9:59 AM   Wound Pressure Stage  3 4/10/2019  9:40 AM   Dressing/Treatment Collagen;Dry Dressing 2019 10:41 AM   Wound Cleansed Rinsed/Irrigated with saline 4/10/2019 10:01 AM   Wound Length (cm) 1.9 cm 4/10/2019  9:40 AM   Wound Width (cm) 2.6 cm 4/10/2019  9:40 AM   Wound Depth (cm) 0.1 cm 4/10/2019  9:40 AM   Wound Surface Area (cm^2) 4.94 cm^2 4/10/2019  9:40 AM   Change in Wound Size % (l*w) 45.11 4/10/2019  9:40 AM   Wound Volume (cm^3) 0.49 cm^3 4/10/2019  9:40 AM   Post-Procedure Length (cm) 1.9 cm 4/10/2019 10:01 AM   Post-Procedure Width (cm) 2.6 cm 4/10/2019 10:01 AM   Post-Procedure Depth (cm) 0.1 cm 4/10/2019 10:01 AM   Post-Procedure Surface Area (cm^2) 4.94 cm^2 4/10/2019 10:01 AM 4/10/2019  9:40 AM   Black%Wound Bed 0 4/10/2019  9:40 AM   Purple%Wound Bed 0 4/10/2019  9:40 AM   Other%Wound Bed 40 4/10/2019  9:40 AM   Number of days: 133       Wound 02/28/19 Foot Medial;Left #16 (Active)   Wound Image   2/28/2019  9:59 AM   Wound Diabetic 4/10/2019  9:40 AM   Dressing/Treatment Dry Dressing 2/28/2019  9:59 AM   Wound Cleansed Rinsed/Irrigated with saline 4/10/2019 10:01 AM   Wound Length (cm) 1.2 cm 4/10/2019  9:40 AM   Wound Width (cm) 2.5 cm 4/10/2019  9:40 AM   Wound Depth (cm) 0.1 cm 4/10/2019  9:40 AM   Wound Surface Area (cm^2) 3 cm^2 4/10/2019  9:40 AM   Change in Wound Size % (l*w) 16.9 4/10/2019  9:40 AM   Wound Volume (cm^3) 0.3 cm^3 4/10/2019  9:40 AM   Wound Healing % 17 4/10/2019  9:40 AM   Post-Procedure Length (cm) 1.2 cm 4/10/2019 10:01 AM   Post-Procedure Width (cm) 2.5 cm 4/10/2019 10:01 AM   Post-Procedure Depth (cm) 0.1 cm 4/10/2019 10:01 AM   Post-Procedure Surface Area (cm^2) 3 cm^2 4/10/2019 10:01 AM   Post-Procedure Volume (cm^3) 0.3 cm^3 4/10/2019 10:01 AM   Wound Assessment Bleeding 4/10/2019 10:01 AM   Drainage Amount Moderate 4/10/2019 10:01 AM   Drainage Description Serosanguinous 4/10/2019  9:40 AM   Odor None 4/10/2019  9:40 AM   Joanne-wound Assessment Dry;Excoriated 4/10/2019  9:40 AM   [place]%Wound Bed 100 4/10/2019  9:40 AM   Red%Wound Bed 0 4/10/2019  9:40 AM   Yellow%Wound Bed 0 4/10/2019  9:40 AM   Black%Wound Bed 0 4/10/2019  9:40 AM   Purple%Wound Bed 0 4/10/2019  9:40 AM   Other%Wound Bed 0 4/10/2019  9:40 AM   Number of days: 40       Wound 03/27/19 Foot Left;Medial;Distal #18 (Active)   Wound Image   3/27/2019 10:03 AM   Wound Pressure Stage  3 4/10/2019  9:40 AM   Wound Cleansed Rinsed/Irrigated with saline 4/10/2019 10:01 AM   Wound Length (cm) 1.2 cm 4/10/2019  9:40 AM   Wound Width (cm) 1.2 cm 4/10/2019  9:40 AM   Wound Depth (cm) 0.1 cm 4/10/2019  9:40 AM   Wound Surface Area (cm^2) 1.44 cm^2 4/10/2019  9:40 AM   Wound Volume (cm^3) 0.14 cm^3 4/10/2019  9:40 AM   Post-Procedure Length (cm) 1.2 cm 4/10/2019 10:01 AM   Post-Procedure Width (cm) 1.2 cm 4/10/2019 10:01 AM   Post-Procedure Depth (cm) 0.1 cm 4/10/2019 10:01 AM   Post-Procedure Surface Area (cm^2) 1.44 cm^2 4/10/2019 10:01 AM   Post-Procedure Volume (cm^3) 0.14 cm^3 4/10/2019 10:01 AM   Wound Assessment Bleeding 4/10/2019 10:01 AM   Drainage Amount Moderate 4/10/2019 10:01 AM   Drainage Description Serosanguinous 4/10/2019  9:40 AM   Joanne-wound Assessment Dry;Excoriated 4/10/2019  9:40 AM   [place]%Wound Bed 100 4/10/2019  9:40 AM   Red%Wound Bed 0 4/10/2019  9:40 AM   Yellow%Wound Bed 0 4/10/2019  9:40 AM   Black%Wound Bed 0 4/10/2019  9:40 AM   Purple%Wound Bed 0 4/10/2019  9:40 AM   Other%Wound Bed 0 4/10/2019  9:40 AM   Number of days: 13       Wound 03/27/19 Foot Left;Lateral #19 (Active)   Wound Image   3/27/2019 10:27 AM   Wound Diabetic 4/10/2019  9:40 AM   Wound Cleansed Rinsed/Irrigated with saline 4/10/2019 10:01 AM   Wound Length (cm) 0.8 cm 4/10/2019  9:40 AM   Wound Width (cm) 0.8 cm 4/10/2019  9:40 AM   Wound Depth (cm) 0.2 cm 4/10/2019  9:40 AM   Wound Surface Area (cm^2) 0.64 cm^2 4/10/2019  9:40 AM   Wound Volume (cm^3) 0.13 cm^3 4/10/2019  9:40 AM   Post-Procedure Length (cm) 0.8 cm 4/10/2019 10:01 AM   Post-Procedure Width (cm) 0.8 cm 4/10/2019 10:01 AM   Post-Procedure Depth (cm) 0.2 cm 4/10/2019 10:01 AM   Post-Procedure Surface Area (cm^2) 0.64 cm^2 4/10/2019 10:01 AM   Post-Procedure Volume (cm^3) 0.13 cm^3 4/10/2019 10:01 AM   Wound Assessment Bleeding 4/10/2019 10:01 AM   Drainage Amount Moderate 4/10/2019 10:01 AM   Drainage Description Serosanguinous 4/10/2019  9:40 AM   Joanne-wound Assessment Dry;Excoriated 4/10/2019  9:40 AM   [place]%Wound Bed 100 4/10/2019  9:40 AM   Red%Wound Bed 0 4/10/2019  9:40 AM   Yellow%Wound Bed 0 4/10/2019  9:40 AM   Black%Wound Bed 0 4/10/2019  9:40 AM   Purple%Wound Bed 0 4/10/2019  9:40 AM   Other%Wound Bed 0 4/10/2019  9:40 AM   Number of days: 13       Total Surface Area Debrided:  28.02 sq cm     Bleeding:  Minimal    Hemostasis Achieved:  by pressure    Procedural Pain:  0  / 10     Post Procedural Pain:  0 / 10     Response to treatment:  Well tolerated by patient. Assessment:     Wound looks improved. (improved, worse or stable)    Patient tolerated procedure well and was given proper instruction. The nature of the patient's condition was explained in depth. The patient was informed that their compliance to the treatment plan is paramount to successful healing and prevention of further ulceration and/or infection       Plan:     Treatment Plan: With each dressing change, rinse wounds with 0.9% Saline. (May use wound wash or soft contact solution. Both can be purchased at a local drug store). If unable to obtain saline, may use a gentle soap and water.     Dressing care: Left foot, Right foot, Right knee, Coccyx- Santyl, moist to dry, dry dressing- change daily. Keep pillow between knees when lying on you side. Rotate position frequently. Drink Glucerna. Continue to use the new low air loss mattress. Derick Banda 6  Haily Momin MD, FACS  4/10/2019  10:26 AM

## 2019-04-18 ENCOUNTER — TELEPHONE (OUTPATIENT)
Dept: INTERNAL MEDICINE CLINIC | Age: 64
End: 2019-04-18

## 2019-04-18 NOTE — TELEPHONE ENCOUNTER
January Cortes with [facility] called and would like a verbal for home for this patient . She will fax over the paper work also. Please call to advise.

## 2019-04-24 ENCOUNTER — HOSPITAL ENCOUNTER (OUTPATIENT)
Dept: WOUND CARE | Age: 64
Discharge: HOME | End: 2019-04-24
Attending: SURGERY
Payer: MEDICARE

## 2019-04-24 VITALS
HEART RATE: 87 BPM | HEIGHT: 72 IN | RESPIRATION RATE: 16 BRPM | DIASTOLIC BLOOD PRESSURE: 70 MMHG | BODY MASS INDEX: 28.11 KG/M2 | TEMPERATURE: 97.9 F | SYSTOLIC BLOOD PRESSURE: 137 MMHG

## 2019-04-24 PROCEDURE — 11042 DBRDMT SUBQ TIS 1ST 20SQCM/<: CPT | Performed by: SURGERY

## 2019-04-24 PROCEDURE — 11045 DBRDMT SUBQ TISS EACH ADDL: CPT | Performed by: SURGERY

## 2019-04-24 PROCEDURE — 11042 DBRDMT SUBQ TIS 1ST 20SQCM/<: CPT

## 2019-04-24 PROCEDURE — 11045 DBRDMT SUBQ TISS EACH ADDL: CPT

## 2019-04-24 RX ORDER — LIDOCAINE 40 MG/G
CREAM TOPICAL ONCE
Status: DISCONTINUED | OUTPATIENT
Start: 2019-04-24 | End: 2019-04-25 | Stop reason: HOSPADM

## 2019-04-24 ASSESSMENT — PAIN SCALES - GENERAL: PAINLEVEL_OUTOF10: 0

## 2019-04-24 NOTE — PLAN OF CARE
Discharge instructions given. Patient verbalized understanding. Return to [facility] in 2 weeks.

## 2019-04-24 NOTE — PROGRESS NOTES
[address] Procedure Note    Liban Michael New  AGE: 70 y.o. GENDER: male    : 1955  TODAY'S DATE: 2019    Chief Complaint   Patient presents with    Wound Check     right knee, left foot F/U        History of Present Illness     Don Peng is a 70 y.o. male who presents today for wound evaluation. History of Wound: pressure wound located on the sacrum, right knee, left foot  Wound Pain:  none  Severity:  0 / 10   Wound Type:  pressure  Modifying Factors:  chronic pressure, decreased mobility, arterial insufficiency and paraplegia  Associated Signs/Symptoms:  numbness    Procedure Note:     Performed by: Florentino Manzanares MD    Consent obtained: Yes    Time out taken: Yes    Pain Control:   topical lidocaine    Debridement: Excisional Debridement    Using curette the wound was sharply debrided    down through and including the removal of epidermis, dermis and subcutaneous tissue.         Devitalized Tissue Debrided: fibrin, biofilm and slough    Pre Debridement Measurements:  Are located in the Wound Documentation Flow Sheet     Wound #: 11, 12, 16 and 18     Post  Debridement Measurements:  Wound 18 Knee Right #12 (Active)   Wound Image   2019  9:59 AM   Wound Pressure Stage  3 2019  9:43 AM   Dressing/Treatment Collagen;Dry Dressing 2019 10:41 AM   Wound Cleansed Rinsed/Irrigated with saline 2019 10:11 AM   Wound Length (cm) 2.5 cm 2019  9:43 AM   Wound Width (cm) 3.3 cm 2019  9:43 AM   Wound Depth (cm) 0.1 cm 2019  9:43 AM   Wound Surface Area (cm^2) 8.25 cm^2 2019  9:43 AM   Change in Wound Size % (l*w) 8.33 2019  9:43 AM   Wound Volume (cm^3) 0.82 cm^3 2019  9:43 AM   Post-Procedure Length (cm) 2.5 cm 2019 10:11 AM   Post-Procedure Width (cm) 3.3 cm 2019 10:11 AM   Post-Procedure Depth (cm) 0.1 cm 2019 10:11 AM   Post-Procedure Surface Area (cm^2) 8.25 cm^2 2019 10:11 AM   Post-Procedure Volume (cm^3) 4/24/2019  9:43 AM   Black%Wound Bed 0 4/24/2019  9:43 AM   Purple%Wound Bed 0 4/24/2019  9:43 AM   Other%Wound Bed 0 4/24/2019  9:43 AM   Number of days: 147       Wound 02/28/19 Foot Medial;Left #16 (Active)   Wound Image   2/28/2019  9:59 AM   Wound Diabetic 4/24/2019  9:43 AM   Dressing/Treatment Dry Dressing 2/28/2019  9:59 AM   Wound Cleansed Rinsed/Irrigated with saline 4/24/2019 10:11 AM   Wound Length (cm) 1 cm 4/24/2019  9:43 AM   Wound Width (cm) 1.7 cm 4/24/2019  9:43 AM   Wound Depth (cm) 0.1 cm 4/24/2019  9:43 AM   Wound Surface Area (cm^2) 1.7 cm^2 4/24/2019  9:43 AM   Change in Wound Size % (l*w) 52.91 4/24/2019  9:43 AM   Wound Volume (cm^3) 0.17 cm^3 4/24/2019  9:43 AM   Wound Healing % 53 4/24/2019  9:43 AM   Post-Procedure Length (cm) 1 cm 4/24/2019 10:11 AM   Post-Procedure Width (cm) 1.7 cm 4/24/2019 10:11 AM   Post-Procedure Depth (cm) 0.1 cm 4/24/2019 10:11 AM   Post-Procedure Surface Area (cm^2) 1.7 cm^2 4/24/2019 10:11 AM   Post-Procedure Volume (cm^3) 0.17 cm^3 4/24/2019 10:11 AM   Wound Assessment Bleeding 4/24/2019 10:11 AM   Drainage Amount Moderate 4/24/2019 10:11 AM   Drainage Description Serosanguinous 4/24/2019  9:43 AM   Odor None 4/24/2019  9:43 AM   Joanne-wound Assessment Excoriated 4/24/2019  9:43 AM   [place]%Wound Bed 100 4/10/2019  9:40 AM   Red%Wound Bed 0 4/10/2019  9:40 AM   Yellow%Wound Bed 0 4/10/2019  9:40 AM   Black%Wound Bed 0 4/10/2019  9:40 AM   Purple%Wound Bed 0 4/10/2019  9:40 AM   Other%Wound Bed 0 4/10/2019  9:40 AM   Number of days: 55       Wound 03/27/19 Foot Left;Medial;Distal #18 (Active)   Wound Image   3/27/2019 10:03 AM   Wound Pressure Stage  3 4/24/2019  9:43 AM   Wound Cleansed Rinsed/Irrigated with saline 4/24/2019 10:11 AM   Wound Length (cm) 1.5 cm 4/24/2019  9:43 AM   Wound Width (cm) 1.3 cm 4/24/2019  9:43 AM   Wound Depth (cm) 0.1 cm 4/24/2019  9:43 AM   Wound Surface Area (cm^2) 1.95 cm^2 4/24/2019  9:43 AM   Wound Volume (cm^3) 0.2 cm^3 4/24/2019 9:43 AM   Post-Procedure Length (cm) 1.5 cm 4/24/2019 10:11 AM   Post-Procedure Width (cm) 1.3 cm 4/24/2019 10:11 AM   Post-Procedure Depth (cm) 0.1 cm 4/24/2019 10:11 AM   Post-Procedure Surface Area (cm^2) 1.95 cm^2 4/24/2019 10:11 AM   Post-Procedure Volume (cm^3) 0.2 cm^3 4/24/2019 10:11 AM   Wound Assessment Bleeding 4/24/2019 10:11 AM   Drainage Amount Moderate 4/24/2019 10:11 AM   Drainage Description Serosanguinous 4/10/2019  9:40 AM   Odor None 4/24/2019  9:43 AM   Joanne-wound Assessment Dry;Pink 4/24/2019  9:43 AM   [place]%Wound Bed 0 4/24/2019  9:43 AM   Red%Wound Bed 100 4/24/2019  9:43 AM   Yellow%Wound Bed 0 4/24/2019  9:43 AM   Black%Wound Bed 0 4/24/2019  9:43 AM   Purple%Wound Bed 0 4/24/2019  9:43 AM   Other%Wound Bed 0 4/24/2019  9:43 AM   Number of days: 28       Wound 03/27/19 Foot Left;Lateral #19 (Active)   Wound Image   3/27/2019 10:27 AM   Wound Diabetic 4/24/2019  9:43 AM   Wound Cleansed Rinsed/Irrigated with saline 4/10/2019 10:01 AM   Wound Length (cm) 0 cm 4/24/2019  9:43 AM   Wound Width (cm) 0 cm 4/24/2019  9:43 AM   Wound Depth (cm) 0 cm 4/24/2019  9:43 AM   Wound Surface Area (cm^2) 0 cm^2 4/24/2019  9:43 AM   Wound Volume (cm^3) 0 cm^3 4/24/2019  9:43 AM   Post-Procedure Length (cm) 0.8 cm 4/10/2019 10:01 AM   Post-Procedure Width (cm) 0.8 cm 4/10/2019 10:01 AM   Post-Procedure Depth (cm) 0.2 cm 4/10/2019 10:01 AM   Post-Procedure Surface Area (cm^2) 0.64 cm^2 4/10/2019 10:01 AM   Post-Procedure Volume (cm^3) 0.13 cm^3 4/10/2019 10:01 AM   Wound Assessment Dry 4/24/2019  9:43 AM   Drainage Amount None 4/24/2019  9:43 AM   Drainage Description Serosanguinous 4/10/2019  9:40 AM   Joanne-wound Assessment Pink 4/24/2019  9:43 AM   [place]%Wound Bed 100 4/10/2019  9:40 AM   Red%Wound Bed 0 4/10/2019  9:40 AM   Yellow%Wound Bed 0 4/10/2019  9:40 AM   Black%Wound Bed 0 4/10/2019  9:40 AM   Purple%Wound Bed 0 4/10/2019  9:40 AM   Other%Wound Bed 0 4/10/2019  9:40 AM   Number of days: 28 Total Surface Area Debrided:  30.71 sq cm     Bleeding:  Minimal    Hemostasis Achieved:  by pressure    Procedural Pain:  0  / 10     Post Procedural Pain:  0 / 10     Response to treatment:  Well tolerated by patient. Assessment:     Wound looks stable. (improved, worse or stable)    Patient tolerated procedure well and was given proper instruction. The nature of the patient's condition was explained in depth. The patient was informed that their compliance to the treatment plan is paramount to successful healing and prevention of further ulceration and/or infection       Plan:     Treatment Plan: With each dressing change, rinse wounds with 0.9% Saline. (May use wound wash or soft contact solution. Both can be purchased at a local drug store). If unable to obtain saline, may use a gentle soap and water.     Dressing care: Left foot, Right foot, Right knee, Coccyx- Santyl, moist to dry, dry dressing- change daily. Keep pillow between knees when lying on you side. Rotate position frequently. Drink Glucerna. Continue to use the new low air loss mattress. Derick Banda 6  Jasvir Brown MD, FACS  4/24/2019  6:34 PM

## 2019-05-08 ENCOUNTER — HOSPITAL ENCOUNTER (OUTPATIENT)
Dept: WOUND CARE | Age: 64
Discharge: HOME | End: 2019-05-08
Attending: SURGERY

## 2019-05-14 ENCOUNTER — OFFICE VISIT (OUTPATIENT)
Dept: CARDIOLOGY CLINIC | Age: 64
End: 2019-05-14
Payer: MEDICARE

## 2019-05-14 ENCOUNTER — HOSPITAL ENCOUNTER (OUTPATIENT)
Dept: NON INVASIVE DIAGNOSTICS | Age: 64
Discharge: HOME | End: 2019-05-14
Payer: MEDICARE

## 2019-05-14 VITALS
OXYGEN SATURATION: 100 % | WEIGHT: 205 LBS | HEART RATE: 81 BPM | HEIGHT: 72 IN | SYSTOLIC BLOOD PRESSURE: 100 MMHG | DIASTOLIC BLOOD PRESSURE: 44 MMHG | BODY MASS INDEX: 27.77 KG/M2

## 2019-05-14 DIAGNOSIS — E87.1 HYPONATREMIA: ICD-10-CM

## 2019-05-14 DIAGNOSIS — D64.9 CHRONIC ANEMIA: ICD-10-CM

## 2019-05-14 DIAGNOSIS — E78.00 PURE HYPERCHOLESTEROLEMIA: Chronic | ICD-10-CM

## 2019-05-14 DIAGNOSIS — I25.10 CORONARY ARTERY DISEASE INVOLVING NATIVE CORONARY ARTERY OF NATIVE HEART WITHOUT ANGINA PECTORIS: Chronic | ICD-10-CM

## 2019-05-14 DIAGNOSIS — I42.8 NON-ISCHEMIC CARDIOMYOPATHY (HCC): Chronic | ICD-10-CM

## 2019-05-14 DIAGNOSIS — N18.30 CKD (CHRONIC KIDNEY DISEASE) STAGE 3, GFR 30-59 ML/MIN (HCC): ICD-10-CM

## 2019-05-14 DIAGNOSIS — I10 ESSENTIAL HYPERTENSION: Chronic | ICD-10-CM

## 2019-05-14 DIAGNOSIS — I50.32 CHRONIC DIASTOLIC HEART FAILURE (HCC): ICD-10-CM

## 2019-05-14 DIAGNOSIS — R06.02 SOB (SHORTNESS OF BREATH): Chronic | ICD-10-CM

## 2019-05-14 DIAGNOSIS — I50.32 CHRONIC DIASTOLIC HEART FAILURE (HCC): Primary | ICD-10-CM

## 2019-05-14 DIAGNOSIS — G82.20 PARAPLEGIA (HCC): ICD-10-CM

## 2019-05-14 LAB
LEFT VENTRICULAR EJECTION FRACTION HIGH VALUE: 55 %
LEFT VENTRICULAR EJECTION FRACTION MODE: NORMAL
LV EF: 55 %
LVEF MODALITY: NORMAL

## 2019-05-14 PROCEDURE — G8598 ASA/ANTIPLAT THER USED: HCPCS | Performed by: INTERNAL MEDICINE

## 2019-05-14 PROCEDURE — 1036F TOBACCO NON-USER: CPT | Performed by: INTERNAL MEDICINE

## 2019-05-14 PROCEDURE — G8419 CALC BMI OUT NRM PARAM NOF/U: HCPCS | Performed by: INTERNAL MEDICINE

## 2019-05-14 PROCEDURE — G8427 DOCREV CUR MEDS BY ELIG CLIN: HCPCS | Performed by: INTERNAL MEDICINE

## 2019-05-14 PROCEDURE — 3017F COLORECTAL CA SCREEN DOC REV: CPT | Performed by: INTERNAL MEDICINE

## 2019-05-14 PROCEDURE — 4040F PNEUMOC VAC/ADMIN/RCVD: CPT | Performed by: INTERNAL MEDICINE

## 2019-05-14 PROCEDURE — 6360000004 HC RX CONTRAST MEDICATION: Performed by: INTERNAL MEDICINE

## 2019-05-14 PROCEDURE — 1123F ACP DISCUSS/DSCN MKR DOCD: CPT | Performed by: INTERNAL MEDICINE

## 2019-05-14 PROCEDURE — 99214 OFFICE O/P EST MOD 30 MIN: CPT | Performed by: INTERNAL MEDICINE

## 2019-05-14 PROCEDURE — C8929 TTE W OR WO FOL WCON,DOPPLER: HCPCS

## 2019-05-14 RX ORDER — GABAPENTIN 100 MG/1
100 CAPSULE ORAL 3 TIMES DAILY
Status: ON HOLD | COMMUNITY
End: 2021-01-01 | Stop reason: SDUPTHER

## 2019-05-14 RX ADMIN — PERFLUTREN 1.65 MG: 6.52 INJECTION, SUSPENSION INTRAVENOUS at 10:14
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
## 2019-05-15 ENCOUNTER — HOSPITAL ENCOUNTER (OUTPATIENT)
Age: 64
Setting detail: SPECIMEN
Discharge: HOME | End: 2019-05-15
Payer: MEDICARE

## 2019-05-15 ENCOUNTER — HOSPITAL ENCOUNTER (OUTPATIENT)
Dept: WOUND CARE | Age: 64
Discharge: HOME | End: 2019-05-15
Attending: SURGERY
Payer: MEDICARE

## 2019-05-15 VITALS — HEART RATE: 92 BPM | SYSTOLIC BLOOD PRESSURE: 119 MMHG | DIASTOLIC BLOOD PRESSURE: 60 MMHG | RESPIRATION RATE: 16 BRPM

## 2019-05-15 LAB
ANION GAP SERPL CALCULATED.3IONS-SCNC: 9 MMOL/L (ref 3–16)
BASOPHILS ABSOLUTE: 0.1 K/UL (ref 0–0.2)
BASOPHILS RELATIVE PERCENT: 0.6 %
BUN BLDV-MCNC: 80 MG/DL (ref 7–20)
CALCIUM SERPL-MCNC: 9.5 MG/DL (ref 8.3–10.6)
CHLORIDE BLD-SCNC: 95 MMOL/L (ref 99–110)
CHOLESTEROL, TOTAL: 124 MG/DL (ref 0–199)
CO2: 30 MMOL/L (ref 21–32)
CREAT SERPL-MCNC: 1.9 MG/DL (ref 0.8–1.3)
EOSINOPHILS ABSOLUTE: 0.4 K/UL (ref 0–0.6)
EOSINOPHILS RELATIVE PERCENT: 4.9 %
GFR AFRICAN AMERICAN: 43
GFR NON-AFRICAN AMERICAN: 36
GLUCOSE BLD-MCNC: 158 MG/DL (ref 70–99)
HCT VFR BLD CALC: 33.3 % (ref 40.5–52.5)
HDLC SERPL-MCNC: 38 MG/DL (ref 40–60)
HEMOGLOBIN: 10.6 G/DL (ref 13.5–17.5)
LDL CHOLESTEROL CALCULATED: 63 MG/DL
LYMPHOCYTES ABSOLUTE: 1.5 K/UL (ref 1–5.1)
LYMPHOCYTES RELATIVE PERCENT: 16.5 %
MCH RBC QN AUTO: 27.4 PG (ref 26–34)
MCHC RBC AUTO-ENTMCNC: 31.9 G/DL (ref 31–36)
MCV RBC AUTO: 86 FL (ref 80–100)
MONOCYTES ABSOLUTE: 0.7 K/UL (ref 0–1.3)
MONOCYTES RELATIVE PERCENT: 7.7 %
NEUTROPHILS ABSOLUTE: 6.4 K/UL (ref 1.7–7.7)
NEUTROPHILS RELATIVE PERCENT: 70.3 %
PDW BLD-RTO: 18.8 % (ref 12.4–15.4)
PLATELET # BLD: 176 K/UL (ref 135–450)
PMV BLD AUTO: 6.9 FL (ref 5–10.5)
POTASSIUM SERPL-SCNC: 4.6 MMOL/L (ref 3.5–5.1)
PRO-BNP: 1532 PG/ML (ref 0–124)
RBC # BLD: 3.88 M/UL (ref 4.2–5.9)
SODIUM BLD-SCNC: 134 MMOL/L (ref 136–145)
TRIGL SERPL-MCNC: 116 MG/DL (ref 0–150)
VLDLC SERPL CALC-MCNC: 23 MG/DL
WBC # BLD: 9.1 K/UL (ref 4–11)

## 2019-05-15 PROCEDURE — 11042 DBRDMT SUBQ TIS 1ST 20SQCM/<: CPT | Performed by: SURGERY

## 2019-05-15 PROCEDURE — 36415 COLL VENOUS BLD VENIPUNCTURE: CPT

## 2019-05-15 PROCEDURE — 85025 COMPLETE CBC W/AUTO DIFF WBC: CPT

## 2019-05-15 PROCEDURE — 11045 DBRDMT SUBQ TISS EACH ADDL: CPT | Performed by: SURGERY

## 2019-05-15 PROCEDURE — 80048 BASIC METABOLIC PNL TOTAL CA: CPT

## 2019-05-15 PROCEDURE — 83880 ASSAY OF NATRIURETIC PEPTIDE: CPT

## 2019-05-15 PROCEDURE — 80061 LIPID PANEL: CPT

## 2019-05-15 PROCEDURE — 11045 DBRDMT SUBQ TISS EACH ADDL: CPT

## 2019-05-15 PROCEDURE — 11042 DBRDMT SUBQ TIS 1ST 20SQCM/<: CPT

## 2019-05-15 RX ORDER — LIDOCAINE 40 MG/G
CREAM TOPICAL ONCE
Status: DISCONTINUED | OUTPATIENT
Start: 2019-05-15 | End: 2019-05-16 | Stop reason: HOSPADM

## 2019-05-15 NOTE — PLAN OF CARE
Discharge instructions given. Patient verbalized understanding. Return to [facility] in 1 week.   Called/faxed orders to [facility] [place] [facility] Care for skin sub

## 2019-05-15 NOTE — PROGRESS NOTES
[address] Procedure Note    Dani Borges New  AGE: 70 y.o. GENDER: male    : 1955  TODAY'S DATE: 5/15/2019    No chief complaint on file. History of Present Illness     Bj Garcia is a 70 y.o. male who presents today for wound evaluation. History of Wound: pressure wound located on the sacrum, right knee, left foot. Wound Pain:  none  Severity:  0 / 10   Wound Type:  pressure  Modifying Factors:  chronic pressure, decreased mobility, arterial insufficiency and paraplegia  Associated Signs/Symptoms:  numbness    Procedure Note:     Performed by: Sandra Burroughs MD    Consent obtained: Yes    Time out taken: Yes    Pain Control: Anesthetic  Anesthetic: Other (comment)(4% cream) topical lidocaine    Debridement: Excisional Debridement    Using curette the wound was sharply debrided    down through and including the removal of epidermis, dermis and subcutaneous tissue.         Devitalized Tissue Debrided: fibrin, biofilm and slough    Pre Debridement Measurements:  Are located in the Wound Documentation Flow Sheet     Wound #: 11, 12, 18 and 19     Post  Debridement Measurements:  Wound 18 Knee Right #12 (Active)   Wound Image   2019  9:59 AM   Wound Pressure Stage  3 5/15/2019  9:21 AM   Dressing/Treatment Collagen;Dry Dressing 2019 10:41 AM   Wound Cleansed Rinsed/Irrigated with saline 5/15/2019  9:39 AM   Wound Length (cm) 3 cm 5/15/2019  9:21 AM   Wound Width (cm) 1.9 cm 5/15/2019  9:21 AM   Wound Depth (cm) 0.1 cm 5/15/2019  9:21 AM   Wound Surface Area (cm^2) 5.7 cm^2 5/15/2019  9:21 AM   Change in Wound Size % (l*w) 36.67 5/15/2019  9:21 AM   Wound Volume (cm^3) 0.57 cm^3 5/15/2019  9:21 AM   Post-Procedure Length (cm) 3 cm 5/15/2019  9:39 AM   Post-Procedure Width (cm) 1.9 cm 5/15/2019  9:39 AM   Post-Procedure Depth (cm) 0.1 cm 5/15/2019  9:39 AM   Post-Procedure Surface Area (cm^2) 5.7 cm^2 5/15/2019  9:39 AM   Post-Procedure Volume (cm^3) 0.57 cm^3 Bed 0 5/15/2019  9:21 AM   Purple%Wound Bed 0 5/15/2019  9:21 AM   Other%Wound Bed 0 5/15/2019  9:21 AM   Number of days: 168       Wound 03/27/19 Foot Left;Medial;Distal #18 (Active)   Wound Image   3/27/2019 10:03 AM   Wound Pressure Stage  3 5/15/2019  9:21 AM   Wound Cleansed Rinsed/Irrigated with saline 5/15/2019  9:39 AM   Wound Length (cm) 1.9 cm 5/15/2019  9:21 AM   Wound Width (cm) 1.6 cm 5/15/2019  9:21 AM   Wound Depth (cm) 0.1 cm 5/15/2019  9:21 AM   Wound Surface Area (cm^2) 3.04 cm^2 5/15/2019  9:21 AM   Wound Volume (cm^3) 0.3 cm^3 5/15/2019  9:21 AM   Post-Procedure Length (cm) 2 cm 5/15/2019  9:39 AM   Post-Procedure Width (cm) 1.6 cm 5/15/2019  9:39 AM   Post-Procedure Depth (cm) 0.1 cm 5/15/2019  9:39 AM   Post-Procedure Surface Area (cm^2) 3.2 cm^2 5/15/2019  9:39 AM   Post-Procedure Volume (cm^3) 0.32 cm^3 5/15/2019  9:39 AM   Wound Assessment Bleeding 5/15/2019  9:39 AM   Drainage Amount Moderate 5/15/2019  9:39 AM   Drainage Description Serosanguinous 4/10/2019  9:40 AM   Odor None 5/15/2019  9:21 AM   Joanne-wound Assessment Red 5/15/2019  9:21 AM   [place]%Wound Bed 0 5/15/2019  9:21 AM   Red%Wound Bed 100 5/15/2019  9:21 AM   Yellow%Wound Bed 0 5/15/2019  9:21 AM   Black%Wound Bed 0 5/15/2019  9:21 AM   Purple%Wound Bed 0 5/15/2019  9:21 AM   Other%Wound Bed 0 5/15/2019  9:21 AM   Number of days: 48       Wound 03/27/19 Foot Left;Lateral #19 (Active)   Wound Image   3/27/2019 10:27 AM   Wound Diabetic 5/15/2019  9:21 AM   Wound Cleansed Rinsed/Irrigated with saline 5/15/2019  9:39 AM   Wound Length (cm) 1.5 cm 5/15/2019  9:21 AM   Wound Width (cm) 0.9 cm 5/15/2019  9:21 AM   Wound Depth (cm) 0.1 cm 5/15/2019  9:21 AM   Wound Surface Area (cm^2) 1.35 cm^2 5/15/2019  9:21 AM   Wound Volume (cm^3) 0.14 cm^3 5/15/2019  9:21 AM   Post-Procedure Length (cm) 1.5 cm 5/15/2019  9:39 AM   Post-Procedure Width (cm) 1 cm 5/15/2019  9:39 AM   Post-Procedure Depth (cm) 0.1 cm 5/15/2019  9:39 AM Post-Procedure Surface Area (cm^2) 1.5 cm^2 5/15/2019  9:39 AM   Post-Procedure Volume (cm^3) 0.15 cm^3 5/15/2019  9:39 AM   Wound Assessment Bleeding 5/15/2019  9:39 AM   Drainage Amount Moderate 5/15/2019  9:39 AM   Drainage Description Serosanguinous 5/15/2019  9:21 AM   Joanne-wound Assessment Red 5/15/2019  9:21 AM   [place]%Wound Bed 0 5/15/2019  9:21 AM   Red%Wound Bed 100 5/15/2019  9:21 AM   Yellow%Wound Bed 0 5/15/2019  9:21 AM   Black%Wound Bed 0 5/15/2019  9:21 AM   Purple%Wound Bed 0 5/15/2019  9:21 AM   Other%Wound Bed 0 5/15/2019  9:21 AM   Number of days: 48       Total Surface Area Debrided:  23.42 sq cm     Bleeding:  Minimal    Hemostasis Achieved:  by pressure    Procedural Pain:  0  / 10     Post Procedural Pain:  0 / 10     Response to treatment:  Well tolerated by patient. Assessment:     Wound looks stable. (improved, worse or stable)    Patient tolerated procedure well and was given proper instruction. The nature of the patient's condition was explained in depth. The patient was informed that their compliance to the treatment plan is paramount to successful healing and prevention of further ulceration and/or infection       Plan:     Treatment Plan: Will investigate insurance approval for skin substitute    With each dressing change, rinse wounds with 0.9% Saline. (May use wound wash or soft contact solution. Both can be purchased at a local drug store). If unable to obtain saline, may use a gentle soap and water.     Dressing care: Left foot, Right foot, Right knee, Coccyx- Santyl, moist to dry, dry dressing- change daily. Keep pillow between knees when lying on you side. Rotate position frequently. Drink Glucerna. Continue to use the new low air loss mattress. Derick [address]  Belgica Alvarez MD, FACS  5/15/2019  10:07 AM

## 2019-05-17 RX ORDER — CITALOPRAM 20 MG/1
TABLET ORAL
Qty: 30 TABLET | Refills: 1 | Status: SHIPPED | OUTPATIENT
Start: 2019-05-17 | End: 2019-07-19 | Stop reason: SDUPTHER

## 2019-05-17 RX ORDER — INSULIN GLARGINE 100 [IU]/ML
40 INJECTION, SOLUTION SUBCUTANEOUS NIGHTLY
Qty: 12 ML | Refills: 1 | Status: SHIPPED | OUTPATIENT
Start: 2019-05-17 | End: 2019-07-19 | Stop reason: SDUPTHER

## 2019-05-17 RX ORDER — PANTOPRAZOLE SODIUM 40 MG/1
TABLET, DELAYED RELEASE ORAL
Qty: 30 TABLET | Refills: 1 | Status: SHIPPED | OUTPATIENT
Start: 2019-05-17 | End: 2019-07-19 | Stop reason: SDUPTHER

## 2019-05-17 RX ORDER — FINASTERIDE 5 MG/1
TABLET, FILM COATED ORAL
Qty: 30 TABLET | Refills: 1 | Status: SHIPPED | OUTPATIENT
Start: 2019-05-17 | End: 2019-07-19 | Stop reason: SDUPTHER

## 2019-05-27 ENCOUNTER — HOSPITAL ENCOUNTER (OUTPATIENT)
Age: 64
Discharge: HOME | End: 2019-05-27
Payer: MEDICARE

## 2019-05-27 LAB
ANION GAP SERPL CALCULATED.3IONS-SCNC: 13 MMOL/L (ref 3–16)
BASOPHILS ABSOLUTE: 0.1 K/UL (ref 0–0.2)
BASOPHILS RELATIVE PERCENT: 0.5 %
BUN BLDV-MCNC: 78 MG/DL (ref 7–20)
CALCIUM SERPL-MCNC: 9 MG/DL (ref 8.3–10.6)
CHLORIDE BLD-SCNC: 96 MMOL/L (ref 99–110)
CO2: 28 MMOL/L (ref 21–32)
CREAT SERPL-MCNC: 1.8 MG/DL (ref 0.8–1.3)
EOSINOPHILS ABSOLUTE: 0.5 K/UL (ref 0–0.6)
EOSINOPHILS RELATIVE PERCENT: 4.9 %
GFR AFRICAN AMERICAN: 46
GFR NON-AFRICAN AMERICAN: 38
GLUCOSE BLD-MCNC: 180 MG/DL (ref 70–99)
HCT VFR BLD CALC: 28.8 % (ref 40.5–52.5)
HEMOGLOBIN: 9.3 G/DL (ref 13.5–17.5)
LYMPHOCYTES ABSOLUTE: 1.7 K/UL (ref 1–5.1)
LYMPHOCYTES RELATIVE PERCENT: 17 %
MCH RBC QN AUTO: 28.7 PG (ref 26–34)
MCHC RBC AUTO-ENTMCNC: 32.4 G/DL (ref 31–36)
MCV RBC AUTO: 88.7 FL (ref 80–100)
MONOCYTES ABSOLUTE: 0.7 K/UL (ref 0–1.3)
MONOCYTES RELATIVE PERCENT: 6.9 %
NEUTROPHILS ABSOLUTE: 6.9 K/UL (ref 1.7–7.7)
NEUTROPHILS RELATIVE PERCENT: 70.7 %
PARATHYROID HORMONE INTACT: 24.4 PG/ML (ref 14–72)
PDW BLD-RTO: 19.7 % (ref 12.4–15.4)
PHOSPHORUS: 3.9 MG/DL (ref 2.5–4.9)
PLATELET # BLD: 178 K/UL (ref 135–450)
PMV BLD AUTO: 7.3 FL (ref 5–10.5)
POTASSIUM SERPL-SCNC: 4.1 MMOL/L (ref 3.5–5.1)
PRO-BNP: 1474 PG/ML (ref 0–124)
RBC # BLD: 3.25 M/UL (ref 4.2–5.9)
SODIUM BLD-SCNC: 137 MMOL/L (ref 136–145)
WBC # BLD: 9.7 K/UL (ref 4–11)

## 2019-05-27 PROCEDURE — 83880 ASSAY OF NATRIURETIC PEPTIDE: CPT

## 2019-05-27 PROCEDURE — 85025 COMPLETE CBC W/AUTO DIFF WBC: CPT

## 2019-05-27 PROCEDURE — 83970 ASSAY OF PARATHORMONE: CPT

## 2019-05-27 PROCEDURE — 36415 COLL VENOUS BLD VENIPUNCTURE: CPT

## 2019-05-27 PROCEDURE — 84100 ASSAY OF PHOSPHORUS: CPT

## 2019-05-27 PROCEDURE — 82306 VITAMIN D 25 HYDROXY: CPT

## 2019-05-27 PROCEDURE — 80048 BASIC METABOLIC PNL TOTAL CA: CPT

## 2019-05-28 LAB — VITAMIN D 25-HYDROXY: 30.2 NG/ML

## 2019-05-29 ENCOUNTER — TELEPHONE (OUTPATIENT)
Dept: CARDIOLOGY CLINIC | Age: 64
End: 2019-05-29

## 2019-05-29 ENCOUNTER — HOSPITAL ENCOUNTER (OUTPATIENT)
Dept: WOUND CARE | Age: 64
Discharge: HOME | End: 2019-05-29
Attending: SURGERY
Payer: MEDICARE

## 2019-05-29 PROCEDURE — 11042 DBRDMT SUBQ TIS 1ST 20SQCM/<: CPT

## 2019-05-29 PROCEDURE — 11045 DBRDMT SUBQ TISS EACH ADDL: CPT

## 2019-05-29 PROCEDURE — 11045 DBRDMT SUBQ TISS EACH ADDL: CPT | Performed by: SURGERY

## 2019-05-29 PROCEDURE — 11042 DBRDMT SUBQ TIS 1ST 20SQCM/<: CPT | Performed by: SURGERY

## 2019-05-29 RX ORDER — LIDOCAINE 40 MG/G
CREAM TOPICAL ONCE
Status: DISCONTINUED | OUTPATIENT
Start: 2019-05-29 | End: 2019-05-30 | Stop reason: HOSPADM

## 2019-05-29 NOTE — TELEPHONE ENCOUNTER
----- Message from ADRIANNA Caba CNP sent at 5/28/2019  6:05 PM EDT -----  Covering for Dr Bucky Prather. Notify patient labs are stable but blood count trending down which could also explain elevated BUN. Continue current medications. Next labs in two weeks as planned. Thanks.

## 2019-05-29 NOTE — PROGRESS NOTES
[address] Procedure Note    Karlee Benites New  AGE: 70 y.o. GENDER: male    : 1955  TODAY'S DATE: 2019    No chief complaint on file. History of Present Illness     Corky Nelson is a 70 y.o. male who presents today for wound evaluation. History of Wound: pressure wound located on the sacrum, right knee, left foot. Wound Pain:  none  Severity:  0 / 10   Wound Type:  pressure  Modifying Factors:  chronic pressure, decreased mobility, arterial insufficiency and paraplegia  Associated Signs/Symptoms:  numbness    Procedure Note:     Performed by: Lian Vaughn MD    Consent obtained: Yes    Time out taken: Yes    Pain Control: Anesthetic  Anesthetic: Other (comment)(4% cream)     Debridement: Excisional Debridement    Using curette the wound was sharply debrided    down through and including the removal of epidermis, dermis and subcutaneous tissue.         Devitalized Tissue Debrided: fibrin, biofilm and slough    Pre Debridement Measurements:  Are located in the Wound Documentation Flow Sheet     Wound #: 11, 12 and 18     Post  Debridement Measurements:  Wound 18 Knee Right #12 (Active)   Wound Image   2019  9:41 AM   Wound Pressure Stage  3 2019  9:41 AM   Dressing/Treatment Collagen;Dry Dressing 2019 10:41 AM   Wound Cleansed Rinsed/Irrigated with saline 2019 10:16 AM   Wound Length (cm) 2.5 cm 2019  9:41 AM   Wound Width (cm) 2.5 cm 2019  9:41 AM   Wound Depth (cm) 0.1 cm 2019  9:41 AM   Wound Surface Area (cm^2) 6.25 cm^2 2019  9:41 AM   Change in Wound Size % (l*w) 30.56 2019  9:41 AM   Wound Volume (cm^3) 0.62 cm^3 2019  9:41 AM   Post-Procedure Length (cm) 2.5 cm 2019 10:16 AM   Post-Procedure Width (cm) 2.5 cm 2019 10:16 AM   Post-Procedure Depth (cm) 0.1 cm 2019 10:16 AM   Post-Procedure Surface Area (cm^2) 6.25 cm^2 2019 10:16 AM   Post-Procedure Volume (cm^3) 0.62 cm^3 2019 10:16 AM AM   Purple%Wound Bed 0 5/29/2019  9:41 AM   Other%Wound Bed 0 5/29/2019  9:41 AM   Number of days: 182       Wound 03/27/19 Foot Left;Medial;Distal #18 (Active)   Wound Image   5/29/2019  9:41 AM   Wound Pressure Stage  3 5/29/2019  9:41 AM   Wound Cleansed Rinsed/Irrigated with saline 5/29/2019 10:16 AM   Wound Length (cm) 2 cm 5/29/2019  9:41 AM   Wound Width (cm) 1.5 cm 5/29/2019  9:41 AM   Wound Depth (cm) 0.1 cm 5/29/2019  9:41 AM   Wound Surface Area (cm^2) 3 cm^2 5/29/2019  9:41 AM   Wound Volume (cm^3) 0.3 cm^3 5/29/2019  9:41 AM   Post-Procedure Length (cm) 2 cm 5/29/2019 10:16 AM   Post-Procedure Width (cm) 1.5 cm 5/29/2019 10:16 AM   Post-Procedure Depth (cm) 0.1 cm 5/29/2019 10:16 AM   Post-Procedure Surface Area (cm^2) 3 cm^2 5/29/2019 10:16 AM   Post-Procedure Volume (cm^3) 0.3 cm^3 5/29/2019 10:16 AM   Wound Assessment Bleeding 5/29/2019 10:16 AM   Drainage Amount Moderate 5/29/2019 10:16 AM   Drainage Description Serosanguinous 4/10/2019  9:40 AM   Odor None 5/15/2019  9:21 AM   Joanne-wound Assessment Red 5/15/2019  9:21 AM   [place]%Wound Bed 0 5/15/2019  9:21 AM   Red%Wound Bed 100 5/15/2019  9:21 AM   Yellow%Wound Bed 0 5/15/2019  9:21 AM   Black%Wound Bed 0 5/15/2019  9:21 AM   Purple%Wound Bed 0 5/15/2019  9:21 AM   Other%Wound Bed 0 5/15/2019  9:21 AM   Number of days: 63       Wound 03/27/19 Foot Left;Lateral #19 (Active)   Wound Image   5/29/2019  9:41 AM   Wound Diabetic 5/29/2019  9:41 AM   Wound Cleansed Rinsed/Irrigated with saline 5/29/2019  9:41 AM   Wound Length (cm) 0 cm 5/29/2019  9:41 AM   Wound Width (cm) 0 cm 5/29/2019  9:41 AM   Wound Depth (cm) 0 cm 5/29/2019  9:41 AM   Wound Surface Area (cm^2) 0 cm^2 5/29/2019  9:41 AM   Wound Volume (cm^3) 0 cm^3 5/29/2019  9:41 AM   Post-Procedure Length (cm) 1.5 cm 5/15/2019  9:39 AM   Post-Procedure Width (cm) 1 cm 5/15/2019  9:39 AM   Post-Procedure Depth (cm) 0.1 cm 5/15/2019  9:39 AM   Post-Procedure Surface Area (cm^2) 1.5 cm^2 5/15/2019 9:39 AM   Post-Procedure Volume (cm^3) 0.15 cm^3 5/15/2019  9:39 AM   Wound Assessment Pink; Other (Comment) 5/29/2019  9:41 AM   Drainage Amount None 5/29/2019  9:41 AM   Drainage Description Serosanguinous 5/15/2019  9:21 AM   Odor None 5/29/2019  9:41 AM   Joanne-wound Assessment Red 5/15/2019  9:21 AM   [place]%Wound Bed 100 5/29/2019  9:41 AM   Red%Wound Bed 0 5/29/2019  9:41 AM   Yellow%Wound Bed 0 5/29/2019  9:41 AM   Black%Wound Bed 0 5/29/2019  9:41 AM   Purple%Wound Bed 0 5/29/2019  9:41 AM   Other%Wound Bed 0 5/29/2019  9:41 AM   Number of days: 63       Total Surface Area Debrided:  23.25 sq cm     Bleeding:  Minimal    Hemostasis Achieved:  by pressure    Procedural Pain:  0  / 10     Post Procedural Pain:  0 / 10     Response to treatment:  Well tolerated by patient. Assessment:     Wound looks improved. (improved, worse or stable)    Patient tolerated procedure well and was given proper instruction. The nature of the patient's condition was explained in depth. The patient was informed that their compliance to the treatment plan is paramount to successful healing and prevention of further ulceration and/or infection       Plan:     Treatment Plan: With each dressing change, rinse wounds with 0.9% Saline. (May use wound wash or soft contact solution. Both can be purchased at a local drug store). If unable to obtain saline, may use a gentle soap and water.     Dressing care: Left foot, Right foot, Right knee, Coccyx- Santyl, moist to dry, dry dressing- change daily. Keep pillow between knees when lying on you side. Rotate position frequently. Drink Glucerna. Continue to use the new low air loss mattress. Derick Banda 6  Haily Momin MD, FACS  5/29/2019  6:44 PM

## 2019-06-10 ENCOUNTER — HOSPITAL ENCOUNTER (OUTPATIENT)
Age: 64
Setting detail: SPECIMEN
Discharge: HOME | End: 2019-06-10
Payer: MEDICARE

## 2019-06-10 LAB
ANION GAP SERPL CALCULATED.3IONS-SCNC: 11 MMOL/L (ref 3–16)
BUN BLDV-MCNC: 80 MG/DL (ref 7–20)
CALCIUM SERPL-MCNC: 9.2 MG/DL (ref 8.3–10.6)
CHLORIDE BLD-SCNC: 96 MMOL/L (ref 99–110)
CO2: 30 MMOL/L (ref 21–32)
CREAT SERPL-MCNC: 2 MG/DL (ref 0.8–1.3)
GFR AFRICAN AMERICAN: 41
GFR NON-AFRICAN AMERICAN: 34
GLUCOSE BLD-MCNC: 78 MG/DL (ref 70–99)
HCT VFR BLD CALC: 30.3 % (ref 40.5–52.5)
HEMOGLOBIN: 10.1 G/DL (ref 13.5–17.5)
MCH RBC QN AUTO: 29.9 PG (ref 26–34)
MCHC RBC AUTO-ENTMCNC: 33.2 G/DL (ref 31–36)
MCV RBC AUTO: 90.2 FL (ref 80–100)
PDW BLD-RTO: 17.7 % (ref 12.4–15.4)
PLATELET # BLD: 178 K/UL (ref 135–450)
PMV BLD AUTO: 7.6 FL (ref 5–10.5)
POTASSIUM SERPL-SCNC: 4.4 MMOL/L (ref 3.5–5.1)
PRO-BNP: 1149 PG/ML (ref 0–124)
RBC # BLD: 3.36 M/UL (ref 4.2–5.9)
SODIUM BLD-SCNC: 137 MMOL/L (ref 136–145)
WBC # BLD: 9.8 K/UL (ref 4–11)

## 2019-06-10 PROCEDURE — 83880 ASSAY OF NATRIURETIC PEPTIDE: CPT

## 2019-06-10 PROCEDURE — 36415 COLL VENOUS BLD VENIPUNCTURE: CPT

## 2019-06-10 PROCEDURE — 85027 COMPLETE CBC AUTOMATED: CPT

## 2019-06-10 PROCEDURE — 80048 BASIC METABOLIC PNL TOTAL CA: CPT

## 2019-06-12 ENCOUNTER — HOSPITAL ENCOUNTER (OUTPATIENT)
Dept: WOUND CARE | Age: 64
Discharge: HOME | End: 2019-06-12
Attending: SURGERY
Payer: MEDICARE

## 2019-06-12 VITALS — DIASTOLIC BLOOD PRESSURE: 67 MMHG | HEART RATE: 91 BPM | SYSTOLIC BLOOD PRESSURE: 126 MMHG | RESPIRATION RATE: 16 BRPM

## 2019-06-12 PROCEDURE — 11042 DBRDMT SUBQ TIS 1ST 20SQCM/<: CPT | Performed by: SURGERY

## 2019-06-12 PROCEDURE — 11042 DBRDMT SUBQ TIS 1ST 20SQCM/<: CPT

## 2019-06-12 RX ORDER — LIDOCAINE 40 MG/G
CREAM TOPICAL ONCE
Status: DISCONTINUED | OUTPATIENT
Start: 2019-06-12 | End: 2019-06-13 | Stop reason: HOSPADM

## 2019-06-12 NOTE — PROGRESS NOTES
[address] Procedure Note    Liban Rodriguez New  AGE: 70 y.o. GENDER: male    : 1955  TODAY'S DATE: 2019    No chief complaint on file. History of Present Illness     Don Peng is a 70 y.o. male who presents today for wound evaluation. History of Wound: pressure wound located on the sacrum, right knee, left foot. Wound Pain:  none  Severity:  0 / 10   Wound Type:  pressure  Modifying Factors:  chronic pressure, decreased mobility, arterial insufficiency and paraplegia  Associated Signs/Symptoms:  numbness    Procedure Note:     Performed by: Florentino Manzanares MD    Consent obtained: Yes    Time out taken: Yes    Pain Control: Anesthetic  Anesthetic: Other (comment)(4% cream) topical lidocaine    Debridement: Excisional Debridement    Using curette the wound was sharply debrided    down through and including the removal of epidermis, dermis and subcutaneous tissue.         Devitalized Tissue Debrided: fibrin, biofilm and slough    Pre Debridement Measurements:  Are located in the Wound Documentation Flow Sheet     Wound #: 11, 12, 18 and 21     Post  Debridement Measurements:  Wound 18 Knee Right #12 (Active)   Wound Image   2019  9:41 AM   Wound Pressure Stage  3 2019  9:32 AM   Dressing/Treatment Collagen;Dry Dressing 2019 10:41 AM   Wound Cleansed Rinsed/Irrigated with saline 2019  9:51 AM   Wound Length (cm) 2.5 cm 2019  9:32 AM   Wound Width (cm) 2.4 cm 2019  9:32 AM   Wound Depth (cm) 0.1 cm 2019  9:32 AM   Wound Surface Area (cm^2) 6 cm^2 2019  9:32 AM   Change in Wound Size % (l*w) 33.33 2019  9:32 AM   Wound Volume (cm^3) 0.6 cm^3 2019  9:32 AM   Post-Procedure Length (cm) 2.5 cm 2019  9:51 AM   Post-Procedure Width (cm) 2.4 cm 2019  9:51 AM   Post-Procedure Depth (cm) 0.1 cm 2019  9:51 AM   Post-Procedure Surface Area (cm^2) 6 cm^2 2019  9:51 AM   Post-Procedure Volume (cm^3) 0.6 cm^3 6/12/2019  9:51 AM   Wound Assessment Bleeding 6/12/2019  9:51 AM   Drainage Amount Moderate 6/12/2019  9:51 AM   Drainage Description Serosanguinous 6/12/2019  9:32 AM   Odor None 6/12/2019  9:32 AM   Margins Defined edges 1/2/2019 10:48 AM   Joanne-wound Assessment Clean 6/12/2019  9:32 AM   [place]%Wound Bed 0 6/12/2019  9:32 AM   Red%Wound Bed 100 6/12/2019  9:32 AM   Yellow%Wound Bed 0 6/12/2019  9:32 AM   Black%Wound Bed 0 6/12/2019  9:32 AM   Purple%Wound Bed 0 6/12/2019  9:32 AM   Other%Wound Bed 0 6/12/2019  9:32 AM   Number of days: 196       Wound 11/28/18 Coccyx Mid #11 (Active)   Wound Image   5/29/2019  9:41 AM   Wound Pressure Stage  3 6/12/2019  9:32 AM   Dressing/Treatment ABD; Moist to dry 2/28/2019 10:41 AM   Wound Cleansed Rinsed/Irrigated with saline 6/12/2019  9:51 AM   Wound Length (cm) 3.5 cm 6/12/2019  9:32 AM   Wound Width (cm) 2.6 cm 6/12/2019  9:32 AM   Wound Depth (cm) 0.3 cm 6/12/2019  9:32 AM   Wound Surface Area (cm^2) 9.1 cm^2 6/12/2019  9:32 AM   Change in Wound Size % (l*w) 55.06 6/12/2019  9:32 AM   Wound Volume (cm^3) 2.73 cm^3 6/12/2019  9:32 AM   Wound Healing % 87 6/12/2019  9:32 AM   Post-Procedure Length (cm) 3.5 cm 6/12/2019  9:51 AM   Post-Procedure Width (cm) 2.6 cm 6/12/2019  9:51 AM   Post-Procedure Depth (cm) 0.1 cm 6/12/2019  9:51 AM   Post-Procedure Surface Area (cm^2) 9.1 cm^2 6/12/2019  9:51 AM   Post-Procedure Volume (cm^3) 0.91 cm^3 6/12/2019  9:51 AM   Undermining Starts ___ O'Clock 1200 4/10/2019  9:40 AM   Undermining Ends___ O'Clock 0400 4/10/2019  9:40 AM   Undermining Maxium Distance (cm) 1 4/10/2019  9:40 AM   Wound Assessment Granulation tissue; Red 6/12/2019  9:32 AM   Drainage Amount Moderate 6/12/2019  9:32 AM   Drainage Description Green;Yellow 6/12/2019  9:32 AM   Odor None 6/12/2019  9:32 AM   Joanne-wound Assessment Excoriated;Red 6/12/2019  9:32 AM   [place]%Wound Bed 0 6/12/2019  9:32 AM   Red%Wound Bed 100 6/12/2019  9:32 AM   Yellow%Wound Bed 0 6/12/2019 9:32 AM   Black%Wound Bed 0 6/12/2019  9:32 AM   Purple%Wound Bed 0 6/12/2019  9:32 AM   Other%Wound Bed 0 6/12/2019  9:32 AM   Number of days: 196       Wound 03/27/19 Foot Left;Medial;Distal #18 (Active)   Wound Image   5/29/2019  9:41 AM   Wound Pressure Stage  3 6/12/2019  9:32 AM   Wound Cleansed Rinsed/Irrigated with saline 6/12/2019  9:51 AM   Wound Length (cm) 1.9 cm 6/12/2019  9:32 AM   Wound Width (cm) 1 cm 6/12/2019  9:32 AM   Wound Depth (cm) 0.1 cm 6/12/2019  9:32 AM   Wound Surface Area (cm^2) 1.9 cm^2 6/12/2019  9:32 AM   Wound Volume (cm^3) 0.19 cm^3 6/12/2019  9:32 AM   Post-Procedure Length (cm) 2 cm 6/12/2019  9:51 AM   Post-Procedure Width (cm) 1 cm 6/12/2019  9:51 AM   Post-Procedure Depth (cm) 0.1 cm 6/12/2019  9:51 AM   Post-Procedure Surface Area (cm^2) 2 cm^2 6/12/2019  9:51 AM   Post-Procedure Volume (cm^3) 0.2 cm^3 6/12/2019  9:51 AM   Wound Assessment Bleeding 6/12/2019  9:51 AM   Drainage Amount Moderate 6/12/2019  9:51 AM   Drainage Description Serosanguinous 4/10/2019  9:40 AM   Odor None 5/15/2019  9:21 AM   Joanne-wound Assessment Red 5/15/2019  9:21 AM   [place]%Wound Bed 0 5/15/2019  9:21 AM   Red%Wound Bed 100 5/15/2019  9:21 AM   Yellow%Wound Bed 0 5/15/2019  9:21 AM   Black%Wound Bed 0 5/15/2019  9:21 AM   Purple%Wound Bed 0 5/15/2019  9:21 AM   Other%Wound Bed 0 5/15/2019  9:21 AM   Number of days: 76       Wound 06/12/19 Foot Dorsal;Left #21 (Active)   Wound Image   6/12/2019  9:32 AM   Wound Pressure Stage  3 6/12/2019  9:32 AM   Wound Cleansed Rinsed/Irrigated with saline 6/12/2019  9:51 AM   Wound Length (cm) 1 cm 6/12/2019  9:32 AM   Wound Width (cm) 1.9 cm 6/12/2019  9:32 AM   Wound Depth (cm) 0.1 cm 6/12/2019  9:32 AM   Wound Surface Area (cm^2) 1.9 cm^2 6/12/2019  9:32 AM   Wound Volume (cm^3) 0.19 cm^3 6/12/2019  9:32 AM   Post-Procedure Length (cm) 1 cm 6/12/2019  9:51 AM   Post-Procedure Width (cm) 2 cm 6/12/2019  9:51 AM   Post-Procedure Depth (cm) 0.1 cm 6/12/2019  9:51 AM   Post-Procedure Surface Area (cm^2) 2 cm^2 6/12/2019  9:51 AM   Post-Procedure Volume (cm^3) 0.2 cm^3 6/12/2019  9:51 AM   Wound Assessment Bleeding 6/12/2019  9:51 AM   Drainage Amount Moderate 6/12/2019  9:51 AM   Drainage Description Serosanguinous 6/12/2019  9:32 AM   Odor None 6/12/2019  9:32 AM   Joanne-wound Assessment Purple 6/12/2019  9:32 AM   [place]%Wound Bed 0 6/12/2019  9:32 AM   Red%Wound Bed 100 6/12/2019  9:32 AM   Yellow%Wound Bed 0 6/12/2019  9:32 AM   Black%Wound Bed 0 6/12/2019  9:32 AM   Purple%Wound Bed 0 6/12/2019  9:32 AM   Other%Wound Bed 0 6/12/2019  9:32 AM   Number of days: 0       Total Surface Area Debrided:  19.1 sq cm     Bleeding:  Minimal    Hemostasis Achieved:  by pressure    Procedural Pain:  0  / 10     Post Procedural Pain:  0 / 10     Response to treatment:  Well tolerated by patient. Assessment:     Wound looks stable. (improved, worse or stable)    Patient tolerated procedure well and was given proper instruction. The nature of the patient's condition was explained in depth. The patient was informed that their compliance to the treatment plan is paramount to successful healing and prevention of further ulceration and/or infection       Plan:     Treatment Plan: With each dressing change, rinse wounds with 0.9% Saline. (May use wound wash or soft contact solution. Both can be purchased at a local drug store). If unable to obtain saline, may use a gentle soap and water.     Dressing care: Right knee- Endoform, dry dressing- change daily. Left foot, Coccyx- Santyl, moist to dry, dry dressing- change daily. Keep pillow between knees when lying on you side. Rotate position frequently. Drink Glucerna. Continue to use the new low air loss mattress. Place pillow between legs to try to avoid pressure to your knee. Derick Diaz MD, FACS  6/12/2019  10:09 AM

## 2019-06-12 NOTE — PLAN OF CARE
Discharge instructions given. Patient verbalized understanding. Return to [address] [place] [address],[address] in 2 weeks.   Called/faxed orders to [facility]

## 2019-06-17 ENCOUNTER — TELEPHONE (OUTPATIENT)
Dept: INTERNAL MEDICINE CLINIC | Age: 64
End: 2019-06-17

## 2019-06-17 RX ORDER — PRAVASTATIN SODIUM 40 MG
40 TABLET ORAL NIGHTLY
Qty: 90 TABLET | Refills: 0 | Status: SHIPPED | OUTPATIENT
Start: 2019-06-17 | End: 2019-09-16 | Stop reason: SDUPTHER

## 2019-06-17 NOTE — TELEPHONE ENCOUNTER
Medication Refill    When was your last appointment with cardiology?      (if  it's been more than 1 year, please go ahead and schedule an appointment with the physician while you have the patient on the phone)      Medication needing refilled: pravastatin    Doseage of the medication: 40 mg     How are you taking this medication (QD, BID, TID, QID, PRN): QD    Patient want a 30 or 90 day supply called in: 90 day    Which Pharmacy are we sending the medication to   [address] [phone] - f [phone]    Medication Question/Concern    (if  it's been more than 1 year, please go ahead and schedule an appointment with the physician while you have the patient on the phone)    What is the name of the medication you need to speak with someone about? Doseage of the medication:    How are you taking this medication:    What issues/concerns are you having with this medication:      Medication Samples    (if  it's been more than 1 year, please go ahead and schedule an appointment with the physician while you have the patient on the phone)    Medication:    Doseage of the medication:    How are you taking this medication (QD, BID, TID, QID, PRN):     in the office or Mail to your home?

## 2019-06-20 RX ORDER — IPRATROPIUM BROMIDE AND ALBUTEROL SULFATE 2.5; .5 MG/3ML; MG/3ML
3 SOLUTION RESPIRATORY (INHALATION) EVERY 4 HOURS PRN
Qty: 360 ML | Refills: 0 | Status: SHIPPED | OUTPATIENT
Start: 2019-06-20 | End: 2019-08-21 | Stop reason: SDUPTHER

## 2019-06-24 ENCOUNTER — HOSPITAL ENCOUNTER (OUTPATIENT)
Age: 64
Setting detail: SPECIMEN
Discharge: HOME | End: 2019-06-24
Payer: MEDICARE

## 2019-06-24 LAB
ANION GAP SERPL CALCULATED.3IONS-SCNC: 11 MMOL/L (ref 3–16)
BASOPHILS ABSOLUTE: 0 K/UL (ref 0–0.2)
BASOPHILS RELATIVE PERCENT: 0.6 %
BUN BLDV-MCNC: 71 MG/DL (ref 7–20)
CALCIUM SERPL-MCNC: 8.8 MG/DL (ref 8.3–10.6)
CHLORIDE BLD-SCNC: 99 MMOL/L (ref 99–110)
CO2: 29 MMOL/L (ref 21–32)
CREAT SERPL-MCNC: 1.9 MG/DL (ref 0.8–1.3)
EOSINOPHILS ABSOLUTE: 0.6 K/UL (ref 0–0.6)
EOSINOPHILS RELATIVE PERCENT: 6.5 %
GFR AFRICAN AMERICAN: 43
GFR NON-AFRICAN AMERICAN: 36
GLUCOSE BLD-MCNC: 145 MG/DL (ref 70–99)
HCT VFR BLD CALC: 31.9 % (ref 40.5–52.5)
HEMOGLOBIN: 10.3 G/DL (ref 13.5–17.5)
LYMPHOCYTES ABSOLUTE: 1.4 K/UL (ref 1–5.1)
LYMPHOCYTES RELATIVE PERCENT: 16.2 %
MCH RBC QN AUTO: 29.3 PG (ref 26–34)
MCHC RBC AUTO-ENTMCNC: 32.3 G/DL (ref 31–36)
MCV RBC AUTO: 90.7 FL (ref 80–100)
MONOCYTES ABSOLUTE: 0.7 K/UL (ref 0–1.3)
MONOCYTES RELATIVE PERCENT: 8.4 %
NEUTROPHILS ABSOLUTE: 5.8 K/UL (ref 1.7–7.7)
NEUTROPHILS RELATIVE PERCENT: 68.3 %
PDW BLD-RTO: 16.7 % (ref 12.4–15.4)
PLATELET # BLD: 168 K/UL (ref 135–450)
PMV BLD AUTO: 7.2 FL (ref 5–10.5)
POTASSIUM SERPL-SCNC: 4.4 MMOL/L (ref 3.5–5.1)
PRO-BNP: 1241 PG/ML (ref 0–124)
RBC # BLD: 3.51 M/UL (ref 4.2–5.9)
SODIUM BLD-SCNC: 139 MMOL/L (ref 136–145)
WBC # BLD: 8.4 K/UL (ref 4–11)

## 2019-06-24 PROCEDURE — 85025 COMPLETE CBC W/AUTO DIFF WBC: CPT

## 2019-06-24 PROCEDURE — 36415 COLL VENOUS BLD VENIPUNCTURE: CPT

## 2019-06-24 PROCEDURE — 83880 ASSAY OF NATRIURETIC PEPTIDE: CPT

## 2019-06-24 PROCEDURE — 80048 BASIC METABOLIC PNL TOTAL CA: CPT

## 2019-06-26 ENCOUNTER — HOSPITAL ENCOUNTER (OUTPATIENT)
Dept: WOUND CARE | Age: 64
Discharge: HOME | End: 2019-06-26

## 2019-07-03 ENCOUNTER — HOSPITAL ENCOUNTER (OUTPATIENT)
Dept: WOUND CARE | Age: 64
Discharge: HOME | End: 2019-07-03
Payer: MEDICARE

## 2019-07-03 VITALS
BODY MASS INDEX: 25.5 KG/M2 | HEART RATE: 97 BPM | WEIGHT: 188 LBS | DIASTOLIC BLOOD PRESSURE: 64 MMHG | TEMPERATURE: 97.5 F | SYSTOLIC BLOOD PRESSURE: 128 MMHG

## 2019-07-03 PROCEDURE — 11042 DBRDMT SUBQ TIS 1ST 20SQCM/<: CPT

## 2019-07-03 PROCEDURE — 11042 DBRDMT SUBQ TIS 1ST 20SQCM/<: CPT | Performed by: SURGERY

## 2019-07-03 RX ORDER — LIDOCAINE 50 MG/G
OINTMENT TOPICAL ONCE
Status: DISCONTINUED | OUTPATIENT
Start: 2019-07-03 | End: 2019-07-04 | Stop reason: HOSPADM

## 2019-07-03 NOTE — PROGRESS NOTES
[address] Procedure Note    Keke Fernandez New  AGE: 70 y.o. GENDER: male    : 1955  TODAY'S DATE: 7/3/2019    Chief Complaint   Patient presents with    Wound Check     buttock, left foot , right knee F/U        History of Present Illness     Kei Santoyo is a 70 y.o. male who presents today for wound evaluation. History of Wound: pressure wound located on the sacrum, right knee, left foot. Wound Pain:  none  Severity:  0 / 10   Wound Type:  pressure  Modifying Factors:  chronic pressure, decreased mobility, arterial insufficiency and paraplegia  Associated Signs/Symptoms:  numbness    Procedure Note:     Performed by: Noble Torres MD    Consent obtained: Yes    Time out taken: Yes    Pain Control:   topical lidocaine    Debridement: Excisional Debridement    Using curette the wound was sharply debrided    down through and including the removal of epidermis, dermis and subcutaneous tissue.         Devitalized Tissue Debrided: fibrin, biofilm and slough    Pre Debridement Measurements:  Are located in the Wound Documentation Flow Sheet     Wound #: 11, 12 and 18     Post  Debridement Measurements:  Wound 18 Knee Right #12 (Active)   Wound Image   2019  9:41 AM   Wound Pressure Stage  3 7/3/2019  9:14 AM   Dressing/Treatment Collagen with Ag;Dry Dressing 7/3/2019 10:26 AM   Wound Cleansed Rinsed/Irrigated with saline 7/3/2019  9:33 AM   Wound Length (cm) 2 cm 7/3/2019  9:14 AM   Wound Width (cm) 2.5 cm 7/3/2019  9:14 AM   Wound Depth (cm) 0.1 cm 7/3/2019  9:14 AM   Wound Surface Area (cm^2) 5 cm^2 7/3/2019  9:14 AM   Change in Wound Size % (l*w) 44.44 7/3/2019  9:14 AM   Wound Volume (cm^3) 0.5 cm^3 7/3/2019  9:14 AM   Post-Procedure Length (cm) 2 cm 7/3/2019  9:33 AM   Post-Procedure Width (cm) 2.5 cm 7/3/2019  9:33 AM   Post-Procedure Depth (cm) 0.1 cm 7/3/2019  9:33 AM   Post-Procedure Surface Area (cm^2) 5 cm^2 7/3/2019  9:33 AM   Post-Procedure Volume (cm^3) 0.5

## 2019-07-08 ENCOUNTER — HOSPITAL ENCOUNTER (OUTPATIENT)
Age: 64
Setting detail: SPECIMEN
Discharge: HOME | End: 2019-07-08
Payer: MEDICARE

## 2019-07-08 LAB
ANION GAP SERPL CALCULATED.3IONS-SCNC: 11 MMOL/L (ref 3–16)
BASOPHILS ABSOLUTE: 0.1 K/UL (ref 0–0.2)
BASOPHILS RELATIVE PERCENT: 0.6 %
BUN BLDV-MCNC: 77 MG/DL (ref 7–20)
CALCIUM SERPL-MCNC: 9.2 MG/DL (ref 8.3–10.6)
CHLORIDE BLD-SCNC: 98 MMOL/L (ref 99–110)
CO2: 29 MMOL/L (ref 21–32)
CREAT SERPL-MCNC: 2.1 MG/DL (ref 0.8–1.3)
EOSINOPHILS ABSOLUTE: 0.5 K/UL (ref 0–0.6)
EOSINOPHILS RELATIVE PERCENT: 6.8 %
GFR AFRICAN AMERICAN: 38
GFR NON-AFRICAN AMERICAN: 32
GLUCOSE BLD-MCNC: 269 MG/DL (ref 70–99)
HCT VFR BLD CALC: 33.5 % (ref 40.5–52.5)
HEMOGLOBIN: 10.8 G/DL (ref 13.5–17.5)
LYMPHOCYTES ABSOLUTE: 1.5 K/UL (ref 1–5.1)
LYMPHOCYTES RELATIVE PERCENT: 18 %
MCH RBC QN AUTO: 29.9 PG (ref 26–34)
MCHC RBC AUTO-ENTMCNC: 32.4 G/DL (ref 31–36)
MCV RBC AUTO: 92.3 FL (ref 80–100)
MONOCYTES ABSOLUTE: 0.6 K/UL (ref 0–1.3)
MONOCYTES RELATIVE PERCENT: 7.6 %
NEUTROPHILS ABSOLUTE: 5.4 K/UL (ref 1.7–7.7)
NEUTROPHILS RELATIVE PERCENT: 67 %
PDW BLD-RTO: 15.6 % (ref 12.4–15.4)
PLATELET # BLD: 146 K/UL (ref 135–450)
PMV BLD AUTO: 7.5 FL (ref 5–10.5)
POTASSIUM SERPL-SCNC: 4.5 MMOL/L (ref 3.5–5.1)
PRO-BNP: 1110 PG/ML (ref 0–124)
RBC # BLD: 3.63 M/UL (ref 4.2–5.9)
SODIUM BLD-SCNC: 138 MMOL/L (ref 136–145)
WBC # BLD: 8.1 K/UL (ref 4–11)

## 2019-07-08 PROCEDURE — 85025 COMPLETE CBC W/AUTO DIFF WBC: CPT

## 2019-07-08 PROCEDURE — 36415 COLL VENOUS BLD VENIPUNCTURE: CPT

## 2019-07-08 PROCEDURE — 83880 ASSAY OF NATRIURETIC PEPTIDE: CPT

## 2019-07-08 PROCEDURE — 80048 BASIC METABOLIC PNL TOTAL CA: CPT

## 2019-07-17 RX ORDER — CITALOPRAM 20 MG/1
TABLET ORAL
Qty: 30 TABLET | Refills: 1 | OUTPATIENT
Start: 2019-07-17

## 2019-07-17 RX ORDER — FINASTERIDE 5 MG/1
TABLET, FILM COATED ORAL
Qty: 30 TABLET | Refills: 1 | OUTPATIENT
Start: 2019-07-17

## 2019-07-17 RX ORDER — PANTOPRAZOLE SODIUM 40 MG/1
TABLET, DELAYED RELEASE ORAL
Qty: 30 TABLET | Refills: 1 | OUTPATIENT
Start: 2019-07-17

## 2019-07-17 RX ORDER — INSULIN GLARGINE 100 [IU]/ML
40 INJECTION, SOLUTION SUBCUTANEOUS NIGHTLY
Qty: 12 ML | Refills: 1 | OUTPATIENT
Start: 2019-07-17

## 2019-07-19 RX ORDER — CITALOPRAM 20 MG/1
TABLET ORAL
Qty: 30 TABLET | Refills: 0 | Status: SHIPPED | OUTPATIENT
Start: 2019-07-19 | End: 2019-09-16 | Stop reason: SDUPTHER

## 2019-07-19 RX ORDER — PANTOPRAZOLE SODIUM 40 MG/1
TABLET, DELAYED RELEASE ORAL
Qty: 30 TABLET | Refills: 0 | Status: SHIPPED | OUTPATIENT
Start: 2019-07-19 | End: 2019-09-16 | Stop reason: SDUPTHER

## 2019-07-19 RX ORDER — INSULIN GLARGINE 100 [IU]/ML
40 INJECTION, SOLUTION SUBCUTANEOUS NIGHTLY
Qty: 12 ML | Refills: 0 | Status: SHIPPED | OUTPATIENT
Start: 2019-07-19 | End: 2019-09-06 | Stop reason: SDUPTHER

## 2019-07-19 RX ORDER — FINASTERIDE 5 MG/1
TABLET, FILM COATED ORAL
Qty: 30 TABLET | Refills: 0 | Status: SHIPPED | OUTPATIENT
Start: 2019-07-19 | End: 2019-09-16 | Stop reason: SDUPTHER

## 2019-07-22 ENCOUNTER — HOSPITAL ENCOUNTER (OUTPATIENT)
Age: 64
Setting detail: SPECIMEN
Discharge: HOME | End: 2019-07-22
Payer: MEDICARE

## 2019-07-22 LAB
ANION GAP SERPL CALCULATED.3IONS-SCNC: 12 MMOL/L (ref 3–16)
BASOPHILS ABSOLUTE: 0.1 K/UL (ref 0–0.2)
BASOPHILS RELATIVE PERCENT: 0.8 %
BUN BLDV-MCNC: 74 MG/DL (ref 7–20)
CALCIUM SERPL-MCNC: 9.3 MG/DL (ref 8.3–10.6)
CHLORIDE BLD-SCNC: 98 MMOL/L (ref 99–110)
CO2: 29 MMOL/L (ref 21–32)
CREAT SERPL-MCNC: 2.1 MG/DL (ref 0.8–1.3)
EOSINOPHILS ABSOLUTE: 1 K/UL (ref 0–0.6)
EOSINOPHILS RELATIVE PERCENT: 10.2 %
GFR AFRICAN AMERICAN: 38
GFR NON-AFRICAN AMERICAN: 32
GLUCOSE BLD-MCNC: 159 MG/DL (ref 70–99)
HCT VFR BLD CALC: 34.5 % (ref 40.5–52.5)
HEMOGLOBIN: 11.2 G/DL (ref 13.5–17.5)
LYMPHOCYTES ABSOLUTE: 2.1 K/UL (ref 1–5.1)
LYMPHOCYTES RELATIVE PERCENT: 21.4 %
MCH RBC QN AUTO: 30 PG (ref 26–34)
MCHC RBC AUTO-ENTMCNC: 32.5 G/DL (ref 31–36)
MCV RBC AUTO: 92.4 FL (ref 80–100)
MONOCYTES ABSOLUTE: 0.7 K/UL (ref 0–1.3)
MONOCYTES RELATIVE PERCENT: 7.4 %
NEUTROPHILS ABSOLUTE: 5.9 K/UL (ref 1.7–7.7)
NEUTROPHILS RELATIVE PERCENT: 60.2 %
PDW BLD-RTO: 15.2 % (ref 12.4–15.4)
PLATELET # BLD: 149 K/UL (ref 135–450)
PMV BLD AUTO: 7.7 FL (ref 5–10.5)
POTASSIUM SERPL-SCNC: 5.2 MMOL/L (ref 3.5–5.1)
PRO-BNP: 985 PG/ML (ref 0–124)
RBC # BLD: 3.73 M/UL (ref 4.2–5.9)
SODIUM BLD-SCNC: 139 MMOL/L (ref 136–145)
WBC # BLD: 9.8 K/UL (ref 4–11)

## 2019-07-22 PROCEDURE — 83880 ASSAY OF NATRIURETIC PEPTIDE: CPT

## 2019-07-22 PROCEDURE — 80048 BASIC METABOLIC PNL TOTAL CA: CPT

## 2019-07-22 PROCEDURE — 36415 COLL VENOUS BLD VENIPUNCTURE: CPT

## 2019-07-22 PROCEDURE — 85025 COMPLETE CBC W/AUTO DIFF WBC: CPT

## 2019-07-24 ENCOUNTER — HOSPITAL ENCOUNTER (OUTPATIENT)
Dept: WOUND CARE | Age: 64
Discharge: HOME | End: 2019-07-24
Payer: MEDICARE

## 2019-07-24 VITALS
HEART RATE: 95 BPM | DIASTOLIC BLOOD PRESSURE: 77 MMHG | SYSTOLIC BLOOD PRESSURE: 143 MMHG | TEMPERATURE: 96.9 F | BODY MASS INDEX: 25.5 KG/M2 | WEIGHT: 188 LBS

## 2019-07-24 PROCEDURE — 11042 DBRDMT SUBQ TIS 1ST 20SQCM/<: CPT | Performed by: SURGERY

## 2019-07-24 PROCEDURE — 11045 DBRDMT SUBQ TISS EACH ADDL: CPT

## 2019-07-24 PROCEDURE — 11042 DBRDMT SUBQ TIS 1ST 20SQCM/<: CPT

## 2019-07-24 PROCEDURE — 11045 DBRDMT SUBQ TISS EACH ADDL: CPT | Performed by: SURGERY

## 2019-07-24 RX ORDER — LIDOCAINE 40 MG/G
CREAM TOPICAL ONCE
Status: DISCONTINUED | OUTPATIENT
Start: 2019-07-24 | End: 2019-07-25 | Stop reason: HOSPADM

## 2019-07-24 NOTE — PROGRESS NOTES
7/24/2019  9:27 AM   Black%Wound Bed 0 7/24/2019  9:27 AM   Purple%Wound Bed 0 7/24/2019  9:27 AM   Other%Wound Bed 0 7/24/2019  9:27 AM   Number of days: 238       Wound 03/27/19 Foot Left;Medial;Distal #18 (Active)   Wound Image   5/29/2019  9:41 AM   Wound Pressure Stage  3 7/24/2019  9:27 AM   Dressing/Treatment Dry Dressing 7/24/2019 10:37 AM   Wound Cleansed Rinsed/Irrigated with saline 7/24/2019  9:40 AM   Wound Length (cm) 3 cm 7/24/2019  9:27 AM   Wound Width (cm) 2.8 cm 7/24/2019  9:27 AM   Wound Depth (cm) 0.1 cm 7/24/2019  9:27 AM   Wound Surface Area (cm^2) 8.4 cm^2 7/24/2019  9:27 AM   Wound Volume (cm^3) 0.84 cm^3 7/24/2019  9:27 AM   Post-Procedure Length (cm) 3 cm 7/24/2019  9:40 AM   Post-Procedure Width (cm) 2.8 cm 7/24/2019  9:40 AM   Post-Procedure Depth (cm) 0.1 cm 7/24/2019  9:40 AM   Post-Procedure Surface Area (cm^2) 8.4 cm^2 7/24/2019  9:40 AM   Post-Procedure Volume (cm^3) 0.84 cm^3 7/24/2019  9:40 AM   Wound Assessment Bleeding 7/24/2019  9:40 AM   Drainage Amount Moderate 7/24/2019  9:40 AM   Drainage Description Serosanguinous 7/24/2019  9:27 AM   Odor None 7/3/2019  9:14 AM   Joanne-wound Assessment Dry 7/24/2019  9:27 AM   [place]%Wound Bed 0 7/24/2019  9:27 AM   Red%Wound Bed 100 7/24/2019  9:27 AM   Yellow%Wound Bed 0 7/24/2019  9:27 AM   Black%Wound Bed 0 7/24/2019  9:27 AM   Purple%Wound Bed 0 7/24/2019  9:27 AM   Other%Wound Bed 0 7/24/2019  9:27 AM   Number of days: 119       Total Surface Area Debrided:  23.78 sq cm     Bleeding:  Minimal    Hemostasis Achieved:  by pressure    Procedural Pain:  0  / 10     Post Procedural Pain:  0 / 10     Response to treatment:  Well tolerated by patient. Assessment:     Wound looks stable. (improved, worse or stable)    Patient tolerated procedure well and was given proper instruction. The nature of the patient's condition was explained in depth.  The patient was informed that their compliance to the treatment plan is paramount to successful healing and prevention of further ulceration and/or infection       Plan:     Treatment Plan: With each dressing change, rinse wounds with 0.9% Saline. (May use wound wash or soft contact solution. Both can be purchased at a local drug store). If unable to obtain saline, may use a gentle soap and water.     Dressing care: Right knee- Endoform, dry dressing- change daily. Left foot, Coccyx- Santyl, moist to dry, dry dressing- change daily. Keep pillow between knees when lying on you side. Rotate position frequently. Drink Glucerna. Continue to use the new low air loss mattress. Place pillow between legs to try to avoid pressure to your knee. Derick Banda 6  Jay Bangura MD, FACS  7/24/2019  6:53 PM

## 2019-07-26 ENCOUNTER — TELEPHONE (OUTPATIENT)
Dept: CARDIOLOGY CLINIC | Age: 64
End: 2019-07-26

## 2019-08-05 ENCOUNTER — HOSPITAL ENCOUNTER (OUTPATIENT)
Age: 64
Setting detail: SPECIMEN
Discharge: HOME | End: 2019-08-05
Payer: MEDICARE

## 2019-08-05 LAB
ANION GAP SERPL CALCULATED.3IONS-SCNC: 13 MMOL/L (ref 3–16)
BASOPHILS ABSOLUTE: 0.1 K/UL (ref 0–0.2)
BASOPHILS RELATIVE PERCENT: 0.6 %
BUN BLDV-MCNC: 74 MG/DL (ref 7–20)
CALCIUM SERPL-MCNC: 9.1 MG/DL (ref 8.3–10.6)
CHLORIDE BLD-SCNC: 94 MMOL/L (ref 99–110)
CO2: 28 MMOL/L (ref 21–32)
CREAT SERPL-MCNC: 1.9 MG/DL (ref 0.8–1.3)
EOSINOPHILS ABSOLUTE: 0.7 K/UL (ref 0–0.6)
EOSINOPHILS RELATIVE PERCENT: 6.5 %
GFR AFRICAN AMERICAN: 43
GFR NON-AFRICAN AMERICAN: 36
GLUCOSE BLD-MCNC: 218 MG/DL (ref 70–99)
HCT VFR BLD CALC: 35.7 % (ref 40.5–52.5)
HEMOGLOBIN: 11.8 G/DL (ref 13.5–17.5)
LYMPHOCYTES ABSOLUTE: 1.8 K/UL (ref 1–5.1)
LYMPHOCYTES RELATIVE PERCENT: 16.5 %
MCH RBC QN AUTO: 30 PG (ref 26–34)
MCHC RBC AUTO-ENTMCNC: 33 G/DL (ref 31–36)
MCV RBC AUTO: 90.9 FL (ref 80–100)
MONOCYTES ABSOLUTE: 0.7 K/UL (ref 0–1.3)
MONOCYTES RELATIVE PERCENT: 6.9 %
NEUTROPHILS ABSOLUTE: 7.5 K/UL (ref 1.7–7.7)
NEUTROPHILS RELATIVE PERCENT: 69.5 %
PDW BLD-RTO: 15.2 % (ref 12.4–15.4)
PLATELET # BLD: 158 K/UL (ref 135–450)
PMV BLD AUTO: 7.6 FL (ref 5–10.5)
POTASSIUM SERPL-SCNC: 5 MMOL/L (ref 3.5–5.1)
PRO-BNP: 759 PG/ML (ref 0–124)
RBC # BLD: 3.93 M/UL (ref 4.2–5.9)
SODIUM BLD-SCNC: 135 MMOL/L (ref 136–145)
WBC # BLD: 10.8 K/UL (ref 4–11)

## 2019-08-05 PROCEDURE — 85025 COMPLETE CBC W/AUTO DIFF WBC: CPT

## 2019-08-05 PROCEDURE — 36415 COLL VENOUS BLD VENIPUNCTURE: CPT

## 2019-08-05 PROCEDURE — 80048 BASIC METABOLIC PNL TOTAL CA: CPT

## 2019-08-05 PROCEDURE — 83880 ASSAY OF NATRIURETIC PEPTIDE: CPT

## 2019-08-07 ENCOUNTER — HOSPITAL ENCOUNTER (OUTPATIENT)
Dept: WOUND CARE | Age: 64
Discharge: HOME | End: 2019-08-07
Payer: MEDICARE

## 2019-08-07 VITALS
TEMPERATURE: 97.3 F | DIASTOLIC BLOOD PRESSURE: 74 MMHG | BODY MASS INDEX: 25.5 KG/M2 | HEART RATE: 101 BPM | SYSTOLIC BLOOD PRESSURE: 134 MMHG | WEIGHT: 188 LBS

## 2019-08-07 PROCEDURE — 11042 DBRDMT SUBQ TIS 1ST 20SQCM/<: CPT

## 2019-08-07 PROCEDURE — 11042 DBRDMT SUBQ TIS 1ST 20SQCM/<: CPT | Performed by: SURGERY

## 2019-08-07 RX ORDER — LIDOCAINE 40 MG/G
CREAM TOPICAL ONCE
Status: DISCONTINUED | OUTPATIENT
Start: 2019-08-07 | End: 2019-08-08 | Stop reason: HOSPADM

## 2019-08-07 NOTE — PLAN OF CARE
Discharge instructions given. Patient verbalized understanding. Return to [address] [place] [address],[address] in 2 weeks.

## 2019-08-07 NOTE — PROGRESS NOTES
Yellow%Wound Bed 0 8/7/2019  9:59 AM   Black%Wound Bed 0 8/7/2019  9:59 AM   Purple%Wound Bed 00 8/7/2019  9:59 AM   Other%Wound Bed 0 8/7/2019  9:59 AM   Number of days: 252       Wound 03/27/19 Foot Left;Medial;Distal #18 (Active)   Wound Image   5/29/2019  9:41 AM   Wound Pressure Stage  3 8/7/2019  9:51 AM   Dressing/Treatment Dry Dressing 7/24/2019 10:37 AM   Wound Cleansed Rinsed/Irrigated with saline 8/7/2019 10:19 AM   Wound Length (cm) 2.7 cm 8/7/2019  9:51 AM   Wound Width (cm) 2 cm 8/7/2019  9:51 AM   Wound Depth (cm) 0.1 cm 8/7/2019  9:51 AM   Wound Surface Area (cm^2) 5.4 cm^2 8/7/2019  9:51 AM   Wound Volume (cm^3) 0.54 cm^3 8/7/2019  9:51 AM   Post-Procedure Length (cm) 2.7 cm 8/7/2019 10:19 AM   Post-Procedure Width (cm) 2 cm 8/7/2019 10:19 AM   Post-Procedure Depth (cm) 0.1 cm 8/7/2019 10:19 AM   Post-Procedure Surface Area (cm^2) 5.4 cm^2 8/7/2019 10:19 AM   Post-Procedure Volume (cm^3) 0.54 cm^3 8/7/2019 10:19 AM   Wound Assessment Bleeding 8/7/2019 10:19 AM   Drainage Amount Moderate 8/7/2019 10:19 AM   Drainage Description Serosanguinous 8/7/2019  9:51 AM   Odor None 7/3/2019  9:14 AM   Joanne-wound Assessment Ecchymosis 8/7/2019  9:51 AM   [place]%Wound Bed 0 8/7/2019  9:51 AM   Red%Wound Bed 100 8/7/2019  9:51 AM   Yellow%Wound Bed 0 8/7/2019  9:51 AM   Black%Wound Bed 0 8/7/2019  9:51 AM   Purple%Wound Bed 0 8/7/2019  9:51 AM   Other%Wound Bed 0 8/7/2019  9:51 AM   Number of days: 133       Wound 08/07/19 Foot Left;Dorsal #1 (Active)   Wound Image   8/7/2019  9:59 AM   Wound Pressure Stage  3 8/7/2019  9:59 AM   Wound Cleansed Rinsed/Irrigated with saline 8/7/2019 10:19 AM   Wound Length (cm) 1.8 cm 8/7/2019  9:59 AM   Wound Width (cm) 2 cm 8/7/2019  9:59 AM   Wound Depth (cm) 0.1 cm 8/7/2019  9:59 AM   Wound Surface Area (cm^2) 3.6 cm^2 8/7/2019  9:59 AM   Wound Volume (cm^3) 0.36 cm^3 8/7/2019  9:59 AM   Post-Procedure Length (cm) 1.8 cm 8/7/2019 10:19 AM   Post-Procedure Width (cm) 2 cm

## 2019-08-16 ENCOUNTER — TELEPHONE (OUTPATIENT)
Dept: INTERNAL MEDICINE CLINIC | Age: 64
End: 2019-08-16

## 2019-08-16 NOTE — TELEPHONE ENCOUNTER
Ayaka Gloss with [employer] requesting verbal order for Re certification of home care. Verbal given.

## 2019-08-16 NOTE — PROGRESS NOTES
Tobacco Use    Smoking status: Former Smoker     Years: 15.00     Last attempt to quit: 1982     Years since quittin.2    Smokeless tobacco: Never Used   Substance Use Topics    Alcohol use: No     Review of Systems:   · Constitutional: there has been no unanticipated weight loss. There's been no change in energy level, sleep pattern, or activity level. · Eyes: No visual changes or diplopia. No scleral icterus. · ENT: No Headaches, hearing loss or vertigo. No mouth sores or sore throat. · Cardiovascular: Reviewed in HPI  · Respiratory: No cough or wheezing, no sputum production. No hematemesis. · Gastrointestinal: No abdominal pain, appetite loss, blood in stools. No change in bowel or bladder habits. · Genitourinary: No dysuria, trouble voiding, or hematuria. · Musculoskeletal:  No gait disturbance, weakness or joint complaints. · Integumentary: No rash or pruritis. · Neurological: No headache, diplopia, change in muscle strength, numbness or tingling. No change in gait, balance, coordination, mood, affect, memory, mentation, behavior. · Psychiatric: No anxiety, no depression. · Endocrine: No malaise, fatigue or temperature intolerance. No excessive thirst, fluid intake, or urination. No tremor. · Hematologic/Lymphatic: No abnormal bruising or bleeding, blood clots or swollen lymph nodes. · Allergic/Immunologic: No nasal congestion or hives. ·     Physical Exam:  Vitals:    19 1029   BP: (!) 108/56   Pulse: 89   SpO2: 98%   Weight: 200 lb (90.7 kg)     Body mass index is 27.12 kg/m².      Wt Readings from Last 3 Encounters:   19 200 lb (90.7 kg)   19 188 lb (85.3 kg)   19 188 lb (85.3 kg)     BP Readings from Last 3 Encounters:   19 (!) 108/56   19 134/74   19 (!) 143/77        Constitutional and General Appearance:   WD/WN in NAD, paralyzed legs in a wheel chair  HEENT:  NC/AT  DANIEL  No problems with hearing  Respiratory:  · Normal

## 2019-08-19 ENCOUNTER — HOSPITAL ENCOUNTER (OUTPATIENT)
Age: 64
Setting detail: SPECIMEN
Discharge: HOME | End: 2019-08-19
Payer: MEDICARE

## 2019-08-19 LAB
ANION GAP SERPL CALCULATED.3IONS-SCNC: 11 MMOL/L (ref 3–16)
BASOPHILS ABSOLUTE: 0.1 K/UL (ref 0–0.2)
BASOPHILS RELATIVE PERCENT: 0.5 %
BUN BLDV-MCNC: 83 MG/DL (ref 7–20)
CALCIUM SERPL-MCNC: 9.1 MG/DL (ref 8.3–10.6)
CHLORIDE BLD-SCNC: 98 MMOL/L (ref 99–110)
CO2: 30 MMOL/L (ref 21–32)
CREAT SERPL-MCNC: 2.2 MG/DL (ref 0.8–1.3)
EOSINOPHILS ABSOLUTE: 0.6 K/UL (ref 0–0.6)
EOSINOPHILS RELATIVE PERCENT: 5.8 %
GFR AFRICAN AMERICAN: 36
GFR NON-AFRICAN AMERICAN: 30
GLUCOSE BLD-MCNC: 193 MG/DL (ref 70–99)
HCT VFR BLD CALC: 36.1 % (ref 40.5–52.5)
HEMOGLOBIN: 11.8 G/DL (ref 13.5–17.5)
LYMPHOCYTES ABSOLUTE: 1.8 K/UL (ref 1–5.1)
LYMPHOCYTES RELATIVE PERCENT: 17.7 %
MCH RBC QN AUTO: 29.8 PG (ref 26–34)
MCHC RBC AUTO-ENTMCNC: 32.5 G/DL (ref 31–36)
MCV RBC AUTO: 91.5 FL (ref 80–100)
MONOCYTES ABSOLUTE: 0.7 K/UL (ref 0–1.3)
MONOCYTES RELATIVE PERCENT: 6.5 %
NEUTROPHILS ABSOLUTE: 7.2 K/UL (ref 1.7–7.7)
NEUTROPHILS RELATIVE PERCENT: 69.5 %
PDW BLD-RTO: 15.6 % (ref 12.4–15.4)
PLATELET # BLD: 168 K/UL (ref 135–450)
PMV BLD AUTO: 7.9 FL (ref 5–10.5)
POTASSIUM SERPL-SCNC: 4.8 MMOL/L (ref 3.5–5.1)
PRO-BNP: 664 PG/ML (ref 0–124)
RBC # BLD: 3.95 M/UL (ref 4.2–5.9)
SODIUM BLD-SCNC: 139 MMOL/L (ref 136–145)
WBC # BLD: 10.4 K/UL (ref 4–11)

## 2019-08-19 PROCEDURE — 80048 BASIC METABOLIC PNL TOTAL CA: CPT

## 2019-08-19 PROCEDURE — 83880 ASSAY OF NATRIURETIC PEPTIDE: CPT

## 2019-08-19 PROCEDURE — 36415 COLL VENOUS BLD VENIPUNCTURE: CPT

## 2019-08-19 PROCEDURE — 85025 COMPLETE CBC W/AUTO DIFF WBC: CPT

## 2019-08-20 ENCOUNTER — OFFICE VISIT (OUTPATIENT)
Dept: CARDIOLOGY CLINIC | Age: 64
End: 2019-08-20
Payer: MEDICARE

## 2019-08-20 VITALS
SYSTOLIC BLOOD PRESSURE: 108 MMHG | HEART RATE: 89 BPM | WEIGHT: 200 LBS | BODY MASS INDEX: 27.12 KG/M2 | DIASTOLIC BLOOD PRESSURE: 56 MMHG | OXYGEN SATURATION: 98 %

## 2019-08-20 DIAGNOSIS — I25.10 CORONARY ARTERY DISEASE INVOLVING NATIVE CORONARY ARTERY OF NATIVE HEART WITHOUT ANGINA PECTORIS: ICD-10-CM

## 2019-08-20 DIAGNOSIS — N18.30 CKD (CHRONIC KIDNEY DISEASE) STAGE 3, GFR 30-59 ML/MIN (HCC): ICD-10-CM

## 2019-08-20 DIAGNOSIS — I50.22 CHRONIC SYSTOLIC HEART FAILURE (HCC): Primary | ICD-10-CM

## 2019-08-20 DIAGNOSIS — I10 ESSENTIAL HYPERTENSION: ICD-10-CM

## 2019-08-20 DIAGNOSIS — G82.20 PARAPLEGIA (HCC): ICD-10-CM

## 2019-08-20 DIAGNOSIS — R06.02 SHORTNESS OF BREATH: ICD-10-CM

## 2019-08-20 DIAGNOSIS — I42.8 NON-ISCHEMIC CARDIOMYOPATHY (HCC): ICD-10-CM

## 2019-08-20 PROCEDURE — G8598 ASA/ANTIPLAT THER USED: HCPCS | Performed by: INTERNAL MEDICINE

## 2019-08-20 PROCEDURE — G8427 DOCREV CUR MEDS BY ELIG CLIN: HCPCS | Performed by: INTERNAL MEDICINE

## 2019-08-20 PROCEDURE — 1123F ACP DISCUSS/DSCN MKR DOCD: CPT | Performed by: INTERNAL MEDICINE

## 2019-08-20 PROCEDURE — 4040F PNEUMOC VAC/ADMIN/RCVD: CPT | Performed by: INTERNAL MEDICINE

## 2019-08-20 PROCEDURE — 3017F COLORECTAL CA SCREEN DOC REV: CPT | Performed by: INTERNAL MEDICINE

## 2019-08-20 PROCEDURE — 99214 OFFICE O/P EST MOD 30 MIN: CPT | Performed by: INTERNAL MEDICINE

## 2019-08-20 PROCEDURE — 1036F TOBACCO NON-USER: CPT | Performed by: INTERNAL MEDICINE

## 2019-08-20 PROCEDURE — G8419 CALC BMI OUT NRM PARAM NOF/U: HCPCS | Performed by: INTERNAL MEDICINE

## 2019-08-20 RX ORDER — GABAPENTIN 100 MG/1
100 CAPSULE ORAL 3 TIMES DAILY
COMMUNITY
End: 2019-11-20

## 2019-08-22 RX ORDER — IPRATROPIUM BROMIDE AND ALBUTEROL SULFATE 2.5; .5 MG/3ML; MG/3ML
3 SOLUTION RESPIRATORY (INHALATION) EVERY 4 HOURS PRN
Qty: 360 ML | Refills: 1 | Status: SHIPPED | OUTPATIENT
Start: 2019-08-22 | End: 2019-09-19 | Stop reason: SDUPTHER

## 2019-08-22 RX ORDER — TORSEMIDE 20 MG/1
TABLET ORAL
Qty: 30 TABLET | Refills: 11 | Status: SHIPPED | OUTPATIENT
Start: 2019-08-22 | End: 2019-11-20

## 2019-08-26 ENCOUNTER — TELEPHONE (OUTPATIENT)
Dept: INTERNAL MEDICINE CLINIC | Age: 64
End: 2019-08-26

## 2019-08-28 ENCOUNTER — HOSPITAL ENCOUNTER (OUTPATIENT)
Dept: WOUND CARE | Age: 64
Discharge: HOME | End: 2019-08-28
Payer: MEDICARE

## 2019-08-28 VITALS
TEMPERATURE: 98 F | SYSTOLIC BLOOD PRESSURE: 98 MMHG | WEIGHT: 200 LBS | BODY MASS INDEX: 27.12 KG/M2 | HEART RATE: 93 BPM | DIASTOLIC BLOOD PRESSURE: 55 MMHG

## 2019-08-28 PROCEDURE — 11042 DBRDMT SUBQ TIS 1ST 20SQCM/<: CPT

## 2019-08-28 PROCEDURE — 11042 DBRDMT SUBQ TIS 1ST 20SQCM/<: CPT | Performed by: SURGERY

## 2019-08-28 RX ORDER — LIDOCAINE 50 MG/G
OINTMENT TOPICAL ONCE
Status: DISCONTINUED | OUTPATIENT
Start: 2019-08-28 | End: 2019-08-29 | Stop reason: HOSPADM

## 2019-08-28 NOTE — PROGRESS NOTES
[address] Procedure Note    Nettie Foley New  AGE: 70 y.o. GENDER: male    : 1955  TODAY'S DATE: 2019    Chief Complaint   Patient presents with    Wound Check     Left foot ,Right knee, buttock  F/U        History of Present Illness     Davida Muniz is a 70 y.o. male who presents today for wound evaluation. History of Wound: pressure wound located on the sacrum, right knee, left foot  Wound Pain:  none  Severity:  0 / 10   Wound Type:  pressure  Modifying Factors:  chronic pressure, decreased mobility, arterial insufficiency and paraplegia  Associated Signs/Symptoms:  numbness    Procedure Note:     Performed by: Amie Doyle MD    Consent obtained: Yes    Time out taken: Yes    Pain Control: Anesthetic  Anesthetic: Other (comment)(4% lidocaine cream )     Debridement: Excisional Debridement    Using curette the wound was sharply debrided    down through and including the removal of epidermis, dermis and subcutaneous tissue.         Devitalized Tissue Debrided: fibrin, biofilm and slough    Pre Debridement Measurements:  Are located in the Wound Documentation Flow Sheet     Wound #: 1, 11, 12 and 18     Post  Debridement Measurements:  Wound 18 Knee Right #12 (Active)   Wound Image   2019  9:41 AM   Wound Pressure Stage  3 2019 10:02 AM   Dressing/Treatment Alginate with Ag;Dry Dressing 2019 10:32 AM   Wound Cleansed Rinsed/Irrigated with saline 2019 10:32 AM   Wound Length (cm) 1.3 cm 2019 10:02 AM   Wound Width (cm) 2.2 cm 2019 10:02 AM   Wound Depth (cm) 0.1 cm 2019 10:02 AM   Wound Surface Area (cm^2) 2.86 cm^2 2019 10:02 AM   Change in Wound Size % (l*w) 68.22 2019 10:02 AM   Wound Volume (cm^3) 0.29 cm^3 2019 10:02 AM   Post-Procedure Length (cm) 1.3 cm 2019 10:32 AM   Post-Procedure Width (cm) 2.2 cm 2019 10:32 AM   Post-Procedure Depth (cm) 0.1 cm 2019 10:32 AM   Post-Procedure Surface Area (cm^2) 2.86 cm^2 8/28/2019 10:32 AM   Post-Procedure Volume (cm^3) 0.29 cm^3 8/28/2019 10:32 AM   Wound Assessment Bleeding 8/28/2019 10:32 AM   Drainage Amount Moderate 8/28/2019 10:32 AM   Drainage Description Serosanguinous 8/28/2019 10:02 AM   Odor None 8/28/2019 10:02 AM   Margins Defined edges 1/2/2019 10:48 AM   Joanne-wound Assessment Dry 8/28/2019 10:02 AM   [place]%Wound Bed 0 8/28/2019 10:02 AM   Red%Wound Bed 100 8/28/2019 10:02 AM   Yellow%Wound Bed 0 8/28/2019 10:02 AM   Black%Wound Bed 0 8/28/2019 10:02 AM   Purple%Wound Bed 0 8/28/2019 10:02 AM   Other%Wound Bed 0 8/28/2019 10:02 AM   Number of days: 273       Wound 11/28/18 Coccyx Mid #11 (Active)   Wound Image   5/29/2019  9:41 AM   Wound Pressure Stage  3 8/28/2019 10:02 AM   Dressing/Treatment Dry Dressing;Santyl 8/28/2019 10:32 AM   Wound Cleansed Rinsed/Irrigated with saline 8/28/2019 10:32 AM   Wound Length (cm) 2.8 cm 8/28/2019 10:02 AM   Wound Width (cm) 0.8 cm 8/28/2019 10:02 AM   Wound Depth (cm) 0.1 cm 8/28/2019 10:02 AM   Wound Surface Area (cm^2) 2.24 cm^2 8/28/2019 10:02 AM   Change in Wound Size % (l*w) 88.94 8/28/2019 10:02 AM   Wound Volume (cm^3) 0.22 cm^3 8/28/2019 10:02 AM   Wound Healing % 99 8/28/2019 10:02 AM   Post-Procedure Length (cm) 2.8 cm 8/28/2019 10:32 AM   Post-Procedure Width (cm) 0.8 cm 8/28/2019 10:32 AM   Post-Procedure Depth (cm) 0.1 cm 8/28/2019 10:32 AM   Post-Procedure Surface Area (cm^2) 2.24 cm^2 8/28/2019 10:32 AM   Post-Procedure Volume (cm^3) 0.22 cm^3 8/28/2019 10:32 AM   Undermining Starts ___ O'Clock 1200 4/10/2019  9:40 AM   Undermining Ends___ O'Clock 0400 4/10/2019  9:40 AM   Undermining Maxium Distance (cm) 1 4/10/2019  9:40 AM   Wound Assessment Bleeding 8/28/2019 10:32 AM   Drainage Amount Moderate 8/28/2019 10:32 AM   Drainage Description Serosanguinous 8/28/2019 10:02 AM   Odor None 8/28/2019 10:02 AM   Joanne-wound Assessment White 8/28/2019 10:02 AM   [place]%Wound Bed 100 8/28/2019 10:02 AM   Red%Wound Bed 0 8/28/2019 10:02 AM   Yellow%Wound Bed 0 8/28/2019 10:02 AM   Black%Wound Bed 0 8/28/2019 10:02 AM   Purple%Wound Bed 0 8/28/2019 10:02 AM   Other%Wound Bed 0 8/28/2019 10:02 AM   Number of days: 273       Wound 03/27/19 Foot Left;Medial;Distal #18 (Active)   Wound Image   5/29/2019  9:41 AM   Wound Pressure Stage  3 8/28/2019 10:02 AM   Dressing/Treatment Alginate with Ag;Dry Dressing 8/28/2019 10:32 AM   Wound Cleansed Rinsed/Irrigated with saline 8/28/2019 10:32 AM   Wound Length (cm) 1.8 cm 8/28/2019 10:02 AM   Wound Width (cm) 1.5 cm 8/28/2019 10:02 AM   Wound Depth (cm) 0.1 cm 8/28/2019 10:02 AM   Wound Surface Area (cm^2) 2.7 cm^2 8/28/2019 10:02 AM   Wound Volume (cm^3) 0.27 cm^3 8/28/2019 10:02 AM   Post-Procedure Length (cm) 1.8 cm 8/28/2019 10:32 AM   Post-Procedure Width (cm) 1.5 cm 8/28/2019 10:32 AM   Post-Procedure Depth (cm) 0.1 cm 8/28/2019 10:32 AM   Post-Procedure Surface Area (cm^2) 2.7 cm^2 8/28/2019 10:32 AM   Post-Procedure Volume (cm^3) 0.27 cm^3 8/28/2019 10:32 AM   Wound Assessment Bleeding 8/28/2019 10:32 AM   Drainage Amount Moderate 8/28/2019 10:32 AM   Drainage Description Serosanguinous 8/7/2019  9:51 AM   Odor None 7/3/2019  9:14 AM   Joanne-wound Assessment Ecchymosis 8/7/2019  9:51 AM   [place]%Wound Bed 0 8/28/2019 10:02 AM   Red%Wound Bed 100 8/28/2019 10:02 AM   Yellow%Wound Bed 0 8/28/2019 10:02 AM   Black%Wound Bed 0 8/28/2019 10:02 AM   Purple%Wound Bed 0 8/28/2019 10:02 AM   Other%Wound Bed 0 8/28/2019 10:02 AM   Number of days: 154       Wound 08/07/19 Foot Left;Dorsal #1 (Active)   Wound Image   8/7/2019  9:59 AM   Wound Pressure Stage  3 8/28/2019 10:02 AM   Dressing/Treatment Dry Dressing;Silver Dressing 8/28/2019 10:32 AM   Wound Cleansed Rinsed/Irrigated with saline 8/28/2019 10:32 AM   Wound Length (cm) 1.2 cm 8/28/2019 10:02 AM   Wound Width (cm) 0.5 cm 8/28/2019 10:02 AM   Wound Depth (cm) 0.1 cm 8/28/2019 10:02 AM   Wound Surface Area (cm^2) 0.6 cm^2 8/28/2019 10:02 AM Change in Wound Size % (l*w) 83.33 8/28/2019 10:02 AM   Wound Volume (cm^3) 0.06 cm^3 8/28/2019 10:02 AM   Wound Healing % 83 8/28/2019 10:02 AM   Post-Procedure Length (cm) 1.2 cm 8/28/2019 10:32 AM   Post-Procedure Width (cm) 0.5 cm 8/28/2019 10:32 AM   Post-Procedure Depth (cm) 0.1 cm 8/28/2019 10:32 AM   Post-Procedure Surface Area (cm^2) 0.6 cm^2 8/28/2019 10:32 AM   Post-Procedure Volume (cm^3) 0.06 cm^3 8/28/2019 10:32 AM   Wound Assessment Bleeding 8/28/2019 10:32 AM   Drainage Amount Moderate 8/28/2019 10:32 AM   Drainage Description Serosanguinous 8/28/2019 10:02 AM   [place]%Wound Bed 0 8/28/2019 10:02 AM   Red%Wound Bed 100 8/28/2019 10:02 AM   Yellow%Wound Bed 0 8/28/2019 10:02 AM   Black%Wound Bed 0 8/28/2019 10:02 AM   Purple%Wound Bed 0 8/28/2019 10:02 AM   Other%Wound Bed 0 8/28/2019 10:02 AM   Number of days: 21       Total Surface Area Debrided:  8.4 sq cm     Bleeding:  Minimal    Hemostasis Achieved:  by pressure    Procedural Pain:  0  / 10     Post Procedural Pain:  0 / 10     Response to treatment:  Well tolerated by patient. Assessment:     Wound looks improved. (improved, worse or stable)    Patient tolerated procedure well and was given proper instruction. The nature of the patient's condition was explained in depth. The patient was informed that their compliance to the treatment plan is paramount to successful healing and prevention of further ulceration and/or infection       Plan:     Treatment Plan: With each dressing change, rinse wounds with 0.9% Saline. (May use wound wash or soft contact solution. Both can be purchased at a local drug store). If unable to obtain saline, may use a gentle soap and water.     Dressing care: Right knee, Left foot- Alginate ag, dry dressing- change daily. Coccyx- Santyl, moist to dry, dry dressing- change daily. Keep pillow between knees when lying on you side. Rotate position frequently. Drink Glucerna.  Continue to use the new low air loss

## 2019-09-02 ENCOUNTER — HOSPITAL ENCOUNTER (OUTPATIENT)
Age: 64
Discharge: HOME | End: 2019-09-02
Payer: MEDICARE

## 2019-09-02 LAB
ANION GAP SERPL CALCULATED.3IONS-SCNC: 11 MMOL/L (ref 3–16)
BUN BLDV-MCNC: 58 MG/DL (ref 7–20)
CALCIUM SERPL-MCNC: 9.1 MG/DL (ref 8.3–10.6)
CHLORIDE BLD-SCNC: 100 MMOL/L (ref 99–110)
CO2: 28 MMOL/L (ref 21–32)
CREAT SERPL-MCNC: 1.7 MG/DL (ref 0.8–1.3)
GFR AFRICAN AMERICAN: 49
GFR NON-AFRICAN AMERICAN: 41
GLUCOSE BLD-MCNC: 178 MG/DL (ref 70–99)
POTASSIUM SERPL-SCNC: 4.8 MMOL/L (ref 3.5–5.1)
PRO-BNP: 954 PG/ML (ref 0–124)
SODIUM BLD-SCNC: 139 MMOL/L (ref 136–145)

## 2019-09-02 PROCEDURE — 83880 ASSAY OF NATRIURETIC PEPTIDE: CPT

## 2019-09-02 PROCEDURE — 80048 BASIC METABOLIC PNL TOTAL CA: CPT

## 2019-09-02 PROCEDURE — 36415 COLL VENOUS BLD VENIPUNCTURE: CPT

## 2019-09-06 RX ORDER — INSULIN GLARGINE 100 [IU]/ML
40 INJECTION, SOLUTION SUBCUTANEOUS NIGHTLY
Qty: 12 ML | Refills: 0 | Status: SHIPPED | OUTPATIENT
Start: 2019-09-06 | End: 2019-10-10 | Stop reason: SDUPTHER

## 2019-09-10 ENCOUNTER — TELEPHONE (OUTPATIENT)
Dept: CARDIOLOGY CLINIC | Age: 64
End: 2019-09-10

## 2019-09-10 NOTE — TELEPHONE ENCOUNTER
Notified pt's wife of MARIEL instructions. She verbalized understanding and agreed to increase the dose.

## 2019-09-10 NOTE — TELEPHONE ENCOUNTER
Pt saw Dr. Saeed Velazquez today and pt had some swelling and suggested pt to increase torsemide from 1/2 to whole pill. Please call to advise.

## 2019-09-13 ENCOUNTER — OFFICE VISIT (OUTPATIENT)
Dept: INTERNAL MEDICINE CLINIC | Age: 64
End: 2019-09-13
Payer: MEDICARE

## 2019-09-13 VITALS — SYSTOLIC BLOOD PRESSURE: 130 MMHG | HEART RATE: 94 BPM | DIASTOLIC BLOOD PRESSURE: 60 MMHG | OXYGEN SATURATION: 98 %

## 2019-09-13 DIAGNOSIS — I42.8 NON-ISCHEMIC CARDIOMYOPATHY (HCC): Chronic | ICD-10-CM

## 2019-09-13 DIAGNOSIS — I70.235 ATHEROSCLEROSIS OF NATIVE ARTERIES OF RIGHT LEG WITH ULCERATION OF OTHER PART OF FOOT (HCC): ICD-10-CM

## 2019-09-13 DIAGNOSIS — M25.511 CHRONIC RIGHT SHOULDER PAIN: ICD-10-CM

## 2019-09-13 DIAGNOSIS — L89.893 PRESSURE ULCER OF RIGHT LEG, STAGE III (HCC): ICD-10-CM

## 2019-09-13 DIAGNOSIS — G89.29 CHRONIC RIGHT SHOULDER PAIN: ICD-10-CM

## 2019-09-13 DIAGNOSIS — N31.9 NEUROGENIC BLADDER: ICD-10-CM

## 2019-09-13 DIAGNOSIS — I10 ESSENTIAL HYPERTENSION: Chronic | ICD-10-CM

## 2019-09-13 DIAGNOSIS — I50.32 CHRONIC DIASTOLIC HEART FAILURE (HCC): ICD-10-CM

## 2019-09-13 DIAGNOSIS — L89.154 SACRAL DECUBITUS ULCER, STAGE IV (HCC): ICD-10-CM

## 2019-09-13 DIAGNOSIS — Z12.5 SPECIAL SCREENING FOR MALIGNANT NEOPLASM OF PROSTATE: ICD-10-CM

## 2019-09-13 DIAGNOSIS — N28.9 RENAL INSUFFICIENCY: Chronic | ICD-10-CM

## 2019-09-13 DIAGNOSIS — L89.893 DECUBITUS ULCER OF RIGHT KNEE, STAGE 3 (HCC): ICD-10-CM

## 2019-09-13 DIAGNOSIS — E78.2 MIXED HYPERLIPIDEMIA: Chronic | ICD-10-CM

## 2019-09-13 DIAGNOSIS — Z23 NEED FOR INFLUENZA VACCINATION: Primary | ICD-10-CM

## 2019-09-13 DIAGNOSIS — E11.621 DIABETIC ULCER OF RIGHT HEEL ASSOCIATED WITH TYPE 2 DIABETES MELLITUS, WITH FAT LAYER EXPOSED (HCC): ICD-10-CM

## 2019-09-13 DIAGNOSIS — N18.30 CKD (CHRONIC KIDNEY DISEASE) STAGE 3, GFR 30-59 ML/MIN (HCC): ICD-10-CM

## 2019-09-13 DIAGNOSIS — I25.10 CORONARY ARTERY DISEASE INVOLVING NATIVE CORONARY ARTERY OF NATIVE HEART WITHOUT ANGINA PECTORIS: Chronic | ICD-10-CM

## 2019-09-13 DIAGNOSIS — L89.613 DECUBITUS ULCER OF RIGHT HEEL, STAGE 3 (HCC): ICD-10-CM

## 2019-09-13 DIAGNOSIS — E11.65 UNCONTROLLED TYPE 2 DIABETES MELLITUS WITH HYPERGLYCEMIA (HCC): ICD-10-CM

## 2019-09-13 DIAGNOSIS — L89.154 DECUBITUS ULCER OF COCCYGEAL REGION, STAGE IV (HCC): ICD-10-CM

## 2019-09-13 DIAGNOSIS — Z93.1 S/P PERCUTANEOUS ENDOSCOPIC GASTROSTOMY (PEG) TUBE PLACEMENT (HCC): ICD-10-CM

## 2019-09-13 DIAGNOSIS — L97.412 DIABETIC ULCER OF RIGHT HEEL ASSOCIATED WITH TYPE 2 DIABETES MELLITUS, WITH FAT LAYER EXPOSED (HCC): ICD-10-CM

## 2019-09-13 PROBLEM — E87.1 HYPONATREMIA: Status: RESOLVED | Noted: 2017-01-03 | Resolved: 2019-09-13

## 2019-09-13 PROBLEM — R53.81 DEBILITY: Status: RESOLVED | Noted: 2018-12-07 | Resolved: 2019-09-13

## 2019-09-13 PROBLEM — D50.9 IRON DEFICIENCY ANEMIA: Status: RESOLVED | Noted: 2017-01-03 | Resolved: 2019-09-13

## 2019-09-13 PROBLEM — N18.9 ACUTE KIDNEY INJURY SUPERIMPOSED ON CHRONIC KIDNEY DISEASE (HCC): Status: RESOLVED | Noted: 2018-12-05 | Resolved: 2019-09-13

## 2019-09-13 PROBLEM — I50.33 ACUTE ON CHRONIC DIASTOLIC CHF (CONGESTIVE HEART FAILURE), NYHA CLASS 4 (HCC): Status: RESOLVED | Noted: 2018-12-05 | Resolved: 2019-09-13

## 2019-09-13 PROBLEM — J18.9 PNEUMONIA DUE TO ORGANISM: Status: RESOLVED | Noted: 2017-01-02 | Resolved: 2019-09-13

## 2019-09-13 PROBLEM — I25.2 HISTORY OF MYOCARDIAL INFARCTION: Status: RESOLVED | Noted: 2018-12-05 | Resolved: 2019-09-13

## 2019-09-13 PROBLEM — I50.33 ACUTE ON CHRONIC DIASTOLIC HEART FAILURE (HCC): Status: RESOLVED | Noted: 2019-01-28 | Resolved: 2019-09-13

## 2019-09-13 PROBLEM — N17.9 ACUTE KIDNEY INJURY SUPERIMPOSED ON CHRONIC KIDNEY DISEASE (HCC): Status: RESOLVED | Noted: 2018-12-05 | Resolved: 2019-09-13

## 2019-09-13 PROBLEM — R07.9 CHEST PAIN: Status: RESOLVED | Noted: 2017-02-06 | Resolved: 2019-09-13

## 2019-09-13 PROCEDURE — 3017F COLORECTAL CA SCREEN DOC REV: CPT | Performed by: INTERNAL MEDICINE

## 2019-09-13 PROCEDURE — G8598 ASA/ANTIPLAT THER USED: HCPCS | Performed by: INTERNAL MEDICINE

## 2019-09-13 PROCEDURE — 3046F HEMOGLOBIN A1C LEVEL >9.0%: CPT | Performed by: INTERNAL MEDICINE

## 2019-09-13 PROCEDURE — 1036F TOBACCO NON-USER: CPT | Performed by: INTERNAL MEDICINE

## 2019-09-13 PROCEDURE — 2022F DILAT RTA XM EVC RTNOPTHY: CPT | Performed by: INTERNAL MEDICINE

## 2019-09-13 PROCEDURE — G8427 DOCREV CUR MEDS BY ELIG CLIN: HCPCS | Performed by: INTERNAL MEDICINE

## 2019-09-13 PROCEDURE — 90653 IIV ADJUVANT VACCINE IM: CPT | Performed by: INTERNAL MEDICINE

## 2019-09-13 PROCEDURE — 1123F ACP DISCUSS/DSCN MKR DOCD: CPT | Performed by: INTERNAL MEDICINE

## 2019-09-13 PROCEDURE — G0008 ADMIN INFLUENZA VIRUS VAC: HCPCS | Performed by: INTERNAL MEDICINE

## 2019-09-13 PROCEDURE — 99215 OFFICE O/P EST HI 40 MIN: CPT | Performed by: INTERNAL MEDICINE

## 2019-09-13 PROCEDURE — 4040F PNEUMOC VAC/ADMIN/RCVD: CPT | Performed by: INTERNAL MEDICINE

## 2019-09-13 PROCEDURE — G8419 CALC BMI OUT NRM PARAM NOF/U: HCPCS | Performed by: INTERNAL MEDICINE

## 2019-09-13 ASSESSMENT — PATIENT HEALTH QUESTIONNAIRE - PHQ9
2. FEELING DOWN, DEPRESSED OR HOPELESS: 0
SUM OF ALL RESPONSES TO PHQ9 QUESTIONS 1 & 2: 0
1. LITTLE INTEREST OR PLEASURE IN DOING THINGS: 0
SUM OF ALL RESPONSES TO PHQ QUESTIONS 1-9: 0
SUM OF ALL RESPONSES TO PHQ QUESTIONS 1-9: 0

## 2019-09-13 ASSESSMENT — ENCOUNTER SYMPTOMS
SHORTNESS OF BREATH: 1
TROUBLE SWALLOWING: 0
GASTROINTESTINAL NEGATIVE: 1

## 2019-09-13 NOTE — PROGRESS NOTES
Systems   Constitutional: Negative for fever. HENT: Negative for trouble swallowing. Respiratory: Positive for shortness of breath. Cardiovascular: Negative for chest pain. Gastrointestinal: Negative. Genitourinary:        Catheter   Musculoskeletal: Positive for arthralgias (Especially right shoulder). Skin: Negative for rash and wound. Neurological: Negative for seizures. Psychiatric/Behavioral: Negative for dysphoric mood. He would feel much better if his right shoulder arthritis was attenuated       OBJECTIVE:    /60 (Site: Left Upper Arm, Position: Sitting, Cuff Size: Medium Adult)   Pulse 94   SpO2 98%      Physical Exam   Constitutional: He is oriented to person, place, and time. He appears well-developed and well-nourished. Wheelchair-bound   Eyes: EOM are normal. No scleral icterus. Neck: No JVD present. Carotid bruit is not present. Cardiovascular: Normal rate and regular rhythm. Exam reveals no gallop. Pulses:       Carotid pulses are 2+ on the right side, and 2+ on the left side. Radial pulses are 2+ on the right side, and 2+ on the left side. Pulmonary/Chest: No stridor. No respiratory distress. He has decreased breath sounds in the right lower field. He has no wheezes. He has no rhonchi. He has no rales. Abdominal: Soft. Bowel sounds are normal.   Musculoskeletal: He exhibits no edema. Neurological: He is alert and oriented to person, place, and time. Hemiparetic/paraplegic   Skin: He is not diaphoretic. There is erythema (Face). No pallor. Psychiatric: He has a normal mood and affect. His speech is normal. He is not withdrawn. Cognition and memory are normal. He does not exhibit a depressed mood. He expresses no suicidal ideation. ASSESSMENT:       Encounter Diagnoses   Name Primary?     Need for influenza vaccination Yes    Atherosclerosis of native arteries of right leg with ulceration of other part of foot ([address]     Sacral decubitus ulcer, stage IV ([address]     Decubitus ulcer of coccygeal region, stage IV ([address]     Decubitus ulcer of right heel, stage 3 ([facility])     Pressure ulcer of right leg, stage III (HCC)     Decubitus ulcer of right knee, stage 3 ([address]     Diabetic ulcer of right heel associated with type 2 diabetes mellitus, with fat layer exposed ([address]     Uncontrolled type 2 diabetes mellitus with hyperglycemia ([facility])     S/P percutaneous endoscopic gastrostomy (PEG) tube placement ([address]     Essential hypertension     Mixed hyperlipidemia     Renal insufficiency     Special screening for malignant neoplasm of prostate     Coronary artery disease involving native coronary artery of native heart without angina pectoris     Non-ischemic cardiomyopathy ([facility])     Chronic right shoulder pain     Chronic diastolic heart failure ([facility])     Neurogenic bladder     CKD (chronic kidney disease) stage 3, GFR 30-59 ml/min ([facility])        Coronary artery disease involving native coronary artery of native heart without angina pectoris  Per cardiology-status post stenting    Non-ischemic cardiomyopathy ([facility])  Last EF 67%, chronic diastolic dysfunction    Diabetes type 2, uncontrolled ([address]  Patient on Lantus 40 units. Check labs with Z2d-O1n has not been done in several months. Essential hypertension  BP okay    Hyperlipidemia  Pravastatin 40 mg-lab soon    Chronic right shoulder pain  History bilateral rotator cuff injury. Status post injection in the past to the left which helped. Refer to orthopedics. No NSAIDs due to renal insufficiency. He states his quality of life would be better if the pain was alleviated. Try CBD oil topical which did not help. May try oral drops. Chronic diastolic heart failure [facility])  Per cardiology    Neurogenic bladder  Chronic catheterization    CKD (chronic kidney disease) stage 3, GFR 30-59 ml/min ([facility])  Recent labs show creatinine 1.7 and GFR 41-this is actually doing well.         PLAN:See ASSESSMENT

## 2019-09-16 ENCOUNTER — HOSPITAL ENCOUNTER (OUTPATIENT)
Age: 64
Setting detail: SPECIMEN
Discharge: HOME | End: 2019-09-16
Payer: MEDICARE

## 2019-09-16 ENCOUNTER — TELEPHONE (OUTPATIENT)
Dept: CARDIOLOGY CLINIC | Age: 64
End: 2019-09-16

## 2019-09-16 LAB
A/G RATIO: 1 (ref 1.1–2.2)
ALBUMIN SERPL-MCNC: 3.6 G/DL (ref 3.4–5)
ALP BLD-CCNC: 103 U/L (ref 40–129)
ALT SERPL-CCNC: 44 U/L (ref 10–40)
ANION GAP SERPL CALCULATED.3IONS-SCNC: 7 MMOL/L (ref 3–16)
AST SERPL-CCNC: 86 U/L (ref 15–37)
BASOPHILS ABSOLUTE: 0.1 K/UL (ref 0–0.2)
BASOPHILS RELATIVE PERCENT: 0.7 %
BILIRUB SERPL-MCNC: 0.6 MG/DL (ref 0–1)
BUN BLDV-MCNC: 64 MG/DL (ref 7–20)
CALCIUM SERPL-MCNC: 9.1 MG/DL (ref 8.3–10.6)
CHLORIDE BLD-SCNC: 97 MMOL/L (ref 99–110)
CO2: 31 MMOL/L (ref 21–32)
CREAT SERPL-MCNC: 1.8 MG/DL (ref 0.8–1.3)
EOSINOPHILS ABSOLUTE: 0.5 K/UL (ref 0–0.6)
EOSINOPHILS RELATIVE PERCENT: 6 %
GFR AFRICAN AMERICAN: 46
GFR NON-AFRICAN AMERICAN: 38
GLOBULIN: 3.6 G/DL
GLUCOSE BLD-MCNC: 130 MG/DL (ref 70–99)
HCT VFR BLD CALC: 35.1 % (ref 40.5–52.5)
HEMOGLOBIN: 11.3 G/DL (ref 13.5–17.5)
LYMPHOCYTES ABSOLUTE: 1.7 K/UL (ref 1–5.1)
LYMPHOCYTES RELATIVE PERCENT: 21.3 %
MCH RBC QN AUTO: 29.8 PG (ref 26–34)
MCHC RBC AUTO-ENTMCNC: 32.2 G/DL (ref 31–36)
MCV RBC AUTO: 92.6 FL (ref 80–100)
MONOCYTES ABSOLUTE: 0.7 K/UL (ref 0–1.3)
MONOCYTES RELATIVE PERCENT: 8.5 %
NEUTROPHILS ABSOLUTE: 5.1 K/UL (ref 1.7–7.7)
NEUTROPHILS RELATIVE PERCENT: 63.5 %
PDW BLD-RTO: 15.7 % (ref 12.4–15.4)
PLATELET # BLD: 159 K/UL (ref 135–450)
PMV BLD AUTO: 7.4 FL (ref 5–10.5)
POTASSIUM SERPL-SCNC: 4.6 MMOL/L (ref 3.5–5.1)
RBC # BLD: 3.79 M/UL (ref 4.2–5.9)
SODIUM BLD-SCNC: 135 MMOL/L (ref 136–145)
TOTAL PROTEIN: 7.2 G/DL (ref 6.4–8.2)
WBC # BLD: 8.1 K/UL (ref 4–11)

## 2019-09-16 PROCEDURE — 84153 ASSAY OF PSA TOTAL: CPT

## 2019-09-16 PROCEDURE — 80053 COMPREHEN METABOLIC PANEL: CPT

## 2019-09-16 PROCEDURE — 80061 LIPID PANEL: CPT

## 2019-09-16 PROCEDURE — 84443 ASSAY THYROID STIM HORMONE: CPT

## 2019-09-16 PROCEDURE — 36415 COLL VENOUS BLD VENIPUNCTURE: CPT

## 2019-09-16 PROCEDURE — 83036 HEMOGLOBIN GLYCOSYLATED A1C: CPT

## 2019-09-16 PROCEDURE — 85025 COMPLETE CBC W/AUTO DIFF WBC: CPT

## 2019-09-16 RX ORDER — FINASTERIDE 5 MG/1
TABLET, FILM COATED ORAL
Qty: 30 TABLET | Refills: 3 | Status: SHIPPED | OUTPATIENT
Start: 2019-09-16 | End: 2020-01-11

## 2019-09-16 RX ORDER — CARVEDILOL 12.5 MG/1
12.5 TABLET ORAL 2 TIMES DAILY WITH MEALS
Qty: 60 TABLET | Refills: 11 | Status: SHIPPED | OUTPATIENT
Start: 2019-09-16 | End: 2019-11-20

## 2019-09-16 RX ORDER — CITALOPRAM 20 MG/1
TABLET ORAL
Qty: 30 TABLET | Refills: 3 | Status: SHIPPED | OUTPATIENT
Start: 2019-09-16 | End: 2020-01-11

## 2019-09-16 RX ORDER — PANTOPRAZOLE SODIUM 40 MG/1
TABLET, DELAYED RELEASE ORAL
Qty: 30 TABLET | Refills: 3 | Status: SHIPPED | OUTPATIENT
Start: 2019-09-16 | End: 2020-01-11

## 2019-09-16 RX ORDER — PRAVASTATIN SODIUM 40 MG
40 TABLET ORAL NIGHTLY
Qty: 90 TABLET | Refills: 2 | Status: SHIPPED | OUTPATIENT
Start: 2019-09-16 | End: 2020-06-09

## 2019-09-16 NOTE — TELEPHONE ENCOUNTER
Medication Refill    Last appointment with cardiology? 8/20/19  Next appointment with Cardiology? Medication needing refilled:  pravastatin (PRAVACHOL) 40 MG tablet   Doseage of the medication:    How are you taking this medication (QD, BID, TID, QID, PRN):  Take 1 tablet by mouth nightly  Patient want a 30 or 90 day supply called in:  80  Which Pharmacy are we sending the medication to  [facility], [facility] [phone] Griselda Castellanos [phone]    Medication Question/Concern    What is the name of the medication you need to speak with someone about? Doseage of the medication:    How are you taking this medication:    What issues/concerns are you having with this medication:      Medication Samples    Medication:    Doseage of the medication:    How are you taking this medication (QD, BID, TID, QID, PRN):     in the office or Mail to your home?

## 2019-09-17 LAB
CHOLESTEROL, TOTAL: 160 MG/DL (ref 0–199)
ESTIMATED AVERAGE GLUCOSE: 159.9 MG/DL
HBA1C MFR BLD: 7.2 %
HDLC SERPL-MCNC: 45 MG/DL (ref 40–60)
LDL CHOLESTEROL CALCULATED: 84 MG/DL
PROSTATE SPECIFIC ANTIGEN: 0.12 NG/ML (ref 0–4)
TRIGL SERPL-MCNC: 154 MG/DL (ref 0–150)
TSH REFLEX: 3.39 UIU/ML (ref 0.27–4.2)
VLDLC SERPL CALC-MCNC: 31 MG/DL

## 2019-09-18 ENCOUNTER — HOSPITAL ENCOUNTER (OUTPATIENT)
Dept: WOUND CARE | Age: 64
Discharge: HOME | End: 2019-09-18
Payer: MEDICARE

## 2019-09-18 ENCOUNTER — TELEPHONE (OUTPATIENT)
Dept: INTERNAL MEDICINE CLINIC | Age: 64
End: 2019-09-18

## 2019-09-18 VITALS
BODY MASS INDEX: 25.6 KG/M2 | DIASTOLIC BLOOD PRESSURE: 70 MMHG | HEIGHT: 72 IN | SYSTOLIC BLOOD PRESSURE: 128 MMHG | WEIGHT: 189 LBS | TEMPERATURE: 98 F | HEART RATE: 81 BPM

## 2019-09-18 DIAGNOSIS — E11.9 DIABETES MELLITUS WITH NO COMPLICATION (HCC): Primary | ICD-10-CM

## 2019-09-18 DIAGNOSIS — E78.5 HYPERLIPIDEMIA, UNSPECIFIED HYPERLIPIDEMIA TYPE: Chronic | ICD-10-CM

## 2019-09-18 DIAGNOSIS — I10 ESSENTIAL HYPERTENSION: Chronic | ICD-10-CM

## 2019-09-18 PROCEDURE — 11042 DBRDMT SUBQ TIS 1ST 20SQCM/<: CPT | Performed by: SURGERY

## 2019-09-18 PROCEDURE — 11042 DBRDMT SUBQ TIS 1ST 20SQCM/<: CPT

## 2019-09-18 RX ORDER — LIDOCAINE 40 MG/G
CREAM TOPICAL ONCE
Status: DISCONTINUED | OUTPATIENT
Start: 2019-09-18 | End: 2019-09-19 | Stop reason: HOSPADM

## 2019-09-18 NOTE — PROGRESS NOTES
[address] Procedure Note    Gracia Lim New  AGE: 70 y.o. GENDER: male    : 1955  TODAY'S DATE: 2019    Chief Complaint   Patient presents with    Wound Check     Coccyx, right knee , left foot  F/U        History of Present Illness     Marylene Cartwright is a 70 y.o. male who presents today for wound evaluation. History of Wound: pressure wound located on the sacrum, right knee, left foot. Wound Pain:  none  Severity:  0 / 10   Wound Type:  pressure  Modifying Factors:  chronic pressure, decreased mobility, arterial insufficiency and paraplegia  Associated Signs/Symptoms:  numbness    Procedure Note:     Performed by: Elia Mcguire MD    Consent obtained: Yes    Time out taken: Yes    Pain Control: Anesthetic  Anesthetic: Other (comment)(4% lidocaine cream)     Debridement: Excisional Debridement    Using curette the wound was sharply debrided    down through and including the removal of epidermis, dermis and subcutaneous tissue.         Devitalized Tissue Debrided: fibrin, biofilm and slough    Pre Debridement Measurements:  Are located in the Wound Documentation Flow Sheet     Wound #: 11, 12 and 18     Post  Debridement Measurements:  Wound 18 Knee Right #12 (Active)   Wound Image   2019  9:55 AM   Wound Pressure Stage  3 2019  9:55 AM   Dressing/Treatment Alginate with Ag;Dry Dressing 2019 10:32 AM   Wound Cleansed Rinsed/Irrigated with saline 2019 10:19 AM   Wound Length (cm) 1.8 cm 2019  9:55 AM   Wound Width (cm) 2.1 cm 2019  9:55 AM   Wound Depth (cm) 0.1 cm 2019  9:55 AM   Wound Surface Area (cm^2) 3.78 cm^2 2019  9:55 AM   Change in Wound Size % (l*w) 58 2019  9:55 AM   Wound Volume (cm^3) 0.38 cm^3 2019  9:55 AM   Post-Procedure Length (cm) 1.8 cm 2019 10:19 AM   Post-Procedure Width (cm) 2.1 cm 2019 10:19 AM   Post-Procedure Depth (cm) 0.1 cm 2019 10:19 AM   Post-Procedure Surface Area (cm^2) 3.78

## 2019-09-19 ENCOUNTER — OFFICE VISIT (OUTPATIENT)
Dept: PULMONOLOGY | Age: 64
End: 2019-09-19
Payer: MEDICARE

## 2019-09-19 VITALS
OXYGEN SATURATION: 98 % | RESPIRATION RATE: 16 BRPM | SYSTOLIC BLOOD PRESSURE: 118 MMHG | DIASTOLIC BLOOD PRESSURE: 82 MMHG | HEART RATE: 78 BPM

## 2019-09-19 DIAGNOSIS — J98.6 ELEVATED DIAPHRAGM: Primary | ICD-10-CM

## 2019-09-19 DIAGNOSIS — Z87.01 H/O: PNEUMONIA: ICD-10-CM

## 2019-09-19 PROCEDURE — 4040F PNEUMOC VAC/ADMIN/RCVD: CPT | Performed by: INTERNAL MEDICINE

## 2019-09-19 PROCEDURE — G8598 ASA/ANTIPLAT THER USED: HCPCS | Performed by: INTERNAL MEDICINE

## 2019-09-19 PROCEDURE — 99213 OFFICE O/P EST LOW 20 MIN: CPT | Performed by: INTERNAL MEDICINE

## 2019-09-19 PROCEDURE — 1036F TOBACCO NON-USER: CPT | Performed by: INTERNAL MEDICINE

## 2019-09-19 PROCEDURE — 1123F ACP DISCUSS/DSCN MKR DOCD: CPT | Performed by: INTERNAL MEDICINE

## 2019-09-19 PROCEDURE — G8419 CALC BMI OUT NRM PARAM NOF/U: HCPCS | Performed by: INTERNAL MEDICINE

## 2019-09-19 PROCEDURE — 3017F COLORECTAL CA SCREEN DOC REV: CPT | Performed by: INTERNAL MEDICINE

## 2019-09-19 PROCEDURE — G8427 DOCREV CUR MEDS BY ELIG CLIN: HCPCS | Performed by: INTERNAL MEDICINE

## 2019-09-19 RX ORDER — IPRATROPIUM BROMIDE AND ALBUTEROL SULFATE 2.5; .5 MG/3ML; MG/3ML
3 SOLUTION RESPIRATORY (INHALATION) EVERY 4 HOURS PRN
Qty: 360 ML | Refills: 1 | Status: SHIPPED | OUTPATIENT
Start: 2019-09-19 | End: 2020-03-19

## 2019-09-19 ASSESSMENT — ENCOUNTER SYMPTOMS
BACK PAIN: 0
SORE THROAT: 0
VOICE CHANGE: 0
ABDOMINAL PAIN: 0
CHEST TIGHTNESS: 0
ABDOMINAL DISTENTION: 0
RHINORRHEA: 0
CONSTIPATION: 0
WHEEZING: 0
CHOKING: 0
COUGH: 0
DIARRHEA: 0
STRIDOR: 0
ANAL BLEEDING: 0
SHORTNESS OF BREATH: 0
SINUS PRESSURE: 0
APNEA: 0
BLOOD IN STOOL: 0
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
## 2019-09-30 ENCOUNTER — HOSPITAL ENCOUNTER (OUTPATIENT)
Age: 64
Setting detail: SPECIMEN
Discharge: HOME | End: 2019-09-30
Payer: MEDICARE

## 2019-09-30 LAB
ANION GAP SERPL CALCULATED.3IONS-SCNC: 10 MMOL/L (ref 3–16)
BASOPHILS ABSOLUTE: 0 K/UL (ref 0–0.2)
BASOPHILS RELATIVE PERCENT: 0.5 %
BUN BLDV-MCNC: 67 MG/DL (ref 7–20)
CALCIUM SERPL-MCNC: 9.2 MG/DL (ref 8.3–10.6)
CHLORIDE BLD-SCNC: 97 MMOL/L (ref 99–110)
CO2: 30 MMOL/L (ref 21–32)
CREAT SERPL-MCNC: 1.8 MG/DL (ref 0.8–1.3)
EOSINOPHILS ABSOLUTE: 0.4 K/UL (ref 0–0.6)
EOSINOPHILS RELATIVE PERCENT: 4.7 %
GFR AFRICAN AMERICAN: 46
GFR NON-AFRICAN AMERICAN: 38
GLUCOSE BLD-MCNC: 253 MG/DL (ref 70–99)
HCT VFR BLD CALC: 35.6 % (ref 40.5–52.5)
HEMOGLOBIN: 11.5 G/DL (ref 13.5–17.5)
LYMPHOCYTES ABSOLUTE: 1.7 K/UL (ref 1–5.1)
LYMPHOCYTES RELATIVE PERCENT: 18.5 %
MCH RBC QN AUTO: 29.8 PG (ref 26–34)
MCHC RBC AUTO-ENTMCNC: 32.3 G/DL (ref 31–36)
MCV RBC AUTO: 92.2 FL (ref 80–100)
MONOCYTES ABSOLUTE: 0.6 K/UL (ref 0–1.3)
MONOCYTES RELATIVE PERCENT: 6.8 %
NEUTROPHILS ABSOLUTE: 6.5 K/UL (ref 1.7–7.7)
NEUTROPHILS RELATIVE PERCENT: 69.5 %
PDW BLD-RTO: 15.8 % (ref 12.4–15.4)
PLATELET # BLD: 160 K/UL (ref 135–450)
PMV BLD AUTO: 7.9 FL (ref 5–10.5)
POTASSIUM SERPL-SCNC: 4.8 MMOL/L (ref 3.5–5.1)
PRO-BNP: 849 PG/ML (ref 0–124)
RBC # BLD: 3.86 M/UL (ref 4.2–5.9)
SODIUM BLD-SCNC: 137 MMOL/L (ref 136–145)
WBC # BLD: 9.3 K/UL (ref 4–11)

## 2019-09-30 PROCEDURE — 80048 BASIC METABOLIC PNL TOTAL CA: CPT

## 2019-09-30 PROCEDURE — 83880 ASSAY OF NATRIURETIC PEPTIDE: CPT

## 2019-09-30 PROCEDURE — 36415 COLL VENOUS BLD VENIPUNCTURE: CPT

## 2019-09-30 PROCEDURE — 85025 COMPLETE CBC W/AUTO DIFF WBC: CPT

## 2019-10-02 ENCOUNTER — HOSPITAL ENCOUNTER (OUTPATIENT)
Dept: WOUND CARE | Age: 64
Discharge: HOME | End: 2019-10-02
Payer: MEDICARE

## 2019-10-02 VITALS
WEIGHT: 189 LBS | BODY MASS INDEX: 25.63 KG/M2 | DIASTOLIC BLOOD PRESSURE: 53 MMHG | HEART RATE: 89 BPM | TEMPERATURE: 97.1 F | SYSTOLIC BLOOD PRESSURE: 87 MMHG

## 2019-10-02 PROCEDURE — 11042 DBRDMT SUBQ TIS 1ST 20SQCM/<: CPT | Performed by: SURGERY

## 2019-10-02 PROCEDURE — 11042 DBRDMT SUBQ TIS 1ST 20SQCM/<: CPT

## 2019-10-02 RX ORDER — LIDOCAINE 40 MG/G
CREAM TOPICAL ONCE
Status: DISCONTINUED | OUTPATIENT
Start: 2019-10-02 | End: 2019-10-03 | Stop reason: HOSPADM

## 2019-10-14 ENCOUNTER — HOSPITAL ENCOUNTER (OUTPATIENT)
Age: 64
Setting detail: SPECIMEN
Discharge: HOME | End: 2019-10-14
Payer: MEDICARE

## 2019-10-14 LAB
ANION GAP SERPL CALCULATED.3IONS-SCNC: 9 MMOL/L (ref 3–16)
BASOPHILS ABSOLUTE: 0.1 K/UL (ref 0–0.2)
BASOPHILS RELATIVE PERCENT: 0.7 %
BUN BLDV-MCNC: 79 MG/DL (ref 7–20)
CALCIUM SERPL-MCNC: 9.8 MG/DL (ref 8.3–10.6)
CHLORIDE BLD-SCNC: 94 MMOL/L (ref 99–110)
CO2: 30 MMOL/L (ref 21–32)
CREAT SERPL-MCNC: 2 MG/DL (ref 0.8–1.3)
EOSINOPHILS ABSOLUTE: 0.6 K/UL (ref 0–0.6)
EOSINOPHILS RELATIVE PERCENT: 5.7 %
GFR AFRICAN AMERICAN: 41
GFR NON-AFRICAN AMERICAN: 34
GLUCOSE BLD-MCNC: 239 MG/DL (ref 70–99)
HCT VFR BLD CALC: 36 % (ref 40.5–52.5)
HEMOGLOBIN: 11.8 G/DL (ref 13.5–17.5)
LYMPHOCYTES ABSOLUTE: 1.8 K/UL (ref 1–5.1)
LYMPHOCYTES RELATIVE PERCENT: 16.7 %
MCH RBC QN AUTO: 30.2 PG (ref 26–34)
MCHC RBC AUTO-ENTMCNC: 32.7 G/DL (ref 31–36)
MCV RBC AUTO: 92.3 FL (ref 80–100)
MONOCYTES ABSOLUTE: 0.7 K/UL (ref 0–1.3)
MONOCYTES RELATIVE PERCENT: 6.9 %
NEUTROPHILS ABSOLUTE: 7.5 K/UL (ref 1.7–7.7)
NEUTROPHILS RELATIVE PERCENT: 70 %
PDW BLD-RTO: 15.4 % (ref 12.4–15.4)
PLATELET # BLD: 163 K/UL (ref 135–450)
PMV BLD AUTO: 7.9 FL (ref 5–10.5)
POTASSIUM SERPL-SCNC: 5 MMOL/L (ref 3.5–5.1)
PRO-BNP: 1147 PG/ML (ref 0–124)
RBC # BLD: 3.9 M/UL (ref 4.2–5.9)
SODIUM BLD-SCNC: 133 MMOL/L (ref 136–145)
WBC # BLD: 10.7 K/UL (ref 4–11)

## 2019-10-14 PROCEDURE — 85025 COMPLETE CBC W/AUTO DIFF WBC: CPT

## 2019-10-14 PROCEDURE — 83880 ASSAY OF NATRIURETIC PEPTIDE: CPT

## 2019-10-14 PROCEDURE — 36415 COLL VENOUS BLD VENIPUNCTURE: CPT

## 2019-10-14 PROCEDURE — 80048 BASIC METABOLIC PNL TOTAL CA: CPT

## 2019-10-16 ENCOUNTER — HOSPITAL ENCOUNTER (OUTPATIENT)
Dept: WOUND CARE | Age: 64
Discharge: HOME | End: 2019-10-16
Payer: MEDICARE

## 2019-10-16 VITALS
TEMPERATURE: 98 F | HEART RATE: 92 BPM | DIASTOLIC BLOOD PRESSURE: 78 MMHG | RESPIRATION RATE: 16 BRPM | SYSTOLIC BLOOD PRESSURE: 135 MMHG

## 2019-10-16 PROCEDURE — 11042 DBRDMT SUBQ TIS 1ST 20SQCM/<: CPT

## 2019-10-16 PROCEDURE — 11042 DBRDMT SUBQ TIS 1ST 20SQCM/<: CPT | Performed by: SURGERY

## 2019-10-16 RX ORDER — LIDOCAINE 40 MG/G
CREAM TOPICAL ONCE
Status: DISCONTINUED | OUTPATIENT
Start: 2019-10-16 | End: 2019-10-17 | Stop reason: HOSPADM

## 2019-10-17 ENCOUNTER — TELEPHONE (OUTPATIENT)
Dept: INTERNAL MEDICINE CLINIC | Age: 64
End: 2019-10-17

## 2019-10-28 ENCOUNTER — TELEPHONE (OUTPATIENT)
Dept: CARDIOLOGY CLINIC | Age: 64
End: 2019-10-28

## 2019-10-28 ENCOUNTER — HOSPITAL ENCOUNTER (OUTPATIENT)
Age: 64
Setting detail: SPECIMEN
Discharge: HOME | End: 2019-10-28
Payer: MEDICARE

## 2019-10-28 LAB
ANION GAP SERPL CALCULATED.3IONS-SCNC: 12 MMOL/L (ref 3–16)
BASOPHILS ABSOLUTE: 0.1 K/UL (ref 0–0.2)
BASOPHILS RELATIVE PERCENT: 0.7 %
BUN BLDV-MCNC: 88 MG/DL (ref 7–20)
CALCIUM SERPL-MCNC: 9.5 MG/DL (ref 8.3–10.6)
CHLORIDE BLD-SCNC: 98 MMOL/L (ref 99–110)
CO2: 29 MMOL/L (ref 21–32)
CREAT SERPL-MCNC: 2.1 MG/DL (ref 0.8–1.3)
EOSINOPHILS ABSOLUTE: 0.5 K/UL (ref 0–0.6)
EOSINOPHILS RELATIVE PERCENT: 5.5 %
GFR AFRICAN AMERICAN: 38
GFR NON-AFRICAN AMERICAN: 32
GLUCOSE BLD-MCNC: 104 MG/DL (ref 70–99)
HCT VFR BLD CALC: 34.5 % (ref 40.5–52.5)
HEMOGLOBIN: 11.4 G/DL (ref 13.5–17.5)
LYMPHOCYTES ABSOLUTE: 2.3 K/UL (ref 1–5.1)
LYMPHOCYTES RELATIVE PERCENT: 24.1 %
MCH RBC QN AUTO: 30.5 PG (ref 26–34)
MCHC RBC AUTO-ENTMCNC: 32.9 G/DL (ref 31–36)
MCV RBC AUTO: 92.6 FL (ref 80–100)
MONOCYTES ABSOLUTE: 0.7 K/UL (ref 0–1.3)
MONOCYTES RELATIVE PERCENT: 7.2 %
NEUTROPHILS ABSOLUTE: 5.9 K/UL (ref 1.7–7.7)
NEUTROPHILS RELATIVE PERCENT: 62.5 %
PDW BLD-RTO: 15.3 % (ref 12.4–15.4)
PLATELET # BLD: 162 K/UL (ref 135–450)
PMV BLD AUTO: 7.5 FL (ref 5–10.5)
POTASSIUM SERPL-SCNC: 4.7 MMOL/L (ref 3.5–5.1)
PRO-BNP: 1611 PG/ML (ref 0–124)
RBC # BLD: 3.73 M/UL (ref 4.2–5.9)
SODIUM BLD-SCNC: 139 MMOL/L (ref 136–145)
WBC # BLD: 9.5 K/UL (ref 4–11)

## 2019-10-28 PROCEDURE — 36415 COLL VENOUS BLD VENIPUNCTURE: CPT

## 2019-10-28 PROCEDURE — 80048 BASIC METABOLIC PNL TOTAL CA: CPT

## 2019-10-28 PROCEDURE — 85025 COMPLETE CBC W/AUTO DIFF WBC: CPT

## 2019-10-28 PROCEDURE — 83880 ASSAY OF NATRIURETIC PEPTIDE: CPT

## 2019-11-11 LAB
BASOPHILS ABSOLUTE: 0.1 K/UL (ref 0–0.2)
BASOPHILS RELATIVE PERCENT: 0.6 %
EOSINOPHILS ABSOLUTE: 0.4 K/UL (ref 0–0.6)
EOSINOPHILS RELATIVE PERCENT: 3.9 %
HCT VFR BLD CALC: 34.7 % (ref 40.5–52.5)
HEMOGLOBIN: 11.4 G/DL (ref 13.5–17.5)
LYMPHOCYTES ABSOLUTE: 1.8 K/UL (ref 1–5.1)
LYMPHOCYTES RELATIVE PERCENT: 17.5 %
MCH RBC QN AUTO: 30.8 PG (ref 26–34)
MCHC RBC AUTO-ENTMCNC: 32.9 G/DL (ref 31–36)
MCV RBC AUTO: 93.5 FL (ref 80–100)
MONOCYTES ABSOLUTE: 0.7 K/UL (ref 0–1.3)
MONOCYTES RELATIVE PERCENT: 6.5 %
NEUTROPHILS ABSOLUTE: 7.5 K/UL (ref 1.7–7.7)
NEUTROPHILS RELATIVE PERCENT: 71.5 %
PDW BLD-RTO: 15.2 % (ref 12.4–15.4)
PLATELET # BLD: 159 K/UL (ref 135–450)
PMV BLD AUTO: 7.9 FL (ref 5–10.5)
PRO-BNP: 1631 PG/ML (ref 0–124)
RBC # BLD: 3.71 M/UL (ref 4.2–5.9)
WBC # BLD: 10.5 K/UL (ref 4–11)

## 2019-11-13 ENCOUNTER — HOSPITAL ENCOUNTER (OUTPATIENT)
Dept: WOUND CARE | Age: 64
Discharge: HOME | End: 2019-11-13
Payer: MEDICARE

## 2019-11-13 VITALS
TEMPERATURE: 97.7 F | SYSTOLIC BLOOD PRESSURE: 133 MMHG | HEART RATE: 100 BPM | BODY MASS INDEX: 25.63 KG/M2 | WEIGHT: 189 LBS | DIASTOLIC BLOOD PRESSURE: 76 MMHG

## 2019-11-13 PROCEDURE — 11042 DBRDMT SUBQ TIS 1ST 20SQCM/<: CPT

## 2019-11-13 PROCEDURE — 11042 DBRDMT SUBQ TIS 1ST 20SQCM/<: CPT | Performed by: SURGERY

## 2019-11-13 RX ORDER — LIDOCAINE 50 MG/G
OINTMENT TOPICAL ONCE
Status: DISCONTINUED | OUTPATIENT
Start: 2019-11-13 | End: 2019-11-14 | Stop reason: HOSPADM

## 2019-11-13 ASSESSMENT — PAIN DESCRIPTION - LOCATION: LOCATION: SHOULDER

## 2019-11-13 ASSESSMENT — PAIN DESCRIPTION - DESCRIPTORS: DESCRIPTORS: ACHING

## 2019-11-13 ASSESSMENT — PAIN DESCRIPTION - PAIN TYPE: TYPE: CHRONIC PAIN

## 2019-11-13 ASSESSMENT — PAIN SCALES - GENERAL: PAINLEVEL_OUTOF10: 5

## 2019-11-13 ASSESSMENT — PAIN DESCRIPTION - FREQUENCY: FREQUENCY: CONTINUOUS

## 2019-11-13 ASSESSMENT — PAIN DESCRIPTION - ORIENTATION: ORIENTATION: RIGHT

## 2019-11-13 ASSESSMENT — PAIN DESCRIPTION - ONSET: ONSET: ON-GOING

## 2019-11-13 ASSESSMENT — PAIN DESCRIPTION - PROGRESSION: CLINICAL_PROGRESSION: NOT CHANGED

## 2019-11-20 ENCOUNTER — OFFICE VISIT (OUTPATIENT)
Dept: CARDIOLOGY CLINIC | Age: 64
End: 2019-11-20
Payer: MEDICARE

## 2019-11-20 VITALS — SYSTOLIC BLOOD PRESSURE: 102 MMHG | OXYGEN SATURATION: 96 % | DIASTOLIC BLOOD PRESSURE: 48 MMHG | HEART RATE: 93 BPM

## 2019-11-20 DIAGNOSIS — R06.02 SHORTNESS OF BREATH: ICD-10-CM

## 2019-11-20 DIAGNOSIS — I50.22 CHRONIC SYSTOLIC HEART FAILURE (HCC): Primary | ICD-10-CM

## 2019-11-20 DIAGNOSIS — I25.10 CORONARY ARTERY DISEASE INVOLVING NATIVE CORONARY ARTERY OF NATIVE HEART WITHOUT ANGINA PECTORIS: ICD-10-CM

## 2019-11-20 DIAGNOSIS — I10 ESSENTIAL HYPERTENSION: ICD-10-CM

## 2019-11-20 DIAGNOSIS — N18.30 CKD (CHRONIC KIDNEY DISEASE) STAGE 3, GFR 30-59 ML/MIN (HCC): ICD-10-CM

## 2019-11-20 DIAGNOSIS — I42.8 NON-ISCHEMIC CARDIOMYOPATHY (HCC): ICD-10-CM

## 2019-11-20 PROCEDURE — G8482 FLU IMMUNIZE ORDER/ADMIN: HCPCS | Performed by: INTERNAL MEDICINE

## 2019-11-20 PROCEDURE — 1036F TOBACCO NON-USER: CPT | Performed by: INTERNAL MEDICINE

## 2019-11-20 PROCEDURE — G8427 DOCREV CUR MEDS BY ELIG CLIN: HCPCS | Performed by: INTERNAL MEDICINE

## 2019-11-20 PROCEDURE — G8598 ASA/ANTIPLAT THER USED: HCPCS | Performed by: INTERNAL MEDICINE

## 2019-11-20 PROCEDURE — G8417 CALC BMI ABV UP PARAM F/U: HCPCS | Performed by: INTERNAL MEDICINE

## 2019-11-20 PROCEDURE — 4040F PNEUMOC VAC/ADMIN/RCVD: CPT | Performed by: INTERNAL MEDICINE

## 2019-11-20 PROCEDURE — 99214 OFFICE O/P EST MOD 30 MIN: CPT | Performed by: INTERNAL MEDICINE

## 2019-11-20 PROCEDURE — 3017F COLORECTAL CA SCREEN DOC REV: CPT | Performed by: INTERNAL MEDICINE

## 2019-11-20 PROCEDURE — 1123F ACP DISCUSS/DSCN MKR DOCD: CPT | Performed by: INTERNAL MEDICINE

## 2019-11-20 RX ORDER — TORSEMIDE 20 MG/1
TABLET ORAL
Qty: 30 TABLET | Refills: 11
Start: 2019-11-20 | End: 2020-09-08

## 2019-11-20 RX ORDER — GUAIFENESIN 400 MG/1
800 TABLET ORAL 2 TIMES DAILY
COMMUNITY
End: 2021-01-01 | Stop reason: ALTCHOICE

## 2019-11-25 LAB
ANION GAP SERPL CALCULATED.3IONS-SCNC: 15 MMOL/L (ref 3–16)
BASOPHILS ABSOLUTE: 0.1 K/UL (ref 0–0.2)
BASOPHILS RELATIVE PERCENT: 0.6 %
BUN BLDV-MCNC: 77 MG/DL (ref 7–20)
CALCIUM SERPL-MCNC: 8.8 MG/DL (ref 8.3–10.6)
CHLORIDE BLD-SCNC: 98 MMOL/L (ref 99–110)
CO2: 26 MMOL/L (ref 21–32)
CREAT SERPL-MCNC: 2.1 MG/DL (ref 0.8–1.3)
EOSINOPHILS ABSOLUTE: 0.4 K/UL (ref 0–0.6)
EOSINOPHILS RELATIVE PERCENT: 4.5 %
GFR AFRICAN AMERICAN: 38
GFR NON-AFRICAN AMERICAN: 32
GLUCOSE BLD-MCNC: 185 MG/DL (ref 70–99)
HCT VFR BLD CALC: 32 % (ref 40.5–52.5)
HEMOGLOBIN: 10.7 G/DL (ref 13.5–17.5)
LYMPHOCYTES ABSOLUTE: 1.8 K/UL (ref 1–5.1)
LYMPHOCYTES RELATIVE PERCENT: 19.1 %
MCH RBC QN AUTO: 31.6 PG (ref 26–34)
MCHC RBC AUTO-ENTMCNC: 33.4 G/DL (ref 31–36)
MCV RBC AUTO: 94.7 FL (ref 80–100)
MONOCYTES ABSOLUTE: 0.7 K/UL (ref 0–1.3)
MONOCYTES RELATIVE PERCENT: 7.3 %
NEUTROPHILS ABSOLUTE: 6.5 K/UL (ref 1.7–7.7)
NEUTROPHILS RELATIVE PERCENT: 68.5 %
PDW BLD-RTO: 15 % (ref 12.4–15.4)
PLATELET # BLD: 150 K/UL (ref 135–450)
PMV BLD AUTO: 7.4 FL (ref 5–10.5)
POTASSIUM SERPL-SCNC: 4.9 MMOL/L (ref 3.5–5.1)
PRO-BNP: 2223 PG/ML (ref 0–124)
RBC # BLD: 3.38 M/UL (ref 4.2–5.9)
SODIUM BLD-SCNC: 139 MMOL/L (ref 136–145)
WBC # BLD: 9.4 K/UL (ref 4–11)

## 2019-12-06 LAB
BACTERIA: ABNORMAL /HPF
BASOPHILS ABSOLUTE: 0.1 K/UL (ref 0–0.2)
BASOPHILS RELATIVE PERCENT: 0.5 %
BILIRUBIN URINE: NEGATIVE
BLOOD, URINE: ABNORMAL
CLARITY: ABNORMAL
COLOR: YELLOW
EOSINOPHILS ABSOLUTE: 0.4 K/UL (ref 0–0.6)
EOSINOPHILS RELATIVE PERCENT: 3.1 %
EPITHELIAL CELLS, UA: 1 /HPF (ref 0–5)
GLUCOSE URINE: NEGATIVE MG/DL
HCT VFR BLD CALC: 32.9 % (ref 40.5–52.5)
HEMOGLOBIN: 11 G/DL (ref 13.5–17.5)
HYALINE CASTS: 2 /LPF (ref 0–8)
KETONES, URINE: NEGATIVE MG/DL
LEUKOCYTE ESTERASE, URINE: ABNORMAL
LYMPHOCYTES ABSOLUTE: 1.8 K/UL (ref 1–5.1)
LYMPHOCYTES RELATIVE PERCENT: 14.2 %
MCH RBC QN AUTO: 31 PG (ref 26–34)
MCHC RBC AUTO-ENTMCNC: 33.3 G/DL (ref 31–36)
MCV RBC AUTO: 93.2 FL (ref 80–100)
MICROSCOPIC EXAMINATION: YES
MONOCYTES ABSOLUTE: 0.8 K/UL (ref 0–1.3)
MONOCYTES RELATIVE PERCENT: 6.6 %
NEUTROPHILS ABSOLUTE: 9.3 K/UL (ref 1.7–7.7)
NEUTROPHILS RELATIVE PERCENT: 75.6 %
NITRITE, URINE: POSITIVE
PDW BLD-RTO: 15.1 % (ref 12.4–15.4)
PH UA: 6 (ref 5–8)
PLATELET # BLD: 154 K/UL (ref 135–450)
PMV BLD AUTO: 7.6 FL (ref 5–10.5)
PROTEIN PROTEIN: 31 MG/DL
PROTEIN UA: ABNORMAL MG/DL
RBC # BLD: 3.53 M/UL (ref 4.2–5.9)
RBC UA: 3 /HPF (ref 0–4)
SPECIFIC GRAVITY UA: 1.01 (ref 1–1.03)
URINE TYPE: ABNORMAL
UROBILINOGEN, URINE: 0.2 E.U./DL
WBC # BLD: 12.3 K/UL (ref 4–11)
WBC UA: 134 /HPF (ref 0–5)

## 2019-12-12 ENCOUNTER — TELEPHONE (OUTPATIENT)
Dept: INTERNAL MEDICINE CLINIC | Age: 64
End: 2019-12-12

## 2019-12-23 ENCOUNTER — HOSPITAL ENCOUNTER (OUTPATIENT)
Age: 64
Setting detail: SPECIMEN
Discharge: HOME | End: 2019-12-23
Payer: MEDICARE

## 2019-12-23 LAB
ANION GAP SERPL CALCULATED.3IONS-SCNC: 12 MMOL/L (ref 3–16)
BASOPHILS ABSOLUTE: 0.1 K/UL (ref 0–0.2)
BASOPHILS RELATIVE PERCENT: 0.5 %
BUN BLDV-MCNC: 79 MG/DL (ref 7–20)
CALCIUM SERPL-MCNC: 9 MG/DL (ref 8.3–10.6)
CHLORIDE BLD-SCNC: 97 MMOL/L (ref 99–110)
CO2: 26 MMOL/L (ref 21–32)
CREAT SERPL-MCNC: 2.2 MG/DL (ref 0.8–1.3)
EOSINOPHILS ABSOLUTE: 0.5 K/UL (ref 0–0.6)
EOSINOPHILS RELATIVE PERCENT: 4.3 %
GFR AFRICAN AMERICAN: 36
GFR NON-AFRICAN AMERICAN: 30
GLUCOSE BLD-MCNC: 236 MG/DL (ref 70–99)
HCT VFR BLD CALC: 34.2 % (ref 40.5–52.5)
HEMOGLOBIN: 11.3 G/DL (ref 13.5–17.5)
LYMPHOCYTES ABSOLUTE: 1.9 K/UL (ref 1–5.1)
LYMPHOCYTES RELATIVE PERCENT: 16.2 %
MCH RBC QN AUTO: 30.9 PG (ref 26–34)
MCHC RBC AUTO-ENTMCNC: 33 G/DL (ref 31–36)
MCV RBC AUTO: 93.5 FL (ref 80–100)
MONOCYTES ABSOLUTE: 0.9 K/UL (ref 0–1.3)
MONOCYTES RELATIVE PERCENT: 7.6 %
NEUTROPHILS ABSOLUTE: 8.4 K/UL (ref 1.7–7.7)
NEUTROPHILS RELATIVE PERCENT: 71.4 %
PDW BLD-RTO: 15.1 % (ref 12.4–15.4)
PLATELET # BLD: 155 K/UL (ref 135–450)
PMV BLD AUTO: 8.1 FL (ref 5–10.5)
POTASSIUM SERPL-SCNC: 4.5 MMOL/L (ref 3.5–5.1)
PRO-BNP: 2075 PG/ML (ref 0–124)
RBC # BLD: 3.66 M/UL (ref 4.2–5.9)
SODIUM BLD-SCNC: 135 MMOL/L (ref 136–145)
WBC # BLD: 11.7 K/UL (ref 4–11)

## 2019-12-23 PROCEDURE — 83880 ASSAY OF NATRIURETIC PEPTIDE: CPT

## 2019-12-23 PROCEDURE — 80048 BASIC METABOLIC PNL TOTAL CA: CPT

## 2019-12-23 PROCEDURE — 85025 COMPLETE CBC W/AUTO DIFF WBC: CPT

## 2019-12-23 PROCEDURE — 36415 COLL VENOUS BLD VENIPUNCTURE: CPT

## 2020-01-01 ENCOUNTER — TELEPHONE (OUTPATIENT)
Dept: CARDIOLOGY CLINIC | Age: 65
End: 2020-01-01

## 2020-01-01 ENCOUNTER — TELEPHONE (OUTPATIENT)
Dept: INTERNAL MEDICINE CLINIC | Age: 65
End: 2020-01-01

## 2020-01-01 ENCOUNTER — HOSPITAL ENCOUNTER (OUTPATIENT)
Age: 65
Setting detail: SPECIMEN
Discharge: HOME | End: 2020-11-02
Payer: MEDICARE

## 2020-01-01 ENCOUNTER — TELEPHONE (OUTPATIENT)
Dept: INFECTIOUS DISEASES | Age: 65
End: 2020-01-01

## 2020-01-01 ENCOUNTER — PATIENT MESSAGE (OUTPATIENT)
Dept: INTERNAL MEDICINE CLINIC | Age: 65
End: 2020-01-01

## 2020-01-01 ENCOUNTER — HOSPITAL ENCOUNTER (OUTPATIENT)
Age: 65
Setting detail: SPECIMEN
Discharge: HOME | End: 2020-12-07
Payer: MEDICARE

## 2020-01-01 LAB
ALBUMIN SERPL-MCNC: 3 G/DL (ref 3.1–4.9)
ALBUMIN SERPL-MCNC: 3.7 G/DL (ref 3.4–5)
ALPHA-1-GLOBULIN: 0.3 G/DL (ref 0.2–0.4)
ALPHA-2-GLOBULIN: 0.8 G/DL (ref 0.4–1.1)
ANION GAP SERPL CALCULATED.3IONS-SCNC: 10 MMOL/L (ref 3–16)
BACTERIA: ABNORMAL /HPF
BASOPHILS ABSOLUTE: 0 K/UL (ref 0–0.2)
BASOPHILS ABSOLUTE: 0.1 K/UL (ref 0–0.2)
BASOPHILS RELATIVE PERCENT: 0.4 %
BASOPHILS RELATIVE PERCENT: 0.6 %
BETA GLOBULIN: 0.9 G/DL (ref 0.9–1.6)
BILIRUBIN URINE: NEGATIVE
BLOOD, URINE: ABNORMAL
BUN BLDV-MCNC: 68 MG/DL (ref 7–20)
CALCIUM SERPL-MCNC: 9 MG/DL (ref 8.3–10.6)
CHLORIDE BLD-SCNC: 101 MMOL/L (ref 99–110)
CLARITY: ABNORMAL
CO2: 29 MMOL/L (ref 21–32)
COLOR: YELLOW
CREAT SERPL-MCNC: 2 MG/DL (ref 0.8–1.3)
CREATININE URINE: 53.2 MG/DL (ref 39–259)
CRYSTALS, UA: ABNORMAL /HPF
EOSINOPHILS ABSOLUTE: 0.3 K/UL (ref 0–0.6)
EOSINOPHILS ABSOLUTE: 0.4 K/UL (ref 0–0.6)
EOSINOPHILS RELATIVE PERCENT: 3 %
EOSINOPHILS RELATIVE PERCENT: 4.8 %
EPITHELIAL CELLS, UA: 25 /HPF (ref 0–5)
GAMMA GLOBULIN: 1.7 G/DL (ref 0.6–1.8)
GFR AFRICAN AMERICAN: 41
GFR NON-AFRICAN AMERICAN: 33
GLUCOSE BLD-MCNC: 119 MG/DL (ref 70–99)
GLUCOSE URINE: NEGATIVE MG/DL
HCT VFR BLD CALC: 27.7 % (ref 40.5–52.5)
HCT VFR BLD CALC: 29.5 % (ref 40.5–52.5)
HEMOGLOBIN: 8.6 G/DL (ref 13.5–17.5)
HEMOGLOBIN: 9.7 G/DL (ref 13.5–17.5)
IRON SATURATION: 22 % (ref 20–50)
IRON: 40 UG/DL (ref 59–158)
KETONES, URINE: NEGATIVE MG/DL
LEUKOCYTE ESTERASE, URINE: ABNORMAL
LYMPHOCYTES ABSOLUTE: 1.2 K/UL (ref 1–5.1)
LYMPHOCYTES ABSOLUTE: 1.2 K/UL (ref 1–5.1)
LYMPHOCYTES RELATIVE PERCENT: 12.9 %
LYMPHOCYTES RELATIVE PERCENT: 13.7 %
MCH RBC QN AUTO: 30.4 PG (ref 26–34)
MCH RBC QN AUTO: 32.1 PG (ref 26–34)
MCHC RBC AUTO-ENTMCNC: 30.9 G/DL (ref 31–36)
MCHC RBC AUTO-ENTMCNC: 33 G/DL (ref 31–36)
MCV RBC AUTO: 97.1 FL (ref 80–100)
MCV RBC AUTO: 98.3 FL (ref 80–100)
MICROSCOPIC EXAMINATION: YES
MISCELLANEOUS LAB TEST ORDER: ABNORMAL
MONOCYTES ABSOLUTE: 0.6 K/UL (ref 0–1.3)
MONOCYTES ABSOLUTE: 0.7 K/UL (ref 0–1.3)
MONOCYTES RELATIVE PERCENT: 6.8 %
MONOCYTES RELATIVE PERCENT: 7.5 %
NEUTROPHILS ABSOLUTE: 6.6 K/UL (ref 1.7–7.7)
NEUTROPHILS ABSOLUTE: 7.1 K/UL (ref 1.7–7.7)
NEUTROPHILS RELATIVE PERCENT: 73.4 %
NEUTROPHILS RELATIVE PERCENT: 76.9 %
NITRITE, URINE: NEGATIVE
PDW BLD-RTO: 16.2 % (ref 12.4–15.4)
PDW BLD-RTO: 16.5 % (ref 12.4–15.4)
PH UA: 7.5 (ref 5–8)
PHOSPHORUS: 4.5 MG/DL (ref 2.5–4.9)
PLATELET # BLD: 145 K/UL (ref 135–450)
PLATELET # BLD: 162 K/UL (ref 135–450)
PMV BLD AUTO: 7.1 FL (ref 5–10.5)
PMV BLD AUTO: 7.2 FL (ref 5–10.5)
POTASSIUM SERPL-SCNC: 5.3 MMOL/L (ref 3.5–5.1)
PRO-BNP: 3377 PG/ML (ref 0–124)
PRO-BNP: 4071 PG/ML (ref 0–124)
PROTEIN PROTEIN: 94 MG/DL
PROTEIN UA: 100 MG/DL
PROTEIN/CREAT RATIO: 1.8 MG/DL
RBC # BLD: 2.81 M/UL (ref 4.2–5.9)
RBC # BLD: 3.04 M/UL (ref 4.2–5.9)
RBC UA: ABNORMAL /HPF (ref 0–4)
SODIUM BLD-SCNC: 140 MMOL/L (ref 136–145)
SPE/IFE INTERPRETATION: NORMAL
SPECIFIC GRAVITY UA: 1.01 (ref 1–1.03)
TOTAL IRON BINDING CAPACITY: 182 UG/DL (ref 260–445)
TOTAL PROTEIN: 6.8 G/DL (ref 6.4–8.2)
URINE TYPE: ABNORMAL
UROBILINOGEN, URINE: 0.2 E.U./DL
WBC # BLD: 9 K/UL (ref 4–11)
WBC # BLD: 9.2 K/UL (ref 4–11)
WBC UA: >900 /HPF (ref 0–5)
YEAST: PRESENT /HPF

## 2020-01-01 PROCEDURE — 85025 COMPLETE CBC W/AUTO DIFF WBC: CPT

## 2020-01-01 PROCEDURE — 83880 ASSAY OF NATRIURETIC PEPTIDE: CPT

## 2020-01-01 PROCEDURE — 84155 ASSAY OF PROTEIN SERUM: CPT

## 2020-01-01 PROCEDURE — 36415 COLL VENOUS BLD VENIPUNCTURE: CPT

## 2020-01-01 PROCEDURE — 83550 IRON BINDING TEST: CPT

## 2020-01-01 PROCEDURE — 86334 IMMUNOFIX E-PHORESIS SERUM: CPT

## 2020-01-01 PROCEDURE — 84165 PROTEIN E-PHORESIS SERUM: CPT

## 2020-01-01 PROCEDURE — 80069 RENAL FUNCTION PANEL: CPT

## 2020-01-01 PROCEDURE — 81001 URINALYSIS AUTO W/SCOPE: CPT

## 2020-01-01 PROCEDURE — 82570 ASSAY OF URINE CREATININE: CPT

## 2020-01-01 PROCEDURE — 84156 ASSAY OF PROTEIN URINE: CPT

## 2020-01-01 PROCEDURE — 83540 ASSAY OF IRON: CPT

## 2020-01-01 RX ORDER — INSULIN GLARGINE 100 [IU]/ML
INJECTION, SOLUTION SUBCUTANEOUS
Qty: 15 ML | Refills: 1 | Status: SHIPPED | OUTPATIENT
Start: 2020-01-01 | End: 2021-01-01

## 2020-01-06 LAB
ANION GAP SERPL CALCULATED.3IONS-SCNC: 18 MMOL/L (ref 3–16)
BASOPHILS ABSOLUTE: 0.1 K/UL (ref 0–0.2)
BASOPHILS RELATIVE PERCENT: 0.6 %
BUN BLDV-MCNC: 66 MG/DL (ref 7–20)
CALCIUM SERPL-MCNC: 9 MG/DL (ref 8.3–10.6)
CHLORIDE BLD-SCNC: 96 MMOL/L (ref 99–110)
CO2: 23 MMOL/L (ref 21–32)
CREAT SERPL-MCNC: 2 MG/DL (ref 0.8–1.3)
EOSINOPHILS ABSOLUTE: 0.6 K/UL (ref 0–0.6)
EOSINOPHILS RELATIVE PERCENT: 5.3 %
GFR AFRICAN AMERICAN: 41
GFR NON-AFRICAN AMERICAN: 34
GLUCOSE BLD-MCNC: 172 MG/DL (ref 70–99)
HCT VFR BLD CALC: 33.8 % (ref 40.5–52.5)
HEMOGLOBIN: 11.4 G/DL (ref 13.5–17.5)
LYMPHOCYTES ABSOLUTE: 1.8 K/UL (ref 1–5.1)
LYMPHOCYTES RELATIVE PERCENT: 16.2 %
MCH RBC QN AUTO: 31.6 PG (ref 26–34)
MCHC RBC AUTO-ENTMCNC: 33.7 G/DL (ref 31–36)
MCV RBC AUTO: 93.7 FL (ref 80–100)
MONOCYTES ABSOLUTE: 0.7 K/UL (ref 0–1.3)
MONOCYTES RELATIVE PERCENT: 6.4 %
NEUTROPHILS ABSOLUTE: 7.8 K/UL (ref 1.7–7.7)
NEUTROPHILS RELATIVE PERCENT: 71.5 %
PDW BLD-RTO: 15 % (ref 12.4–15.4)
PLATELET # BLD: 152 K/UL (ref 135–450)
PMV BLD AUTO: 7.9 FL (ref 5–10.5)
POTASSIUM SERPL-SCNC: 4.6 MMOL/L (ref 3.5–5.1)
PRO-BNP: 2279 PG/ML (ref 0–124)
RBC # BLD: 3.6 M/UL (ref 4.2–5.9)
SODIUM BLD-SCNC: 137 MMOL/L (ref 136–145)
WBC # BLD: 11 K/UL (ref 4–11)

## 2020-01-11 RX ORDER — PANTOPRAZOLE SODIUM 40 MG/1
TABLET, DELAYED RELEASE ORAL
Qty: 30 TABLET | Refills: 3 | Status: SHIPPED | OUTPATIENT
Start: 2020-01-11 | End: 2020-05-05

## 2020-01-11 RX ORDER — CITALOPRAM 20 MG/1
TABLET ORAL
Qty: 30 TABLET | Refills: 3 | Status: SHIPPED | OUTPATIENT
Start: 2020-01-11 | End: 2020-05-05

## 2020-01-11 RX ORDER — FINASTERIDE 5 MG/1
TABLET, FILM COATED ORAL
Qty: 30 TABLET | Refills: 3 | Status: SHIPPED | OUTPATIENT
Start: 2020-01-11 | End: 2020-05-05

## 2020-01-20 LAB
ANION GAP SERPL CALCULATED.3IONS-SCNC: 15 MMOL/L (ref 3–16)
BASOPHILS ABSOLUTE: 0 K/UL (ref 0–0.2)
BASOPHILS RELATIVE PERCENT: 0.4 %
BUN BLDV-MCNC: 69 MG/DL (ref 7–20)
CALCIUM SERPL-MCNC: 8.9 MG/DL (ref 8.3–10.6)
CHLORIDE BLD-SCNC: 93 MMOL/L (ref 99–110)
CO2: 26 MMOL/L (ref 21–32)
CREAT SERPL-MCNC: 2.4 MG/DL (ref 0.8–1.3)
EOSINOPHILS ABSOLUTE: 0.6 K/UL (ref 0–0.6)
EOSINOPHILS RELATIVE PERCENT: 4.5 %
GFR AFRICAN AMERICAN: 33
GFR NON-AFRICAN AMERICAN: 27
GLUCOSE BLD-MCNC: 253 MG/DL (ref 70–99)
HCT VFR BLD CALC: 36.7 % (ref 40.5–52.5)
HEMOGLOBIN: 11.9 G/DL (ref 13.5–17.5)
LYMPHOCYTES ABSOLUTE: 2.2 K/UL (ref 1–5.1)
LYMPHOCYTES RELATIVE PERCENT: 16.9 %
MCH RBC QN AUTO: 30.8 PG (ref 26–34)
MCHC RBC AUTO-ENTMCNC: 32.3 G/DL (ref 31–36)
MCV RBC AUTO: 95.3 FL (ref 80–100)
MONOCYTES ABSOLUTE: 0.9 K/UL (ref 0–1.3)
MONOCYTES RELATIVE PERCENT: 7 %
NEUTROPHILS ABSOLUTE: 9.1 K/UL (ref 1.7–7.7)
NEUTROPHILS RELATIVE PERCENT: 71.2 %
PDW BLD-RTO: 15.6 % (ref 12.4–15.4)
PLATELET # BLD: 160 K/UL (ref 135–450)
PMV BLD AUTO: 8.1 FL (ref 5–10.5)
POTASSIUM SERPL-SCNC: 4.4 MMOL/L (ref 3.5–5.1)
PRO-BNP: 1791 PG/ML (ref 0–124)
RBC # BLD: 3.85 M/UL (ref 4.2–5.9)
SODIUM BLD-SCNC: 134 MMOL/L (ref 136–145)
WBC # BLD: 12.8 K/UL (ref 4–11)

## 2020-02-03 LAB
ANION GAP SERPL CALCULATED.3IONS-SCNC: 14 MMOL/L (ref 3–16)
BASOPHILS ABSOLUTE: 0.1 K/UL (ref 0–0.2)
BASOPHILS RELATIVE PERCENT: 0.6 %
BUN BLDV-MCNC: 72 MG/DL (ref 7–20)
CALCIUM SERPL-MCNC: 9.1 MG/DL (ref 8.3–10.6)
CHLORIDE BLD-SCNC: 98 MMOL/L (ref 99–110)
CO2: 28 MMOL/L (ref 21–32)
CREAT SERPL-MCNC: 2 MG/DL (ref 0.8–1.3)
EOSINOPHILS ABSOLUTE: 0.5 K/UL (ref 0–0.6)
EOSINOPHILS RELATIVE PERCENT: 4 %
GFR AFRICAN AMERICAN: 41
GFR NON-AFRICAN AMERICAN: 34
GLUCOSE BLD-MCNC: 169 MG/DL (ref 70–99)
HCT VFR BLD CALC: 34.3 % (ref 40.5–52.5)
HEMOGLOBIN: 11.3 G/DL (ref 13.5–17.5)
LYMPHOCYTES ABSOLUTE: 2.1 K/UL (ref 1–5.1)
LYMPHOCYTES RELATIVE PERCENT: 17.7 %
MCH RBC QN AUTO: 31.1 PG (ref 26–34)
MCHC RBC AUTO-ENTMCNC: 33.1 G/DL (ref 31–36)
MCV RBC AUTO: 93.9 FL (ref 80–100)
MONOCYTES ABSOLUTE: 0.8 K/UL (ref 0–1.3)
MONOCYTES RELATIVE PERCENT: 6.9 %
NEUTROPHILS ABSOLUTE: 8.5 K/UL (ref 1.7–7.7)
NEUTROPHILS RELATIVE PERCENT: 70.8 %
PDW BLD-RTO: 15.2 % (ref 12.4–15.4)
PLATELET # BLD: 147 K/UL (ref 135–450)
PMV BLD AUTO: 7.6 FL (ref 5–10.5)
POTASSIUM SERPL-SCNC: 4.5 MMOL/L (ref 3.5–5.1)
PRO-BNP: 2054 PG/ML (ref 0–124)
RBC # BLD: 3.65 M/UL (ref 4.2–5.9)
SODIUM BLD-SCNC: 140 MMOL/L (ref 136–145)
WBC # BLD: 12.1 K/UL (ref 4–11)

## 2020-02-10 ENCOUNTER — TELEPHONE (OUTPATIENT)
Dept: INTERNAL MEDICINE CLINIC | Age: 65
End: 2020-02-10

## 2020-02-17 LAB
ANION GAP SERPL CALCULATED.3IONS-SCNC: 14 MMOL/L (ref 3–16)
BASOPHILS ABSOLUTE: 0 K/UL (ref 0–0.2)
BASOPHILS RELATIVE PERCENT: 0.4 %
BUN BLDV-MCNC: 73 MG/DL (ref 7–20)
CALCIUM SERPL-MCNC: 9.1 MG/DL (ref 8.3–10.6)
CHLORIDE BLD-SCNC: 97 MMOL/L (ref 99–110)
CO2: 28 MMOL/L (ref 21–32)
CREAT SERPL-MCNC: 2.2 MG/DL (ref 0.8–1.3)
EOSINOPHILS ABSOLUTE: 0.4 K/UL (ref 0–0.6)
EOSINOPHILS RELATIVE PERCENT: 3.2 %
GFR AFRICAN AMERICAN: 36
GFR NON-AFRICAN AMERICAN: 30
GLUCOSE BLD-MCNC: 178 MG/DL (ref 70–99)
HCT VFR BLD CALC: 34.6 % (ref 40.5–52.5)
HEMOGLOBIN: 11.4 G/DL (ref 13.5–17.5)
LYMPHOCYTES ABSOLUTE: 1.7 K/UL (ref 1–5.1)
LYMPHOCYTES RELATIVE PERCENT: 14.2 %
MCH RBC QN AUTO: 31 PG (ref 26–34)
MCHC RBC AUTO-ENTMCNC: 33 G/DL (ref 31–36)
MCV RBC AUTO: 94 FL (ref 80–100)
MONOCYTES ABSOLUTE: 0.8 K/UL (ref 0–1.3)
MONOCYTES RELATIVE PERCENT: 6.9 %
NEUTROPHILS ABSOLUTE: 9.2 K/UL (ref 1.7–7.7)
NEUTROPHILS RELATIVE PERCENT: 75.3 %
PDW BLD-RTO: 15.4 % (ref 12.4–15.4)
PLATELET # BLD: 159 K/UL (ref 135–450)
PMV BLD AUTO: 8 FL (ref 5–10.5)
POTASSIUM SERPL-SCNC: 4.7 MMOL/L (ref 3.5–5.1)
PRO-BNP: 1877 PG/ML (ref 0–124)
RBC # BLD: 3.68 M/UL (ref 4.2–5.9)
SODIUM BLD-SCNC: 139 MMOL/L (ref 136–145)
WBC # BLD: 12.2 K/UL (ref 4–11)

## 2020-03-02 ENCOUNTER — HOSPITAL ENCOUNTER (OUTPATIENT)
Age: 65
Setting detail: SPECIMEN
Discharge: HOME | End: 2020-03-02
Payer: MEDICARE

## 2020-03-02 LAB
ANION GAP SERPL CALCULATED.3IONS-SCNC: 11 MMOL/L (ref 3–16)
BASOPHILS ABSOLUTE: 0.1 K/UL (ref 0–0.2)
BASOPHILS RELATIVE PERCENT: 0.4 %
BUN BLDV-MCNC: 79 MG/DL (ref 7–20)
CALCIUM SERPL-MCNC: 8.8 MG/DL (ref 8.3–10.6)
CHLORIDE BLD-SCNC: 96 MMOL/L (ref 99–110)
CO2: 30 MMOL/L (ref 21–32)
CREAT SERPL-MCNC: 2.2 MG/DL (ref 0.8–1.3)
EOSINOPHILS ABSOLUTE: 0.5 K/UL (ref 0–0.6)
EOSINOPHILS RELATIVE PERCENT: 4.4 %
GFR AFRICAN AMERICAN: 36
GFR NON-AFRICAN AMERICAN: 30
GLUCOSE BLD-MCNC: 196 MG/DL (ref 70–99)
HCT VFR BLD CALC: 33.8 % (ref 40.5–52.5)
HEMOGLOBIN: 11 G/DL (ref 13.5–17.5)
LYMPHOCYTES ABSOLUTE: 1.7 K/UL (ref 1–5.1)
LYMPHOCYTES RELATIVE PERCENT: 14.2 %
MCH RBC QN AUTO: 30.4 PG (ref 26–34)
MCHC RBC AUTO-ENTMCNC: 32.7 G/DL (ref 31–36)
MCV RBC AUTO: 93 FL (ref 80–100)
MONOCYTES ABSOLUTE: 0.7 K/UL (ref 0–1.3)
MONOCYTES RELATIVE PERCENT: 6.2 %
NEUTROPHILS ABSOLUTE: 8.8 K/UL (ref 1.7–7.7)
NEUTROPHILS RELATIVE PERCENT: 74.8 %
PDW BLD-RTO: 15.6 % (ref 12.4–15.4)
PLATELET # BLD: 156 K/UL (ref 135–450)
PMV BLD AUTO: 7.6 FL (ref 5–10.5)
POTASSIUM SERPL-SCNC: 5 MMOL/L (ref 3.5–5.1)
PRO-BNP: 1668 PG/ML (ref 0–124)
RBC # BLD: 3.63 M/UL (ref 4.2–5.9)
SODIUM BLD-SCNC: 137 MMOL/L (ref 136–145)
WBC # BLD: 11.7 K/UL (ref 4–11)

## 2020-03-02 PROCEDURE — 85025 COMPLETE CBC W/AUTO DIFF WBC: CPT

## 2020-03-02 PROCEDURE — 83880 ASSAY OF NATRIURETIC PEPTIDE: CPT

## 2020-03-02 PROCEDURE — 36415 COLL VENOUS BLD VENIPUNCTURE: CPT

## 2020-03-02 PROCEDURE — 80048 BASIC METABOLIC PNL TOTAL CA: CPT

## 2020-03-02 NOTE — PROGRESS NOTES
Essential hypertension    3. Coronary artery disease involving native coronary artery of native heart without angina pectoris    4. CKD (chronic kidney disease) stage 3, GFR 30-59 ml/min ([facility])    5. Shortness of breath    6. Non-ischemic cardiomyopathy ([facility]        1. Chronic systolic heart failure ([facility] : Compensated by exam.   EF recovered. Continue Coreg at 6.25mg twice daily and continue Torsemide. No Ace/ARB due to CKD. ProBNP contines to decrease  [id number]>759>664. Now 6347 without HF symptoms. ProBNP monitoring 1668 without HF symptoms. 2. Essential hypertension: BP 96/60   Pulse 85   Ht 6' (1.829 m)   Wt 213 lb (96.6 kg)   SpO2 98%   BMI 28.89 kg/m²   Controlled. 3. Coronary artery disease involving native coronary artery of native heart without angina pectoris:      4. CKD (chronic kidney disease) stage 3, GFR 30-59 ml/min ([facility]) : He is off Dialysis since April. He continues with suprapubic catheter. Continues on Torsemide. Creat 2.2 up from 1.8. Follows with nephrology Dr. Denia Azevedo.  -On Monday and Friday take Torsemide 10mg and all other days, Take 20mg. 5. Shortness of breath:  Denies SOB       6. Non-ischemic cardiomyopathy ([facility]        Pure hypercholesterolemia:  9-16-19  Total  Chol 160, HDL 45 (improved from 38), LDL 84,   Trig 154. Continue pravastatin 40mg daily. Paraplegia ([facility]):  Stable. Presents in a . He is more independent in this chair. Chronic anemia:  Due to CKD. Will continue labs with [facility]. Plan:  1. Continue standing orders for labs. 2.  Continue same medications. 3.  See Dr. Lindsay Lay in 4 months. Scribe's attestation: This note was scribed in the presence of Tamiko Crews M.D. by Nita Sue RN     The scribe's documentation has been prepared under my direction and personally reviewed by me in its entirety.   I confirm that the note above accurately reflects all work, treatment, procedures, and medical decision making performed by me. QUALITY MEASURES  1. Tobacco Cessation Counseling: N/A  2. BP retake if >140/90: N/A  3. Communication to PCP: Office note forwarded/faxed to PCP  4. CAD antiplatelet therapy: Yes  5. CAD lipid lowering therapy: Yes  6. HF A. Fib on anticoagulation: N/A      Thank you for allowing me to participate in the care of your patient.   Brian Sánchez MD [facility]

## 2020-03-04 ENCOUNTER — OFFICE VISIT (OUTPATIENT)
Dept: CARDIOLOGY CLINIC | Age: 65
End: 2020-03-04
Payer: MEDICARE

## 2020-03-04 VITALS
OXYGEN SATURATION: 98 % | WEIGHT: 213 LBS | HEART RATE: 85 BPM | SYSTOLIC BLOOD PRESSURE: 96 MMHG | DIASTOLIC BLOOD PRESSURE: 60 MMHG | BODY MASS INDEX: 28.85 KG/M2 | HEIGHT: 72 IN

## 2020-03-04 PROCEDURE — 1036F TOBACCO NON-USER: CPT | Performed by: INTERNAL MEDICINE

## 2020-03-04 PROCEDURE — 99214 OFFICE O/P EST MOD 30 MIN: CPT | Performed by: INTERNAL MEDICINE

## 2020-03-04 PROCEDURE — 4040F PNEUMOC VAC/ADMIN/RCVD: CPT | Performed by: INTERNAL MEDICINE

## 2020-03-04 PROCEDURE — 3017F COLORECTAL CA SCREEN DOC REV: CPT | Performed by: INTERNAL MEDICINE

## 2020-03-04 PROCEDURE — G8482 FLU IMMUNIZE ORDER/ADMIN: HCPCS | Performed by: INTERNAL MEDICINE

## 2020-03-04 PROCEDURE — G8417 CALC BMI ABV UP PARAM F/U: HCPCS | Performed by: INTERNAL MEDICINE

## 2020-03-04 PROCEDURE — G8427 DOCREV CUR MEDS BY ELIG CLIN: HCPCS | Performed by: INTERNAL MEDICINE

## 2020-03-04 PROCEDURE — 1123F ACP DISCUSS/DSCN MKR DOCD: CPT | Performed by: INTERNAL MEDICINE

## 2020-03-16 LAB
ANION GAP SERPL CALCULATED.3IONS-SCNC: 15 MMOL/L (ref 3–16)
BASOPHILS ABSOLUTE: 0.1 K/UL (ref 0–0.2)
BASOPHILS RELATIVE PERCENT: 0.7 %
BUN BLDV-MCNC: 69 MG/DL (ref 7–20)
CALCIUM SERPL-MCNC: 9 MG/DL (ref 8.3–10.6)
CHLORIDE BLD-SCNC: 97 MMOL/L (ref 99–110)
CO2: 25 MMOL/L (ref 21–32)
CREAT SERPL-MCNC: 2.2 MG/DL (ref 0.8–1.3)
EOSINOPHILS ABSOLUTE: 0.4 K/UL (ref 0–0.6)
EOSINOPHILS RELATIVE PERCENT: 3.4 %
GFR AFRICAN AMERICAN: 36
GFR NON-AFRICAN AMERICAN: 30
GLUCOSE BLD-MCNC: 275 MG/DL (ref 70–99)
HCT VFR BLD CALC: 33.6 % (ref 40.5–52.5)
HEMOGLOBIN: 11.1 G/DL (ref 13.5–17.5)
LYMPHOCYTES ABSOLUTE: 1.4 K/UL (ref 1–5.1)
LYMPHOCYTES RELATIVE PERCENT: 12.8 %
MCH RBC QN AUTO: 31.3 PG (ref 26–34)
MCHC RBC AUTO-ENTMCNC: 32.9 G/DL (ref 31–36)
MCV RBC AUTO: 95 FL (ref 80–100)
MONOCYTES ABSOLUTE: 0.7 K/UL (ref 0–1.3)
MONOCYTES RELATIVE PERCENT: 6.1 %
NEUTROPHILS ABSOLUTE: 8.5 K/UL (ref 1.7–7.7)
NEUTROPHILS RELATIVE PERCENT: 77 %
PDW BLD-RTO: 15.9 % (ref 12.4–15.4)
PLATELET # BLD: 164 K/UL (ref 135–450)
PMV BLD AUTO: 7.8 FL (ref 5–10.5)
POTASSIUM SERPL-SCNC: 5.1 MMOL/L (ref 3.5–5.1)
PRO-BNP: 1644 PG/ML (ref 0–124)
RBC # BLD: 3.53 M/UL (ref 4.2–5.9)
SODIUM BLD-SCNC: 137 MMOL/L (ref 136–145)
WBC # BLD: 11.1 K/UL (ref 4–11)

## 2020-03-17 ENCOUNTER — TELEPHONE (OUTPATIENT)
Dept: CARDIOLOGY CLINIC | Age: 65
End: 2020-03-17

## 2020-03-17 NOTE — TELEPHONE ENCOUNTER
----- Message from ADRIANNA Paredes CNP sent at 3/17/2020  1:24 PM EDT -----  Covering for Dr. Jane Lazaro. Labs are stable. Potassium level is stable. Heart number stable compared to prior labs. No changes to medications at this time. Repeat as planned per DR. Jack's standing orders.

## 2020-03-23 ENCOUNTER — PATIENT MESSAGE (OUTPATIENT)
Dept: INTERNAL MEDICINE CLINIC | Age: 65
End: 2020-03-23

## 2020-03-24 ENCOUNTER — TELEPHONE (OUTPATIENT)
Dept: INTERNAL MEDICINE CLINIC | Age: 65
End: 2020-03-24

## 2020-03-24 NOTE — TELEPHONE ENCOUNTER
I recommend he start using the Flonase spray again. 2 sprays in each nostril daily. If there is any sign of fever or purulence then let me know.

## 2020-03-24 NOTE — TELEPHONE ENCOUNTER
From  Sabine Jay \"Moises\" To  [facility] Practice Support Sent  3/23/2020  4:16 PM   Hi Jarred Chilel,   I know you are very busy but Luc Barnett has some congestion and a sore throat. I think it is drainage so I have been giving him Mucinex and Tylenol. The [address] lpn was out today & said his lungs are clear and no fever. Just wondered if Dr. Abby Murphy has any suggestions - he used to have a Flonase spray but it is gone.  Just let me know - thanks       Encounter Messages

## 2020-03-25 ENCOUNTER — TELEPHONE (OUTPATIENT)
Dept: INTERNAL MEDICINE CLINIC | Age: 65
End: 2020-03-25

## 2020-03-25 RX ORDER — AMOXICILLIN AND CLAVULANATE POTASSIUM 875; 125 MG/1; MG/1
TABLET, FILM COATED ORAL
Qty: 20 TABLET | Refills: 0 | Status: SHIPPED | OUTPATIENT
Start: 2020-03-25 | End: 2020-07-08

## 2020-03-25 NOTE — TELEPHONE ENCOUNTER
From  Jenna Armendariz \"Moises\" To  [place] [id number] Practice Support Sent  3/24/2020 11:45 PM   Jose Antonio Valles sputum has changed from clear to thick and yellow. I can hear it rattling but despite using Duoneb in the nebulizer, taking Mucinex and using his cough assist machine, he cannot bring much up. Any suggestions are appreciated-thank you.    Dada Alvarado       Previous Messages

## 2020-03-30 ENCOUNTER — HOSPITAL ENCOUNTER (OUTPATIENT)
Age: 65
Setting detail: SPECIMEN
Discharge: HOME | End: 2020-03-30
Payer: MEDICARE

## 2020-03-30 LAB
ANION GAP SERPL CALCULATED.3IONS-SCNC: 11 MMOL/L (ref 3–16)
BASOPHILS ABSOLUTE: 0.1 K/UL (ref 0–0.2)
BASOPHILS RELATIVE PERCENT: 0.6 %
BUN BLDV-MCNC: 74 MG/DL (ref 7–20)
CALCIUM SERPL-MCNC: 9.5 MG/DL (ref 8.3–10.6)
CHLORIDE BLD-SCNC: 99 MMOL/L (ref 99–110)
CO2: 29 MMOL/L (ref 21–32)
CREAT SERPL-MCNC: 2.2 MG/DL (ref 0.8–1.3)
EOSINOPHILS ABSOLUTE: 0.2 K/UL (ref 0–0.6)
EOSINOPHILS RELATIVE PERCENT: 1.5 %
GFR AFRICAN AMERICAN: 36
GFR NON-AFRICAN AMERICAN: 30
GLUCOSE BLD-MCNC: 189 MG/DL (ref 70–99)
HCT VFR BLD CALC: 35.1 % (ref 40.5–52.5)
HEMOGLOBIN: 11.4 G/DL (ref 13.5–17.5)
LYMPHOCYTES ABSOLUTE: 1.7 K/UL (ref 1–5.1)
LYMPHOCYTES RELATIVE PERCENT: 14.2 %
MCH RBC QN AUTO: 30.3 PG (ref 26–34)
MCHC RBC AUTO-ENTMCNC: 32.4 G/DL (ref 31–36)
MCV RBC AUTO: 93.6 FL (ref 80–100)
MONOCYTES ABSOLUTE: 0.9 K/UL (ref 0–1.3)
MONOCYTES RELATIVE PERCENT: 7.4 %
NEUTROPHILS ABSOLUTE: 9 K/UL (ref 1.7–7.7)
NEUTROPHILS RELATIVE PERCENT: 76.3 %
PDW BLD-RTO: 15.3 % (ref 12.4–15.4)
PLATELET # BLD: 222 K/UL (ref 135–450)
PMV BLD AUTO: 7.2 FL (ref 5–10.5)
POTASSIUM SERPL-SCNC: 5.1 MMOL/L (ref 3.5–5.1)
PRO-BNP: 3587 PG/ML (ref 0–124)
RBC # BLD: 3.75 M/UL (ref 4.2–5.9)
SODIUM BLD-SCNC: 139 MMOL/L (ref 136–145)
WBC # BLD: 11.7 K/UL (ref 4–11)

## 2020-03-30 PROCEDURE — 85025 COMPLETE CBC W/AUTO DIFF WBC: CPT

## 2020-03-30 PROCEDURE — 83880 ASSAY OF NATRIURETIC PEPTIDE: CPT

## 2020-03-30 PROCEDURE — 36415 COLL VENOUS BLD VENIPUNCTURE: CPT

## 2020-03-30 PROCEDURE — 80048 BASIC METABOLIC PNL TOTAL CA: CPT

## 2020-04-10 ENCOUNTER — TELEPHONE (OUTPATIENT)
Dept: INTERNAL MEDICINE CLINIC | Age: 65
End: 2020-04-10

## 2020-04-13 ENCOUNTER — HOSPITAL ENCOUNTER (OUTPATIENT)
Age: 65
Discharge: HOME | End: 2020-04-13
Payer: MEDICARE

## 2020-04-13 ENCOUNTER — HOSPITAL ENCOUNTER (OUTPATIENT)
Age: 65
Setting detail: SPECIMEN
Discharge: HOME | End: 2020-04-13
Payer: MEDICARE

## 2020-04-13 LAB
ANION GAP SERPL CALCULATED.3IONS-SCNC: 13 MMOL/L (ref 3–16)
BASOPHILS ABSOLUTE: 0.1 K/UL (ref 0–0.2)
BASOPHILS RELATIVE PERCENT: 0.5 %
BUN BLDV-MCNC: 62 MG/DL (ref 7–20)
CALCIUM SERPL-MCNC: 9.1 MG/DL (ref 8.3–10.6)
CHLORIDE BLD-SCNC: 97 MMOL/L (ref 99–110)
CO2: 28 MMOL/L (ref 21–32)
CREAT SERPL-MCNC: 1.9 MG/DL (ref 0.8–1.3)
EOSINOPHILS ABSOLUTE: 0.4 K/UL (ref 0–0.6)
EOSINOPHILS RELATIVE PERCENT: 4.3 %
GFR AFRICAN AMERICAN: 43
GFR NON-AFRICAN AMERICAN: 36
GLUCOSE BLD-MCNC: 216 MG/DL (ref 70–99)
HCT VFR BLD CALC: 32 % (ref 40.5–52.5)
HEMOGLOBIN: 10.6 G/DL (ref 13.5–17.5)
LYMPHOCYTES ABSOLUTE: 1.9 K/UL (ref 1–5.1)
LYMPHOCYTES RELATIVE PERCENT: 18.3 %
MCH RBC QN AUTO: 31.1 PG (ref 26–34)
MCHC RBC AUTO-ENTMCNC: 33.2 G/DL (ref 31–36)
MCV RBC AUTO: 93.7 FL (ref 80–100)
MONOCYTES ABSOLUTE: 0.8 K/UL (ref 0–1.3)
MONOCYTES RELATIVE PERCENT: 7.9 %
NEUTROPHILS ABSOLUTE: 7.2 K/UL (ref 1.7–7.7)
NEUTROPHILS RELATIVE PERCENT: 69 %
PDW BLD-RTO: 15.4 % (ref 12.4–15.4)
PLATELET # BLD: 191 K/UL (ref 135–450)
PMV BLD AUTO: 7.5 FL (ref 5–10.5)
POTASSIUM SERPL-SCNC: 5.2 MMOL/L (ref 3.5–5.1)
PRO-BNP: 2766 PG/ML (ref 0–124)
RBC # BLD: 3.42 M/UL (ref 4.2–5.9)
SODIUM BLD-SCNC: 138 MMOL/L (ref 136–145)
WBC # BLD: 10.4 K/UL (ref 4–11)

## 2020-04-13 PROCEDURE — 83880 ASSAY OF NATRIURETIC PEPTIDE: CPT

## 2020-04-13 PROCEDURE — 36415 COLL VENOUS BLD VENIPUNCTURE: CPT

## 2020-04-13 PROCEDURE — 85025 COMPLETE CBC W/AUTO DIFF WBC: CPT

## 2020-04-13 PROCEDURE — 80048 BASIC METABOLIC PNL TOTAL CA: CPT

## 2020-04-24 RX ORDER — INSULIN GLARGINE 100 [IU]/ML
40 INJECTION, SOLUTION SUBCUTANEOUS NIGHTLY
Qty: 12 ML | Refills: 1 | Status: SHIPPED | OUTPATIENT
Start: 2020-04-24 | End: 2020-07-15 | Stop reason: SDUPTHER

## 2020-04-27 ENCOUNTER — HOSPITAL ENCOUNTER (OUTPATIENT)
Age: 65
Setting detail: SPECIMEN
Discharge: HOME | End: 2020-04-27
Payer: MEDICARE

## 2020-04-27 LAB
ANION GAP SERPL CALCULATED.3IONS-SCNC: 10 MMOL/L (ref 3–16)
BASOPHILS ABSOLUTE: 0.1 K/UL (ref 0–0.2)
BASOPHILS RELATIVE PERCENT: 0.6 %
BUN BLDV-MCNC: 69 MG/DL (ref 7–20)
CALCIUM SERPL-MCNC: 9 MG/DL (ref 8.3–10.6)
CHLORIDE BLD-SCNC: 97 MMOL/L (ref 99–110)
CO2: 30 MMOL/L (ref 21–32)
CREAT SERPL-MCNC: 2 MG/DL (ref 0.8–1.3)
EOSINOPHILS ABSOLUTE: 0.5 K/UL (ref 0–0.6)
EOSINOPHILS RELATIVE PERCENT: 4.9 %
GFR AFRICAN AMERICAN: 41
GFR NON-AFRICAN AMERICAN: 34
GLUCOSE BLD-MCNC: 213 MG/DL (ref 70–99)
HCT VFR BLD CALC: 32.7 % (ref 40.5–52.5)
HEMOGLOBIN: 10.7 G/DL (ref 13.5–17.5)
LYMPHOCYTES ABSOLUTE: 1.8 K/UL (ref 1–5.1)
LYMPHOCYTES RELATIVE PERCENT: 16.6 %
MCH RBC QN AUTO: 30.9 PG (ref 26–34)
MCHC RBC AUTO-ENTMCNC: 32.7 G/DL (ref 31–36)
MCV RBC AUTO: 94.5 FL (ref 80–100)
MONOCYTES ABSOLUTE: 0.7 K/UL (ref 0–1.3)
MONOCYTES RELATIVE PERCENT: 6.6 %
NEUTROPHILS ABSOLUTE: 7.8 K/UL (ref 1.7–7.7)
NEUTROPHILS RELATIVE PERCENT: 71.3 %
PDW BLD-RTO: 15.5 % (ref 12.4–15.4)
PLATELET # BLD: 157 K/UL (ref 135–450)
PMV BLD AUTO: 7.9 FL (ref 5–10.5)
POTASSIUM SERPL-SCNC: 5.3 MMOL/L (ref 3.5–5.1)
PRO-BNP: 1945 PG/ML (ref 0–124)
RBC # BLD: 3.45 M/UL (ref 4.2–5.9)
SODIUM BLD-SCNC: 137 MMOL/L (ref 136–145)
WBC # BLD: 10.9 K/UL (ref 4–11)

## 2020-04-27 PROCEDURE — 80048 BASIC METABOLIC PNL TOTAL CA: CPT

## 2020-04-27 PROCEDURE — 83880 ASSAY OF NATRIURETIC PEPTIDE: CPT

## 2020-04-27 PROCEDURE — 85025 COMPLETE CBC W/AUTO DIFF WBC: CPT

## 2020-04-27 PROCEDURE — 36415 COLL VENOUS BLD VENIPUNCTURE: CPT

## 2020-05-05 RX ORDER — FINASTERIDE 5 MG/1
TABLET, FILM COATED ORAL
Qty: 30 TABLET | Refills: 3 | Status: SHIPPED | OUTPATIENT
Start: 2020-05-05 | End: 2020-09-25 | Stop reason: SDUPTHER

## 2020-05-05 RX ORDER — PANTOPRAZOLE SODIUM 40 MG/1
TABLET, DELAYED RELEASE ORAL
Qty: 30 TABLET | Refills: 3 | Status: SHIPPED | OUTPATIENT
Start: 2020-05-05 | End: 2020-09-25 | Stop reason: SDUPTHER

## 2020-05-05 RX ORDER — CITALOPRAM 20 MG/1
TABLET ORAL
Qty: 30 TABLET | Refills: 3 | Status: SHIPPED | OUTPATIENT
Start: 2020-05-05 | End: 2020-09-25 | Stop reason: SDUPTHER

## 2020-05-11 ENCOUNTER — HOSPITAL ENCOUNTER (OUTPATIENT)
Age: 65
Setting detail: SPECIMEN
Discharge: HOME | End: 2020-05-11
Payer: MEDICARE

## 2020-05-11 LAB
ANION GAP SERPL CALCULATED.3IONS-SCNC: 8 MMOL/L (ref 3–16)
BASOPHILS ABSOLUTE: 0.1 K/UL (ref 0–0.2)
BASOPHILS RELATIVE PERCENT: 0.6 %
BUN BLDV-MCNC: 61 MG/DL (ref 7–20)
CALCIUM SERPL-MCNC: 9.1 MG/DL (ref 8.3–10.6)
CHLORIDE BLD-SCNC: 98 MMOL/L (ref 99–110)
CO2: 30 MMOL/L (ref 21–32)
CREAT SERPL-MCNC: 2 MG/DL (ref 0.8–1.3)
EOSINOPHILS ABSOLUTE: 0.5 K/UL (ref 0–0.6)
EOSINOPHILS RELATIVE PERCENT: 5.3 %
GFR AFRICAN AMERICAN: 41
GFR NON-AFRICAN AMERICAN: 34
GLUCOSE BLD-MCNC: 73 MG/DL (ref 70–99)
HCT VFR BLD CALC: 33.7 % (ref 40.5–52.5)
HEMOGLOBIN: 11.1 G/DL (ref 13.5–17.5)
LYMPHOCYTES ABSOLUTE: 2 K/UL (ref 1–5.1)
LYMPHOCYTES RELATIVE PERCENT: 19.6 %
MCH RBC QN AUTO: 31.5 PG (ref 26–34)
MCHC RBC AUTO-ENTMCNC: 32.8 G/DL (ref 31–36)
MCV RBC AUTO: 96 FL (ref 80–100)
MONOCYTES ABSOLUTE: 0.8 K/UL (ref 0–1.3)
MONOCYTES RELATIVE PERCENT: 7.9 %
NEUTROPHILS ABSOLUTE: 6.9 K/UL (ref 1.7–7.7)
NEUTROPHILS RELATIVE PERCENT: 66.6 %
PDW BLD-RTO: 15.7 % (ref 12.4–15.4)
PLATELET # BLD: 168 K/UL (ref 135–450)
PMV BLD AUTO: 8 FL (ref 5–10.5)
POTASSIUM SERPL-SCNC: 5 MMOL/L (ref 3.5–5.1)
PRO-BNP: 1879 PG/ML (ref 0–124)
RBC # BLD: 3.51 M/UL (ref 4.2–5.9)
SODIUM BLD-SCNC: 136 MMOL/L (ref 136–145)
WBC # BLD: 10.4 K/UL (ref 4–11)

## 2020-05-11 PROCEDURE — 36415 COLL VENOUS BLD VENIPUNCTURE: CPT

## 2020-05-11 PROCEDURE — 83880 ASSAY OF NATRIURETIC PEPTIDE: CPT

## 2020-05-11 PROCEDURE — 80048 BASIC METABOLIC PNL TOTAL CA: CPT

## 2020-05-11 PROCEDURE — 85025 COMPLETE CBC W/AUTO DIFF WBC: CPT

## 2020-05-14 NOTE — PROGRESS NOTES
Patient : Brett Emmanuel Age: 67 year old Sex: male   MRN: [id number] Encounter Date: 5/14/2020      History     Chief Complaint   Patient presents with   • Shortness of Breath   • Cough     All encounters were performed in full contact and droplet PPE.    HPI     Brett is a 67-year-old male with a history of COPD who presents with shortness of breath and cough.  He states he has had a cough, productive of white sputum, for the past month and has been having worsening shortness of breath.  He does not usually wear oxygen.  He denies feeling febrile at home.  He denies chest pain, abdominal pain, diarrhea, , urinary symptoms, swelling.  He does state he has been dry heaving, however can not tell me how long.  He sees Dr. Gifford with pulmonology.  He denies any other aggravating or alleviating factors.    3/5 20 Cath:    · Prox RCA lesion with 40% stenosis.  · Dist RCA lesion with 60% stenosis.  · Mid LAD lesion with 30% stenosis.     1. Intermediate mid RCA stenosis with moderate disease proximally.  2. Mild to moderate disease of LAD.  3. LVEDP is normal at 10mmHg.       Allergies   Allergen Reactions   • Rocephin [Ceftriaxone] ANXIETY, SHORTNESS OF BREATH and Other (See Comments)     Redness to skin       Current Discharge Medication List      Prior to Admission Medications    Details   fluticasone-umeclidin-vilanterol (TRELEGY ELLIPTA) 100-62.5-25 MCG/INH inhaler Inhale 1 puff into the lungs daily.  Qty: 60 each, Refills: 3      amLODIPine (NORVASC) 5 MG tablet Take 1 tablet by mouth daily.  Qty: 90 tablet, Refills: 1      clopidogrel (PLAVIX) 75 MG tablet Take 1 tablet by mouth daily.  Qty: 90 tablet, Refills: 1      atorvastatin (LIPITOR) 40 MG tablet Take 1 tablet by mouth daily.  Qty: 30 tablet, Refills: 11      sildenafil (REVATIO) 20 MG tablet Take 1-5 tablets as needed for ED  Qty: 60 tablet, Refills: 1      aspirin 81 MG EC tablet Take 1 tablet by mouth daily.  Qty: 90 tablet, Refills: 3      albuterol 108 (90  Base) MCG/ACT inhaler Inhale 2 puffs into the lungs every 4 hours as needed for Shortness of Breath or Wheezing.  Qty: 18 g, Refills: 5      formoterol (PERFOROMIST) 20 MCG/2ML inhalation solution Take 2 mLs by nebulization two times daily. Mix with Budesonide  Qty: 120 mL, Refills: 12             Current Discharge Medication List          Past Medical History:   Diagnosis Date   • CKD (chronic kidney disease), stage III (CMS/[facility])    • COPD (chronic obstructive pulmonary disease) (CMS/[facility])    • Emphysema of lung (CMS/[facility])    • Essential (primary) hypertension    • High cholesterol    • Pneumothorax     history of   • SVT (supraventricular tachycardia) (CMS/[facility])    • Tachycardia        Past Surgical History:   Procedure Laterality Date   • CDL CATH POSS PTCA  03/05/2020   • COLONOSCOPY DIAGNOSTIC  2/9/16    Affi 3yr recall, 6 polyps tubular adenomas   • CORONARY ANGIOPLASTY     • SKULL FRACTURE ELEVATION         Family History   Problem Relation Age of Onset   • COPD Mother    • Dementia/Alzheimers Father    • Cancer Daughter        Social History     Tobacco Use   • Smoking status: Current Some Day Smoker     Packs/day: 0.25     Years: 41.00     Pack years: 10.25     Types: Cigarettes   • Smokeless tobacco: Never Used   • Tobacco comment: 3   Substance Use Topics   • Alcohol use: Yes     Alcohol/week: 7.0 standard drinks     Types: 7 Standard drinks or equivalent per week     Frequency: 2-4 times a month     Drinks per session: 3 or 4     Binge frequency: Never     Comment: daily, 1/2 pint dinesh joinerre   • Drug use: Yes     Frequency: 1.5 times per week     Types: Cocaine, Marijuana     Comment: last used cocaine 8/6/19       Review of Systems   Constitutional: Positive for fever. Negative for chills.   HENT: Negative for congestion, ear pain, rhinorrhea and sore throat.    Eyes: Negative for discharge and redness.   Respiratory: Positive for cough and shortness of breath. Negative for chest tightness.     Cardiovascular: Negative for chest pain and leg swelling.   Gastrointestinal: Positive for nausea. Negative for abdominal pain, constipation, diarrhea and vomiting.   Genitourinary: Negative for difficulty urinating, dysuria and frequency.   Skin: Negative for rash.   Neurological: Negative for dizziness and headaches.   Psychiatric/Behavioral: Negative.        Physical Exam     ED Triage Vitals   ED Triage Vitals Group      Temp 05/14/20 0858 (!) 101 °F (38.3 °C)      Heart Rate 05/14/20 0858 (!) 108      Resp 05/14/20 0858 (!) 28      BP 05/14/20 0858 135/87      SpO2 05/14/20 0856 (!) 88 %      EtCO2 mmHg --       Height 05/14/20 0858 6' (1.829 m)      Weight 05/14/20 0858 181 lb 7 oz (82.3 kg)      Weight Scale Used 05/14/20 0858 ED Actual      BMI (Calculated) 05/14/20 0858 24.61      IBW/kg (Calculated) 05/14/20 0858 77.6       Physical Exam   Constitutional: He is oriented to person, place, and time. He appears well-developed and well-nourished.   HENT:   Head: Normocephalic.   Eyes: Conjunctivae and EOM are normal. Right eye exhibits no discharge. Left eye exhibits no discharge. No scleral icterus.   Neck: Normal range of motion. Neck supple.   Cardiovascular: Regular rhythm, normal heart sounds and intact distal pulses. Tachycardia present.   Pulmonary/Chest: Effort normal. No respiratory distress. He has wheezes (occ expiratory).   Abdominal: Soft. Bowel sounds are normal. He exhibits no distension. There is no abdominal tenderness.   Musculoskeletal: Normal range of motion.   Neurological: He is alert and oriented to person, place, and time. GCS eye subscore is 4. GCS verbal subscore is 5. GCS motor subscore is 6.   Skin: Skin is warm and dry. No rash noted.   Psychiatric: He has a normal mood and affect.   Nursing note and vitals reviewed.      ED Course     Procedures    Lab Results     Results for orders placed or performed during the hospital encounter of 05/14/20   NT proBNP   Result Value Ref  Range    NT-proBNP 52 <=125 pg/mL   Comprehensive Metabolic Panel   Result Value Ref Range    Fasting Status      Sodium 137 135 - 145 mmol/L    Potassium 4.0 3.4 - 5.1 mmol/L    Chloride 105 98 - 107 mmol/L    Carbon Dioxide 24 21 - 32 mmol/L    Anion Gap 12 10 - 20 mmol/L    Glucose 109 (H) 65 - 99 mg/dL    BUN 26 (H) 6 - 20 mg/dL    Creatinine 1.84 (H) 0.67 - 1.17 mg/dL    Glomerular Filtration Rate 42 (L) >90    BUN/ Creatinine Ratio 14 7 - 25    Bilirubin, Total 1.1 (H) 0.2 - 1.0 mg/dL    GOT/AST 32 <=37 Units/L    Alkaline Phosphatase 93 45 - 117 Units/L    Albumin 3.8 3.6 - 5.1 g/dL    Protein, Total 8.1 6.4 - 8.2 g/dL    Globulin 4.3 (H) 2.0 - 4.0 g/dL    A/G Ratio 0.9 (L) 1.0 - 2.4    GPT/ALT 40 <64 Units/L    Calcium 8.6 8.4 - 10.2 mg/dL   CBC with Automated Differential (performable only)   Result Value Ref Range    WBC 7.2 4.2 - 11.0 K/mcL    RBC 5.46 4.50 - 5.90 mil/mcL    HGB 15.9 13.0 - 17.0 g/dL    HCT 50.2 39.0 - 51.0 %    MCV 91.9 78.0 - 100.0 fl    MCH 29.1 26.0 - 34.0 pg    MCHC 31.7 (L) 32.0 - 36.5 g/dL    RDW-CV 12.5 11.0 - 15.0 %     140 - 450 K/mcL    NRBC 0 <=0 /100 WBC    Neutrophil, Percent 65 %    Lymphocytes, Percent 19 %    Mono, Percent 15 %    Eosinophils, Percent 0 %    Basophils, Percent 0 %    Immature Granulocytes 1 %    Absolute Neutrophils 4.7 1.8 - 7.7 K/mcL    Absolute Lymphocytes 1.4 1.0 - 4.0 K/mcL    Absolute Monocytes 1.1 (H) 0.3 - 0.9 K/mcL    Absolute Eosinophils  0.0 (L) 0.1 - 0.5 K/mcL    Absolute Basophils 0.0 0.0 - 0.3 K/mcL    Absolute Immmature Granulocytes 0.0 0.0 - 0.2 K/mcL    RDW-SD 41.6 39.0 - 50.0 fL   TROPONIN I  POINT OF CARE   Result Value Ref Range    TROPONIN I - POINT OF CARE <0.10 <0.10 ng/mL   BLOOD GAS, VENOUS -POINT OF CARE   Result Value Ref Range    PH, VENOUS - POINT OF CARE 7.35 7.35 - 7.45 Units    PCO2, VENOUS - POINT OF CARE 46 41 - 54 mm Hg    PO2, VENOUS - POINT OF CARE 23 (L) 35 - 42 mm Hg    HCO3, VENOUS - POINT OF CARE 25 22 - 28  recurrent hospitalizations for mucous plugging related to poor cough effort. Has been helped immensely by the cough assist device with the patient and his wife are happy about. Checked O2 sat on room air, 96%. I have asked the patient to continue using O2 at nighttime only and avoid using it at daytime unless his O2 sat is below 88-90%. Currently on regular diet with thin liquids. Continue same, no signs of ongoing aspiration. Return in about 1 year (around 8/2/2019). mmol/L    BASE EXCESS / DEFICIT, VENOUS - POINT OF CARE -1 -2 - 2 mmol/L    O2 SATURATION, VENOUS - POINT OF CARE 36 (L) 60 - 80 %    TCO2 - POINT OF CARE 27 (H) 19 - 24 mmol/L   ISTAT8 VENOUS  POINT OF CARE   Result Value Ref Range    BUN - POINT OF CARE 29 (H) 6 - 20 mg/dL    SODIUM - POINT OF CARE 139 135 - 145 mmol/L    POTASSIUM - POINT OF CARE 4.1 3.4 - 5.1 mmol/L    CHLORIDE - POINT OF CARE 104 98 - 107 mmol/L    TCO2 - POINT OF CARE 25 (H) 19 - 24 mmol/L    ANION GAP - POINT OF CARE 15 mmol/L    HEMATOCRIT - POINT OF CARE 52.0 (H) 39.0 - 51.0 %    HEMOGLOBIN - POINT OF CARE 17.7 (H) 13.0 - 17.0 g/dL    GLUCOSE - POINT OF CARE 113 (H) 70 - 99 mg/dL    CALCIUM, IONIZED - POINT OF CARE 1.10 (L) 1.15 - 1.29 mmol/L    Creatinine 1.90 (H) 0.67 - 1.17 mg/dL    Glomerular Filtration Rate 41 (L) >90   LACTIC ACID VENOUS POINT OF CARE   Result Value Ref Range    LACTIC ACID, VENOUS - POINT OF CARE 2.1 (HH) <2.0 mmol/L       EKG Results     EKG Interpretation  Rate: 108  Rhythm: sinus tachycardia   Abnormality:  No acute ischemic abnormality.  No significant change from 03/05/2020.    EKG tracing interpreted by ED physician    Radiology Results     Imaging Results          XR Chest AP or PA (Final result)  Result time 05/14/20 09:46:02    Final result                 Impression:    IMPRESSION:     1.  No acute cardiopulmonary process.  2.  Redemonstrated severe bullous emphysematous changes and scarring.    I have personally reviewed the images and modified the resident's report as  necessary.             Narrative:    XR CHEST AP OR PA 5/14/2020 9:25 AM    HISTORY:  67 years-old Male with presenting history of shortness of breath.    COMPARISON: Multiple prior radiographs of the chest, most recently from  8/13/2019, CT of the chest 8/2/2019    TECHNIQUE:  Single, AP upright view of the chest.    FINDINGS:    Overlying cardiac monitoring leads.    Stable appearance of the cardiomediastinal silhouette. The  pulmonary  vasculature is within normal limits. Redemonstrated bilateral upper lung  predominant emphysematous changes with additional areas of scattered  bibasilar scarring. No dense focal consolidation. No definite pleural  effusion. No pneumothorax.                      Preliminary result                 Impression:         1.  No acute cardiopulmonary process.  2.  Redemonstrated severe bullous emphysematous changes and scarring.             Narrative:    XR CHEST AP OR PA 5/14/2020 9:25 AM    HISTORY:  67 years-old Male with presenting history of shortness of   breath.    COMPARISON: Multiple prior radiographs of the chest, most recently from  8/13/2019, CT of the chest 8/2/2019    TECHNIQUE:  Single, AP upright view of the chest.    FINDINGS:    Overlying cardiac monitoring leads.    Stable appearance of the cardiomediastinal silhouette. The pulmonary  vasculature is within normal limits. Redemonstrated bilateral upper lung  predominant emphysematous changes with additional areas of scattered  bibasilar scarring. No dense focal consolidation. No definite pleural  effusion. No pneumothorax.                                    ED Medication Orders (From admission, onward)    Ordered Start     Status Ordering Provider    05/14/20 0951 05/14/20 0952  sodium chloride (NORMAL SALINE) 0.9 % bolus 1,000 mL  ONCE      Last MAR action:  New Bag TEMITOPE VILLATORO    05/14/20 0912 05/14/20 0913  albuterol inhaler 4 puff  ONCE      Last MAR action:  Given TEMITOPE VILLATORO        ED Course:  10:10 AM: D/w the [facility] resident, who accepts the pt.    MDM:   Critical Care time spent on this patient outside of billable procedures:  None     Patient presents with cough, increasing shortness of breath, fever here.  He was mildly hypoxic upon presentation.  This improved with 2 L oxygen by nasal cannula.  Labs, chest x-ray are negative.  Concern for COVID-19.  Will admit given hypoxia.      9:30 AM  Does this patient meet Severe  Sepsis criteria by CMS SEP-1 definition? No     9:30 AM Does this patient meet Septic Shock criteria by CMS SEP-1 definition? No      Clinical Impression:  The primary encounter diagnosis was Hypoxia. Diagnoses of Suspected COVID-19 virus infection and Acute renal insufficiency were also pertinent to this visit.    Pt to be admitted to Dr. Johns in stable condition.         Tammy Jackson MD  05/14/20 [id number]

## 2020-05-25 ENCOUNTER — HOSPITAL ENCOUNTER (OUTPATIENT)
Age: 65
Discharge: HOME | End: 2020-05-25
Payer: MEDICARE

## 2020-05-25 LAB
ANION GAP SERPL CALCULATED.3IONS-SCNC: 10 MMOL/L (ref 3–16)
BASOPHILS ABSOLUTE: 0.1 K/UL (ref 0–0.2)
BASOPHILS RELATIVE PERCENT: 0.7 %
BUN BLDV-MCNC: 65 MG/DL (ref 7–20)
CALCIUM SERPL-MCNC: 9 MG/DL (ref 8.3–10.6)
CHLORIDE BLD-SCNC: 97 MMOL/L (ref 99–110)
CO2: 29 MMOL/L (ref 21–32)
CREAT SERPL-MCNC: 2.1 MG/DL (ref 0.8–1.3)
EOSINOPHILS ABSOLUTE: 0.5 K/UL (ref 0–0.6)
EOSINOPHILS RELATIVE PERCENT: 5.1 %
GFR AFRICAN AMERICAN: 38
GFR NON-AFRICAN AMERICAN: 32
GLUCOSE BLD-MCNC: 155 MG/DL (ref 70–99)
HCT VFR BLD CALC: 32.6 % (ref 40.5–52.5)
HEMOGLOBIN: 10.8 G/DL (ref 13.5–17.5)
LYMPHOCYTES ABSOLUTE: 1.8 K/UL (ref 1–5.1)
LYMPHOCYTES RELATIVE PERCENT: 18.6 %
MCH RBC QN AUTO: 31.1 PG (ref 26–34)
MCHC RBC AUTO-ENTMCNC: 33 G/DL (ref 31–36)
MCV RBC AUTO: 94.2 FL (ref 80–100)
MONOCYTES ABSOLUTE: 0.7 K/UL (ref 0–1.3)
MONOCYTES RELATIVE PERCENT: 7.1 %
NEUTROPHILS ABSOLUTE: 6.8 K/UL (ref 1.7–7.7)
NEUTROPHILS RELATIVE PERCENT: 68.5 %
PDW BLD-RTO: 15.4 % (ref 12.4–15.4)
PLATELET # BLD: 159 K/UL (ref 135–450)
PMV BLD AUTO: 7.4 FL (ref 5–10.5)
POTASSIUM SERPL-SCNC: 5.3 MMOL/L (ref 3.5–5.1)
PRO-BNP: 1554 PG/ML (ref 0–124)
RBC # BLD: 3.46 M/UL (ref 4.2–5.9)
SODIUM BLD-SCNC: 136 MMOL/L (ref 136–145)
WBC # BLD: 9.9 K/UL (ref 4–11)

## 2020-05-25 PROCEDURE — 80048 BASIC METABOLIC PNL TOTAL CA: CPT

## 2020-05-25 PROCEDURE — 85025 COMPLETE CBC W/AUTO DIFF WBC: CPT

## 2020-05-25 PROCEDURE — 83880 ASSAY OF NATRIURETIC PEPTIDE: CPT

## 2020-05-25 PROCEDURE — 36415 COLL VENOUS BLD VENIPUNCTURE: CPT

## 2020-05-26 ENCOUNTER — TELEPHONE (OUTPATIENT)
Dept: CARDIOLOGY CLINIC | Age: 65
End: 2020-05-26

## 2020-05-26 NOTE — TELEPHONE ENCOUNTER
I spoke with pt and relayed message per Mariano Manuel. He said that he has no scale . He stated that he will talk with his wife and call back tomorrow, with results. ----- Message from ADRIANNA Piper CNP sent at 5/26/2020  9:07 AM EDT -----  Labs ok, he might be getting a little bit dry. Ask about wt and if 3 or more pounds below baseline then decreased lasix for 2 days to half dose. He has orders for standing labs.  Hanane Swartz

## 2020-06-08 ENCOUNTER — HOSPITAL ENCOUNTER (OUTPATIENT)
Age: 65
Setting detail: SPECIMEN
Discharge: HOME | End: 2020-06-08
Payer: MEDICARE

## 2020-06-08 LAB
ANION GAP SERPL CALCULATED.3IONS-SCNC: 10 MMOL/L (ref 3–16)
BASOPHILS ABSOLUTE: 0 K/UL (ref 0–0.2)
BASOPHILS RELATIVE PERCENT: 0.5 %
BUN BLDV-MCNC: 67 MG/DL (ref 7–20)
CALCIUM SERPL-MCNC: 9.2 MG/DL (ref 8.3–10.6)
CHLORIDE BLD-SCNC: 98 MMOL/L (ref 99–110)
CO2: 30 MMOL/L (ref 21–32)
CREAT SERPL-MCNC: 1.9 MG/DL (ref 0.8–1.3)
EOSINOPHILS ABSOLUTE: 0.6 K/UL (ref 0–0.6)
EOSINOPHILS RELATIVE PERCENT: 6.1 %
GFR AFRICAN AMERICAN: 43
GFR NON-AFRICAN AMERICAN: 36
GLUCOSE BLD-MCNC: 163 MG/DL (ref 70–99)
HCT VFR BLD CALC: 33.6 % (ref 40.5–52.5)
HEMOGLOBIN: 11 G/DL (ref 13.5–17.5)
LYMPHOCYTES ABSOLUTE: 1.8 K/UL (ref 1–5.1)
LYMPHOCYTES RELATIVE PERCENT: 19.6 %
MCH RBC QN AUTO: 30.9 PG (ref 26–34)
MCHC RBC AUTO-ENTMCNC: 32.7 G/DL (ref 31–36)
MCV RBC AUTO: 94.5 FL (ref 80–100)
MONOCYTES ABSOLUTE: 0.7 K/UL (ref 0–1.3)
MONOCYTES RELATIVE PERCENT: 7 %
NEUTROPHILS ABSOLUTE: 6.3 K/UL (ref 1.7–7.7)
NEUTROPHILS RELATIVE PERCENT: 66.8 %
PDW BLD-RTO: 14.9 % (ref 12.4–15.4)
PLATELET # BLD: 153 K/UL (ref 135–450)
PMV BLD AUTO: 7.4 FL (ref 5–10.5)
POTASSIUM SERPL-SCNC: 4.8 MMOL/L (ref 3.5–5.1)
PRO-BNP: 1864 PG/ML (ref 0–124)
RBC # BLD: 3.55 M/UL (ref 4.2–5.9)
SODIUM BLD-SCNC: 138 MMOL/L (ref 136–145)
WBC # BLD: 9.4 K/UL (ref 4–11)

## 2020-06-08 PROCEDURE — 36415 COLL VENOUS BLD VENIPUNCTURE: CPT

## 2020-06-08 PROCEDURE — 80048 BASIC METABOLIC PNL TOTAL CA: CPT

## 2020-06-08 PROCEDURE — 85025 COMPLETE CBC W/AUTO DIFF WBC: CPT

## 2020-06-08 PROCEDURE — 83880 ASSAY OF NATRIURETIC PEPTIDE: CPT

## 2020-06-09 RX ORDER — PRAVASTATIN SODIUM 40 MG
40 TABLET ORAL NIGHTLY
Qty: 90 TABLET | Refills: 3 | Status: SHIPPED | OUTPATIENT
Start: 2020-06-09 | End: 2021-01-01

## 2020-06-12 ENCOUNTER — TELEPHONE (OUTPATIENT)
Dept: INTERNAL MEDICINE CLINIC | Age: 65
End: 2020-06-12

## 2020-06-15 ENCOUNTER — TELEPHONE (OUTPATIENT)
Dept: INTERNAL MEDICINE CLINIC | Age: 65
End: 2020-06-15

## 2020-06-15 LAB
ANION GAP SERPL CALCULATED.3IONS-SCNC: 14 MMOL/L (ref 3–16)
BASOPHILS ABSOLUTE: 0 K/UL (ref 0–0.2)
BASOPHILS RELATIVE PERCENT: 0.4 %
BUN BLDV-MCNC: 62 MG/DL (ref 7–20)
CALCIUM SERPL-MCNC: 8.4 MG/DL (ref 8.3–10.6)
CHLORIDE BLD-SCNC: 98 MMOL/L (ref 99–110)
CO2: 26 MMOL/L (ref 21–32)
CREAT SERPL-MCNC: 1.9 MG/DL (ref 0.8–1.3)
EOSINOPHILS ABSOLUTE: 0.4 K/UL (ref 0–0.6)
EOSINOPHILS RELATIVE PERCENT: 4.1 %
GFR AFRICAN AMERICAN: 43
GFR NON-AFRICAN AMERICAN: 36
GLUCOSE BLD-MCNC: 245 MG/DL (ref 70–99)
HCT VFR BLD CALC: 32 % (ref 40.5–52.5)
HEMOGLOBIN: 10.5 G/DL (ref 13.5–17.5)
LYMPHOCYTES ABSOLUTE: 1.5 K/UL (ref 1–5.1)
LYMPHOCYTES RELATIVE PERCENT: 15.8 %
MCH RBC QN AUTO: 31.3 PG (ref 26–34)
MCHC RBC AUTO-ENTMCNC: 33 G/DL (ref 31–36)
MCV RBC AUTO: 94.8 FL (ref 80–100)
MONOCYTES ABSOLUTE: 0.6 K/UL (ref 0–1.3)
MONOCYTES RELATIVE PERCENT: 6.4 %
NEUTROPHILS ABSOLUTE: 7.1 K/UL (ref 1.7–7.7)
NEUTROPHILS RELATIVE PERCENT: 73.3 %
PDW BLD-RTO: 15 % (ref 12.4–15.4)
PLATELET # BLD: 140 K/UL (ref 135–450)
PMV BLD AUTO: 7.3 FL (ref 5–10.5)
POTASSIUM SERPL-SCNC: 4.7 MMOL/L (ref 3.5–5.1)
PRO-BNP: 2789 PG/ML (ref 0–124)
RBC # BLD: 3.37 M/UL (ref 4.2–5.9)
SODIUM BLD-SCNC: 138 MMOL/L (ref 136–145)
WBC # BLD: 9.7 K/UL (ref 4–11)

## 2020-06-15 NOTE — TELEPHONE ENCOUNTER
[facility] is reporting that the patient had ice cream the night before and this am his blood sugar at 251    Also advising that indwelling catheter will be change today.

## 2020-06-29 LAB
ANION GAP SERPL CALCULATED.3IONS-SCNC: 12 MMOL/L (ref 6–18)
B-TYPE NATRIURETIC PEPTIDE: 128 PG/ML (ref 0–72)
BASOPHILS ABSOLUTE: 0.1 THOU/MCL (ref 0–0.2)
BASOPHILS ABSOLUTE: 1 %
BUN BLDV-MCNC: 66 MG/DL (ref 8–26)
CALCIUM SERPL-MCNC: 8.4 MG/DL (ref 8.5–10.5)
CHLORIDE BLD-SCNC: 98 MEQ/L (ref 101–111)
CO2: 27 MMOL/L (ref 24–36)
CREAT SERPL-MCNC: 1.91 MG/DL (ref 0.64–1.27)
EOSINOPHILS ABSOLUTE: 0.5 THOU/MCL (ref 0.03–0.45)
EOSINOPHILS RELATIVE PERCENT: 6 %
GFR AFRICAN AMERICAN: 40 ML/MIN/1.73 M2
GFR NON-AFRICAN AMERICAN: 35 ML/MIN/1.73 M2
GLUCOSE BLD-MCNC: 203 MG/DL (ref 70–99)
HCT VFR BLD CALC: 28.4 % (ref 40–50)
HEMOGLOBIN: 9.1 G/DL (ref 13.5–16.5)
LYMPHOCYTES ABSOLUTE: 1.8 THOU/MCL (ref 1–4)
LYMPHOCYTES RELATIVE PERCENT: 20 %
MCH RBC QN AUTO: 30 PG (ref 27–33)
MCHC RBC AUTO-ENTMCNC: 32.2 G/DL (ref 32–36)
MCV RBC AUTO: 93.3 FL (ref 82–97)
MONOCYTES # BLD: 6 %
MONOCYTES ABSOLUTE: 0.6 THOU/MCL (ref 0.2–0.9)
NEUTROPHILS ABSOLUTE: 6 THOU/MCL (ref 1.8–7.7)
PDW BLD-RTO: 15 %
PLATELET # BLD: 166 THOU/MCL (ref 140–375)
PMV BLD AUTO: 7.3 FL (ref 7.4–11.5)
POTASSIUM SERPL-SCNC: 3.9 MEQ/L (ref 3.6–5.1)
RBC # BLD: 3.04 MIL/MCL (ref 4.4–5.8)
SEG NEUTROPHILS: 67 %
SODIUM BLD-SCNC: 137 MEQ/L (ref 135–145)
WBC # BLD: 9 THOU/MCL (ref 3.6–10.5)

## 2020-06-30 ENCOUNTER — TELEPHONE (OUTPATIENT)
Dept: INTERNAL MEDICINE CLINIC | Age: 65
End: 2020-06-30

## 2020-07-08 ENCOUNTER — VIRTUAL VISIT (OUTPATIENT)
Dept: CARDIOLOGY CLINIC | Age: 65
End: 2020-07-08
Payer: MEDICARE

## 2020-07-08 PROCEDURE — 3017F COLORECTAL CA SCREEN DOC REV: CPT | Performed by: INTERNAL MEDICINE

## 2020-07-08 PROCEDURE — 4040F PNEUMOC VAC/ADMIN/RCVD: CPT | Performed by: INTERNAL MEDICINE

## 2020-07-08 PROCEDURE — G8427 DOCREV CUR MEDS BY ELIG CLIN: HCPCS | Performed by: INTERNAL MEDICINE

## 2020-07-08 PROCEDURE — 1123F ACP DISCUSS/DSCN MKR DOCD: CPT | Performed by: INTERNAL MEDICINE

## 2020-07-08 PROCEDURE — 99214 OFFICE O/P EST MOD 30 MIN: CPT | Performed by: INTERNAL MEDICINE

## 2020-07-08 NOTE — PATIENT INSTRUCTIONS
Plan:   1. Check CBC , BMP, BNP through  [facility]  2. Possible transfusion or iron infusion depending on results  3.   Follow up in 3 months
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
## 2020-07-10 ENCOUNTER — TELEPHONE (OUTPATIENT)
Dept: CARDIOLOGY CLINIC | Age: 65
End: 2020-07-10

## 2020-07-13 ENCOUNTER — HOSPITAL ENCOUNTER (OUTPATIENT)
Age: 65
Setting detail: SPECIMEN
Discharge: HOME | End: 2020-07-13
Payer: MEDICARE

## 2020-07-13 LAB
ANION GAP SERPL CALCULATED.3IONS-SCNC: 11 MMOL/L (ref 3–16)
BACTERIA: ABNORMAL /HPF
BASOPHILS ABSOLUTE: 0 K/UL (ref 0–0.2)
BASOPHILS RELATIVE PERCENT: 0.5 %
BILIRUBIN URINE: NEGATIVE
BLOOD, URINE: ABNORMAL
BUN BLDV-MCNC: 67 MG/DL (ref 7–20)
CALCIUM SERPL-MCNC: 8.8 MG/DL (ref 8.3–10.6)
CHLORIDE BLD-SCNC: 99 MMOL/L (ref 99–110)
CLARITY: ABNORMAL
CO2: 30 MMOL/L (ref 21–32)
COLOR: YELLOW
CREAT SERPL-MCNC: 2.1 MG/DL (ref 0.8–1.3)
CREATININE URINE: 83.8 MG/DL (ref 39–259)
EOSINOPHILS ABSOLUTE: 0.5 K/UL (ref 0–0.6)
EOSINOPHILS RELATIVE PERCENT: 5.3 %
EPITHELIAL CELLS, UA: 0 /HPF (ref 0–5)
FERRITIN: 446.1 NG/ML (ref 30–400)
GFR AFRICAN AMERICAN: 38
GFR NON-AFRICAN AMERICAN: 32
GLUCOSE BLD-MCNC: 203 MG/DL (ref 70–99)
GLUCOSE URINE: NEGATIVE MG/DL
HCT VFR BLD CALC: 29.9 % (ref 40.5–52.5)
HEMOGLOBIN: 10.1 G/DL (ref 13.5–17.5)
HYALINE CASTS: 1 /LPF (ref 0–8)
IRON SATURATION: 24 % (ref 20–50)
IRON: 39 UG/DL (ref 59–158)
KETONES, URINE: NEGATIVE MG/DL
LEUKOCYTE ESTERASE, URINE: ABNORMAL
LYMPHOCYTES ABSOLUTE: 1.7 K/UL (ref 1–5.1)
LYMPHOCYTES RELATIVE PERCENT: 20.2 %
MCH RBC QN AUTO: 31.9 PG (ref 26–34)
MCHC RBC AUTO-ENTMCNC: 33.9 G/DL (ref 31–36)
MCV RBC AUTO: 94.1 FL (ref 80–100)
MICROALBUMIN UR-MCNC: 12.8 MG/DL
MICROALBUMIN/CREAT UR-RTO: 152.7 MG/G (ref 0–30)
MICROSCOPIC EXAMINATION: YES
MONOCYTES ABSOLUTE: 0.6 K/UL (ref 0–1.3)
MONOCYTES RELATIVE PERCENT: 7.2 %
NEUTROPHILS ABSOLUTE: 5.7 K/UL (ref 1.7–7.7)
NEUTROPHILS RELATIVE PERCENT: 66.8 %
NITRITE, URINE: NEGATIVE
PARATHYROID HORMONE INTACT: 56.5 PG/ML (ref 14–72)
PDW BLD-RTO: 15.3 % (ref 12.4–15.4)
PH UA: 5 (ref 5–8)
PLATELET # BLD: 152 K/UL (ref 135–450)
PMV BLD AUTO: 7.5 FL (ref 5–10.5)
POTASSIUM SERPL-SCNC: 4.8 MMOL/L (ref 3.5–5.1)
PRO-BNP: 2202 PG/ML (ref 0–124)
PROTEIN UA: 30 MG/DL
RBC # BLD: 3.18 M/UL (ref 4.2–5.9)
RBC UA: 3 /HPF (ref 0–4)
SODIUM BLD-SCNC: 140 MMOL/L (ref 136–145)
SPECIFIC GRAVITY UA: 1.01 (ref 1–1.03)
TOTAL IRON BINDING CAPACITY: 163 UG/DL (ref 260–445)
URIC ACID, SERUM: 6.8 MG/DL (ref 3.5–7.2)
URINE TYPE: ABNORMAL
UROBILINOGEN, URINE: 0.2 E.U./DL
VITAMIN D 25-HYDROXY: 40.4 NG/ML
WBC # BLD: 8.6 K/UL (ref 4–11)
WBC UA: 52 /HPF (ref 0–5)

## 2020-07-13 PROCEDURE — 36415 COLL VENOUS BLD VENIPUNCTURE: CPT

## 2020-07-13 PROCEDURE — 82728 ASSAY OF FERRITIN: CPT

## 2020-07-13 PROCEDURE — 83883 ASSAY NEPHELOMETRY NOT SPEC: CPT

## 2020-07-13 PROCEDURE — 84550 ASSAY OF BLOOD/URIC ACID: CPT

## 2020-07-13 PROCEDURE — 85025 COMPLETE CBC W/AUTO DIFF WBC: CPT

## 2020-07-13 PROCEDURE — 83970 ASSAY OF PARATHORMONE: CPT

## 2020-07-13 PROCEDURE — 82043 UR ALBUMIN QUANTITATIVE: CPT

## 2020-07-13 PROCEDURE — 83550 IRON BINDING TEST: CPT

## 2020-07-13 PROCEDURE — 80048 BASIC METABOLIC PNL TOTAL CA: CPT

## 2020-07-13 PROCEDURE — 82306 VITAMIN D 25 HYDROXY: CPT

## 2020-07-13 PROCEDURE — 83880 ASSAY OF NATRIURETIC PEPTIDE: CPT

## 2020-07-13 PROCEDURE — 82570 ASSAY OF URINE CREATININE: CPT

## 2020-07-13 PROCEDURE — 81001 URINALYSIS AUTO W/SCOPE: CPT

## 2020-07-13 PROCEDURE — 83540 ASSAY OF IRON: CPT

## 2020-07-14 LAB
KAPPA, FREE LIGHT CHAINS, SERUM: 245.83 MG/L (ref 3.3–19.4)
KAPPA/LAMBDA RATIO: 2.29 (ref 0.26–1.65)
KAPPA/LAMBDA TEST COMMENT: ABNORMAL
LAMBDA, FREE LIGHT CHAINS, SERUM: 107.26 MG/L (ref 5.71–26.3)

## 2020-07-15 ENCOUNTER — TELEPHONE (OUTPATIENT)
Dept: INTERNAL MEDICINE CLINIC | Age: 65
End: 2020-07-15

## 2020-07-15 RX ORDER — INSULIN GLARGINE 100 [IU]/ML
50 INJECTION, SOLUTION SUBCUTANEOUS NIGHTLY
Qty: 5 PEN | Refills: 1 | Status: SHIPPED | OUTPATIENT
Start: 2020-07-15 | End: 2020-07-16 | Stop reason: SDUPTHER

## 2020-07-15 NOTE — TELEPHONE ENCOUNTER
Prescription Question     From   Jerry York \"Moises\"  To   [place] [id number] Practice Support  Sent   7/14/2020  6:55 PM    Hi Dr. She Choudhury,   [address] [facility] is having [place] from [facility] and one of Jose Antonio Vigil Rx was written for 4 pens using 44 units at bedtime. The insurance companies now require us to dispense an entire box so in order to meet their requirements, I wondered if we could change the Rx to an entire box with the directions as 50 units at bedtime so it lasts for 30 days (but I would keep it at 44 units). You can verbally say ok or respond back thru myChart.  I am sorry its so confusing & wordy but we all know how picky insurance companies are!! Thanks so much - hope everyone is doing well!!   [employer]

## 2020-07-16 ENCOUNTER — PATIENT MESSAGE (OUTPATIENT)
Dept: INTERNAL MEDICINE CLINIC | Age: 65
End: 2020-07-16

## 2020-07-16 RX ORDER — INSULIN GLARGINE 100 [IU]/ML
50 INJECTION, SOLUTION SUBCUTANEOUS NIGHTLY
Qty: 5 PEN | Refills: 1 | Status: SHIPPED | OUTPATIENT
Start: 2020-07-16 | End: 2020-09-08

## 2020-07-16 NOTE — TELEPHONE ENCOUNTER
From: Alejandro Reddy New  To: Radha Valladares MD  Sent: 7/16/2020 9:01 AM EDT  Subject: Prescription Question    Hi Dr. Claudia Deluna  Was wondering if you could possible e-Script an Rx for Lantus with directions of 44-50 units at bedtime so a box of pens lasts for 30 days. That way the insurance company will not penalize the store when they do the audit.  Thanks    Patricia Cuellar

## 2020-07-27 ENCOUNTER — HOSPITAL ENCOUNTER (OUTPATIENT)
Age: 65
Setting detail: SPECIMEN
Discharge: HOME | End: 2020-07-27
Payer: MEDICARE

## 2020-07-27 LAB
ANION GAP SERPL CALCULATED.3IONS-SCNC: 12 MMOL/L (ref 3–16)
BUN BLDV-MCNC: 74 MG/DL (ref 7–20)
CALCIUM SERPL-MCNC: 9.3 MG/DL (ref 8.3–10.6)
CHLORIDE BLD-SCNC: 98 MMOL/L (ref 99–110)
CO2: 30 MMOL/L (ref 21–32)
CREAT SERPL-MCNC: 2.2 MG/DL (ref 0.8–1.3)
GFR AFRICAN AMERICAN: 36
GFR NON-AFRICAN AMERICAN: 30
GLUCOSE BLD-MCNC: 94 MG/DL (ref 70–99)
HCT VFR BLD CALC: 31.3 % (ref 40.5–52.5)
HEMOGLOBIN: 10.4 G/DL (ref 13.5–17.5)
MCH RBC QN AUTO: 31 PG (ref 26–34)
MCHC RBC AUTO-ENTMCNC: 33.2 G/DL (ref 31–36)
MCV RBC AUTO: 93.3 FL (ref 80–100)
PDW BLD-RTO: 15.2 % (ref 12.4–15.4)
PLATELET # BLD: 164 K/UL (ref 135–450)
PMV BLD AUTO: 7.4 FL (ref 5–10.5)
POTASSIUM SERPL-SCNC: 4.8 MMOL/L (ref 3.5–5.1)
PRO-BNP: 2369 PG/ML (ref 0–124)
RBC # BLD: 3.36 M/UL (ref 4.2–5.9)
SODIUM BLD-SCNC: 140 MMOL/L (ref 136–145)
WBC # BLD: 10.4 K/UL (ref 4–11)

## 2020-07-27 PROCEDURE — 85027 COMPLETE CBC AUTOMATED: CPT

## 2020-07-27 PROCEDURE — 80048 BASIC METABOLIC PNL TOTAL CA: CPT

## 2020-07-27 PROCEDURE — 36415 COLL VENOUS BLD VENIPUNCTURE: CPT

## 2020-07-27 PROCEDURE — 83880 ASSAY OF NATRIURETIC PEPTIDE: CPT

## 2020-08-10 ENCOUNTER — TELEPHONE (OUTPATIENT)
Dept: INTERNAL MEDICINE CLINIC | Age: 65
End: 2020-08-10

## 2020-08-10 ENCOUNTER — HOSPITAL ENCOUNTER (OUTPATIENT)
Age: 65
Setting detail: SPECIMEN
Discharge: HOME | End: 2020-08-10
Payer: MEDICARE

## 2020-08-10 DIAGNOSIS — I50.9 HEART FAILURE, UNSPECIFIED HF CHRONICITY, UNSPECIFIED HEART FAILURE TYPE (HCC): ICD-10-CM

## 2020-08-10 DIAGNOSIS — D50.9 IRON DEFICIENCY ANEMIA, UNSPECIFIED IRON DEFICIENCY ANEMIA TYPE: ICD-10-CM

## 2020-08-10 DIAGNOSIS — G82.50 QUADRIPLEGIA, UNSPECIFIED (HCC): ICD-10-CM

## 2020-08-10 LAB
ANION GAP SERPL CALCULATED.3IONS-SCNC: 11 MMOL/L (ref 3–16)
BASOPHILS ABSOLUTE: 0.1 K/UL (ref 0–0.2)
BASOPHILS RELATIVE PERCENT: 0.5 %
BUN BLDV-MCNC: 79 MG/DL (ref 7–20)
CALCIUM SERPL-MCNC: 8.7 MG/DL (ref 8.3–10.6)
CHLORIDE BLD-SCNC: 101 MMOL/L (ref 99–110)
CO2: 29 MMOL/L (ref 21–32)
CREAT SERPL-MCNC: 2.2 MG/DL (ref 0.8–1.3)
EOSINOPHILS ABSOLUTE: 0.3 K/UL (ref 0–0.6)
EOSINOPHILS RELATIVE PERCENT: 3 %
GFR AFRICAN AMERICAN: 36
GFR NON-AFRICAN AMERICAN: 30
GLUCOSE BLD-MCNC: 145 MG/DL (ref 70–99)
HCT VFR BLD CALC: 32 % (ref 40.5–52.5)
HEMOGLOBIN: 10.6 G/DL (ref 13.5–17.5)
LYMPHOCYTES ABSOLUTE: 1.3 K/UL (ref 1–5.1)
LYMPHOCYTES RELATIVE PERCENT: 14 %
MCH RBC QN AUTO: 30.5 PG (ref 26–34)
MCHC RBC AUTO-ENTMCNC: 33 G/DL (ref 31–36)
MCV RBC AUTO: 92.4 FL (ref 80–100)
MONOCYTES ABSOLUTE: 0.6 K/UL (ref 0–1.3)
MONOCYTES RELATIVE PERCENT: 6.5 %
NEUTROPHILS ABSOLUTE: 7.3 K/UL (ref 1.7–7.7)
NEUTROPHILS RELATIVE PERCENT: 76 %
PDW BLD-RTO: 15.1 % (ref 12.4–15.4)
PLATELET # BLD: 173 K/UL (ref 135–450)
PMV BLD AUTO: 7.2 FL (ref 5–10.5)
POTASSIUM SERPL-SCNC: 4.9 MMOL/L (ref 3.5–5.1)
PRO-BNP: 2468 PG/ML (ref 0–124)
RBC # BLD: 3.46 M/UL (ref 4.2–5.9)
SODIUM BLD-SCNC: 141 MMOL/L (ref 136–145)
WBC # BLD: 9.6 K/UL (ref 4–11)

## 2020-08-10 PROCEDURE — 85025 COMPLETE CBC W/AUTO DIFF WBC: CPT

## 2020-08-10 PROCEDURE — 80048 BASIC METABOLIC PNL TOTAL CA: CPT

## 2020-08-10 PROCEDURE — 36415 COLL VENOUS BLD VENIPUNCTURE: CPT

## 2020-08-10 PROCEDURE — 83880 ASSAY OF NATRIURETIC PEPTIDE: CPT

## 2020-08-10 RX ORDER — SODIUM CHLORIDE 0.9 % (FLUSH) 0.9 %
10 SYRINGE (ML) INJECTION PRN
Status: CANCELLED | OUTPATIENT
Start: 2020-08-10

## 2020-08-10 RX ORDER — SODIUM CHLORIDE 9 MG/ML
INJECTION, SOLUTION INTRAVENOUS CONTINUOUS
Status: CANCELLED | OUTPATIENT
Start: 2020-08-10

## 2020-08-10 NOTE — TELEPHONE ENCOUNTER
Home care requesting verbal consent for re certification.  Home care states they are having issues with pt super pubic cath but they have a call out to urology

## 2020-08-19 ENCOUNTER — HOSPITAL ENCOUNTER (OUTPATIENT)
Dept: ONCOLOGY | Age: 65
Setting detail: INFUSION SERIES
Discharge: HOME | End: 2020-08-19
Payer: MEDICARE

## 2020-08-19 VITALS
SYSTOLIC BLOOD PRESSURE: 135 MMHG | HEART RATE: 80 BPM | RESPIRATION RATE: 16 BRPM | TEMPERATURE: 97 F | DIASTOLIC BLOOD PRESSURE: 71 MMHG

## 2020-08-19 DIAGNOSIS — I50.9 HEART FAILURE, UNSPECIFIED HF CHRONICITY, UNSPECIFIED HEART FAILURE TYPE (HCC): ICD-10-CM

## 2020-08-19 DIAGNOSIS — G82.50 QUADRIPLEGIA, UNSPECIFIED (HCC): ICD-10-CM

## 2020-08-19 DIAGNOSIS — D50.9 IRON DEFICIENCY ANEMIA, UNSPECIFIED IRON DEFICIENCY ANEMIA TYPE: Primary | ICD-10-CM

## 2020-08-19 PROCEDURE — 2580000003 HC RX 258: Performed by: INTERNAL MEDICINE

## 2020-08-19 PROCEDURE — 6360000002 HC RX W HCPCS: Performed by: INTERNAL MEDICINE

## 2020-08-19 PROCEDURE — 96365 THER/PROPH/DIAG IV INF INIT: CPT

## 2020-08-19 RX ORDER — SODIUM CHLORIDE 0.9 % (FLUSH) 0.9 %
10 SYRINGE (ML) INJECTION PRN
Status: CANCELLED | OUTPATIENT
Start: 2020-08-26

## 2020-08-19 RX ORDER — SODIUM CHLORIDE 0.9 % (FLUSH) 0.9 %
10 SYRINGE (ML) INJECTION PRN
Status: DISCONTINUED | OUTPATIENT
Start: 2020-08-19 | End: 2020-08-20 | Stop reason: HOSPADM

## 2020-08-19 RX ORDER — SODIUM CHLORIDE 9 MG/ML
INJECTION, SOLUTION INTRAVENOUS CONTINUOUS
Status: CANCELLED | OUTPATIENT
Start: 2020-08-26

## 2020-08-19 RX ORDER — SODIUM CHLORIDE 9 MG/ML
INJECTION, SOLUTION INTRAVENOUS CONTINUOUS
Status: ACTIVE | OUTPATIENT
Start: 2020-08-19 | End: 2020-08-19

## 2020-08-19 RX ADMIN — Medication 10 ML: at 10:35

## 2020-08-19 RX ADMIN — FERRIC CARBOXYMALTOSE INJECTION 750 MG: 50 INJECTION, SOLUTION INTRAVENOUS at 10:56

## 2020-08-19 RX ADMIN — SODIUM CHLORIDE: 9 INJECTION, SOLUTION INTRAVENOUS at 10:35

## 2020-08-19 NOTE — PROGRESS NOTES
To clinic for first dose of Injectafer. Pt in  [facility]. Tolerated infusion well over 30 minutes. Given information about the medication including possible side effects. Verbalized understanding. Patient scheduled to return next week for same infusion. Pt discharged home per transportation.

## 2020-08-24 LAB
ANION GAP SERPL CALCULATED.3IONS-SCNC: 10 MMOL/L (ref 3–16)
BASOPHILS ABSOLUTE: 0.1 K/UL (ref 0–0.2)
BASOPHILS RELATIVE PERCENT: 0.8 %
BUN BLDV-MCNC: 65 MG/DL (ref 7–20)
CALCIUM SERPL-MCNC: 8.7 MG/DL (ref 8.3–10.6)
CHLORIDE BLD-SCNC: 101 MMOL/L (ref 99–110)
CO2: 29 MMOL/L (ref 21–32)
CREAT SERPL-MCNC: 1.9 MG/DL (ref 0.8–1.3)
EOSINOPHILS ABSOLUTE: 0.5 K/UL (ref 0–0.6)
EOSINOPHILS RELATIVE PERCENT: 4.3 %
GFR AFRICAN AMERICAN: 43
GFR NON-AFRICAN AMERICAN: 36
GLUCOSE BLD-MCNC: 72 MG/DL (ref 70–99)
HCT VFR BLD CALC: 31.1 % (ref 40.5–52.5)
HEMOGLOBIN: 10.2 G/DL (ref 13.5–17.5)
LYMPHOCYTES ABSOLUTE: 1.9 K/UL (ref 1–5.1)
LYMPHOCYTES RELATIVE PERCENT: 17.6 %
MCH RBC QN AUTO: 30.4 PG (ref 26–34)
MCHC RBC AUTO-ENTMCNC: 32.8 G/DL (ref 31–36)
MCV RBC AUTO: 92.4 FL (ref 80–100)
MONOCYTES ABSOLUTE: 0.9 K/UL (ref 0–1.3)
MONOCYTES RELATIVE PERCENT: 8 %
NEUTROPHILS ABSOLUTE: 7.4 K/UL (ref 1.7–7.7)
NEUTROPHILS RELATIVE PERCENT: 69.3 %
PDW BLD-RTO: 15.3 % (ref 12.4–15.4)
PLATELET # BLD: 162 K/UL (ref 135–450)
PMV BLD AUTO: 7.3 FL (ref 5–10.5)
POTASSIUM SERPL-SCNC: 4.9 MMOL/L (ref 3.5–5.1)
PRO-BNP: 2316 PG/ML (ref 0–124)
RBC # BLD: 3.36 M/UL (ref 4.2–5.9)
SODIUM BLD-SCNC: 140 MMOL/L (ref 136–145)
WBC # BLD: 10.7 K/UL (ref 4–11)

## 2020-08-26 ENCOUNTER — HOSPITAL ENCOUNTER (OUTPATIENT)
Dept: ONCOLOGY | Age: 65
Setting detail: INFUSION SERIES
Discharge: HOME | End: 2020-08-26
Payer: MEDICARE

## 2020-08-26 VITALS
SYSTOLIC BLOOD PRESSURE: 161 MMHG | HEART RATE: 78 BPM | RESPIRATION RATE: 16 BRPM | TEMPERATURE: 97.2 F | DIASTOLIC BLOOD PRESSURE: 71 MMHG

## 2020-08-26 DIAGNOSIS — G82.50 QUADRIPLEGIA, UNSPECIFIED (HCC): ICD-10-CM

## 2020-08-26 DIAGNOSIS — D50.9 IRON DEFICIENCY ANEMIA, UNSPECIFIED IRON DEFICIENCY ANEMIA TYPE: Primary | ICD-10-CM

## 2020-08-26 DIAGNOSIS — I50.9 HEART FAILURE, UNSPECIFIED HF CHRONICITY, UNSPECIFIED HEART FAILURE TYPE (HCC): ICD-10-CM

## 2020-08-26 PROCEDURE — 2580000003 HC RX 258: Performed by: INTERNAL MEDICINE

## 2020-08-26 PROCEDURE — 96365 THER/PROPH/DIAG IV INF INIT: CPT

## 2020-08-26 PROCEDURE — 6360000002 HC RX W HCPCS: Performed by: INTERNAL MEDICINE

## 2020-08-26 RX ORDER — SODIUM CHLORIDE 9 MG/ML
INJECTION, SOLUTION INTRAVENOUS CONTINUOUS
Status: CANCELLED | OUTPATIENT
Start: 2020-08-26

## 2020-08-26 RX ORDER — SODIUM CHLORIDE 0.9 % (FLUSH) 0.9 %
10 SYRINGE (ML) INJECTION PRN
Status: DISCONTINUED | OUTPATIENT
Start: 2020-08-26 | End: 2020-08-26

## 2020-08-26 RX ORDER — SODIUM CHLORIDE 9 MG/ML
INJECTION, SOLUTION INTRAVENOUS CONTINUOUS
Status: DISCONTINUED | OUTPATIENT
Start: 2020-08-26 | End: 2020-08-26

## 2020-08-26 RX ORDER — SODIUM CHLORIDE 0.9 % (FLUSH) 0.9 %
10 SYRINGE (ML) INJECTION PRN
Status: CANCELLED | OUTPATIENT
Start: 2020-08-26

## 2020-08-26 RX ADMIN — Medication 10 ML: at 10:48

## 2020-08-26 RX ADMIN — FERRIC CARBOXYMALTOSE INJECTION 750 MG: 50 INJECTION, SOLUTION INTRAVENOUS at 10:48

## 2020-08-26 RX ADMIN — SODIUM CHLORIDE: 9 INJECTION, SOLUTION INTRAVENOUS at 10:48

## 2020-08-26 NOTE — PROGRESS NOTES
To clinic for second dose of Injectafer. Pt in  [facility]. Tolerated infusion well over 30 minutes. Given information about the medication including possible side effects. Verbalized understanding. Patient to follow up with Dr. Joe Mosley. Pt discharged home per transportation.

## 2020-09-08 ENCOUNTER — TELEPHONE (OUTPATIENT)
Dept: INTERNAL MEDICINE CLINIC | Age: 65
End: 2020-09-08

## 2020-09-08 ENCOUNTER — HOSPITAL ENCOUNTER (OUTPATIENT)
Age: 65
Setting detail: SPECIMEN
Discharge: HOME | End: 2020-09-08
Payer: MEDICARE

## 2020-09-08 LAB
ANION GAP SERPL CALCULATED.3IONS-SCNC: 10 MMOL/L (ref 3–16)
BASOPHILS ABSOLUTE: 0.1 K/UL (ref 0–0.2)
BASOPHILS RELATIVE PERCENT: 0.6 %
BUN BLDV-MCNC: 71 MG/DL (ref 7–20)
CALCIUM SERPL-MCNC: 8.7 MG/DL (ref 8.3–10.6)
CHLORIDE BLD-SCNC: 101 MMOL/L (ref 99–110)
CO2: 26 MMOL/L (ref 21–32)
CREAT SERPL-MCNC: 2.2 MG/DL (ref 0.8–1.3)
EOSINOPHILS ABSOLUTE: 0.2 K/UL (ref 0–0.6)
EOSINOPHILS RELATIVE PERCENT: 2.2 %
GFR AFRICAN AMERICAN: 36
GFR NON-AFRICAN AMERICAN: 30
GLUCOSE BLD-MCNC: 199 MG/DL (ref 70–99)
HCT VFR BLD CALC: 28.2 % (ref 40.5–52.5)
HEMOGLOBIN: 9.5 G/DL (ref 13.5–17.5)
LYMPHOCYTES ABSOLUTE: 1.3 K/UL (ref 1–5.1)
LYMPHOCYTES RELATIVE PERCENT: 13.7 %
MCH RBC QN AUTO: 32.1 PG (ref 26–34)
MCHC RBC AUTO-ENTMCNC: 33.8 G/DL (ref 31–36)
MCV RBC AUTO: 95 FL (ref 80–100)
MONOCYTES ABSOLUTE: 0.8 K/UL (ref 0–1.3)
MONOCYTES RELATIVE PERCENT: 8.2 %
NEUTROPHILS ABSOLUTE: 7.4 K/UL (ref 1.7–7.7)
NEUTROPHILS RELATIVE PERCENT: 75.3 %
PDW BLD-RTO: 17.2 % (ref 12.4–15.4)
PLATELET # BLD: 148 K/UL (ref 135–450)
PMV BLD AUTO: 7.5 FL (ref 5–10.5)
POTASSIUM SERPL-SCNC: 5 MMOL/L (ref 3.5–5.1)
PRO-BNP: 2410 PG/ML (ref 0–124)
RBC # BLD: 2.97 M/UL (ref 4.2–5.9)
SODIUM BLD-SCNC: 137 MMOL/L (ref 136–145)
WBC # BLD: 9.8 K/UL (ref 4–11)

## 2020-09-08 PROCEDURE — 85025 COMPLETE CBC W/AUTO DIFF WBC: CPT

## 2020-09-08 PROCEDURE — 83880 ASSAY OF NATRIURETIC PEPTIDE: CPT

## 2020-09-08 PROCEDURE — 36415 COLL VENOUS BLD VENIPUNCTURE: CPT

## 2020-09-08 PROCEDURE — 80048 BASIC METABOLIC PNL TOTAL CA: CPT

## 2020-09-08 RX ORDER — INSULIN GLARGINE 100 [IU]/ML
44 INJECTION, SOLUTION SUBCUTANEOUS NIGHTLY
Qty: 5 PEN | Refills: 11 | Status: SHIPPED | OUTPATIENT
Start: 2020-09-08 | End: 2020-09-14 | Stop reason: SDUPTHER

## 2020-09-08 RX ORDER — TORSEMIDE 20 MG/1
TABLET ORAL
Qty: 30 TABLET | Refills: 11 | Status: ON HOLD | OUTPATIENT
Start: 2020-09-08 | End: 2021-01-01 | Stop reason: SDUPTHER

## 2020-09-08 RX ORDER — INSULIN GLARGINE 100 [IU]/ML
44 INJECTION, SOLUTION SUBCUTANEOUS NIGHTLY
Qty: 5 PEN | Refills: 1 | COMMUNITY
Start: 2020-09-08 | End: 2020-09-08 | Stop reason: SDUPTHER

## 2020-09-08 NOTE — TELEPHONE ENCOUNTER
Prescription Question     From   Obed Saeed \"Moises\"  To   [id number]  Sent   9/8/2020  8:43 AM    Hi Dr. Dianne Wong or John Tai   I am so sorry to keep pestering you about Moisess Lantus Rx but The audit department now is requiring  Dr. Willie Rizvi to verify that on 10/11/2019, he ordered Lantus Solostar 12ml with directions to inject 44 units sq nightly with 7 refills and signed by Dr. Willie Rizvi with the   current date. You can either fax it to [phone] or mail it to me!,    Again I apologize for the trouble and let me know if you can do this. Thanks again     Evelyn Beniteses           Is this ok to write just like that?

## 2020-09-09 RX ORDER — PANTOPRAZOLE SODIUM 40 MG/1
TABLET, DELAYED RELEASE ORAL
Qty: 30 TABLET | Refills: 3 | OUTPATIENT
Start: 2020-09-09

## 2020-09-09 RX ORDER — FINASTERIDE 5 MG/1
TABLET, FILM COATED ORAL
Qty: 30 TABLET | Refills: 3 | OUTPATIENT
Start: 2020-09-09

## 2020-09-09 RX ORDER — CITALOPRAM 20 MG/1
TABLET ORAL
Qty: 30 TABLET | Refills: 3 | OUTPATIENT
Start: 2020-09-09

## 2020-09-10 ENCOUNTER — PATIENT MESSAGE (OUTPATIENT)
Dept: INTERNAL MEDICINE CLINIC | Age: 65
End: 2020-09-10

## 2020-09-11 NOTE — TELEPHONE ENCOUNTER
From: Natty Woods New  To: Mansi Magallon MD  Sent: 9/10/2020 7:27 PM EDT  Subject: Prescription Question    Dear Dr. Liza Miller,  Just wondering if you have had a chance to read my message from 9/8? I really appreciate your help   So just let me know. Thanks so very much!     Katie

## 2020-09-14 RX ORDER — INSULIN GLARGINE 100 [IU]/ML
44 INJECTION, SOLUTION SUBCUTANEOUS NIGHTLY
Qty: 12 ML | Refills: 7 | Status: SHIPPED | OUTPATIENT
Start: 2020-09-14 | End: 2020-01-01

## 2020-09-18 ENCOUNTER — VIRTUAL VISIT (OUTPATIENT)
Dept: PULMONOLOGY | Age: 65
End: 2020-09-18
Payer: MEDICARE

## 2020-09-18 PROCEDURE — 4040F PNEUMOC VAC/ADMIN/RCVD: CPT | Performed by: INTERNAL MEDICINE

## 2020-09-18 PROCEDURE — 1123F ACP DISCUSS/DSCN MKR DOCD: CPT | Performed by: INTERNAL MEDICINE

## 2020-09-18 PROCEDURE — G8427 DOCREV CUR MEDS BY ELIG CLIN: HCPCS | Performed by: INTERNAL MEDICINE

## 2020-09-18 PROCEDURE — 99213 OFFICE O/P EST LOW 20 MIN: CPT | Performed by: INTERNAL MEDICINE

## 2020-09-18 PROCEDURE — 3017F COLORECTAL CA SCREEN DOC REV: CPT | Performed by: INTERNAL MEDICINE

## 2020-09-18 ASSESSMENT — ENCOUNTER SYMPTOMS
DIARRHEA: 0
ANAL BLEEDING: 0
BLOOD IN STOOL: 0
ABDOMINAL PAIN: 0
ABDOMINAL DISTENTION: 0
CHEST TIGHTNESS: 0
SINUS PRESSURE: 0
VOICE CHANGE: 0
APNEA: 0
CHOKING: 0
CONSTIPATION: 0
BACK PAIN: 0
STRIDOR: 0
WHEEZING: 0
SORE THROAT: 0
RHINORRHEA: 0
COUGH: 1
SHORTNESS OF BREATH: 1
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
## 2020-09-21 LAB
ANION GAP SERPL CALCULATED.3IONS-SCNC: 10 MMOL/L (ref 3–16)
BASOPHILS ABSOLUTE: 0.1 K/UL (ref 0–0.2)
BASOPHILS RELATIVE PERCENT: 0.7 %
BUN BLDV-MCNC: 59 MG/DL (ref 7–20)
CALCIUM SERPL-MCNC: 8.3 MG/DL (ref 8.3–10.6)
CHLORIDE BLD-SCNC: 102 MMOL/L (ref 99–110)
CO2: 28 MMOL/L (ref 21–32)
CREAT SERPL-MCNC: 2.1 MG/DL (ref 0.8–1.3)
EOSINOPHILS ABSOLUTE: 0.4 K/UL (ref 0–0.6)
EOSINOPHILS RELATIVE PERCENT: 4.9 %
GFR AFRICAN AMERICAN: 38
GFR NON-AFRICAN AMERICAN: 32
GLUCOSE BLD-MCNC: 105 MG/DL (ref 70–99)
HCT VFR BLD CALC: 30.4 % (ref 40.5–52.5)
HEMOGLOBIN: 9.8 G/DL (ref 13.5–17.5)
LYMPHOCYTES ABSOLUTE: 1.4 K/UL (ref 1–5.1)
LYMPHOCYTES RELATIVE PERCENT: 15 %
MCH RBC QN AUTO: 30.6 PG (ref 26–34)
MCHC RBC AUTO-ENTMCNC: 32.2 G/DL (ref 31–36)
MCV RBC AUTO: 94.8 FL (ref 80–100)
MONOCYTES ABSOLUTE: 0.7 K/UL (ref 0–1.3)
MONOCYTES RELATIVE PERCENT: 7.6 %
NEUTROPHILS ABSOLUTE: 6.5 K/UL (ref 1.7–7.7)
NEUTROPHILS RELATIVE PERCENT: 71.8 %
PDW BLD-RTO: 17.1 % (ref 12.4–15.4)
PLATELET # BLD: 158 K/UL (ref 135–450)
PMV BLD AUTO: 7.1 FL (ref 5–10.5)
POTASSIUM SERPL-SCNC: 5 MMOL/L (ref 3.5–5.1)
PRO-BNP: 2717 PG/ML (ref 0–124)
RBC # BLD: 3.2 M/UL (ref 4.2–5.9)
SODIUM BLD-SCNC: 140 MMOL/L (ref 136–145)
WBC # BLD: 9 K/UL (ref 4–11)

## 2020-09-23 ENCOUNTER — VIRTUAL VISIT (OUTPATIENT)
Dept: INTERNAL MEDICINE CLINIC | Age: 65
End: 2020-09-23
Payer: MEDICARE

## 2020-09-23 PROBLEM — D89.2 HYPERGAMMAGLOBULINEMIA: Status: ACTIVE | Noted: 2020-09-23

## 2020-09-23 PROCEDURE — 99214 OFFICE O/P EST MOD 30 MIN: CPT | Performed by: INTERNAL MEDICINE

## 2020-09-23 PROCEDURE — 4040F PNEUMOC VAC/ADMIN/RCVD: CPT | Performed by: INTERNAL MEDICINE

## 2020-09-23 PROCEDURE — G8427 DOCREV CUR MEDS BY ELIG CLIN: HCPCS | Performed by: INTERNAL MEDICINE

## 2020-09-23 PROCEDURE — 3017F COLORECTAL CA SCREEN DOC REV: CPT | Performed by: INTERNAL MEDICINE

## 2020-09-23 PROCEDURE — 3046F HEMOGLOBIN A1C LEVEL >9.0%: CPT | Performed by: INTERNAL MEDICINE

## 2020-09-23 PROCEDURE — 2022F DILAT RTA XM EVC RTNOPTHY: CPT | Performed by: INTERNAL MEDICINE

## 2020-09-23 PROCEDURE — 1123F ACP DISCUSS/DSCN MKR DOCD: CPT | Performed by: INTERNAL MEDICINE

## 2020-09-23 ASSESSMENT — ENCOUNTER SYMPTOMS
SHORTNESS OF BREATH: 0
ABDOMINAL PAIN: 0
TROUBLE SWALLOWING: 0

## 2020-09-23 ASSESSMENT — PATIENT HEALTH QUESTIONNAIRE - PHQ9
SUM OF ALL RESPONSES TO PHQ9 QUESTIONS 1 & 2: 0
SUM OF ALL RESPONSES TO PHQ QUESTIONS 1-9: 0
SUM OF ALL RESPONSES TO PHQ QUESTIONS 1-9: 0
2. FEELING DOWN, DEPRESSED OR HOPELESS: 0
1. LITTLE INTEREST OR PLEASURE IN DOING THINGS: 0

## 2020-09-23 NOTE — PROGRESS NOTES
SUBJECTIVE:  Patient ID: Sue Chavez is an 75 y.o. male. HPI: Patient here today for the f/u of chronic problems-- see Problem List and associated comments. New issues or complaints include (also see Assessment for more details):      TELEHEALTH EVALUATION -- Audio/Visual (During BPEXJ-24 public health emergency)    Pursuant to the emergency declaration under the [address] [place] [address] authority and the [employer] Act, this Virtual  Visit was conducted, with patient's consent, to reduce the patient's risk of exposure to COVID-19 and provide continuity of care for an established patient. Services were provided through a video synchronous discussion virtually to substitute for in-person clinic visit. Patient on a video visit today. He is continued to shelter in place at home during the pandemic. He has had no signs or symptoms of COVID. He is dutifully cared for by his wife and he has home nursing. He does see a specialist routinely, although through video visits recently. He has not had blood work for some of his chronic problems for some time. It is difficult for him negative in the office, especially during the pandemic. Review of Systems   Constitutional: Negative for fever. HENT: Negative for trouble swallowing. Eyes: Negative for visual disturbance. Respiratory: Negative for shortness of breath. Cardiovascular: Negative for chest pain. Gastrointestinal: Negative for abdominal pain. Skin: Negative for wound. Neurological: Negative for headaches. Stable paralysis   Psychiatric/Behavioral: Negative for dysphoric mood and sleep disturbance. The patient is nervous/anxious. OBJECTIVE:    There were no vitals taken for this visit. Physical Exam  Constitutional:       General: He is not in acute distress. Appearance: He is not ill-appearing.       Comments: Reclining in chair   Pulmonary:      Effort: No respiratory distress. Neurological:      Mental Status: He is oriented to person, place, and time. Comments: Soft speaking voice with pauses   Psychiatric:         Mood and Affect: Mood normal.         Thought Content: Thought content normal.         Judgment: Judgment normal.         ASSESSMENT:       Encounter Diagnoses   Name Primary?  Hypergammaglobulinemia     Uncontrolled type 2 diabetes mellitus with hyperglycemia (HCC)     Hyperlipidemia, unspecified hyperlipidemia type     Chronic anemia     CKD (chronic kidney disease) stage 3, GFR 30-59 ml/min ([facility])        Hypergammaglobulinemia  Elevated kappa and lambda chains-probably from renal insufficiency. But check SPEP. Diabetes type 2, uncontrolled ([facility]  Patient on insulin-check A1c    Hyperlipidemia  Pravastatin-check nonfasting lipids    Chronic anemia  Check CBC    CKD (chronic kidney disease) stage 3, GFR 30-59 ml/min ([facility])  Per nephrology- last creatinine 2.1 with GFR 39        PLAN:See ASSESSMENT for evaluation & PLAN    We will arrange for home labs. We will also arrange for flu shot at his house. No orders of the defined types were placed in this encounter.    , PMH, SH and FH reviewed and noted. Recent and past labs, tests and consultsalso reviewed. Recent or new meds also reviewed.

## 2020-09-28 RX ORDER — CITALOPRAM 20 MG/1
20 TABLET ORAL DAILY
Qty: 30 TABLET | Refills: 3 | Status: SHIPPED | OUTPATIENT
Start: 2020-09-28 | End: 2021-01-01

## 2020-09-28 RX ORDER — PANTOPRAZOLE SODIUM 40 MG/1
40 TABLET, DELAYED RELEASE ORAL DAILY
Qty: 30 TABLET | Refills: 3 | Status: SHIPPED | OUTPATIENT
Start: 2020-09-28 | End: 2021-01-01

## 2020-09-28 RX ORDER — FINASTERIDE 5 MG/1
5 TABLET, FILM COATED ORAL DAILY
Qty: 30 TABLET | Refills: 3 | Status: SHIPPED | OUTPATIENT
Start: 2020-09-28 | End: 2021-01-01

## 2020-09-29 LAB
A/G RATIO: 1.1 (ref 1.1–2.2)
ALBUMIN SERPL-MCNC: 3.5 G/DL (ref 3.4–5)
ALP BLD-CCNC: 113 U/L (ref 40–129)
ALT SERPL-CCNC: 9 U/L (ref 10–40)
ANION GAP SERPL CALCULATED.3IONS-SCNC: 11 MMOL/L (ref 3–16)
AST SERPL-CCNC: 18 U/L (ref 15–37)
BASOPHILS ABSOLUTE: 0 K/UL (ref 0–0.2)
BASOPHILS RELATIVE PERCENT: 0.5 %
BILIRUB SERPL-MCNC: 0.3 MG/DL (ref 0–1)
BUN BLDV-MCNC: 60 MG/DL (ref 7–20)
CALCIUM SERPL-MCNC: 8.6 MG/DL (ref 8.3–10.6)
CHLORIDE BLD-SCNC: 101 MMOL/L (ref 99–110)
CHOLESTEROL, TOTAL: 132 MG/DL (ref 0–199)
CO2: 27 MMOL/L (ref 21–32)
CREAT SERPL-MCNC: 2.1 MG/DL (ref 0.8–1.3)
EOSINOPHILS ABSOLUTE: 0.5 K/UL (ref 0–0.6)
EOSINOPHILS RELATIVE PERCENT: 6.3 %
GFR AFRICAN AMERICAN: 38
GFR NON-AFRICAN AMERICAN: 32
GLOBULIN: 3.2 G/DL
GLUCOSE BLD-MCNC: 108 MG/DL (ref 70–99)
HCT VFR BLD CALC: 29.7 % (ref 40.5–52.5)
HDLC SERPL-MCNC: 37 MG/DL (ref 40–60)
HEMOGLOBIN: 9.7 G/DL (ref 13.5–17.5)
LDL CHOLESTEROL CALCULATED: 70 MG/DL
LYMPHOCYTES ABSOLUTE: 1.7 K/UL (ref 1–5.1)
LYMPHOCYTES RELATIVE PERCENT: 19.9 %
MCH RBC QN AUTO: 30.5 PG (ref 26–34)
MCHC RBC AUTO-ENTMCNC: 32.5 G/DL (ref 31–36)
MCV RBC AUTO: 93.8 FL (ref 80–100)
MONOCYTES ABSOLUTE: 0.6 K/UL (ref 0–1.3)
MONOCYTES RELATIVE PERCENT: 7.3 %
NEUTROPHILS ABSOLUTE: 5.8 K/UL (ref 1.7–7.7)
NEUTROPHILS RELATIVE PERCENT: 66 %
PDW BLD-RTO: 16.5 % (ref 12.4–15.4)
PLATELET # BLD: 161 K/UL (ref 135–450)
PMV BLD AUTO: 7.1 FL (ref 5–10.5)
POTASSIUM SERPL-SCNC: 4.5 MMOL/L (ref 3.5–5.1)
PROSTATE SPECIFIC ANTIGEN: 0.11 NG/ML (ref 0–4)
RBC # BLD: 3.17 M/UL (ref 4.2–5.9)
SODIUM BLD-SCNC: 139 MMOL/L (ref 136–145)
TOTAL PROTEIN: 6.7 G/DL (ref 6.4–8.2)
TRIGL SERPL-MCNC: 123 MG/DL (ref 0–150)
VLDLC SERPL CALC-MCNC: 25 MG/DL
WBC # BLD: 8.7 K/UL (ref 4–11)

## 2020-09-29 RX ORDER — CARVEDILOL 12.5 MG/1
12.5 TABLET ORAL 2 TIMES DAILY WITH MEALS
Qty: 60 TABLET | Refills: 11 | Status: ON HOLD | OUTPATIENT
Start: 2020-09-29 | End: 2021-01-01 | Stop reason: HOSPADM

## 2020-09-30 LAB
ALBUMIN SERPL-MCNC: 3.2 G/DL (ref 3.1–4.9)
ALPHA-1-GLOBULIN: 0.3 G/DL (ref 0.2–0.4)
ALPHA-2-GLOBULIN: 0.8 G/DL (ref 0.4–1.1)
BETA GLOBULIN: 0.9 G/DL (ref 0.9–1.6)
ESTIMATED AVERAGE GLUCOSE: 111.2 MG/DL
GAMMA GLOBULIN: 1.5 G/DL (ref 0.6–1.8)
HBA1C MFR BLD: 5.5 %
SPE/IFE INTERPRETATION: NORMAL

## 2020-10-01 RX ORDER — FLUTICASONE PROPIONATE 50 MCG
SPRAY, SUSPENSION (ML) NASAL
Qty: 16 G | Refills: 2 | Status: SHIPPED | OUTPATIENT
Start: 2020-10-01 | End: 2021-01-01

## 2020-10-02 ENCOUNTER — TELEPHONE (OUTPATIENT)
Dept: INTERNAL MEDICINE CLINIC | Age: 65
End: 2020-10-02

## 2020-10-02 NOTE — TELEPHONE ENCOUNTER
*Flu Vaccine at [address] is asking if Dr. Shell Schmid will okay her picking up the Flu vaccine for the patient and giving it to the patient in his home. Patient is unable to travel to receive the flu shot. Please call Omari Garcia if possible [phone]    Please call and advise.

## 2020-10-05 LAB
ANION GAP SERPL CALCULATED.3IONS-SCNC: 10 MMOL/L (ref 3–16)
BASOPHILS ABSOLUTE: 0 K/UL (ref 0–0.2)
BASOPHILS RELATIVE PERCENT: 0.4 %
BUN BLDV-MCNC: 61 MG/DL (ref 7–20)
CALCIUM SERPL-MCNC: 8.8 MG/DL (ref 8.3–10.6)
CHLORIDE BLD-SCNC: 102 MMOL/L (ref 99–110)
CO2: 27 MMOL/L (ref 21–32)
CREAT SERPL-MCNC: 2.2 MG/DL (ref 0.8–1.3)
EOSINOPHILS ABSOLUTE: 0.4 K/UL (ref 0–0.6)
EOSINOPHILS RELATIVE PERCENT: 4.1 %
GFR AFRICAN AMERICAN: 36
GFR NON-AFRICAN AMERICAN: 30
GLUCOSE BLD-MCNC: 80 MG/DL (ref 70–99)
HCT VFR BLD CALC: 28.9 % (ref 40.5–52.5)
HEMOGLOBIN: 9.5 G/DL (ref 13.5–17.5)
LYMPHOCYTES ABSOLUTE: 2.3 K/UL (ref 1–5.1)
LYMPHOCYTES RELATIVE PERCENT: 21.7 %
MCH RBC QN AUTO: 31.1 PG (ref 26–34)
MCHC RBC AUTO-ENTMCNC: 32.7 G/DL (ref 31–36)
MCV RBC AUTO: 95.2 FL (ref 80–100)
MONOCYTES ABSOLUTE: 0.7 K/UL (ref 0–1.3)
MONOCYTES RELATIVE PERCENT: 6.5 %
NEUTROPHILS ABSOLUTE: 7.2 K/UL (ref 1.7–7.7)
NEUTROPHILS RELATIVE PERCENT: 67.3 %
PDW BLD-RTO: 17.1 % (ref 12.4–15.4)
PLATELET # BLD: 170 K/UL (ref 135–450)
PMV BLD AUTO: 7.2 FL (ref 5–10.5)
POTASSIUM SERPL-SCNC: 4.8 MMOL/L (ref 3.5–5.1)
PRO-BNP: 3224 PG/ML (ref 0–124)
RBC # BLD: 3.04 M/UL (ref 4.2–5.9)
SODIUM BLD-SCNC: 139 MMOL/L (ref 136–145)
WBC # BLD: 10.7 K/UL (ref 4–11)

## 2020-10-06 ENCOUNTER — NURSE ONLY (OUTPATIENT)
Dept: INTERNAL MEDICINE CLINIC | Age: 65
End: 2020-10-06
Payer: MEDICARE

## 2020-10-06 PROCEDURE — 90694 VACC AIIV4 NO PRSRV 0.5ML IM: CPT | Performed by: INTERNAL MEDICINE

## 2020-10-06 PROCEDURE — G0008 ADMIN INFLUENZA VIRUS VAC: HCPCS | Performed by: INTERNAL MEDICINE

## 2020-10-06 NOTE — PROGRESS NOTES
Vaccine Information Sheet, \"Influenza - Inactivated\"  given to Stevie Carter, or parent/legal guardian of  Stevie Carter and verbalized understanding. Patient responses:    Have you ever had a reaction to a flu vaccine? No  Do you have any current illness? No  Have you ever had Guillian Darlington Syndrome? No  Do you have a serious allergy to any of the follow: Neomycin, Polymyxin, Thimerosal, eggs or egg products? No    Flu vaccine given per order. Please see immunization tab. Risks and benefits explained. Current VIS given.

## 2020-10-16 NOTE — TELEPHONE ENCOUNTER
Needs to clarify lab orders . They have been drawing BNP, BMP, and CBC w diff every other week . Does pham want that continued ?

## 2020-10-26 NOTE — TELEPHONE ENCOUNTER
I would be concerned that tumeric could have the possibility of affecting his kidneys as well. He may try something like Salonpas which is over-the-counter.

## 2020-10-26 NOTE — TELEPHONE ENCOUNTER
Non-Urgent Medical Question     [facility]  To   [id number] Practice Support  Sent   10/26/2020  9:34 AM    Hi Dr. Ivette Brady shoulder has been bothering him and he saw where Tumeric helps arthritic pain. I dont know if that is okay for him to take or if you have any suggestions. I know he cant take any NSAIDs because of his kidneys and I know that acetaminophen is not great either. Do you have any suggestions?  Thanks for your help!!     Sita Little

## 2020-10-27 NOTE — TELEPHONE ENCOUNTER
Absolutely try the oral drops. I have seen better results when it is taken orally versus topical.  If it even helps a little bit then I wonder if he would be a candidate for medical marijuana.

## 2020-10-27 NOTE — TELEPHONE ENCOUNTER
From: Bhavna Florentino  To: Shea Gilman MD  Sent: 10/26/2020 9:10 PM EDT  Subject: Non-Urgent Medical Question    He has tried [employer], heating pad, massage but dont have any ideas. Unfortunately, everything advertised sounds great but doesnt work for Clara Hamilton. If you have any other ideas, just let us know! !      ----- Message -----   From:RUDI Small   Sent:10/26/2020 5:01 PM EDT   To:Gustavo Florentino    Subject:RE: Non-Urgent Medical Question         I would be concerned that tumeric could have the possibility of affecting his kidneys as well. He may try something like Salonpas which is over-the-counter. ----- Message -----   From:Gustavo Florentino   Sent:10/26/2020 9:34 AM EDT   Julienne Locke, MD Carroll Ferri Dr. Eve Sever Randys shoulder has been bothering him and he saw where Tumeric helps arthritic pain. I dont know if that is okay for him to take or if you have any suggestions. I know he cant take any NSAIDs because of his kidneys and I know that acetaminophen is not great either. Do you have any suggestions?  Thanks for your help!!    Tobin Hector

## 2020-10-27 NOTE — TELEPHONE ENCOUNTER
Before actually giving him a painkilling medication such as tramadol, have they tried CBD oil/topical?

## 2020-10-27 NOTE — TELEPHONE ENCOUNTER
From: Jeremiah Florentino  To: Courtney Osullivan MD  Sent: 10/27/2020 2:58 PM EDT  Subject: Non-Urgent Medical Question    He has tried CBD salve but he has not taken it orally. Should he try the drops? Thanks for your help!!      ----- Message -----   From:RUDI Contreras   Sent:10/27/2020 1:54 PM EDT   To:Gustavo Florentino   Subject:RE: Non-Urgent Medical [facility] before giving you a pain killing medication such as tramadol he wants to know if you have tried CBD oil/topical?      ----- Message -----   From:Gustavo Florentino   Sent:10/26/2020 9:10 PM EDT   Britney Thomas MD   Subject:Non-Urgent Medical Question    He has tried [employer], heating pad, massage but dont have any ideas. Unfortunately, everything advertised sounds great but doesnt work for [place]. If you have any other ideas, just let us know! !      ----- Message -----   From:RUDI Contreras   Sent:10/26/2020 5:01 PM EDT   To:Gustavo Florentino   Subject:RE: Non-Urgent Medical Question         I would be concerned that tumeric could have the possibility of affecting his kidneys as well. He may try something like Salonpas which is over-the-counter. ----- Message -----   From:Gustavo Florentino   Sent:10/26/2020 9:34 AM EDT   Britney Thomas MD   [facility] Dr. Diamante Real  Moisess shoulder has been bothering him and he saw where Tumeric helps arthritic pain. I dont know if that is okay for him to take or if you have any suggestions. I know he cant take any NSAIDs because of his kidneys and I know that acetaminophen is not great either. Do you have any suggestions?  Thanks for your help!!    Tahira Ellison

## 2020-10-27 NOTE — TELEPHONE ENCOUNTER
Non-Urgent Medical Question     From   Jake Marinelli \"Moises\"  To   [facility] Practice Support  Sent   10/26/2020  9:10 PM    He has tried [employer], heating pad, massage but dont have any ideas. Unfortunately, everything advertised sounds great but doesnt work for Cale Lau.  If you have any other ideas, just let us know!!

## 2020-10-27 NOTE — TELEPHONE ENCOUNTER
He has tried CBD salve but he has not taken it orally. Should he try the drops?    Thanks for your help!!

## 2020-11-17 NOTE — TELEPHONE ENCOUNTER
LMOM to schedule NP appt.  Referral in MM, already approved by [facility]

## 2020-12-07 NOTE — TELEPHONE ENCOUNTER
Shannon Whiting called and was given a verbal for the labs needed from last office visit  Please place labs.      Per last office visit     - Check CBC , BMP, BNP through  [facility], add an iron and TIBC to the labs

## 2020-12-16 NOTE — TELEPHONE ENCOUNTER
Cristela Razo with [facility] called needing verbal orders ? She also stated if you need labs let her know but cardiology did order the patient some monthly labs. Please call to advise.

## 2021-01-01 ENCOUNTER — HOSPITAL ENCOUNTER (INPATIENT)
Age: 66
LOS: 11 days | Discharge: HOME HEALTH | DRG: 727 | End: 2021-06-11
Attending: STUDENT IN AN ORGANIZED HEALTH CARE EDUCATION/TRAINING PROGRAM | Admitting: FAMILY MEDICINE
Payer: MEDICARE

## 2021-01-01 ENCOUNTER — CARE COORDINATION (OUTPATIENT)
Dept: CASE MANAGEMENT | Age: 66
End: 2021-01-01

## 2021-01-01 ENCOUNTER — PATIENT MESSAGE (OUTPATIENT)
Dept: PULMONOLOGY | Age: 66
End: 2021-01-01

## 2021-01-01 ENCOUNTER — APPOINTMENT (OUTPATIENT)
Dept: CT IMAGING | Age: 66
DRG: 640 | End: 2021-01-01
Payer: MEDICARE

## 2021-01-01 ENCOUNTER — APPOINTMENT (OUTPATIENT)
Dept: ULTRASOUND IMAGING | Age: 66
DRG: 727 | End: 2021-01-01
Payer: MEDICARE

## 2021-01-01 ENCOUNTER — TELEPHONE (OUTPATIENT)
Dept: CARDIOLOGY CLINIC | Age: 66
End: 2021-01-01

## 2021-01-01 ENCOUNTER — APPOINTMENT (OUTPATIENT)
Dept: INTERVENTIONAL RADIOLOGY/VASCULAR | Age: 66
DRG: 727 | End: 2021-01-01
Payer: MEDICARE

## 2021-01-01 ENCOUNTER — APPOINTMENT (OUTPATIENT)
Dept: CT IMAGING | Age: 66
DRG: 727 | End: 2021-01-01
Payer: MEDICARE

## 2021-01-01 ENCOUNTER — PATIENT MESSAGE (OUTPATIENT)
Dept: CARDIOLOGY CLINIC | Age: 66
End: 2021-01-01

## 2021-01-01 ENCOUNTER — APPOINTMENT (OUTPATIENT)
Dept: GENERAL RADIOLOGY | Age: 66
DRG: 673 | End: 2021-01-01
Payer: MEDICARE

## 2021-01-01 ENCOUNTER — VIRTUAL VISIT (OUTPATIENT)
Dept: INFECTIOUS DISEASES | Age: 66
End: 2021-01-01
Payer: MEDICARE

## 2021-01-01 ENCOUNTER — TELEPHONE (OUTPATIENT)
Dept: INTERNAL MEDICINE CLINIC | Age: 66
End: 2021-01-01

## 2021-01-01 ENCOUNTER — HOSPITAL ENCOUNTER (INPATIENT)
Age: 66
LOS: 11 days | Discharge: HOME HEALTH | DRG: 673 | End: 2021-02-12
Attending: INTERNAL MEDICINE | Admitting: INTERNAL MEDICINE
Payer: MEDICARE

## 2021-01-01 ENCOUNTER — TELEPHONE (OUTPATIENT)
Dept: PULMONOLOGY | Age: 66
End: 2021-01-01

## 2021-01-01 ENCOUNTER — VIRTUAL VISIT (OUTPATIENT)
Dept: CARDIOLOGY CLINIC | Age: 66
End: 2021-01-01
Payer: MEDICARE

## 2021-01-01 ENCOUNTER — APPOINTMENT (OUTPATIENT)
Dept: INTERVENTIONAL RADIOLOGY/VASCULAR | Age: 66
DRG: 673 | End: 2021-01-01
Payer: MEDICARE

## 2021-01-01 ENCOUNTER — OFFICE VISIT (OUTPATIENT)
Dept: PULMONOLOGY | Age: 66
End: 2021-01-01
Payer: MEDICARE

## 2021-01-01 ENCOUNTER — APPOINTMENT (OUTPATIENT)
Dept: INTERVENTIONAL RADIOLOGY/VASCULAR | Age: 66
DRG: 640 | End: 2021-01-01
Payer: MEDICARE

## 2021-01-01 ENCOUNTER — HOSPITAL ENCOUNTER (OUTPATIENT)
Dept: CT IMAGING | Age: 66
Discharge: HOME | DRG: 640 | End: 2021-09-28
Payer: MEDICARE

## 2021-01-01 ENCOUNTER — HOSPITAL ENCOUNTER (OUTPATIENT)
Age: 66
Setting detail: SPECIMEN
Discharge: HOME | End: 2021-05-26
Payer: MEDICARE

## 2021-01-01 ENCOUNTER — APPOINTMENT (OUTPATIENT)
Dept: MRI IMAGING | Age: 66
DRG: 727 | End: 2021-01-01
Payer: MEDICARE

## 2021-01-01 ENCOUNTER — TELEPHONE (OUTPATIENT)
Dept: OTHER | Age: 66
End: 2021-01-01

## 2021-01-01 ENCOUNTER — ANESTHESIA EVENT (OUTPATIENT)
Dept: OPERATING ROOM | Age: 66
DRG: 727 | End: 2021-01-01
Payer: MEDICARE

## 2021-01-01 ENCOUNTER — TELEPHONE (OUTPATIENT)
Dept: INFECTIOUS DISEASES | Age: 66
End: 2021-01-01

## 2021-01-01 ENCOUNTER — APPOINTMENT (OUTPATIENT)
Dept: GENERAL RADIOLOGY | Age: 66
DRG: 727 | End: 2021-01-01
Payer: MEDICARE

## 2021-01-01 ENCOUNTER — APPOINTMENT (OUTPATIENT)
Dept: GENERAL RADIOLOGY | Age: 66
DRG: 640 | End: 2021-01-01
Payer: MEDICARE

## 2021-01-01 ENCOUNTER — HOSPITAL ENCOUNTER (OUTPATIENT)
Age: 66
Setting detail: SPECIMEN
Discharge: HOME | DRG: 673 | End: 2021-02-01
Payer: MEDICARE

## 2021-01-01 ENCOUNTER — PRE-PROCEDURE TELEPHONE (OUTPATIENT)
Dept: INTERVENTIONAL RADIOLOGY/VASCULAR | Age: 66
End: 2021-01-01

## 2021-01-01 ENCOUNTER — ANESTHESIA (OUTPATIENT)
Dept: OPERATING ROOM | Age: 66
DRG: 727 | End: 2021-01-01
Payer: MEDICARE

## 2021-01-01 ENCOUNTER — HOSPITAL ENCOUNTER (OUTPATIENT)
Age: 66
Setting detail: SPECIMEN
Discharge: HOME | End: 2021-06-25
Payer: MEDICARE

## 2021-01-01 ENCOUNTER — HOSPITAL ENCOUNTER (OUTPATIENT)
Age: 66
Setting detail: SPECIMEN
Discharge: HOME | End: 2021-01-04
Payer: MEDICARE

## 2021-01-01 ENCOUNTER — HOSPITAL ENCOUNTER (OUTPATIENT)
Dept: INTERVENTIONAL RADIOLOGY/VASCULAR | Age: 66
Discharge: HOME | End: 2021-03-16
Payer: MEDICARE

## 2021-01-01 ENCOUNTER — APPOINTMENT (OUTPATIENT)
Dept: GENERAL RADIOLOGY | Age: 66
End: 2021-01-01
Payer: MEDICARE

## 2021-01-01 ENCOUNTER — HOSPITAL ENCOUNTER (OUTPATIENT)
Age: 66
Setting detail: SPECIMEN
Discharge: HOME | End: 2021-06-15
Payer: MEDICARE

## 2021-01-01 ENCOUNTER — HOSPITAL ENCOUNTER (OUTPATIENT)
Age: 66
Setting detail: SPECIMEN
Discharge: HOME | End: 2021-04-01
Payer: MEDICARE

## 2021-01-01 ENCOUNTER — HOSPITAL ENCOUNTER (OUTPATIENT)
Age: 66
Setting detail: SPECIMEN
Discharge: HOME | End: 2021-06-22
Payer: MEDICARE

## 2021-01-01 ENCOUNTER — HOSPITAL ENCOUNTER (INPATIENT)
Age: 66
LOS: 9 days | DRG: 640 | End: 2021-10-09
Attending: EMERGENCY MEDICINE | Admitting: INTERNAL MEDICINE
Payer: MEDICARE

## 2021-01-01 ENCOUNTER — PATIENT MESSAGE (OUTPATIENT)
Dept: INTERNAL MEDICINE CLINIC | Age: 66
End: 2021-01-01

## 2021-01-01 ENCOUNTER — APPOINTMENT (OUTPATIENT)
Dept: ULTRASOUND IMAGING | Age: 66
DRG: 673 | End: 2021-01-01
Payer: MEDICARE

## 2021-01-01 ENCOUNTER — HOSPITAL ENCOUNTER (EMERGENCY)
Age: 66
Discharge: HOME | End: 2021-09-03
Attending: EMERGENCY MEDICINE
Payer: MEDICARE

## 2021-01-01 ENCOUNTER — HOSPITAL ENCOUNTER (OUTPATIENT)
Dept: INTERVENTIONAL RADIOLOGY/VASCULAR | Age: 66
Discharge: HOME | End: 2021-06-24
Payer: MEDICARE

## 2021-01-01 VITALS
WEIGHT: 205 LBS | DIASTOLIC BLOOD PRESSURE: 63 MMHG | HEIGHT: 77 IN | TEMPERATURE: 97.5 F | SYSTOLIC BLOOD PRESSURE: 114 MMHG | BODY MASS INDEX: 24.21 KG/M2 | HEART RATE: 67 BPM | RESPIRATION RATE: 24 BRPM | OXYGEN SATURATION: 98 %

## 2021-01-01 VITALS
TEMPERATURE: 98 F | HEIGHT: 72 IN | OXYGEN SATURATION: 92 % | RESPIRATION RATE: 16 BRPM | WEIGHT: 209.44 LBS | HEART RATE: 106 BPM | SYSTOLIC BLOOD PRESSURE: 119 MMHG | DIASTOLIC BLOOD PRESSURE: 65 MMHG | BODY MASS INDEX: 28.37 KG/M2

## 2021-01-01 VITALS
DIASTOLIC BLOOD PRESSURE: 60 MMHG | TEMPERATURE: 98.1 F | WEIGHT: 207.23 LBS | OXYGEN SATURATION: 97 % | SYSTOLIC BLOOD PRESSURE: 109 MMHG | RESPIRATION RATE: 16 BRPM | BODY MASS INDEX: 28.07 KG/M2 | HEART RATE: 71 BPM | HEIGHT: 72 IN

## 2021-01-01 VITALS
WEIGHT: 208.34 LBS | HEART RATE: 52 BPM | OXYGEN SATURATION: 93 % | BODY MASS INDEX: 28.22 KG/M2 | SYSTOLIC BLOOD PRESSURE: 58 MMHG | HEIGHT: 72 IN | DIASTOLIC BLOOD PRESSURE: 30 MMHG | RESPIRATION RATE: 12 BRPM | TEMPERATURE: 97.6 F

## 2021-01-01 VITALS
SYSTOLIC BLOOD PRESSURE: 118 MMHG | DIASTOLIC BLOOD PRESSURE: 69 MMHG | RESPIRATION RATE: 22 BRPM | TEMPERATURE: 98.4 F | HEART RATE: 74 BPM | OXYGEN SATURATION: 93 %

## 2021-01-01 VITALS
WEIGHT: 209 LBS | OXYGEN SATURATION: 98 % | HEART RATE: 71 BPM | SYSTOLIC BLOOD PRESSURE: 118 MMHG | BODY MASS INDEX: 28.31 KG/M2 | DIASTOLIC BLOOD PRESSURE: 68 MMHG | HEIGHT: 72 IN

## 2021-01-01 VITALS
OXYGEN SATURATION: 99 % | SYSTOLIC BLOOD PRESSURE: 92 MMHG | DIASTOLIC BLOOD PRESSURE: 50 MMHG | RESPIRATION RATE: 1 BRPM

## 2021-01-01 DIAGNOSIS — N18.6 ESRD (END STAGE RENAL DISEASE) ON DIALYSIS (HCC): ICD-10-CM

## 2021-01-01 DIAGNOSIS — Z99.2 ESRD (END STAGE RENAL DISEASE) ON DIALYSIS (HCC): ICD-10-CM

## 2021-01-01 DIAGNOSIS — Z99.2 END STAGE RENAL DISEASE ON DIALYSIS (HCC): ICD-10-CM

## 2021-01-01 DIAGNOSIS — I95.9 HYPOTENSION, UNSPECIFIED HYPOTENSION TYPE: ICD-10-CM

## 2021-01-01 DIAGNOSIS — I50.22 CHRONIC SYSTOLIC HEART FAILURE (HCC): Primary | ICD-10-CM

## 2021-01-01 DIAGNOSIS — R18.8 OTHER ASCITES: ICD-10-CM

## 2021-01-01 DIAGNOSIS — Z99.2 DIALYSIS PATIENT (HCC): ICD-10-CM

## 2021-01-01 DIAGNOSIS — I25.10 CORONARY ARTERY DISEASE INVOLVING NATIVE CORONARY ARTERY OF NATIVE HEART WITHOUT ANGINA PECTORIS: ICD-10-CM

## 2021-01-01 DIAGNOSIS — N17.9 ACUTE RENAL FAILURE SUPERIMPOSED ON CHRONIC KIDNEY DISEASE, UNSPECIFIED CKD STAGE, UNSPECIFIED ACUTE RENAL FAILURE TYPE (HCC): Primary | ICD-10-CM

## 2021-01-01 DIAGNOSIS — Z99.2 ESRD ON DIALYSIS (HCC): ICD-10-CM

## 2021-01-01 DIAGNOSIS — G82.20 PARAPLEGIA (HCC): ICD-10-CM

## 2021-01-01 DIAGNOSIS — N18.6 ESRD (END STAGE RENAL DISEASE) (HCC): Primary | ICD-10-CM

## 2021-01-01 DIAGNOSIS — N18.6 ESRD ON DIALYSIS (HCC): ICD-10-CM

## 2021-01-01 DIAGNOSIS — R82.71 BACTERIURIA, ASYMPTOMATIC: ICD-10-CM

## 2021-01-01 DIAGNOSIS — R06.09 DOE (DYSPNEA ON EXERTION): Primary | ICD-10-CM

## 2021-01-01 DIAGNOSIS — M89.8X5 PAIN IN RIGHT FEMUR: Primary | ICD-10-CM

## 2021-01-01 DIAGNOSIS — Z79.2 RECEIVING INTRAVENOUS ANTIBIOTIC TREATMENT AS OUTPATIENT: Primary | ICD-10-CM

## 2021-01-01 DIAGNOSIS — N28.9 RENAL LESION: ICD-10-CM

## 2021-01-01 DIAGNOSIS — N39.0 URINARY TRACT INFECTION WITHOUT HEMATURIA, SITE UNSPECIFIED: Primary | ICD-10-CM

## 2021-01-01 DIAGNOSIS — R06.00 DYSPNEA, UNSPECIFIED TYPE: Primary | ICD-10-CM

## 2021-01-01 DIAGNOSIS — D50.8 OTHER IRON DEFICIENCY ANEMIA: ICD-10-CM

## 2021-01-01 DIAGNOSIS — E78.5 HYPERLIPIDEMIA, UNSPECIFIED HYPERLIPIDEMIA TYPE: ICD-10-CM

## 2021-01-01 DIAGNOSIS — F41.9 ANXIETY DUE TO INVASIVE PROCEDURE: Primary | ICD-10-CM

## 2021-01-01 DIAGNOSIS — N39.0 COMPLICATED UTI (URINARY TRACT INFECTION): ICD-10-CM

## 2021-01-01 DIAGNOSIS — I50.42 CHRONIC COMBINED SYSTOLIC AND DIASTOLIC HEART FAILURE (HCC): Primary | ICD-10-CM

## 2021-01-01 DIAGNOSIS — Z97.8 FOLEY CATHETER IN PLACE: ICD-10-CM

## 2021-01-01 DIAGNOSIS — I10 ESSENTIAL HYPERTENSION: ICD-10-CM

## 2021-01-01 DIAGNOSIS — J98.6 DIAPHRAGM PARALYSIS: ICD-10-CM

## 2021-01-01 DIAGNOSIS — S72.401A CLOSED FRACTURE OF DISTAL END OF RIGHT FEMUR, UNSPECIFIED FRACTURE MORPHOLOGY, INITIAL ENCOUNTER (HCC): ICD-10-CM

## 2021-01-01 DIAGNOSIS — N49.2 SCROTAL ABSCESS: Primary | ICD-10-CM

## 2021-01-01 DIAGNOSIS — N18.6 END STAGE RENAL DISEASE ON DIALYSIS (HCC): ICD-10-CM

## 2021-01-01 DIAGNOSIS — N18.9 CHRONIC KIDNEY DISEASE, UNSPECIFIED CKD STAGE: ICD-10-CM

## 2021-01-01 DIAGNOSIS — Z79.2 RECEIVING INTRAVENOUS ANTIBIOTIC TREATMENT AS OUTPATIENT: ICD-10-CM

## 2021-01-01 DIAGNOSIS — G82.50 QUADRIPLEGIA, UNSPECIFIED (HCC): Primary | ICD-10-CM

## 2021-01-01 DIAGNOSIS — N18.9 ACUTE RENAL FAILURE SUPERIMPOSED ON CHRONIC KIDNEY DISEASE, UNSPECIFIED CKD STAGE, UNSPECIFIED ACUTE RENAL FAILURE TYPE (HCC): Primary | ICD-10-CM

## 2021-01-01 DIAGNOSIS — R06.09 DOE (DYSPNEA ON EXERTION): ICD-10-CM

## 2021-01-01 DIAGNOSIS — R06.02 SOB (SHORTNESS OF BREATH): ICD-10-CM

## 2021-01-01 DIAGNOSIS — R07.9 CHEST PAIN, UNSPECIFIED TYPE: ICD-10-CM

## 2021-01-01 DIAGNOSIS — J81.0 ACUTE PULMONARY EDEMA (HCC): ICD-10-CM

## 2021-01-01 DIAGNOSIS — Z87.01 HISTORY OF PNEUMONIA: ICD-10-CM

## 2021-01-01 DIAGNOSIS — J96.21 ACUTE AND CHRONIC RESPIRATORY FAILURE WITH HYPOXIA (HCC): Primary | ICD-10-CM

## 2021-01-01 LAB
A/G RATIO: 0.8 (ref 1.1–2.2)
A/G RATIO: 0.9 (ref 1.1–2.2)
A/G RATIO: 1 (ref 1.1–2.2)
A/G RATIO: 1.1 (ref 1.1–2.2)
A/G RATIO: 1.2 (ref 1.1–2.2)
ALBUMIN FLUID: 2 G/DL
ALBUMIN SERPL-MCNC: 3.1 G/DL (ref 3.4–5)
ALBUMIN SERPL-MCNC: 3.1 G/DL (ref 3.4–5)
ALBUMIN SERPL-MCNC: 3.2 G/DL (ref 3.4–5)
ALBUMIN SERPL-MCNC: 3.2 G/DL (ref 3.4–5)
ALBUMIN SERPL-MCNC: 3.4 G/DL (ref 3.4–5)
ALBUMIN SERPL-MCNC: 3.5 G/DL (ref 3.4–5)
ALBUMIN SERPL-MCNC: 3.6 G/DL (ref 3.4–5)
ALBUMIN SERPL-MCNC: 3.7 G/DL (ref 3.4–5)
ALBUMIN SERPL-MCNC: 3.7 G/DL (ref 3.4–5)
ALBUMIN SERPL-MCNC: 3.8 G/DL (ref 3.4–5)
ALBUMIN SERPL-MCNC: 3.9 G/DL (ref 3.4–5)
ALBUMIN SERPL-MCNC: 3.9 G/DL (ref 3.4–5)
ALP BLD-CCNC: 102 U/L (ref 40–129)
ALP BLD-CCNC: 108 U/L (ref 40–129)
ALP BLD-CCNC: 109 U/L (ref 40–129)
ALP BLD-CCNC: 110 U/L (ref 40–129)
ALP BLD-CCNC: 112 U/L (ref 40–129)
ALP BLD-CCNC: 112 U/L (ref 40–129)
ALP BLD-CCNC: 123 U/L (ref 40–129)
ALP BLD-CCNC: 153 U/L (ref 40–129)
ALP BLD-CCNC: 154 U/L (ref 40–129)
ALP BLD-CCNC: 168 U/L (ref 40–129)
ALT SERPL-CCNC: 10 U/L (ref 10–40)
ALT SERPL-CCNC: 11 U/L (ref 10–40)
ALT SERPL-CCNC: 12 U/L (ref 10–40)
ALT SERPL-CCNC: 14 U/L (ref 10–40)
ALT SERPL-CCNC: 14 U/L (ref 10–40)
ALT SERPL-CCNC: 15 U/L (ref 10–40)
ALT SERPL-CCNC: 15 U/L (ref 10–40)
ALT SERPL-CCNC: 18 U/L (ref 10–40)
ALT SERPL-CCNC: <5 U/L (ref 10–40)
ALT SERPL-CCNC: <5 U/L (ref 10–40)
AMYLASE FLUID: 24 U/L
AMYLASE: 41 U/L (ref 25–115)
ANAEROBIC CULTURE: NORMAL
ANION GAP SERPL CALCULATED.3IONS-SCNC: 10 MMOL/L (ref 3–16)
ANION GAP SERPL CALCULATED.3IONS-SCNC: 11 MMOL/L (ref 3–16)
ANION GAP SERPL CALCULATED.3IONS-SCNC: 11 MMOL/L (ref 3–16)
ANION GAP SERPL CALCULATED.3IONS-SCNC: 12 MMOL/L (ref 3–16)
ANION GAP SERPL CALCULATED.3IONS-SCNC: 13 MMOL/L (ref 3–16)
ANION GAP SERPL CALCULATED.3IONS-SCNC: 5 MMOL/L (ref 3–16)
ANION GAP SERPL CALCULATED.3IONS-SCNC: 6 MMOL/L (ref 3–16)
ANION GAP SERPL CALCULATED.3IONS-SCNC: 7 MMOL/L (ref 3–16)
ANION GAP SERPL CALCULATED.3IONS-SCNC: 7 MMOL/L (ref 3–16)
ANION GAP SERPL CALCULATED.3IONS-SCNC: 8 MMOL/L (ref 3–16)
ANION GAP SERPL CALCULATED.3IONS-SCNC: 9 MMOL/L (ref 3–16)
APPEARANCE FLUID: NORMAL
APPEARANCE FLUID: NORMAL
APTT: 27.9 SEC (ref 24.2–36.2)
APTT: 31.8 SEC (ref 26.2–38.6)
APTT: 35.2 SEC (ref 24.2–36.2)
AST SERPL-CCNC: 18 U/L (ref 15–37)
AST SERPL-CCNC: 21 U/L (ref 15–37)
AST SERPL-CCNC: 21 U/L (ref 15–37)
AST SERPL-CCNC: 22 U/L (ref 15–37)
AST SERPL-CCNC: 24 U/L (ref 15–37)
AST SERPL-CCNC: 25 U/L (ref 15–37)
AST SERPL-CCNC: 25 U/L (ref 15–37)
AST SERPL-CCNC: 27 U/L (ref 15–37)
AST SERPL-CCNC: 30 U/L (ref 15–37)
AST SERPL-CCNC: 32 U/L (ref 15–37)
BACTERIA: ABNORMAL /HPF
BASE EXCESS ARTERIAL: -1.1 MMOL/L (ref -3–3)
BASE EXCESS ARTERIAL: 2.5 MMOL/L (ref -3–3)
BASE EXCESS VENOUS: 1.8 MMOL/L (ref -3–3)
BASE EXCESS VENOUS: 5.1 MMOL/L (ref -3–3)
BASOPHILS ABSOLUTE: 0 K/UL (ref 0–0.2)
BASOPHILS ABSOLUTE: 0.1 K/UL (ref 0–0.2)
BASOPHILS RELATIVE PERCENT: 0.1 %
BASOPHILS RELATIVE PERCENT: 0.2 %
BASOPHILS RELATIVE PERCENT: 0.3 %
BASOPHILS RELATIVE PERCENT: 0.4 %
BASOPHILS RELATIVE PERCENT: 0.5 %
BASOPHILS RELATIVE PERCENT: 0.6 %
BASOPHILS RELATIVE PERCENT: 0.7 %
BASOPHILS RELATIVE PERCENT: 0.8 %
BASOPHILS RELATIVE PERCENT: 0.9 %
BILIRUB SERPL-MCNC: 0.3 MG/DL (ref 0–1)
BILIRUB SERPL-MCNC: 0.4 MG/DL (ref 0–1)
BILIRUB SERPL-MCNC: <0.2 MG/DL (ref 0–1)
BILIRUBIN DIRECT: <0.2 MG/DL (ref 0–0.3)
BILIRUBIN URINE: ABNORMAL
BILIRUBIN URINE: ABNORMAL
BILIRUBIN URINE: NEGATIVE
BILIRUBIN, INDIRECT: ABNORMAL MG/DL (ref 0–1)
BILIRUBIN, INDIRECT: ABNORMAL MG/DL (ref 0–1)
BILIRUBIN, INDIRECT: NORMAL MG/DL (ref 0–1)
BILIRUBIN, INDIRECT: NORMAL MG/DL (ref 0–1)
BLOOD CULTURE, ROUTINE: NORMAL
BLOOD CULTURE, ROUTINE: NORMAL
BLOOD, URINE: ABNORMAL
BODY FLUID CULTURE, STERILE: NORMAL
BUN BLDV-MCNC: 101 MG/DL (ref 7–20)
BUN BLDV-MCNC: 107 MG/DL (ref 7–20)
BUN BLDV-MCNC: 20 MG/DL (ref 7–20)
BUN BLDV-MCNC: 26 MG/DL (ref 7–20)
BUN BLDV-MCNC: 29 MG/DL (ref 7–20)
BUN BLDV-MCNC: 29 MG/DL (ref 7–20)
BUN BLDV-MCNC: 30 MG/DL (ref 7–20)
BUN BLDV-MCNC: 31 MG/DL (ref 7–20)
BUN BLDV-MCNC: 31 MG/DL (ref 7–20)
BUN BLDV-MCNC: 34 MG/DL (ref 7–20)
BUN BLDV-MCNC: 36 MG/DL (ref 7–20)
BUN BLDV-MCNC: 36 MG/DL (ref 7–20)
BUN BLDV-MCNC: 37 MG/DL (ref 7–20)
BUN BLDV-MCNC: 39 MG/DL (ref 7–20)
BUN BLDV-MCNC: 40 MG/DL (ref 7–20)
BUN BLDV-MCNC: 40 MG/DL (ref 7–20)
BUN BLDV-MCNC: 42 MG/DL (ref 7–20)
BUN BLDV-MCNC: 46 MG/DL (ref 7–20)
BUN BLDV-MCNC: 47 MG/DL (ref 7–20)
BUN BLDV-MCNC: 48 MG/DL (ref 7–20)
BUN BLDV-MCNC: 49 MG/DL (ref 7–20)
BUN BLDV-MCNC: 51 MG/DL (ref 7–20)
BUN BLDV-MCNC: 52 MG/DL (ref 7–20)
BUN BLDV-MCNC: 53 MG/DL (ref 7–20)
BUN BLDV-MCNC: 54 MG/DL (ref 7–20)
BUN BLDV-MCNC: 55 MG/DL (ref 7–20)
BUN BLDV-MCNC: 58 MG/DL (ref 7–20)
BUN BLDV-MCNC: 62 MG/DL (ref 7–20)
BUN BLDV-MCNC: 69 MG/DL (ref 7–20)
BUN BLDV-MCNC: 70 MG/DL (ref 7–20)
BUN BLDV-MCNC: 73 MG/DL (ref 7–20)
BUN BLDV-MCNC: 73 MG/DL (ref 7–20)
BUN BLDV-MCNC: 78 MG/DL (ref 7–20)
BUN BLDV-MCNC: 94 MG/DL (ref 7–20)
BUN BLDV-MCNC: 95 MG/DL (ref 7–20)
BUN BLDV-MCNC: 98 MG/DL (ref 7–20)
BUN BLDV-MCNC: 98 MG/DL (ref 7–20)
CALCIUM SERPL-MCNC: 8 MG/DL (ref 8.3–10.6)
CALCIUM SERPL-MCNC: 8.1 MG/DL (ref 8.3–10.6)
CALCIUM SERPL-MCNC: 8.2 MG/DL (ref 8.3–10.6)
CALCIUM SERPL-MCNC: 8.3 MG/DL (ref 8.3–10.6)
CALCIUM SERPL-MCNC: 8.4 MG/DL (ref 8.3–10.6)
CALCIUM SERPL-MCNC: 8.5 MG/DL (ref 8.3–10.6)
CALCIUM SERPL-MCNC: 8.6 MG/DL (ref 8.3–10.6)
CALCIUM SERPL-MCNC: 8.7 MG/DL (ref 8.3–10.6)
CALCIUM SERPL-MCNC: 8.8 MG/DL (ref 8.3–10.6)
CALCIUM SERPL-MCNC: 8.8 MG/DL (ref 8.3–10.6)
CALCIUM SERPL-MCNC: 8.9 MG/DL (ref 8.3–10.6)
CALCIUM SERPL-MCNC: 9 MG/DL (ref 8.3–10.6)
CALCIUM SERPL-MCNC: 9.1 MG/DL (ref 8.3–10.6)
CALCIUM SERPL-MCNC: 9.2 MG/DL (ref 8.3–10.6)
CALCIUM SERPL-MCNC: 9.2 MG/DL (ref 8.3–10.6)
CARBOXYHEMOGLOBIN ARTERIAL: 1.2 % (ref 0–1.5)
CARBOXYHEMOGLOBIN ARTERIAL: 1.4 % (ref 0–1.5)
CARBOXYHEMOGLOBIN: 2.7 % (ref 0–1.5)
CARBOXYHEMOGLOBIN: 4.5 % (ref 0–1.5)
CELL COUNT FLUID TYPE: NORMAL
CELL COUNT FLUID TYPE: NORMAL
CHLORIDE BLD-SCNC: 100 MMOL/L (ref 99–110)
CHLORIDE BLD-SCNC: 102 MMOL/L (ref 99–110)
CHLORIDE BLD-SCNC: 92 MMOL/L (ref 99–110)
CHLORIDE BLD-SCNC: 93 MMOL/L (ref 99–110)
CHLORIDE BLD-SCNC: 94 MMOL/L (ref 99–110)
CHLORIDE BLD-SCNC: 96 MMOL/L (ref 99–110)
CHLORIDE BLD-SCNC: 97 MMOL/L (ref 99–110)
CHLORIDE BLD-SCNC: 98 MMOL/L (ref 99–110)
CHLORIDE BLD-SCNC: 98 MMOL/L (ref 99–110)
CHLORIDE BLD-SCNC: 99 MMOL/L (ref 99–110)
CHLORIDE BLD-SCNC: 99 MMOL/L (ref 99–110)
CHOLESTEROL, TOTAL: 110 MG/DL (ref 0–199)
CLARITY: ABNORMAL
CLOT EVALUATION: NORMAL
CLOT EVALUATION: NORMAL
CO2: 19 MMOL/L (ref 21–32)
CO2: 22 MMOL/L (ref 21–32)
CO2: 23 MMOL/L (ref 21–32)
CO2: 24 MMOL/L (ref 21–32)
CO2: 25 MMOL/L (ref 21–32)
CO2: 25 MMOL/L (ref 21–32)
CO2: 26 MMOL/L (ref 21–32)
CO2: 27 MMOL/L (ref 21–32)
CO2: 28 MMOL/L (ref 21–32)
CO2: 29 MMOL/L (ref 21–32)
CO2: 29 MMOL/L (ref 21–32)
CO2: 30 MMOL/L (ref 21–32)
CO2: 31 MMOL/L (ref 21–32)
CO2: 32 MMOL/L (ref 21–32)
CO2: 32 MMOL/L (ref 21–32)
COLOR FLUID: NORMAL
COLOR FLUID: YELLOW
COLOR: ABNORMAL
COLOR: YELLOW
COMMENT UA: ABNORMAL
CREAT SERPL-MCNC: 1.8 MG/DL (ref 0.8–1.3)
CREAT SERPL-MCNC: 2 MG/DL (ref 0.8–1.3)
CREAT SERPL-MCNC: 2.1 MG/DL (ref 0.8–1.3)
CREAT SERPL-MCNC: 2.2 MG/DL (ref 0.8–1.3)
CREAT SERPL-MCNC: 2.3 MG/DL (ref 0.8–1.3)
CREAT SERPL-MCNC: 2.3 MG/DL (ref 0.8–1.3)
CREAT SERPL-MCNC: 2.4 MG/DL (ref 0.8–1.3)
CREAT SERPL-MCNC: 2.4 MG/DL (ref 0.8–1.3)
CREAT SERPL-MCNC: 2.5 MG/DL (ref 0.8–1.3)
CREAT SERPL-MCNC: 2.6 MG/DL (ref 0.8–1.3)
CREAT SERPL-MCNC: 2.7 MG/DL (ref 0.8–1.3)
CREAT SERPL-MCNC: 2.7 MG/DL (ref 0.8–1.3)
CREAT SERPL-MCNC: 2.9 MG/DL (ref 0.8–1.3)
CREAT SERPL-MCNC: 3 MG/DL (ref 0.8–1.3)
CREAT SERPL-MCNC: 3.2 MG/DL (ref 0.8–1.3)
CREAT SERPL-MCNC: 3.3 MG/DL (ref 0.8–1.3)
CREAT SERPL-MCNC: 3.4 MG/DL (ref 0.8–1.3)
CREAT SERPL-MCNC: 3.4 MG/DL (ref 0.8–1.3)
CREAT SERPL-MCNC: 3.5 MG/DL (ref 0.8–1.3)
CREAT SERPL-MCNC: 3.6 MG/DL (ref 0.8–1.3)
CREAT SERPL-MCNC: 3.7 MG/DL (ref 0.8–1.3)
CREAT SERPL-MCNC: 3.7 MG/DL (ref 0.8–1.3)
CREAT SERPL-MCNC: 4.3 MG/DL (ref 0.8–1.3)
CREATININE URINE: 133.4 MG/DL (ref 39–259)
CREATININE URINE: 88 MG/DL (ref 39–259)
CRYSTALS, UA: ABNORMAL /HPF
CULTURE SURGICAL: NORMAL
CULTURE, BLOOD 2: ABNORMAL
CULTURE, BLOOD 2: ABNORMAL
CULTURE, BLOOD 2: NORMAL
EKG ATRIAL RATE: 73 BPM
EKG ATRIAL RATE: 82 BPM
EKG ATRIAL RATE: 98 BPM
EKG DIAGNOSIS: NORMAL
EKG P AXIS: 55 DEGREES
EKG P AXIS: 71 DEGREES
EKG P AXIS: 83 DEGREES
EKG P-R INTERVAL: 280 MS
EKG P-R INTERVAL: 292 MS
EKG P-R INTERVAL: 298 MS
EKG Q-T INTERVAL: 382 MS
EKG Q-T INTERVAL: 384 MS
EKG Q-T INTERVAL: 428 MS
EKG QRS DURATION: 104 MS
EKG QRS DURATION: 104 MS
EKG QRS DURATION: 108 MS
EKG QTC CALCULATION (BAZETT): 446 MS
EKG QTC CALCULATION (BAZETT): 471 MS
EKG QTC CALCULATION (BAZETT): 490 MS
EKG R AXIS: 35 DEGREES
EKG R AXIS: 83 DEGREES
EKG R AXIS: 96 DEGREES
EKG T AXIS: 187 DEGREES
EKG T AXIS: 201 DEGREES
EKG T AXIS: 238 DEGREES
EKG VENTRICULAR RATE: 73 BPM
EKG VENTRICULAR RATE: 82 BPM
EKG VENTRICULAR RATE: 98 BPM
EOSINOPHILS ABSOLUTE: 0 K/UL (ref 0–0.6)
EOSINOPHILS ABSOLUTE: 0.1 K/UL (ref 0–0.6)
EOSINOPHILS ABSOLUTE: 0.2 K/UL (ref 0–0.6)
EOSINOPHILS ABSOLUTE: 0.3 K/UL (ref 0–0.6)
EOSINOPHILS RELATIVE PERCENT: 0 %
EOSINOPHILS RELATIVE PERCENT: 0.1 %
EOSINOPHILS RELATIVE PERCENT: 0.3 %
EOSINOPHILS RELATIVE PERCENT: 0.3 %
EOSINOPHILS RELATIVE PERCENT: 0.5 %
EOSINOPHILS RELATIVE PERCENT: 0.5 %
EOSINOPHILS RELATIVE PERCENT: 0.6 %
EOSINOPHILS RELATIVE PERCENT: 0.8 %
EOSINOPHILS RELATIVE PERCENT: 0.9 %
EOSINOPHILS RELATIVE PERCENT: 0.9 %
EOSINOPHILS RELATIVE PERCENT: 1 %
EOSINOPHILS RELATIVE PERCENT: 1.1 %
EOSINOPHILS RELATIVE PERCENT: 1.5 %
EOSINOPHILS RELATIVE PERCENT: 1.5 %
EOSINOPHILS RELATIVE PERCENT: 2 %
EOSINOPHILS RELATIVE PERCENT: 2.2 %
EOSINOPHILS RELATIVE PERCENT: 2.6 %
EOSINOPHILS RELATIVE PERCENT: 2.6 %
EOSINOPHILS RELATIVE PERCENT: 2.7 %
EOSINOPHILS RELATIVE PERCENT: 2.7 %
EOSINOPHILS RELATIVE PERCENT: 2.8 %
EOSINOPHILS RELATIVE PERCENT: 2.9 %
EOSINOPHILS RELATIVE PERCENT: 3 %
EOSINOPHILS RELATIVE PERCENT: 3.2 %
EOSINOPHILS RELATIVE PERCENT: 3.2 %
EOSINOPHILS RELATIVE PERCENT: 3.3 %
EPITHELIAL CELLS, UA: 0 /HPF (ref 0–5)
EPITHELIAL CELLS, UA: 2 /HPF (ref 0–5)
EPITHELIAL CELLS, UA: 5 /HPF (ref 0–5)
EPITHELIAL CELLS, UA: 6 /HPF (ref 0–5)
EPITHELIAL CELLS, UA: 7 /HPF (ref 0–5)
ESTIMATED AVERAGE GLUCOSE: 102.5 MG/DL
FERRITIN: 493.7 NG/ML (ref 30–400)
FLUID TYPE: NORMAL
FLUID TYPE: NORMAL
FOLATE: 12.15 NG/ML (ref 4.78–24.2)
GFR AFRICAN AMERICAN: 17
GFR AFRICAN AMERICAN: 20
GFR AFRICAN AMERICAN: 20
GFR AFRICAN AMERICAN: 21
GFR AFRICAN AMERICAN: 21
GFR AFRICAN AMERICAN: 22
GFR AFRICAN AMERICAN: 22
GFR AFRICAN AMERICAN: 23
GFR AFRICAN AMERICAN: 24
GFR AFRICAN AMERICAN: 25
GFR AFRICAN AMERICAN: 26
GFR AFRICAN AMERICAN: 29
GFR AFRICAN AMERICAN: 29
GFR AFRICAN AMERICAN: 30
GFR AFRICAN AMERICAN: 31
GFR AFRICAN AMERICAN: 33
GFR AFRICAN AMERICAN: 33
GFR AFRICAN AMERICAN: 34
GFR AFRICAN AMERICAN: 34
GFR AFRICAN AMERICAN: 36
GFR AFRICAN AMERICAN: 38
GFR AFRICAN AMERICAN: 40
GFR AFRICAN AMERICAN: 46
GFR NON-AFRICAN AMERICAN: 14
GFR NON-AFRICAN AMERICAN: 16
GFR NON-AFRICAN AMERICAN: 16
GFR NON-AFRICAN AMERICAN: 17
GFR NON-AFRICAN AMERICAN: 18
GFR NON-AFRICAN AMERICAN: 19
GFR NON-AFRICAN AMERICAN: 21
GFR NON-AFRICAN AMERICAN: 22
GFR NON-AFRICAN AMERICAN: 24
GFR NON-AFRICAN AMERICAN: 24
GFR NON-AFRICAN AMERICAN: 25
GFR NON-AFRICAN AMERICAN: 26
GFR NON-AFRICAN AMERICAN: 27
GFR NON-AFRICAN AMERICAN: 27
GFR NON-AFRICAN AMERICAN: 28
GFR NON-AFRICAN AMERICAN: 28
GFR NON-AFRICAN AMERICAN: 30
GFR NON-AFRICAN AMERICAN: 32
GFR NON-AFRICAN AMERICAN: 33
GFR NON-AFRICAN AMERICAN: 38
GLOBULIN: 2.8 G/DL
GLOBULIN: 3.6 G/DL
GLOBULIN: 3.7 G/DL
GLOBULIN: 3.7 G/DL
GLOBULIN: 3.9 G/DL
GLOBULIN: 4.1 G/DL
GLOBULIN: 4.2 G/DL
GLUCOSE BLD-MCNC: 100 MG/DL (ref 70–99)
GLUCOSE BLD-MCNC: 101 MG/DL (ref 70–99)
GLUCOSE BLD-MCNC: 104 MG/DL (ref 70–99)
GLUCOSE BLD-MCNC: 105 MG/DL (ref 70–99)
GLUCOSE BLD-MCNC: 106 MG/DL (ref 70–99)
GLUCOSE BLD-MCNC: 106 MG/DL (ref 70–99)
GLUCOSE BLD-MCNC: 107 MG/DL (ref 70–99)
GLUCOSE BLD-MCNC: 108 MG/DL (ref 70–99)
GLUCOSE BLD-MCNC: 109 MG/DL (ref 70–99)
GLUCOSE BLD-MCNC: 109 MG/DL (ref 70–99)
GLUCOSE BLD-MCNC: 110 MG/DL (ref 70–99)
GLUCOSE BLD-MCNC: 110 MG/DL (ref 70–99)
GLUCOSE BLD-MCNC: 111 MG/DL (ref 70–99)
GLUCOSE BLD-MCNC: 111 MG/DL (ref 70–99)
GLUCOSE BLD-MCNC: 112 MG/DL (ref 70–99)
GLUCOSE BLD-MCNC: 112 MG/DL (ref 70–99)
GLUCOSE BLD-MCNC: 113 MG/DL (ref 70–99)
GLUCOSE BLD-MCNC: 114 MG/DL (ref 70–99)
GLUCOSE BLD-MCNC: 115 MG/DL (ref 70–99)
GLUCOSE BLD-MCNC: 116 MG/DL (ref 70–99)
GLUCOSE BLD-MCNC: 117 MG/DL (ref 70–99)
GLUCOSE BLD-MCNC: 118 MG/DL (ref 70–99)
GLUCOSE BLD-MCNC: 119 MG/DL (ref 70–99)
GLUCOSE BLD-MCNC: 120 MG/DL (ref 70–99)
GLUCOSE BLD-MCNC: 121 MG/DL (ref 70–99)
GLUCOSE BLD-MCNC: 123 MG/DL (ref 70–99)
GLUCOSE BLD-MCNC: 124 MG/DL (ref 70–99)
GLUCOSE BLD-MCNC: 125 MG/DL (ref 70–99)
GLUCOSE BLD-MCNC: 126 MG/DL (ref 70–99)
GLUCOSE BLD-MCNC: 127 MG/DL (ref 70–99)
GLUCOSE BLD-MCNC: 128 MG/DL (ref 70–99)
GLUCOSE BLD-MCNC: 128 MG/DL (ref 70–99)
GLUCOSE BLD-MCNC: 129 MG/DL (ref 70–99)
GLUCOSE BLD-MCNC: 131 MG/DL (ref 70–99)
GLUCOSE BLD-MCNC: 132 MG/DL (ref 70–99)
GLUCOSE BLD-MCNC: 133 MG/DL (ref 70–99)
GLUCOSE BLD-MCNC: 134 MG/DL (ref 70–99)
GLUCOSE BLD-MCNC: 135 MG/DL (ref 70–99)
GLUCOSE BLD-MCNC: 138 MG/DL (ref 70–99)
GLUCOSE BLD-MCNC: 139 MG/DL (ref 70–99)
GLUCOSE BLD-MCNC: 139 MG/DL (ref 70–99)
GLUCOSE BLD-MCNC: 140 MG/DL (ref 70–99)
GLUCOSE BLD-MCNC: 140 MG/DL (ref 70–99)
GLUCOSE BLD-MCNC: 141 MG/DL (ref 70–99)
GLUCOSE BLD-MCNC: 142 MG/DL (ref 70–99)
GLUCOSE BLD-MCNC: 143 MG/DL (ref 70–99)
GLUCOSE BLD-MCNC: 145 MG/DL (ref 70–99)
GLUCOSE BLD-MCNC: 145 MG/DL (ref 70–99)
GLUCOSE BLD-MCNC: 146 MG/DL (ref 70–99)
GLUCOSE BLD-MCNC: 146 MG/DL (ref 70–99)
GLUCOSE BLD-MCNC: 147 MG/DL (ref 70–99)
GLUCOSE BLD-MCNC: 148 MG/DL (ref 70–99)
GLUCOSE BLD-MCNC: 149 MG/DL (ref 70–99)
GLUCOSE BLD-MCNC: 151 MG/DL (ref 70–99)
GLUCOSE BLD-MCNC: 151 MG/DL (ref 70–99)
GLUCOSE BLD-MCNC: 152 MG/DL (ref 70–99)
GLUCOSE BLD-MCNC: 155 MG/DL (ref 70–99)
GLUCOSE BLD-MCNC: 155 MG/DL (ref 70–99)
GLUCOSE BLD-MCNC: 156 MG/DL (ref 70–99)
GLUCOSE BLD-MCNC: 162 MG/DL (ref 70–99)
GLUCOSE BLD-MCNC: 165 MG/DL (ref 70–99)
GLUCOSE BLD-MCNC: 166 MG/DL (ref 70–99)
GLUCOSE BLD-MCNC: 168 MG/DL (ref 70–99)
GLUCOSE BLD-MCNC: 170 MG/DL (ref 70–99)
GLUCOSE BLD-MCNC: 172 MG/DL (ref 70–99)
GLUCOSE BLD-MCNC: 173 MG/DL (ref 70–99)
GLUCOSE BLD-MCNC: 174 MG/DL (ref 70–99)
GLUCOSE BLD-MCNC: 177 MG/DL (ref 70–99)
GLUCOSE BLD-MCNC: 181 MG/DL (ref 70–99)
GLUCOSE BLD-MCNC: 181 MG/DL (ref 70–99)
GLUCOSE BLD-MCNC: 183 MG/DL (ref 70–99)
GLUCOSE BLD-MCNC: 191 MG/DL (ref 70–99)
GLUCOSE BLD-MCNC: 193 MG/DL (ref 70–99)
GLUCOSE BLD-MCNC: 194 MG/DL (ref 70–99)
GLUCOSE BLD-MCNC: 195 MG/DL (ref 70–99)
GLUCOSE BLD-MCNC: 201 MG/DL (ref 70–99)
GLUCOSE BLD-MCNC: 202 MG/DL (ref 70–99)
GLUCOSE BLD-MCNC: 204 MG/DL (ref 70–99)
GLUCOSE BLD-MCNC: 211 MG/DL (ref 70–99)
GLUCOSE BLD-MCNC: 221 MG/DL (ref 70–99)
GLUCOSE BLD-MCNC: 224 MG/DL (ref 70–99)
GLUCOSE BLD-MCNC: 225 MG/DL (ref 70–99)
GLUCOSE BLD-MCNC: 227 MG/DL (ref 70–99)
GLUCOSE BLD-MCNC: 230 MG/DL (ref 70–99)
GLUCOSE BLD-MCNC: 240 MG/DL (ref 70–99)
GLUCOSE BLD-MCNC: 242 MG/DL (ref 70–99)
GLUCOSE BLD-MCNC: 265 MG/DL (ref 70–99)
GLUCOSE BLD-MCNC: 265 MG/DL (ref 70–99)
GLUCOSE BLD-MCNC: 53 MG/DL (ref 70–99)
GLUCOSE BLD-MCNC: 64 MG/DL (ref 70–99)
GLUCOSE BLD-MCNC: 65 MG/DL (ref 70–99)
GLUCOSE BLD-MCNC: 68 MG/DL (ref 70–99)
GLUCOSE BLD-MCNC: 68 MG/DL (ref 70–99)
GLUCOSE BLD-MCNC: 69 MG/DL (ref 70–99)
GLUCOSE BLD-MCNC: 72 MG/DL (ref 70–99)
GLUCOSE BLD-MCNC: 72 MG/DL (ref 70–99)
GLUCOSE BLD-MCNC: 73 MG/DL (ref 70–99)
GLUCOSE BLD-MCNC: 74 MG/DL (ref 70–99)
GLUCOSE BLD-MCNC: 75 MG/DL (ref 70–99)
GLUCOSE BLD-MCNC: 76 MG/DL (ref 70–99)
GLUCOSE BLD-MCNC: 76 MG/DL (ref 70–99)
GLUCOSE BLD-MCNC: 78 MG/DL (ref 70–99)
GLUCOSE BLD-MCNC: 80 MG/DL (ref 70–99)
GLUCOSE BLD-MCNC: 80 MG/DL (ref 70–99)
GLUCOSE BLD-MCNC: 81 MG/DL (ref 70–99)
GLUCOSE BLD-MCNC: 82 MG/DL (ref 70–99)
GLUCOSE BLD-MCNC: 84 MG/DL (ref 70–99)
GLUCOSE BLD-MCNC: 84 MG/DL (ref 70–99)
GLUCOSE BLD-MCNC: 85 MG/DL (ref 70–99)
GLUCOSE BLD-MCNC: 85 MG/DL (ref 70–99)
GLUCOSE BLD-MCNC: 89 MG/DL (ref 70–99)
GLUCOSE BLD-MCNC: 90 MG/DL (ref 70–99)
GLUCOSE BLD-MCNC: 90 MG/DL (ref 70–99)
GLUCOSE BLD-MCNC: 93 MG/DL (ref 70–99)
GLUCOSE BLD-MCNC: 95 MG/DL (ref 70–99)
GLUCOSE BLD-MCNC: 97 MG/DL (ref 70–99)
GLUCOSE BLD-MCNC: 97 MG/DL (ref 70–99)
GLUCOSE BLD-MCNC: 98 MG/DL (ref 70–99)
GLUCOSE BLD-MCNC: 99 MG/DL (ref 70–99)
GLUCOSE BLD-MCNC: 99 MG/DL (ref 70–99)
GLUCOSE URINE: NEGATIVE MG/DL
GRAM STAIN RESULT: ABNORMAL
GRAM STAIN RESULT: NORMAL
GRAM STAIN RESULT: NORMAL
HBA1C MFR BLD: 5.2 %
HBV SURFACE AB TITR SER: <3.5 MIU/ML
HCO3 ARTERIAL: 26.7 MMOL/L (ref 21–29)
HCO3 ARTERIAL: 30.3 MMOL/L (ref 21–29)
HCO3 VENOUS: 29.8 MMOL/L (ref 23–29)
HCO3 VENOUS: 33.7 MMOL/L (ref 23–29)
HCT VFR BLD CALC: 27.8 % (ref 40.5–52.5)
HCT VFR BLD CALC: 28.6 % (ref 40.5–52.5)
HCT VFR BLD CALC: 28.9 % (ref 40.5–52.5)
HCT VFR BLD CALC: 29.2 % (ref 40.5–52.5)
HCT VFR BLD CALC: 29.4 % (ref 40.5–52.5)
HCT VFR BLD CALC: 29.6 % (ref 40.5–52.5)
HCT VFR BLD CALC: 29.9 % (ref 40.5–52.5)
HCT VFR BLD CALC: 29.9 % (ref 40.5–52.5)
HCT VFR BLD CALC: 30 % (ref 40.5–52.5)
HCT VFR BLD CALC: 30.1 % (ref 40.5–52.5)
HCT VFR BLD CALC: 30.2 % (ref 40.5–52.5)
HCT VFR BLD CALC: 30.3 % (ref 40.5–52.5)
HCT VFR BLD CALC: 30.3 % (ref 40.5–52.5)
HCT VFR BLD CALC: 30.4 % (ref 40.5–52.5)
HCT VFR BLD CALC: 30.4 % (ref 40.5–52.5)
HCT VFR BLD CALC: 30.9 % (ref 40.5–52.5)
HCT VFR BLD CALC: 31 % (ref 40.5–52.5)
HCT VFR BLD CALC: 31 % (ref 40.5–52.5)
HCT VFR BLD CALC: 32.1 % (ref 40.5–52.5)
HCT VFR BLD CALC: 33 % (ref 40.5–52.5)
HCT VFR BLD CALC: 33.9 % (ref 40.5–52.5)
HCT VFR BLD CALC: 34.5 % (ref 40.5–52.5)
HCT VFR BLD CALC: 34.6 % (ref 40.5–52.5)
HCT VFR BLD CALC: 34.7 % (ref 40.5–52.5)
HCT VFR BLD CALC: 36 % (ref 40.5–52.5)
HCT VFR BLD CALC: 36.1 % (ref 40.5–52.5)
HCT VFR BLD CALC: 36.2 % (ref 40.5–52.5)
HCT VFR BLD CALC: 36.4 % (ref 40.5–52.5)
HCT VFR BLD CALC: 36.4 % (ref 40.5–52.5)
HCT VFR BLD CALC: 36.6 % (ref 40.5–52.5)
HCT VFR BLD CALC: 36.6 % (ref 40.5–52.5)
HCT VFR BLD CALC: 37.5 % (ref 40.5–52.5)
HCT VFR BLD CALC: 37.7 % (ref 40.5–52.5)
HCT VFR BLD CALC: 37.8 % (ref 40.5–52.5)
HDLC SERPL-MCNC: 46 MG/DL (ref 40–60)
HEMOGLOBIN, ART, EXTENDED: 10.6 G/DL (ref 13.5–17.5)
HEMOGLOBIN, ART, EXTENDED: 9.6 G/DL (ref 13.5–17.5)
HEMOGLOBIN, VEN, REDUCED: 5 %
HEMOGLOBIN, VEN, REDUCED: 7 %
HEMOGLOBIN: 10.2 G/DL (ref 13.5–17.5)
HEMOGLOBIN: 10.5 G/DL (ref 13.5–17.5)
HEMOGLOBIN: 10.5 G/DL (ref 13.5–17.5)
HEMOGLOBIN: 10.8 G/DL (ref 13.5–17.5)
HEMOGLOBIN: 10.8 G/DL (ref 13.5–17.5)
HEMOGLOBIN: 10.9 G/DL (ref 13.5–17.5)
HEMOGLOBIN: 10.9 G/DL (ref 13.5–17.5)
HEMOGLOBIN: 11 G/DL (ref 13.5–17.5)
HEMOGLOBIN: 11.3 G/DL (ref 13.5–17.5)
HEMOGLOBIN: 11.4 G/DL (ref 13.5–17.5)
HEMOGLOBIN: 11.4 G/DL (ref 13.5–17.5)
HEMOGLOBIN: 11.5 G/DL (ref 13.5–17.5)
HEMOGLOBIN: 11.7 G/DL (ref 13.5–17.5)
HEMOGLOBIN: 11.8 G/DL (ref 13.5–17.5)
HEMOGLOBIN: 11.8 G/DL (ref 13.5–17.5)
HEMOGLOBIN: 8.7 G/DL (ref 13.5–17.5)
HEMOGLOBIN: 8.9 G/DL (ref 13.5–17.5)
HEMOGLOBIN: 9.1 G/DL (ref 13.5–17.5)
HEMOGLOBIN: 9.1 G/DL (ref 13.5–17.5)
HEMOGLOBIN: 9.2 G/DL (ref 13.5–17.5)
HEMOGLOBIN: 9.3 G/DL (ref 13.5–17.5)
HEMOGLOBIN: 9.4 G/DL (ref 13.5–17.5)
HEMOGLOBIN: 9.5 G/DL (ref 13.5–17.5)
HEMOGLOBIN: 9.5 G/DL (ref 13.5–17.5)
HEMOGLOBIN: 9.6 G/DL (ref 13.5–17.5)
HEMOGLOBIN: 9.7 G/DL (ref 13.5–17.5)
HEMOGLOBIN: 9.8 G/DL (ref 13.5–17.5)
HEPATITIS B CORE TOTAL ANTIBODY: NEGATIVE
HEPATITIS B SURFACE ANTIGEN INTERPRETATION: NORMAL
HYALINE CASTS: 0 /LPF (ref 0–8)
HYALINE CASTS: 5 /LPF (ref 0–8)
HYALINE CASTS: ABNORMAL /LPF (ref 0–2)
HYALINE CASTS: ABNORMAL /LPF (ref 0–2)
IMMATURE RETIC FRACT: 0.61 (ref 0.21–0.37)
INR BLD: 1.14 (ref 0.86–1.14)
INR BLD: 1.15 (ref 0.86–1.14)
INR BLD: 1.17 (ref 0.88–1.12)
INR BLD: 1.17 (ref 0.88–1.12)
IRON SATURATION: 15 % (ref 20–50)
IRON: 27 UG/DL (ref 59–158)
KETONES, URINE: 15 MG/DL
KETONES, URINE: ABNORMAL MG/DL
KETONES, URINE: ABNORMAL MG/DL
KETONES, URINE: NEGATIVE MG/DL
LACTIC ACID, SEPSIS: 1.2 MMOL/L (ref 0.4–1.9)
LACTIC ACID, SEPSIS: 1.6 MMOL/L (ref 0.4–1.9)
LACTIC ACID: 1.2 MMOL/L (ref 0.4–2)
LACTIC ACID: 1.5 MMOL/L (ref 0.4–2)
LDL CHOLESTEROL CALCULATED: 45 MG/DL
LEUKOCYTE ESTERASE, URINE: ABNORMAL
LIPASE: 52 U/L (ref 13–60)
LV EF: 55 %
LVEF MODALITY: NORMAL
LYMPHOCYTES ABSOLUTE: 0.4 K/UL (ref 1–5.1)
LYMPHOCYTES ABSOLUTE: 0.5 K/UL (ref 1–5.1)
LYMPHOCYTES ABSOLUTE: 0.5 K/UL (ref 1–5.1)
LYMPHOCYTES ABSOLUTE: 0.6 K/UL (ref 1–5.1)
LYMPHOCYTES ABSOLUTE: 0.6 K/UL (ref 1–5.1)
LYMPHOCYTES ABSOLUTE: 0.7 K/UL (ref 1–5.1)
LYMPHOCYTES ABSOLUTE: 0.8 K/UL (ref 1–5.1)
LYMPHOCYTES ABSOLUTE: 0.9 K/UL (ref 1–5.1)
LYMPHOCYTES ABSOLUTE: 1 K/UL (ref 1–5.1)
LYMPHOCYTES ABSOLUTE: 1.1 K/UL (ref 1–5.1)
LYMPHOCYTES ABSOLUTE: 1.3 K/UL (ref 1–5.1)
LYMPHOCYTES ABSOLUTE: 1.3 K/UL (ref 1–5.1)
LYMPHOCYTES RELATIVE PERCENT: 1.5 %
LYMPHOCYTES RELATIVE PERCENT: 1.6 %
LYMPHOCYTES RELATIVE PERCENT: 10.6 %
LYMPHOCYTES RELATIVE PERCENT: 10.9 %
LYMPHOCYTES RELATIVE PERCENT: 11 %
LYMPHOCYTES RELATIVE PERCENT: 11.2 %
LYMPHOCYTES RELATIVE PERCENT: 11.6 %
LYMPHOCYTES RELATIVE PERCENT: 11.7 %
LYMPHOCYTES RELATIVE PERCENT: 12 %
LYMPHOCYTES RELATIVE PERCENT: 12.5 %
LYMPHOCYTES RELATIVE PERCENT: 12.5 %
LYMPHOCYTES RELATIVE PERCENT: 12.6 %
LYMPHOCYTES RELATIVE PERCENT: 12.7 %
LYMPHOCYTES RELATIVE PERCENT: 13.5 %
LYMPHOCYTES RELATIVE PERCENT: 13.5 %
LYMPHOCYTES RELATIVE PERCENT: 13.8 %
LYMPHOCYTES RELATIVE PERCENT: 14.3 %
LYMPHOCYTES RELATIVE PERCENT: 14.3 %
LYMPHOCYTES RELATIVE PERCENT: 15.3 %
LYMPHOCYTES RELATIVE PERCENT: 4.4 %
LYMPHOCYTES RELATIVE PERCENT: 6.3 %
LYMPHOCYTES RELATIVE PERCENT: 7 %
LYMPHOCYTES RELATIVE PERCENT: 8 %
LYMPHOCYTES RELATIVE PERCENT: 8 %
LYMPHOCYTES RELATIVE PERCENT: 9 %
LYMPHOCYTES RELATIVE PERCENT: 9.3 %
LYMPHOCYTES RELATIVE PERCENT: 9.3 %
LYMPHOCYTES RELATIVE PERCENT: 9.9 %
LYMPHOCYTES, BODY FLUID: 26 %
LYMPHOCYTES, BODY FLUID: 9 %
MACROPHAGE FLUID: 17 %
MACROPHAGE FLUID: 71 %
MAGNESIUM: 1.7 MG/DL (ref 1.8–2.4)
MAGNESIUM: 1.9 MG/DL (ref 1.8–2.4)
MAGNESIUM: 2 MG/DL (ref 1.8–2.4)
MAGNESIUM: 2 MG/DL (ref 1.8–2.4)
MAGNESIUM: 2.1 MG/DL (ref 1.8–2.4)
MAGNESIUM: 2.3 MG/DL (ref 1.8–2.4)
MAGNESIUM: 2.3 MG/DL (ref 1.8–2.4)
MAGNESIUM: 2.4 MG/DL (ref 1.8–2.4)
MAGNESIUM: 2.5 MG/DL (ref 1.8–2.4)
MCH RBC QN AUTO: 27 PG (ref 26–34)
MCH RBC QN AUTO: 27.1 PG (ref 26–34)
MCH RBC QN AUTO: 27.3 PG (ref 26–34)
MCH RBC QN AUTO: 27.3 PG (ref 26–34)
MCH RBC QN AUTO: 27.6 PG (ref 26–34)
MCH RBC QN AUTO: 27.6 PG (ref 26–34)
MCH RBC QN AUTO: 28 PG (ref 26–34)
MCH RBC QN AUTO: 28 PG (ref 26–34)
MCH RBC QN AUTO: 28.5 PG (ref 26–34)
MCH RBC QN AUTO: 28.5 PG (ref 26–34)
MCH RBC QN AUTO: 28.6 PG (ref 26–34)
MCH RBC QN AUTO: 28.6 PG (ref 26–34)
MCH RBC QN AUTO: 28.7 PG (ref 26–34)
MCH RBC QN AUTO: 28.8 PG (ref 26–34)
MCH RBC QN AUTO: 28.9 PG (ref 26–34)
MCH RBC QN AUTO: 29 PG (ref 26–34)
MCH RBC QN AUTO: 29.1 PG (ref 26–34)
MCH RBC QN AUTO: 29.5 PG (ref 26–34)
MCHC RBC AUTO-ENTMCNC: 30 G/DL (ref 31–36)
MCHC RBC AUTO-ENTMCNC: 30.3 G/DL (ref 31–36)
MCHC RBC AUTO-ENTMCNC: 30.4 G/DL (ref 31–36)
MCHC RBC AUTO-ENTMCNC: 30.4 G/DL (ref 31–36)
MCHC RBC AUTO-ENTMCNC: 30.5 G/DL (ref 31–36)
MCHC RBC AUTO-ENTMCNC: 30.6 G/DL (ref 31–36)
MCHC RBC AUTO-ENTMCNC: 30.6 G/DL (ref 31–36)
MCHC RBC AUTO-ENTMCNC: 30.7 G/DL (ref 31–36)
MCHC RBC AUTO-ENTMCNC: 30.7 G/DL (ref 31–36)
MCHC RBC AUTO-ENTMCNC: 30.8 G/DL (ref 31–36)
MCHC RBC AUTO-ENTMCNC: 30.9 G/DL (ref 31–36)
MCHC RBC AUTO-ENTMCNC: 31 G/DL (ref 31–36)
MCHC RBC AUTO-ENTMCNC: 31.1 G/DL (ref 31–36)
MCHC RBC AUTO-ENTMCNC: 31.2 G/DL (ref 31–36)
MCHC RBC AUTO-ENTMCNC: 31.3 G/DL (ref 31–36)
MCHC RBC AUTO-ENTMCNC: 31.4 G/DL (ref 31–36)
MCHC RBC AUTO-ENTMCNC: 31.5 G/DL (ref 31–36)
MCHC RBC AUTO-ENTMCNC: 31.5 G/DL (ref 31–36)
MCHC RBC AUTO-ENTMCNC: 31.6 G/DL (ref 31–36)
MCV RBC AUTO: 87.6 FL (ref 80–100)
MCV RBC AUTO: 87.6 FL (ref 80–100)
MCV RBC AUTO: 88.1 FL (ref 80–100)
MCV RBC AUTO: 88.2 FL (ref 80–100)
MCV RBC AUTO: 88.4 FL (ref 80–100)
MCV RBC AUTO: 88.8 FL (ref 80–100)
MCV RBC AUTO: 89.5 FL (ref 80–100)
MCV RBC AUTO: 90.2 FL (ref 80–100)
MCV RBC AUTO: 91.1 FL (ref 80–100)
MCV RBC AUTO: 91.4 FL (ref 80–100)
MCV RBC AUTO: 91.4 FL (ref 80–100)
MCV RBC AUTO: 91.7 FL (ref 80–100)
MCV RBC AUTO: 91.9 FL (ref 80–100)
MCV RBC AUTO: 92.3 FL (ref 80–100)
MCV RBC AUTO: 92.5 FL (ref 80–100)
MCV RBC AUTO: 92.5 FL (ref 80–100)
MCV RBC AUTO: 93 FL (ref 80–100)
MCV RBC AUTO: 93 FL (ref 80–100)
MCV RBC AUTO: 93.2 FL (ref 80–100)
MCV RBC AUTO: 93.2 FL (ref 80–100)
MCV RBC AUTO: 93.4 FL (ref 80–100)
MCV RBC AUTO: 93.6 FL (ref 80–100)
MCV RBC AUTO: 93.6 FL (ref 80–100)
MCV RBC AUTO: 93.7 FL (ref 80–100)
MCV RBC AUTO: 93.7 FL (ref 80–100)
MCV RBC AUTO: 93.8 FL (ref 80–100)
MCV RBC AUTO: 93.9 FL (ref 80–100)
MCV RBC AUTO: 94.2 FL (ref 80–100)
MCV RBC AUTO: 95.3 FL (ref 80–100)
MCV RBC AUTO: 95.3 FL (ref 80–100)
MCV RBC AUTO: 95.4 FL (ref 80–100)
MCV RBC AUTO: 95.6 FL (ref 80–100)
MCV RBC AUTO: 95.7 FL (ref 80–100)
MCV RBC AUTO: 96.6 FL (ref 80–100)
MESOTHELIAL FLUID: 3 %
METHEMOGLOBIN ARTERIAL: 0.1 %
METHEMOGLOBIN ARTERIAL: 0.3 %
METHEMOGLOBIN VENOUS: 0 %
METHEMOGLOBIN VENOUS: 0.4 %
MICROSCOPIC EXAMINATION: YES
MONOCYTE, FLUID: 1 %
MONOCYTES ABSOLUTE: 0.5 K/UL (ref 0–1.3)
MONOCYTES ABSOLUTE: 0.6 K/UL (ref 0–1.3)
MONOCYTES ABSOLUTE: 0.7 K/UL (ref 0–1.3)
MONOCYTES ABSOLUTE: 0.8 K/UL (ref 0–1.3)
MONOCYTES ABSOLUTE: 0.9 K/UL (ref 0–1.3)
MONOCYTES ABSOLUTE: 1 K/UL (ref 0–1.3)
MONOCYTES ABSOLUTE: 1 K/UL (ref 0–1.3)
MONOCYTES ABSOLUTE: 1.3 K/UL (ref 0–1.3)
MONOCYTES RELATIVE PERCENT: 10.1 %
MONOCYTES RELATIVE PERCENT: 10.2 %
MONOCYTES RELATIVE PERCENT: 10.8 %
MONOCYTES RELATIVE PERCENT: 11.4 %
MONOCYTES RELATIVE PERCENT: 11.5 %
MONOCYTES RELATIVE PERCENT: 11.7 %
MONOCYTES RELATIVE PERCENT: 12.3 %
MONOCYTES RELATIVE PERCENT: 2.5 %
MONOCYTES RELATIVE PERCENT: 4.8 %
MONOCYTES RELATIVE PERCENT: 5.9 %
MONOCYTES RELATIVE PERCENT: 6.7 %
MONOCYTES RELATIVE PERCENT: 6.8 %
MONOCYTES RELATIVE PERCENT: 7.1 %
MONOCYTES RELATIVE PERCENT: 7.3 %
MONOCYTES RELATIVE PERCENT: 7.4 %
MONOCYTES RELATIVE PERCENT: 7.6 %
MONOCYTES RELATIVE PERCENT: 7.8 %
MONOCYTES RELATIVE PERCENT: 8 %
MONOCYTES RELATIVE PERCENT: 8.1 %
MONOCYTES RELATIVE PERCENT: 8.1 %
MONOCYTES RELATIVE PERCENT: 8.3 %
MONOCYTES RELATIVE PERCENT: 8.4 %
MONOCYTES RELATIVE PERCENT: 8.5 %
MONOCYTES RELATIVE PERCENT: 8.7 %
MONOCYTES RELATIVE PERCENT: 9.1 %
MONOCYTES RELATIVE PERCENT: 9.2 %
MONOCYTES RELATIVE PERCENT: 9.3 %
MONOCYTES RELATIVE PERCENT: 9.6 %
MONOCYTES RELATIVE PERCENT: 9.7 %
MONOCYTES RELATIVE PERCENT: 9.8 %
MRSA SCREEN RT-PCR: ABNORMAL
NEUTROPHIL, FLUID: 17 %
NEUTROPHIL, FLUID: 56 %
NEUTROPHILS ABSOLUTE: 11.1 K/UL (ref 1.7–7.7)
NEUTROPHILS ABSOLUTE: 25.8 K/UL (ref 1.7–7.7)
NEUTROPHILS ABSOLUTE: 29 K/UL (ref 1.7–7.7)
NEUTROPHILS ABSOLUTE: 4.9 K/UL (ref 1.7–7.7)
NEUTROPHILS ABSOLUTE: 4.9 K/UL (ref 1.7–7.7)
NEUTROPHILS ABSOLUTE: 5.1 K/UL (ref 1.7–7.7)
NEUTROPHILS ABSOLUTE: 5.3 K/UL (ref 1.7–7.7)
NEUTROPHILS ABSOLUTE: 5.4 K/UL (ref 1.7–7.7)
NEUTROPHILS ABSOLUTE: 5.7 K/UL (ref 1.7–7.7)
NEUTROPHILS ABSOLUTE: 5.7 K/UL (ref 1.7–7.7)
NEUTROPHILS ABSOLUTE: 5.9 K/UL (ref 1.7–7.7)
NEUTROPHILS ABSOLUTE: 6 K/UL (ref 1.7–7.7)
NEUTROPHILS ABSOLUTE: 6.2 K/UL (ref 1.7–7.7)
NEUTROPHILS ABSOLUTE: 6.3 K/UL (ref 1.7–7.7)
NEUTROPHILS ABSOLUTE: 6.5 K/UL (ref 1.7–7.7)
NEUTROPHILS ABSOLUTE: 6.5 K/UL (ref 1.7–7.7)
NEUTROPHILS ABSOLUTE: 6.6 K/UL (ref 1.7–7.7)
NEUTROPHILS ABSOLUTE: 6.6 K/UL (ref 1.7–7.7)
NEUTROPHILS ABSOLUTE: 6.8 K/UL (ref 1.7–7.7)
NEUTROPHILS ABSOLUTE: 6.9 K/UL (ref 1.7–7.7)
NEUTROPHILS ABSOLUTE: 6.9 K/UL (ref 1.7–7.7)
NEUTROPHILS ABSOLUTE: 7.2 K/UL (ref 1.7–7.7)
NEUTROPHILS ABSOLUTE: 7.2 K/UL (ref 1.7–7.7)
NEUTROPHILS ABSOLUTE: 7.7 K/UL (ref 1.7–7.7)
NEUTROPHILS ABSOLUTE: 7.8 K/UL (ref 1.7–7.7)
NEUTROPHILS ABSOLUTE: 8.1 K/UL (ref 1.7–7.7)
NEUTROPHILS RELATIVE PERCENT: 71.1 %
NEUTROPHILS RELATIVE PERCENT: 72.3 %
NEUTROPHILS RELATIVE PERCENT: 73.9 %
NEUTROPHILS RELATIVE PERCENT: 74.2 %
NEUTROPHILS RELATIVE PERCENT: 74.4 %
NEUTROPHILS RELATIVE PERCENT: 74.5 %
NEUTROPHILS RELATIVE PERCENT: 75.1 %
NEUTROPHILS RELATIVE PERCENT: 75.3 %
NEUTROPHILS RELATIVE PERCENT: 76.1 %
NEUTROPHILS RELATIVE PERCENT: 76.2 %
NEUTROPHILS RELATIVE PERCENT: 76.4 %
NEUTROPHILS RELATIVE PERCENT: 76.4 %
NEUTROPHILS RELATIVE PERCENT: 76.8 %
NEUTROPHILS RELATIVE PERCENT: 77.6 %
NEUTROPHILS RELATIVE PERCENT: 77.6 %
NEUTROPHILS RELATIVE PERCENT: 77.8 %
NEUTROPHILS RELATIVE PERCENT: 78.3 %
NEUTROPHILS RELATIVE PERCENT: 78.6 %
NEUTROPHILS RELATIVE PERCENT: 79 %
NEUTROPHILS RELATIVE PERCENT: 79.1 %
NEUTROPHILS RELATIVE PERCENT: 79.8 %
NEUTROPHILS RELATIVE PERCENT: 81.3 %
NEUTROPHILS RELATIVE PERCENT: 81.6 %
NEUTROPHILS RELATIVE PERCENT: 82.4 %
NEUTROPHILS RELATIVE PERCENT: 83.1 %
NEUTROPHILS RELATIVE PERCENT: 83.5 %
NEUTROPHILS RELATIVE PERCENT: 85.2 %
NEUTROPHILS RELATIVE PERCENT: 89.5 %
NEUTROPHILS RELATIVE PERCENT: 93.5 %
NEUTROPHILS RELATIVE PERCENT: 95.8 %
NITRITE, URINE: NEGATIVE
NUCLEATED CELLS FLUID: 195 /CUMM
NUCLEATED CELLS FLUID: 264 /CUMM
NUMBER OF CELLS COUNTED FLUID: 100
NUMBER OF CELLS COUNTED FLUID: 100
O2 CONTENT ARTERIAL: 13 ML/DL
O2 CONTENT, VEN: 14 VOL %
O2 CONTENT, VEN: 15 VOL %
O2 SAT, ARTERIAL: 98.1 %
O2 SAT, ARTERIAL: 98.2 %
O2 SAT, VEN: 93 %
O2 SAT, VEN: 95 %
O2 THERAPY: ABNORMAL
ORGANISM: ABNORMAL
PCO2 ARTERIAL: 60.1 MMHG (ref 35–45)
PCO2 ARTERIAL: 62.9 MMHG (ref 35–45)
PCO2, VEN: 63.3 MMHG (ref 40–50)
PCO2, VEN: 71.4 MMHG (ref 40–50)
PDW BLD-RTO: 15.7 % (ref 12.4–15.4)
PDW BLD-RTO: 16.4 % (ref 12.4–15.4)
PDW BLD-RTO: 16.5 % (ref 12.4–15.4)
PDW BLD-RTO: 16.6 % (ref 12.4–15.4)
PDW BLD-RTO: 16.6 % (ref 12.4–15.4)
PDW BLD-RTO: 16.7 % (ref 12.4–15.4)
PDW BLD-RTO: 16.8 % (ref 12.4–15.4)
PDW BLD-RTO: 17 % (ref 12.4–15.4)
PDW BLD-RTO: 17 % (ref 12.4–15.4)
PDW BLD-RTO: 17.1 % (ref 12.4–15.4)
PDW BLD-RTO: 17.3 % (ref 12.4–15.4)
PDW BLD-RTO: 17.3 % (ref 12.4–15.4)
PDW BLD-RTO: 17.4 % (ref 12.4–15.4)
PDW BLD-RTO: 17.5 % (ref 12.4–15.4)
PDW BLD-RTO: 17.5 % (ref 12.4–15.4)
PDW BLD-RTO: 17.6 % (ref 12.4–15.4)
PDW BLD-RTO: 17.7 % (ref 12.4–15.4)
PDW BLD-RTO: 17.7 % (ref 12.4–15.4)
PDW BLD-RTO: 17.8 % (ref 12.4–15.4)
PDW BLD-RTO: 17.8 % (ref 12.4–15.4)
PDW BLD-RTO: 17.9 % (ref 12.4–15.4)
PDW BLD-RTO: 18 % (ref 12.4–15.4)
PDW BLD-RTO: 18.1 % (ref 12.4–15.4)
PDW BLD-RTO: 18.4 % (ref 12.4–15.4)
PDW BLD-RTO: 18.5 % (ref 12.4–15.4)
PDW BLD-RTO: 19.2 % (ref 12.4–15.4)
PDW BLD-RTO: 19.5 % (ref 12.4–15.4)
PERFORMED ON: ABNORMAL
PERFORMED ON: NORMAL
PH ARTERIAL: 7.25 (ref 7.35–7.45)
PH ARTERIAL: 7.29 (ref 7.35–7.45)
PH UA: 5 (ref 5–8)
PH UA: 5.5 (ref 5–8)
PH UA: 6 (ref 5–8)
PH UA: 7 (ref 5–8)
PH VENOUS: 7.28 (ref 7.35–7.45)
PH VENOUS: 7.28 (ref 7.35–7.45)
PHOSPHORUS: 3.7 MG/DL (ref 2.5–4.9)
PHOSPHORUS: 3.7 MG/DL (ref 2.5–4.9)
PHOSPHORUS: 3.9 MG/DL (ref 2.5–4.9)
PHOSPHORUS: 4 MG/DL (ref 2.5–4.9)
PHOSPHORUS: 4.2 MG/DL (ref 2.5–4.9)
PHOSPHORUS: 4.5 MG/DL (ref 2.5–4.9)
PHOSPHORUS: 4.6 MG/DL (ref 2.5–4.9)
PHOSPHORUS: 4.6 MG/DL (ref 2.5–4.9)
PHOSPHORUS: 4.7 MG/DL (ref 2.5–4.9)
PHOSPHORUS: 4.8 MG/DL (ref 2.5–4.9)
PHOSPHORUS: 5.4 MG/DL (ref 2.5–4.9)
PHOSPHORUS: 5.7 MG/DL (ref 2.5–4.9)
PLATELET # BLD: 111 K/UL (ref 135–450)
PLATELET # BLD: 113 K/UL (ref 135–450)
PLATELET # BLD: 126 K/UL (ref 135–450)
PLATELET # BLD: 127 K/UL (ref 135–450)
PLATELET # BLD: 128 K/UL (ref 135–450)
PLATELET # BLD: 134 K/UL (ref 135–450)
PLATELET # BLD: 135 K/UL (ref 135–450)
PLATELET # BLD: 138 K/UL (ref 135–450)
PLATELET # BLD: 139 K/UL (ref 135–450)
PLATELET # BLD: 142 K/UL (ref 135–450)
PLATELET # BLD: 143 K/UL (ref 135–450)
PLATELET # BLD: 147 K/UL (ref 135–450)
PLATELET # BLD: 147 K/UL (ref 135–450)
PLATELET # BLD: 150 K/UL (ref 135–450)
PLATELET # BLD: 151 K/UL (ref 135–450)
PLATELET # BLD: 154 K/UL (ref 135–450)
PLATELET # BLD: 158 K/UL (ref 135–450)
PLATELET # BLD: 162 K/UL (ref 135–450)
PLATELET # BLD: 165 K/UL (ref 135–450)
PLATELET # BLD: 165 K/UL (ref 135–450)
PLATELET # BLD: 171 K/UL (ref 135–450)
PLATELET # BLD: 171 K/UL (ref 135–450)
PLATELET # BLD: 172 K/UL (ref 135–450)
PLATELET # BLD: 173 K/UL (ref 135–450)
PLATELET # BLD: 176 K/UL (ref 135–450)
PLATELET # BLD: 179 K/UL (ref 135–450)
PLATELET # BLD: 181 K/UL (ref 135–450)
PLATELET # BLD: 182 K/UL (ref 135–450)
PLATELET # BLD: 182 K/UL (ref 135–450)
PLATELET # BLD: 207 K/UL (ref 135–450)
PLATELET # BLD: 216 K/UL (ref 135–450)
PLATELET # BLD: 218 K/UL (ref 135–450)
PMV BLD AUTO: 6.4 FL (ref 5–10.5)
PMV BLD AUTO: 6.5 FL (ref 5–10.5)
PMV BLD AUTO: 6.7 FL (ref 5–10.5)
PMV BLD AUTO: 6.8 FL (ref 5–10.5)
PMV BLD AUTO: 6.9 FL (ref 5–10.5)
PMV BLD AUTO: 7 FL (ref 5–10.5)
PMV BLD AUTO: 7.1 FL (ref 5–10.5)
PMV BLD AUTO: 7.2 FL (ref 5–10.5)
PMV BLD AUTO: 7.3 FL (ref 5–10.5)
PMV BLD AUTO: 7.4 FL (ref 5–10.5)
PMV BLD AUTO: 7.4 FL (ref 5–10.5)
PMV BLD AUTO: 7.5 FL (ref 5–10.5)
PMV BLD AUTO: 7.6 FL (ref 5–10.5)
PMV BLD AUTO: 7.6 FL (ref 5–10.5)
PMV BLD AUTO: 7.8 FL (ref 5–10.5)
PMV BLD AUTO: 7.9 FL (ref 5–10.5)
PMV BLD AUTO: 8.2 FL (ref 5–10.5)
PO2 ARTERIAL: 103 MMHG (ref 75–108)
PO2 ARTERIAL: 94.5 MMHG (ref 75–108)
PO2, VEN: 70.6 MMHG (ref 25–40)
PO2, VEN: 73.3 MMHG (ref 25–40)
POTASSIUM REFLEX MAGNESIUM: 4 MMOL/L (ref 3.5–5.1)
POTASSIUM REFLEX MAGNESIUM: 4.3 MMOL/L (ref 3.5–5.1)
POTASSIUM REFLEX MAGNESIUM: 4.3 MMOL/L (ref 3.5–5.1)
POTASSIUM REFLEX MAGNESIUM: 4.5 MMOL/L (ref 3.5–5.1)
POTASSIUM REFLEX MAGNESIUM: 4.6 MMOL/L (ref 3.5–5.1)
POTASSIUM REFLEX MAGNESIUM: 4.8 MMOL/L (ref 3.5–5.1)
POTASSIUM REFLEX MAGNESIUM: 4.9 MMOL/L (ref 3.5–5.1)
POTASSIUM REFLEX MAGNESIUM: 4.9 MMOL/L (ref 3.5–5.1)
POTASSIUM REFLEX MAGNESIUM: 5 MMOL/L (ref 3.5–5.1)
POTASSIUM REFLEX MAGNESIUM: 5.1 MMOL/L (ref 3.5–5.1)
POTASSIUM REFLEX MAGNESIUM: 5.2 MMOL/L (ref 3.5–5.1)
POTASSIUM REFLEX MAGNESIUM: 5.4 MMOL/L (ref 3.5–5.1)
POTASSIUM SERPL-SCNC: 4.4 MMOL/L (ref 3.5–5.1)
POTASSIUM SERPL-SCNC: 4.5 MMOL/L (ref 3.5–5.1)
POTASSIUM SERPL-SCNC: 4.6 MMOL/L (ref 3.5–5.1)
POTASSIUM SERPL-SCNC: 4.7 MMOL/L (ref 3.5–5.1)
POTASSIUM SERPL-SCNC: 4.8 MMOL/L (ref 3.5–5.1)
POTASSIUM SERPL-SCNC: 4.9 MMOL/L (ref 3.5–5.1)
POTASSIUM SERPL-SCNC: 5 MMOL/L (ref 3.5–5.1)
POTASSIUM SERPL-SCNC: 5.1 MMOL/L (ref 3.5–5.1)
POTASSIUM SERPL-SCNC: 5.2 MMOL/L (ref 3.5–5.1)
POTASSIUM SERPL-SCNC: 5.3 MMOL/L (ref 3.5–5.1)
POTASSIUM SERPL-SCNC: 5.5 MMOL/L (ref 3.5–5.1)
POTASSIUM SERPL-SCNC: 5.7 MMOL/L (ref 3.5–5.1)
PRO-BNP: 4719 PG/ML (ref 0–124)
PRO-BNP: 9832 PG/ML (ref 0–124)
PRO-BNP: ABNORMAL PG/ML (ref 0–124)
PROCALCITONIN: 2.62 NG/ML (ref 0–0.15)
PROCALCITONIN: 2.93 NG/ML (ref 0–0.15)
PROCALCITONIN: 9.76 NG/ML (ref 0–0.15)
PROTEIN FLUID: 3.7 G/DL
PROTEIN UA: 100 MG/DL
PROTEIN UA: >300 MG/DL
PROTEIN UA: >=300 MG/DL
PROTHROMBIN TIME: 13.3 SEC (ref 10–13.2)
PROTHROMBIN TIME: 13.3 SEC (ref 9.9–12.7)
PROTHROMBIN TIME: 13.3 SEC (ref 9.9–12.7)
PROTHROMBIN TIME: 13.4 SEC (ref 10–13.2)
RBC # BLD: 3.05 M/UL (ref 4.2–5.9)
RBC # BLD: 3.05 M/UL (ref 4.2–5.9)
RBC # BLD: 3.1 M/UL (ref 4.2–5.9)
RBC # BLD: 3.14 M/UL (ref 4.2–5.9)
RBC # BLD: 3.16 M/UL (ref 4.2–5.9)
RBC # BLD: 3.2 M/UL (ref 4.2–5.9)
RBC # BLD: 3.2 M/UL (ref 4.2–5.9)
RBC # BLD: 3.21 M/UL (ref 4.2–5.9)
RBC # BLD: 3.22 M/UL (ref 4.2–5.9)
RBC # BLD: 3.22 M/UL (ref 4.2–5.9)
RBC # BLD: 3.25 M/UL (ref 4.2–5.9)
RBC # BLD: 3.25 M/UL (ref 4.2–5.9)
RBC # BLD: 3.31 M/UL (ref 4.2–5.9)
RBC # BLD: 3.31 M/UL (ref 4.2–5.9)
RBC # BLD: 3.32 M/UL (ref 4.2–5.9)
RBC # BLD: 3.33 M/UL (ref 4.2–5.9)
RBC # BLD: 3.35 M/UL (ref 4.2–5.9)
RBC # BLD: 3.38 M/UL (ref 4.2–5.9)
RBC # BLD: 3.38 M/UL (ref 4.2–5.9)
RBC # BLD: 3.43 M/UL (ref 4.2–5.9)
RBC # BLD: 3.63 M/UL (ref 4.2–5.9)
RBC # BLD: 3.74 M/UL (ref 4.2–5.9)
RBC # BLD: 3.78 M/UL (ref 4.2–5.9)
RBC # BLD: 3.78 M/UL (ref 4.2–5.9)
RBC # BLD: 3.79 M/UL (ref 4.2–5.9)
RBC # BLD: 3.85 M/UL (ref 4.2–5.9)
RBC # BLD: 3.87 M/UL (ref 4.2–5.9)
RBC # BLD: 3.95 M/UL (ref 4.2–5.9)
RBC # BLD: 3.96 M/UL (ref 4.2–5.9)
RBC # BLD: 3.98 M/UL (ref 4.2–5.9)
RBC # BLD: 4 M/UL (ref 4.2–5.9)
RBC # BLD: 4.04 M/UL (ref 4.2–5.9)
RBC # BLD: 4.07 M/UL (ref 4.2–5.9)
RBC # BLD: 4.09 M/UL (ref 4.2–5.9)
RBC FLUID: 1200 /CUMM
RBC FLUID: 2900 /CUMM
RBC UA: 33 /HPF (ref 0–4)
RBC UA: >100 /HPF (ref 0–4)
RBC UA: ABNORMAL /HPF (ref 0–4)
REASON FOR REJECTION: NORMAL
REASON FOR REJECTION: NORMAL
REJECTED TEST: NORMAL
REJECTED TEST: NORMAL
REPORT: NORMAL
RETICULOCYTE ABSOLUTE COUNT: 0.08 M/UL
RETICULOCYTE COUNT PCT: 2.36 % (ref 0.5–2.18)
SARS-COV-2, NAAT: NOT DETECTED
SARS-COV-2: NOT DETECTED
SARS-COV-2: NOT DETECTED
SODIUM BLD-SCNC: 128 MMOL/L (ref 136–145)
SODIUM BLD-SCNC: 129 MMOL/L (ref 136–145)
SODIUM BLD-SCNC: 130 MMOL/L (ref 136–145)
SODIUM BLD-SCNC: 130 MMOL/L (ref 136–145)
SODIUM BLD-SCNC: 131 MMOL/L (ref 136–145)
SODIUM BLD-SCNC: 132 MMOL/L (ref 136–145)
SODIUM BLD-SCNC: 133 MMOL/L (ref 136–145)
SODIUM BLD-SCNC: 134 MMOL/L (ref 136–145)
SODIUM BLD-SCNC: 135 MMOL/L (ref 136–145)
SODIUM BLD-SCNC: 136 MMOL/L (ref 136–145)
SODIUM BLD-SCNC: 137 MMOL/L (ref 136–145)
SODIUM BLD-SCNC: 141 MMOL/L (ref 136–145)
SODIUM URINE: 24 MMOL/L
SODIUM URINE: <20 MMOL/L
SPECIFIC GRAVITY UA: 1.01 (ref 1–1.03)
SPECIFIC GRAVITY UA: 1.02 (ref 1–1.03)
TCO2 ARTERIAL: 63.9 MMOL/L
TCO2 ARTERIAL: 72.2 MMOL/L
TCO2 CALC VENOUS: 71 MMOL/L
TCO2 CALC VENOUS: 81 MMOL/L
TOTAL CK: 14 U/L (ref 39–308)
TOTAL IRON BINDING CAPACITY: 175 UG/DL (ref 260–445)
TOTAL PROTEIN: 6.2 G/DL (ref 6.4–8.2)
TOTAL PROTEIN: 6.6 G/DL (ref 6.4–8.2)
TOTAL PROTEIN: 6.7 G/DL (ref 6.4–8.2)
TOTAL PROTEIN: 6.8 G/DL (ref 6.4–8.2)
TOTAL PROTEIN: 6.9 G/DL (ref 6.4–8.2)
TOTAL PROTEIN: 6.9 G/DL (ref 6.4–8.2)
TOTAL PROTEIN: 7.5 G/DL (ref 6.4–8.2)
TOTAL PROTEIN: 7.6 G/DL (ref 6.4–8.2)
TOTAL PROTEIN: 7.7 G/DL (ref 6.4–8.2)
TOTAL PROTEIN: 7.7 G/DL (ref 6.4–8.2)
TOTAL PROTEIN: 8.1 G/DL (ref 6.4–8.2)
TRIGL SERPL-MCNC: 97 MG/DL (ref 0–150)
TRIGLYCERIDES FLUID: 51 MG/DL
TROPONIN: 0.07 NG/ML
TROPONIN: 0.08 NG/ML
TROPONIN: 0.11 NG/ML
TROPONIN: 0.11 NG/ML
TROPONIN: 0.12 NG/ML
TROPONIN: 0.14 NG/ML
TSH SERPL DL<=0.05 MIU/L-ACNC: 2.37 UIU/ML (ref 0.27–4.2)
URINE CULTURE, ROUTINE: ABNORMAL
URINE REFLEX TO CULTURE: YES
URINE TYPE: ABNORMAL
UROBILINOGEN, URINE: 0.2 E.U./DL
UROBILINOGEN, URINE: 1 E.U./DL
VANCOMYCIN RANDOM: 11.7 UG/ML
VANCOMYCIN RANDOM: 12.9 UG/ML
VANCOMYCIN RANDOM: 18 UG/ML
VITAMIN B-12: 324 PG/ML (ref 211–911)
VLDLC SERPL CALC-MCNC: 19 MG/DL
WBC # BLD: 10.2 K/UL (ref 4–11)
WBC # BLD: 12.4 K/UL (ref 4–11)
WBC # BLD: 27.6 K/UL (ref 4–11)
WBC # BLD: 30.3 K/UL (ref 4–11)
WBC # BLD: 6.5 K/UL (ref 4–11)
WBC # BLD: 6.6 K/UL (ref 4–11)
WBC # BLD: 6.9 K/UL (ref 4–11)
WBC # BLD: 7 K/UL (ref 4–11)
WBC # BLD: 7 K/UL (ref 4–11)
WBC # BLD: 7.2 K/UL (ref 4–11)
WBC # BLD: 7.4 K/UL (ref 4–11)
WBC # BLD: 7.4 K/UL (ref 4–11)
WBC # BLD: 7.5 K/UL (ref 4–11)
WBC # BLD: 8.1 K/UL (ref 4–11)
WBC # BLD: 8.2 K/UL (ref 4–11)
WBC # BLD: 8.2 K/UL (ref 4–11)
WBC # BLD: 8.3 K/UL (ref 4–11)
WBC # BLD: 8.4 K/UL (ref 4–11)
WBC # BLD: 8.5 K/UL (ref 4–11)
WBC # BLD: 8.7 K/UL (ref 4–11)
WBC # BLD: 8.8 K/UL (ref 4–11)
WBC # BLD: 8.9 K/UL (ref 4–11)
WBC # BLD: 9 K/UL (ref 4–11)
WBC # BLD: 9 K/UL (ref 4–11)
WBC # BLD: 9.1 K/UL (ref 4–11)
WBC # BLD: 9.1 K/UL (ref 4–11)
WBC # BLD: 9.2 K/UL (ref 4–11)
WBC # BLD: 9.4 K/UL (ref 4–11)
WBC # BLD: 9.8 K/UL (ref 4–11)
WBC UA: 251 /HPF (ref 0–5)
WBC UA: >100 /HPF (ref 0–5)
WBC UA: >900 /HPF (ref 0–5)
WOUND/ABSCESS: ABNORMAL
YEAST: PRESENT /HPF
YEAST: PRESENT /HPF

## 2021-01-01 PROCEDURE — 90935 HEMODIALYSIS ONE EVALUATION: CPT

## 2021-01-01 PROCEDURE — 94761 N-INVAS EAR/PLS OXIMETRY MLT: CPT

## 2021-01-01 PROCEDURE — 4040F PNEUMOC VAC/ADMIN/RCVD: CPT | Performed by: INTERNAL MEDICINE

## 2021-01-01 PROCEDURE — 2500000003 HC RX 250 WO HCPCS: Performed by: NURSE ANESTHETIST, CERTIFIED REGISTERED

## 2021-01-01 PROCEDURE — 86706 HEP B SURFACE ANTIBODY: CPT

## 2021-01-01 PROCEDURE — 94640 AIRWAY INHALATION TREATMENT: CPT

## 2021-01-01 PROCEDURE — 36415 COLL VENOUS BLD VENIPUNCTURE: CPT

## 2021-01-01 PROCEDURE — 87077 CULTURE AEROBIC IDENTIFY: CPT

## 2021-01-01 PROCEDURE — 2580000003 HC RX 258: Performed by: INTERNAL MEDICINE

## 2021-01-01 PROCEDURE — C1894 INTRO/SHEATH, NON-LASER: HCPCS

## 2021-01-01 PROCEDURE — 2060000000 HC ICU INTERMEDIATE R&B

## 2021-01-01 PROCEDURE — 6370000000 HC RX 637 (ALT 250 FOR IP): Performed by: FAMILY MEDICINE

## 2021-01-01 PROCEDURE — 6370000000 HC RX 637 (ALT 250 FOR IP): Performed by: INTERNAL MEDICINE

## 2021-01-01 PROCEDURE — 80048 BASIC METABOLIC PNL TOTAL CA: CPT

## 2021-01-01 PROCEDURE — 83540 ASSAY OF IRON: CPT

## 2021-01-01 PROCEDURE — 93005 ELECTROCARDIOGRAM TRACING: CPT | Performed by: PHYSICIAN ASSISTANT

## 2021-01-01 PROCEDURE — 2700000000 HC OXYGEN THERAPY PER DAY

## 2021-01-01 PROCEDURE — 2580000003 HC RX 258: Performed by: UROLOGY

## 2021-01-01 PROCEDURE — 87205 SMEAR GRAM STAIN: CPT

## 2021-01-01 PROCEDURE — 80076 HEPATIC FUNCTION PANEL: CPT

## 2021-01-01 PROCEDURE — 82728 ASSAY OF FERRITIN: CPT

## 2021-01-01 PROCEDURE — G8427 DOCREV CUR MEDS BY ELIG CLIN: HCPCS | Performed by: CLINICAL NURSE SPECIALIST

## 2021-01-01 PROCEDURE — 6360000002 HC RX W HCPCS: Performed by: NURSE PRACTITIONER

## 2021-01-01 PROCEDURE — 80069 RENAL FUNCTION PANEL: CPT

## 2021-01-01 PROCEDURE — 83605 ASSAY OF LACTIC ACID: CPT

## 2021-01-01 PROCEDURE — 2580000003 HC RX 258: Performed by: PHYSICIAN ASSISTANT

## 2021-01-01 PROCEDURE — 83735 ASSAY OF MAGNESIUM: CPT

## 2021-01-01 PROCEDURE — 99233 SBSQ HOSP IP/OBS HIGH 50: CPT | Performed by: INTERNAL MEDICINE

## 2021-01-01 PROCEDURE — 6360000002 HC RX W HCPCS: Performed by: UROLOGY

## 2021-01-01 PROCEDURE — 6370000000 HC RX 637 (ALT 250 FOR IP): Performed by: NURSE PRACTITIONER

## 2021-01-01 PROCEDURE — 82150 ASSAY OF AMYLASE: CPT

## 2021-01-01 PROCEDURE — 84100 ASSAY OF PHOSPHORUS: CPT

## 2021-01-01 PROCEDURE — 87150 DNA/RNA AMPLIFIED PROBE: CPT

## 2021-01-01 PROCEDURE — 81001 URINALYSIS AUTO W/SCOPE: CPT

## 2021-01-01 PROCEDURE — 6360000002 HC RX W HCPCS: Performed by: INTERNAL MEDICINE

## 2021-01-01 PROCEDURE — 2000000000 HC ICU R&B

## 2021-01-01 PROCEDURE — 2500000003 HC RX 250 WO HCPCS: Performed by: INTERNAL MEDICINE

## 2021-01-01 PROCEDURE — 6370000000 HC RX 637 (ALT 250 FOR IP): Performed by: UROLOGY

## 2021-01-01 PROCEDURE — 94760 N-INVAS EAR/PLS OXIMETRY 1: CPT

## 2021-01-01 PROCEDURE — 84145 PROCALCITONIN (PCT): CPT

## 2021-01-01 PROCEDURE — 73590 X-RAY EXAM OF LOWER LEG: CPT

## 2021-01-01 PROCEDURE — 93005 ELECTROCARDIOGRAM TRACING: CPT | Performed by: EMERGENCY MEDICINE

## 2021-01-01 PROCEDURE — 87086 URINE CULTURE/COLONY COUNT: CPT

## 2021-01-01 PROCEDURE — U0002 COVID-19 LAB TEST NON-CDC: HCPCS

## 2021-01-01 PROCEDURE — 1123F ACP DISCUSS/DSCN MKR DOCD: CPT | Performed by: CLINICAL NURSE SPECIALIST

## 2021-01-01 PROCEDURE — 1036F TOBACCO NON-USER: CPT | Performed by: CLINICAL NURSE SPECIALIST

## 2021-01-01 PROCEDURE — 84484 ASSAY OF TROPONIN QUANT: CPT

## 2021-01-01 PROCEDURE — 83880 ASSAY OF NATRIURETIC PEPTIDE: CPT

## 2021-01-01 PROCEDURE — 71045 X-RAY EXAM CHEST 1 VIEW: CPT

## 2021-01-01 PROCEDURE — 82570 ASSAY OF URINE CREATININE: CPT

## 2021-01-01 PROCEDURE — 6360000002 HC RX W HCPCS: Performed by: FAMILY MEDICINE

## 2021-01-01 PROCEDURE — 85027 COMPLETE CBC AUTOMATED: CPT

## 2021-01-01 PROCEDURE — 71260 CT THORAX DX C+: CPT

## 2021-01-01 PROCEDURE — 99284 EMERGENCY DEPT VISIT MOD MDM: CPT

## 2021-01-01 PROCEDURE — 6370000000 HC RX 637 (ALT 250 FOR IP): Performed by: PHYSICIAN ASSISTANT

## 2021-01-01 PROCEDURE — 76870 US EXAM SCROTUM: CPT

## 2021-01-01 PROCEDURE — 1200000000 HC SEMI PRIVATE

## 2021-01-01 PROCEDURE — 2580000003 HC RX 258: Performed by: NURSE PRACTITIONER

## 2021-01-01 PROCEDURE — 76705 ECHO EXAM OF ABDOMEN: CPT

## 2021-01-01 PROCEDURE — 94669 MECHANICAL CHEST WALL OSCILL: CPT

## 2021-01-01 PROCEDURE — 85730 THROMBOPLASTIN TIME PARTIAL: CPT

## 2021-01-01 PROCEDURE — 5A1D70Z PERFORMANCE OF URINARY FILTRATION, INTERMITTENT, LESS THAN 6 HOURS PER DAY: ICD-10-PCS | Performed by: INTERNAL MEDICINE

## 2021-01-01 PROCEDURE — 7100000001 HC PACU RECOVERY - ADDTL 15 MIN: Performed by: UROLOGY

## 2021-01-01 PROCEDURE — G8427 DOCREV CUR MEDS BY ELIG CLIN: HCPCS | Performed by: INTERNAL MEDICINE

## 2021-01-01 PROCEDURE — P9047 ALBUMIN (HUMAN), 25%, 50ML: HCPCS | Performed by: INTERNAL MEDICINE

## 2021-01-01 PROCEDURE — 87088 URINE BACTERIA CULTURE: CPT

## 2021-01-01 PROCEDURE — 6360000004 HC RX CONTRAST MEDICATION: Performed by: INTERNAL MEDICINE

## 2021-01-01 PROCEDURE — 92526 ORAL FUNCTION THERAPY: CPT

## 2021-01-01 PROCEDURE — 85025 COMPLETE CBC W/AUTO DIFF WBC: CPT

## 2021-01-01 PROCEDURE — 74177 CT ABD & PELVIS W/CONTRAST: CPT

## 2021-01-01 PROCEDURE — 3017F COLORECTAL CA SCREEN DOC REV: CPT | Performed by: INTERNAL MEDICINE

## 2021-01-01 PROCEDURE — 87015 SPECIMEN INFECT AGNT CONCNTJ: CPT

## 2021-01-01 PROCEDURE — 99223 1ST HOSP IP/OBS HIGH 75: CPT | Performed by: INTERNAL MEDICINE

## 2021-01-01 PROCEDURE — 2580000003 HC RX 258: Performed by: CLINICAL NURSE SPECIALIST

## 2021-01-01 PROCEDURE — C1769 GUIDE WIRE: HCPCS

## 2021-01-01 PROCEDURE — 77001 FLUOROGUIDE FOR VEIN DEVICE: CPT

## 2021-01-01 PROCEDURE — 3700000001 HC ADD 15 MINUTES (ANESTHESIA): Performed by: UROLOGY

## 2021-01-01 PROCEDURE — 85610 PROTHROMBIN TIME: CPT

## 2021-01-01 PROCEDURE — 74176 CT ABD & PELVIS W/O CONTRAST: CPT

## 2021-01-01 PROCEDURE — 93010 ELECTROCARDIOGRAM REPORT: CPT | Performed by: INTERNAL MEDICINE

## 2021-01-01 PROCEDURE — 99232 SBSQ HOSP IP/OBS MODERATE 35: CPT | Performed by: CLINICAL NURSE SPECIALIST

## 2021-01-01 PROCEDURE — 2580000003 HC RX 258: Performed by: FAMILY MEDICINE

## 2021-01-01 PROCEDURE — 49083 ABD PARACENTESIS W/IMAGING: CPT

## 2021-01-01 PROCEDURE — 87186 SC STD MICRODIL/AGAR DIL: CPT

## 2021-01-01 PROCEDURE — 99231 SBSQ HOSP IP/OBS SF/LOW 25: CPT | Performed by: INTERNAL MEDICINE

## 2021-01-01 PROCEDURE — 80053 COMPREHEN METABOLIC PANEL: CPT

## 2021-01-01 PROCEDURE — 87641 MR-STAPH DNA AMP PROBE: CPT

## 2021-01-01 PROCEDURE — 2580000003 HC RX 258: Performed by: NURSE ANESTHETIST, CERTIFIED REGISTERED

## 2021-01-01 PROCEDURE — 6360000004 HC RX CONTRAST MEDICATION: Performed by: RADIOLOGY

## 2021-01-01 PROCEDURE — G8484 FLU IMMUNIZE NO ADMIN: HCPCS | Performed by: CLINICAL NURSE SPECIALIST

## 2021-01-01 PROCEDURE — 99232 SBSQ HOSP IP/OBS MODERATE 35: CPT | Performed by: NURSE PRACTITIONER

## 2021-01-01 PROCEDURE — 99232 SBSQ HOSP IP/OBS MODERATE 35: CPT | Performed by: INTERNAL MEDICINE

## 2021-01-01 PROCEDURE — 2709999900 HC NON-CHARGEABLE SUPPLY: Performed by: UROLOGY

## 2021-01-01 PROCEDURE — 99202 OFFICE O/P NEW SF 15 MIN: CPT | Performed by: INTERNAL MEDICINE

## 2021-01-01 PROCEDURE — XW033N5 INTRODUCTION OF MEROPENEM-VABORBACTAM ANTI-INFECTIVE INTO PERIPHERAL VEIN, PERCUTANEOUS APPROACH, NEW TECHNOLOGY GROUP 5: ICD-10-PCS | Performed by: INTERNAL MEDICINE

## 2021-01-01 PROCEDURE — 6370000000 HC RX 637 (ALT 250 FOR IP): Performed by: HOSPITALIST

## 2021-01-01 PROCEDURE — 72195 MRI PELVIS W/O DYE: CPT

## 2021-01-01 PROCEDURE — 3017F COLORECTAL CA SCREEN DOC REV: CPT | Performed by: CLINICAL NURSE SPECIALIST

## 2021-01-01 PROCEDURE — 99283 EMERGENCY DEPT VISIT LOW MDM: CPT

## 2021-01-01 PROCEDURE — 2500000003 HC RX 250 WO HCPCS: Performed by: RADIOLOGY

## 2021-01-01 PROCEDURE — 82803 BLOOD GASES ANY COMBINATION: CPT

## 2021-01-01 PROCEDURE — 87070 CULTURE OTHR SPECIMN AEROBIC: CPT

## 2021-01-01 PROCEDURE — 84478 ASSAY OF TRIGLYCERIDES: CPT

## 2021-01-01 PROCEDURE — 36581 REPLACE TUNNELED CV CATH: CPT

## 2021-01-01 PROCEDURE — 87184 SC STD DISK METHOD PER PLATE: CPT

## 2021-01-01 PROCEDURE — 3600000012 HC SURGERY LEVEL 2 ADDTL 15MIN: Performed by: UROLOGY

## 2021-01-01 PROCEDURE — U0003 INFECTIOUS AGENT DETECTION BY NUCLEIC ACID (DNA OR RNA); SEVERE ACUTE RESPIRATORY SYNDROME CORONAVIRUS 2 (SARS-COV-2) (CORONAVIRUS DISEASE [COVID-19]), AMPLIFIED PROBE TECHNIQUE, MAKING USE OF HIGH THROUGHPUT TECHNOLOGIES AS DESCRIBED BY CMS-2020-01-R: HCPCS

## 2021-01-01 PROCEDURE — 84300 ASSAY OF URINE SODIUM: CPT

## 2021-01-01 PROCEDURE — 1123F ACP DISCUSS/DSCN MKR DOCD: CPT | Performed by: INTERNAL MEDICINE

## 2021-01-01 PROCEDURE — 83550 IRON BINDING TEST: CPT

## 2021-01-01 PROCEDURE — 99214 OFFICE O/P EST MOD 30 MIN: CPT | Performed by: INTERNAL MEDICINE

## 2021-01-01 PROCEDURE — 5A1D70Z PERFORMANCE OF URINARY FILTRATION, INTERMITTENT, LESS THAN 6 HOURS PER DAY: ICD-10-PCS | Performed by: RADIOLOGY

## 2021-01-01 PROCEDURE — 0W9G3ZX DRAINAGE OF PERITONEAL CAVITY, PERCUTANEOUS APPROACH, DIAGNOSTIC: ICD-10-PCS | Performed by: PHYSICIAN ASSISTANT

## 2021-01-01 PROCEDURE — 88305 TISSUE EXAM BY PATHOLOGIST: CPT

## 2021-01-01 PROCEDURE — 99214 OFFICE O/P EST MOD 30 MIN: CPT | Performed by: CLINICAL NURSE SPECIALIST

## 2021-01-01 PROCEDURE — 96374 THER/PROPH/DIAG INJ IV PUSH: CPT

## 2021-01-01 PROCEDURE — 94660 CPAP INITIATION&MGMT: CPT

## 2021-01-01 PROCEDURE — 89051 BODY FLUID CELL COUNT: CPT

## 2021-01-01 PROCEDURE — 99222 1ST HOSP IP/OBS MODERATE 55: CPT | Performed by: ORTHOPAEDIC SURGERY

## 2021-01-01 PROCEDURE — 02HV33Z INSERTION OF INFUSION DEVICE INTO SUPERIOR VENA CAVA, PERCUTANEOUS APPROACH: ICD-10-PCS | Performed by: RADIOLOGY

## 2021-01-01 PROCEDURE — 2580000003 HC RX 258

## 2021-01-01 PROCEDURE — G8420 CALC BMI NORM PARAMETERS: HCPCS | Performed by: CLINICAL NURSE SPECIALIST

## 2021-01-01 PROCEDURE — 80202 ASSAY OF VANCOMYCIN: CPT

## 2021-01-01 PROCEDURE — 85014 HEMATOCRIT: CPT

## 2021-01-01 PROCEDURE — 4040F PNEUMOC VAC/ADMIN/RCVD: CPT | Performed by: CLINICAL NURSE SPECIALIST

## 2021-01-01 PROCEDURE — 87040 BLOOD CULTURE FOR BACTERIA: CPT

## 2021-01-01 PROCEDURE — 99222 1ST HOSP IP/OBS MODERATE 55: CPT | Performed by: INTERNAL MEDICINE

## 2021-01-01 PROCEDURE — 6360000002 HC RX W HCPCS: Performed by: EMERGENCY MEDICINE

## 2021-01-01 PROCEDURE — 73552 X-RAY EXAM OF FEMUR 2/>: CPT

## 2021-01-01 PROCEDURE — 76937 US GUIDE VASCULAR ACCESS: CPT

## 2021-01-01 PROCEDURE — 36600 WITHDRAWAL OF ARTERIAL BLOOD: CPT

## 2021-01-01 PROCEDURE — 81003 URINALYSIS AUTO W/O SCOPE: CPT

## 2021-01-01 PROCEDURE — 93306 TTE W/DOPPLER COMPLETE: CPT

## 2021-01-01 PROCEDURE — 85045 AUTOMATED RETICULOCYTE COUNT: CPT

## 2021-01-01 PROCEDURE — 1036F TOBACCO NON-USER: CPT | Performed by: INTERNAL MEDICINE

## 2021-01-01 PROCEDURE — 36558 INSERT TUNNELED CV CATH: CPT

## 2021-01-01 PROCEDURE — 97165 OT EVAL LOW COMPLEX 30 MIN: CPT

## 2021-01-01 PROCEDURE — 96367 TX/PROPH/DG ADDL SEQ IV INF: CPT

## 2021-01-01 PROCEDURE — 6360000002 HC RX W HCPCS: Performed by: NURSE ANESTHETIST, CERTIFIED REGISTERED

## 2021-01-01 PROCEDURE — 83036 HEMOGLOBIN GLYCOSYLATED A1C: CPT

## 2021-01-01 PROCEDURE — 93970 EXTREMITY STUDY: CPT

## 2021-01-01 PROCEDURE — G8417 CALC BMI ABV UP PARAM F/U: HCPCS | Performed by: INTERNAL MEDICINE

## 2021-01-01 PROCEDURE — G8428 CUR MEDS NOT DOCUMENT: HCPCS | Performed by: INTERNAL MEDICINE

## 2021-01-01 PROCEDURE — C1729 CATH, DRAINAGE: HCPCS

## 2021-01-01 PROCEDURE — 84157 ASSAY OF PROTEIN OTHER: CPT

## 2021-01-01 PROCEDURE — 97530 THERAPEUTIC ACTIVITIES: CPT

## 2021-01-01 PROCEDURE — 74018 RADEX ABDOMEN 1 VIEW: CPT

## 2021-01-01 PROCEDURE — 0VJ80ZZ INSPECTION OF SCROTUM AND TUNICA VAGINALIS, OPEN APPROACH: ICD-10-PCS | Performed by: UROLOGY

## 2021-01-01 PROCEDURE — 6360000002 HC RX W HCPCS: Performed by: CLINICAL NURSE SPECIALIST

## 2021-01-01 PROCEDURE — 84155 ASSAY OF PROTEIN SERUM: CPT

## 2021-01-01 PROCEDURE — 83690 ASSAY OF LIPASE: CPT

## 2021-01-01 PROCEDURE — 6360000002 HC RX W HCPCS: Performed by: PHYSICIAN ASSISTANT

## 2021-01-01 PROCEDURE — 99291 CRITICAL CARE FIRST HOUR: CPT | Performed by: INTERNAL MEDICINE

## 2021-01-01 PROCEDURE — 82040 ASSAY OF SERUM ALBUMIN: CPT

## 2021-01-01 PROCEDURE — 80061 LIPID PANEL: CPT

## 2021-01-01 PROCEDURE — 73564 X-RAY EXAM KNEE 4 OR MORE: CPT

## 2021-01-01 PROCEDURE — 99233 SBSQ HOSP IP/OBS HIGH 50: CPT | Performed by: NURSE PRACTITIONER

## 2021-01-01 PROCEDURE — 0JH63XZ INSERTION OF TUNNELED VASCULAR ACCESS DEVICE INTO CHEST SUBCUTANEOUS TISSUE AND FASCIA, PERCUTANEOUS APPROACH: ICD-10-PCS | Performed by: RADIOLOGY

## 2021-01-01 PROCEDURE — 93308 TTE F-UP OR LMTD: CPT

## 2021-01-01 PROCEDURE — 36589 REMOVAL TUNNELED CV CATH: CPT

## 2021-01-01 PROCEDURE — 82550 ASSAY OF CK (CPK): CPT

## 2021-01-01 PROCEDURE — 73560 X-RAY EXAM OF KNEE 1 OR 2: CPT

## 2021-01-01 PROCEDURE — 88112 CYTOPATH CELL ENHANCE TECH: CPT

## 2021-01-01 PROCEDURE — U0005 INFEC AGEN DETEC AMPLI PROBE: HCPCS

## 2021-01-01 PROCEDURE — 6370000000 HC RX 637 (ALT 250 FOR IP): Performed by: EMERGENCY MEDICINE

## 2021-01-01 PROCEDURE — 1111F DSCHRG MED/CURRENT MED MERGE: CPT | Performed by: INTERNAL MEDICINE

## 2021-01-01 PROCEDURE — 73630 X-RAY EXAM OF FOOT: CPT

## 2021-01-01 PROCEDURE — 82042 OTHER SOURCE ALBUMIN QUAN EA: CPT

## 2021-01-01 PROCEDURE — 6360000004 HC RX CONTRAST MEDICATION: Performed by: PHYSICIAN ASSISTANT

## 2021-01-01 PROCEDURE — 87340 HEPATITIS B SURFACE AG IA: CPT

## 2021-01-01 PROCEDURE — 86704 HEP B CORE ANTIBODY TOTAL: CPT

## 2021-01-01 PROCEDURE — 82607 VITAMIN B-12: CPT

## 2021-01-01 PROCEDURE — 82746 ASSAY OF FOLIC ACID SERUM: CPT

## 2021-01-01 PROCEDURE — 84443 ASSAY THYROID STIM HORMONE: CPT

## 2021-01-01 PROCEDURE — G1010 CDSM STANSON: HCPCS

## 2021-01-01 PROCEDURE — 0W9G3ZX DRAINAGE OF PERITONEAL CAVITY, PERCUTANEOUS APPROACH, DIAGNOSTIC: ICD-10-PCS | Performed by: INTERNAL MEDICINE

## 2021-01-01 PROCEDURE — 87075 CULTR BACTERIA EXCEPT BLOOD: CPT

## 2021-01-01 PROCEDURE — 6370000000 HC RX 637 (ALT 250 FOR IP): Performed by: STUDENT IN AN ORGANIZED HEALTH CARE EDUCATION/TRAINING PROGRAM

## 2021-01-01 PROCEDURE — 96365 THER/PROPH/DIAG IV INF INIT: CPT

## 2021-01-01 PROCEDURE — 3700000000 HC ANESTHESIA ATTENDED CARE: Performed by: UROLOGY

## 2021-01-01 PROCEDURE — 3600000002 HC SURGERY LEVEL 2 BASE: Performed by: UROLOGY

## 2021-01-01 PROCEDURE — 92610 EVALUATE SWALLOWING FUNCTION: CPT

## 2021-01-01 PROCEDURE — 99496 TRANSJ CARE MGMT HIGH F2F 7D: CPT | Performed by: CLINICAL NURSE SPECIALIST

## 2021-01-01 PROCEDURE — 96366 THER/PROPH/DIAG IV INF ADDON: CPT

## 2021-01-01 PROCEDURE — 7100000000 HC PACU RECOVERY - FIRST 15 MIN: Performed by: UROLOGY

## 2021-01-01 PROCEDURE — 6360000002 HC RX W HCPCS: Performed by: RADIOLOGY

## 2021-01-01 PROCEDURE — 85018 HEMOGLOBIN: CPT

## 2021-01-01 RX ORDER — DEXTROSE MONOHYDRATE 25 G/50ML
12.5 INJECTION, SOLUTION INTRAVENOUS PRN
Status: DISCONTINUED | OUTPATIENT
Start: 2021-01-01 | End: 2021-01-01 | Stop reason: HOSPADM

## 2021-01-01 RX ORDER — IPRATROPIUM BROMIDE AND ALBUTEROL SULFATE 2.5; .5 MG/3ML; MG/3ML
1 SOLUTION RESPIRATORY (INHALATION)
Status: DISCONTINUED | OUTPATIENT
Start: 2021-01-01 | End: 2021-01-01 | Stop reason: HOSPADM

## 2021-01-01 RX ORDER — PRAVASTATIN SODIUM 40 MG
40 TABLET ORAL NIGHTLY
Status: DISCONTINUED | OUTPATIENT
Start: 2021-01-01 | End: 2021-01-01 | Stop reason: HOSPADM

## 2021-01-01 RX ORDER — CITALOPRAM 40 MG/1
20 TABLET ORAL DAILY
Qty: 90 TABLET | Refills: 0 | Status: SHIPPED | OUTPATIENT
Start: 2021-01-01 | End: 2021-01-01 | Stop reason: SDUPTHER

## 2021-01-01 RX ORDER — TRAZODONE HYDROCHLORIDE 50 MG/1
50 TABLET ORAL PRN
COMMUNITY

## 2021-01-01 RX ORDER — CIPROFLOXACIN 500 MG/1
250 TABLET, FILM COATED ORAL EVERY 24 HOURS
Status: DISCONTINUED | OUTPATIENT
Start: 2021-01-01 | End: 2021-01-01

## 2021-01-01 RX ORDER — GABAPENTIN 100 MG/1
100 CAPSULE ORAL NIGHTLY
Qty: 90 CAPSULE | Refills: 3 | COMMUNITY
Start: 2021-01-01

## 2021-01-01 RX ORDER — FERROUS SULFATE 325(65) MG
325 TABLET ORAL
Status: DISCONTINUED | OUTPATIENT
Start: 2021-01-01 | End: 2021-01-01

## 2021-01-01 RX ORDER — PRAVASTATIN SODIUM 40 MG
40 TABLET ORAL NIGHTLY
Qty: 90 TABLET | Refills: 3 | Status: SHIPPED | OUTPATIENT
Start: 2021-01-01 | End: 2021-01-01

## 2021-01-01 RX ORDER — TORSEMIDE 100 MG/1
100 TABLET ORAL DAILY
Qty: 30 TABLET | Refills: 1 | Status: SHIPPED | OUTPATIENT
Start: 2021-01-01 | End: 2021-01-01

## 2021-01-01 RX ORDER — PANTOPRAZOLE SODIUM 40 MG/1
40 TABLET, DELAYED RELEASE ORAL DAILY
Qty: 90 TABLET | Refills: 0 | Status: SHIPPED | OUTPATIENT
Start: 2021-01-01 | End: 2021-01-01 | Stop reason: SDUPTHER

## 2021-01-01 RX ORDER — KETOROLAC TROMETHAMINE 30 MG/ML
15 INJECTION, SOLUTION INTRAMUSCULAR; INTRAVENOUS EVERY 6 HOURS PRN
Status: DISCONTINUED | OUTPATIENT
Start: 2021-01-01 | End: 2021-01-01 | Stop reason: HOSPADM

## 2021-01-01 RX ORDER — TORSEMIDE 100 MG/1
100 TABLET ORAL DAILY
Qty: 30 TABLET | Refills: 1 | Status: SHIPPED | OUTPATIENT
Start: 2021-01-01 | End: 2021-01-01 | Stop reason: SDUPTHER

## 2021-01-01 RX ORDER — MAGNESIUM HYDROXIDE 1200 MG/15ML
LIQUID ORAL CONTINUOUS PRN
Status: COMPLETED | OUTPATIENT
Start: 2021-01-01 | End: 2021-01-01

## 2021-01-01 RX ORDER — POLYETHYLENE GLYCOL 3350 17 G/17G
17 POWDER, FOR SOLUTION ORAL DAILY PRN
Status: DISCONTINUED | OUTPATIENT
Start: 2021-01-01 | End: 2021-01-01 | Stop reason: HOSPADM

## 2021-01-01 RX ORDER — CITALOPRAM 20 MG/1
20 TABLET ORAL 2 TIMES DAILY
Status: DISCONTINUED | OUTPATIENT
Start: 2021-01-01 | End: 2021-01-01

## 2021-01-01 RX ORDER — FUROSEMIDE 10 MG/ML
80 INJECTION INTRAMUSCULAR; INTRAVENOUS ONCE
Status: COMPLETED | OUTPATIENT
Start: 2021-01-01 | End: 2021-01-01

## 2021-01-01 RX ORDER — PROMETHAZINE HYDROCHLORIDE 25 MG/1
12.5 TABLET ORAL EVERY 6 HOURS PRN
Status: DISCONTINUED | OUTPATIENT
Start: 2021-01-01 | End: 2021-01-01 | Stop reason: HOSPADM

## 2021-01-01 RX ORDER — HYDROMORPHONE HYDROCHLORIDE 1 MG/ML
1 INJECTION, SOLUTION INTRAMUSCULAR; INTRAVENOUS; SUBCUTANEOUS
Status: DISCONTINUED | OUTPATIENT
Start: 2021-01-01 | End: 2021-01-01 | Stop reason: HOSPADM

## 2021-01-01 RX ORDER — ALBUMIN (HUMAN) 12.5 G/50ML
12.5 SOLUTION INTRAVENOUS 3 TIMES DAILY PRN
Status: DISCONTINUED | OUTPATIENT
Start: 2021-01-01 | End: 2021-01-01 | Stop reason: HOSPADM

## 2021-01-01 RX ORDER — TORSEMIDE 100 MG/1
100 TABLET ORAL DAILY
Status: DISCONTINUED | OUTPATIENT
Start: 2021-01-01 | End: 2021-01-01 | Stop reason: HOSPADM

## 2021-01-01 RX ORDER — PANTOPRAZOLE SODIUM 40 MG/1
40 TABLET, DELAYED RELEASE ORAL
Status: DISCONTINUED | OUTPATIENT
Start: 2021-01-01 | End: 2021-01-01 | Stop reason: HOSPADM

## 2021-01-01 RX ORDER — HYDROCODONE BITARTRATE AND ACETAMINOPHEN 5; 325 MG/1; MG/1
1 TABLET ORAL EVERY 6 HOURS PRN
Status: DISCONTINUED | OUTPATIENT
Start: 2021-01-01 | End: 2021-01-01 | Stop reason: HOSPADM

## 2021-01-01 RX ORDER — CIPROFLOXACIN 500 MG/1
500 TABLET, FILM COATED ORAL DAILY
Qty: 7 TABLET | Refills: 0 | Status: SHIPPED | OUTPATIENT
Start: 2021-01-01 | End: 2021-01-01

## 2021-01-01 RX ORDER — ONDANSETRON 2 MG/ML
4 INJECTION INTRAMUSCULAR; INTRAVENOUS EVERY 6 HOURS PRN
Status: DISCONTINUED | OUTPATIENT
Start: 2021-01-01 | End: 2021-01-01 | Stop reason: HOSPADM

## 2021-01-01 RX ORDER — LIDOCAINE HYDROCHLORIDE 10 MG/ML
5 INJECTION, SOLUTION EPIDURAL; INFILTRATION; INTRACAUDAL; PERINEURAL ONCE
Status: COMPLETED | OUTPATIENT
Start: 2021-01-01 | End: 2021-01-01

## 2021-01-01 RX ORDER — HEPARIN SODIUM 1000 [USP'U]/ML
3800 INJECTION, SOLUTION INTRAVENOUS; SUBCUTANEOUS PRN
Status: DISCONTINUED | OUTPATIENT
Start: 2021-01-01 | End: 2021-01-01 | Stop reason: HOSPADM

## 2021-01-01 RX ORDER — LORAZEPAM 2 MG/ML
1 INJECTION INTRAMUSCULAR
Status: DISCONTINUED | OUTPATIENT
Start: 2021-01-01 | End: 2021-01-01 | Stop reason: HOSPADM

## 2021-01-01 RX ORDER — SODIUM CHLORIDE 0.9 % (FLUSH) 0.9 %
5-40 SYRINGE (ML) INJECTION EVERY 12 HOURS SCHEDULED
Status: DISCONTINUED | OUTPATIENT
Start: 2021-01-01 | End: 2021-01-01 | Stop reason: HOSPADM

## 2021-01-01 RX ORDER — IPRATROPIUM BROMIDE AND ALBUTEROL SULFATE 2.5; .5 MG/3ML; MG/3ML
1 SOLUTION RESPIRATORY (INHALATION)
Status: DISCONTINUED | OUTPATIENT
Start: 2021-01-01 | End: 2021-01-01

## 2021-01-01 RX ORDER — HEPARIN SODIUM 200 [USP'U]/100ML
30 INJECTION, SOLUTION INTRAVENOUS CONTINUOUS
Status: ACTIVE | OUTPATIENT
Start: 2021-01-01 | End: 2021-01-01

## 2021-01-01 RX ORDER — NICOTINE POLACRILEX 4 MG
15 LOZENGE BUCCAL PRN
Status: DISCONTINUED | OUTPATIENT
Start: 2021-01-01 | End: 2021-01-01 | Stop reason: HOSPADM

## 2021-01-01 RX ORDER — HEPARIN SODIUM 5000 [USP'U]/ML
5000 INJECTION, SOLUTION INTRAVENOUS; SUBCUTANEOUS EVERY 8 HOURS SCHEDULED
Status: DISCONTINUED | OUTPATIENT
Start: 2021-01-01 | End: 2021-01-01 | Stop reason: HOSPADM

## 2021-01-01 RX ORDER — FINASTERIDE 5 MG/1
5 TABLET, FILM COATED ORAL DAILY
Status: DISCONTINUED | OUTPATIENT
Start: 2021-01-01 | End: 2021-01-01

## 2021-01-01 RX ORDER — MIDODRINE HYDROCHLORIDE 5 MG/1
5 TABLET ORAL DAILY
Status: DISCONTINUED | OUTPATIENT
Start: 2021-01-01 | End: 2021-01-01

## 2021-01-01 RX ORDER — DEXTROSE MONOHYDRATE 50 MG/ML
100 INJECTION, SOLUTION INTRAVENOUS PRN
Status: DISCONTINUED | OUTPATIENT
Start: 2021-01-01 | End: 2021-01-01 | Stop reason: HOSPADM

## 2021-01-01 RX ORDER — MIDODRINE HYDROCHLORIDE 5 MG/1
5 TABLET ORAL DAILY
Qty: 30 TABLET | Refills: 0 | Status: SHIPPED | OUTPATIENT
Start: 2021-01-01 | End: 2021-01-01

## 2021-01-01 RX ORDER — INSULIN LISPRO 100 [IU]/ML
0-12 INJECTION, SOLUTION INTRAVENOUS; SUBCUTANEOUS
Status: DISCONTINUED | OUTPATIENT
Start: 2021-01-01 | End: 2021-01-01 | Stop reason: HOSPADM

## 2021-01-01 RX ORDER — TRAZODONE HYDROCHLORIDE 50 MG/1
50 TABLET ORAL PRN
Status: DISCONTINUED | OUTPATIENT
Start: 2021-01-01 | End: 2021-01-01 | Stop reason: HOSPADM

## 2021-01-01 RX ORDER — CITALOPRAM 20 MG/1
20 TABLET ORAL DAILY
Status: DISCONTINUED | OUTPATIENT
Start: 2021-01-01 | End: 2021-01-01 | Stop reason: HOSPADM

## 2021-01-01 RX ORDER — GABAPENTIN 100 MG/1
100 CAPSULE ORAL 3 TIMES DAILY
Status: DISCONTINUED | OUTPATIENT
Start: 2021-01-01 | End: 2021-01-01

## 2021-01-01 RX ORDER — MORPHINE SULFATE 2 MG/ML
2 INJECTION, SOLUTION INTRAMUSCULAR; INTRAVENOUS EVERY 4 HOURS PRN
Status: DISCONTINUED | OUTPATIENT
Start: 2021-01-01 | End: 2021-01-01 | Stop reason: HOSPADM

## 2021-01-01 RX ORDER — CITALOPRAM 40 MG/1
20 TABLET ORAL DAILY
Qty: 90 TABLET | Refills: 1
Start: 2021-01-01 | End: 2021-01-01 | Stop reason: SDUPTHER

## 2021-01-01 RX ORDER — IPRATROPIUM BROMIDE AND ALBUTEROL SULFATE 2.5; .5 MG/3ML; MG/3ML
3 SOLUTION RESPIRATORY (INHALATION) EVERY 4 HOURS PRN
Status: DISCONTINUED | OUTPATIENT
Start: 2021-01-01 | End: 2021-01-01 | Stop reason: HOSPADM

## 2021-01-01 RX ORDER — ALBUMIN (HUMAN) 12.5 G/50ML
50 SOLUTION INTRAVENOUS ONCE
Status: COMPLETED | OUTPATIENT
Start: 2021-01-01 | End: 2021-01-01

## 2021-01-01 RX ORDER — LIDOCAINE HYDROCHLORIDE 20 MG/ML
INJECTION, SOLUTION EPIDURAL; INFILTRATION; INTRACAUDAL; PERINEURAL PRN
Status: DISCONTINUED | OUTPATIENT
Start: 2021-01-01 | End: 2021-01-01 | Stop reason: SDUPTHER

## 2021-01-01 RX ORDER — SODIUM CHLORIDE 9 MG/ML
INJECTION, SOLUTION INTRAVENOUS
Status: DISPENSED
Start: 2021-01-01 | End: 2021-01-01

## 2021-01-01 RX ORDER — SODIUM CHLORIDE 9 MG/ML
INJECTION, SOLUTION INTRAVENOUS CONTINUOUS
Status: DISCONTINUED | OUTPATIENT
Start: 2021-01-01 | End: 2021-01-01

## 2021-01-01 RX ORDER — ACETAMINOPHEN 325 MG/1
650 TABLET ORAL EVERY 4 HOURS PRN
Status: DISCONTINUED | OUTPATIENT
Start: 2021-01-01 | End: 2021-01-01 | Stop reason: HOSPADM

## 2021-01-01 RX ORDER — ALBUMIN (HUMAN) 12.5 G/50ML
25 SOLUTION INTRAVENOUS ONCE
Status: COMPLETED | OUTPATIENT
Start: 2021-01-01 | End: 2021-01-01

## 2021-01-01 RX ORDER — INSULIN LISPRO 100 [IU]/ML
0-12 INJECTION, SOLUTION INTRAVENOUS; SUBCUTANEOUS
Status: DISCONTINUED | OUTPATIENT
Start: 2021-01-01 | End: 2021-01-01

## 2021-01-01 RX ORDER — LIDOCAINE HYDROCHLORIDE 10 MG/ML
20 INJECTION, SOLUTION EPIDURAL; INFILTRATION; INTRACAUDAL; PERINEURAL ONCE
Status: COMPLETED | OUTPATIENT
Start: 2021-01-01 | End: 2021-01-01

## 2021-01-01 RX ORDER — MIDODRINE HYDROCHLORIDE 5 MG/1
10 TABLET ORAL
Status: COMPLETED | OUTPATIENT
Start: 2021-01-01 | End: 2021-01-01

## 2021-01-01 RX ORDER — BUDESONIDE AND FORMOTEROL FUMARATE DIHYDRATE 160; 4.5 UG/1; UG/1
2 AEROSOL RESPIRATORY (INHALATION) 2 TIMES DAILY
Status: DISCONTINUED | OUTPATIENT
Start: 2021-01-01 | End: 2021-01-01 | Stop reason: HOSPADM

## 2021-01-01 RX ORDER — ACETAMINOPHEN 325 MG/1
650 TABLET ORAL EVERY 6 HOURS PRN
Status: DISCONTINUED | OUTPATIENT
Start: 2021-01-01 | End: 2021-01-01

## 2021-01-01 RX ORDER — TORSEMIDE 20 MG/1
50 TABLET ORAL DAILY
Qty: 30 TABLET | Refills: 11 | Status: SHIPPED
Start: 2021-01-01 | End: 2021-01-01 | Stop reason: HOSPADM

## 2021-01-01 RX ORDER — ASPIRIN 81 MG/1
81 TABLET, CHEWABLE ORAL DAILY
Status: DISCONTINUED | OUTPATIENT
Start: 2021-01-01 | End: 2021-01-01

## 2021-01-01 RX ORDER — SODIUM CHLORIDE 0.9 % (FLUSH) 0.9 %
10 SYRINGE (ML) INJECTION PRN
Status: DISCONTINUED | OUTPATIENT
Start: 2021-01-01 | End: 2021-01-01 | Stop reason: HOSPADM

## 2021-01-01 RX ORDER — CIPROFLOXACIN 500 MG/1
500 TABLET, FILM COATED ORAL EVERY 24 HOURS
Status: DISCONTINUED | OUTPATIENT
Start: 2021-01-01 | End: 2021-01-01

## 2021-01-01 RX ORDER — LIDOCAINE HYDROCHLORIDE AND EPINEPHRINE 10; 10 MG/ML; UG/ML
7.5 INJECTION, SOLUTION INFILTRATION; PERINEURAL ONCE
Status: COMPLETED | OUTPATIENT
Start: 2021-01-01 | End: 2021-01-01

## 2021-01-01 RX ORDER — 0.9 % SODIUM CHLORIDE 0.9 %
500 INTRAVENOUS SOLUTION INTRAVENOUS ONCE
Status: COMPLETED | OUTPATIENT
Start: 2021-01-01 | End: 2021-01-01

## 2021-01-01 RX ORDER — SODIUM CHLORIDE 0.9 % (FLUSH) 0.9 %
10 SYRINGE (ML) INJECTION EVERY 12 HOURS SCHEDULED
Status: DISCONTINUED | OUTPATIENT
Start: 2021-01-01 | End: 2021-01-01 | Stop reason: HOSPADM

## 2021-01-01 RX ORDER — 0.9 % SODIUM CHLORIDE 0.9 %
1000 INTRAVENOUS SOLUTION INTRAVENOUS ONCE
Status: DISCONTINUED | OUTPATIENT
Start: 2021-01-01 | End: 2021-01-01

## 2021-01-01 RX ORDER — ACETAMINOPHEN 325 MG/1
650 TABLET ORAL EVERY 6 HOURS PRN
Status: DISCONTINUED | OUTPATIENT
Start: 2021-01-01 | End: 2021-01-01 | Stop reason: HOSPADM

## 2021-01-01 RX ORDER — BUDESONIDE AND FORMOTEROL FUMARATE DIHYDRATE 160; 4.5 UG/1; UG/1
2 AEROSOL RESPIRATORY (INHALATION) 2 TIMES DAILY
Qty: 10.2 G | Refills: 3 | Status: SHIPPED | OUTPATIENT
Start: 2021-01-01

## 2021-01-01 RX ORDER — IPRATROPIUM BROMIDE AND ALBUTEROL SULFATE 2.5; .5 MG/3ML; MG/3ML
1 SOLUTION RESPIRATORY (INHALATION) 3 TIMES DAILY
Status: DISCONTINUED | OUTPATIENT
Start: 2021-01-01 | End: 2021-01-01 | Stop reason: HOSPADM

## 2021-01-01 RX ORDER — DEXMEDETOMIDINE HYDROCHLORIDE 100 UG/ML
INJECTION, SOLUTION INTRAVENOUS PRN
Status: DISCONTINUED | OUTPATIENT
Start: 2021-01-01 | End: 2021-01-01 | Stop reason: SDUPTHER

## 2021-01-01 RX ORDER — ASPIRIN 81 MG/1
81 TABLET, CHEWABLE ORAL DAILY
Status: DISCONTINUED | OUTPATIENT
Start: 2021-01-01 | End: 2021-01-01 | Stop reason: HOSPADM

## 2021-01-01 RX ORDER — IPRATROPIUM BROMIDE AND ALBUTEROL SULFATE 2.5; .5 MG/3ML; MG/3ML
1 SOLUTION RESPIRATORY (INHALATION) 4 TIMES DAILY
Status: DISCONTINUED | OUTPATIENT
Start: 2021-01-01 | End: 2021-01-01 | Stop reason: HOSPADM

## 2021-01-01 RX ORDER — ONDANSETRON 4 MG/1
4 TABLET, ORALLY DISINTEGRATING ORAL EVERY 8 HOURS PRN
Status: DISCONTINUED | OUTPATIENT
Start: 2021-01-01 | End: 2021-01-01 | Stop reason: HOSPADM

## 2021-01-01 RX ORDER — CIPROFLOXACIN 250 MG/1
250 TABLET, FILM COATED ORAL EVERY OTHER DAY
Qty: 2 TABLET | Refills: 0 | Status: SHIPPED | OUTPATIENT
Start: 2021-01-01 | End: 2021-01-01

## 2021-01-01 RX ORDER — LORAZEPAM 0.5 MG/1
TABLET ORAL
Qty: 15 TABLET | Refills: 1 | Status: SHIPPED | OUTPATIENT
Start: 2021-01-01 | End: 2022-09-17

## 2021-01-01 RX ORDER — NITROGLYCERIN 0.4 MG/1
0.4 TABLET SUBLINGUAL EVERY 5 MIN PRN
Status: DISCONTINUED | OUTPATIENT
Start: 2021-01-01 | End: 2021-01-01 | Stop reason: HOSPADM

## 2021-01-01 RX ORDER — SODIUM CHLORIDE/ALOE VERA
GEL (GRAM) NASAL PRN
Status: DISCONTINUED | OUTPATIENT
Start: 2021-01-01 | End: 2021-01-01 | Stop reason: HOSPADM

## 2021-01-01 RX ORDER — INSULIN LISPRO 100 [IU]/ML
0-6 INJECTION, SOLUTION INTRAVENOUS; SUBCUTANEOUS NIGHTLY
Status: DISCONTINUED | OUTPATIENT
Start: 2021-01-01 | End: 2021-01-01

## 2021-01-01 RX ORDER — GUAIFENESIN 600 MG/1
600 TABLET, EXTENDED RELEASE ORAL 2 TIMES DAILY
Qty: 30 TABLET | Refills: 0 | Status: SHIPPED | OUTPATIENT
Start: 2021-01-01 | End: 2021-01-01

## 2021-01-01 RX ORDER — MIDODRINE HYDROCHLORIDE 5 MG/1
5 TABLET ORAL
Status: DISCONTINUED | OUTPATIENT
Start: 2021-01-01 | End: 2021-01-01

## 2021-01-01 RX ORDER — MIDODRINE HYDROCHLORIDE 5 MG/1
5 TABLET ORAL DAILY
Status: DISCONTINUED | OUTPATIENT
Start: 2021-01-01 | End: 2021-01-01 | Stop reason: HOSPADM

## 2021-01-01 RX ORDER — INSULIN LISPRO 100 [IU]/ML
0-6 INJECTION, SOLUTION INTRAVENOUS; SUBCUTANEOUS
Status: DISCONTINUED | OUTPATIENT
Start: 2021-01-01 | End: 2021-01-01 | Stop reason: HOSPADM

## 2021-01-01 RX ORDER — MIDODRINE HYDROCHLORIDE 5 MG/1
10 TABLET ORAL ONCE
Status: COMPLETED | OUTPATIENT
Start: 2021-01-01 | End: 2021-01-01

## 2021-01-01 RX ORDER — SIMETHICONE 80 MG
80 TABLET,CHEWABLE ORAL EVERY 6 HOURS PRN
Status: DISCONTINUED | OUTPATIENT
Start: 2021-01-01 | End: 2021-01-01 | Stop reason: HOSPADM

## 2021-01-01 RX ORDER — HEPARIN SODIUM 1000 [USP'U]/ML
6000 INJECTION, SOLUTION INTRAVENOUS; SUBCUTANEOUS ONCE
Status: COMPLETED | OUTPATIENT
Start: 2021-01-01 | End: 2021-01-01

## 2021-01-01 RX ORDER — LORAZEPAM 2 MG/ML
0.25 INJECTION INTRAMUSCULAR EVERY 4 HOURS PRN
Status: DISCONTINUED | OUTPATIENT
Start: 2021-01-01 | End: 2021-01-01

## 2021-01-01 RX ORDER — CITALOPRAM 40 MG/1
20 TABLET ORAL 2 TIMES DAILY
Qty: 90 TABLET | Refills: 1 | Status: SHIPPED | OUTPATIENT
Start: 2021-01-01

## 2021-01-01 RX ORDER — LIDOCAINE HYDROCHLORIDE 10 MG/ML
10 INJECTION, SOLUTION EPIDURAL; INFILTRATION; INTRACAUDAL; PERINEURAL ONCE
Status: COMPLETED | OUTPATIENT
Start: 2021-01-01 | End: 2021-01-01

## 2021-01-01 RX ORDER — DEXTROSE MONOHYDRATE 25 G/50ML
INJECTION, SOLUTION INTRAVENOUS
Status: COMPLETED
Start: 2021-01-01 | End: 2021-01-01

## 2021-01-01 RX ORDER — IPRATROPIUM BROMIDE AND ALBUTEROL SULFATE 2.5; .5 MG/3ML; MG/3ML
3 SOLUTION RESPIRATORY (INHALATION) EVERY 4 HOURS PRN
Qty: 360 ML | Refills: 7 | Status: SHIPPED | OUTPATIENT
Start: 2021-01-01 | End: 2021-01-01 | Stop reason: SDUPTHER

## 2021-01-01 RX ORDER — MIDODRINE HYDROCHLORIDE 10 MG/1
10 TABLET ORAL
Qty: 90 TABLET | Refills: 1 | Status: SHIPPED | OUTPATIENT
Start: 2021-01-01

## 2021-01-01 RX ORDER — SODIUM CHLORIDE 9 MG/ML
25 INJECTION, SOLUTION INTRAVENOUS PRN
Status: DISCONTINUED | OUTPATIENT
Start: 2021-01-01 | End: 2021-01-01 | Stop reason: HOSPADM

## 2021-01-01 RX ORDER — LORAZEPAM 2 MG/ML
1 CONCENTRATE ORAL EVERY 4 HOURS PRN
Status: DISCONTINUED | OUTPATIENT
Start: 2021-01-01 | End: 2021-01-01 | Stop reason: HOSPADM

## 2021-01-01 RX ORDER — DOCUSATE SODIUM 100 MG/1
100 CAPSULE, LIQUID FILLED ORAL DAILY PRN
Status: DISCONTINUED | OUTPATIENT
Start: 2021-01-01 | End: 2021-01-01 | Stop reason: HOSPADM

## 2021-01-01 RX ORDER — PANTOPRAZOLE SODIUM 40 MG/1
40 TABLET, DELAYED RELEASE ORAL
Status: DISCONTINUED | OUTPATIENT
Start: 2021-01-01 | End: 2021-01-01

## 2021-01-01 RX ORDER — PANTOPRAZOLE SODIUM 40 MG/1
40 TABLET, DELAYED RELEASE ORAL DAILY
Qty: 30 TABLET | Refills: 3 | Status: SHIPPED | OUTPATIENT
Start: 2021-01-01 | End: 2021-01-01 | Stop reason: SDUPTHER

## 2021-01-01 RX ORDER — ALBUMIN (HUMAN) 12.5 G/50ML
12.5 SOLUTION INTRAVENOUS PRN
Status: DISCONTINUED | OUTPATIENT
Start: 2021-01-01 | End: 2021-01-01 | Stop reason: HOSPADM

## 2021-01-01 RX ORDER — ALBUMIN (HUMAN) 12.5 G/50ML
25 SOLUTION INTRAVENOUS 2 TIMES DAILY
Status: COMPLETED | OUTPATIENT
Start: 2021-01-01 | End: 2021-01-01

## 2021-01-01 RX ORDER — HYDROXYZINE HYDROCHLORIDE 25 MG/1
25 TABLET, FILM COATED ORAL 3 TIMES DAILY PRN
Status: DISCONTINUED | OUTPATIENT
Start: 2021-01-01 | End: 2021-01-01 | Stop reason: HOSPADM

## 2021-01-01 RX ORDER — SODIUM CHLORIDE FOR INHALATION 3 %
4 VIAL, NEBULIZER (ML) INHALATION PRN
Status: DISCONTINUED | OUTPATIENT
Start: 2021-01-01 | End: 2021-01-01 | Stop reason: HOSPADM

## 2021-01-01 RX ORDER — ASPIRIN 325 MG/1
325 TABLET, FILM COATED ORAL ONCE
Status: COMPLETED | OUTPATIENT
Start: 2021-01-01 | End: 2021-01-01

## 2021-01-01 RX ORDER — FUROSEMIDE 10 MG/ML
100 INJECTION INTRAMUSCULAR; INTRAVENOUS ONCE
Status: COMPLETED | OUTPATIENT
Start: 2021-01-01 | End: 2021-01-01

## 2021-01-01 RX ORDER — GABAPENTIN 100 MG/1
100 CAPSULE ORAL NIGHTLY
Status: DISCONTINUED | OUTPATIENT
Start: 2021-01-01 | End: 2021-01-01 | Stop reason: HOSPADM

## 2021-01-01 RX ORDER — ALBUTEROL SULFATE 2.5 MG/3ML
2.5 SOLUTION RESPIRATORY (INHALATION) EVERY 6 HOURS PRN
Status: DISCONTINUED | OUTPATIENT
Start: 2021-01-01 | End: 2021-01-01 | Stop reason: HOSPADM

## 2021-01-01 RX ORDER — TORSEMIDE 100 MG/1
50 TABLET ORAL DAILY
Status: DISCONTINUED | OUTPATIENT
Start: 2021-01-01 | End: 2021-01-01

## 2021-01-01 RX ORDER — PANTOPRAZOLE SODIUM 40 MG/1
40 TABLET, DELAYED RELEASE ORAL DAILY
Qty: 90 TABLET | Refills: 0 | Status: SHIPPED | OUTPATIENT
Start: 2021-01-01

## 2021-01-01 RX ORDER — GUAIFENESIN 600 MG/1
600 TABLET, EXTENDED RELEASE ORAL 2 TIMES DAILY
Status: DISCONTINUED | OUTPATIENT
Start: 2021-01-01 | End: 2021-01-01 | Stop reason: HOSPADM

## 2021-01-01 RX ORDER — SCOLOPAMINE TRANSDERMAL SYSTEM 1 MG/1
1 PATCH, EXTENDED RELEASE TRANSDERMAL
Status: DISCONTINUED | OUTPATIENT
Start: 2021-01-01 | End: 2021-01-01 | Stop reason: HOSPADM

## 2021-01-01 RX ORDER — MIDODRINE HYDROCHLORIDE 5 MG/1
10 TABLET ORAL
Status: DISCONTINUED | OUTPATIENT
Start: 2021-01-01 | End: 2021-01-01 | Stop reason: HOSPADM

## 2021-01-01 RX ORDER — PANTOPRAZOLE SODIUM 40 MG/1
40 TABLET, DELAYED RELEASE ORAL DAILY
Status: DISCONTINUED | OUTPATIENT
Start: 2021-01-01 | End: 2021-01-01 | Stop reason: HOSPADM

## 2021-01-01 RX ORDER — ACETAMINOPHEN 650 MG/1
650 SUPPOSITORY RECTAL EVERY 6 HOURS PRN
Status: DISCONTINUED | OUTPATIENT
Start: 2021-01-01 | End: 2021-01-01 | Stop reason: HOSPADM

## 2021-01-01 RX ORDER — LORAZEPAM 0.5 MG/1
0.5 TABLET ORAL EVERY 4 HOURS PRN
Status: DISCONTINUED | OUTPATIENT
Start: 2021-01-01 | End: 2021-01-01

## 2021-01-01 RX ORDER — SODIUM CHLORIDE/ALOE VERA
GEL (GRAM) NASAL PRN
Qty: 1 EACH | Refills: 3 | Status: SHIPPED | OUTPATIENT
Start: 2021-01-01

## 2021-01-01 RX ORDER — MIDODRINE HYDROCHLORIDE 10 MG/1
10 TABLET ORAL 2 TIMES DAILY PRN
Qty: 60 TABLET | Refills: 0 | Status: SHIPPED | OUTPATIENT
Start: 2021-01-01

## 2021-01-01 RX ORDER — FINASTERIDE 5 MG/1
5 TABLET, FILM COATED ORAL DAILY
Qty: 30 TABLET | Refills: 3 | Status: SHIPPED | OUTPATIENT
Start: 2021-01-01 | End: 2021-01-01 | Stop reason: SDUPTHER

## 2021-01-01 RX ORDER — GABAPENTIN 100 MG/1
100 CAPSULE ORAL NIGHTLY
Status: DISCONTINUED | OUTPATIENT
Start: 2021-01-01 | End: 2021-01-01

## 2021-01-01 RX ORDER — TORSEMIDE 20 MG/1
TABLET ORAL
Qty: 30 TABLET | Refills: 11 | Status: CANCELLED
Start: 2021-01-01

## 2021-01-01 RX ORDER — HEPARIN SODIUM 5000 [USP'U]/ML
5000 INJECTION, SOLUTION INTRAVENOUS; SUBCUTANEOUS EVERY 8 HOURS SCHEDULED
Status: DISCONTINUED | OUTPATIENT
Start: 2021-01-01 | End: 2021-01-01

## 2021-01-01 RX ORDER — INSULIN GLARGINE 100 [IU]/ML
7 INJECTION, SOLUTION SUBCUTANEOUS NIGHTLY
Qty: 5 PEN | Refills: 1 | Status: ON HOLD | OUTPATIENT
Start: 2021-01-01 | End: 2021-01-01 | Stop reason: HOSPADM

## 2021-01-01 RX ORDER — CIPROFLOXACIN 500 MG/1
500 TABLET, FILM COATED ORAL ONCE
Status: DISCONTINUED | OUTPATIENT
Start: 2021-01-01 | End: 2021-01-01

## 2021-01-01 RX ORDER — ATROPINE SULFATE 10 MG/ML
1 SOLUTION/ DROPS OPHTHALMIC EVERY 4 HOURS PRN
Status: DISCONTINUED | OUTPATIENT
Start: 2021-01-01 | End: 2021-01-01 | Stop reason: HOSPADM

## 2021-01-01 RX ORDER — INSULIN GLARGINE 100 [IU]/ML
14 INJECTION, SOLUTION SUBCUTANEOUS NIGHTLY
Qty: 5 PEN | Refills: 0 | Status: ON HOLD
Start: 2021-01-01 | End: 2021-01-01 | Stop reason: HOSPADM

## 2021-01-01 RX ORDER — ALBUMIN (HUMAN) 12.5 G/50ML
25 SOLUTION INTRAVENOUS PRN
Status: DISCONTINUED | OUTPATIENT
Start: 2021-01-01 | End: 2021-01-01 | Stop reason: HOSPADM

## 2021-01-01 RX ORDER — NICOTINE POLACRILEX 4 MG
LOZENGE BUCCAL
Status: DISPENSED
Start: 2021-01-01 | End: 2021-01-01

## 2021-01-01 RX ORDER — 0.9 % SODIUM CHLORIDE 0.9 %
1000 INTRAVENOUS SOLUTION INTRAVENOUS ONCE
Status: DISCONTINUED | OUTPATIENT
Start: 2021-01-01 | End: 2021-01-01 | Stop reason: HOSPADM

## 2021-01-01 RX ORDER — ALBUTEROL SULFATE 90 UG/1
2 AEROSOL, METERED RESPIRATORY (INHALATION) 4 TIMES DAILY PRN
Qty: 18 G | Refills: 5 | Status: SHIPPED | OUTPATIENT
Start: 2021-01-01

## 2021-01-01 RX ORDER — SODIUM CHLORIDE 0.9 % (FLUSH) 0.9 %
5-40 SYRINGE (ML) INJECTION PRN
Status: DISCONTINUED | OUTPATIENT
Start: 2021-01-01 | End: 2021-01-01 | Stop reason: HOSPADM

## 2021-01-01 RX ORDER — MIDODRINE HYDROCHLORIDE 5 MG/1
2.5 TABLET ORAL
Status: DISCONTINUED | OUTPATIENT
Start: 2021-01-01 | End: 2021-01-01

## 2021-01-01 RX ORDER — IPRATROPIUM BROMIDE AND ALBUTEROL SULFATE 2.5; .5 MG/3ML; MG/3ML
3 SOLUTION RESPIRATORY (INHALATION) EVERY 4 HOURS PRN
Qty: 360 ML | Refills: 11 | Status: SHIPPED | OUTPATIENT
Start: 2021-01-01

## 2021-01-01 RX ORDER — TRAZODONE HYDROCHLORIDE 50 MG/1
50 TABLET ORAL NIGHTLY PRN
Status: DISCONTINUED | OUTPATIENT
Start: 2021-01-01 | End: 2021-01-01 | Stop reason: HOSPADM

## 2021-01-01 RX ORDER — PRAVASTATIN SODIUM 40 MG
40 TABLET ORAL NIGHTLY
Qty: 90 TABLET | Refills: 3 | Status: SHIPPED | OUTPATIENT
Start: 2021-01-01

## 2021-01-01 RX ORDER — LIDOCAINE HYDROCHLORIDE AND EPINEPHRINE BITARTRATE 20; .01 MG/ML; MG/ML
20 INJECTION, SOLUTION SUBCUTANEOUS ONCE
Status: COMPLETED | OUTPATIENT
Start: 2021-01-01 | End: 2021-01-01

## 2021-01-01 RX ORDER — PROMETHAZINE HYDROCHLORIDE 25 MG/1
25 TABLET ORAL 2 TIMES DAILY PRN
Qty: 30 TABLET | Refills: 1 | Status: SHIPPED | OUTPATIENT
Start: 2021-01-01

## 2021-01-01 RX ORDER — MIDODRINE HYDROCHLORIDE 5 MG/1
5 TABLET ORAL DAILY
Qty: 30 TABLET | Refills: 1 | Status: ON HOLD | OUTPATIENT
Start: 2021-01-01 | End: 2021-01-01 | Stop reason: HOSPADM

## 2021-01-01 RX ORDER — BISACODYL 10 MG
10 SUPPOSITORY, RECTAL RECTAL DAILY PRN
Status: DISCONTINUED | OUTPATIENT
Start: 2021-01-01 | End: 2021-01-01 | Stop reason: HOSPADM

## 2021-01-01 RX ORDER — IPRATROPIUM BROMIDE AND ALBUTEROL SULFATE 2.5; .5 MG/3ML; MG/3ML
1 SOLUTION RESPIRATORY (INHALATION) EVERY 4 HOURS PRN
Status: DISCONTINUED | OUTPATIENT
Start: 2021-01-01 | End: 2021-01-01 | Stop reason: HOSPADM

## 2021-01-01 RX ORDER — CITALOPRAM 20 MG/1
20 TABLET ORAL DAILY
Qty: 30 TABLET | Refills: 3 | Status: SHIPPED | OUTPATIENT
Start: 2021-01-01 | End: 2021-01-01 | Stop reason: SDUPTHER

## 2021-01-01 RX ORDER — TRAZODONE HYDROCHLORIDE 50 MG/1
50 TABLET ORAL NIGHTLY PRN
Status: DISCONTINUED | OUTPATIENT
Start: 2021-01-01 | End: 2021-01-01

## 2021-01-01 RX ORDER — INSULIN LISPRO 100 [IU]/ML
0-3 INJECTION, SOLUTION INTRAVENOUS; SUBCUTANEOUS NIGHTLY
Status: DISCONTINUED | OUTPATIENT
Start: 2021-01-01 | End: 2021-01-01 | Stop reason: HOSPADM

## 2021-01-01 RX ORDER — HEPARIN SODIUM 1000 [USP'U]/ML
1000 INJECTION, SOLUTION INTRAVENOUS; SUBCUTANEOUS PRN
Status: DISCONTINUED | OUTPATIENT
Start: 2021-01-01 | End: 2021-01-01 | Stop reason: HOSPADM

## 2021-01-01 RX ORDER — HEPARIN SODIUM 200 [USP'U]/100ML
7 INJECTION, SOLUTION INTRAVENOUS CONTINUOUS
Status: ACTIVE | OUTPATIENT
Start: 2021-01-01 | End: 2021-01-01

## 2021-01-01 RX ORDER — INSULIN GLARGINE 100 [IU]/ML
INJECTION, SOLUTION SUBCUTANEOUS
Qty: 15 ML | Refills: 1 | Status: ON HOLD | OUTPATIENT
Start: 2021-01-01 | End: 2021-01-01 | Stop reason: SDUPTHER

## 2021-01-01 RX ORDER — INSULIN GLARGINE 100 [IU]/ML
40 INJECTION, SOLUTION SUBCUTANEOUS NIGHTLY
COMMUNITY
Start: 2021-01-01 | End: 2021-01-01 | Stop reason: SDUPTHER

## 2021-01-01 RX ORDER — CARVEDILOL 3.12 MG/1
6.25 TABLET ORAL 2 TIMES DAILY WITH MEALS
Status: DISCONTINUED | OUTPATIENT
Start: 2021-01-01 | End: 2021-01-01

## 2021-01-01 RX ORDER — ALBUTEROL SULFATE 90 UG/1
2 AEROSOL, METERED RESPIRATORY (INHALATION) 4 TIMES DAILY
Status: DISCONTINUED | OUTPATIENT
Start: 2021-01-01 | End: 2021-01-01

## 2021-01-01 RX ORDER — ACETAMINOPHEN 160 MG
TABLET,DISINTEGRATING ORAL
COMMUNITY

## 2021-01-01 RX ORDER — SODIUM CHLORIDE 9 MG/ML
INJECTION, SOLUTION INTRAVENOUS CONTINUOUS PRN
Status: DISCONTINUED | OUTPATIENT
Start: 2021-01-01 | End: 2021-01-01 | Stop reason: SDUPTHER

## 2021-01-01 RX ORDER — GABAPENTIN 100 MG/1
100 CAPSULE ORAL 2 TIMES DAILY
Status: DISCONTINUED | OUTPATIENT
Start: 2021-01-01 | End: 2021-01-01 | Stop reason: HOSPADM

## 2021-01-01 RX ORDER — LACTOBACILLUS RHAMNOSUS GG 10B CELL
1 CAPSULE ORAL 2 TIMES DAILY WITH MEALS
Status: DISCONTINUED | OUTPATIENT
Start: 2021-01-01 | End: 2021-01-01 | Stop reason: HOSPADM

## 2021-01-01 RX ORDER — LANOLIN ALCOHOL/MO/W.PET/CERES
200 CREAM (GRAM) TOPICAL DAILY
Status: DISCONTINUED | OUTPATIENT
Start: 2021-01-01 | End: 2021-01-01 | Stop reason: HOSPADM

## 2021-01-01 RX ORDER — FINASTERIDE 5 MG/1
5 TABLET, FILM COATED ORAL DAILY
Status: DISCONTINUED | OUTPATIENT
Start: 2021-01-01 | End: 2021-01-01 | Stop reason: HOSPADM

## 2021-01-01 RX ORDER — MIDODRINE HYDROCHLORIDE 5 MG/1
10 TABLET ORAL 2 TIMES DAILY PRN
Status: DISCONTINUED | OUTPATIENT
Start: 2021-01-01 | End: 2021-01-01 | Stop reason: HOSPADM

## 2021-01-01 RX ORDER — ACETAMINOPHEN 650 MG/1
650 SUPPOSITORY RECTAL EVERY 6 HOURS PRN
Status: DISCONTINUED | OUTPATIENT
Start: 2021-01-01 | End: 2021-01-01

## 2021-01-01 RX ORDER — MIDODRINE HYDROCHLORIDE 5 MG/1
10 TABLET ORAL
Status: DISCONTINUED | OUTPATIENT
Start: 2021-01-01 | End: 2021-01-01

## 2021-01-01 RX ORDER — PRAVASTATIN SODIUM 40 MG
40 TABLET ORAL NIGHTLY
Status: DISCONTINUED | OUTPATIENT
Start: 2021-01-01 | End: 2021-01-01

## 2021-01-01 RX ORDER — MIDODRINE HYDROCHLORIDE 5 MG/1
5 TABLET ORAL 2 TIMES DAILY WITH MEALS
Status: DISCONTINUED | OUTPATIENT
Start: 2021-01-01 | End: 2021-01-01

## 2021-01-01 RX ADMIN — ALBUMIN (HUMAN) 12.5 G: 0.25 INJECTION, SOLUTION INTRAVENOUS at 12:02

## 2021-01-01 RX ADMIN — ACETAMINOPHEN 650 MG: 325 TABLET ORAL at 22:16

## 2021-01-01 RX ADMIN — ASPIRIN 81 MG 81 MG: 81 TABLET ORAL at 15:52

## 2021-01-01 RX ADMIN — HEPARIN SODIUM 5000 UNITS: 5000 INJECTION INTRAVENOUS; SUBCUTANEOUS at 05:41

## 2021-01-01 RX ADMIN — VANCOMYCIN HYDROCHLORIDE 1250 MG: 10 INJECTION, POWDER, LYOPHILIZED, FOR SOLUTION INTRAVENOUS at 15:41

## 2021-01-01 RX ADMIN — Medication 1 CAPSULE: at 18:20

## 2021-01-01 RX ADMIN — TRAZODONE HYDROCHLORIDE 50 MG: 50 TABLET ORAL at 00:11

## 2021-01-01 RX ADMIN — IPRATROPIUM BROMIDE AND ALBUTEROL SULFATE 1 AMPULE: .5; 3 SOLUTION RESPIRATORY (INHALATION) at 07:44

## 2021-01-01 RX ADMIN — MIDODRINE HYDROCHLORIDE 10 MG: 5 TABLET ORAL at 11:57

## 2021-01-01 RX ADMIN — GABAPENTIN 100 MG: 100 CAPSULE ORAL at 09:03

## 2021-01-01 RX ADMIN — IPRATROPIUM BROMIDE 2 PUFF: 17 AEROSOL, METERED RESPIRATORY (INHALATION) at 12:29

## 2021-01-01 RX ADMIN — GABAPENTIN 100 MG: 100 CAPSULE ORAL at 21:36

## 2021-01-01 RX ADMIN — IPRATROPIUM BROMIDE AND ALBUTEROL SULFATE 1 AMPULE: .5; 3 SOLUTION RESPIRATORY (INHALATION) at 19:43

## 2021-01-01 RX ADMIN — ASPIRIN 81 MG: 81 TABLET, CHEWABLE ORAL at 09:32

## 2021-01-01 RX ADMIN — FINASTERIDE 5 MG: 5 TABLET, FILM COATED ORAL at 09:03

## 2021-01-01 RX ADMIN — HEPARIN SODIUM 5000 UNITS: 5000 INJECTION INTRAVENOUS; SUBCUTANEOUS at 21:47

## 2021-01-01 RX ADMIN — HEPARIN SODIUM 5000 UNITS: 5000 INJECTION INTRAVENOUS; SUBCUTANEOUS at 21:20

## 2021-01-01 RX ADMIN — CARVEDILOL 6.25 MG: 3.12 TABLET, FILM COATED ORAL at 18:19

## 2021-01-01 RX ADMIN — Medication 10 ML: at 21:17

## 2021-01-01 RX ADMIN — INSULIN GLARGINE 20 UNITS: 100 INJECTION, SOLUTION SUBCUTANEOUS at 21:51

## 2021-01-01 RX ADMIN — IOPAMIDOL 75 ML: 755 INJECTION, SOLUTION INTRAVENOUS at 12:15

## 2021-01-01 RX ADMIN — ACETAMINOPHEN 650 MG: 325 TABLET ORAL at 06:23

## 2021-01-01 RX ADMIN — MEROPENEM 500 MG: 1 INJECTION, POWDER, FOR SOLUTION INTRAVENOUS at 15:26

## 2021-01-01 RX ADMIN — INSULIN GLARGINE 14 UNITS: 100 INJECTION, SOLUTION SUBCUTANEOUS at 22:08

## 2021-01-01 RX ADMIN — INSULIN LISPRO 6 UNITS: 100 INJECTION, SOLUTION INTRAVENOUS; SUBCUTANEOUS at 18:05

## 2021-01-01 RX ADMIN — HEPARIN SODIUM 5000 UNITS: 5000 INJECTION INTRAVENOUS; SUBCUTANEOUS at 05:06

## 2021-01-01 RX ADMIN — INSULIN LISPRO 1 UNITS: 100 INJECTION, SOLUTION INTRAVENOUS; SUBCUTANEOUS at 09:05

## 2021-01-01 RX ADMIN — HEPARIN SODIUM 5000 UNITS: 5000 INJECTION INTRAVENOUS; SUBCUTANEOUS at 05:32

## 2021-01-01 RX ADMIN — IRON SUCROSE 200 MG: 20 INJECTION, SOLUTION INTRAVENOUS at 14:25

## 2021-01-01 RX ADMIN — FUROSEMIDE 10 MG/HR: 10 INJECTION, SOLUTION INTRAVENOUS at 15:51

## 2021-01-01 RX ADMIN — PROMETHAZINE HYDROCHLORIDE 12.5 MG: 25 TABLET ORAL at 10:45

## 2021-01-01 RX ADMIN — MIDODRINE HYDROCHLORIDE 10 MG: 5 TABLET ORAL at 17:01

## 2021-01-01 RX ADMIN — SODIUM CHLORIDE, PRESERVATIVE FREE 10 ML: 5 INJECTION INTRAVENOUS at 13:00

## 2021-01-01 RX ADMIN — Medication 10 ML: at 22:15

## 2021-01-01 RX ADMIN — IPRATROPIUM BROMIDE AND ALBUTEROL SULFATE 1 AMPULE: .5; 3 SOLUTION RESPIRATORY (INHALATION) at 08:09

## 2021-01-01 RX ADMIN — ACETAMINOPHEN 650 MG: 325 TABLET ORAL at 21:02

## 2021-01-01 RX ADMIN — Medication 2 PUFF: at 21:12

## 2021-01-01 RX ADMIN — INSULIN GLARGINE 7 UNITS: 100 INJECTION, SOLUTION SUBCUTANEOUS at 22:14

## 2021-01-01 RX ADMIN — TRAZODONE HYDROCHLORIDE 50 MG: 50 TABLET ORAL at 23:02

## 2021-01-01 RX ADMIN — Medication 1000 MG: at 08:17

## 2021-01-01 RX ADMIN — GABAPENTIN 100 MG: 100 CAPSULE ORAL at 23:53

## 2021-01-01 RX ADMIN — PRAVASTATIN SODIUM 40 MG: 40 TABLET ORAL at 21:37

## 2021-01-01 RX ADMIN — HYDROMORPHONE HYDROCHLORIDE 1 MG: 1 INJECTION, SOLUTION INTRAMUSCULAR; INTRAVENOUS; SUBCUTANEOUS at 23:30

## 2021-01-01 RX ADMIN — TRAZODONE HYDROCHLORIDE 50 MG: 50 TABLET ORAL at 21:58

## 2021-01-01 RX ADMIN — Medication 2 PUFF: at 21:29

## 2021-01-01 RX ADMIN — MEROPENEM 500 MG: 1 INJECTION, POWDER, FOR SOLUTION INTRAVENOUS at 14:24

## 2021-01-01 RX ADMIN — IPRATROPIUM BROMIDE AND ALBUTEROL SULFATE 1 AMPULE: .5; 3 SOLUTION RESPIRATORY (INHALATION) at 15:53

## 2021-01-01 RX ADMIN — HEPARIN SODIUM 5000 UNITS: 5000 INJECTION INTRAVENOUS; SUBCUTANEOUS at 15:25

## 2021-01-01 RX ADMIN — Medication 10 ML: at 21:03

## 2021-01-01 RX ADMIN — PANTOPRAZOLE SODIUM 40 MG: 40 TABLET, DELAYED RELEASE ORAL at 05:14

## 2021-01-01 RX ADMIN — IPRATROPIUM BROMIDE AND ALBUTEROL SULFATE 1 AMPULE: .5; 3 SOLUTION RESPIRATORY (INHALATION) at 15:55

## 2021-01-01 RX ADMIN — TORSEMIDE 100 MG: 100 TABLET ORAL at 10:25

## 2021-01-01 RX ADMIN — PRAVASTATIN SODIUM 40 MG: 40 TABLET ORAL at 00:10

## 2021-01-01 RX ADMIN — IPRATROPIUM BROMIDE AND ALBUTEROL SULFATE 1 AMPULE: .5; 3 SOLUTION RESPIRATORY (INHALATION) at 20:03

## 2021-01-01 RX ADMIN — HEPARIN SODIUM 5000 UNITS: 5000 INJECTION INTRAVENOUS; SUBCUTANEOUS at 06:45

## 2021-01-01 RX ADMIN — HEPARIN SODIUM 5000 UNITS: 5000 INJECTION INTRAVENOUS; SUBCUTANEOUS at 22:54

## 2021-01-01 RX ADMIN — METOPROLOL TARTRATE 25 MG: 25 TABLET, FILM COATED ORAL at 13:53

## 2021-01-01 RX ADMIN — Medication 10 ML: at 22:41

## 2021-01-01 RX ADMIN — TORSEMIDE 100 MG: 100 TABLET ORAL at 08:10

## 2021-01-01 RX ADMIN — CITALOPRAM HYDROBROMIDE 20 MG: 20 TABLET ORAL at 08:16

## 2021-01-01 RX ADMIN — TORSEMIDE 50 MG: 100 TABLET ORAL at 09:08

## 2021-01-01 RX ADMIN — METOPROLOL TARTRATE 25 MG: 25 TABLET, FILM COATED ORAL at 08:35

## 2021-01-01 RX ADMIN — PRAVASTATIN SODIUM 40 MG: 40 TABLET ORAL at 22:13

## 2021-01-01 RX ADMIN — PANTOPRAZOLE SODIUM 40 MG: 40 TABLET, DELAYED RELEASE ORAL at 06:19

## 2021-01-01 RX ADMIN — IPRATROPIUM BROMIDE AND ALBUTEROL SULFATE 3 ML: .5; 3 SOLUTION RESPIRATORY (INHALATION) at 07:21

## 2021-01-01 RX ADMIN — IPRATROPIUM BROMIDE AND ALBUTEROL SULFATE 1 AMPULE: .5; 3 SOLUTION RESPIRATORY (INHALATION) at 20:43

## 2021-01-01 RX ADMIN — SODIUM CHLORIDE: 9 INJECTION, SOLUTION INTRAVENOUS at 17:43

## 2021-01-01 RX ADMIN — HEPARIN SODIUM 5000 UNITS: 5000 INJECTION INTRAVENOUS; SUBCUTANEOUS at 14:19

## 2021-01-01 RX ADMIN — DEXMEDETOMIDINE HYDROCHLORIDE 4 MCG: 100 INJECTION, SOLUTION INTRAVENOUS at 09:50

## 2021-01-01 RX ADMIN — PHENYLEPHRINE HYDROCHLORIDE 100 MCG: 10 INJECTION INTRAVENOUS at 10:16

## 2021-01-01 RX ADMIN — LORAZEPAM 0.5 MG: 0.5 TABLET ORAL at 22:01

## 2021-01-01 RX ADMIN — PANTOPRAZOLE SODIUM 40 MG: 40 TABLET, DELAYED RELEASE ORAL at 05:38

## 2021-01-01 RX ADMIN — INSULIN GLARGINE 5 UNITS: 100 INJECTION, SOLUTION SUBCUTANEOUS at 00:36

## 2021-01-01 RX ADMIN — IPRATROPIUM BROMIDE AND ALBUTEROL SULFATE 1 AMPULE: .5; 3 SOLUTION RESPIRATORY (INHALATION) at 15:56

## 2021-01-01 RX ADMIN — PRAVASTATIN SODIUM 40 MG: 40 TABLET ORAL at 20:46

## 2021-01-01 RX ADMIN — Medication 10 ML: at 15:51

## 2021-01-01 RX ADMIN — PRAVASTATIN SODIUM 40 MG: 40 TABLET ORAL at 23:38

## 2021-01-01 RX ADMIN — TRAZODONE HYDROCHLORIDE 50 MG: 50 TABLET ORAL at 23:39

## 2021-01-01 RX ADMIN — Medication 10 ML: at 21:38

## 2021-01-01 RX ADMIN — MIDODRINE HYDROCHLORIDE 2.5 MG: 5 TABLET ORAL at 10:30

## 2021-01-01 RX ADMIN — EPOETIN ALFA-EPBX 8000 UNITS: 10000 INJECTION, SOLUTION INTRAVENOUS; SUBCUTANEOUS at 10:13

## 2021-01-01 RX ADMIN — ACETAMINOPHEN 650 MG: 325 TABLET ORAL at 21:35

## 2021-01-01 RX ADMIN — DEXTROSE MONOHYDRATE 100 ML/HR: 50 INJECTION, SOLUTION INTRAVENOUS at 12:35

## 2021-01-01 RX ADMIN — CITALOPRAM HYDROBROMIDE 20 MG: 20 TABLET ORAL at 09:03

## 2021-01-01 RX ADMIN — IPRATROPIUM BROMIDE AND ALBUTEROL SULFATE 1 AMPULE: .5; 3 SOLUTION RESPIRATORY (INHALATION) at 11:58

## 2021-01-01 RX ADMIN — HYDROXYZINE HYDROCHLORIDE 25 MG: 25 TABLET, FILM COATED ORAL at 02:33

## 2021-01-01 RX ADMIN — ACETAMINOPHEN 650 MG: 325 TABLET ORAL at 03:04

## 2021-01-01 RX ADMIN — SODIUM CHLORIDE, PRESERVATIVE FREE 10 ML: 5 INJECTION INTRAVENOUS at 05:59

## 2021-01-01 RX ADMIN — Medication 1 CAPSULE: at 08:34

## 2021-01-01 RX ADMIN — IPRATROPIUM BROMIDE AND ALBUTEROL SULFATE 1 AMPULE: .5; 3 SOLUTION RESPIRATORY (INHALATION) at 18:02

## 2021-01-01 RX ADMIN — ACETAMINOPHEN 650 MG: 325 TABLET ORAL at 15:50

## 2021-01-01 RX ADMIN — INSULIN LISPRO 1 UNITS: 100 INJECTION, SOLUTION INTRAVENOUS; SUBCUTANEOUS at 22:13

## 2021-01-01 RX ADMIN — CITALOPRAM HYDROBROMIDE 20 MG: 20 TABLET ORAL at 21:04

## 2021-01-01 RX ADMIN — CARVEDILOL 6.25 MG: 3.12 TABLET, FILM COATED ORAL at 09:32

## 2021-01-01 RX ADMIN — FERROUS SULFATE TAB 325 MG (65 MG ELEMENTAL FE) 325 MG: 325 (65 FE) TAB at 09:15

## 2021-01-01 RX ADMIN — Medication 2 PUFF: at 20:08

## 2021-01-01 RX ADMIN — HEPARIN SODIUM 5000 UNITS: 5000 INJECTION INTRAVENOUS; SUBCUTANEOUS at 06:14

## 2021-01-01 RX ADMIN — MIDODRINE HYDROCHLORIDE 5 MG: 5 TABLET ORAL at 08:27

## 2021-01-01 RX ADMIN — HYDROXYZINE HYDROCHLORIDE 25 MG: 25 TABLET, FILM COATED ORAL at 21:58

## 2021-01-01 RX ADMIN — CITALOPRAM HYDROBROMIDE 20 MG: 20 TABLET ORAL at 13:33

## 2021-01-01 RX ADMIN — HEPARIN SODIUM 5000 UNITS: 5000 INJECTION INTRAVENOUS; SUBCUTANEOUS at 20:59

## 2021-01-01 RX ADMIN — CITALOPRAM HYDROBROMIDE 20 MG: 20 TABLET ORAL at 10:30

## 2021-01-01 RX ADMIN — CITALOPRAM HYDROBROMIDE 20 MG: 20 TABLET ORAL at 10:24

## 2021-01-01 RX ADMIN — IPRATROPIUM BROMIDE 2 PUFF: 17 AEROSOL, METERED RESPIRATORY (INHALATION) at 08:35

## 2021-01-01 RX ADMIN — FINASTERIDE 5 MG: 5 TABLET, FILM COATED ORAL at 10:30

## 2021-01-01 RX ADMIN — PRAVASTATIN SODIUM 40 MG: 40 TABLET ORAL at 22:17

## 2021-01-01 RX ADMIN — Medication 200 MG: at 13:35

## 2021-01-01 RX ADMIN — MIDODRINE HYDROCHLORIDE 10 MG: 5 TABLET ORAL at 16:29

## 2021-01-01 RX ADMIN — HEPARIN SODIUM 5000 UNITS: 5000 INJECTION INTRAVENOUS; SUBCUTANEOUS at 23:20

## 2021-01-01 RX ADMIN — TRAZODONE HYDROCHLORIDE 50 MG: 50 TABLET ORAL at 22:46

## 2021-01-01 RX ADMIN — TRAZODONE HYDROCHLORIDE 50 MG: 50 TABLET ORAL at 23:36

## 2021-01-01 RX ADMIN — Medication 10 ML: at 09:04

## 2021-01-01 RX ADMIN — IPRATROPIUM BROMIDE AND ALBUTEROL SULFATE 1 AMPULE: .5; 3 SOLUTION RESPIRATORY (INHALATION) at 16:18

## 2021-01-01 RX ADMIN — IPRATROPIUM BROMIDE AND ALBUTEROL SULFATE 1 AMPULE: .5; 3 SOLUTION RESPIRATORY (INHALATION) at 08:27

## 2021-01-01 RX ADMIN — ASPIRIN 81 MG 81 MG: 81 TABLET ORAL at 09:04

## 2021-01-01 RX ADMIN — Medication 10 ML: at 10:30

## 2021-01-01 RX ADMIN — GABAPENTIN 100 MG: 100 CAPSULE ORAL at 13:32

## 2021-01-01 RX ADMIN — ALBUTEROL SULFATE 2 PUFF: 90 AEROSOL, METERED RESPIRATORY (INHALATION) at 20:54

## 2021-01-01 RX ADMIN — HYDROMORPHONE HYDROCHLORIDE 1 MG: 1 INJECTION, SOLUTION INTRAMUSCULAR; INTRAVENOUS; SUBCUTANEOUS at 19:44

## 2021-01-01 RX ADMIN — ACETAMINOPHEN 650 MG: 325 TABLET ORAL at 17:18

## 2021-01-01 RX ADMIN — ACETAMINOPHEN 650 MG: 325 TABLET ORAL at 15:00

## 2021-01-01 RX ADMIN — ALBUMIN (HUMAN) 12.5 G: 0.25 INJECTION, SOLUTION INTRAVENOUS at 08:40

## 2021-01-01 RX ADMIN — PRAVASTATIN SODIUM 40 MG: 40 TABLET ORAL at 22:06

## 2021-01-01 RX ADMIN — TORSEMIDE 100 MG: 100 TABLET ORAL at 09:15

## 2021-01-01 RX ADMIN — SODIUM CHLORIDE 500 ML: 9 INJECTION, SOLUTION INTRAVENOUS at 17:20

## 2021-01-01 RX ADMIN — FINASTERIDE 5 MG: 5 TABLET, FILM COATED ORAL at 09:49

## 2021-01-01 RX ADMIN — METOPROLOL TARTRATE 12.5 MG: 25 TABLET, FILM COATED ORAL at 15:54

## 2021-01-01 RX ADMIN — ASPIRIN 81 MG: 81 TABLET, CHEWABLE ORAL at 13:55

## 2021-01-01 RX ADMIN — PRAVASTATIN SODIUM 40 MG: 40 TABLET ORAL at 21:58

## 2021-01-01 RX ADMIN — Medication 10 ML: at 13:36

## 2021-01-01 RX ADMIN — INSULIN GLARGINE 7 UNITS: 100 INJECTION, SOLUTION SUBCUTANEOUS at 21:40

## 2021-01-01 RX ADMIN — Medication 1 CAPSULE: at 08:57

## 2021-01-01 RX ADMIN — Medication 10 ML: at 21:39

## 2021-01-01 RX ADMIN — IPRATROPIUM BROMIDE AND ALBUTEROL SULFATE 1 AMPULE: .5; 3 SOLUTION RESPIRATORY (INHALATION) at 13:15

## 2021-01-01 RX ADMIN — IRON SUCROSE 200 MG: 20 INJECTION, SOLUTION INTRAVENOUS at 18:22

## 2021-01-01 RX ADMIN — CITALOPRAM HYDROBROMIDE 20 MG: 20 TABLET ORAL at 09:32

## 2021-01-01 RX ADMIN — PRAVASTATIN SODIUM 40 MG: 40 TABLET ORAL at 23:57

## 2021-01-01 RX ADMIN — METOPROLOL TARTRATE 25 MG: 25 TABLET, FILM COATED ORAL at 10:25

## 2021-01-01 RX ADMIN — CITALOPRAM HYDROBROMIDE 20 MG: 20 TABLET ORAL at 21:03

## 2021-01-01 RX ADMIN — INSULIN LISPRO 2 UNITS: 100 INJECTION, SOLUTION INTRAVENOUS; SUBCUTANEOUS at 18:22

## 2021-01-01 RX ADMIN — MIDODRINE HYDROCHLORIDE 5 MG: 5 TABLET ORAL at 22:52

## 2021-01-01 RX ADMIN — INSULIN LISPRO 2 UNITS: 100 INJECTION, SOLUTION INTRAVENOUS; SUBCUTANEOUS at 17:02

## 2021-01-01 RX ADMIN — TORSEMIDE 100 MG: 100 TABLET ORAL at 08:34

## 2021-01-01 RX ADMIN — SODIUM CHLORIDE 25 ML: 9 INJECTION, SOLUTION INTRAVENOUS at 03:45

## 2021-01-01 RX ADMIN — GABAPENTIN 100 MG: 100 CAPSULE ORAL at 22:13

## 2021-01-01 RX ADMIN — ACETAMINOPHEN 650 MG: 325 TABLET ORAL at 05:14

## 2021-01-01 RX ADMIN — TRAZODONE HYDROCHLORIDE 50 MG: 50 TABLET ORAL at 22:16

## 2021-01-01 RX ADMIN — Medication 10 ML: at 10:41

## 2021-01-01 RX ADMIN — TRAZODONE HYDROCHLORIDE 50 MG: 50 TABLET ORAL at 21:37

## 2021-01-01 RX ADMIN — ASPIRIN 81 MG: 81 TABLET, CHEWABLE ORAL at 10:12

## 2021-01-01 RX ADMIN — ACETAMINOPHEN 650 MG: 325 TABLET ORAL at 17:01

## 2021-01-01 RX ADMIN — ACETAMINOPHEN 650 MG: 325 TABLET ORAL at 15:36

## 2021-01-01 RX ADMIN — GUAIFENESIN 600 MG: 600 TABLET, EXTENDED RELEASE ORAL at 22:08

## 2021-01-01 RX ADMIN — VANCOMYCIN HYDROCHLORIDE 1000 MG: 1 INJECTION, POWDER, LYOPHILIZED, FOR SOLUTION INTRAVENOUS at 15:33

## 2021-01-01 RX ADMIN — SODIUM CHLORIDE, PRESERVATIVE FREE 10 ML: 5 INJECTION INTRAVENOUS at 11:15

## 2021-01-01 RX ADMIN — HEPARIN SODIUM 5000 UNITS: 5000 INJECTION INTRAVENOUS; SUBCUTANEOUS at 21:53

## 2021-01-01 RX ADMIN — PRAVASTATIN SODIUM 40 MG: 40 TABLET ORAL at 21:32

## 2021-01-01 RX ADMIN — ACETAMINOPHEN 650 MG: 325 TABLET ORAL at 21:16

## 2021-01-01 RX ADMIN — MIDODRINE HYDROCHLORIDE 5 MG: 5 TABLET ORAL at 08:55

## 2021-01-01 RX ADMIN — GUAIFENESIN 600 MG: 600 TABLET, EXTENDED RELEASE ORAL at 23:02

## 2021-01-01 RX ADMIN — HEPARIN SODIUM 5000 UNITS: 5000 INJECTION INTRAVENOUS; SUBCUTANEOUS at 05:23

## 2021-01-01 RX ADMIN — ALBUTEROL SULFATE 2 PUFF: 90 AEROSOL, METERED RESPIRATORY (INHALATION) at 16:26

## 2021-01-01 RX ADMIN — IPRATROPIUM BROMIDE AND ALBUTEROL SULFATE 1 AMPULE: .5; 3 SOLUTION RESPIRATORY (INHALATION) at 08:20

## 2021-01-01 RX ADMIN — Medication 10 ML: at 14:17

## 2021-01-01 RX ADMIN — SODIUM CHLORIDE: 9 INJECTION, SOLUTION INTRAVENOUS at 06:50

## 2021-01-01 RX ADMIN — TRAZODONE HYDROCHLORIDE 50 MG: 50 TABLET ORAL at 22:08

## 2021-01-01 RX ADMIN — HEPARIN SODIUM 5000 UNITS: 5000 INJECTION INTRAVENOUS; SUBCUTANEOUS at 13:49

## 2021-01-01 RX ADMIN — PANTOPRAZOLE SODIUM 40 MG: 40 TABLET, DELAYED RELEASE ORAL at 09:14

## 2021-01-01 RX ADMIN — TRAZODONE HYDROCHLORIDE 50 MG: 50 TABLET ORAL at 21:59

## 2021-01-01 RX ADMIN — HEPARIN SODIUM 5000 UNITS: 5000 INJECTION INTRAVENOUS; SUBCUTANEOUS at 23:42

## 2021-01-01 RX ADMIN — HEPARIN SODIUM 5000 UNITS: 5000 INJECTION INTRAVENOUS; SUBCUTANEOUS at 06:23

## 2021-01-01 RX ADMIN — FERROUS SULFATE TAB 325 MG (65 MG ELEMENTAL FE) 325 MG: 325 (65 FE) TAB at 08:17

## 2021-01-01 RX ADMIN — CITALOPRAM HYDROBROMIDE 20 MG: 20 TABLET ORAL at 09:08

## 2021-01-01 RX ADMIN — Medication 10 ML: at 13:56

## 2021-01-01 RX ADMIN — MEROPENEM 500 MG: 1 INJECTION, POWDER, FOR SOLUTION INTRAVENOUS at 15:55

## 2021-01-01 RX ADMIN — METOPROLOL TARTRATE 12.5 MG: 25 TABLET, FILM COATED ORAL at 21:31

## 2021-01-01 RX ADMIN — Medication 10 ML: at 22:14

## 2021-01-01 RX ADMIN — IRON SUCROSE 200 MG: 20 INJECTION, SOLUTION INTRAVENOUS at 14:49

## 2021-01-01 RX ADMIN — ALBUMIN (HUMAN) 25 G: 0.25 INJECTION, SOLUTION INTRAVENOUS at 21:50

## 2021-01-01 RX ADMIN — INSULIN GLARGINE 20 UNITS: 100 INJECTION, SOLUTION SUBCUTANEOUS at 22:51

## 2021-01-01 RX ADMIN — Medication 10 ML: at 22:54

## 2021-01-01 RX ADMIN — Medication 10 ML: at 08:01

## 2021-01-01 RX ADMIN — Medication 10 ML: at 21:57

## 2021-01-01 RX ADMIN — LORAZEPAM 0.5 MG: 0.5 TABLET ORAL at 21:58

## 2021-01-01 RX ADMIN — LORAZEPAM 0.25 MG: 2 INJECTION INTRAMUSCULAR; INTRAVENOUS at 11:14

## 2021-01-01 RX ADMIN — MIDODRINE HYDROCHLORIDE 2.5 MG: 5 TABLET ORAL at 18:22

## 2021-01-01 RX ADMIN — FINASTERIDE 5 MG: 5 TABLET, FILM COATED ORAL at 13:31

## 2021-01-01 RX ADMIN — ALBUMIN (HUMAN) 25 G: 0.25 INJECTION, SOLUTION INTRAVENOUS at 14:14

## 2021-01-01 RX ADMIN — MEROPENEM 500 MG: 1 INJECTION, POWDER, FOR SOLUTION INTRAVENOUS at 14:22

## 2021-01-01 RX ADMIN — GABAPENTIN 100 MG: 100 CAPSULE ORAL at 23:36

## 2021-01-01 RX ADMIN — Medication 10 ML: at 09:40

## 2021-01-01 RX ADMIN — HEPARIN SODIUM 5000 UNITS: 5000 INJECTION INTRAVENOUS; SUBCUTANEOUS at 06:10

## 2021-01-01 RX ADMIN — PANTOPRAZOLE SODIUM 40 MG: 40 TABLET, DELAYED RELEASE ORAL at 06:45

## 2021-01-01 RX ADMIN — IPRATROPIUM BROMIDE AND ALBUTEROL SULFATE 1 AMPULE: .5; 3 SOLUTION RESPIRATORY (INHALATION) at 19:36

## 2021-01-01 RX ADMIN — TORSEMIDE 100 MG: 100 TABLET ORAL at 09:08

## 2021-01-01 RX ADMIN — MIDODRINE HYDROCHLORIDE 10 MG: 5 TABLET ORAL at 13:27

## 2021-01-01 RX ADMIN — IOPAMIDOL 75 ML: 755 INJECTION, SOLUTION INTRAVENOUS at 16:50

## 2021-01-01 RX ADMIN — HEPARIN SODIUM 5000 UNITS: 5000 INJECTION INTRAVENOUS; SUBCUTANEOUS at 22:57

## 2021-01-01 RX ADMIN — MIDODRINE HYDROCHLORIDE 10 MG: 5 TABLET ORAL at 17:21

## 2021-01-01 RX ADMIN — MIDODRINE HYDROCHLORIDE 2.5 MG: 5 TABLET ORAL at 09:08

## 2021-01-01 RX ADMIN — CITALOPRAM HYDROBROMIDE 20 MG: 20 TABLET ORAL at 09:49

## 2021-01-01 RX ADMIN — CITALOPRAM HYDROBROMIDE 20 MG: 20 TABLET ORAL at 11:52

## 2021-01-01 RX ADMIN — ASPIRIN 81 MG: 81 TABLET, CHEWABLE ORAL at 10:30

## 2021-01-01 RX ADMIN — HEPARIN SODIUM 5000 UNITS: 5000 INJECTION INTRAVENOUS; SUBCUTANEOUS at 14:10

## 2021-01-01 RX ADMIN — MEROPENEM 500 MG: 500 INJECTION, POWDER, FOR SOLUTION INTRAVENOUS at 13:16

## 2021-01-01 RX ADMIN — CARVEDILOL 6.25 MG: 3.12 TABLET, FILM COATED ORAL at 17:18

## 2021-01-01 RX ADMIN — INSULIN GLARGINE 7 UNITS: 100 INJECTION, SOLUTION SUBCUTANEOUS at 23:41

## 2021-01-01 RX ADMIN — Medication 2 PUFF: at 20:29

## 2021-01-01 RX ADMIN — INSULIN LISPRO 1 UNITS: 100 INJECTION, SOLUTION INTRAVENOUS; SUBCUTANEOUS at 21:53

## 2021-01-01 RX ADMIN — GUAIFENESIN 600 MG: 600 TABLET, EXTENDED RELEASE ORAL at 09:09

## 2021-01-01 RX ADMIN — ACETAMINOPHEN 650 MG: 325 TABLET ORAL at 13:05

## 2021-01-01 RX ADMIN — TORSEMIDE 100 MG: 100 TABLET ORAL at 09:05

## 2021-01-01 RX ADMIN — ACETAMINOPHEN 650 MG: 325 TABLET ORAL at 17:37

## 2021-01-01 RX ADMIN — ALBUTEROL SULFATE 2 PUFF: 90 AEROSOL, METERED RESPIRATORY (INHALATION) at 08:20

## 2021-01-01 RX ADMIN — ASPIRIN 81 MG: 81 TABLET, CHEWABLE ORAL at 09:08

## 2021-01-01 RX ADMIN — MIDODRINE HYDROCHLORIDE 10 MG: 5 TABLET ORAL at 20:09

## 2021-01-01 RX ADMIN — GABAPENTIN 100 MG: 100 CAPSULE ORAL at 09:32

## 2021-01-01 RX ADMIN — ASPIRIN 81 MG 81 MG: 81 TABLET ORAL at 09:30

## 2021-01-01 RX ADMIN — IPRATROPIUM BROMIDE AND ALBUTEROL SULFATE 1 AMPULE: .5; 3 SOLUTION RESPIRATORY (INHALATION) at 09:49

## 2021-01-01 RX ADMIN — IPRATROPIUM BROMIDE AND ALBUTEROL SULFATE 1 AMPULE: .5; 3 SOLUTION RESPIRATORY (INHALATION) at 08:23

## 2021-01-01 RX ADMIN — HEPARIN SODIUM 5000 UNITS: 5000 INJECTION INTRAVENOUS; SUBCUTANEOUS at 22:08

## 2021-01-01 RX ADMIN — IPRATROPIUM BROMIDE 2 PUFF: 17 AEROSOL, METERED RESPIRATORY (INHALATION) at 20:53

## 2021-01-01 RX ADMIN — LORAZEPAM 0.5 MG: 0.5 TABLET ORAL at 08:01

## 2021-01-01 RX ADMIN — CITALOPRAM HYDROBROMIDE 20 MG: 20 TABLET ORAL at 13:07

## 2021-01-01 RX ADMIN — VANCOMYCIN HYDROCHLORIDE 1000 MG: 1 INJECTION, POWDER, LYOPHILIZED, FOR SOLUTION INTRAVENOUS at 16:49

## 2021-01-01 RX ADMIN — SODIUM CHLORIDE, PRESERVATIVE FREE 10 ML: 5 INJECTION INTRAVENOUS at 03:38

## 2021-01-01 RX ADMIN — ACETAMINOPHEN 650 MG: 325 TABLET ORAL at 21:37

## 2021-01-01 RX ADMIN — Medication 10 ML: at 09:20

## 2021-01-01 RX ADMIN — MIDODRINE HYDROCHLORIDE 10 MG: 5 TABLET ORAL at 09:20

## 2021-01-01 RX ADMIN — MIDODRINE HYDROCHLORIDE 10 MG: 5 TABLET ORAL at 18:11

## 2021-01-01 RX ADMIN — HEPARIN SODIUM 5000 UNITS: 5000 INJECTION INTRAVENOUS; SUBCUTANEOUS at 14:30

## 2021-01-01 RX ADMIN — Medication 10 ML: at 20:46

## 2021-01-01 RX ADMIN — INSULIN LISPRO 1 UNITS: 100 INJECTION, SOLUTION INTRAVENOUS; SUBCUTANEOUS at 17:44

## 2021-01-01 RX ADMIN — INSULIN LISPRO 1 UNITS: 100 INJECTION, SOLUTION INTRAVENOUS; SUBCUTANEOUS at 21:41

## 2021-01-01 RX ADMIN — ALBUMIN (HUMAN) 25 G: 0.25 INJECTION, SOLUTION INTRAVENOUS at 17:52

## 2021-01-01 RX ADMIN — Medication 10 ML: at 21:30

## 2021-01-01 RX ADMIN — Medication 10 ML: at 00:11

## 2021-01-01 RX ADMIN — TORSEMIDE 100 MG: 100 TABLET ORAL at 08:01

## 2021-01-01 RX ADMIN — TRAZODONE HYDROCHLORIDE 50 MG: 50 TABLET ORAL at 21:16

## 2021-01-01 RX ADMIN — PANTOPRAZOLE SODIUM 40 MG: 40 TABLET, DELAYED RELEASE ORAL at 05:25

## 2021-01-01 RX ADMIN — MIDODRINE HYDROCHLORIDE 10 MG: 5 TABLET ORAL at 08:57

## 2021-01-01 RX ADMIN — MEROPENEM 500 MG: 1 INJECTION, POWDER, FOR SOLUTION INTRAVENOUS at 15:14

## 2021-01-01 RX ADMIN — CITALOPRAM HYDROBROMIDE 20 MG: 20 TABLET ORAL at 13:54

## 2021-01-01 RX ADMIN — Medication 10 ML: at 15:37

## 2021-01-01 RX ADMIN — CITALOPRAM HYDROBROMIDE 20 MG: 20 TABLET ORAL at 09:25

## 2021-01-01 RX ADMIN — ACETAMINOPHEN 650 MG: 325 TABLET ORAL at 13:41

## 2021-01-01 RX ADMIN — IPRATROPIUM BROMIDE AND ALBUTEROL SULFATE 1 AMPULE: .5; 3 SOLUTION RESPIRATORY (INHALATION) at 20:57

## 2021-01-01 RX ADMIN — CARVEDILOL 6.25 MG: 3.12 TABLET, FILM COATED ORAL at 13:01

## 2021-01-01 RX ADMIN — HEPARIN SODIUM 5000 UNITS: 5000 INJECTION INTRAVENOUS; SUBCUTANEOUS at 05:15

## 2021-01-01 RX ADMIN — Medication: at 14:30

## 2021-01-01 RX ADMIN — CITALOPRAM HYDROBROMIDE 20 MG: 20 TABLET ORAL at 09:15

## 2021-01-01 RX ADMIN — HEPARIN SODIUM 5000 UNITS: 5000 INJECTION INTRAVENOUS; SUBCUTANEOUS at 15:00

## 2021-01-01 RX ADMIN — Medication 2 PUFF: at 08:42

## 2021-01-01 RX ADMIN — HEPARIN SODIUM 5000 UNITS: 5000 INJECTION INTRAVENOUS; SUBCUTANEOUS at 22:20

## 2021-01-01 RX ADMIN — Medication 10 ML: at 22:38

## 2021-01-01 RX ADMIN — INSULIN GLARGINE 40 UNITS: 100 INJECTION, SOLUTION SUBCUTANEOUS at 00:33

## 2021-01-01 RX ADMIN — PHENYLEPHRINE HYDROCHLORIDE 100 MCG: 10 INJECTION INTRAVENOUS at 10:18

## 2021-01-01 RX ADMIN — ASPIRIN 81 MG: 81 TABLET, CHEWABLE ORAL at 13:32

## 2021-01-01 RX ADMIN — IPRATROPIUM BROMIDE AND ALBUTEROL SULFATE 1 AMPULE: .5; 3 SOLUTION RESPIRATORY (INHALATION) at 19:20

## 2021-01-01 RX ADMIN — INSULIN GLARGINE 40 UNITS: 100 INJECTION, SOLUTION SUBCUTANEOUS at 22:14

## 2021-01-01 RX ADMIN — IPRATROPIUM BROMIDE AND ALBUTEROL SULFATE 1 AMPULE: .5; 3 SOLUTION RESPIRATORY (INHALATION) at 08:33

## 2021-01-01 RX ADMIN — INSULIN LISPRO 4 UNITS: 100 INJECTION, SOLUTION INTRAVENOUS; SUBCUTANEOUS at 17:20

## 2021-01-01 RX ADMIN — ACETAMINOPHEN 650 MG: 325 TABLET ORAL at 09:11

## 2021-01-01 RX ADMIN — GABAPENTIN 100 MG: 100 CAPSULE ORAL at 13:01

## 2021-01-01 RX ADMIN — HEPARIN SODIUM 5000 UNITS: 5000 INJECTION INTRAVENOUS; SUBCUTANEOUS at 21:27

## 2021-01-01 RX ADMIN — HEPARIN SODIUM 5000 UNITS: 5000 INJECTION INTRAVENOUS; SUBCUTANEOUS at 15:09

## 2021-01-01 RX ADMIN — Medication 10 ML: at 20:16

## 2021-01-01 RX ADMIN — GABAPENTIN 100 MG: 100 CAPSULE ORAL at 21:16

## 2021-01-01 RX ADMIN — SIMETHICONE 80 MG: 80 TABLET, CHEWABLE ORAL at 13:00

## 2021-01-01 RX ADMIN — INSULIN GLARGINE 7 UNITS: 100 INJECTION, SOLUTION SUBCUTANEOUS at 22:35

## 2021-01-01 RX ADMIN — GABAPENTIN 100 MG: 100 CAPSULE ORAL at 22:06

## 2021-01-01 RX ADMIN — PRAVASTATIN SODIUM 40 MG: 40 TABLET ORAL at 21:30

## 2021-01-01 RX ADMIN — FINASTERIDE 5 MG: 5 TABLET, FILM COATED ORAL at 14:17

## 2021-01-01 RX ADMIN — DEXMEDETOMIDINE HYDROCHLORIDE 4 MCG: 100 INJECTION, SOLUTION INTRAVENOUS at 09:46

## 2021-01-01 RX ADMIN — ONDANSETRON 4 MG: 2 INJECTION INTRAMUSCULAR; INTRAVENOUS at 19:45

## 2021-01-01 RX ADMIN — TRAZODONE HYDROCHLORIDE 50 MG: 50 TABLET ORAL at 23:22

## 2021-01-01 RX ADMIN — HEPARIN SODIUM 5000 UNITS: 5000 INJECTION INTRAVENOUS; SUBCUTANEOUS at 14:57

## 2021-01-01 RX ADMIN — METOPROLOL TARTRATE 12.5 MG: 25 TABLET, FILM COATED ORAL at 08:16

## 2021-01-01 RX ADMIN — MIDODRINE HYDROCHLORIDE 5 MG: 5 TABLET ORAL at 08:45

## 2021-01-01 RX ADMIN — CARVEDILOL 6.25 MG: 3.12 TABLET, FILM COATED ORAL at 09:08

## 2021-01-01 RX ADMIN — Medication 1 CAPSULE: at 18:40

## 2021-01-01 RX ADMIN — MIDODRINE HYDROCHLORIDE 10 MG: 5 TABLET ORAL at 18:40

## 2021-01-01 RX ADMIN — PANTOPRAZOLE SODIUM 40 MG: 40 TABLET, DELAYED RELEASE ORAL at 06:04

## 2021-01-01 RX ADMIN — PROMETHAZINE HYDROCHLORIDE 12.5 MG: 25 TABLET ORAL at 22:06

## 2021-01-01 RX ADMIN — CITALOPRAM HYDROBROMIDE 20 MG: 20 TABLET ORAL at 15:52

## 2021-01-01 RX ADMIN — CEFEPIME HYDROCHLORIDE 2000 MG: 2 INJECTION, POWDER, FOR SOLUTION INTRAVENOUS at 03:47

## 2021-01-01 RX ADMIN — GABAPENTIN 100 MG: 100 CAPSULE ORAL at 21:37

## 2021-01-01 RX ADMIN — HYDROMORPHONE HYDROCHLORIDE 1 MG: 1 INJECTION, SOLUTION INTRAMUSCULAR; INTRAVENOUS; SUBCUTANEOUS at 09:40

## 2021-01-01 RX ADMIN — GUAIFENESIN 600 MG: 600 TABLET, EXTENDED RELEASE ORAL at 21:50

## 2021-01-01 RX ADMIN — METOPROLOL TARTRATE 12.5 MG: 25 TABLET, FILM COATED ORAL at 09:15

## 2021-01-01 RX ADMIN — Medication 1 CAPSULE: at 09:03

## 2021-01-01 RX ADMIN — IPRATROPIUM BROMIDE AND ALBUTEROL SULFATE 1 AMPULE: .5; 3 SOLUTION RESPIRATORY (INHALATION) at 09:21

## 2021-01-01 RX ADMIN — MIDODRINE HYDROCHLORIDE 5 MG: 5 TABLET ORAL at 17:38

## 2021-01-01 RX ADMIN — Medication 10 ML: at 20:32

## 2021-01-01 RX ADMIN — Medication 2 PUFF: at 20:09

## 2021-01-01 RX ADMIN — LIDOCAINE HYDROCHLORIDE 40 MG: 20 INJECTION, SOLUTION EPIDURAL; INFILTRATION; INTRACAUDAL; PERINEURAL at 09:44

## 2021-01-01 RX ADMIN — INSULIN GLARGINE 7 UNITS: 100 INJECTION, SOLUTION SUBCUTANEOUS at 21:47

## 2021-01-01 RX ADMIN — ALBUMIN (HUMAN) 25 G: 0.25 INJECTION, SOLUTION INTRAVENOUS at 21:51

## 2021-01-01 RX ADMIN — FUROSEMIDE 100 MG: 10 INJECTION, SOLUTION INTRAVENOUS at 14:10

## 2021-01-01 RX ADMIN — CARVEDILOL 6.25 MG: 3.12 TABLET, FILM COATED ORAL at 17:01

## 2021-01-01 RX ADMIN — CARVEDILOL 6.25 MG: 3.12 TABLET, FILM COATED ORAL at 17:02

## 2021-01-01 RX ADMIN — METOPROLOL TARTRATE 12.5 MG: 25 TABLET, FILM COATED ORAL at 08:01

## 2021-01-01 RX ADMIN — DEXMEDETOMIDINE HYDROCHLORIDE 4 MCG: 100 INJECTION, SOLUTION INTRAVENOUS at 09:52

## 2021-01-01 RX ADMIN — MIDODRINE HYDROCHLORIDE 10 MG: 5 TABLET ORAL at 18:46

## 2021-01-01 RX ADMIN — TORSEMIDE 100 MG: 100 TABLET ORAL at 08:16

## 2021-01-01 RX ADMIN — INSULIN GLARGINE 20 UNITS: 100 INJECTION, SOLUTION SUBCUTANEOUS at 22:08

## 2021-01-01 RX ADMIN — PRAVASTATIN SODIUM 40 MG: 40 TABLET ORAL at 22:12

## 2021-01-01 RX ADMIN — GABAPENTIN 100 MG: 100 CAPSULE ORAL at 22:51

## 2021-01-01 RX ADMIN — HEPARIN SODIUM 5000 UNITS: 5000 INJECTION INTRAVENOUS; SUBCUTANEOUS at 14:53

## 2021-01-01 RX ADMIN — ASPIRIN 325 MG: 325 TABLET, FILM COATED ORAL at 22:17

## 2021-01-01 RX ADMIN — TORSEMIDE 100 MG: 100 TABLET ORAL at 11:52

## 2021-01-01 RX ADMIN — CITALOPRAM HYDROBROMIDE 20 MG: 20 TABLET ORAL at 08:57

## 2021-01-01 RX ADMIN — Medication 10 ML: at 13:07

## 2021-01-01 RX ADMIN — PANTOPRAZOLE SODIUM 40 MG: 40 TABLET, DELAYED RELEASE ORAL at 09:32

## 2021-01-01 RX ADMIN — HEPARIN SODIUM IN SODIUM CHLORIDE 7 UNITS/KG/HR: 200 INJECTION INTRAVENOUS at 09:55

## 2021-01-01 RX ADMIN — GABAPENTIN 100 MG: 100 CAPSULE ORAL at 23:37

## 2021-01-01 RX ADMIN — MEROPENEM 500 MG: 1 INJECTION, POWDER, FOR SOLUTION INTRAVENOUS at 14:50

## 2021-01-01 RX ADMIN — MIDODRINE HYDROCHLORIDE 5 MG: 5 TABLET ORAL at 09:03

## 2021-01-01 RX ADMIN — ASPIRIN 81 MG: 81 TABLET, CHEWABLE ORAL at 08:55

## 2021-01-01 RX ADMIN — Medication 2 PUFF: at 07:43

## 2021-01-01 RX ADMIN — GABAPENTIN 100 MG: 100 CAPSULE ORAL at 16:46

## 2021-01-01 RX ADMIN — Medication 10 ML: at 09:15

## 2021-01-01 RX ADMIN — ASPIRIN 81 MG: 81 TABLET, CHEWABLE ORAL at 08:34

## 2021-01-01 RX ADMIN — HEPARIN SODIUM 5000 UNITS: 5000 INJECTION INTRAVENOUS; SUBCUTANEOUS at 21:41

## 2021-01-01 RX ADMIN — Medication 10 ML: at 19:46

## 2021-01-01 RX ADMIN — IPRATROPIUM BROMIDE AND ALBUTEROL SULFATE 1 AMPULE: .5; 3 SOLUTION RESPIRATORY (INHALATION) at 20:08

## 2021-01-01 RX ADMIN — METOPROLOL TARTRATE 25 MG: 25 TABLET, FILM COATED ORAL at 09:03

## 2021-01-01 RX ADMIN — Medication 1000 MG: at 08:01

## 2021-01-01 RX ADMIN — TRAZODONE HYDROCHLORIDE 50 MG: 50 TABLET ORAL at 21:29

## 2021-01-01 RX ADMIN — METOPROLOL TARTRATE 25 MG: 25 TABLET, FILM COATED ORAL at 21:37

## 2021-01-01 RX ADMIN — GABAPENTIN 100 MG: 100 CAPSULE ORAL at 15:17

## 2021-01-01 RX ADMIN — SODIUM CHLORIDE: 9 INJECTION, SOLUTION INTRAVENOUS at 15:00

## 2021-01-01 RX ADMIN — MEROPENEM 500 MG: 1 INJECTION, POWDER, FOR SOLUTION INTRAVENOUS at 14:57

## 2021-01-01 RX ADMIN — Medication 10 ML: at 09:11

## 2021-01-01 RX ADMIN — ACETAMINOPHEN 650 MG: 325 TABLET ORAL at 22:23

## 2021-01-01 RX ADMIN — Medication 10 ML: at 23:54

## 2021-01-01 RX ADMIN — TRAZODONE HYDROCHLORIDE 50 MG: 50 TABLET ORAL at 22:53

## 2021-01-01 RX ADMIN — LORAZEPAM 0.5 MG: 0.5 TABLET ORAL at 18:35

## 2021-01-01 RX ADMIN — IPRATROPIUM BROMIDE 2 PUFF: 17 AEROSOL, METERED RESPIRATORY (INHALATION) at 08:20

## 2021-01-01 RX ADMIN — INSULIN GLARGINE 20 UNITS: 100 INJECTION, SOLUTION SUBCUTANEOUS at 21:00

## 2021-01-01 RX ADMIN — GABAPENTIN 100 MG: 100 CAPSULE ORAL at 00:33

## 2021-01-01 RX ADMIN — IPRATROPIUM BROMIDE AND ALBUTEROL SULFATE 1 AMPULE: .5; 3 SOLUTION RESPIRATORY (INHALATION) at 08:39

## 2021-01-01 RX ADMIN — HEPARIN SODIUM 5000 UNITS: 5000 INJECTION INTRAVENOUS; SUBCUTANEOUS at 15:30

## 2021-01-01 RX ADMIN — GABAPENTIN 100 MG: 100 CAPSULE ORAL at 21:50

## 2021-01-01 RX ADMIN — HEPARIN SODIUM 5000 UNITS: 5000 INJECTION INTRAVENOUS; SUBCUTANEOUS at 23:38

## 2021-01-01 RX ADMIN — FUROSEMIDE 10 MG/HR: 10 INJECTION, SOLUTION INTRAVENOUS at 08:34

## 2021-01-01 RX ADMIN — CARVEDILOL 6.25 MG: 3.12 TABLET, FILM COATED ORAL at 10:30

## 2021-01-01 RX ADMIN — GABAPENTIN 100 MG: 100 CAPSULE ORAL at 22:53

## 2021-01-01 RX ADMIN — GABAPENTIN 100 MG: 100 CAPSULE ORAL at 21:35

## 2021-01-01 RX ADMIN — LORAZEPAM 0.5 MG: 0.5 TABLET ORAL at 19:49

## 2021-01-01 RX ADMIN — LORAZEPAM 0.5 MG: 0.5 TABLET ORAL at 04:27

## 2021-01-01 RX ADMIN — IPRATROPIUM BROMIDE AND ALBUTEROL SULFATE 1 AMPULE: .5; 3 SOLUTION RESPIRATORY (INHALATION) at 07:51

## 2021-01-01 RX ADMIN — IPRATROPIUM BROMIDE 2 PUFF: 17 AEROSOL, METERED RESPIRATORY (INHALATION) at 16:27

## 2021-01-01 RX ADMIN — PRAVASTATIN SODIUM 40 MG: 40 TABLET ORAL at 21:04

## 2021-01-01 RX ADMIN — Medication 10 ML: at 22:06

## 2021-01-01 RX ADMIN — GABAPENTIN 100 MG: 100 CAPSULE ORAL at 22:40

## 2021-01-01 RX ADMIN — PRAVASTATIN SODIUM 40 MG: 40 TABLET ORAL at 20:56

## 2021-01-01 RX ADMIN — CITALOPRAM HYDROBROMIDE 20 MG: 20 TABLET ORAL at 22:00

## 2021-01-01 RX ADMIN — Medication 10 ML: at 08:17

## 2021-01-01 RX ADMIN — LIDOCAINE HYDROCHLORIDE 10 ML: 10 INJECTION, SOLUTION EPIDURAL; INFILTRATION; INTRACAUDAL; PERINEURAL at 14:29

## 2021-01-01 RX ADMIN — PANTOPRAZOLE SODIUM 40 MG: 40 TABLET, DELAYED RELEASE ORAL at 05:20

## 2021-01-01 RX ADMIN — TRAZODONE HYDROCHLORIDE 50 MG: 50 TABLET ORAL at 22:01

## 2021-01-01 RX ADMIN — TORSEMIDE 100 MG: 100 TABLET ORAL at 08:56

## 2021-01-01 RX ADMIN — INSULIN GLARGINE 7 UNITS: 100 INJECTION, SOLUTION SUBCUTANEOUS at 22:20

## 2021-01-01 RX ADMIN — INSULIN GLARGINE 7 UNITS: 100 INJECTION, SOLUTION SUBCUTANEOUS at 21:19

## 2021-01-01 RX ADMIN — FINASTERIDE 5 MG: 5 TABLET, FILM COATED ORAL at 08:01

## 2021-01-01 RX ADMIN — PANTOPRAZOLE SODIUM 40 MG: 40 TABLET, DELAYED RELEASE ORAL at 05:55

## 2021-01-01 RX ADMIN — LIDOCAINE HYDROCHLORIDE,EPINEPHRINE BITARTRATE 10 ML: 20; .01 INJECTION, SOLUTION INFILTRATION; PERINEURAL at 14:29

## 2021-01-01 RX ADMIN — INSULIN LISPRO 4 UNITS: 100 INJECTION, SOLUTION INTRAVENOUS; SUBCUTANEOUS at 12:07

## 2021-01-01 RX ADMIN — INSULIN GLARGINE 5 UNITS: 100 INJECTION, SOLUTION SUBCUTANEOUS at 20:58

## 2021-01-01 RX ADMIN — Medication 1 CAPSULE: at 17:59

## 2021-01-01 RX ADMIN — HEPARIN SODIUM 3.8 UNITS/ML: 1000 INJECTION INTRAVENOUS; SUBCUTANEOUS at 14:34

## 2021-01-01 RX ADMIN — TORSEMIDE 100 MG: 100 TABLET ORAL at 09:03

## 2021-01-01 RX ADMIN — METOPROLOL TARTRATE 25 MG: 25 TABLET, FILM COATED ORAL at 09:08

## 2021-01-01 RX ADMIN — ASPIRIN 81 MG: 81 TABLET, CHEWABLE ORAL at 09:03

## 2021-01-01 RX ADMIN — MIDODRINE HYDROCHLORIDE 10 MG: 5 TABLET ORAL at 08:01

## 2021-01-01 RX ADMIN — SIMETHICONE 80 MG: 80 TABLET, CHEWABLE ORAL at 06:23

## 2021-01-01 RX ADMIN — PRAVASTATIN SODIUM 40 MG: 40 TABLET ORAL at 23:37

## 2021-01-01 RX ADMIN — IPRATROPIUM BROMIDE AND ALBUTEROL SULFATE 1 AMPULE: .5; 3 SOLUTION RESPIRATORY (INHALATION) at 21:24

## 2021-01-01 RX ADMIN — CARVEDILOL 6.25 MG: 3.12 TABLET, FILM COATED ORAL at 09:20

## 2021-01-01 RX ADMIN — HEPARIN SODIUM 5000 UNITS: 5000 INJECTION INTRAVENOUS; SUBCUTANEOUS at 21:05

## 2021-01-01 RX ADMIN — Medication 10 ML: at 23:39

## 2021-01-01 RX ADMIN — MIDODRINE HYDROCHLORIDE 10 MG: 5 TABLET ORAL at 13:00

## 2021-01-01 RX ADMIN — Medication 1000 MG: at 15:00

## 2021-01-01 RX ADMIN — Medication 2 SPRAY: at 21:50

## 2021-01-01 RX ADMIN — IPRATROPIUM BROMIDE AND ALBUTEROL SULFATE 1 AMPULE: .5; 3 SOLUTION RESPIRATORY (INHALATION) at 12:27

## 2021-01-01 RX ADMIN — MIDODRINE HYDROCHLORIDE 10 MG: 5 TABLET ORAL at 13:55

## 2021-01-01 RX ADMIN — HEPARIN SODIUM 5000 UNITS: 5000 INJECTION INTRAVENOUS; SUBCUTANEOUS at 15:14

## 2021-01-01 RX ADMIN — GUAIFENESIN 600 MG: 600 TABLET, EXTENDED RELEASE ORAL at 20:46

## 2021-01-01 RX ADMIN — INSULIN LISPRO 2 UNITS: 100 INJECTION, SOLUTION INTRAVENOUS; SUBCUTANEOUS at 12:51

## 2021-01-01 RX ADMIN — PRAVASTATIN SODIUM 40 MG: 40 TABLET ORAL at 21:53

## 2021-01-01 RX ADMIN — IPRATROPIUM BROMIDE 2 PUFF: 17 AEROSOL, METERED RESPIRATORY (INHALATION) at 16:55

## 2021-01-01 RX ADMIN — HEPARIN SODIUM 5000 UNITS: 5000 INJECTION INTRAVENOUS; SUBCUTANEOUS at 22:13

## 2021-01-01 RX ADMIN — LORAZEPAM 0.5 MG: 0.5 TABLET ORAL at 00:14

## 2021-01-01 RX ADMIN — IPRATROPIUM BROMIDE AND ALBUTEROL SULFATE 1 AMPULE: .5; 3 SOLUTION RESPIRATORY (INHALATION) at 08:15

## 2021-01-01 RX ADMIN — GABAPENTIN 100 MG: 100 CAPSULE ORAL at 14:09

## 2021-01-01 RX ADMIN — IPRATROPIUM BROMIDE AND ALBUTEROL SULFATE 1 AMPULE: .5; 3 SOLUTION RESPIRATORY (INHALATION) at 20:24

## 2021-01-01 RX ADMIN — PANTOPRAZOLE SODIUM 40 MG: 40 TABLET, DELAYED RELEASE ORAL at 06:10

## 2021-01-01 RX ADMIN — MEROPENEM 500 MG: 500 INJECTION, POWDER, FOR SOLUTION INTRAVENOUS at 11:03

## 2021-01-01 RX ADMIN — HYDROMORPHONE HYDROCHLORIDE 1 MG: 1 INJECTION, SOLUTION INTRAMUSCULAR; INTRAVENOUS; SUBCUTANEOUS at 03:38

## 2021-01-01 RX ADMIN — INSULIN LISPRO 2 UNITS: 100 INJECTION, SOLUTION INTRAVENOUS; SUBCUTANEOUS at 17:00

## 2021-01-01 RX ADMIN — FERROUS SULFATE TAB 325 MG (65 MG ELEMENTAL FE) 325 MG: 325 (65 FE) TAB at 09:04

## 2021-01-01 RX ADMIN — Medication 10 ML: at 23:23

## 2021-01-01 RX ADMIN — TRAZODONE HYDROCHLORIDE 50 MG: 50 TABLET ORAL at 21:35

## 2021-01-01 RX ADMIN — IPRATROPIUM BROMIDE AND ALBUTEROL SULFATE 1 AMPULE: .5; 3 SOLUTION RESPIRATORY (INHALATION) at 19:45

## 2021-01-01 RX ADMIN — IPRATROPIUM BROMIDE AND ALBUTEROL SULFATE 1 AMPULE: .5; 3 SOLUTION RESPIRATORY (INHALATION) at 04:54

## 2021-01-01 RX ADMIN — HEPARIN SODIUM 5000 UNITS: 5000 INJECTION INTRAVENOUS; SUBCUTANEOUS at 22:43

## 2021-01-01 RX ADMIN — METOPROLOL TARTRATE 25 MG: 25 TABLET, FILM COATED ORAL at 22:41

## 2021-01-01 RX ADMIN — ONDANSETRON 4 MG: 2 INJECTION INTRAMUSCULAR; INTRAVENOUS at 21:57

## 2021-01-01 RX ADMIN — ASPIRIN 81 MG 81 MG: 81 TABLET ORAL at 08:00

## 2021-01-01 RX ADMIN — DEXTROSE MONOHYDRATE 25 G: 25 INJECTION, SOLUTION INTRAVENOUS at 12:24

## 2021-01-01 RX ADMIN — SODIUM CHLORIDE 500 ML: 9 INJECTION, SOLUTION INTRAVENOUS at 09:19

## 2021-01-01 RX ADMIN — Medication 10 ML: at 09:06

## 2021-01-01 RX ADMIN — FINASTERIDE 5 MG: 5 TABLET, FILM COATED ORAL at 09:13

## 2021-01-01 RX ADMIN — IPRATROPIUM BROMIDE AND ALBUTEROL SULFATE 1 AMPULE: .5; 3 SOLUTION RESPIRATORY (INHALATION) at 12:04

## 2021-01-01 RX ADMIN — FERROUS SULFATE TAB 325 MG (65 MG ELEMENTAL FE) 325 MG: 325 (65 FE) TAB at 09:25

## 2021-01-01 RX ADMIN — HEPARIN SODIUM 5000 UNITS: 5000 INJECTION INTRAVENOUS; SUBCUTANEOUS at 05:53

## 2021-01-01 RX ADMIN — FUROSEMIDE 10 MG/HR: 10 INJECTION, SOLUTION INTRAVENOUS at 22:48

## 2021-01-01 RX ADMIN — IPRATROPIUM BROMIDE AND ALBUTEROL SULFATE 1 AMPULE: .5; 3 SOLUTION RESPIRATORY (INHALATION) at 16:50

## 2021-01-01 RX ADMIN — LORAZEPAM 1 MG: 2 INJECTION INTRAMUSCULAR; INTRAVENOUS at 05:58

## 2021-01-01 RX ADMIN — MIDODRINE HYDROCHLORIDE 10 MG: 5 TABLET ORAL at 17:37

## 2021-01-01 RX ADMIN — CITALOPRAM HYDROBROMIDE 20 MG: 20 TABLET ORAL at 08:34

## 2021-01-01 RX ADMIN — CITALOPRAM HYDROBROMIDE 20 MG: 20 TABLET ORAL at 21:28

## 2021-01-01 RX ADMIN — CITALOPRAM HYDROBROMIDE 20 MG: 20 TABLET ORAL at 21:59

## 2021-01-01 RX ADMIN — PRAVASTATIN SODIUM 40 MG: 40 TABLET ORAL at 21:02

## 2021-01-01 RX ADMIN — LORAZEPAM 0.5 MG: 0.5 TABLET ORAL at 05:25

## 2021-01-01 RX ADMIN — MIDODRINE HYDROCHLORIDE 10 MG: 5 TABLET ORAL at 07:21

## 2021-01-01 RX ADMIN — CEFEPIME HYDROCHLORIDE 2000 MG: 2 INJECTION, POWDER, FOR SOLUTION INTRAVENOUS at 15:24

## 2021-01-01 RX ADMIN — ACETAMINOPHEN 650 MG: 325 TABLET ORAL at 22:42

## 2021-01-01 RX ADMIN — ACETAMINOPHEN 650 MG: 325 TABLET ORAL at 23:58

## 2021-01-01 RX ADMIN — Medication 10 ML: at 09:09

## 2021-01-01 RX ADMIN — PANTOPRAZOLE SODIUM 40 MG: 40 TABLET, DELAYED RELEASE ORAL at 05:53

## 2021-01-01 RX ADMIN — FINASTERIDE 5 MG: 5 TABLET, FILM COATED ORAL at 09:15

## 2021-01-01 RX ADMIN — SIMETHICONE 80 MG: 80 TABLET, CHEWABLE ORAL at 21:58

## 2021-01-01 RX ADMIN — IPRATROPIUM BROMIDE AND ALBUTEROL SULFATE 1 AMPULE: .5; 3 SOLUTION RESPIRATORY (INHALATION) at 11:44

## 2021-01-01 RX ADMIN — Medication 1000 MG: at 02:07

## 2021-01-01 RX ADMIN — ACETAMINOPHEN 650 MG: 325 TABLET ORAL at 16:57

## 2021-01-01 RX ADMIN — ASPIRIN 81 MG: 81 TABLET, CHEWABLE ORAL at 13:35

## 2021-01-01 RX ADMIN — HEPARIN SODIUM 5000 UNITS: 5000 INJECTION INTRAVENOUS; SUBCUTANEOUS at 05:38

## 2021-01-01 RX ADMIN — IPRATROPIUM BROMIDE AND ALBUTEROL SULFATE 1 AMPULE: .5; 3 SOLUTION RESPIRATORY (INHALATION) at 16:17

## 2021-01-01 RX ADMIN — DEXMEDETOMIDINE HYDROCHLORIDE 4 MCG: 100 INJECTION, SOLUTION INTRAVENOUS at 09:57

## 2021-01-01 RX ADMIN — PANTOPRAZOLE SODIUM 40 MG: 40 TABLET, DELAYED RELEASE ORAL at 05:56

## 2021-01-01 RX ADMIN — HEPARIN SODIUM 5000 UNITS: 5000 INJECTION INTRAVENOUS; SUBCUTANEOUS at 22:42

## 2021-01-01 RX ADMIN — TORSEMIDE 100 MG: 100 TABLET ORAL at 15:52

## 2021-01-01 RX ADMIN — TORSEMIDE 100 MG: 100 TABLET ORAL at 13:32

## 2021-01-01 RX ADMIN — PANTOPRAZOLE SODIUM 40 MG: 40 TABLET, DELAYED RELEASE ORAL at 06:28

## 2021-01-01 RX ADMIN — FINASTERIDE 5 MG: 5 TABLET, FILM COATED ORAL at 13:01

## 2021-01-01 RX ADMIN — Medication 10 ML: at 09:34

## 2021-01-01 RX ADMIN — SODIUM CHLORIDE: 9 INJECTION, SOLUTION INTRAVENOUS at 09:34

## 2021-01-01 RX ADMIN — Medication 10 ML: at 13:34

## 2021-01-01 RX ADMIN — Medication 1 CAPSULE: at 18:15

## 2021-01-01 RX ADMIN — PRAVASTATIN SODIUM 40 MG: 40 TABLET ORAL at 22:51

## 2021-01-01 RX ADMIN — GABAPENTIN 100 MG: 100 CAPSULE ORAL at 23:01

## 2021-01-01 RX ADMIN — CITALOPRAM HYDROBROMIDE 20 MG: 20 TABLET ORAL at 09:04

## 2021-01-01 RX ADMIN — IPRATROPIUM BROMIDE AND ALBUTEROL SULFATE 1 AMPULE: .5; 3 SOLUTION RESPIRATORY (INHALATION) at 19:27

## 2021-01-01 RX ADMIN — PRAVASTATIN SODIUM 40 MG: 40 TABLET ORAL at 21:35

## 2021-01-01 RX ADMIN — PANTOPRAZOLE SODIUM 40 MG: 40 TABLET, DELAYED RELEASE ORAL at 06:23

## 2021-01-01 RX ADMIN — ONDANSETRON 4 MG: 2 INJECTION INTRAMUSCULAR; INTRAVENOUS at 20:23

## 2021-01-01 RX ADMIN — METOPROLOL TARTRATE 25 MG: 25 TABLET, FILM COATED ORAL at 21:51

## 2021-01-01 RX ADMIN — MEROPENEM 500 MG: 1 INJECTION, POWDER, FOR SOLUTION INTRAVENOUS at 13:57

## 2021-01-01 RX ADMIN — ALBUMIN (HUMAN) 50 G: 0.25 INJECTION, SOLUTION INTRAVENOUS at 12:15

## 2021-01-01 RX ADMIN — MEROPENEM 500 MG: 500 INJECTION, POWDER, FOR SOLUTION INTRAVENOUS at 11:11

## 2021-01-01 RX ADMIN — Medication 2 PUFF: at 19:53

## 2021-01-01 RX ADMIN — CITALOPRAM HYDROBROMIDE 20 MG: 20 TABLET ORAL at 14:17

## 2021-01-01 RX ADMIN — MIDODRINE HYDROCHLORIDE 10 MG: 5 TABLET ORAL at 13:05

## 2021-01-01 RX ADMIN — CITALOPRAM HYDROBROMIDE 20 MG: 20 TABLET ORAL at 20:56

## 2021-01-01 RX ADMIN — PRAVASTATIN SODIUM 40 MG: 40 TABLET ORAL at 00:32

## 2021-01-01 RX ADMIN — ASPIRIN 81 MG: 81 TABLET, CHEWABLE ORAL at 13:01

## 2021-01-01 RX ADMIN — MIDODRINE HYDROCHLORIDE 10 MG: 5 TABLET ORAL at 15:00

## 2021-01-01 RX ADMIN — ACETAMINOPHEN 650 MG: 325 TABLET ORAL at 22:07

## 2021-01-01 RX ADMIN — PANTOPRAZOLE SODIUM 40 MG: 40 TABLET, DELAYED RELEASE ORAL at 15:52

## 2021-01-01 RX ADMIN — HEPARIN SODIUM 5000 UNITS: 5000 INJECTION INTRAVENOUS; SUBCUTANEOUS at 13:56

## 2021-01-01 RX ADMIN — Medication 10 ML: at 00:45

## 2021-01-01 RX ADMIN — LORAZEPAM 1 MG: 2 INJECTION INTRAMUSCULAR; INTRAVENOUS at 01:39

## 2021-01-01 RX ADMIN — MIDODRINE HYDROCHLORIDE 2.5 MG: 5 TABLET ORAL at 10:12

## 2021-01-01 RX ADMIN — Medication: at 13:04

## 2021-01-01 RX ADMIN — IPRATROPIUM BROMIDE AND ALBUTEROL SULFATE 1 AMPULE: .5; 3 SOLUTION RESPIRATORY (INHALATION) at 15:30

## 2021-01-01 RX ADMIN — GABAPENTIN 100 MG: 100 CAPSULE ORAL at 09:13

## 2021-01-01 RX ADMIN — PRAVASTATIN SODIUM 40 MG: 40 TABLET ORAL at 22:53

## 2021-01-01 RX ADMIN — HEPARIN SODIUM 3800 UNITS: 1000 INJECTION INTRAVENOUS; SUBCUTANEOUS at 09:22

## 2021-01-01 RX ADMIN — INSULIN LISPRO 1 UNITS: 100 INJECTION, SOLUTION INTRAVENOUS; SUBCUTANEOUS at 21:47

## 2021-01-01 RX ADMIN — TRAZODONE HYDROCHLORIDE 50 MG: 50 TABLET ORAL at 23:37

## 2021-01-01 RX ADMIN — IPRATROPIUM BROMIDE AND ALBUTEROL SULFATE 1 AMPULE: .5; 3 SOLUTION RESPIRATORY (INHALATION) at 09:30

## 2021-01-01 RX ADMIN — SODIUM CHLORIDE: 9 INJECTION, SOLUTION INTRAVENOUS at 04:40

## 2021-01-01 RX ADMIN — HEPARIN SODIUM 5000 UNITS: 5000 INJECTION INTRAVENOUS; SUBCUTANEOUS at 05:25

## 2021-01-01 RX ADMIN — IPRATROPIUM BROMIDE AND ALBUTEROL SULFATE 1 AMPULE: .5; 3 SOLUTION RESPIRATORY (INHALATION) at 13:14

## 2021-01-01 RX ADMIN — POLYETHYLENE GLYCOL 3350 17 G: 17 POWDER, FOR SOLUTION ORAL at 23:37

## 2021-01-01 RX ADMIN — GUAIFENESIN 600 MG: 600 TABLET, EXTENDED RELEASE ORAL at 09:13

## 2021-01-01 RX ADMIN — HEPARIN SODIUM 5000 UNITS: 5000 INJECTION INTRAVENOUS; SUBCUTANEOUS at 06:34

## 2021-01-01 RX ADMIN — FERROUS SULFATE TAB 325 MG (65 MG ELEMENTAL FE) 325 MG: 325 (65 FE) TAB at 09:30

## 2021-01-01 RX ADMIN — CITALOPRAM HYDROBROMIDE 20 MG: 20 TABLET ORAL at 10:12

## 2021-01-01 RX ADMIN — HEPARIN SODIUM 3800 UNITS: 1000 INJECTION INTRAVENOUS; SUBCUTANEOUS at 10:00

## 2021-01-01 RX ADMIN — PHENYLEPHRINE HYDROCHLORIDE 100 MCG: 10 INJECTION INTRAVENOUS at 10:15

## 2021-01-01 RX ADMIN — GUAIFENESIN 600 MG: 600 TABLET, EXTENDED RELEASE ORAL at 13:01

## 2021-01-01 RX ADMIN — CITALOPRAM HYDROBROMIDE 20 MG: 20 TABLET ORAL at 08:10

## 2021-01-01 RX ADMIN — INSULIN GLARGINE 7 UNITS: 100 INJECTION, SOLUTION SUBCUTANEOUS at 22:04

## 2021-01-01 RX ADMIN — MIDODRINE HYDROCHLORIDE 5 MG: 5 TABLET ORAL at 07:58

## 2021-01-01 RX ADMIN — GUAIFENESIN 600 MG: 600 TABLET, EXTENDED RELEASE ORAL at 10:30

## 2021-01-01 RX ADMIN — CITALOPRAM HYDROBROMIDE 20 MG: 20 TABLET ORAL at 09:20

## 2021-01-01 RX ADMIN — ALBUMIN (HUMAN) 12.5 G: 0.25 INJECTION, SOLUTION INTRAVENOUS at 09:55

## 2021-01-01 RX ADMIN — IPRATROPIUM BROMIDE AND ALBUTEROL SULFATE 1 AMPULE: .5; 3 SOLUTION RESPIRATORY (INHALATION) at 20:00

## 2021-01-01 RX ADMIN — ALBUMIN (HUMAN) 12.5 G: 0.25 INJECTION, SOLUTION INTRAVENOUS at 11:34

## 2021-01-01 RX ADMIN — MEROPENEM 500 MG: 1 INJECTION, POWDER, FOR SOLUTION INTRAVENOUS at 14:09

## 2021-01-01 RX ADMIN — ALBUTEROL SULFATE 2 PUFF: 90 AEROSOL, METERED RESPIRATORY (INHALATION) at 08:33

## 2021-01-01 RX ADMIN — GUAIFENESIN 600 MG: 600 TABLET, EXTENDED RELEASE ORAL at 13:32

## 2021-01-01 RX ADMIN — FINASTERIDE 5 MG: 5 TABLET, FILM COATED ORAL at 15:52

## 2021-01-01 RX ADMIN — INSULIN GLARGINE 40 UNITS: 100 INJECTION, SOLUTION SUBCUTANEOUS at 20:32

## 2021-01-01 RX ADMIN — MIDODRINE HYDROCHLORIDE 2.5 MG: 5 TABLET ORAL at 18:05

## 2021-01-01 RX ADMIN — POLYETHYLENE GLYCOL 3350 17 G: 17 POWDER, FOR SOLUTION ORAL at 14:14

## 2021-01-01 RX ADMIN — INSULIN GLARGINE 7 UNITS: 100 INJECTION, SOLUTION SUBCUTANEOUS at 23:39

## 2021-01-01 RX ADMIN — IPRATROPIUM BROMIDE AND ALBUTEROL SULFATE 1 AMPULE: .5; 3 SOLUTION RESPIRATORY (INHALATION) at 13:00

## 2021-01-01 RX ADMIN — DEXMEDETOMIDINE HYDROCHLORIDE 8 MCG: 100 INJECTION, SOLUTION INTRAVENOUS at 09:48

## 2021-01-01 RX ADMIN — Medication 10 ML: at 09:50

## 2021-01-01 RX ADMIN — INSULIN GLARGINE 5 UNITS: 100 INJECTION, SOLUTION SUBCUTANEOUS at 21:03

## 2021-01-01 RX ADMIN — Medication 10 ML: at 08:34

## 2021-01-01 RX ADMIN — PANTOPRAZOLE SODIUM 40 MG: 40 TABLET, DELAYED RELEASE ORAL at 05:32

## 2021-01-01 RX ADMIN — FINASTERIDE 5 MG: 5 TABLET, FILM COATED ORAL at 09:05

## 2021-01-01 RX ADMIN — MIDODRINE HYDROCHLORIDE 10 MG: 5 TABLET ORAL at 14:14

## 2021-01-01 RX ADMIN — ASPIRIN 81 MG: 81 TABLET, CHEWABLE ORAL at 11:52

## 2021-01-01 RX ADMIN — HEPARIN SODIUM 5000 UNITS: 5000 INJECTION INTRAVENOUS; SUBCUTANEOUS at 15:55

## 2021-01-01 RX ADMIN — IPRATROPIUM BROMIDE AND ALBUTEROL SULFATE 1 AMPULE: .5; 3 SOLUTION RESPIRATORY (INHALATION) at 00:58

## 2021-01-01 RX ADMIN — GUAIFENESIN 600 MG: 600 TABLET, EXTENDED RELEASE ORAL at 20:31

## 2021-01-01 RX ADMIN — ACETAMINOPHEN 650 MG: 325 TABLET ORAL at 21:50

## 2021-01-01 RX ADMIN — ASPIRIN 81 MG: 81 TABLET, CHEWABLE ORAL at 08:57

## 2021-01-01 RX ADMIN — PANTOPRAZOLE SODIUM 40 MG: 40 TABLET, DELAYED RELEASE ORAL at 06:34

## 2021-01-01 RX ADMIN — MIDODRINE HYDROCHLORIDE 10 MG: 5 TABLET ORAL at 17:59

## 2021-01-01 RX ADMIN — GABAPENTIN 100 MG: 100 CAPSULE ORAL at 21:54

## 2021-01-01 RX ADMIN — LORAZEPAM 0.5 MG: 0.5 TABLET ORAL at 19:22

## 2021-01-01 RX ADMIN — METOPROLOL TARTRATE 25 MG: 25 TABLET, FILM COATED ORAL at 08:10

## 2021-01-01 RX ADMIN — GABAPENTIN 100 MG: 100 CAPSULE ORAL at 22:17

## 2021-01-01 RX ADMIN — FINASTERIDE 5 MG: 5 TABLET, FILM COATED ORAL at 09:25

## 2021-01-01 RX ADMIN — FERROUS SULFATE TAB 325 MG (65 MG ELEMENTAL FE) 325 MG: 325 (65 FE) TAB at 15:52

## 2021-01-01 RX ADMIN — IPRATROPIUM BROMIDE AND ALBUTEROL SULFATE 1 AMPULE: .5; 3 SOLUTION RESPIRATORY (INHALATION) at 20:42

## 2021-01-01 RX ADMIN — MIDODRINE HYDROCHLORIDE 10 MG: 5 TABLET ORAL at 09:04

## 2021-01-01 RX ADMIN — Medication 10 ML: at 10:29

## 2021-01-01 RX ADMIN — PRAVASTATIN SODIUM 40 MG: 40 TABLET ORAL at 22:08

## 2021-01-01 RX ADMIN — PANTOPRAZOLE SODIUM 40 MG: 40 TABLET, DELAYED RELEASE ORAL at 05:41

## 2021-01-01 RX ADMIN — HEPARIN SODIUM 5000 UNITS: 5000 INJECTION INTRAVENOUS; SUBCUTANEOUS at 21:32

## 2021-01-01 RX ADMIN — IPRATROPIUM BROMIDE AND ALBUTEROL SULFATE 3 ML: .5; 3 SOLUTION RESPIRATORY (INHALATION) at 21:13

## 2021-01-01 RX ADMIN — CARVEDILOL 6.25 MG: 3.12 TABLET, FILM COATED ORAL at 16:56

## 2021-01-01 RX ADMIN — HEPARIN SODIUM 5000 UNITS: 5000 INJECTION INTRAVENOUS; SUBCUTANEOUS at 21:04

## 2021-01-01 RX ADMIN — IPRATROPIUM BROMIDE AND ALBUTEROL SULFATE 1 AMPULE: .5; 3 SOLUTION RESPIRATORY (INHALATION) at 12:47

## 2021-01-01 RX ADMIN — HEPARIN SODIUM 5000 UNITS: 5000 INJECTION INTRAVENOUS; SUBCUTANEOUS at 05:55

## 2021-01-01 RX ADMIN — IPRATROPIUM BROMIDE AND ALBUTEROL SULFATE 3 ML: .5; 3 SOLUTION RESPIRATORY (INHALATION) at 11:30

## 2021-01-01 RX ADMIN — SODIUM CHLORIDE, PRESERVATIVE FREE 10 ML: 5 INJECTION INTRAVENOUS at 15:00

## 2021-01-01 RX ADMIN — Medication 10 ML: at 23:00

## 2021-01-01 RX ADMIN — FERROUS SULFATE TAB 325 MG (65 MG ELEMENTAL FE) 325 MG: 325 (65 FE) TAB at 08:01

## 2021-01-01 RX ADMIN — HEPARIN SODIUM 5000 UNITS: 5000 INJECTION INTRAVENOUS; SUBCUTANEOUS at 14:25

## 2021-01-01 RX ADMIN — FINASTERIDE 5 MG: 5 TABLET, FILM COATED ORAL at 13:35

## 2021-01-01 RX ADMIN — HEPARIN SODIUM 5000 UNITS: 5000 INJECTION INTRAVENOUS; SUBCUTANEOUS at 05:20

## 2021-01-01 RX ADMIN — ACETAMINOPHEN 650 MG: 325 TABLET ORAL at 23:23

## 2021-01-01 RX ADMIN — GABAPENTIN 100 MG: 100 CAPSULE ORAL at 17:23

## 2021-01-01 RX ADMIN — MIDODRINE HYDROCHLORIDE 10 MG: 5 TABLET ORAL at 17:10

## 2021-01-01 RX ADMIN — SODIUM CHLORIDE, PRESERVATIVE FREE 10 ML: 5 INJECTION INTRAVENOUS at 01:41

## 2021-01-01 RX ADMIN — Medication 1 CAPSULE: at 17:24

## 2021-01-01 RX ADMIN — ALBUMIN (HUMAN) 25 G: 0.25 INJECTION, SOLUTION INTRAVENOUS at 17:24

## 2021-01-01 RX ADMIN — IPRATROPIUM BROMIDE AND ALBUTEROL SULFATE 1 AMPULE: .5; 3 SOLUTION RESPIRATORY (INHALATION) at 16:16

## 2021-01-01 RX ADMIN — LIDOCAINE HYDROCHLORIDE 10 ML: 10 INJECTION, SOLUTION EPIDURAL; INFILTRATION; INTRACAUDAL; PERINEURAL at 13:03

## 2021-01-01 RX ADMIN — ALBUMIN (HUMAN) 25 G: 0.25 INJECTION, SOLUTION INTRAVENOUS at 19:30

## 2021-01-01 RX ADMIN — GUAIFENESIN 600 MG: 600 TABLET, EXTENDED RELEASE ORAL at 22:54

## 2021-01-01 RX ADMIN — ALBUMIN (HUMAN) 12.5 G: 0.25 INJECTION, SOLUTION INTRAVENOUS at 10:18

## 2021-01-01 RX ADMIN — CITALOPRAM HYDROBROMIDE 20 MG: 20 TABLET ORAL at 09:30

## 2021-01-01 RX ADMIN — ASPIRIN 81 MG: 81 TABLET, CHEWABLE ORAL at 14:17

## 2021-01-01 RX ADMIN — Medication 2 PUFF: at 08:37

## 2021-01-01 RX ADMIN — LIDOCAINE HYDROCHLORIDE 20 ML: 10 INJECTION, SOLUTION EPIDURAL; INFILTRATION; INTRACAUDAL; PERINEURAL at 09:45

## 2021-01-01 RX ADMIN — GABAPENTIN 100 MG: 100 CAPSULE ORAL at 22:08

## 2021-01-01 RX ADMIN — HEPARIN SODIUM 30 ML/HR: 200 INJECTION, SOLUTION INTRAVENOUS at 14:34

## 2021-01-01 RX ADMIN — IPRATROPIUM BROMIDE AND ALBUTEROL SULFATE 1 AMPULE: .5; 3 SOLUTION RESPIRATORY (INHALATION) at 15:25

## 2021-01-01 RX ADMIN — TORSEMIDE 100 MG: 100 TABLET ORAL at 13:54

## 2021-01-01 RX ADMIN — ALBUMIN (HUMAN) 12.5 G: 0.25 INJECTION, SOLUTION INTRAVENOUS at 21:00

## 2021-01-01 RX ADMIN — GABAPENTIN 100 MG: 100 CAPSULE ORAL at 09:49

## 2021-01-01 RX ADMIN — FINASTERIDE 5 MG: 5 TABLET, FILM COATED ORAL at 10:11

## 2021-01-01 RX ADMIN — HEPARIN SODIUM 5000 UNITS: 5000 INJECTION INTRAVENOUS; SUBCUTANEOUS at 14:26

## 2021-01-01 RX ADMIN — CARVEDILOL 6.25 MG: 3.12 TABLET, FILM COATED ORAL at 17:22

## 2021-01-01 RX ADMIN — HEPARIN SODIUM 5000 UNITS: 5000 INJECTION INTRAVENOUS; SUBCUTANEOUS at 06:44

## 2021-01-01 RX ADMIN — MIDODRINE HYDROCHLORIDE 10 MG: 5 TABLET ORAL at 15:51

## 2021-01-01 RX ADMIN — Medication 10 ML: at 09:03

## 2021-01-01 RX ADMIN — HEPARIN SODIUM 5000 UNITS: 5000 INJECTION INTRAVENOUS; SUBCUTANEOUS at 13:50

## 2021-01-01 RX ADMIN — INSULIN GLARGINE 20 UNITS: 100 INJECTION, SOLUTION SUBCUTANEOUS at 22:54

## 2021-01-01 RX ADMIN — EPOETIN ALFA-EPBX 8000 UNITS: 10000 INJECTION, SOLUTION INTRAVENOUS; SUBCUTANEOUS at 10:43

## 2021-01-01 RX ADMIN — METOPROLOL TARTRATE 25 MG: 25 TABLET, FILM COATED ORAL at 21:35

## 2021-01-01 RX ADMIN — DEXMEDETOMIDINE HYDROCHLORIDE 8 MCG: 100 INJECTION, SOLUTION INTRAVENOUS at 09:44

## 2021-01-01 RX ADMIN — CITALOPRAM HYDROBROMIDE 20 MG: 20 TABLET ORAL at 13:36

## 2021-01-01 RX ADMIN — HEPARIN SODIUM 5000 UNITS: 5000 INJECTION INTRAVENOUS; SUBCUTANEOUS at 06:04

## 2021-01-01 RX ADMIN — ASPIRIN 81 MG: 81 TABLET, CHEWABLE ORAL at 10:25

## 2021-01-01 RX ADMIN — PANTOPRAZOLE SODIUM 40 MG: 40 TABLET, DELAYED RELEASE ORAL at 05:29

## 2021-01-01 RX ADMIN — IPRATROPIUM BROMIDE AND ALBUTEROL SULFATE 1 AMPULE: .5; 3 SOLUTION RESPIRATORY (INHALATION) at 15:51

## 2021-01-01 RX ADMIN — PRAVASTATIN SODIUM 40 MG: 40 TABLET ORAL at 21:50

## 2021-01-01 RX ADMIN — FINASTERIDE 5 MG: 5 TABLET, FILM COATED ORAL at 09:30

## 2021-01-01 RX ADMIN — TORSEMIDE 50 MG: 100 TABLET ORAL at 10:12

## 2021-01-01 RX ADMIN — IPRATROPIUM BROMIDE AND ALBUTEROL SULFATE 1 AMPULE: .5; 3 SOLUTION RESPIRATORY (INHALATION) at 21:12

## 2021-01-01 RX ADMIN — PRAVASTATIN SODIUM 40 MG: 40 TABLET ORAL at 22:41

## 2021-01-01 RX ADMIN — HEPARIN SODIUM 5000 UNITS: 5000 INJECTION INTRAVENOUS; SUBCUTANEOUS at 06:19

## 2021-01-01 RX ADMIN — IPRATROPIUM BROMIDE AND ALBUTEROL SULFATE 1 AMPULE: .5; 3 SOLUTION RESPIRATORY (INHALATION) at 15:42

## 2021-01-01 RX ADMIN — LIDOCAINE HYDROCHLORIDE 10 ML: 10 INJECTION, SOLUTION EPIDURAL; INFILTRATION; INTRACAUDAL; PERINEURAL at 14:01

## 2021-01-01 RX ADMIN — GABAPENTIN 100 MG: 100 CAPSULE ORAL at 22:04

## 2021-01-01 RX ADMIN — Medication 10 ML: at 20:22

## 2021-01-01 RX ADMIN — Medication 10 ML: at 10:13

## 2021-01-01 RX ADMIN — Medication 10 ML: at 23:38

## 2021-01-01 RX ADMIN — TRAZODONE HYDROCHLORIDE 50 MG: 50 TABLET ORAL at 22:42

## 2021-01-01 RX ADMIN — INSULIN GLARGINE 5 UNITS: 100 INJECTION, SOLUTION SUBCUTANEOUS at 21:54

## 2021-01-01 RX ADMIN — IOPAMIDOL 20 ML: 755 INJECTION, SOLUTION INTRAVENOUS at 10:45

## 2021-01-01 RX ADMIN — Medication 1 CAPSULE: at 13:54

## 2021-01-01 RX ADMIN — GABAPENTIN 100 MG: 100 CAPSULE ORAL at 20:31

## 2021-01-01 RX ADMIN — Medication 10 ML: at 21:00

## 2021-01-01 RX ADMIN — EPOETIN ALFA-EPBX 8000 UNITS: 10000 INJECTION, SOLUTION INTRAVENOUS; SUBCUTANEOUS at 09:30

## 2021-01-01 RX ADMIN — HEPARIN SODIUM 5000 UNITS: 5000 INJECTION INTRAVENOUS; SUBCUTANEOUS at 22:04

## 2021-01-01 RX ADMIN — HEPARIN SODIUM 5000 UNITS: 5000 INJECTION INTRAVENOUS; SUBCUTANEOUS at 06:28

## 2021-01-01 RX ADMIN — IPRATROPIUM BROMIDE AND ALBUTEROL SULFATE 1 AMPULE: .5; 3 SOLUTION RESPIRATORY (INHALATION) at 08:37

## 2021-01-01 RX ADMIN — IPRATROPIUM BROMIDE AND ALBUTEROL SULFATE 3 ML: .5; 3 SOLUTION RESPIRATORY (INHALATION) at 18:34

## 2021-01-01 RX ADMIN — SODIUM CHLORIDE 500 ML: 9 INJECTION, SOLUTION INTRAVENOUS at 21:52

## 2021-01-01 RX ADMIN — GABAPENTIN 100 MG: 100 CAPSULE ORAL at 13:36

## 2021-01-01 RX ADMIN — ALBUMIN (HUMAN) 12.5 G: 0.25 INJECTION, SOLUTION INTRAVENOUS at 09:15

## 2021-01-01 RX ADMIN — SODIUM CHLORIDE, PRESERVATIVE FREE 10 ML: 5 INJECTION INTRAVENOUS at 11:05

## 2021-01-01 RX ADMIN — INSULIN GLARGINE 7 UNITS: 100 INJECTION, SOLUTION SUBCUTANEOUS at 23:27

## 2021-01-01 RX ADMIN — PRAVASTATIN SODIUM 40 MG: 40 TABLET ORAL at 21:16

## 2021-01-01 RX ADMIN — MEROPENEM 500 MG: 1 INJECTION, POWDER, FOR SOLUTION INTRAVENOUS at 23:12

## 2021-01-01 RX ADMIN — PANTOPRAZOLE SODIUM 40 MG: 40 TABLET, DELAYED RELEASE ORAL at 06:37

## 2021-01-01 RX ADMIN — IPRATROPIUM BROMIDE AND ALBUTEROL SULFATE 1 AMPULE: .5; 3 SOLUTION RESPIRATORY (INHALATION) at 17:20

## 2021-01-01 RX ADMIN — PANTOPRAZOLE SODIUM 40 MG: 40 TABLET, DELAYED RELEASE ORAL at 04:39

## 2021-01-01 RX ADMIN — MIDODRINE HYDROCHLORIDE 2.5 MG: 5 TABLET ORAL at 12:00

## 2021-01-01 RX ADMIN — MEROPENEM 500 MG: 1 INJECTION, POWDER, FOR SOLUTION INTRAVENOUS at 14:26

## 2021-01-01 RX ADMIN — GUAIFENESIN 600 MG: 600 TABLET, EXTENDED RELEASE ORAL at 10:11

## 2021-01-01 RX ADMIN — FINASTERIDE 5 MG: 5 TABLET, FILM COATED ORAL at 08:17

## 2021-01-01 RX ADMIN — IPRATROPIUM BROMIDE AND ALBUTEROL SULFATE 1 AMPULE: .5; 3 SOLUTION RESPIRATORY (INHALATION) at 11:59

## 2021-01-01 RX ADMIN — PRAVASTATIN SODIUM 40 MG: 40 TABLET ORAL at 21:54

## 2021-01-01 RX ADMIN — MIDODRINE HYDROCHLORIDE 10 MG: 5 TABLET ORAL at 09:25

## 2021-01-01 RX ADMIN — TORSEMIDE 100 MG: 100 TABLET ORAL at 13:35

## 2021-01-01 RX ADMIN — INSULIN LISPRO 2 UNITS: 100 INJECTION, SOLUTION INTRAVENOUS; SUBCUTANEOUS at 17:40

## 2021-01-01 RX ADMIN — IPRATROPIUM BROMIDE AND ALBUTEROL SULFATE 1 AMPULE: .5; 3 SOLUTION RESPIRATORY (INHALATION) at 07:57

## 2021-01-01 RX ADMIN — FINASTERIDE 5 MG: 5 TABLET, FILM COATED ORAL at 09:08

## 2021-01-01 RX ADMIN — CITALOPRAM HYDROBROMIDE 20 MG: 20 TABLET ORAL at 19:22

## 2021-01-01 RX ADMIN — Medication 1 CAPSULE: at 17:38

## 2021-01-01 RX ADMIN — SODIUM CHLORIDE 25 ML: 9 INJECTION, SOLUTION INTRAVENOUS at 08:41

## 2021-01-01 RX ADMIN — SIMETHICONE 80 MG: 80 TABLET, CHEWABLE ORAL at 09:30

## 2021-01-01 RX ADMIN — HYDROMORPHONE HYDROCHLORIDE 1 MG: 1 INJECTION, SOLUTION INTRAMUSCULAR; INTRAVENOUS; SUBCUTANEOUS at 13:00

## 2021-01-01 RX ADMIN — ASPIRIN 81 MG 81 MG: 81 TABLET ORAL at 09:15

## 2021-01-01 RX ADMIN — METOPROLOL TARTRATE 12.5 MG: 25 TABLET, FILM COATED ORAL at 21:04

## 2021-01-01 RX ADMIN — HYDROXYZINE HYDROCHLORIDE 25 MG: 25 TABLET, FILM COATED ORAL at 15:40

## 2021-01-01 RX ADMIN — ALBUTEROL SULFATE 2 PUFF: 90 AEROSOL, METERED RESPIRATORY (INHALATION) at 16:55

## 2021-01-01 RX ADMIN — Medication 2 PUFF: at 19:36

## 2021-01-01 RX ADMIN — ASPIRIN 81 MG: 81 TABLET, CHEWABLE ORAL at 09:49

## 2021-01-01 RX ADMIN — GABAPENTIN 100 MG: 100 CAPSULE ORAL at 23:38

## 2021-01-01 RX ADMIN — SODIUM CHLORIDE 25 ML: 9 INJECTION, SOLUTION INTRAVENOUS at 16:48

## 2021-01-01 RX ADMIN — ACETAMINOPHEN 650 MG: 325 TABLET ORAL at 19:13

## 2021-01-01 RX ADMIN — EPOETIN ALFA-EPBX 8000 UNITS: 10000 INJECTION, SOLUTION INTRAVENOUS; SUBCUTANEOUS at 20:05

## 2021-01-01 RX ADMIN — KETOROLAC TROMETHAMINE 15 MG: 30 INJECTION, SOLUTION INTRAMUSCULAR at 11:05

## 2021-01-01 RX ADMIN — Medication 200 MG: at 15:07

## 2021-01-01 RX ADMIN — INSULIN LISPRO 2 UNITS: 100 INJECTION, SOLUTION INTRAVENOUS; SUBCUTANEOUS at 10:30

## 2021-01-01 RX ADMIN — TRAZODONE HYDROCHLORIDE 50 MG: 50 TABLET ORAL at 22:54

## 2021-01-01 RX ADMIN — CITALOPRAM HYDROBROMIDE 20 MG: 20 TABLET ORAL at 08:00

## 2021-01-01 RX ADMIN — Medication 1 CAPSULE: at 10:25

## 2021-01-01 RX ADMIN — LORAZEPAM 1 MG: 2 INJECTION INTRAMUSCULAR; INTRAVENOUS at 11:15

## 2021-01-01 RX ADMIN — CARVEDILOL 6.25 MG: 3.12 TABLET, FILM COATED ORAL at 09:49

## 2021-01-01 RX ADMIN — MIDODRINE HYDROCHLORIDE 5 MG: 5 TABLET ORAL at 07:50

## 2021-01-01 RX ADMIN — Medication 1 CAPSULE: at 17:37

## 2021-01-01 RX ADMIN — MIDODRINE HYDROCHLORIDE 10 MG: 5 TABLET ORAL at 09:15

## 2021-01-01 RX ADMIN — Medication 5 ML: at 21:37

## 2021-01-01 RX ADMIN — ASPIRIN 81 MG 81 MG: 81 TABLET ORAL at 09:25

## 2021-01-01 RX ADMIN — ALBUTEROL SULFATE 2 PUFF: 90 AEROSOL, METERED RESPIRATORY (INHALATION) at 12:29

## 2021-01-01 RX ADMIN — MIDODRINE HYDROCHLORIDE 2.5 MG: 5 TABLET ORAL at 14:30

## 2021-01-01 RX ADMIN — METOPROLOL TARTRATE 25 MG: 25 TABLET, FILM COATED ORAL at 21:16

## 2021-01-01 RX ADMIN — Medication 1000 MG: at 13:53

## 2021-01-01 RX ADMIN — LIDOCAINE HYDROCHLORIDE 5 ML: 10 INJECTION, SOLUTION EPIDURAL; INFILTRATION; INTRACAUDAL; PERINEURAL at 13:37

## 2021-01-01 RX ADMIN — CARVEDILOL 6.25 MG: 3.12 TABLET, FILM COATED ORAL at 18:05

## 2021-01-01 RX ADMIN — IPRATROPIUM BROMIDE AND ALBUTEROL SULFATE 1 AMPULE: .5; 3 SOLUTION RESPIRATORY (INHALATION) at 15:17

## 2021-01-01 RX ADMIN — MIDODRINE HYDROCHLORIDE 10 MG: 5 TABLET ORAL at 08:16

## 2021-01-01 RX ADMIN — HEPARIN SODIUM 5000 UNITS: 5000 INJECTION INTRAVENOUS; SUBCUTANEOUS at 05:21

## 2021-01-01 RX ADMIN — ACETAMINOPHEN 650 MG: 325 TABLET ORAL at 11:09

## 2021-01-01 RX ADMIN — IPRATROPIUM BROMIDE AND ALBUTEROL SULFATE 1 AMPULE: .5; 3 SOLUTION RESPIRATORY (INHALATION) at 08:19

## 2021-01-01 RX ADMIN — METOPROLOL TARTRATE 25 MG: 25 TABLET, FILM COATED ORAL at 13:32

## 2021-01-01 RX ADMIN — INSULIN LISPRO 2 UNITS: 100 INJECTION, SOLUTION INTRAVENOUS; SUBCUTANEOUS at 10:11

## 2021-01-01 RX ADMIN — INSULIN LISPRO 2 UNITS: 100 INJECTION, SOLUTION INTRAVENOUS; SUBCUTANEOUS at 17:59

## 2021-01-01 RX ADMIN — HYDROCODONE BITARTRATE AND ACETAMINOPHEN 1 TABLET: 5; 325 TABLET ORAL at 13:53

## 2021-01-01 RX ADMIN — VANCOMYCIN HYDROCHLORIDE 1000 MG: 1 INJECTION, POWDER, LYOPHILIZED, FOR SOLUTION INTRAVENOUS at 09:46

## 2021-01-01 RX ADMIN — GUAIFENESIN 600 MG: 600 TABLET, EXTENDED RELEASE ORAL at 13:36

## 2021-01-01 RX ADMIN — ACETAMINOPHEN 650 MG: 325 TABLET ORAL at 13:27

## 2021-01-01 RX ADMIN — MIDODRINE HYDROCHLORIDE 10 MG: 5 TABLET ORAL at 07:42

## 2021-01-01 RX ADMIN — HEPARIN SODIUM 5000 UNITS: 5000 INJECTION INTRAVENOUS; SUBCUTANEOUS at 21:42

## 2021-01-01 RX ADMIN — MIDODRINE HYDROCHLORIDE 10 MG: 5 TABLET ORAL at 07:43

## 2021-01-01 RX ADMIN — MIDODRINE HYDROCHLORIDE 10 MG: 5 TABLET ORAL at 17:52

## 2021-01-01 RX ADMIN — ASPIRIN 81 MG 81 MG: 81 TABLET ORAL at 08:17

## 2021-01-01 RX ADMIN — METOPROLOL TARTRATE 12.5 MG: 25 TABLET, FILM COATED ORAL at 21:29

## 2021-01-01 RX ADMIN — LORAZEPAM 0.5 MG: 0.5 TABLET ORAL at 21:40

## 2021-01-01 RX ADMIN — ACETAMINOPHEN 650 MG: 325 TABLET ORAL at 12:15

## 2021-01-01 RX ADMIN — HEPARIN SODIUM 5000 UNITS: 5000 INJECTION INTRAVENOUS; SUBCUTANEOUS at 06:35

## 2021-01-01 RX ADMIN — PRAVASTATIN SODIUM 40 MG: 40 TABLET ORAL at 22:04

## 2021-01-01 RX ADMIN — GUAIFENESIN 600 MG: 600 TABLET, EXTENDED RELEASE ORAL at 14:18

## 2021-01-01 RX ADMIN — TORSEMIDE 50 MG: 100 TABLET ORAL at 14:17

## 2021-01-01 RX ADMIN — EPOETIN ALFA-EPBX 3000 UNITS: 10000 INJECTION, SOLUTION INTRAVENOUS; SUBCUTANEOUS at 12:19

## 2021-01-01 RX ADMIN — INSULIN GLARGINE 20 UNITS: 100 INJECTION, SOLUTION SUBCUTANEOUS at 22:43

## 2021-01-01 RX ADMIN — IPRATROPIUM BROMIDE AND ALBUTEROL SULFATE 1 AMPULE: .5; 3 SOLUTION RESPIRATORY (INHALATION) at 20:56

## 2021-01-01 RX ADMIN — ACETAMINOPHEN 650 MG: 325 TABLET ORAL at 01:39

## 2021-01-01 RX ADMIN — GUAIFENESIN 600 MG: 600 TABLET, EXTENDED RELEASE ORAL at 23:54

## 2021-01-01 RX ADMIN — TRAZODONE HYDROCHLORIDE 50 MG: 50 TABLET ORAL at 23:58

## 2021-01-01 RX ADMIN — Medication 10 ML: at 09:08

## 2021-01-01 RX ADMIN — INSULIN GLARGINE 40 UNITS: 100 INJECTION, SOLUTION SUBCUTANEOUS at 21:38

## 2021-01-01 RX ADMIN — Medication 200 MG: at 14:17

## 2021-01-01 RX ADMIN — LIDOCAINE HYDROCHLORIDE,EPINEPHRINE BITARTRATE 7.5 ML: 10; .01 INJECTION, SOLUTION INFILTRATION; PERINEURAL at 13:03

## 2021-01-01 RX ADMIN — MIDODRINE HYDROCHLORIDE 10 MG: 5 TABLET ORAL at 13:52

## 2021-01-01 RX ADMIN — HEPARIN SODIUM 5000 UNITS: 5000 INJECTION INTRAVENOUS; SUBCUTANEOUS at 13:02

## 2021-01-01 RX ADMIN — Medication 10 ML: at 09:25

## 2021-01-01 RX ADMIN — PANTOPRAZOLE SODIUM 40 MG: 40 TABLET, DELAYED RELEASE ORAL at 09:57

## 2021-01-01 RX ADMIN — Medication 200 MG: at 13:32

## 2021-01-01 RX ADMIN — POLYETHYLENE GLYCOL 3350 17 G: 17 POWDER, FOR SOLUTION ORAL at 18:40

## 2021-01-01 RX ADMIN — HEPARIN SODIUM 5000 UNITS: 5000 INJECTION INTRAVENOUS; SUBCUTANEOUS at 13:58

## 2021-01-01 RX ADMIN — SODIUM CHLORIDE 25 ML: 9 INJECTION, SOLUTION INTRAVENOUS at 09:44

## 2021-01-01 RX ADMIN — HEPARIN SODIUM 5000 UNITS: 5000 INJECTION INTRAVENOUS; SUBCUTANEOUS at 13:35

## 2021-01-01 RX ADMIN — ALBUMIN (HUMAN) 12.5 G: 0.25 INJECTION, SOLUTION INTRAVENOUS at 09:00

## 2021-01-01 RX ADMIN — MIDODRINE HYDROCHLORIDE 2.5 MG: 5 TABLET ORAL at 17:02

## 2021-01-01 RX ADMIN — PRAVASTATIN SODIUM 40 MG: 40 TABLET ORAL at 20:31

## 2021-01-01 RX ADMIN — LORAZEPAM 0.5 MG: 0.5 TABLET ORAL at 09:16

## 2021-01-01 RX ADMIN — Medication 1 CAPSULE: at 22:51

## 2021-01-01 RX ADMIN — HEPARIN SODIUM 5000 UNITS: 5000 INJECTION INTRAVENOUS; SUBCUTANEOUS at 06:36

## 2021-01-01 RX ADMIN — FUROSEMIDE 80 MG: 10 INJECTION, SOLUTION INTRAMUSCULAR; INTRAVENOUS at 23:13

## 2021-01-01 RX ADMIN — CARVEDILOL 6.25 MG: 3.12 TABLET, FILM COATED ORAL at 09:03

## 2021-01-01 RX ADMIN — PANTOPRAZOLE SODIUM 40 MG: 40 TABLET, DELAYED RELEASE ORAL at 05:07

## 2021-01-01 RX ADMIN — ASPIRIN 81 MG: 81 TABLET, CHEWABLE ORAL at 09:13

## 2021-01-01 RX ADMIN — CARVEDILOL 6.25 MG: 3.12 TABLET, FILM COATED ORAL at 10:12

## 2021-01-01 RX ADMIN — FINASTERIDE 5 MG: 5 TABLET, FILM COATED ORAL at 09:32

## 2021-01-01 RX ADMIN — GUAIFENESIN 600 MG: 600 TABLET, EXTENDED RELEASE ORAL at 22:41

## 2021-01-01 RX ADMIN — IPRATROPIUM BROMIDE AND ALBUTEROL SULFATE 1 AMPULE: .5; 3 SOLUTION RESPIRATORY (INHALATION) at 15:04

## 2021-01-01 RX ADMIN — TRAZODONE HYDROCHLORIDE 50 MG: 50 TABLET ORAL at 23:54

## 2021-01-01 RX ADMIN — IPRATROPIUM BROMIDE AND ALBUTEROL SULFATE 1 AMPULE: .5; 3 SOLUTION RESPIRATORY (INHALATION) at 14:31

## 2021-01-01 ASSESSMENT — ENCOUNTER SYMPTOMS
NAUSEA: 0
COUGH: 0
DIARRHEA: 0
ABDOMINAL PAIN: 0
RHINORRHEA: 0
NAUSEA: 0
SHORTNESS OF BREATH: 1
WHEEZING: 0
TROUBLE SWALLOWING: 0
ABDOMINAL PAIN: 0
EYE DISCHARGE: 0
SHORTNESS OF BREATH: 0
DIARRHEA: 0
EYE REDNESS: 0
CONSTIPATION: 0
DIARRHEA: 0
EYE REDNESS: 0
DIARRHEA: 0
ABDOMINAL PAIN: 0
COUGH: 0
ABDOMINAL PAIN: 0
CHEST TIGHTNESS: 0
STRIDOR: 0
DIARRHEA: 0
DIARRHEA: 0
EYE REDNESS: 0
ABDOMINAL PAIN: 0
EYE DISCHARGE: 0
ABDOMINAL PAIN: 0
ABDOMINAL DISTENTION: 0
ABDOMINAL PAIN: 0
NAUSEA: 0
ABDOMINAL PAIN: 0
TROUBLE SWALLOWING: 0
APNEA: 0
EYE DISCHARGE: 0
CONSTIPATION: 0
SHORTNESS OF BREATH: 0
CONSTIPATION: 0
NAUSEA: 0
CONSTIPATION: 0
EYE DISCHARGE: 0
SORE THROAT: 0
EYE REDNESS: 0
DIARRHEA: 0
COLOR CHANGE: 0
ABDOMINAL PAIN: 0
COUGH: 0
WHEEZING: 0
TROUBLE SWALLOWING: 0
RHINORRHEA: 0
COUGH: 0
EYE REDNESS: 0
WHEEZING: 0
NAUSEA: 0
ABDOMINAL PAIN: 0
WHEEZING: 0
TROUBLE SWALLOWING: 0
TROUBLE SWALLOWING: 0
SORE THROAT: 0
GASTROINTESTINAL NEGATIVE: 1
WHEEZING: 0
TROUBLE SWALLOWING: 0
EYE REDNESS: 0
SORE THROAT: 0
NAUSEA: 0
WHEEZING: 0
TROUBLE SWALLOWING: 0
TROUBLE SWALLOWING: 0
SORE THROAT: 0
RHINORRHEA: 0
SHORTNESS OF BREATH: 0
DIARRHEA: 0
RHINORRHEA: 0
BACK PAIN: 0
DIARRHEA: 0
SINUS PRESSURE: 0
CONSTIPATION: 0
BACK PAIN: 0
EYE DISCHARGE: 0
BACK PAIN: 0
SHORTNESS OF BREATH: 0
NAUSEA: 0
ABDOMINAL PAIN: 0
SHORTNESS OF BREATH: 0
CONSTIPATION: 0
RHINORRHEA: 0
BACK PAIN: 0
VOICE CHANGE: 0
BACK PAIN: 0
TROUBLE SWALLOWING: 0
TROUBLE SWALLOWING: 0
WHEEZING: 0
SORE THROAT: 0
RHINORRHEA: 0
EYE REDNESS: 0
STRIDOR: 0
COUGH: 0
BACK PAIN: 0
EYE DISCHARGE: 0
SHORTNESS OF BREATH: 0
SORE THROAT: 0
BACK PAIN: 0
NAUSEA: 0
COUGH: 0
ABDOMINAL PAIN: 0
NAUSEA: 0
BACK PAIN: 0
DIARRHEA: 0
WHEEZING: 0
RHINORRHEA: 0
BACK PAIN: 0
BLOOD IN STOOL: 0
COUGH: 0
CONSTIPATION: 0
BACK PAIN: 0
WHEEZING: 0
DIARRHEA: 0
COUGH: 0
SHORTNESS OF BREATH: 0
EYE DISCHARGE: 0
VOMITING: 0
CONSTIPATION: 0
SORE THROAT: 0
COUGH: 0
SORE THROAT: 0
CONSTIPATION: 0
EYE DISCHARGE: 0
CONSTIPATION: 0
BACK PAIN: 0
RHINORRHEA: 0
ANAL BLEEDING: 0
EYE REDNESS: 0
BACK PAIN: 0
COUGH: 0
EYE REDNESS: 0
SHORTNESS OF BREATH: 1
COUGH: 0
RHINORRHEA: 0
SHORTNESS OF BREATH: 0
COUGH: 0
EYE DISCHARGE: 0
NAUSEA: 0
CONSTIPATION: 0
SHORTNESS OF BREATH: 0
RHINORRHEA: 0
COUGH: 0
NAUSEA: 0
WHEEZING: 0
SORE THROAT: 0
SHORTNESS OF BREATH: 0
ABDOMINAL DISTENTION: 0
DIARRHEA: 0
RHINORRHEA: 0
CHOKING: 0
SORE THROAT: 0
WHEEZING: 0
CONSTIPATION: 0
EYE REDNESS: 0
EYE DISCHARGE: 0
SORE THROAT: 0
SHORTNESS OF BREATH: 0
WHEEZING: 0

## 2021-01-01 ASSESSMENT — PAIN SCALES - GENERAL
PAINLEVEL_OUTOF10: 0
PAINLEVEL_OUTOF10: 3
PAINLEVEL_OUTOF10: 9
PAINLEVEL_OUTOF10: 0
PAINLEVEL_OUTOF10: 3
PAINLEVEL_OUTOF10: 0
PAINLEVEL_OUTOF10: 4
PAINLEVEL_OUTOF10: 6
PAINLEVEL_OUTOF10: 0
PAINLEVEL_OUTOF10: 5
PAINLEVEL_OUTOF10: 4
PAINLEVEL_OUTOF10: 0
PAINLEVEL_OUTOF10: 0
PAINLEVEL_OUTOF10: 2
PAINLEVEL_OUTOF10: 7
PAINLEVEL_OUTOF10: 0
PAINLEVEL_OUTOF10: 5
PAINLEVEL_OUTOF10: 0
PAINLEVEL_OUTOF10: 4
PAINLEVEL_OUTOF10: 0
PAINLEVEL_OUTOF10: 0
PAINLEVEL_OUTOF10: 2
PAINLEVEL_OUTOF10: 10
PAINLEVEL_OUTOF10: 3
PAINLEVEL_OUTOF10: 0
PAINLEVEL_OUTOF10: 0
PAINLEVEL_OUTOF10: 3
PAINLEVEL_OUTOF10: 0
PAINLEVEL_OUTOF10: 0
PAINLEVEL_OUTOF10: 4
PAINLEVEL_OUTOF10: 0
PAINLEVEL_OUTOF10: 7
PAINLEVEL_OUTOF10: 0
PAINLEVEL_OUTOF10: 0
PAINLEVEL_OUTOF10: 10
PAINLEVEL_OUTOF10: 0
PAINLEVEL_OUTOF10: 3
PAINLEVEL_OUTOF10: 0
PAINLEVEL_OUTOF10: 7
PAINLEVEL_OUTOF10: 0
PAINLEVEL_OUTOF10: 5
PAINLEVEL_OUTOF10: 10
PAINLEVEL_OUTOF10: 0
PAINLEVEL_OUTOF10: 3
PAINLEVEL_OUTOF10: 0
PAINLEVEL_OUTOF10: 0
PAINLEVEL_OUTOF10: 6
PAINLEVEL_OUTOF10: 3
PAINLEVEL_OUTOF10: 0
PAINLEVEL_OUTOF10: 0
PAINLEVEL_OUTOF10: 3
PAINLEVEL_OUTOF10: 0
PAINLEVEL_OUTOF10: 4
PAINLEVEL_OUTOF10: 0
PAINLEVEL_OUTOF10: 3
PAINLEVEL_OUTOF10: 0
PAINLEVEL_OUTOF10: 3
PAINLEVEL_OUTOF10: 0
PAINLEVEL_OUTOF10: 4
PAINLEVEL_OUTOF10: 2
PAINLEVEL_OUTOF10: 3
PAINLEVEL_OUTOF10: 0
PAINLEVEL_OUTOF10: 4
PAINLEVEL_OUTOF10: 4
PAINLEVEL_OUTOF10: 0
PAINLEVEL_OUTOF10: 0
PAINLEVEL_OUTOF10: 3
PAINLEVEL_OUTOF10: 0
PAINLEVEL_OUTOF10: 5
PAINLEVEL_OUTOF10: 0
PAINLEVEL_OUTOF10: 4
PAINLEVEL_OUTOF10: 0
PAINLEVEL_OUTOF10: 4
PAINLEVEL_OUTOF10: 4
PAINLEVEL_OUTOF10: 6
PAINLEVEL_OUTOF10: 3
PAINLEVEL_OUTOF10: 0
PAINLEVEL_OUTOF10: 3
PAINLEVEL_OUTOF10: 0
PAINLEVEL_OUTOF10: 3
PAINLEVEL_OUTOF10: 3
PAINLEVEL_OUTOF10: 0
PAINLEVEL_OUTOF10: 10
PAINLEVEL_OUTOF10: 0
PAINLEVEL_OUTOF10: 0
PAINLEVEL_OUTOF10: 6
PAINLEVEL_OUTOF10: 0
PAINLEVEL_OUTOF10: 0
PAINLEVEL_OUTOF10: 5
PAINLEVEL_OUTOF10: 0
PAINLEVEL_OUTOF10: 0
PAINLEVEL_OUTOF10: 4
PAINLEVEL_OUTOF10: 0
PAINLEVEL_OUTOF10: 3
PAINLEVEL_OUTOF10: 0
PAINLEVEL_OUTOF10: 0
PAINLEVEL_OUTOF10: 4
PAINLEVEL_OUTOF10: 5
PAINLEVEL_OUTOF10: 0
PAINLEVEL_OUTOF10: 5
PAINLEVEL_OUTOF10: 0
PAINLEVEL_OUTOF10: 6
PAINLEVEL_OUTOF10: 5
PAINLEVEL_OUTOF10: 0
PAINLEVEL_OUTOF10: 4
PAINLEVEL_OUTOF10: 6
PAINLEVEL_OUTOF10: 0
PAINLEVEL_OUTOF10: 6
PAINLEVEL_OUTOF10: 0

## 2021-01-01 ASSESSMENT — PAIN DESCRIPTION - PAIN TYPE
TYPE: ACUTE PAIN
TYPE: CHRONIC PAIN
TYPE: ACUTE PAIN
TYPE: CHRONIC PAIN
TYPE: ACUTE PAIN
TYPE: CHRONIC PAIN
TYPE: ACUTE PAIN;CHRONIC PAIN
TYPE: ACUTE PAIN
TYPE: CHRONIC PAIN
TYPE: ACUTE PAIN
TYPE: CHRONIC PAIN
TYPE: ACUTE PAIN

## 2021-01-01 ASSESSMENT — PULMONARY FUNCTION TESTS
PIF_VALUE: 0
PIF_VALUE: 0
PIF_VALUE: 1
PIF_VALUE: 0
PIF_VALUE: 1
PIF_VALUE: 1
PIF_VALUE: 0
PIF_VALUE: 1
PIF_VALUE: 0
PIF_VALUE: 1
PIF_VALUE: 0
PIF_VALUE: 1

## 2021-01-01 ASSESSMENT — PAIN DESCRIPTION - DESCRIPTORS
DESCRIPTORS: ACHING
DESCRIPTORS: THROBBING
DESCRIPTORS: HEADACHE
DESCRIPTORS: ACHING
DESCRIPTORS: HEADACHE;TIGHTNESS
DESCRIPTORS: DISCOMFORT;TIGHTNESS
DESCRIPTORS: ACHING
DESCRIPTORS: HEADACHE
DESCRIPTORS: DISCOMFORT;TIGHTNESS
DESCRIPTORS: THROBBING;BURNING

## 2021-01-01 ASSESSMENT — PAIN DESCRIPTION - ONSET
ONSET: ON-GOING
ONSET: UNABLE TO TELL
ONSET: ON-GOING

## 2021-01-01 ASSESSMENT — PAIN - FUNCTIONAL ASSESSMENT
PAIN_FUNCTIONAL_ASSESSMENT: 0-10
PAIN_FUNCTIONAL_ASSESSMENT: PREVENTS OR INTERFERES SOME ACTIVE ACTIVITIES AND ADLS

## 2021-01-01 ASSESSMENT — PAIN DESCRIPTION - ORIENTATION
ORIENTATION: RIGHT
ORIENTATION: RIGHT
ORIENTATION: OTHER (COMMENT)
ORIENTATION: RIGHT
ORIENTATION: RIGHT
ORIENTATION: OTHER (COMMENT)
ORIENTATION: RIGHT
ORIENTATION: LEFT

## 2021-01-01 ASSESSMENT — PAIN DESCRIPTION - LOCATION
LOCATION: HEAD
LOCATION: SHOULDER
LOCATION: SHOULDER
LOCATION: NECK
LOCATION: BACK
LOCATION: BACK
LOCATION: GENERALIZED
LOCATION: ARM;HAND
LOCATION: SHOULDER
LOCATION: SHOULDER
LOCATION: BACK
LOCATION: SHOULDER
LOCATION: HEAD
LOCATION: BACK
LOCATION: SHOULDER
LOCATION: ABDOMEN;GENERALIZED
LOCATION: SHOULDER
LOCATION: NECK
LOCATION: NECK
LOCATION: ARM;HAND
LOCATION: SHOULDER
LOCATION: NECK

## 2021-01-01 ASSESSMENT — PAIN DESCRIPTION - FREQUENCY
FREQUENCY: INTERMITTENT
FREQUENCY: CONTINUOUS
FREQUENCY: INTERMITTENT

## 2021-01-01 ASSESSMENT — PAIN DESCRIPTION - PROGRESSION
CLINICAL_PROGRESSION: GRADUALLY IMPROVING
CLINICAL_PROGRESSION: GRADUALLY IMPROVING
CLINICAL_PROGRESSION: NOT CHANGED

## 2021-01-01 ASSESSMENT — PAIN DESCRIPTION - DIRECTION: RADIATING_TOWARDS: DOWNWARD

## 2021-01-05 NOTE — TELEPHONE ENCOUNTER
Called and spoke with spouse Dea Samanon and informed her of message below.  She did state that she saw the results on mychart

## 2021-01-05 NOTE — TELEPHONE ENCOUNTER
----- Message from Traci Whiting MD sent at 1/4/2021  5:17 PM EST -----  Please call patient and make sure he gets this message. Derrel Kaela, your labs show that you are retaining potassium. Need to reduce potassium rich foods (please review with wife). It would also be a good idea to reach out to your kidney doctor about this.   Traci Whiting

## 2021-01-14 NOTE — TELEPHONE ENCOUNTER
Roque with [facility] called stating that the patient was wanting to know if they could get an order for him to get speech therapy also. She can take a verbal order.      Please call to advise

## 2021-02-01 PROBLEM — N18.6 ESRD (END STAGE RENAL DISEASE) (HCC): Status: ACTIVE | Noted: 2021-01-01

## 2021-02-01 NOTE — TELEPHONE ENCOUNTER
DEIRDRE sofie his labs today and wanted MARIEL to know this patient has some swelling in both feet. He is having no SOB or other symptoms . He was at 91% on 3 liters of oxygen . What should patient do about swelling ?

## 2021-02-01 NOTE — TELEPHONE ENCOUNTER
Spoke to patient's wife Hayden Cherry and notified lab results are more abnormal than usual.  Clarified current nephrologist Dr. Gregg Puckett. Called [phone] spoke to [facility] from Dr. Kanika Rodriguez office [facility] Hypert[facility] Specialist.  She was given lab results and can see them in the system. She will page Dr. Gregg Puckett and call patient's wife with further instructions, likely hospital admission. Called Ronald Katz back from [facility] at [phone] and notified.

## 2021-02-01 NOTE — ED NOTES
Bed: 09  Expected date:   Expected time:   Means of arrival: [facility]  Comments:     Estelita Madison RN  02/01/21 9216

## 2021-02-01 NOTE — TELEPHONE ENCOUNTER
Left Oscar Martin RN a message after talking with MARIEL. Pt has paraplegia so feet  swelling is not new. Called and spoke to wife Jennifer Gonsalez. She states his feet are always swollen but maybe slightly more swollen. She states that in turning him, he is winded. He is improved when sitting up in his wheel chair. Will await lab results. If any meds changes after labs, will call wife. FYI: He took Kayexalate for three days in a row (K+ 5.7) on Jan 5, Jan 6 and Jan 7th per nephrology.
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
## 2021-02-02 NOTE — PROGRESS NOTES
Pt seen in  ED, admission completed. Pt is alert and oriented x 4. Pt lives at home with his wife and is being admitted for ESRD and CHF. Plan of care updated, all questions answered.

## 2021-02-02 NOTE — PROGRESS NOTES
Pt admitted to room 3361. Telemetry on. Pt alert and oriented x4. Pt on specialty mattress. Bilateral below the knee SCD's placed. Refused heparin. Pt took medications whole in puree per MAR. Pt denies any further needs at this time. Educated pt on how to use call light and to use call light for assistance. Call light within reach.
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
## 2021-02-02 NOTE — DISCHARGE INSTR - COC
Continuity of Care Form  CHF Pathway      _x_ Daily Visits x 3     _x_Cardiovascular Assessment.  Titrate O2 to keep SaO2 greater than 90%    _x_ Daily Weights- Baseline Wt: 207 lb  Call MD if:   3 pound weight gain or loss in one day OR 5 pound weight gain in one week         _x_ Med List attached:   Hold Coreg/Metoprolol if HR less than 45 or patient symptomatic*   Hold ACE/ARB if SBP less than 85 or patient symptomatic*   Do not hold Spironolactone (aldactone) for hypotension/bradycardia   Call MD for questions: CHF Clinic: [phone]    _x_Follow up appointment with cardiology: date/time:  with Dante Greene              Patient Name: Asher Garcia   :  1955  MRN:  [id number]    Admit date:  2021  Discharge date:  21    Code Status Order: Full Code   Advance Directives:   [facility] Documentation     Date/Time Healthcare Directive Type of Healthcare Directive Copy in [address] [place] [address] [address] Agent's Name Healthcare Agent's Phone Number    21 1475  Yes, patient has an advance directive for healthcare treatment  Durable power of  for health care  No, copy requested from family  [facility],[address]          Admitting Physician:  Aakash Price MD  PCP: Nathaniel Barreto MD    Discharging Nurse: [facility] Unit/Room#: [id number]-[id number]/0755-44  Discharging Unit Phone Number: [phone]    Emergency Contact:   Extended Emergency Contact Information  Primary Emergency Contact: Valencia Mcwilliams  Address: [address] [place] [address], [facility]. [address]  Home Phone: [phone]  Mobile Phone: [phone]  Relation: Spouse  Secondary Emergency Contact: Corie Tavares [facility] [address]  Phone: [phone]  Relation: Child    Past Surgical History:  Past Surgical History:   Procedure Laterality Date    APPENDECTOMY      [place]OSCOPY  2018   [place]    CHOLECYSTECTOMY  CORONARY ANGIOPLASTY WITH STENT PLACEMENT      X1    CORONARY ANGIOPLASTY WITH STENT PLACEMENT      X2 FEMORAL    CYSTOSCOPY Bilateral 12/02/2016    cysto bilateral retrogrades, ball exchange    GASTROSTOMY TUBE PLACEMENT  01/17/2017    IR TUNNELED CATHETER PLACEMENT GREATER THAN 5 YEARS  2/5/2021    IR TUNNELED CATHETER PLACEMENT GREATER THAN 5 YEARS 2/5/2021 [facility]ED[facility]    LITHOTRIPSY      OTHER SURGICAL HISTORY  2/24/15    right sided percutaneous nephrolithotomy     OTHER SURGICAL HISTORY  04/04/2017    suprapubic cath placed     SKIN GRAFT      MANY    TONSILLECTOMY         Immunization History:   Immunization History   Administered Date(s) Administered    Influenza 10/01/2014    Influenza Vaccine, unspecified formulation 10/22/2018    Influenza, Intradermal, Preservative free 09/30/2015    Influenza, Quadv, IM, PF (6 mo and older Fluzone, Flulaval, Fluarix, and 3 yrs and older Afluria) 10/01/2009, 11/27/2016    Influenza, Megan Figures, adjuvanted, 65 yrs +, IM, PF (Fluad) 10/06/2020    Influenza, Triv, inactivated, subunit, adjuvanted, IM (Fluad 65 yrs and older) 09/13/2019    Pneumococcal Conjugate 13-valent ([id number]) 12/12/2018    Pneumococcal Polysaccharide ([id number]) 03/31/2017       Active Problems:  Patient Active Problem List   Diagnosis Code    Diabetes type 2, uncontrolled ([facility] E11.65    Essential hypertension I10    Paraplegia ([facility] G82.20    Hyperlipidemia E78.5    GERD (gastroesophageal reflux disease) K21.9    Chronic anemia D64.9    Renal stones N20.0    Chronic right shoulder pain M25.511, G89.29    Urinary tract infection with hematuria due to chronic urinary catheter N39.0, R31.9    Pulmonary nodule R91.1    Coronary artery disease involving native coronary artery of native heart without angina pectoris I25.10    Acute renal failure superimposed on chronic kidney disease (HCC) N17.9, N18.9    Chronic diastolic heart failure (HCC) I50.32  Non-ischemic cardiomyopathy ([facility]) I42.8    Diaphragmatic paralysis J98.6    Chronic pulmonary aspiration T17.908A    Neurogenic bladder N31.9    CKD (chronic kidney disease) stage 3, GFR 30-59 ml/min N18.30    History of DVT (deep vein thrombosis) Z86.718    Dysphagia R13.10    S/P percutaneous endoscopic gastrostomy (PEG) tube placement ([facility]) Z93.1    Decubitus ulcer of right knee, stage 3 ([facility]) A44.309    Iron deficiency anemia, unspecified D50.9    Heart failure, unspecified ([facility] I50.9    Quadriplegia, unspecified ([facility]) G82.50    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    ESRD (end stage renal disease) ([facility]) N18.6    Other ascites R18.8       Isolation/Infection:   Isolation          No Isolation        Patient Infection Status     Infection Onset Added Last Indicated Last Indicated By Review Planned Expiration Resolved Resolved By    None active    Resolved    COVID-19 Rule Out 02/07/21 02/07/21 02/07/21 COVID-19 (Ordered)   02/08/21 Rule-Out Test Resulted    COVID-19 Rule Out 02/07/21 02/07/21 02/07/21 COVID-19 (Ordered)   02/07/21 Rule-Out Test Resulted    MDRO (multi-drug resistant organism)  05/18/16 05/18/16 Keshia Garrett RN   02/08/19 Johnny Muir RN    1/7/18 MDRO urine culture. 10/31/17 scrotum.  2/22/17 urine 2/18/17 urine 9/1/16 wound coccxy          Nurse Assessment:  Last Vital Signs: BP (!) 84/50   Pulse 78   Temp 98 °F (36.7 °C) (Oral)   Resp 20   Ht 6' (1.829 m)   Wt 207 lb 14.3 oz (94.3 kg)   SpO2 93%   BMI 28.20 kg/m²     Last documented pain score (0-10 scale): Pain Level: 0  Last Weight:   Wt Readings from Last 1 Encounters:   02/11/21 207 lb 14.3 oz (94.3 kg)     Mental Status:  oriented, alert, coherent, logical and thought processes intact    IV Access:  Dialysis central tunneled catheter, right IJ, inserted 2/5/2021    Nursing Mobility/ADLs:  Walking   Dependent, paraplegic   Transfer  Dependent  Bathing  Dependent  Dressing  Dependent  Toileting  Assisted Feeding  Assisted  Med Admin  Assisted  Med Delivery   prefers mixed with applesauce    Wound Care Documentation and Therapy:  Wound 02/02/21 Sacrum (Active)   Dressing Status Clean;Dry; Intact 02/12/21 0752   Dressing/Treatment Foam 02/12/21 0752   Wound Length (cm) 1.5 cm 02/08/21 1641   Wound Width (cm) 2.1 cm 02/08/21 1641   Wound Depth (cm) 0.1 cm 02/08/21 1641   Wound Surface Area (cm^2) 3.15 cm^2 02/08/21 1641   Wound Volume (cm^3) 0.32 cm^3 02/08/21 1641   Wound Assessment Granulation tissue;Pink/red 02/08/21 1641   Drainage Amount Small 02/08/21 1641   Joanne-wound Assessment Blanchable erythema;Fragile 02/08/21 1641   Margins Defined edges 02/08/21 1641   Wound Thickness Description not for Pressure Injury Partial thickness 02/08/21 1641   Number of days: 10       Wound 02/02/21 Toe (Comment  which one) Anterior;Right fifth (Active)   Dressing Status Clean;Dry; Intact 02/12/21 0752   Dressing/Treatment Foam 02/12/21 0752   Number of days: 10       Wound 02/02/21 Heel Right (Active)   Dressing Status Clean;Dry; Intact 02/12/21 0752   Dressing/Treatment Foam 02/12/21 0752   Number of days: 10       Wound 02/02/21 Ankle Left;Posterior (Active)   Dressing Status Clean;Dry; Intact 02/12/21 0752   Dressing/Treatment Foam 02/12/21 0752   Number of days: 10        Elimination:  Continence:   · Bowel: No  · Bladder: Suprapubic catheter, oliguric  Urinary Catheter: Insertion Date: unk   Colostomy/Ileostomy/Ileal Conduit: no       Date of Last BM: 2/10/2021    Intake/Output Summary (Last 24 hours) at 2/12/2021 0859  Last data filed at 2/12/2021 0658  Gross per 24 hour   Intake 500 ml   Output 2550 ml   Net -2050 ml     I/O last 3 completed shifts:   In: 56 [P.O.:240]  Out: 2550 [Urine:50]    Safety Concerns:     high risk for skin breakdown    Impairments/Disabilities:      Paralysis - BLE's    Nutrition Therapy:  Current Nutrition Therapy:   - Oral Diet:  Carb Control 4 carbs/meal (1800kcals/day) Routes of Feeding: Oral  Liquids: Thin Liquids  Daily Fluid Restriction: yes - amount 60 0z, 1800 ml  Last Modified Barium Swallow with Video (Video Swallowing Test): not done    Treatments at the Time of Hospital Discharge:   Respiratory Treatments: no inhalers at time of discharge  Oxygen Therapy:  is on oxygen at 2 L/min per nasal cannula. Ventilator:    - No ventilator support    Rehab Therapies: SKILLED NURSING  Weight Bearing Status/Restrictions:  Paraplegic   Other Medical Equipment (for information only, NOT a DME order):  {EQUIPMENT:[id number]}  Other Treatments: Skin care, pt dependent and paraplegic    Patient's personal belongings (please select all that are sent with patient):  skin supplies, pillow, clothing, dentures upper    RN SIGNATURE:  Electronically signed by Dylon Gonsalez RN on 2/12/21 at 3:35 PM EST    CASE MANAGEMENT/SOCIAL WORK SECTION    Inpatient Status Date: 1-12-21    Readmission Risk Assessment Score:  Readmission Risk              Risk of Unplanned Readmission:        26           Discharging to Facility/ Agency   Name: Matthieu Blum  will call for Appointment  Phone: [phone]  Fax: [phone]    Dialysis Facility (if applicable)   Name:HD chair time is scheduled  @ [address] [place] [address]. MCKINLEY Sherman at 11:15 am  · [phone]  · Address:    / signature: Electronically signed by ANDREW Larson on 2/10/21 at 11:50 AM EST    PHYSICIAN SECTION    Prognosis: Fair    Condition at Discharge: Stable    Rehab Potential (if transferring to Rehab): N/A    Recommended Labs or Other Treatments After Discharge: Labs per dialysis    Physician Certification: I certify the above information and transfer of Theta Morning  is necessary for the continuing treatment of the diagnosis listed and that he requires [address] for greater 30 days.      Update Admission H&P: Changes in H&P as follows - started on dialysis PHYSICIAN SIGNATURE:  Electronically signed by ADRIANNA Bazan CNP / Tariq Coffey MD on 2/10/21 at 5:46 PM EST

## 2021-02-02 NOTE — ED NOTES
Report called to 3T RN, by Abbi Crawford RN . V/u, denies questions at this time.       Mikayla Johnston RN  02/01/21 0225

## 2021-02-02 NOTE — ED NOTES
Tele monitor in place with visual on monitors by receiving unit. HR 86 and in NSR. Pt taken to the floor by Sheryle Guan from transport and belongings in tow. Pt a&o with no signs of distress. Breathing easy and unlabored. Nothing infusing during time of transport.         Kayla Adams RN  02/01/21 1923
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
## 2021-02-02 NOTE — PLAN OF CARE
Problem: Falls - Risk of:  Goal: Will remain free from falls  Description: Will remain free from falls  Outcome: Ongoing   Call light within reach, bed in lowest position, bed alarm on, and pt educated to use call light for assistance.    Problem: Skin Integrity:  Goal: Will show no infection signs and symptoms  Description: Will show no infection signs and symptoms  Outcome: Ongoing

## 2021-02-02 NOTE — PROGRESS NOTES
[address] [phone] was active with this pt prior to admit. Discharge planner notified. Will follow.
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
## 2021-02-03 NOTE — PLAN OF CARE
Problem: Falls - Risk of:  Goal: Will remain free from falls  Description: Will remain free from falls  Outcome: Ongoing  Call light within reach, bed in lowest position, bed alarm on, and pt educated to call me for assistance.    Problem: Skin Integrity:  Goal: Will show no infection signs and symptoms  Description: Will show no infection signs and symptoms  Outcome: Ongoing

## 2021-02-03 NOTE — PROGRESS NOTES
[facility]  HEART FAILURE  Progress Note      Admit Date 2/1/2021     Reason for Consult:      Reason for Consultation/Chief Complaint: abnl lab    HPI:    Nataliya Tapia is a 77 y.o. male with PMH CKD3, CAD, PCI, ICM, combined HF, Aortic dissection, DM, MRSA, VT, paraplegia admitted for worsening labs and increased abd distension      Subjective:  Patient is being seen for CHF. There were no acute overnight cardiac events. Today Mr. Florentino denies chest pain, shortness of breath, palpitations      Baseline Weight: 213   Wt Readings from Last 3 Encounters:   02/03/21 224 lb 12.8 oz (102 kg)   03/04/20 213 lb (96.6 kg)   11/13/19 189 lb (85.7 kg)          Objective:   /73   Pulse 86   Temp 97 °F (36.1 °C) (Temporal)   Resp 18   Ht 6' (1.829 m)   Wt 224 lb 12.8 oz (102 kg)   SpO2 96%   BMI 30.49 kg/m²       Intake/Output Summary (Last 24 hours) at 2/3/2021 1000  Last data filed at 2/3/2021 0425  Gross per 24 hour   Intake 1826 ml   Output 375 ml   Net 1451 ml      Wt Readings from Last 3 Encounters:   02/03/21 224 lb 12.8 oz (102 kg)   03/04/20 213 lb (96.6 kg)   11/13/19 189 lb (85.7 kg)      In: 2945 [P.O.:655;  I.V.:971]  Out: 375       Physical Exam:  General Appearance:  Non-obese/Well Nourished  Respiratory:  · Resp Auscultation: Normal breath sounds without dullness  Cardiovascular:  · Auscultation: Regular rate and rhythm, normal S1S2, no m/g/r/c  · Palpation: Normal    · Pedal Pulses: 2+ and equal   Abdomen:  · Soft, NT, ND, + bs  Extremities:  · No Cyanosis or Clubbing  · Extremities: negative  Neurological/Psychiatric:  · Oriented to time, place, and person  · Non-anxious    MEDICATIONS:   Scheduled Meds:   Scheduled Meds:   aspirin  81 mg Oral Daily    carvedilol  6.25 mg Oral BID WC    citalopram  20 mg Oral Daily    finasteride  5 mg Oral Daily    gabapentin  100 mg Oral TID    insulin glargine  40 Units Subcutaneous Nightly    pantoprazole  40 mg Oral QAM AC IRON 27 (L) 02/01/2021    TIBC 175 (L) 02/01/2021    FERRITIN 493.7 (H) 02/01/2021      Iron Deficiency Anemia:  Yes IV Iron Therapy:  No  2017 ACC/AHA HF Guidelines:   intravenous iron replacement in patients with New York Heart Association (NYHA) class II and III HF and iron deficiency(ferritin <100 ng/ml or 100-300 ng/ml if transferrin saturation <20%), to improve functional status and QoL. 1. WEIGHT: Admit Weight: 210 lb (95.3 kg)      Today  Weight: 224 lb 12.8 oz (102 kg)   2. I/O     Intake/Output Summary (Last 24 hours) at 2/3/2021 1000  Last data filed at 2/3/2021 0425  Gross per 24 hour   Intake 1826 ml   Output 375 ml   Net 1451 ml       Cardiac Testing:   Echo 1/8/2018   Summary   Left ventricular size is normal .   Normal left ventricular wall thickness.   Global ejection fraction is normal and estimated from 55 % .  E/e'= 16.1 .        Echo 1/25/2017:  Study Conclusions  - Procedure narrative: Transthoracic echocardiography. Image quality was poor. Scanning was performed from the parasternal, apical, and subcostal acoustic windows. - Left ventricle: Systolic function was probably normal. Left    ventricular diastolic function parameters were normal.  - Right ventricle: Systolic function was low normal. TAPSE: 1.6 cm.  - Pulmonary arteries: Systolic pressure could not be accurately    estimated. ST: MPI 11/28/2016:  Summary   Abnormal baseline and lexiscan EKG with inferolateral ST and T changes   Reduced LV systolic function with EF of 31%   Myocardial perfusion imaging is severely compromised by overlapping    gastrointestinal structures. Images are essentially uninterpretable. There    may be an area of scar in the apex. Assessment/Plan:     1. CHF- getting IVF now, diuretics on hold  2.  ICM- continue coreg, no ACE/ARB for CKD,           I appreciate the opportunity of cooperating in the care of this individual.    Gage Koo, [facility], [address] 2/3/2021, 10:00 AM  Heart Failure The [address], [address]  Ph: [phone]      Core Measures:   · Discharge instructions:   · LVEF documented:   · ACEI for LV dysfunction:   · Smoking Cessation:

## 2021-02-03 NOTE — PLAN OF CARE
Problem: Falls - Risk of:  Goal: Will remain free from falls  Description: Will remain free from falls  2/3/2021 0933 by Daphnie Albert, RN  Outcome: Ongoing  Note: Pt remains free from falls. Safety precautions in place. Bed in lowest position, bed wheels locked, call light with in reach, bed alarm on, yellow blanket in place, fall risk wrist band on, SAFE outside of doorway. Will continue to monitor.    2/3/2021 0259 by Tiffani Morales RN  Outcome: Ongoing  Goal: Absence of physical injury  Description: Absence of physical injury  Outcome: Ongoing

## 2021-02-04 NOTE — PROGRESS NOTES
Vitals:    02/04/21 0321   BP: (!) 119/44   Pulse: 84   Resp:    Temp: 97.9 °F (36.6 °C)   SpO2: 98%     Pt vss, on 4l/Nc  spo2 95% at this time , refused heparin shot will continue to monitor and wean to baseline, Patient in bed with wheels locked in lowest position with alarm on, call light and belongings within reach.

## 2021-02-04 NOTE — PROGRESS NOTES
SEGSPCT 76.1 10/22/2014    BANDSPCT 2 01/16/2018    BLASTSPCT CANCELED 10/22/2014    METASPCT 1 04/03/2017    LYMPHOPCT 11.0 02/04/2021    PROMYELOPCT CANCELED 10/22/2014    MONOPCT 7.4 02/04/2021    MYELOPCT 1 03/31/2017    EOSPCT 4.6 07/11/2011    BASOPCT 0.4 02/04/2021    MONOSABS 0.5 02/04/2021    LYMPHSABS 0.8 02/04/2021    EOSABS 0.2 02/04/2021    BASOSABS 0.0 02/04/2021    DIFFTYPE Auto 07/11/2011     CMP:    Lab Results   Component Value Date     02/04/2021    K 5.3 02/04/2021    K 4.7 01/29/2019     02/04/2021    CO2 27 02/04/2021    BUN 98 02/04/2021    CREATININE 3.3 02/04/2021    GFRAA 23 02/04/2021    GFRAA >60 05/13/2013    AGRATIO 1.2 02/01/2021    LABGLOM 19 02/04/2021    LABGLOM 58 10/15/2015    GLUCOSE 168 02/04/2021    PROT 6.2 02/01/2021    PROT 7.2 01/06/2012    LABALBU 3.1 02/04/2021    CALCIUM 8.4 02/04/2021    BILITOT <0.2 02/01/2021    ALKPHOS 123 02/01/2021    AST 27 02/01/2021    ALT 14 02/01/2021     Phosphorus:    Lab Results   Component Value Date    PHOS 4.7 02/04/2021     Troponin:    Lab Results   Component Value Date    TROPONINI 0.07 02/02/2021     U/A:    Lab Results   Component Value Date    COLORU YELLOW 02/03/2021    PROTEINU 100 02/03/2021    PHUR 5.0 02/03/2021    WBCUA >900 02/03/2021    RBCUA 33 02/03/2021    RBCUA 3+ 05/12/2016    YEAST Present 02/03/2021    BACTERIA 3+ 02/03/2021    CLARITYU TURBID 02/03/2021    SPECGRAV 1.013 02/03/2021    LEUKOCYTESUR LARGE 02/03/2021    UROBILINOGEN 0.2 02/03/2021    BILIRUBINUR Negative 02/03/2021    BILIRUBINUR NEGATIVE 05/12/2016    BLOODU LARGE 02/03/2021    GLUCOSEU Negative 02/03/2021    GLUCOSEU >=1000 05/17/2011    AMORPHOUS 4+ 11/18/2016           IMPRESSION/RECOMMENDATIONS:      1. CHEMO   - feNa < 1 , U na < 20 , Indicates Pre Renal  - Cr worse at 3.3,  , BUN 98 . Worse inspite of IV fluids   - Stop IV , recheck Fena   - Trial of IV lasix drip and albumin   - If no better - HD     2. T2DM     3.  HTN 4. CKD stage 3   Baseline Cr 2., eGFR 33     5. sCHF     On Coreg , worse, IV furosemide       Thank you for allowing me to participate in the care of this patient. I will continue to follow along. Please call with questions.     Gabriel Mckeon MD  2/4/2021  [facility]

## 2021-02-04 NOTE — PROGRESS NOTES
[facility]  HEART FAILURE  Progress Note      Admit Date 2/1/2021     Reason for Consult:      Reason for Consultation/Chief Complaint: abnl lab    HPI:    Jenny Fountain is a 77 y.o. male with PMH CKD3, CAD, PCI, ICM, combined HF, Aortic dissection, DM, MRSA, VT, paraplegia admitted for worsening labs and increased abd distension. He is getting IVF for CHEMO, cr up to 3.3 today, BNP still elevated. Subjective:  Patient is being seen for CHF. There were no acute overnight cardiac events. Today Mr. Florentino says he feels about the same, has increased O2 requirements today.      Baseline Weight: 213   Wt Readings from Last 3 Encounters:   02/04/21 223 lb 8 oz (101.4 kg)   03/04/20 213 lb (96.6 kg)   11/13/19 189 lb (85.7 kg)          Objective:   BP (!) 100/53   Pulse 83   Temp 97.7 °F (36.5 °C) (Oral)   Resp 16   Ht 6' (1.829 m)   Wt 223 lb 8 oz (101.4 kg)   SpO2 93%   BMI 30.31 kg/m²       Intake/Output Summary (Last 24 hours) at 2/4/2021 1025  Last data filed at 2/4/2021 1328  Gross per 24 hour   Intake 240 ml   Output 500 ml   Net -260 ml      Wt Readings from Last 3 Encounters:   02/04/21 223 lb 8 oz (101.4 kg)   03/04/20 213 lb (96.6 kg)   11/13/19 189 lb (85.7 kg)      In: 240 [P.O.:240]  Out: 500       Physical Exam:  General Appearance:  Non-obese/Well Nourished  Respiratory:  · Resp Auscultation: Normal breath sounds without dullness  Cardiovascular:  · Auscultation: Regular rate and rhythm, normal S1S2, no m/g/r/c  · Palpation: Normal    · Pedal Pulses: 2+ and equal   Abdomen:  · Soft, NT, distended, + bs  Extremities:  · No Cyanosis or Clubbing  · Extremities: 1+ right, trace left  Neurological/Psychiatric:  · Oriented to time, place, and person  · Non-anxious    MEDICATIONS:   Scheduled Meds:   Scheduled Meds:   ipratropium-albuterol  1 ampule Inhalation Q4H WA    aspirin  81 mg Oral Daily    carvedilol  6.25 mg Oral BID WC    citalopram  20 mg Oral Daily    finasteride  5 mg Oral Daily  gabapentin  100 mg Oral TID    insulin glargine  40 Units Subcutaneous Nightly    pantoprazole  40 mg Oral QAM AC    pravastatin  40 mg Oral Nightly    sodium chloride flush  10 mL Intravenous 2 times per day    heparin (porcine)  5,000 Units Subcutaneous 3 times per day    insulin lispro  0-12 Units Subcutaneous TID WC     Continuous Infusions:   sodium chloride 75 mL/hr at 02/04/21 0650     PRN Meds:.traZODone, ipratropium-albuterol, ipratropium-albuterol, nitroGLYCERIN, sodium chloride flush, promethazine **OR** ondansetron, polyethylene glycol, acetaminophen **OR** acetaminophen, perflutren lipid microspheres  Continuous Infusions:   sodium chloride 75 mL/hr at 02/04/21 0650       Intake/Output Summary (Last 24 hours) at 2/4/2021 1025  Last data filed at 2/4/2021 0926  Gross per 24 hour   Intake 240 ml   Output 500 ml   Net -260 ml       Lab Data:  CBC:   Lab Results   Component Value Date    WBC 7.2 02/04/2021    HGB 9.3 02/04/2021     02/04/2021     BMP:  Lab Results   Component Value Date     02/04/2021    K 5.3 02/04/2021    K 4.7 01/29/2019     02/04/2021    CO2 27 02/04/2021    BUN 98 02/04/2021    CREATININE 3.3 02/04/2021    GLUCOSE 168 02/04/2021     INR:   Lab Results   Component Value Date    INR 1.31 02/08/2019    INR 1.22 01/28/2019    INR 1.18 01/16/2018        CARDIAC LABS  ENZYMES:  Recent Labs     02/01/21  1757 02/02/21  0017   TROPONINI 0.08* 0.07*     FASTING LIPID PANEL:  Lab Results   Component Value Date    HDL 46 02/02/2021    HDL 43 01/06/2012    LDLCALC 45 02/02/2021    TRIG 97 02/02/2021    TSH 2.37 02/01/2021     LIVER PROFILE:  Lab Results   Component Value Date    AST 27 02/01/2021    AST 18 09/29/2020    ALT 14 02/01/2021    ALT 9 09/29/2020     BNP:   Lab Results   Component Value Date    PROBNP 10,475 02/04/2021    PROBNP 10,383 02/01/2021    PROBNP 5,208 02/01/2021     Iron Studies:    Lab Results   Component Value Date    FERRITIN 493.7 02/01/2021 FERRITIN 446.1 07/13/2020    FERRITIN 590.2 02/24/2020     Lab Results   Component Value Date    IRON 27 (L) 02/01/2021    TIBC 175 (L) 02/01/2021    FERRITIN 493.7 (H) 02/01/2021      Iron Deficiency Anemia:  Yes IV Iron Therapy:  No  2017 ACC/AHA HF Guidelines:   intravenous iron replacement in patients with New York Heart Association (NYHA) class II and III HF and iron deficiency(ferritin <100 ng/ml or 100-300 ng/ml if transferrin saturation <20%), to improve functional status and QoL. 1. WEIGHT: Admit Weight: 210 lb (95.3 kg)      Today  Weight: 223 lb 8 oz (101.4 kg)   2. I/O     Intake/Output Summary (Last 24 hours) at 2/4/2021 1025  Last data filed at 2/4/2021 0926  Gross per 24 hour   Intake 240 ml   Output 500 ml   Net -260 ml       Cardiac Testing:   Echo 1/8/2018   Summary   Left ventricular size is normal .   Normal left ventricular wall thickness.   Global ejection fraction is normal and estimated from 55 % .  E/e'= 16.1 .        Echo 1/25/2017:  Study Conclusions  - Procedure narrative: Transthoracic echocardiography. Image quality was poor. Scanning was performed from the parasternal, apical, and subcostal acoustic windows. - Left ventricle: Systolic function was probably normal. Left    ventricular diastolic function parameters were normal.  - Right ventricle: Systolic function was low normal. TAPSE: 1.6 cm.  - Pulmonary arteries: Systolic pressure could not be accurately    estimated. ST: MPI 11/28/2016:  Summary   Abnormal baseline and lexiscan EKG with inferolateral ST and T changes   Reduced LV systolic function with EF of 31%   Myocardial perfusion imaging is severely compromised by overlapping    gastrointestinal structures. Images are essentially uninterpretable. There    may be an area of scar in the apex. Assessment/Plan:     1. CHF- overloaded, would like to stop IVF, will leave diuretics vs dialysis to nephrology  2.  ICM- continue coreg, no ACE/ARB for CKD I appreciate the opportunity of cooperating in the care of this individual.    Leatha Centeno, [facility], [address] 2/4/2021, 10:25 AM  Heart Failure  The [address], [address]  Ph: [phone]      Core Measures:   · Discharge instructions:   · LVEF documented:   · ACEI for LV dysfunction:   · Smoking Cessation:
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
## 2021-02-05 NOTE — PROGRESS NOTES
Vitals:    02/05/21 0450   BP: (!) 104/45   Pulse: 70   Resp: 16   Temp: 97.6 °F (36.4 °C)   SpO2: 96%   Assessment complete. See flow sheet. Pain evaluation complete. No complaints of pain at this time. Discussed POC for this shift. increased O2 to 4l/nc from 3l/nc due to c/o of dyspnea at rest and SpO2 of 84% at 0000  Patient encouraged to use call light with any needs. Patient states understanding, call light in reach, bed alarm on. Will continue to monitor.

## 2021-02-05 NOTE — PROGRESS NOTES
[facility]  HEART FAILURE  Progress Note      Admit Date 2/1/2021     Reason for Consult:      Reason for Consultation/Chief Complaint: abnl lab    HPI:    Brea Raines is a 77 y.o. male with PMH CKD3, CAD, PCI, ICM, combined HF, Aortic dissection, DM, MRSA, VT, paraplegia admitted for worsening labs and increased abd distension. He is getting IVF for CHEMO, no improvement in Cr with IVF, now stopped and on Lasix gtt    Subjective:  Patient is being seen for CHF. There were no acute overnight cardiac events. Today Mr. Florentino says he feels about the same, is able to lay flat, edema increasing    Baseline Weight: 213   Wt Readings from Last 3 Encounters:   02/05/21 224 lb (101.6 kg)   03/04/20 213 lb (96.6 kg)   11/13/19 189 lb (85.7 kg)          Objective:   BP (!) 104/45   Pulse 70   Temp 97.6 °F (36.4 °C) (Oral)   Resp 16   Ht 6' (1.829 m)   Wt 224 lb (101.6 kg)   SpO2 96%   BMI 30.38 kg/m²       Intake/Output Summary (Last 24 hours) at 2/5/2021 0800  Last data filed at 2/5/2021 0450  Gross per 24 hour   Intake 480 ml   Output 225 ml   Net 255 ml      Wt Readings from Last 3 Encounters:   02/05/21 224 lb (101.6 kg)   03/04/20 213 lb (96.6 kg)   11/13/19 189 lb (85.7 kg)      In: -   Out: 225       Physical Exam:  General Appearance:  Non-obese/Well Nourished  Respiratory:  · Resp Auscultation: rales  Cardiovascular:  · Auscultation: Regular rate and rhythm, normal S1S2, no m/g/r/c  · Palpation: Normal    · Pedal Pulses: 2+ and equal   Abdomen:  · Soft, NT, distended, + bs  Extremities:  · No Cyanosis or Clubbing  · Extremities: 1+ right, trace left  Neurological/Psychiatric:  · Oriented to time, place, and person  · Non-anxious    MEDICATIONS:   Scheduled Meds:   Scheduled Meds:   guaiFENesin  600 mg Oral BID    ipratropium-albuterol  1 ampule Inhalation Q4H WA    aspirin  81 mg Oral Daily    carvedilol  6.25 mg Oral BID WC    citalopram  20 mg Oral Daily    finasteride  5 mg Oral Daily  gabapentin  100 mg Oral TID    insulin glargine  40 Units Subcutaneous Nightly    pantoprazole  40 mg Oral QAM AC    pravastatin  40 mg Oral Nightly    sodium chloride flush  10 mL Intravenous 2 times per day    heparin (porcine)  5,000 Units Subcutaneous 3 times per day    insulin lispro  0-12 Units Subcutaneous TID WC     Continuous Infusions:   furosemide (LASIX) 1mg/ml infusion 10 mg/hr (02/04/21 2248)     PRN Meds:.traZODone, ipratropium-albuterol, ipratropium-albuterol, nitroGLYCERIN, sodium chloride flush, promethazine **OR** ondansetron, polyethylene glycol, acetaminophen **OR** acetaminophen, perflutren lipid microspheres  Continuous Infusions:   furosemide (LASIX) 1mg/ml infusion 10 mg/hr (02/04/21 2248)       Intake/Output Summary (Last 24 hours) at 2/5/2021 0800  Last data filed at 2/5/2021 0450  Gross per 24 hour   Intake 480 ml   Output 225 ml   Net 255 ml       Lab Data:  CBC:   Lab Results   Component Value Date    WBC 6.6 02/05/2021    HGB 8.9 02/05/2021     02/05/2021     BMP:  Lab Results   Component Value Date     02/05/2021    K 5.3 02/05/2021    K 4.7 01/29/2019     02/05/2021    CO2 27 02/05/2021     02/05/2021    CREATININE 3.7 02/05/2021    GLUCOSE 118 02/05/2021     INR:   Lab Results   Component Value Date    INR 1.31 02/08/2019    INR 1.22 01/28/2019    INR 1.18 01/16/2018        CARDIAC LABS  ENZYMES:  No results for input(s): CKMB, CKMBINDEX, TROPONINI in the last 72 hours.     Invalid input(s): CKTOTAL;3  FASTING LIPID PANEL:  Lab Results   Component Value Date    HDL 46 02/02/2021    HDL 43 01/06/2012    LDLCALC 45 02/02/2021    TRIG 97 02/02/2021    TSH 2.37 02/01/2021     LIVER PROFILE:  Lab Results   Component Value Date    AST 27 02/01/2021    AST 18 09/29/2020    ALT 14 02/01/2021    ALT 9 09/29/2020     BNP:   Lab Results   Component Value Date    PROBNP 10,475 02/04/2021    PROBNP 10,383 02/01/2021    PROBNP 8,998 02/01/2021 Iron Studies:    Lab Results   Component Value Date    FERRITIN 493.7 02/01/2021    FERRITIN 446.1 07/13/2020    FERRITIN 590.2 02/24/2020     Lab Results   Component Value Date    IRON 27 (L) 02/01/2021    TIBC 175 (L) 02/01/2021    FERRITIN 493.7 (H) 02/01/2021      Iron Deficiency Anemia:  Yes IV Iron Therapy:  No  2017 ACC/AHA HF Guidelines:   intravenous iron replacement in patients with New York Heart Association (NYHA) class II and III HF and iron deficiency(ferritin <100 ng/ml or 100-300 ng/ml if transferrin saturation <20%), to improve functional status and QoL. 1. WEIGHT: Admit Weight: 210 lb (95.3 kg)      Today  Weight: 224 lb (101.6 kg)   2. I/O     Intake/Output Summary (Last 24 hours) at 2/5/2021 0800  Last data filed at 2/5/2021 0450  Gross per 24 hour   Intake 480 ml   Output 225 ml   Net 255 ml       Cardiac Testing:   Echo 1/8/2018   Summary   Left ventricular size is normal .   Normal left ventricular wall thickness.   Global ejection fraction is normal and estimated from 55 % .  E/e'= 16.1 .        Echo 1/25/2017:  Study Conclusions  - Procedure narrative: Transthoracic echocardiography. Image quality was poor. Scanning was performed from the parasternal, apical, and subcostal acoustic windows. - Left ventricle: Systolic function was probably normal. Left    ventricular diastolic function parameters were normal.  - Right ventricle: Systolic function was low normal. TAPSE: 1.6 cm.  - Pulmonary arteries: Systolic pressure could not be accurately    estimated. ST: MPI 11/28/2016:  Summary   Abnormal baseline and lexiscan EKG with inferolateral ST and T changes   Reduced LV systolic function with EF of 31%   Myocardial perfusion imaging is severely compromised by overlapping    gastrointestinal structures. Images are essentially uninterpretable. There    may be an area of scar in the apex.            Assessment/Plan: 1. CHF-overloaded, diuresis slow despite lasix gtt, nephrology to determine dialysis  2.  ICM- continue coreg, no ACE/ARB for CKD           I appreciate the opportunity of cooperating in the care of this individual.    Dina Montes, [facility], [address] 2/5/2021, 8:00 AM  Heart Failure  The [address], [address]  Ph: [phone]      Core Measures:   · Discharge instructions:   · LVEF documented:   · ACEI for LV dysfunction:   · Smoking Cessation:

## 2021-02-05 NOTE — OP NOTE
Brief Postoperative Note    Severiano Spitz  YOB: 1955  [id number]    Pre-operative Diagnosis: renal failure. Need for hemodialysis. Post-operative Diagnosis: Same    Procedure: RIJ tunneled HD catheter placement. Anesthesia: Local    Surgeons/Assistants: Dr. Monroe Roy    Estimated Blood Loss: less than 5ml     Complications: None    Specimens: Was Not Obtained    Findings: RIJ tunneled HD catheter with tip in the upper right atrium. Aspirated, flushed and locked with heparin. Okay to use.     Electronically signed by Isha Resendiz MD on 2/5/2021 at 2:36 PM
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
## 2021-02-06 NOTE — FLOWSHEET NOTE
Treatment time: 2.5 hours  Net UF: 1800 ml     Pre weight: 102.1 kg  Post weight:101.3 kg  EDW: TBD kg  Crit Line Used: no Ending Profile: n/a     02/05/21 2000 02/05/21 2300   Vital Signs   BP (!) 95/48 (!) 80/41   Temp 96.7 °F (35.9 °C) 97 °F (36.1 °C)   Pulse 65 55   Resp 18 18   SpO2 95 % 95 %   Height 6' (1.829 m) 6' (1.829 m)   Weight 225 lb 1.4 oz (102.1 kg) 223 lb 5.2 oz (101.3 kg)   Weight Method Bed scale Bed scale   Percent Weight Change 0.49 -0.78     Access used: R TDC placed 2/5    Access function: Well with  ml/min     Medications or blood products given: 25% Albumin 25g      Regular outpatient schedule: TBD     Summary of response to treatment: Patient tolerated treatment well with asymptomatic hypotension. Patient remained stable throughout entire treatment. Report given to Gallito Will RN and copy of dialysis treatment record placed in chart, to be scanned into EMR.
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
## 2021-02-07 NOTE — PROGRESS NOTES
Assessment complete, see doc flowsheet. Patient resting in bed, call light in reach, bed in lowest position, brake set. Patient denies any needs at this time. Patient encouraged to call if needs arise.   Xiao dykes RN
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
## 2021-02-07 NOTE — PLAN OF CARE
Problem: Falls - Risk of:  Goal: Will remain free from falls  Description: Will remain free from falls  Outcome: Ongoing  Goal: Absence of physical injury  Description: Absence of physical injury  Outcome: Ongoing     Problem: Skin Integrity:  Goal: Will show no infection signs and symptoms  Description: Will show no infection signs and symptoms  Outcome: Ongoing  Goal: Absence of new skin breakdown  Description: Absence of new skin breakdown  Outcome: Ongoing     Problem: Pain:  Goal: Pain level will decrease  Description: Pain level will decrease  Outcome: Ongoing  Goal: Control of acute pain  Description: Control of acute pain  Outcome: Ongoing  Goal: Control of chronic pain  Description: Control of chronic pain  Outcome: Ongoing    VSS, PT has stg 3 on coccyx that was verified with two other Rns, lasix gtt D/Cd, iron infused, midodrine ordered and started. HD today 1.5 L off, 3 hours tolerated, given albumin at dialysis.

## 2021-02-07 NOTE — PROGRESS NOTES
[facility] PROGRESS NOTE    2/8/2021 3:03 PM        Name: Sondra Mae . Admitted: 2/1/2021  Primary Care Provider: Marsha Case MD (Tel: [phone])      Chief Complaint   Patient presents with    Abnormal Lab     in from home by  [facility] drawed lab work and stating he is possibly in kidney failure per nurse. base line paraplegic, on 3liters all the time      Brief History: Patient is a 76 yo male with hx CKD with hx of dialysis in past, chronic dCHF, CAD/stent, chronic hypoxic respiratory failure, paraplegia, suprapubic catheter. He presented to ER as instructed by nephrologist for worsening kidney numbers and edema. He was admitted with CHEMO and need for dialysis. Subjective:  Presently resting in bed, wife visiting. There were no acute overnight events, patient offers no new complaints. He denies shortness of breath, abdominal pain, nausea, diarrhea, constipation. He does note intermittent discomfort \"right lung,\" no cough.      Reviewed interval ancillary notes    Current Medications      ipratropium (ATROVENT HFA) 17 MCG/ACT inhaler 2 puff, 4x daily      albuterol sulfate  (90 Base) MCG/ACT inhaler 2 puff, 4x daily      torsemide (DEMADEX) tablet 50 mg, Daily      iron sucrose (VENOFER) 200 mg in sodium chloride 0.9 % 100 mL IVPB, Q24H      gabapentin (NEURONTIN) capsule 100 mg, Nightly      midodrine (PROAMATINE) tablet 2.5 mg, TID WC      glucose (GLUTOSE) 40 % oral gel 15 g, PRN      dextrose 50 % IV solution, PRN      glucagon (rDNA) injection 1 mg, PRN      dextrose 5 % solution, PRN      albumin human 25 % IV solution 12.5 g, TID PRN      heparin (porcine) injection 3,800 Units, PRN      guaiFENesin (MUCINEX) extended release tablet 600 mg, BID      traZODone (DESYREL) tablet 50 mg, Nightly PRN      ipratropium-albuterol (DUONEB) nebulizer solution 1 ampule, Q4H PRN   aspirin chewable tablet 81 mg, Daily      carvedilol (COREG) tablet 6.25 mg, BID WC      citalopram (CELEXA) tablet 20 mg, Daily      finasteride (PROSCAR) tablet 5 mg, Daily      ipratropium-albuterol (DUONEB) nebulizer solution 3 mL, Q4H PRN      insulin glargine (LANTUS;BASAGLAR) injection pen 40 Units, Nightly      nitroGLYCERIN (NITROSTAT) SL tablet 0.4 mg, Q5 Min PRN      pantoprazole (PROTONIX) tablet 40 mg, QAM AC      pravastatin (PRAVACHOL) tablet 40 mg, Nightly      sodium chloride flush 0.9 % injection 10 mL, 2 times per day      sodium chloride flush 0.9 % injection 10 mL, PRN      promethazine (PHENERGAN) tablet 12.5 mg, Q6H PRN    Or      ondansetron (ZOFRAN) injection 4 mg, Q6H PRN      polyethylene glycol (GLYCOLAX) packet 17 g, Daily PRN      acetaminophen (TYLENOL) tablet 650 mg, Q6H PRN    Or      acetaminophen (TYLENOL) suppository 650 mg, Q6H PRN      perflutren lipid microspheres (DEFINITY) injection 1.65 mg, ONCE PRN      heparin (porcine) injection 5,000 Units, 3 times per day      insulin lispro (1 Unit Dial) 0-12 Units, TID         Objective:  BP (!) 91/45   Pulse 104   Temp 98 °F (36.7 °C)   Resp 18   Ht 6' (1.829 m)   Wt 222 lb 14.2 oz (101.1 kg)   SpO2 97%   BMI 30.23 kg/m²     Intake/Output Summary (Last 24 hours) at 2/8/2021 1503  Last data filed at 2/8/2021 1000  Gross per 24 hour   Intake 120 ml   Output 150 ml   Net -30 ml      Wt Readings from Last 3 Encounters:   02/08/21 222 lb 14.2 oz (101.1 kg)   03/04/20 213 lb (96.6 kg)   11/13/19 189 lb (85.7 kg)       General appearance:  Appears comfortable  Eyes: Sclera clear. Pupils equal.  ENT: Moist oral mucosa. Trachea midline, no adenopathy. Cardiovascular: Regular rhythm, normal S1, S2. No murmur. BLE edema  Respiratory: Not using accessory muscles. Good inspiratory effort. Diminished in bases.    GI: Abdomen somewhat distended, no tenderness, normal bowel sounds, suprapubic catheter Musculoskeletal: No cyanosis in digits, neck supple  Neurology: Paraplegic below waist  Psych: Normal affect. Alert and oriented in time, place and person  Skin: Warm, dry, normal turgor    Labs and Tests:  CBC:   Recent Labs     02/06/21 0416 02/07/21 0419 02/08/21 0421   WBC 8.2 6.5 8.9   HGB 9.2* 9.1* 9.8*   * 128* 139     BMP:    Recent Labs     02/06/21 0416 02/07/21 0419 02/08/21 0421   * 132* 132*   K 4.5 4.7 5.1   CL 98* 100 97*   CO2 24 26 26   BUN 70* 51* 62*   CREATININE 2.9* 2.6* 3.3*   GLUCOSE 201* 202* 204*     Hepatic: No results for input(s): AST, ALT, ALB, BILITOT, ALKPHOS in the last 72 hours. CXR 2/2/2021:  Diffuse lung disease consistent with interstitial edema.  Infection could   have this appearance as a differential consideration. US Abdomen 2/2/2021:  Small volume ascites within the left lower quadrant.       Status post cholecystectomy, with normal caliber common duct for the post   cholecystectomy state. CXR 2/7/2021:  1. Interval placement of a right-sided dialysis catheter, in satisfactory   position. 2. Persistent findings consistent  with CHF, including cardiomegaly, small   bilateral pleural effusions, and pulmonary edema. 3. Stable widespread airspace opacity throughout both lungs, likely a   combination of pulmonary edema and superimposed multifocal pneumonia.             Problem List  Active Problems:    Acute renal failure superimposed on chronic kidney disease (HCC)    ESRD (end stage renal disease) ([facility] [address]    Other ascites  Resolved Problems:    * No resolved hospital problems. *       Assessment & Plan:   1. CHEMO on CKD. , creatinine 2.7 on admission. Suspected secondary to CRS. Initially started on IV Lasix and albumin, creatinine bumped to 3.7. Tunneled cath placed 2/5 and dialysis initiated, underwent session this morning. Remains volume overloaded. Case management working on dialysis spot. Nephrology on board. 2. Acute on chronic diastolic CHF. Small volume ascites on ultrasound. Initially placed on lasix drip with worsening creatinine. Dialysis initiated 2/5. Fluid management per dialysis. Cardiology on board. 3. Abnormal CXR. Repeat CXR 2/7 with widespread airspace opacity likely pulmonary edema vs multifocal pneumonia. Procalcitonin 2.93->2.62 in setting CHEMO. Afebrile, no leukocytosis. Covid-19 NAAT (2/7) negative, PCR (2/7) negative. Suspect likely pulmonary edema, will hold off on antibiotics. 4. Chronic hypoxic respiratory failure. O2 needs stable at 3 liters as he takes at home. 5. DM2. A1c 5.5 (9/2020). BG values reasonably controlled. Continue basal and medium dose correction. 6. Normocytic anemia. Likely secondary chronic disease. Blood counts appear stable. Iron studies low, receiving IV iron.        Diet: DIET RENAL;  Code:Full Code  DVT PPX: heparin      ADRIANNA Giron - CNP   2/8/2021 3:10 PM
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
## 2021-02-08 NOTE — PROGRESS NOTES
PT transported to dialysis suite, side rails up x4, pt mask on, o2 set at 3L, no obvious signs of distress. Report given to dialysis RN Cruz Macias.

## 2021-02-08 NOTE — FLOWSHEET NOTE
02/08/21 1120 02/08/21 1420   Treatment   Time On 0120  --    Time Off  --  1420   Treatment Goal 2400 2060   Vital Signs   /65 (!) 91/45   Temp 96.8 °F (36 °C) 98 °F (36.7 °C)   Pulse 76 104   Resp 18 18   Weight 225 lb 8.5 oz (102.3 kg) 222 lb 14.2 oz (101.1 kg)   Percent Weight Change -0.08 -1.17       Treatment time: 3 hours  Net UF: 2000 ml    Pre Weight: 102.4 kg  Post Weight: 100.4 kg  EDW: TBD    Accessed used: HARRIET CVC  Access function: WELL  with  ml/min    Medications or blood products given: N/A    Regular outpatient schedule: TBD    Summary of response to treatment: HD tx tolerated WELL. Pt did not have any HD related complications or complaints     Copy of dialysis treatment record placed in chart to be scanned into EMR.

## 2021-02-08 NOTE — PROGRESS NOTES
Comprehensive Nutrition Assessment    Type and Reason for Visit:  Initial(LOS assessment)    Nutrition Recommendations/Plan:   1. Offer vanilla Ensure High Protein (low calorie, high protein supplement) BID. Nutrition Assessment:  Pt is adequately nourished as evidenced by PO intake greater than 50% of meals and stable weight hx. However, pt with increased nutrient needs related to the presence of multiple pressure injuries. Will offer Ensure High Protein BID to promote skin integrity. Malnutrition Assessment:  Malnutrition Status:  No malnutrition      Estimated Daily Nutrient Needs:  Energy (kcal):  7367-4668; Weight Used for Energy Requirements:  Adjusted(110 kg)     Protein (g):  105-128 grams; Weight Used for Protein Requirements:  Adjusted(75 kg adjusted IBW; 1.4-1.7 grams per kg)        Fluid (ml/day):   ; Method Used for Fluid Requirements:  1 ml/kcal      Nutrition Related Findings:  Non-pitting LUE edema. +2 RLE edema. +1 LLE edema. -2.2 liters. Na+ 132. Wounds:  Multiple, Pressure Injury       Current Nutrition Therapies:    DIET RENAL; Anthropometric Measures:  · Height: 6' (182.9 cm)  · Current Body Weight: 225 lb (102.1 kg)   · Admission Body Weight: 222 lb (100.7 kg)    · Usual Body Weight: 210 lb (95.3 kg)(per pt report)     · Ideal Body Weight: 178 lbs; % Ideal Body Weight 126.4 %   · BMI: 30.5  · Adjusted Body Weight: 241.9; Paraplegia   · Adjusted BMI: 32.8    · BMI Categories: Obese Class 1 (BMI 30.0-34. 9)       Nutrition Diagnosis:   · Increased nutrient needs related to increase demand for energy/nutrients as evidenced by wounds      Nutrition Interventions:   Food and/or Nutrient Delivery:  Continue Current Diet, Start Oral Nutrition Supplement  Nutrition Education/Counseling:  Education completed(CHF education 2/2)   Coordination of Nutrition Care:  Continue to monitor while inpatient    Goals:  Pt will consume at least 50% of meals and supplements Nutrition Monitoring and Evaluation:   Behavioral-Environmental Outcomes:  None Identified   Food/Nutrient Intake Outcomes:  Food and Nutrient Intake, Supplement Intake  Physical Signs/Symptoms Outcomes:  Skin, Biochemical Data, Weight, Fluid Status or Edema     Discharge Planning:    Continue Oral Nutrition Supplement     Electronically signed by Radha Plasencia RD, LD on 2/8/21 at 11:59 AM EST    Contact: [phone]

## 2021-02-08 NOTE — PROGRESS NOTES
 citalopram  20 mg Oral Daily    finasteride  5 mg Oral Daily    insulin glargine  40 Units Subcutaneous Nightly    pantoprazole  40 mg Oral QAM AC    pravastatin  40 mg Oral Nightly    sodium chloride flush  10 mL Intravenous 2 times per day    heparin (porcine)  5,000 Units Subcutaneous 3 times per day    insulin lispro  0-12 Units Subcutaneous TID WC      dextrose 100 mL/hr (02/05/21 1235)       Lab Data:  CBC:   Recent Labs     02/06/21  0416 02/07/21 0419 02/08/21  0421   WBC 8.2 6.5 8.9   HGB 9.2* 9.1* 9.8*   * 128* 139     BMP:    Recent Labs     02/06/21  0416 02/07/21 0419 02/08/21  0421   * 132* 132*   K 4.5 4.7 5.1   CO2 24 26 26   BUN 70* 51* 62*   CREATININE 2.9* 2.6* 3.3*     INR:    Recent Labs     02/05/21  1207   INR 1.15*     BNP:    Recent Labs     02/07/21 0419   PROBNP 04,790*         Diagnostics:  echo 5/14/2019  Summary   Technically difficult study done in a wheelchair. Definity administered.   -Left ventricular cavity size is normal. Overall left ventricular systolic   function appears normal. -Ejection fraction is visually estimated to be 55%. There is mild hypokinesis of the apical septal wall.   -Indeterminate diastolic function. Echo 1/8/2018   Summary   Left ventricular size is normal .   Normal left ventricular wall thickness.   Global ejection fraction is normal and estimated from 55 % .  E/e'= 16.1 .        Echo 1/25/2017:  Study Conclusions  - Procedure narrative: Transthoracic echocardiography. Image quality was poor. Scanning was performed from the parasternal, apical, and subcostal acoustic windows. - Left ventricle: Systolic function was probably normal. Left    ventricular diastolic function parameters were normal.  - Right ventricle: Systolic function was low normal. TAPSE: 1.6 cm.  - Pulmonary arteries: Systolic pressure could not be accurately    estimated.          ST: MPI 11/28/2016:  Summary Abnormal baseline and lexiscan EKG with inferolateral ST and T changes   Reduced LV systolic function with EF of 31%   Myocardial perfusion imaging is severely compromised by overlapping    gastrointestinal structures. Images are essentially uninterpretable. There    may be an area of scar in the apex. Assessment:    1. Chronic combined systolic and diastolic heart failure with improved LVEF, on bb, no ace/arb/aldosterone antagonist due to CKD  2. CHEMO on chronic kidney disease, on dialysis  3. Essential hypertension  4. Paraplegia  5. Ascites    Plan:    1. Nephrology following and on dialysis  2. Continue coreg 6.25 mg bid  3. IV venofer x 3 doses  4. Planning for dialysis on Monday and then [facility]    Stable from cardiology standpoint    Discussed with patient who is agreeable with plan of care. Thank you for allowing me to participate in the care of your patient.     Remberto Shepherd, CNS

## 2021-02-08 NOTE — PROGRESS NOTES
Patients chart was reviewed post Tunneled Dialysis Catheter Placement procedure. No complications were noted post procedure.

## 2021-02-09 NOTE — PROGRESS NOTES
Physician Progress Note      Guanako Lindsay  [facility] #:                  [id number]  :                       1955  ADMIT DATE:       2021 5:22 PM  DISCH DATE:  RESPONDING  PROVIDER #:        Laurie Lynch CNP          QUERY TEXT:    Patient admitted with CHEMO. Noted documentation of  ESRD by internal medicine   and CHEMO and chronic kidney disease III by internal medicine If possible,   please document in progress notes and discharge summary if you are evaluating   and /or treating any of the following: The medical record reflects the following:  Risk Factors: HTN, CKD, CHF  Clinical Indicators: Per internal medicine note: Admitted with end-stage renal   disease, acute on chronic heart failure, hyperkalemia. .. ? -CHEMO and chronic   kidney disease stage III. He had a TDC placed today. He is lying in the bed   awaiting dialysis. CHEMO on CKD. , creatinine 2.7 on admission. Suspected secondary to CRS. Initially started on IV Lasix and albumin,  creatinine bumped to 3.7. Tunneled cath placed / and dialysis initiated, underwent session this   morning. Remains volume overloaded. Case management working on dialysis spot. Nephrology on board. Treatment: Tunneled cath placed, dialysis M-W-F. Failed trial of Lasix and   albumin. Baseline creatinine of 2. Creatinine peaked at 3.7 while inpatient. Options provided:  -- ESRD  -- CHEMO on CKD III, progressed to ESRD this admission, requiring dialysis  -- CHEMO on CKD III  -- Other - I will add my own diagnosis  -- Disagree - Not applicable / Not valid  -- Disagree - Clinically unable to determine / Unknown  -- Refer to Clinical Documentation Reviewer    PROVIDER RESPONSE TEXT:    This patient has CHEMO ON CKD III.     Query created by: Latosha Ortega on 2021 6:42 AM      Electronically signed by:  Mona Lynch CNP 2021 1:53 PM

## 2021-02-09 NOTE — PROGRESS NOTES
Notified Jefferson Reno pt's BP low see flowsheets, pt asymptomatic at this time, Jefferson Reno ordered to just continue to monitor.

## 2021-02-09 NOTE — PROGRESS NOTES
 insulin glargine  40 Units Subcutaneous Nightly    pantoprazole  40 mg Oral QAM AC    pravastatin  40 mg Oral Nightly    sodium chloride flush  10 mL Intravenous 2 times per day    heparin (porcine)  5,000 Units Subcutaneous 3 times per day    insulin lispro  0-12 Units Subcutaneous TID WC      dextrose 100 mL/hr (02/05/21 1235)       Lab Data:  CBC:   Recent Labs     02/07/21 0419 02/08/21 0421 02/09/21  0441   WBC 6.5 8.9 9.2   HGB 9.1* 9.8* 8.9*   * 139 134*     BMP:    Recent Labs     02/07/21 0419 02/08/21  0421 02/09/21  0441   * 132* 132*   K 4.7 5.1 4.7   CO2 26 26 27   BUN 51* 62* 46*   CREATININE 2.6* 3.3* 3.0*     INR:    No results for input(s): INR in the last 72 hours. BNP:    Recent Labs     02/07/21 0419   PROBNP 64,868*         Diagnostics:  echo 5/14/2019  Summary   Technically difficult study done in a wheelchair. Definity administered.   -Left ventricular cavity size is normal. Overall left ventricular systolic   function appears normal. -Ejection fraction is visually estimated to be 55%. There is mild hypokinesis of the apical septal wall.   -Indeterminate diastolic function. Echo 1/8/2018   Summary   Left ventricular size is normal .   Normal left ventricular wall thickness.   Global ejection fraction is normal and estimated from 55 % .  E/e'= 16.1 .        Echo 1/25/2017:  Study Conclusions  - Procedure narrative: Transthoracic echocardiography. Image quality was poor. Scanning was performed from the parasternal, apical, and subcostal acoustic windows. - Left ventricle: Systolic function was probably normal. Left    ventricular diastolic function parameters were normal.  - Right ventricle: Systolic function was low normal. TAPSE: 1.6 cm.  - Pulmonary arteries: Systolic pressure could not be accurately    estimated.          ST: MPI 11/28/2016:  Summary   Abnormal baseline and lexiscan EKG with inferolateral ST and T changes Reduced LV systolic function with EF of 31%   Myocardial perfusion imaging is severely compromised by overlapping    gastrointestinal structures. Images are essentially uninterpretable. There    may be an area of scar in the apex. Assessment:    1. Chronic combined systolic and diastolic heart failure with improved LVEF, on bb, no ace/arb/aldosterone antagonist due to CKD  2. CHEMO on chronic kidney disease, on dialysis  3. Essential hypertension  4. Paraplegia  5. Ascites    Plan:    1. Nephrology following and on dialysis  2. Continue coreg 6.25 mg bid  3. IV venofer x 3 doses  4. Planning for dialysis at [facility]    Stable from cardiology standpoint. Hopefully home soon    Discussed with patient who is agreeable with plan of care. Thank you for allowing me to participate in the care of your patient.     Rina Laguerre, CNS

## 2021-02-09 NOTE — PROGRESS NOTES
Nephrology Progress Note  [phone]  [phone]   [email or web address].        Reason for Consult:  CHEMO/ CKD          HISTORY OF PRESENT ILLNESS:      The patient is a 77 y.o. male with significant past medical history of CHEMO needing dialysis in 2019, CKD stage III for which he follows up with Dr. Kaleigh Andres, ischemic cardiomyopathy, coronary artery disease, CHF, VT, and traumatic paraplegia ( 1982) was sent to the hospital because his lab tests were worse than usual.  A BUN of  94, Creatinine 2.7 . On  January 11 his bun was 79 with a creatinine of 2.1. The creatinine of 2.1 with a EGFR of 32 seems to be his baseline  His medications prior to admission included torsemide 20 mg daily. This dose has been unchanged recently. He is not on a ACE inhibitor or ARB. He is not on NSAIDs or prolonged use of PPI  Denies any nausea vomiting diarrhea  There is no dysuria or gross hematuria    Interval History (Chart/Data reviewed): Having some cramping in UE but mild, on 2-3 L O2 (Baseline). HD Unit placement pending. Otherwise feels well and very pleasant. SOB at baseline 3L O2  Denies n/v/d/f/c/cp. Updated: Patient, his wife at bedside. Past medical, family, and social histories were reviewed as previously documented. Updates were made as necessary. Review of Systems ROS with pertinent positives and negatives listed in interval history. PHYSICAL EXAM:    Vitals:    /63   Pulse 74   Temp 97.6 °F (36.4 °C) (Oral)   Resp 20   Ht 6' (1.829 m)   Wt 222 lb 14.2 oz (101.1 kg)   SpO2 97%   BMI 30.23 kg/m²   I/O last 3 completed shifts: In: 360 [P.O.:360]  Out: 125 [Urine:125]  No intake/output data recorded. Physical Exam:  Gen:  Awake, oriented x 2  PERRL , EOM +  Pallor +, No icterus  JVP not raised   CV: RRR no murmur or rub .   Lungs:B/ L air entry, Normal breath sounds + Slight expiratory wheeze  Abd: soft, bowel sounds + , No organomegaly , suprapubic Catheter +, Epigastric scar Ext: 1+ edema, no cyanosis  Skin: Warm.   No rash  Neuro: paraparesis       DATA:    CBC with Differential:    Lab Results   Component Value Date    WBC 9.2 02/09/2021    RBC 3.05 02/09/2021    HGB 8.9 02/09/2021    HCT 28.6 02/09/2021     02/09/2021    MCV 93.7 02/09/2021    MCH 29.0 02/09/2021    MCHC 31.0 02/09/2021    RDW 16.8 02/09/2021    NRBC CANCELED 10/22/2014    NRBC CANCELED 10/22/2014    SEGSPCT 76.1 10/22/2014    BANDSPCT 2 01/16/2018    BLASTSPCT CANCELED 10/22/2014    METASPCT 1 04/03/2017    LYMPHOPCT 11.0 02/09/2021    PROMYELOPCT CANCELED 10/22/2014    MONOPCT 9.7 02/09/2021    MYELOPCT 1 03/31/2017    EOSPCT 4.6 07/11/2011    BASOPCT 0.4 02/09/2021    MONOSABS 0.9 02/09/2021    LYMPHSABS 1.0 02/09/2021    EOSABS 0.1 02/09/2021    BASOSABS 0.0 02/09/2021    DIFFTYPE Auto 07/11/2011     CMP:    Lab Results   Component Value Date     02/09/2021    K 4.7 02/09/2021    K 4.7 01/29/2019    CL 97 02/09/2021    CO2 27 02/09/2021    BUN 46 02/09/2021    CREATININE 3.0 02/09/2021    GFRAA 25 02/09/2021    GFRAA >60 05/13/2013    AGRATIO 1.2 02/01/2021    LABGLOM 21 02/09/2021    LABGLOM 58 10/15/2015    GLUCOSE 166 02/09/2021    PROT 6.2 02/01/2021    PROT 7.2 01/06/2012    LABALBU 3.7 02/07/2021    CALCIUM 8.1 02/09/2021    BILITOT <0.2 02/01/2021    ALKPHOS 123 02/01/2021    AST 27 02/01/2021    ALT 14 02/01/2021     Phosphorus:    Lab Results   Component Value Date    PHOS 3.9 02/09/2021     Troponin:    Lab Results   Component Value Date    TROPONINI 0.07 02/02/2021     U/A:    Lab Results   Component Value Date    COLORU YELLOW 02/03/2021    PROTEINU 100 02/03/2021    PHUR 5.0 02/03/2021    WBCUA >900 02/03/2021    RBCUA 33 02/03/2021    RBCUA 3+ 05/12/2016    YEAST Present 02/03/2021    BACTERIA 3+ 02/03/2021    CLARITYU TURBID 02/03/2021    SPECGRAV 1.013 02/03/2021    LEUKOCYTESUR LARGE 02/03/2021    UROBILINOGEN 0.2 02/03/2021    BILIRUBINUR Negative 02/03/2021 BILIRUBINUR NEGATIVE 05/12/2016    BLOODU LARGE 02/03/2021    GLUCOSEU Negative 02/03/2021    GLUCOSEU >=1000 05/17/2011    AMORPHOUS 4+ 11/18/2016           IMPRESSION/RECOMMENDATIONS:      1. CHEMO; decreased perfusion, progression of CKD  - feNa < 1 , U na < 20 , Indicates Pre Renal; suspected congestion   -  Failed trail of lasix and IV albumin  - s/p R TDC by IR 2/5/21 and initiation of HD   - Reduced Gabapentin 100 mg TID to 100 mg daily     2. T2DM     3. HTN     4. CKD stage 3; Follows with Dr. Jeremy Hobson 2., eGFR 33     5. SCHF; EF 2019 55%   On Coreg , Fluid overloaded ; reported  lb   - (2/7/21) Torsemide 50 mg daily added       Recommendations:  Oliguric CHEMO in setting of decreased perfusion suspected CRS with volume overload and need for initiation of RRT 2/5/21, TDC placed by IR 2/5/21 appreciated    HD #4 tomorrow; on a M/W/F schedule currently. TDW to be established. Case management working on placement to [employer] appreciated. Reports his normal weight is about 210 lb. Appears near euvolemic; limited HD UF 2/2 cramping in UE    Will check AM Mg, adjust UF profile with HD, increase Gabapentin    Checking CXR to better eval fluid status    HD per schedule Wendesday if remains in house. Thank you for involving us in the care of this patient, please call with any questions.     Signed:  Juan aCrver M.D.   [facility]  Office Number: [phone]  Fax Number: [phone]  Answering Service: [phone]  2/9/2021, 9:59 AM     Admit Wt: Weight: 210 lb (95.3 kg)   Todays Wt: Weight: 222 lb 14.2 oz (101.1 kg)     Most Recent Wt: Weight: 222 lb 14.2 oz (101.1 kg)

## 2021-02-09 NOTE — PROGRESS NOTES
[facility] PROGRESS NOTE    2/9/2021 1:48 PM        Name: Sarah Vo . Admitted: 2/1/2021  Primary Care Provider: Melilssa Ho MD (Tel: [phone])      Chief Complaint   Patient presents with    Abnormal Lab     in from home by  [facility] drawed lab work and stating he is possibly in kidney failure per nurse. base line paraplegic, on 3liters all the time      Brief History: Patient is a 78 yo male with hx CKD with hx of dialysis in past, chronic dCHF, CAD/stent, chronic hypoxic respiratory failure, paraplegia, suprapubic catheter. He presented to ER as instructed by nephrologist for worsening kidney numbers and edema. He was admitted with CHEMO and need for dialysis. Subjective:  Presently resting in bed, wife visiting. There were no acute overnight events, patient offers no new complaints. He denies shortness of breath, pain, nausea, still with edema. On dialysis M-W-F, case management working on out-patient spot.      Reviewed interval ancillary notes    Current Medications      magnesium oxide (MAG-OX) tablet 200 mg, Daily      metoprolol tartrate (LOPRESSOR) tablet 25 mg, BID      [START ON 2/10/2021] midodrine (PROAMATINE) tablet 5 mg, Daily      ipratropium (ATROVENT HFA) 17 MCG/ACT inhaler 2 puff, 4x daily      albuterol sulfate  (90 Base) MCG/ACT inhaler 2 puff, 4x daily      torsemide (DEMADEX) tablet 50 mg, Daily      gabapentin (NEURONTIN) capsule 100 mg, Nightly      glucose (GLUTOSE) 40 % oral gel 15 g, PRN      dextrose 50 % IV solution, PRN      glucagon (rDNA) injection 1 mg, PRN      dextrose 5 % solution, PRN      albumin human 25 % IV solution 12.5 g, TID PRN      heparin (porcine) injection 3,800 Units, PRN      guaiFENesin (MUCINEX) extended release tablet 600 mg, BID      traZODone (DESYREL) tablet 50 mg, Nightly PRN   ipratropium-albuterol (DUONEB) nebulizer solution 1 ampule, Q4H PRN      aspirin chewable tablet 81 mg, Daily      citalopram (CELEXA) tablet 20 mg, Daily      finasteride (PROSCAR) tablet 5 mg, Daily      ipratropium-albuterol (DUONEB) nebulizer solution 3 mL, Q4H PRN      insulin glargine (LANTUS;BASAGLAR) injection pen 40 Units, Nightly      nitroGLYCERIN (NITROSTAT) SL tablet 0.4 mg, Q5 Min PRN      pantoprazole (PROTONIX) tablet 40 mg, QAM AC      pravastatin (PRAVACHOL) tablet 40 mg, Nightly      sodium chloride flush 0.9 % injection 10 mL, 2 times per day      sodium chloride flush 0.9 % injection 10 mL, PRN      promethazine (PHENERGAN) tablet 12.5 mg, Q6H PRN    Or      ondansetron (ZOFRAN) injection 4 mg, Q6H PRN      polyethylene glycol (GLYCOLAX) packet 17 g, Daily PRN      acetaminophen (TYLENOL) tablet 650 mg, Q6H PRN    Or      acetaminophen (TYLENOL) suppository 650 mg, Q6H PRN      perflutren lipid microspheres (DEFINITY) injection 1.65 mg, ONCE PRN      heparin (porcine) injection 5,000 Units, 3 times per day      insulin lispro (1 Unit Dial) 0-12 Units, TID WC        Objective:  /63   Pulse 74   Temp 97.6 °F (36.4 °C) (Oral)   Resp 20   Ht 6' (1.829 m)   Wt 208 lb 11.2 oz (94.7 kg)   SpO2 97%   BMI 28.30 kg/m²     Intake/Output Summary (Last 24 hours) at 2/9/2021 1348  Last data filed at 2/9/2021 2341  Gross per 24 hour   Intake 240 ml   Output 125 ml   Net 115 ml      Wt Readings from Last 3 Encounters:   02/09/21 208 lb 11.2 oz (94.7 kg)   03/04/20 213 lb (96.6 kg)   11/13/19 189 lb (85.7 kg)     General:  Awake, alert, oriented in NAD  Skin:  Warm and dry. Neck:  Supple. No JVD appreciated  Chest:  Normal effort.   Clear to auscultation, no wheezes/rhonchi/rales  Cardiovascular:  RRR, normal S1/S2, no murmur/gallop/rub  Abdomen:  Soft, nontender, +bowel sounds  Extremities:  +BLE  edema  Neurological: No focal deficits  Psychological: Normal mood and affect Labs and Tests:  CBC:   Recent Labs     02/07/21 0419 02/08/21  0421 02/09/21  0441   WBC 6.5 8.9 9.2   HGB 9.1* 9.8* 8.9*   * 139 134*     BMP:    Recent Labs     02/07/21  0419 02/08/21  0421 02/09/21  0441   * 132* 132*   K 4.7 5.1 4.7    97* 97*   CO2 26 26 27   BUN 51* 62* 46*   CREATININE 2.6* 3.3* 3.0*   GLUCOSE 202* 204* 166*     Hepatic: No results for input(s): AST, ALT, ALB, BILITOT, ALKPHOS in the last 72 hours. CXR 2/2/2021:  Diffuse lung disease consistent with interstitial edema.  Infection could   have this appearance as a differential consideration. US Abdomen 2/2/2021:  Small volume ascites within the left lower quadrant.       Status post cholecystectomy, with normal caliber common duct for the post   cholecystectomy state. CXR 2/7/2021:  1. Interval placement of a right-sided dialysis catheter, in satisfactory   position. 2. Persistent findings consistent  with CHF, including cardiomegaly, small   bilateral pleural effusions, and pulmonary edema. 3. Stable widespread airspace opacity throughout both lungs, likely a   combination of pulmonary edema and superimposed multifocal pneumonia.         Results for Reji Archuleta" (MRN [id number]) as of 2/9/2021 14:18   Ref. Range 2/8/2021 15:19 2/8/2021 16:22 2/8/2021 20:38 2/9/2021 08:53 2/9/2021 11:22   POC Glucose Latest Ref Range: 70 - 99 mg/dl 116 (H) 142 (H) 224 (H) 121 (H) 156 (H)       Problem List  Active Problems:    Acute renal failure superimposed on chronic kidney disease (HCC)    ESRD (end stage renal disease) (HCC)    Other ascites  Resolved Problems:    * No resolved hospital problems. *       Assessment & Plan:   1. CHEMO on CKD. , creatinine 2.7 on admission. Suspected secondary to CRS. Initially started on IV Lasix and albumin, creatinine bumped to 3.7. Tunneled cath placed 2/5 and dialysis initiated, M-W-F. Case management working on dialysis spot. Nephrology on board. 2. Acute on chronic diastolic CHF. Small volume ascites on ultrasound. Initially placed on lasix drip with worsening creatinine. Dialysis initiated 2/5. Fluid management per dialysis. Cardiology on board. 3. Abnormal CXR. Repeat CXR 2/7 with widespread airspace opacity likely pulmonary edema vs multifocal pneumonia. Procalcitonin 2.93->2.62 in setting CHEMO. Afebrile, no leukocytosis. Covid-19 NAAT (2/7) negative, PCR (2/7) negative. Suspect likely pulmonary edema, will hold off on antibiotics. 4. Chronic hypoxic respiratory failure. O2 needs stable at 3 liters as he takes at home. 5. DM2. A1c 5.5 (9/2020). BG values reasonably controlled. Continue basal and medium dose correction. 6. Normocytic anemia. Likely secondary chronic disease. Blood counts appear stable. Iron studies low, receiving IV iron.        Diet: DIET RENAL;  Code:Full Code  DVT PPX: heparin      Arnaldo Arizmendi, APRN - CNP   2/9/2021 1:48 PM

## 2021-02-09 NOTE — PLAN OF CARE
Problem: Falls - Risk of:  Goal: Will remain free from falls  Description: Will remain free from falls  2/8/2021 2006 by Jose Eduardo Palomares RN  Outcome: Ongoing  2/8/2021 0721 by Trey Greenwood RN  Outcome: Ongoing  Goal: Absence of physical injury  Description: Absence of physical injury  Outcome: Ongoing     Problem: Skin Integrity:  Goal: Will show no infection signs and symptoms  Description: Will show no infection signs and symptoms  2/8/2021 2006 by Jose Eduardo Palomares RN  Outcome: Ongoing  2/8/2021 0721 by Trey Greenwood RN  Outcome: Ongoing  Goal: Absence of new skin breakdown  Description: Absence of new skin breakdown  Outcome: Ongoing     Problem: Pain:  Goal: Pain level will decrease  Description: Pain level will decrease  2/8/2021 2006 by Jose Eduardo Palomares RN  Outcome: Ongoing  2/8/2021 0721 by Trey Greenwood RN  Outcome: Ongoing  Goal: Control of acute pain  Description: Control of acute pain  Outcome: Ongoing  Goal: Control of chronic pain  Description: Control of chronic pain  Outcome: Ongoing     Problem: Nutrition  Goal: Optimal nutrition therapy  Outcome: Ongoing     VSS, dialysis today 3 L off 3 hours and PT tolerated well, no pain medications given, turned frequently to prevent further skin breakdown, seen by [address] [place] [address] and she put in new orders. No signs of respiratory distress, edema still remains prevalent, and abdomen firm to the touch.

## 2021-02-09 NOTE — PLAN OF CARE
Problem: Falls - Risk of:  Goal: Will remain free from falls  Description: Will remain free from falls  2/9/2021 0700 by Syed Vora RN  Outcome: Ongoing  Note: Call light within reach, bed in lowest position, bed alarm on, and pt educated to use call light for assistance. Problem: Skin Integrity:  Goal: Will show no infection signs and symptoms  Description: Will show no infection signs and symptoms  2/9/2021 0700 by Syed Vora RN  Outcome: Ongoing     Problem: Pain:  Goal: Pain level will decrease  Description: Pain level will decrease  2/9/2021 0700 by Syed Vora RN  Outcome: Ongoing  Note: Pt denies any pain at this time.

## 2021-02-10 NOTE — PROGRESS NOTES
[address]   Daily Progress Note      Admit Date:  2/1/2021    HPI:    Mr. Misa Ye is a 77year old male with history of CKD, CAD, PCI, ICM, combined s/dHF, aortic dissection, DM, MRSA, VT, traumatic paraplegia 1982    He is admitted with worsening labs (BUN 94 and creat 2.7 potassium 5.7) and increased abdominal distention with ascites on ultrasound. He received IVF for CHEMO without improvement and started on dialysis 2/5/2021 with [facility] on 2/5/21  Dialysis started 2/5/2021  probnp 10K->28K->30K    Subjective:  Patient is being seen for acute on chronic combined s/dHF There were no acute overnight cardiac events. creat 3.3 potassium 5.0  He is on dialysis and doing well. Nephrology planning for longer run today per dialysis nurse. Objective:   BP (!) 146/66   Pulse 76   Temp 97.9 °F (36.6 °C) (Axillary)   Resp 18   Ht 6' (1.829 m)   Wt 216 lb 11.2 oz (98.3 kg) Comment: EVERYTHING OFF  SpO2 97%   BMI 29.39 kg/m²       Intake/Output Summary (Last 24 hours) at 2/10/2021 0915  Last data filed at 2/10/2021 0330  Gross per 24 hour   Intake    Output 200 ml   Net -200 ml          Physical Exam:  General:  Awake, alert, oriented in NAD  Skin:  Warm and dry. No unusual bruising or rash  Neck:  Supple. No JVD or carotid bruit appreciated  Chest:  Normal effort.   Decreased bilateral in bases  Cardiovascular:  RRR, S1/S2, no murmur/gallop/rub  Abdomen:  Firm with ascites +bowel sounds  Extremities:  Bilateral lower ankle to thigh edema  Neurological: No focal deficits  Psychological: Normal mood and affect      Medications:    ipratropium-albuterol  1 ampule Inhalation Q4H WA    magnesium oxide  200 mg Oral Daily    metoprolol tartrate  25 mg Oral BID    midodrine  5 mg Oral Daily    epoetin greg-epbx  3,000 Units Subcutaneous Q MWF    torsemide  50 mg Oral Daily    gabapentin  100 mg Oral Nightly    guaiFENesin  600 mg Oral BID    aspirin  81 mg Oral Daily    citalopram  20 mg Oral Daily  finasteride  5 mg Oral Daily    insulin glargine  40 Units Subcutaneous Nightly    pantoprazole  40 mg Oral QAM AC    pravastatin  40 mg Oral Nightly    sodium chloride flush  10 mL Intravenous 2 times per day    heparin (porcine)  5,000 Units Subcutaneous 3 times per day    insulin lispro  0-12 Units Subcutaneous TID WC      dextrose 100 mL/hr (02/05/21 1235)       Lab Data:  CBC:   Recent Labs     02/08/21  0421 02/09/21  0441 02/10/21  0457   WBC 8.9 9.2 8.1   HGB 9.8* 8.9* 9.3*    134* 147     BMP:    Recent Labs     02/08/21  0421 02/09/21  0441 02/10/21  0457   * 132* 131*   K 5.1 4.7 5.0   CO2 26 27 25   BUN 62* 46* 55*   CREATININE 3.3* 3.0* 3.3*     INR:    No results for input(s): INR in the last 72 hours. BNP:    Recent Labs     02/10/21  0457   PROBNP 30,493*         Diagnostics:  echo 5/14/2019  Summary   Technically difficult study done in a wheelchair. Definity administered.   -Left ventricular cavity size is normal. Overall left ventricular systolic   function appears normal. -Ejection fraction is visually estimated to be 55%. There is mild hypokinesis of the apical septal wall.   -Indeterminate diastolic function. Echo 1/8/2018   Summary   Left ventricular size is normal .   Normal left ventricular wall thickness.   Global ejection fraction is normal and estimated from 55 % .  E/e'= 16.1 .        Echo 1/25/2017:  Study Conclusions  - Procedure narrative: Transthoracic echocardiography. Image quality was poor. Scanning was performed from the parasternal, apical, and subcostal acoustic windows. - Left ventricle: Systolic function was probably normal. Left    ventricular diastolic function parameters were normal.  - Right ventricle: Systolic function was low normal. TAPSE: 1.6 cm.  - Pulmonary arteries: Systolic pressure could not be accurately    estimated.          ST: MPI 11/28/2016:  Summary   Abnormal baseline and lexiscan EKG with inferolateral ST and T changes

## 2021-02-10 NOTE — FLOWSHEET NOTE
02/10/21 0916 02/10/21 1332   Treatment   Time On  --  0923   Time Off  --  1327   Treatment Goal 2000 2000   Observations & Evaluations   Level of Consciousness Alert (0) Alert (0)   Vital Signs   BP (!) 163/64 (!) 173/78   Temp 96.4 °F (35.8 °C) 98 °F (36.7 °C)   Pulse 82 96   Resp 18 18   Weight 222 lb 14.2 oz (101.1 kg) 218 lb 7.6 oz (99.1 kg)   Weight Method Bed scale Bed scale   Percent Weight Change 2.85 -1.98   Pain Assessment   Pain Assessment  --  0-10   Pain Level  --  0   Technical Checks   Dialysis Machine No. [id number]  --    RO Machine No. 55  --    RO Machine Log Sheet Completed Yes  --    Machine Alarm Self Test Completed; Passed  --    Machine Autotest Completed; Passed  --    Air Foam Detector Tested;Proper Function  --    Extracorporeal Circuit Tested for Integrity Yes  --    Bleach Test (Neg) Yes  --    Dialysis Bath   K+ (Potassium) 2  --    Ca+ (Calcium) 2.5  --    Na+ (Sodium) 138  --    HCO3 (Bicarb) 35  --    Post-Hemodialysis Assessment   Post-Treatment Procedures  --  Blood returned;Catheter capped, clamped and heparinized x 2 ports   Machine Disinfection Process  --  Acid/Vinegar Clean;Heat Disinfect; Bicarb Jug Disinfection;Verified Absence of Bleach in HCO3 Jug;Machine Absence of Bleach Machine; Exterior Machine Disinfection   Rinseback Volume (ml)  --  300 ml   Total Liters Processed (l/min)  --  40.6 l/min   Dialyzer Clearance  --  Lightly streaked   Duration of Treatment (minutes)  --  240 minutes   NET Removed (ml)  --  2000 ml   Tolerated Treatment  --  Good       Treatment time: 4 hours  Net UF: 2000 ml    Pre Weight: 101.1 kg  Post Weight: 99.1 kg  EDW: TBD    Accessed used: RCW CVC  Access function: WELL with  ml/min    Medications or blood products given: N/A    Regular outpatient schedule: MWF    Summary of response to treatment: HD tx tolerated WELL. Pt did not have any HD complications or complaints. Copy of dialysis treatment record placed in chart to be scanned into EMR.

## 2021-02-10 NOTE — PROGRESS NOTES
[facility] PROGRESS NOTE    2/10/2021 7:52 AM        Name: Linda Wagner . Admitted: 2/1/2021  Primary Care Provider: Romana Rumple, MD (Tel: [phone])      Chief Complaint   Patient presents with    Abnormal Lab     in from home by  [facility] drawed lab work and stating he is possibly in kidney failure per nurse. base line paraplegic, on 3liters all the time      Brief History: Patient is a 78 yo male with hx CKD with hx of dialysis in past, chronic dCHF, CAD/stent, chronic hypoxic respiratory failure, paraplegia, suprapubic catheter. He presented to ER as instructed by nephrologist for worsening kidney numbers and edema. He was admitted with CHEMO and need for dialysis. Subjective:  Presently resting in bed. HOB flat for weight and tolerating. There were no acute events overnight, offers no new complaints. Still with swelling in legs, abdomen and scrotum. Bed weight 216 today, weight is down approximately 9 lbs from peak (2/6), baseline 210 lbs. Scheduled for dialysis today, case management working on out-patient spot.      Reviewed interval ancillary notes    Current Medications      ipratropium-albuterol (DUONEB) nebulizer solution 1 ampule, Q4H WA      magnesium oxide (MAG-OX) tablet 200 mg, Daily      metoprolol tartrate (LOPRESSOR) tablet 25 mg, BID      midodrine (PROAMATINE) tablet 5 mg, Daily      epoetin greg-epbx (RETACRIT) injection 3,000 Units, Q MWF      torsemide (DEMADEX) tablet 50 mg, Daily      gabapentin (NEURONTIN) capsule 100 mg, Nightly      glucose (GLUTOSE) 40 % oral gel 15 g, PRN      dextrose 50 % IV solution, PRN      glucagon (rDNA) injection 1 mg, PRN      dextrose 5 % solution, PRN      albumin human 25 % IV solution 12.5 g, TID PRN      heparin (porcine) injection 3,800 Units, PRN      guaiFENesin (MUCINEX) extended release tablet 600 mg, BID   traZODone (DESYREL) tablet 50 mg, Nightly PRN      ipratropium-albuterol (DUONEB) nebulizer solution 1 ampule, Q4H PRN      aspirin chewable tablet 81 mg, Daily      citalopram (CELEXA) tablet 20 mg, Daily      finasteride (PROSCAR) tablet 5 mg, Daily      ipratropium-albuterol (DUONEB) nebulizer solution 3 mL, Q4H PRN      insulin glargine (LANTUS;BASAGLAR) injection pen 40 Units, Nightly      nitroGLYCERIN (NITROSTAT) SL tablet 0.4 mg, Q5 Min PRN      pantoprazole (PROTONIX) tablet 40 mg, QAM AC      pravastatin (PRAVACHOL) tablet 40 mg, Nightly      sodium chloride flush 0.9 % injection 10 mL, 2 times per day      sodium chloride flush 0.9 % injection 10 mL, PRN      promethazine (PHENERGAN) tablet 12.5 mg, Q6H PRN    Or      ondansetron (ZOFRAN) injection 4 mg, Q6H PRN      polyethylene glycol (GLYCOLAX) packet 17 g, Daily PRN      acetaminophen (TYLENOL) tablet 650 mg, Q6H PRN    Or      acetaminophen (TYLENOL) suppository 650 mg, Q6H PRN      perflutren lipid microspheres (DEFINITY) injection 1.65 mg, ONCE PRN      heparin (porcine) injection 5,000 Units, 3 times per day      insulin lispro (1 Unit Dial) 0-12 Units, TID WC        Objective:  /70   Pulse 73   Temp 97.9 °F (36.6 °C) (Oral)   Resp 18   Ht 6' (1.829 m)   Wt 216 lb 11.2 oz (98.3 kg) Comment: EVERYTHING OFF  SpO2 96%   BMI 29.39 kg/m²     Intake/Output Summary (Last 24 hours) at 2/10/2021 0752  Last data filed at 2/10/2021 0330  Gross per 24 hour   Intake    Output 200 ml   Net -200 ml      Wt Readings from Last 3 Encounters:   02/10/21 216 lb 11.2 oz (98.3 kg)   03/04/20 213 lb (96.6 kg)   11/13/19 189 lb (85.7 kg)     General:  Awake, alert, oriented in NAD  Skin:  Warm and dry. No unusual bruising or rash  Neck:  Supple. No JVD appreciated  Chest:  Normal effort.   Clear to auscultation, no wheezes/rhonchi/rales  Cardiovascular:  RRR, normal S1/S2, no murmur/gallop/rub Abdomen:  Soft, nontender, +bowel sounds  Extremities:  +BLE edema extending up into thighs, + scrotal edema  Neurological: Paraplegic  Psychological: Normal mood and affect      Labs and Tests:  CBC:   Recent Labs     02/08/21  0421 02/09/21  0441 02/10/21  0457   WBC 8.9 9.2 8.1   HGB 9.8* 8.9* 9.3*    134* 147     BMP:    Recent Labs     02/08/21  0421 02/09/21  0441 02/10/21  0457   * 132* 131*   K 5.1 4.7 5.0   CL 97* 97* 97*   CO2 26 27 25   BUN 62* 46* 55*   CREATININE 3.3* 3.0* 3.3*   GLUCOSE 204* 166* 145*     Hepatic: No results for input(s): AST, ALT, ALB, BILITOT, ALKPHOS in the last 72 hours. Results for Pranav Salgado" (MRN [id number]) as of 2/10/2021 07:57   Ref. Range 2/8/2021 20:38 2/9/2021 08:53 2/9/2021 11:22 2/9/2021 16:30 2/9/2021 20:21   POC Glucose Latest Ref Range: 70 - 99 mg/dl 224 (H) 121 (H) 156 (H) 191 (H) 230 (H)       CXR 2/2/2021:  Diffuse lung disease consistent with interstitial edema.  Infection could   have this appearance as a differential consideration. US Abdomen 2/2/2021:  Small volume ascites within the left lower quadrant.       Status post cholecystectomy, with normal caliber common duct for the post   cholecystectomy state. CXR 2/7/2021:  1. Interval placement of a right-sided dialysis catheter, in satisfactory   position. 2. Persistent findings consistent  with CHF, including cardiomegaly, small   bilateral pleural effusions, and pulmonary edema. 3. Stable widespread airspace opacity throughout both lungs, likely a   combination of pulmonary edema and superimposed multifocal pneumonia.         CXR 2/9/2021:  Pulmonary vessels remain congested.       Stable bilateral airspace disease and small bilateral pleural effusions. Pneumonia versus pulmonary edema.       Stable enlargement of the cardiac silhouette.        Problem List  Active Problems:    Acute renal failure superimposed on chronic kidney disease ([address] ESRD (end stage renal disease) ([facility] [address]    Other ascites  Resolved Problems:    * No resolved hospital problems. *       Assessment & Plan:   1. CHEMO on CKD. , creatinine 2.7 on admission. Suspected secondary to CRS. Initially started on IV Lasix and albumin, creatinine bumped to 3.7. Tunneled cath placed 2/5 and dialysis initiated, M-W-F. Case management working on dialysis spot. Nephrology on board. On torsemide daily, remains oliguric. 2. Acute on chronic diastolic CHF. Small volume ascites on ultrasound. Initially placed on lasix drip with worsening creatinine. Dialysis initiated 2/5. Still with evidence pulmonary edema on CXR yesterday. Fluid management per dialysis. Cardiology on board. 3. Abnormal CXR. Repeat CXR 2/9 stable bilateral airspace disease and small bilateral pleural effusions, pneumonia vs pulmonary edema. Remains afebrile, no leukocytosis. Procalcitonin 2.93->2.62 in setting CHEMO. Covid-19 NAAT (2/7) negative, PCR (2/7) negative. Suspect likely pulmonary edema, will hold off on antibiotics. 4. Chronic hypoxic respiratory failure. O2 needs stable at 3 liters as he takes at home. 5. DM2. A1c 5.5 (9/2020). BG values reasonably controlled. Continue basal and medium dose correction. 6. Normocytic anemia. Likely secondary chronic disease. Blood counts appear stable. Iron studies low, receiving IV iron.        Diet: DIET RENAL;  Code:Full Code  DVT PPX: heparin      ADRIANNA Esqueda - CNP   2/10/2021 7:52 AM

## 2021-02-10 NOTE — PROGRESS NOTES
Abd: soft, bowel sounds + , No organomegaly , suprapubic Catheter +, Epigastric scar   Ext: 1+ edema, no cyanosis  Skin: Warm.   No rash  Neuro: paraparesis       DATA:    CBC with Differential:    Lab Results   Component Value Date    WBC 8.1 02/10/2021    RBC 3.22 02/10/2021    HGB 9.3 02/10/2021    HCT 30.2 02/10/2021     02/10/2021    MCV 93.6 02/10/2021    MCH 28.8 02/10/2021    MCHC 30.7 02/10/2021    RDW 17.3 02/10/2021    NRBC CANCELED 10/22/2014    NRBC CANCELED 10/22/2014    SEGSPCT 76.1 10/22/2014    BANDSPCT 2 01/16/2018    BLASTSPCT CANCELED 10/22/2014    METASPCT 1 04/03/2017    LYMPHOPCT 12.5 02/10/2021    PROMYELOPCT CANCELED 10/22/2014    MONOPCT 8.7 02/10/2021    MYELOPCT 1 03/31/2017    EOSPCT 4.6 07/11/2011    BASOPCT 0.5 02/10/2021    MONOSABS 0.7 02/10/2021    LYMPHSABS 1.0 02/10/2021    EOSABS 0.1 02/10/2021    BASOSABS 0.0 02/10/2021    DIFFTYPE Auto 07/11/2011     CMP:    Lab Results   Component Value Date     02/10/2021    K 5.0 02/10/2021    K 4.7 01/29/2019    CL 97 02/10/2021    CO2 25 02/10/2021    BUN 55 02/10/2021    CREATININE 3.3 02/10/2021    GFRAA 23 02/10/2021    GFRAA >60 05/13/2013    AGRATIO 1.2 02/01/2021    LABGLOM 19 02/10/2021    LABGLOM 58 10/15/2015    GLUCOSE 145 02/10/2021    PROT 6.7 02/10/2021    PROT 7.2 01/06/2012    LABALBU 3.6 02/10/2021    CALCIUM 8.4 02/10/2021    BILITOT 0.3 02/10/2021    ALKPHOS 110 02/10/2021    AST 24 02/10/2021    ALT 18 02/10/2021     Phosphorus:    Lab Results   Component Value Date    PHOS 4.8 02/10/2021     Troponin:    Lab Results   Component Value Date    TROPONINI 0.07 02/02/2021     U/A:    Lab Results   Component Value Date    COLORU YELLOW 02/03/2021    PROTEINU 100 02/03/2021    PHUR 5.0 02/03/2021    WBCUA >900 02/03/2021    RBCUA 33 02/03/2021    RBCUA 3+ 05/12/2016    YEAST Present 02/03/2021    BACTERIA 3+ 02/03/2021    CLARITYU TURBID 02/03/2021    SPECGRAV 1.013 02/03/2021    LEUKOCYTESUR LARGE 02/03/2021 UROBILINOGEN 0.2 02/03/2021    BILIRUBINUR Negative 02/03/2021    BILIRUBINUR NEGATIVE 05/12/2016    BLOODU LARGE 02/03/2021    GLUCOSEU Negative 02/03/2021    GLUCOSEU >=1000 05/17/2011    AMORPHOUS 4+ 11/18/2016           IMPRESSION/RECOMMENDATIONS:      1. CHEMO; decreased perfusion, progression of CKD  - feNa < 1 , U na < 20 , Indicates Pre Renal; suspected congestion   -  Failed trail of lasix and IV albumin  - s/p R TDC by IR 2/5/21 and initiation of HD   -  MCKINLEY Amezcua at 11:15 am. [phone]    2. T2DM     3. HTN     4. CKD stage 3; Follows with Dr. Jayjay Kelly 2., eGFR 33     5. SCHF; EF 2019 55%   On Coreg , Fluid overloaded ; reported  lb   - (2/7/21) Torsemide 50 mg daily added (2/10/21) increase to 100 mg daily   - (2/10/21) Changed Coreg to Metoprolol to help raise BP some     6. Testicular edema; (2/10/21) Will see if can get scrotal sling    7. Anemia; (2/10/21) Epogen 3000 u q M/W/F with HD       Recommendations:  Oliguric CHEMO in setting of decreased perfusion suspected CRS with volume overload and need for initiation of RRT 2/5/21, TDC placed by IR 2/5/21 appreciated    Has chair time  MCKINLEY Malave at 11:15 am. [phone]. From renal stand point is clear for HD but wife is unable to arrange transport for Friday to HD if can be assisted with that. Agreeable to extra treatment tomorrow AM to help further with UF and testicular edema; will continue to challenge weight as o/p as well. Continue AMBAR with HD    Scrotal sling if available to go home with as well; will consult OT. D/w RN staff and unsure if available on the floor. Will see if PT/OT may know where to locate it. Challenge tomorrow 3.5 hrs 2L off; with albumin 12.5 gm      Stable from renal stand point to go home; but wife needs assistance finding transport to HD on Friday. Thank you for involving us in the care of this patient, please call with any questions.     Signed: Violet Hartman M.D.   [facility]  Office Number: [phone]  Fax Number: [phone]  Answering Service: [phone]  2/10/2021, 9:56 AM     Admit Wt: Weight: 210 lb (95.3 kg)   Todays Wt: Weight: 216 lb 11.2 oz (98.3 kg)(EVERYTHING OFF)     Most Recent Wt: Weight: 216 lb 11.2 oz (98.3 kg)(EVERYTHING OFF)

## 2021-02-10 NOTE — PROGRESS NOTES
Per Social Work no more transport tonight, we cannot even get the patients out who had scheduled transport. Patient will have to transport tomorrow.

## 2021-02-10 NOTE — PLAN OF CARE
Problem: Falls - Risk of:  Goal: Will remain free from falls  Description: Will remain free from falls  2/10/2021 0345 by Hyacinth Mcclellan RN  Outcome: Ongoing   Call light within reach, bed in lowest position, and pt educated to use call light for assistance. Problem: Skin Integrity:  Goal: Absence of new skin breakdown  Description: Absence of new skin breakdown  Outcome: Ongoing   Pt repositioned to prevent new skin breakdown. Problem: Pain:  Goal: Pain level will decrease  Description: Pain level will decrease  Outcome: Ongoing   Pt denies any pain at this time.

## 2021-02-11 NOTE — PROGRESS NOTES
Nephrology Progress Note  [phone]  [phone]   [email or web address]        Reason for Consult:  CHEMO/ CKD          HISTORY OF PRESENT ILLNESS:      The patient is a 77 y.o. male with significant past medical history of CHEMO needing dialysis in 2019, CKD stage III for which he follows up with Dr. Nadine Harris, ischemic cardiomyopathy, coronary artery disease, CHF, VT, and traumatic paraplegia ( 1982) was sent to the hospital because his lab tests were worse than usual.  A BUN of  94, Creatinine 2.7 . On  January 11 his bun was 79 with a creatinine of 2.1. The creatinine of 2.1 with a EGFR of 32 seems to be his baseline  His medications prior to admission included torsemide 20 mg daily. This dose has been unchanged recently. He is not on a ACE inhibitor or ARB. He is not on NSAIDs or prolonged use of PPI  Denies any nausea vomiting diarrhea  There is no dysuria or gross hematuria    Interval History (Chart/Data reviewed): HD tolerated much better today; 2L removed; reports slight improvement in scrotal edema; agreeable to HD again tomorrow per outpatient schedule and then home. SOB at baseline 3L O2  Denies n/v/d/f/c/cp. Updated: Patient, his wife at bedside. Past medical, family, and social histories were reviewed as previously documented. Updates were made as necessary. Review of Systems ROS with pertinent positives and negatives listed in interval history. PHYSICAL EXAM:    Vitals:    BP (!) 78/43   Pulse 74   Temp 98.6 °F (37 °C) (Temporal)   Resp 18   Ht 6' (1.829 m)   Wt 212 lb 4.9 oz (96.3 kg)   SpO2 96%   BMI 28.79 kg/m²   I/O last 3 completed shifts: In: 480 [P.O.:480]  Out: 75 [Urine:75]  No intake/output data recorded. Physical Exam:  Gen:  Awake, oriented x 2  PERRL , EOM +  Pallor +, No icterus  JVP not raised   CV: RRR no murmur or rub .   Lungs:B/ L air entry, Normal breath sounds + Slight expiratory wheeze Abd: soft, bowel sounds + , No organomegaly , suprapubic Catheter +, Epigastric scar   Ext: 1+ edema, no cyanosis  Skin: Warm.   No rash  Neuro: paraparesis       DATA:    CBC with Differential:    Lab Results   Component Value Date    WBC 8.8 02/11/2021    RBC 3.22 02/11/2021    HGB 9.3 02/11/2021    HCT 30.1 02/11/2021     02/11/2021    MCV 93.6 02/11/2021    MCH 29.0 02/11/2021    MCHC 30.9 02/11/2021    RDW 17.6 02/11/2021    NRBC CANCELED 10/22/2014    NRBC CANCELED 10/22/2014    SEGSPCT 76.1 10/22/2014    BANDSPCT 2 01/16/2018    BLASTSPCT CANCELED 10/22/2014    METASPCT 1 04/03/2017    LYMPHOPCT 11.2 02/11/2021    PROMYELOPCT CANCELED 10/22/2014    MONOPCT 9.1 02/11/2021    MYELOPCT 1 03/31/2017    EOSPCT 4.6 07/11/2011    BASOPCT 0.5 02/11/2021    MONOSABS 0.8 02/11/2021    LYMPHSABS 1.0 02/11/2021    EOSABS 0.1 02/11/2021    BASOSABS 0.0 02/11/2021    DIFFTYPE Auto 07/11/2011     CMP:    Lab Results   Component Value Date     02/11/2021    K 4.8 02/11/2021    K 4.7 01/29/2019    CL 96 02/11/2021    CO2 29 02/11/2021    BUN 34 02/11/2021    CREATININE 2.6 02/11/2021    GFRAA 30 02/11/2021    GFRAA >60 05/13/2013    AGRATIO 1.2 02/01/2021    LABGLOM 25 02/11/2021    LABGLOM 58 10/15/2015    GLUCOSE 131 02/11/2021    PROT 6.7 02/10/2021    PROT 7.2 01/06/2012    LABALBU 3.6 02/10/2021    CALCIUM 8.6 02/11/2021    BILITOT 0.3 02/10/2021    ALKPHOS 110 02/10/2021    AST 24 02/10/2021    ALT 18 02/10/2021     Phosphorus:    Lab Results   Component Value Date    PHOS 4.2 02/11/2021     Troponin:    Lab Results   Component Value Date    TROPONINI 0.07 02/02/2021     U/A:    Lab Results   Component Value Date    COLORU YELLOW 02/03/2021    PROTEINU 100 02/03/2021    PHUR 5.0 02/03/2021    WBCUA >900 02/03/2021    RBCUA 33 02/03/2021    RBCUA 3+ 05/12/2016    YEAST Present 02/03/2021    BACTERIA 3+ 02/03/2021    CLARITYU TURBID 02/03/2021    SPECGRAV 1.013 02/03/2021    LEUKOCYTESUR LARGE 02/03/2021 UROBILINOGEN 0.2 02/03/2021    BILIRUBINUR Negative 02/03/2021    BILIRUBINUR NEGATIVE 05/12/2016    BLOODU LARGE 02/03/2021    GLUCOSEU Negative 02/03/2021    GLUCOSEU >=1000 05/17/2011    AMORPHOUS 4+ 11/18/2016           IMPRESSION/RECOMMENDATIONS:      1. CHEMO; decreased perfusion, progression of CKD  - feNa < 1 , U na < 20 , Indicates Pre Renal; suspected congestion   -  Failed trail of lasix and IV albumin  - s/p R TDC by IR 2/5/21 and initiation of HD   -  MCKINLEY Amezcua at 11:15 am. [phone]    2. T2DM     3. HTN     4. CKD stage 3; Follows with Dr. Jayjay Kelly 2., eGFR 33     5. SCHF; EF 2019 55%   On Coreg , Fluid overloaded ; reported  lb   - (2/7/21) Torsemide 50 mg daily added (2/10/21) increase to 100 mg daily   - (2/10/21) Changed Coreg to Metoprolol to help raise BP some     6. Testicular edema; (2/10/21) Will see if can get scrotal sling    7. Anemia; (2/10/21) Epogen 3000 u q M/W/F with HD       Recommendations:  Oliguric CHEMO in setting of decreased perfusion suspected CRS with volume overload and need for initiation of RRT 2/5/21, TDC placed by IR 2/5/21 appreciated    Has chair time  MCKINLEY Malave at 11:15 am. [phone]. From renal stand point is clear for HD but pt's family was unable to arrange transport to HD Friday from home; so will get HD tomorrow (Friday) and then home. Continue AMBAR with HD    Stable from renal stand point to go home after HD      Thank you for involving us in the care of this patient, please call with any questions.     Signed:  Virgil Wang M.D.   [facility]  Office Number: [phone]  Fax Number: [phone]  Answering Service: [phone]  2/11/2021, 10:53 AM     Admit Wt: Weight: 210 lb (95.3 kg)   Todays Wt: Weight: 212 lb 4.9 oz (96.3 kg)     Most Recent Wt: Weight: 212 lb 4.9 oz (96.3 kg)

## 2021-02-11 NOTE — PROGRESS NOTES
[facility]   Daily Progress Note      Admit Date:  2/1/2021    HPI:    Mr. Shantell Wharton is a 77year old male with history of CKD, CAD, PCI, ICM, combined s/dHF, aortic dissection, DM, MRSA, VT, traumatic paraplegia 1982    He is admitted with worsening labs (BUN 94 and creat 2.7 potassium 5.7) and increased abdominal distention with ascites on ultrasound. He received IVF for CHEMO without improvement and started on dialysis 2/5/2021 with [facility] [place] on 2/5/21  Dialysis started 2/5/2021  probnp 10K->28K->30K    Subjective:  Patient is being seen for acute on chronic combined s/dHF There were no acute overnight cardiac events. Tolerated dialysis well yesterday weight 219 sodium 131 creat 2.6 He is having an extra dialysis run today. He is set up for dialysis on MWF. Objective:   BP 98/62   Pulse 92   Temp 97.8 °F (36.6 °C) (Oral)   Resp 16   Ht 6' (1.829 m)   Wt 219 lb 12.8 oz (99.7 kg) Comment: machine off  SpO2 96%   BMI 29.81 kg/m²       Intake/Output Summary (Last 24 hours) at 2/11/2021 0905  Last data filed at 2/10/2021 1827  Gross per 24 hour   Intake 360 ml   Output 75 ml   Net 285 ml          Physical Exam:  General:  Awake, alert, oriented in NAD  Skin:  Warm and dry. No unusual bruising or rash  Neck:  Supple. No JVD or carotid bruit appreciated  Chest:  Normal effort.   Decreased bilateral in bases  Cardiovascular:  RRR, S1/S2, no murmur/gallop/rub  Abdomen:  Firm with ascites +bowel sounds  Extremities:  Bilateral lower ankle to thigh edema  Neurological: No focal deficits  Psychological: Normal mood and affect      Medications:    ipratropium-albuterol  1 ampule Inhalation Q4H WA    gabapentin  100 mg Oral BID    torsemide  100 mg Oral Daily    [START ON 2/12/2021] epoetin greg-epbx  3,000 Units Intravenous Q MWF    magnesium oxide  200 mg Oral Daily    metoprolol tartrate  25 mg Oral BID    midodrine  5 mg Oral Daily    guaiFENesin  600 mg Oral BID    aspirin  81 mg Oral Daily  citalopram  20 mg Oral Daily    finasteride  5 mg Oral Daily    insulin glargine  40 Units Subcutaneous Nightly    pantoprazole  40 mg Oral QAM AC    pravastatin  40 mg Oral Nightly    sodium chloride flush  10 mL Intravenous 2 times per day    heparin (porcine)  5,000 Units Subcutaneous 3 times per day    insulin lispro  0-12 Units Subcutaneous TID WC      dextrose 100 mL/hr (02/05/21 1235)       Lab Data:  CBC:   Recent Labs     02/09/21  0441 02/10/21  0457 02/11/21  0412   WBC 9.2 8.1 8.8   HGB 8.9* 9.3* 9.3*   * 147 150     BMP:    Recent Labs     02/09/21  0441 02/10/21  0457 02/11/21  0412   * 131* 131*   K 4.7 5.0 4.8   CO2 27 25 29   BUN 46* 55* 34*   CREATININE 3.0* 3.3* 2.6*     INR:    No results for input(s): INR in the last 72 hours. BNP:    Recent Labs     02/10/21  0457   PROBNP 30,493*         Diagnostics:  echo 5/14/2019  Summary   Technically difficult study done in a wheelchair. Definity administered.   -Left ventricular cavity size is normal. Overall left ventricular systolic   function appears normal. -Ejection fraction is visually estimated to be 55%. There is mild hypokinesis of the apical septal wall.   -Indeterminate diastolic function. Echo 1/8/2018   Summary   Left ventricular size is normal .   Normal left ventricular wall thickness.   Global ejection fraction is normal and estimated from 55 % .  E/e'= 16.1 .        Echo 1/25/2017:  Study Conclusions  - Procedure narrative: Transthoracic echocardiography. Image quality was poor. Scanning was performed from the parasternal, apical, and subcostal acoustic windows. - Left ventricle: Systolic function was probably normal. Left    ventricular diastolic function parameters were normal.  - Right ventricle: Systolic function was low normal. TAPSE: 1.6 cm.  - Pulmonary arteries: Systolic pressure could not be accurately    estimated.          ST: MPI 11/28/2016:  Summary Abnormal baseline and lexiscan EKG with inferolateral ST and T changes   Reduced LV systolic function with EF of 31%   Myocardial perfusion imaging is severely compromised by overlapping    gastrointestinal structures. Images are essentially uninterpretable. There    may be an area of scar in the apex. Assessment:    1. Chronic combined systolic and diastolic heart failure with improved LVEF, on bb, no ace/arb/aldosterone antagonist due to CKD  2. CHEMO on chronic kidney disease, on dialysis  3. Essential hypertension  4. Paraplegia  5. Ascites    Plan:    1. Nephrology following and on dialysis  2. Continue coreg 6.25 mg bid  3. IV venofer x 3 doses  4. Planning for dialysis at [address] MWF    Stable from cardiology standpoint. Hopefully home soon. Will sign off please call with any questions    Discussed RHC with Dr Joe Traore who will review chart. Not sure if RHC would change medical management. Discussed with patient who is agreeable with plan of care. Thank you for allowing me to participate in the care of your patient.     Frannie Eddy, CNS

## 2021-02-11 NOTE — PLAN OF CARE
Problem: Serum Glucose Level - Abnormal:  Goal: Ability to maintain appropriate glucose levels will improve  Description: Ability to maintain appropriate glucose levels will improve  Outcome: Ongoing      Yina Mcdaniels messaged at 8:37 PM, \" tonight. Patient and his wife report that he usually takes 20 units of Lantus when his blood sugar runs \"this low. \"     Message Read at 8:40 PM and responded at 8:44 PM, \"That's fine\"     RN responded at 9:12 PM, \"OK so how much do you want me to give him tonight? He has 40 units ordered. \"     Messaged Read at 9:14 PM and  responded \" 20 units. \"    Will d/w patient and his wife at bedside

## 2021-02-11 NOTE — PLAN OF CARE
Problem: Nausea/Vomiting:  Goal: Absence of nausea/vomiting  Description: Absence of nausea/vomiting  Outcome: Ongoing     Pt c/o nausea; gave zofran IV at 2023. See [facility] & all flowsheets. Will continue to monitor.

## 2021-02-11 NOTE — PLAN OF CARE
Problem: Pain:  Goal: Pain level will decrease  Description: Pain level will decrease  Outcome: Ongoing     Pt c/o headache; gave tylenol at 2150 per pt request. See [facility] & all flowsheets. Will continue to monitor.

## 2021-02-11 NOTE — CARE COORDINATION
CM spoke with patient's wife, Loraine Ye, she confirms that she has dialysis transportation with [employer] but cannot obtain the service for 2/12/2021, the transport is arranged for [facility] outpatient appointment. Patient will need to dialyze here tomorrow morning prior to transportation to home. Message to Kaden Dobbins CNP to advise of this via [employer].     WALTER CorbettN, CCM, RN  [facility]  [phone]

## 2021-02-11 NOTE — FLOWSHEET NOTE
Treatment time: 3.5 hours  Net UF: 2000 ml     Pre weight: 96.3 kg   Post weight: 94.3 kg  EDW: TBD     Access used: RCW CVC  Access function: Good with  ml/min     Medications or blood products given: Midodrine , Albumin     Regular outpatient schedule: [facility], TTS     Summary of response to treatment: Tolerated tx fair. Some  Asymptomatic hypotension noted which was managed with Albumin and Midodrine.       Copy of dialysis treatment record placed in chart, to be scanned into EMR.       02/11/21 0900 02/11/21 1250   Treatment   Time On  --  0912   Time Off  --  1245   Vital Signs   BP (!) 78/43 (!) 127/96   Temp 98.6 °F (37 °C) 97.4 °F (36.3 °C)   Pulse 74 74   Resp 18 18   Dialysis Bath   K+ (Potassium) 3  --    Ca+ (Calcium) 2.5  --    Na+ (Sodium) 138  --    HCO3 (Bicarb) 35  --

## 2021-02-11 NOTE — PROGRESS NOTES
Messaged Dr. Sushila Sanches, Nephrology, about epoetin order placed due to conversation at or about 1800 hrs yesterday stating that it should be given IV.
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
## 2021-02-11 NOTE — PLAN OF CARE
Problem: Falls - Risk of:  Goal: Will remain free from falls  Description: Will remain free from falls  Outcome: Ongoing  Goal: Absence of physical injury  Description: Absence of physical injury  Outcome: Ongoing     Problem: Skin Integrity:  Goal: Will show no infection signs and symptoms  Description: Will show no infection signs and symptoms  Outcome: Ongoing  Goal: Absence of new skin breakdown  Description: Absence of new skin breakdown  Outcome: Ongoing     Problem: Pain:  Goal: Pain level will decrease  Description: Pain level will decrease  Outcome: Ongoing  Goal: Control of acute pain  Description: Control of acute pain  Outcome: Ongoing  Goal: Control of chronic pain  Description: Control of chronic pain  Outcome: Ongoing     Problem: Nutrition  Goal: Optimal nutrition therapy  Outcome: Ongoing     PT received HD today, planned for repeat tomorrow, dressings changed, turned per unit protocol, no new signs of skin breakdown, and no complaints of pain or PRN pain medications given during the shift. VSS and no signs of obvious distress.

## 2021-02-11 NOTE — PROGRESS NOTES
[facility] PROGRESS NOTE    2/11/2021 9:09 AM        Name: Garrett Woodard . Admitted: 2/1/2021  Primary Care Provider: Moises Adams MD (Tel: [phone])      Chief Complaint   Patient presents with    Abnormal Lab     in from home by  Aleja Leslie drawed lab work and stating he is possibly in kidney failure per nurse. base line paraplegic, on 3liters all the time      Brief History: Patient is a 78 yo male with hx CKD with hx of dialysis in past, chronic dCHF, CAD/stent, chronic hypoxic respiratory failure, paraplegia, suprapubic catheter. He presented to ER as instructed by nephrologist for worsening kidney numbers and edema. He was admitted with CHEMO and need for dialysis. Subjective:  Patient seen in dialysis, undergoing extra treatment today, plan is to continue to challenging dry weight as out-patient. There were no acute overnight events, offers no new complaints. DC order placed yesterday, transport unable to be arranged due to weather and road conditions. Still with swelling in legs and scrotum. Bed weight 219 today. Out-patient dialysis spot arranged, wife arranging for transport to dialysis.      Reviewed interval ancillary notes    Current Medications      ipratropium-albuterol (DUONEB) nebulizer solution 1 ampule, Q4H WA      gabapentin (NEURONTIN) capsule 100 mg, BID      albumin human 25 % IV solution 12.5 g, PRN      torsemide (DEMADEX) tablet 100 mg, Daily      [START ON 2/12/2021] epoetin greg-epbx (RETACRIT) injection 3,000 Units, Q MWF      sodium chloride (OCEAN, BABY AYR) 0.65 % nasal spray 2 spray, PRN      magnesium oxide (MAG-OX) tablet 200 mg, Daily      metoprolol tartrate (LOPRESSOR) tablet 25 mg, BID      midodrine (PROAMATINE) tablet 5 mg, Daily      glucose (GLUTOSE) 40 % oral gel 15 g, PRN      dextrose 50 % IV solution, PRN      glucagon (rDNA) injection 1 mg, PRN Neck:  Supple. No JVD appreciated  Chest:  Normal effort. Clear to auscultation  Cardiovascular:  RRR, normal S1/S2, no murmur/gallop/rub  Abdomen:  Soft, nontender, +bowel sounds  Extremities:  + BLE edema, + scrotal edema  Neurological: Paraplegic  Psychological: Normal mood and affect      Labs and Tests:  CBC:   Recent Labs     02/09/21  0441 02/10/21  0457 02/11/21  0412   WBC 9.2 8.1 8.8   HGB 8.9* 9.3* 9.3*   * 147 150     BMP:    Recent Labs     02/09/21  0441 02/10/21  0457 02/11/21  0412   * 131* 131*   K 4.7 5.0 4.8   CL 97* 97* 96*   CO2 27 25 29   BUN 46* 55* 34*   CREATININE 3.0* 3.3* 2.6*   GLUCOSE 166* 145* 131*     Hepatic:   Recent Labs     02/10/21  0457   AST 24   ALT 18   BILITOT 0.3   ALKPHOS 110     Results for Sam AMOS" (MRN [id number]) as of 2/11/2021 09:59   Ref. Range 2/10/2021 14:14 2/10/2021 16:06 2/10/2021 20:16 2/11/2021 02:01 2/11/2021 07:46   POC Glucose Latest Ref Range: 70 - 99 mg/dl 82 105 (H) 148 (H) 147 (H) 98     CXR 2/2/2021:  Diffuse lung disease consistent with interstitial edema.  Infection could   have this appearance as a differential consideration. US Abdomen 2/2/2021:  Small volume ascites within the left lower quadrant.       Status post cholecystectomy, with normal caliber common duct for the post   cholecystectomy state. CXR 2/7/2021:  1. Interval placement of a right-sided dialysis catheter, in satisfactory   position. 2. Persistent findings consistent  with CHF, including cardiomegaly, small   bilateral pleural effusions, and pulmonary edema. 3. Stable widespread airspace opacity throughout both lungs, likely a   combination of pulmonary edema and superimposed multifocal pneumonia.         CXR 2/9/2021:  Pulmonary vessels remain congested.       Stable bilateral airspace disease and small bilateral pleural effusions. Pneumonia versus pulmonary edema.       Stable enlargement of the cardiac silhouette.        Problem List Active Problems:    Acute renal failure superimposed on chronic kidney disease ([address]    ESRD (end stage renal disease) ([address]    Other ascites  Resolved Problems:    * No resolved hospital problems. *       Assessment & Plan:   1. CHEMO on CKD. , creatinine 2.7 on admission. Suspected secondary to CRS. Initially started on IV Lasix and albumin, creatinine bumped to 3.7. Tunneled cath placed 2/5 and dialysis initiated, M-W-F. Dialysis spot arranged, wife working on transport arrangements. Nephrology on board. On torsemide daily, remains oliguric. 2. Acute on chronic diastolic CHF. Small volume ascites on ultrasound. Initially placed on lasix drip with worsening creatinine. Dialysis initiated 2/5. Still with evidence pulmonary edema on CXR yesterday. Fluid management per dialysis. Cardiology on board. 3. Abnormal CXR. Repeat CXR 2/9 stable bilateral airspace disease and small bilateral pleural effusions, pneumonia vs pulmonary edema. Remains afebrile, no leukocytosis. Procalcitonin 2.93->2.62 in setting CHEMO. Covid-19 NAAT (2/7) negative, PCR (2/7) negative. Suspect likely pulmonary edema, will hold off on antibiotics. 4. Chronic hypoxic respiratory failure. O2 needs stable at 3 liters as he takes at home. 5. DM2. A1c 5.5 (9/2020). BG values reasonably controlled. Continue basal and medium dose correction. 6. Normocytic anemia. Likely secondary chronic disease. Blood counts appear stable. Iron studies low, received IV iron. Disposition: DC order placed yesterday but transport unable to be arranged due to road conditions. Dialysis spot arranged, wife attempting to set up transport. Patient is stable for DC if transport arranged for dialysis treatment tomorrow.        Diet: DIET RENAL;  Code:Full Code  DVT PPX: heparin      Brad Martines, APRN - CNP   2/11/2021 9:09 AM

## 2021-02-11 NOTE — PROGRESS NOTES
Occupational Therapy  Alex [place]    OT order received and appreciated - patient currently JUNI to HD upon evaluation attempt. Will follow up as time/schedule allows for OT assessment. Per chart review, pt with hx of paraplegia - he would benefit an order for PT as well to assess safety with transfers prior to d/c. Discussed with RN.     Thank you,    Yesica Velez, [facility] OTR/L [id number]

## 2021-02-12 NOTE — CARE COORDINATION
Patient discharged to home with spouse 2-12-21 at 3pm via first care with Mara Amato  Patient/Family aware of and agreeable to the discharge plan.   All discharge needs met per case management

## 2021-02-12 NOTE — FLOWSHEET NOTE
02/12/21 1231   Vital Signs   /66   Temp 96.1 °F (35.6 °C)   Pulse 71   Resp 16   Weight 207 lb 3.7 oz (94 kg)   Weight Method Bed scale   Percent Weight Change -0.32   Post-Hemodialysis Assessment   Post-Treatment Procedures Blood returned;Catheter capped, clamped and heparinized x 2 ports   Machine Disinfection Process Acid/Vinegar Clean;Heat Disinfect   Rinseback Volume (ml) 300 ml   Total Liters Processed (l/min) 71.3 l/min   Dialyzer Clearance Lightly streaked   Duration of Treatment (minutes) 210 minutes   Heparin amount administered during treatment (units) 0 units   Hemodialysis Intake (ml) 500 ml   Hemodialysis Output (ml) 2507 ml   NET Removed (ml) 2007 ml   Tolerated Treatment Fair   Patient Response to Treatment Pt back to room per transport. Report called to floor nurse [place]. Bilateral Breath Sounds Clear;Diminished   LLE Edema Non-pitting       Access used: Rt TDC  Access function: Well    Medications or blood products given: Albumin and Retacrit    Regular outpatient schedule: Will be MWF    Summary of response to treatment:     Copy of dialysis treatment record placed in chart, to be scanned into EMR.
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
## 2021-02-12 NOTE — DISCHARGE SUMMARY
[facility]    Patient Demographics    Patient. Joe Maynard  Date of Birth. 1955  MRN. [id number]     Primary care provider. Jeremiah Faulkner MD  (Tel: [phone])    Admit date: 2/1/2021    Discharge date (blank if same as Note Date): DC order originally placed 2/10/2021. DC delayed secondary to transportation issues  Note Date: 2/12/2021     Reason for Hospitalization. Chief Complaint   Patient presents with    Abnormal Lab     in from home by  [facility] drawed lab work and stating he is possibly in kidney failure per nurse. base line paraplegic, on 3liters all the time          Significant Findings. Active Problems:    Acute renal failure superimposed on chronic kidney disease ([address] [address]    ESRD (end stage renal disease) ([address] [address]    Other ascites  Resolved Problems:    * No resolved hospital problems. *       Problems and results from this hospitalization that need follow up. 1. CHEMO/CKD, hemodialysis. 2. Chronic diastolic CHF. Significant test results and incidental findings. CXR 2/2/2021:  Diffuse lung disease consistent with interstitial edema.  Infection could   have this appearance as a differential consideration.      US Abdomen 2/2/2021:  Small volume ascites within the left lower quadrant.       Status post cholecystectomy, with normal caliber common duct for the post   cholecystectomy state.      CXR 2/7/2021:  1. Interval placement of a right-sided dialysis catheter, in satisfactory   position. 2. Persistent findings consistent  with CHF, including cardiomegaly, small   bilateral pleural effusions, and pulmonary edema.    3. Stable widespread airspace opacity throughout both lungs, likely a   combination of pulmonary edema and superimposed multifocal pneumonia.          CXR 2/9/2021:  Pulmonary vessels remain congested.     Stable bilateral airspace disease and small bilateral pleural effusions. Pneumonia versus pulmonary edema.       Stable enlargement of the cardiac silhouette.          Invasive procedures and treatments. 2/5/2021: HARRIET tunneled HD catheter placement. [facility] Course. Patient is a 78 yo male with hx CKD with hx of dialysis in past, chronic dCHF, CAD/stent, chronic hypoxic respiratory failure, paraplegia, suprapubic catheter. He presented to ER as instructed by nephrologist for worsening kidney numbers and edema. He was admitted with CHEMO and need for dialysis. 1. CHEMO on CKD. , creatinine 2.7 on admission. Suspected secondary to CRS. Initially started on IV Lasix and albumin, creatinine bumped to 3.7. Tunneled cath placed 2/5 and dialysis initiated, M-W-F. Dialysis spot arranged, wife has transport set up with [employer] for Monday dialysis session. On torsemide daily, remains oliguric. 2. Acute on chronic diastolic CHF. Small volume ascites on ultrasound. Initially placed on lasix drip with worsening creatinine. Dialysis initiated 2/5. Repeat CXR 2/9 with evidence pulmonary edema, underwent additional dialysis session. Fluid management per dialysis. 3. Abnormal CXR. Repeat CXR 2/9 stable bilateral airspace disease and small bilateral pleural effusions, pneumonia vs pulmonary edema. Remains afebrile, no leukocytosis. Procalcitonin 2.93->2.62 in setting CHEMO. Covid-19 NAAT (2/7) negative, PCR (2/7) negative. Suspect likely pulmonary edema, no antibiotic indicated. 4. Chronic hypoxic respiratory failure. O2 needs stable at 3 liters as he uses at home at home. 5. DM2. A1c 5.5 (9/2020). BG values reasonably controlled. Covered with basal and medium dose correction in hospital, continued Lantus on DC. 6. Normocytic anemia. Likely secondary chronic disease. Blood counts appear stable. Iron studies low, received IV iron. 7. Hypotension. BP stable but on low side with dialysis. Midodrine has been added. Asymptomatic. Consults. IP CONSULT TO NEPHROLOGY  IP CONSULT TO HOSPITALIST  IP CONSULT TO HEART FAILURE NURSE/COORDINATOR  IP CONSULT TO DIETITIAN  IP CONSULT TO SOCIAL WORK  IP CONSULT TO CARDIOLOGY  INPATIENT CONSULT TO ORTHOTIST/PROSTHETIST  IP CONSULT TO CASE MANAGEMENT  IP CONSULT TO HOME CARE NEEDS    Physical examination on discharge day. BP (!) 84/50   Pulse 78   Temp 98 °F (36.7 °C) (Oral)   Resp 20   Ht 6' (1.829 m)   Wt 207 lb 14.3 oz (94.3 kg)   SpO2 93%   BMI 28.20 kg/m²   General appearance. Alert. Looks comfortable. HEENT. Sclera clear. Moist mucus membranes. Cardiovascular. Regular rate and rhythm, normal S1, S2. No murmur. Respiratory. Not using accessory muscles. Clear to auscultation bilaterally, no wheeze. Gastrointestinal. Abdomen soft, non-tender, not distended, normal bowel sounds, last BM yesterday  Neurology. paraplegic  Extremities. + BLE edema, + scrotal edema. Skin. Warm, dry, normal turgor    Condition at time of discharge stable    Medication instructions provided to patient at discharge. Medication List      START taking these medications    metoprolol tartrate 25 MG tablet  Commonly known as: LOPRESSOR  Take 1 tablet by mouth 2 times daily     midodrine 5 MG tablet  Commonly known as: PROAMATINE  Take 1 tablet by mouth daily        CHANGE how you take these medications    gabapentin 100 MG capsule  Commonly known as: NEURONTIN  What changed: when to take this     Lantus SoloStar 100 UNIT/ML injection pen  Generic drug: insulin glargine  What changed: See the new instructions.      torsemide 100 MG tablet  Commonly known as: DEMADEX  Take 1 tablet by mouth daily  What changed:   · medication strength  · how much to take  · how to take this  · when to take this  · additional instructions     Vitamin D3 50 MCG (2000 UT) Caps What changed: Another medication with the same name was removed. Continue taking this medication, and follow the directions you see here.         CONTINUE taking these medications    acetaminophen 325 MG tablet  Commonly known as: TYLENOL  Take 2 tablets by mouth every 4 hours as needed for Pain or Fever     aspirin 81 MG chewable tablet     bisacodyl 5 MG EC tablet  Commonly known as: DULCOLAX     citalopram 20 MG tablet  Commonly known as: CELEXA  Take 1 tablet by mouth daily     docusate sodium 100 MG capsule  Commonly known as: COLACE     ferrous sulfate 325 (65 Fe) MG tablet  Commonly known as: IRON 325  Take 1 tablet by mouth daily (with breakfast)     finasteride 5 MG tablet  Commonly known as: PROSCAR  Take 1 tablet by mouth daily     fluticasone 50 MCG/ACT nasal spray  Commonly known as: FLONASE  2 SPRAYS INTO EACH NOSTRIL EVERY DAY     glucose 40 % Gel  Commonly known as: GLUTOSE  Take 15 g by mouth as needed (low BS)     insulin lispro 100 UNIT/ML pen  Commonly known as: HUMALOG  Inject 0-12 Units into the skin 3 times daily (with meals)     ipratropium-albuterol 0.5-2.5 (3) MG/3ML Soln nebulizer solution  Commonly known as: DUONEB  Inhale 3 mLs into the lungs every 4 hours as needed for Shortness of Breath DX code J47.1 bronchiectasis     nitroGLYCERIN 0.4 MG SL tablet  Commonly known as: NITROSTAT     OXYGEN     pantoprazole 40 MG tablet  Commonly known as: PROTONIX  Take 1 tablet by mouth daily     Pen Needles 31G X 6 MM Misc  1 each by Does not apply route daily     pravastatin 40 MG tablet  Commonly known as: PRAVACHOL  Take 1 tablet by mouth nightly     traZODone 50 MG tablet  Commonly known as: DESYREL     vitamin E 400 UNIT capsule        STOP taking these medications    allopurinol 100 MG tablet  Commonly known as: ZYLOPRIM     carvedilol 12.5 MG tablet  Commonly known as: COREG     guaiFENesin 400 MG tablet           Where to Get Your Medications These medications were sent to [facility], [address] - P [phone] - F [phone]  [address] [facility]    Phone: [phone]   · metoprolol tartrate 25 MG tablet  · midodrine 5 MG tablet  · torsemide 100 MG tablet         Discharge recommendations given to patient. Follow Up. Cardio virtual visit 2/18, PCP in 2-3 weeks   Disposition. Home with home health  Activity. activity as tolerated  Diet: DIET RENAL;      Spent 35 minutes in discharge process.     Signed:  ADRIANNA Connelly CNP     2/12/2021 8:08 AM

## 2021-02-12 NOTE — PLAN OF CARE
Problem: Falls - Risk of:  Goal: Will remain free from falls  Description: Will remain free from falls  Outcome: Ongoing  Goal: Absence of physical injury  Description: Absence of physical injury  Outcome: Ongoing     Problem: Skin Integrity:  Goal: Will show no infection signs and symptoms  Description: Will show no infection signs and symptoms  Outcome: Ongoing  Goal: Absence of new skin breakdown  Description: Absence of new skin breakdown  Outcome: Ongoing     Problem: Pain:  Goal: Pain level will decrease  Description: Pain level will decrease  Outcome: Ongoing  Goal: Control of acute pain  Description: Control of acute pain  Outcome: Ongoing  Goal: Control of chronic pain  Description: Control of chronic pain  Outcome: Ongoing     Problem: Nutrition  Goal: Optimal nutrition therapy  Outcome: Ongoing     Problem: Serum Glucose Level - Abnormal:  Goal: Ability to maintain appropriate glucose levels will improve  Description: Ability to maintain appropriate glucose levels will improve  Outcome: Ongoing     Problem: Cardiovascular  Goal: Hemodynamic stability  Outcome: Ongoing     Problem: Nausea/Vomiting:  Goal: Absence of nausea/vomiting  Description: Absence of nausea/vomiting  Outcome: Ongoing   VSS, HD today tolerated well, no knew skin issues, BG hypo event occurred after HD with a BG of 65 and PT was provided apple juice and apple sauce. PT was also brought lunch after BG of 65 was charted, later result was 110, and no adverse reactions.

## 2021-02-13 NOTE — CARE COORDINATION
[facility] Transitions Initial Follow Up Call    Call within 2 business days of discharge: Yes    Patient: Asher Garcia Patient : 1955   MRN: [id number]  Reason for Admission:   Discharge Date: 21 RARS: Readmission Risk Score: 26      Last Discharge [facility]       Complaint Diagnosis Description Type Department Provider    21 Abnormal Lab Acute renal failure superimposed on chronic kidney disease, unspecified CKD stage, unspecified acute renal failure type ([facility] . .. ED to Hosp-Admission (Discharged) (ADMITTED) Alan Martino MD; [facility] . .. Non-face-to-face services provided:  Obtained and reviewed discharge summary and/or continuity of care documents  [id number]     Challenges to be reviewed by the provider   Additional needs identified to be addressed with provider No  none    Discussed COVID-19 related testing which was available at this time. Test results were negative. Patient informed of results, if available? Yes         Method of communication with provider : none    Advance Care Planning:   Does patient have an Advance Directive:  reviewed and current. Was this a readmission? No  Patient stated reason for admission: abnormal  labs  Patients top risk factors for readmission: medical condition    Care Transition Nurse (CTN) contacted the patient by telephone to perform post hospital discharge assessment. Verified name and  with patient as identifiers. Provided introduction to self, and explanation of the CTN role. CTN reviewed discharge instructions, medical action plan and red flags with patient who verbalized understanding. Patient given an opportunity to ask questions and does not have any further questions or concerns at this time. Were discharge instructions available to patient? Yes. Reviewed appropriate site of care based on symptoms and resources available to patient including: When to call 911.  The patient agrees to contact the PCP office for questions related to their healthcare. Medication reconciliation was performed with patient, who verbalizes understanding of administration of home medications. Advised obtaining a 90-day supply of all daily and as-needed medications. Covid Risk Education    Patient has following risk factors of: heart failure, diabetes and chronic kidney disease. Education provided regarding infection prevention, and signs and symptoms of COVID-19 and when to seek medical attention with patient who verbalized understanding. Discussed exposure protocols and quarantine From CDC: Are you at higher risk for severe illness?   and given an opportunity for questions and concerns. The patient agrees to contact the COVID-19 hotline [phone] or PCP office for questions related to COVID-19. For more information on steps you can take to protect yourself, see CDC's How to Protect Yourself       Discussed follow-up appointments. If no appointment was previously scheduled, appointment scheduling offered: Yes. Is follow up appointment scheduled within 7 days of discharge? Yes  Non-[facility] follow up appointment(s): no    Plan for follow-up call in 3-5 days based on severity of symptoms and risk factors. Plan for next call: symptom management-kidney,CHF     Pt states doing well, no issues or concerns. Denies SOB, CP. Pt does not weigh himself, he is a paraplegic and doesn't have a scale that he can use. Will go back to dialysis on Monday. F/U appt listed below. HC will be out on Monday. Agreed to more CTC f/u calls. CTN provided contact information for future needs.     Care Transitions 24 Hour Call    Do you have any ongoing symptoms?: No  Do you have a copy of your discharge instructions?: Yes  Do you have all of your prescriptions and are they filled?: Yes  Have you been contacted by a Annamarie Dakins Pharmacist?: No  Have you scheduled your follow up appointment?: Yes  How are you going to get to your appointment?: Car - family or friend to transport  Were you discharged with any Home Care or Post Acute Services: No  Patient DME: Wheelchair, Other  Other Patient DME: [employer] [employer]  Do you have support at home?: Partner/Spouse/SO  Do you feel like you have everything you need to keep you well at home?: Yes  Are you an active caregiver in your home?: No  Care Transitions Interventions  No Identified Needs         Follow Up  Future Appointments   Date Time Provider Jeevan Lomas   2/18/2021 10:30 AM [facility] [id number], [facility], APRN - CNS FF Cardio [facility]   9/24/2021  8:30 AM Luca Chapa MD PULM & CC ERNESTINA Calderon, BUDDY

## 2021-02-15 NOTE — TELEPHONE ENCOUNTER
[facility] FAILURE PROGRAM  TELEPHONE ENCOUNTER FORM    Herlinda Florentino 1955    ASSESSMENT:   1. Baseline weight: 207 lbs  2. Current weight: weighed at HD today-- wife wasn't sure what it was  3. Patient weighing daily: is weighing with HD  4. Symptoms: denies symptoms  5. Patient knows who to call with symptoms: Yes  6. Diet history:      Patient states sodium limitation is : 3000 mg      Patient states fluid limitation is 48 oz  Patient following diet as instructed: Yes  7. Medication history: taking medications as instructed Yes; medication side effects noted No  8. Patient is involved in exercise program: No  9. Patient knows date and time of follow up appointment: Yes  10. Patient has transportation to appointments: Yes    RECOMMENDATIONS:   1. Medication: taking as prescribed- wife picked up new rx  2. Diet: no added salt diet  3. MD/ Clinic appointment:  Virtual visit with Maria A Samano on 2/18  4. Other:  Spoke with wife, patient is currently at HD. Patient doing well. Encouraged to call with any questions or concerns.

## 2021-02-17 NOTE — CARE COORDINATION
[facility] Transitions Follow Up Call    2021    Patient: Maryana Horowitz  Patient : 1955   MRN: [id number]  Reason for Admission:   Discharge Date: 21 RARS: Readmission Risk Score: 32         Spoke with:Gustavo Florentino      Care Transitions Subsequent and Final Call    Subsequent and Final Calls  Do you have any ongoing symptoms?: No  Have your medications changed?: No  Do you have any questions related to your medications?: No  Do you currently have any active services?: Yes  Are you currently active with any services?: Home Health  Do you have any needs or concerns that I can assist you with?: No  Identified Barriers: None  Care Transitions Interventions  No Identified Needs  Other Interventions:         Pt states doing well, no issues or concerns. Denies SOB, CP. Had dialysis today.  Agreed to more CTC f/u calls    Follow Up  Future Appointments   Date Time Provider Jeevan Lomas   2021 10:30 AM ADRIANNA Holloway - CNS FF Cardio [facility]   2021  8:30 AM Isai Mccormick MD PULM & CC [facility]       Gifty Jacques RN

## 2021-02-18 NOTE — PATIENT INSTRUCTIONS
1.  Increase oxygen to 4 L   2. Take an extra 40 mg of torsemide later this afternoon  3. Try taking you gabapentin while on dialysis to help with cramping  4. See if dialysis will allow you to use a muscle relaxant to help with cramping so you dont have to stop early  5. Continue all current medications (was on coreg which hospitalist stopped and switched to metoprolol) when finished with script go back to coreg 6.25 mg bid  6.   RTO in 3 weeks
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
## 2021-02-24 NOTE — CARE COORDINATION
[address] Transitions Follow Up Call    2021    Patient: Jen Brown New  Patient : 1955   MRN: [id number]  Reason for Admission:   Discharge Date: 21 RARS: Readmission Risk Score: 26         Follow up call attempted, left contact info on vm      Follow Up  Future Appointments   Date Time Provider Jeevan Lomas   3/16/2021 10:30 AM ADRIANNA Vásquez - CNS FF Cardio [facility]   2021  8:30 AM Blaine Casiano MD PULM & CC [facility]       Nimisha Jacinto RN

## 2021-02-26 NOTE — CARE COORDINATION
[address] Transitions Follow Up Call    2021    Patient: Phillip Cisse New  Patient : 1955   MRN: [id number]  Reason for Admission:   Discharge Date: 21 RARS: Readmission Risk Score: 32         Spoke with: pt's wife    Care Transitions Subsequent and Final Call    Subsequent and Final Calls  Do you have any ongoing symptoms?: No  Have your medications changed?: No  Do you have any questions related to your medications?: No  Do you currently have any active services?: No  Do you have any needs or concerns that I can assist you with?: No  Identified Barriers: None  Care Transitions Interventions  No Identified Needs  Other Interventions:         Pt's wife states pt is currently at dialysis and doing well without prompting from this nurse since there is not a HIPAA form on file for her to discuss his condition. Will f/u at a later time.      Follow Up  Future Appointments   Date Time Provider Jeevan oLmas   3/16/2021 10:30 AM ADRIANNA Dodd - CNS FF Cardio [facility]   2021  8:30 AM Charles Avilez MD PULM & CC [facility]       Romelia Brown RN

## 2021-03-02 NOTE — TELEPHONE ENCOUNTER
Now that he is on dialysis, we can just have [facility] handle his labs. We were of course watching his kidney function carefully with our labs, which we don't need to do now that he is on dialysis.   MARIEL

## 2021-03-02 NOTE — TELEPHONE ENCOUNTER
HHN calling pt got out of [facility] hospital was put on Dialysis and pt goes to [address] [place] [address] [address] [address] Ph. [phone]. Pts wife is wanting to know if MARIEL wants any labs done from [facility] [employer] that aren't done with the weekly labs at dialysis? Or HHN Rn is wants to know if MARIEL just wants to have [facility] do the Labs if any are needed when he gets them done weekly there?  Pls call to advise Thank you

## 2021-03-02 NOTE — CARE COORDINATION
[facility] Transitions Follow Up Call    3/2/2021    Patient: Di Escobar New  Patient : 1955   MRN: [id number]  Reason for Admission:   Discharge Date: 21 RARS: Readmission Risk Score: 32         Spoke with: [address] [facility] [address] Transitions Subsequent and Final Call    Subsequent and Final Calls  Do you have any ongoing symptoms?: No  Have your medications changed?: No  Do you have any questions related to your medications?: No  Do you currently have any active services?: Yes  Are you currently active with any services?: Home Health  Do you have any needs or concerns that I can assist you with?: No  Identified Barriers: None  Care Transitions Interventions  No Identified Needs  Other Interventions:         Pt states doing well, no issues or concerns except jut gets tired after dialysis. Denies SOB, CP. Does not weigh himself at home.  Agreed to more CTC f/u calls      Follow Up  Future Appointments   Date Time Provider Jeevan Lomas   3/16/2021 10:30 AM [facility] [id number], [address], APRN - CNS FF Cardio [facility]   2021  8:30 AM Mack Ramsay MD PULM & CC [facility]       Angle Siddiqui, RN

## 2021-03-09 NOTE — CARE COORDINATION
[address] Transitions Follow Up Call    3/9/2021    Patient: Phillip Cisse New  Patient : 1955   MRN: [id number]  Reason for Admission:   Discharge Date: 21 RARS: Readmission Risk Score: 32         Spoke with: [address] Transitions Subsequent and Final Call    Subsequent and Final Calls  Do you have any ongoing symptoms?: No  Have your medications changed?: No  Do you have any questions related to your medications?: No  Do you currently have any active services?: Yes  Are you currently active with any services?: Home Health  Do you have any needs or concerns that I can assist you with?: No  Identified Barriers: None  Care Transitions Interventions  No Identified Needs  Other Interventions:         Pt states doing well, no issues or concerns. Last week he was SOB at dialysis, they called 911 and took him to [facility]. [address] He feels the reason why it happened was because they had his chaired tilted back too far, did better this week. F/U appts listed below.  Agreed to more CTC f/u calls      Follow Up  Future Appointments   Date Time Provider Jeevan Lomas   3/16/2021 10:30 AM ADRIANNA Dodd - CNS FF Cardio [facility]   2021  8:30 AM Charles Avilez MD PULM & CC [facility]       Romelia Brown RN

## 2021-03-10 PROBLEM — G83.9 PARALYSIS (HCC): Status: ACTIVE | Noted: 2019-12-04

## 2021-03-10 PROBLEM — S98.131A AMPUTATION OF SECOND TOE, RIGHT, TRAUMATIC (HCC): Status: ACTIVE | Noted: 2021-01-01

## 2021-03-10 PROBLEM — I71.9 AORTA ANEURYSM (HCC): Status: ACTIVE | Noted: 2021-01-01

## 2021-03-10 PROBLEM — Z95.1 S/P CABG (CORONARY ARTERY BYPASS GRAFT): Status: ACTIVE | Noted: 2021-01-01

## 2021-03-10 PROBLEM — N25.0 RENAL OSTEODYSTROPHY: Status: ACTIVE | Noted: 2019-12-04

## 2021-03-10 NOTE — TELEPHONE ENCOUNTER
Brad Arciniega, can you see if you can get the report of the \"sleep study\" from [facility]? Also pt is requesting for a new nebulizer machine.

## 2021-03-10 NOTE — TELEPHONE ENCOUNTER
From: Wilfredo Florentino  To: Isai Mccormick MD  Sent: 3/10/2021 10:07 AM EST  Subject: Non-Urgent Medical Question    I was recently taken to [place] from [place] dialysis due to breathing issues and was admitted for fluid overload and bigeminy. My CO2 was elevated so I was briefly put on a CPAP and it was resolved but they did do a sleep study on 3/4/21. They didnt have the results when I was dismissed so I wondered it you would be able to get them? Also, I am in need of a new nebulizer for my Duoneb breathing treatment and wondered if you could write an RX for me? Please let me know and I appreciate whatever you can do. Thank you very much.     Sahara Florentino ( 1955)

## 2021-03-15 NOTE — PROGRESS NOTES
Pre-op interview done. Spoke with pt's wife. Verified allergies, medications and past medical history. Instructed on procedure, npo status, medications with restrictions and transportation. Questions answered, verbalized understanding.
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
## 2021-03-18 NOTE — CARE COORDINATION
Gt 45 Transitions Follow Up Call    3/18/2021    Patient: Jillian Dooley New  Patient : 1955   MRN: [id number]  Reason for Admission:   Discharge Date: 21 RARS: Readmission Risk Score: 32         Spoke with: [address] Transitions Subsequent and Final Call    Subsequent and Final Calls  Do you have any ongoing symptoms?: No  Have your medications changed?: No  Do you have any questions related to your medications?: No  Do you currently have any active services?: Yes  Are you currently active with any services?: Home Health  Do you have any needs or concerns that I can assist you with?: No  Identified Barriers: None  Care Transitions Interventions  No Identified Needs  Other Interventions:         Pt states doing well, no issues or concerns. Had a new catheter placed for dialysis. F/U appt with cardiology went well today.  No need for further f/u CTC calls       Follow Up  Future Appointments   Date Time Provider Jeevan Lomas   2021  9:15 AM Isha Akbar MD FF Cardio [facility]   2021  8:30 AM Lynda Rogers MD PULM & CC [facility]       Johannah Sicard, RN

## 2021-04-02 NOTE — TELEPHONE ENCOUNTER
Discussed with Carmelita. This patient is basically bedridden and attends dialysis MWF. She suggested calling [facility] [place] to see if they will go out to service the tank. I spoke to Marlen at [facility]. She said they will be happy to go fix the valve but since it is not an emergency, they do not make same day house calls; they can go out next week. I updated Carmelita, then called Mrs. Florentino. She agreed to call [facility] and have them come out next week. She knows to call 911 if his breathing worsens, and she states she does limit his fluids. He had dialysis this morning and right now is doing OK.

## 2021-04-02 NOTE — TELEPHONE ENCOUNTER
Wife states pt is having issues with ox compressor and valve. States pt needs to adjust ox depending on his fluid retention. States Kaelyn has him at 2 liters and can not come out to service until they get direction that pt's ox level can be adjusted. Wife states pulmonology is not in today and is asking if you can contact [phone] or fax [phone]. Any questions please call.

## 2021-04-02 NOTE — TELEPHONE ENCOUNTER
I spoke to Mrs. Florentino. The issue is that at some point, she thinks during a hospital admission, his oxygen was increased to 3lpm from 2lpm. She is now having trouble getting the valve to work to increase it to the 3lpm as he is having more SOB; it is a mechanical problem. I advised her to call the pulmonology office and speak to the doctor on call to see if he can help. She was reluctant as he last saw Dr. Dante Thomas Sept 2020 (sees him once a year) but she might try anyway. She is concerned that if his breathing worsens, she may have to call 911 to take him to the ER.

## 2021-04-08 NOTE — TELEPHONE ENCOUNTER
From: Alma Eisenberg New  To: Nasir Marie MD  Sent: 4/8/2021 11:57 AM EDT  Subject: Prescription Question    Dr. Ken Zapata,  When Jackie Min was released from the hospital, he was put on Torsemide 100mg daily. The new Rx was written by the hospitalist but he is out of refills and is in need of this medication. Could you please send a new Rx to [facility] [place] [address] [place], [place] . Pily Godinez. phone number is [phone] and fax number is [phone]. Thanks for your help.   Bonilla

## 2021-04-08 NOTE — TELEPHONE ENCOUNTER
Medication Refill    Medication needing refilled:torsemide 100mg NEW PHARMACY    Dosage of the medication:    How are you taking this medication (QD, BID, TID, QID, PRN):    30 or 90 day supply called in:    When will you run out of your medication:    Which Pharmacy are we sending the medication to?:[facility] on [facility] [address] [facility] , [address] [place] [address] . WIFE IS ASKING WHAT TO DO  ?  IF HE LOST WEIGHT WOULD HIS \"DRY WEIGHT\" CHANGE ?

## 2021-04-19 NOTE — TELEPHONE ENCOUNTER
Call returned. Verbal order given. Nurse wants to know what to do about Lantus dose? Chart states 40 units nurse said patient is taking 14 units.

## 2021-04-19 NOTE — TELEPHONE ENCOUNTER
[facility] with [address] dav[address] called to get a verbal order to extend his home care order. Patients blood sugar was 97 today, also only doing 14 units of Lantus. Phone Number [phone]    Please advise.
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
## 2021-04-30 NOTE — TELEPHONE ENCOUNTER
Medication Refill    Medication needing refilled:metoprolol    Dosage of the medication:25mg    How are you taking this medication (QD, BID, TID, QID, PRN):  BID    30 or 90 day supply called in:    When will you run out of your medication:    Which Pharmacy are we sending the medication to?:[facility]

## 2021-05-07 NOTE — TELEPHONE ENCOUNTER
Prescription refill    Last OV:03-18-21    Last Refill: 4-8-21    Labs: 3-16-21    Future Appt:6-21-21  MARIEL

## 2021-05-13 NOTE — TELEPHONE ENCOUNTER
Thank you. I'm sure they will recheck labs but if they don't, ask wife to call and I will put in lab orders. Strong peripheral pulses

## 2021-05-13 NOTE — TELEPHONE ENCOUNTER
Cherylene Kos with [employer] [phone] states  Pt has a new wound on  Right heel and right 5th toe that is 3 by 2.2 cm.    There is sloth and odor from those wounds    Cherylene Kos believes these are due to Pressure ulcer     Cherylene Kos would like wound order changed     She would also like to have meta honey or santyl for wounds   3 times a week         Cherylene Kos is also requestingTraveling wound care

## 2021-05-31 PROBLEM — N49.2 SCROTAL ABSCESS: Status: ACTIVE | Noted: 2021-01-01
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
## 2021-06-01 PROBLEM — S72.451A: Status: ACTIVE | Noted: 2021-01-01

## 2021-06-01 PROBLEM — Z99.2 END STAGE RENAL DISEASE ON DIALYSIS (HCC): Status: ACTIVE | Noted: 2021-01-01

## 2021-06-01 NOTE — CARE COORDINATION
Discharge Planning Assessment  Rn/SW discharge planner met w/patient/family member to discuss reason for admission, current living situation, and potential needs at the time of discharge. Demographics/Insurance verified Yes    Current type of dwellin level home w/ramped entr    Living arrangements: w/spouse    Level of function/support: spouse assists w/ADL's (bathing/dressing, housekeeping)    PCP: Rosey Forrest    Last Visit to PCP: about 6mos ago    DME: electric chair, on 3L home O2    Active with any community resources/agencies/skilled home care:active w/[facility] for Rn only for wound care-had eval for COA but does not qualify    Medication compliance issues:no issues    Financial issues that could impact healthcare:no issues    Transportation at time of d/c (Discussed w/pt/family that on the day of discharge home, tentative time of discharge will be between 10am and noon): may need stretcher transport home     Discussed and provided facilities of choice if transition to a skilled nursing facility is required a the time of discharge. Tentative discharge plan: Met w/pt's spouse to determine any dc needs. Spouse stated that they are waiting to see [place] [facility] broke his leg. Spouse stated that if pt needs stretcher transport, will need a different company other than [employer]-used [employer] in the past. [facility] M[facility] at 1115am. Sw following for dc planning/recommendations.      Electronically signed by AYAAN Wisdom  [phone]
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
## 2021-06-01 NOTE — PROGRESS NOTES
[address] [phone] was active with this pt prior to admit. Discharge planner notified. Will follow.
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
## 2021-06-01 NOTE — PROGRESS NOTES
8: 23 AM  Patient resting in bed, awake and alert and oriented x4. Shift assessment completed. Suprapubic catheter in place, dressing has slight drainage. Multiple wounds to lower extremities, there are meplex in place. Patient on 3L NC, patient states that is his baseline. Discussed plan of care with patient, patient agreeable. Patient awaiting ortho consult to see if patient will need surgery or not. Dialysis will be sometime today as well.     8:59 AM  Ortho in to see patient, not planning on surgery as fracture is non operable. Patient is now on diet, and will be going for dialysis soon. Patient updated with plan of care. 9:24 AM  Transport here to take patient to dialysis. Patient transitioned to portable O2 tank for transport. 2:00 PM  Patient arrived back to room from dialysis. VSS. Blood glucose 72, eating lunch now. 6:00 PM  Changed patient to specialty bed, brief removed and placed abdominal pads under scrotum, per Dr. Zachary Stone. Sacral dressing changed as well. Removed patients right knee immobilizer for a little bit, wrapped leg with ace bandage from foot to knee, see orders.

## 2021-06-01 NOTE — PLAN OF CARE
Problem: Falls - Risk of:  Goal: Will remain free from falls  Description: Will remain free from falls  6/1/2021 1939 by Scott Hardwick RN  Outcome: Ongoing  6/1/2021 0713 by Loraine Stafford RN  Outcome: Ongoing  Goal: Absence of physical injury  Description: Absence of physical injury  6/1/2021 1939 by Scott Hardwick RN  Outcome: Ongoing  6/1/2021 0713 by Loraine Stafford RN  Outcome: Ongoing     Problem: Skin Integrity:  Goal: Will show no infection signs and symptoms  Description: Will show no infection signs and symptoms  6/1/2021 1939 by Scott Hardwick RN  Outcome: Ongoing  6/1/2021 0713 by Loraine Stafford RN  Outcome: Ongoing  Goal: Absence of new skin breakdown  Description: Absence of new skin breakdown  6/1/2021 1939 by Scott Hardwick RN  Outcome: Ongoing  6/1/2021 0713 by Loraine Stafford RN  Outcome: Ongoing     Problem: Pain:  Goal: Pain level will decrease  Description: Pain level will decrease  Outcome: Ongoing  Goal: Control of acute pain  Description: Control of acute pain  Outcome: Ongoing  Goal: Control of chronic pain  Description: Control of chronic pain  Outcome: Ongoing
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
## 2021-06-01 NOTE — PROGRESS NOTES
Admission assessment completed. Medications given per MAR. Phenergan for nausea. Suprapubic catheter present at admission. Knee immobilizer on right leg. Bilateral heel protectors on. Meplilex applied to multiple wounds on sacrum, heels, and lateral right foot. Patient resting in bed without distress. Family at bedside. Call light within reach and fall precaution in place. The care plan and education has been reviewed.

## 2021-06-01 NOTE — DISCHARGE INSTR - COC
Continuity of Care Form    Patient Name: Sree Winters   :  1955  MRN:  [id number]    Admit date:  2021  Discharge date:  2021    Code Status Order: Full Code   Advance Directives:   [facility] Documentation       Date/Time Healthcare Directive Type of Healthcare Directive Copy in [facility] [address] Agent's Name Healthcare Agent's Phone Number    [phone]  No, patient does not have an advance directive for healthcare treatment -- -- -- -- --            Admitting Physician:  Rupert Major MD  PCP: Monae Frey MD    Discharging Nurse: [facility]/Room#: 3OD-2017/4088-06  Discharging Unit Phone Number: [phone]    Emergency Contact:   Extended Emergency Contact Information  Primary Emergency Contact: Thuan Vargas  Address: [address] [place] [address], [facility] [address]  Home Phone: [phone]  Mobile Phone: [phone]  Relation: Spouse  Secondary Emergency Contact: Akiko Dave [address] Phone: [phone]  Relation: Child    Past Surgical History:  Past Surgical History:   Procedure Laterality Date    APPENDECTOMY      BRONCHOSCOPY  2018   [place] CARDIAC SURGERY      AORTA 1982     CHOLECYSTECTOMY      CORONARY ANGIOPLASTY WITH STENT PLACEMENT      X1    CORONARY ANGIOPLASTY WITH STENT PLACEMENT      X2 FEMORAL    CYSTOSCOPY Bilateral 2016    cysto bilateral retrogrades, ball exchange    GASTROSTOMY TUBE PLACEMENT  2017    IR TUNNELED CATHETER PLACEMENT GREATER THAN 5 YEARS  2021    IR TUNNELED CATHETER PLACEMENT GREATER THAN 5 YEARS 2021 [facility]    LITHOTRIPSY      OTHER SURGICAL HISTORY  2/24/15    right sided percutaneous nephrolithotomy     OTHER SURGICAL HISTORY  2017    suprapubic cath placed     SKIN GRAFT      MANY    TONSILLECTOMY         Immunization History:   Immunization History   Administered Date(s) Administered   [place] Influenza 10/01/2014    Influenza Vaccine, unspecified formulation 10/22/2018    Influenza Virus Vaccine 10/01/2009, 12/18/2013, 10/01/2014, 09/30/2015, 10/22/2018    Influenza, Intradermal, Preservative free 09/30/2015    Influenza, Moisés Hum, IM, PF (6 mo and older Fluzone, Flulaval, Fluarix, and 3 yrs and older Afluria) 10/01/2009, 11/27/2016, 10/19/2017    Influenza, Moisés Hum, adjuvanted, 65 yrs +, IM, PF (Fluad) 10/06/2020    Influenza, Triv, inactivated, subunit, adjuvanted, IM (Fluad 65 yrs and older) 09/13/2019    Pneumococcal Conjugate 13-valent ([id number]) 12/12/2018    Pneumococcal Polysaccharide ([id number]) 03/31/2017       Active Problems:  Patient Active Problem List   Diagnosis Code    Diabetes type 2, uncontrolled ([facility] E11.65    Essential hypertension I10    Paraplegia (HCC) G82.20    Hyperlipidemia E78.5    GERD (gastroesophageal reflux disease) K21.9    Chronic anemia D64.9    Renal stones N20.0    Chronic right shoulder pain M25.511, G89.29    Urinary tract infection with hematuria due to chronic urinary catheter N39.0, R31.9    Pulmonary nodule R91.1    Coronary artery disease involving native coronary artery of native heart without angina pectoris I25.10    Acute renal failure superimposed on chronic kidney disease (HCC) N17.9, N18.9    Chronic diastolic heart failure ([facility]) I50.32    Non-ischemic cardiomyopathy ([facility]) I42.8    Diaphragmatic paralysis J98.6    Chronic pulmonary aspiration T17.908A    Neurogenic bladder N31.9    CKD (chronic kidney disease) stage 3, GFR 30-59 ml/min N18.30    History of DVT (deep vein thrombosis) Z86.718    Dysphagia R13.10    S/P percutaneous endoscopic gastrostomy (PEG) tube placement ([facility]) Z93.1    Decubitus ulcer of right knee, stage 3 ([facility]) N68.593    Iron deficiency anemia, unspecified D50.9    Heart failure, unspecified ([facility]) I50.9    Quadriplegia, unspecified ([facility]) G82.50    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    ESRD (end stage renal disease) ([facility] N18.6    Other ascites R18.8    Aorta aneurysm ([facility]) I71.9    Dysthymic disorder F34.1    Renal osteodystrophy N25.0    Paralysis ([facility]) G83.9    S/P CABG (coronary artery bypass graft) Z95.1    S/P coronary artery stent placement Z95.5    Type 2 diabetes mellitus ([facility] E11.9    Anemia due to chronic kidney disease N18.9, D63.1    Amputation of second toe, right, traumatic ([facility] I83.605O    Scrotal abscess N49.2       Isolation/Infection:   Isolation            Contact          Patient Infection Status       Infection Onset Added Last Indicated Last Indicated By Review Planned Expiration Resolved Resolved By    MDRO (multi-drug resistant organism) 03/25/21 03/27/21 03/25/21 Culture, Urine        Resolved    COVID-19 Rule Out 02/07/21 02/07/21 02/07/21 COVID-19 (Ordered)   02/08/21 Rule-Out Test Resulted    COVID-19 Rule Out 02/07/21 02/07/21 02/07/21 COVID-19 (Ordered)   02/07/21 Rule-Out Test Resulted    MDRO (multi-drug resistant organism)  05/18/16 05/18/16 Jamal Biswas RN   02/08/19 Jesika Morrow RN    1/7/18 MDRO urine culture. 10/31/17 scrotum.  2/22/17 urine 2/18/17 urine 9/1/16 wound coccxy            Nurse Assessment:  Last Vital Signs: BP (!) 98/47   Pulse 65   Temp 97.5 °F (36.4 °C)   Resp 18   Ht 6' (1.829 m)   Wt 205 lb 0.4 oz (93 kg)   SpO2 92%   BMI 27.81 kg/m²     Last documented pain score (0-10 scale): Pain Level: 0  Last Weight:   Wt Readings from Last 1 Encounters:   06/01/21 205 lb 0.4 oz (93 kg)     Mental Status:  oriented, alert, coherent, logical, thought processes intact and able to concentrate and follow conversation    IV Access:  - Central Venous Catheter  - site  left and internal jugular, insertion date: 6/10/2021  - Dialysis Catheter  - site  right and subclavian, insertion date: unsure    Nursing Mobility/ADLs:  Walking   Dependent  Transfer  Dependent  Bathing  Dependent  Dressing  Dependent  Toileting  Dependent  Feeding  Assisted  Med Admin  Assisted  Med Delivery   whole and prefers mixed with pudding    Wound Care Documentation and Therapy:  Wound 05/31/21 Sacrum (Active)   Wound Etiology Pressure Stage  3 06/01/21 0800   Dressing Status Clean;Dry; Intact 06/01/21 0800   Dressing/Treatment Foam 06/01/21 0800   Wound Length (cm) 2.2 cm 05/31/21 2105   Wound Width (cm) 2.5 cm 05/31/21 2105   Wound Depth (cm) 0.3 cm 05/31/21 2105   Wound Surface Area (cm^2) 5.5 cm^2 05/31/21 2105   Change in Wound Size % (l*w) -74.6 05/31/21 2105   Wound Volume (cm^3) 1.65 cm^3 05/31/21 2105   Wound Healing % -416 05/31/21 2105   Wound Assessment Erythema 06/01/21 0800   Drainage Amount Small 06/01/21 0800   Drainage Description Sanguinous 06/01/21 0800   Odor None 06/01/21 0800   Joanne-wound Assessment Edematous 06/01/21 0800   Number of days: 0       Wound 05/31/21 Toe (Comment  which one) Right;Lateral (Active)   Wound Etiology Pressure Stage  2 06/01/21 0800   Dressing Status Clean;Dry; Intact 06/01/21 0800   Dressing/Treatment Foam 06/01/21 0800   Wound Length (cm) 2 cm 05/31/21 2105   Wound Width (cm) 1.8 cm 05/31/21 2105   Wound Depth (cm) 0.1 cm 05/31/21 2105   Wound Surface Area (cm^2) 3.6 cm^2 05/31/21 2105   Wound Volume (cm^3) 0.36 cm^3 05/31/21 2105   Wound Assessment Erythema 06/01/21 0800   Drainage Amount None 06/01/21 0800   Joanne-wound Assessment Intact 06/01/21 0800   Number of days: 0       Wound 05/31/21 Heel Right (Active)   Wound Etiology Pressure Stage  3 06/01/21 0800   Dressing Status Clean;Dry; Intact 06/01/21 0800   Dressing/Treatment Foam 06/01/21 0800   Wound Length (cm) 2.2 cm 05/31/21 2105   Wound Width (cm) 2 cm 05/31/21 2105   Wound Depth (cm) 0.2 cm 05/31/21 2105   Wound Surface Area (cm^2) 4.4 cm^2 05/31/21 2105   Wound Volume (cm^3) 0.88 cm^3 05/31/21 2105   Wound Assessment Erythema 06/01/21 0800   Drainage Amount None 06/01/21 0800   Joanne-wound Assessment Intact 06/01/21 0800   Number of days: 0       Wound 05/31/21 Heel Left (Active)   Wound Etiology Pressure Stage  1 06/01/21 0800   Dressing Status Clean;Dry; Intact 06/01/21 0800   Dressing/Treatment Foam 06/01/21 0800   Wound Assessment Dry 06/01/21 0800   Drainage Amount None 06/01/21 0800   Joanne-wound Assessment Intact 06/01/21 0800   Number of days: 0        Elimination:  Continence:   · Bowel: No  · Bladder: No  Urinary Catheter: Suprapubic catheter   Colostomy/Ileostomy/Ileal Conduit: No       Date of Last BM: ***    Intake/Output Summary (Last 24 hours) at 6/1/2021 1135  Last data filed at 6/1/2021 0644  Gross per 24 hour   Intake 329.13 ml   Output 100 ml   Net 229.13 ml     I/O last 3 completed shifts: In: 329.1 [I.V.:5.7; IV Piggyback:323.5]  Out: 100 [Urine:100]    Safety Concerns: At Risk for Falls    Impairments/Disabilities:      Speech and Paralysis - Soft spoken, hoarseness, Paraplegic    Nutrition Therapy:  Current Nutrition Therapy:   - Oral Diet:  Carb Control 4 carbs/meal (1800kcals/day) and Renal    Routes of Feeding: Oral  Liquids: Thin Liquids  Daily Fluid Restriction: no  Last Modified Barium Swallow with Video (Video Swallowing Test): not done    Treatments at the Time of Hospital Discharge:   Respiratory Treatments: ***  Oxygen Therapy:  is on oxygen at 3 L/min per nasal cannula. Ventilator:    - No ventilator support    Rehab Therapies: SN PT OT [facility]  Weight Bearing Status/Restrictions: Non-weight bearing on right leg  Other Medical Equipment (for information only, NOT a DME order):  hospital bed and lift  Other Treatments: Knee immobilizer. Open twice daily to check for skin breakdown. No ROM to knee.     Patient's personal belongings (please select all that are sent with patient):  None    RN SIGNATURE:  Electronically signed by Abdon Smith RN on 6/11/21 at 8:01 PM EDT    CASE MANAGEMENT/SOCIAL WORK SECTION    Inpatient Status Date: 05/31/21    Readmission Risk Assessment Score:  Readmission Risk              Risk of Unplanned Readmission:  25 Discharging to Facility/ Agency     Name: Matthieu Vital will call for Appointment  Phone: [phone]  Fax: [phone]  Phone: [phone]  Fax: [phone]    / signature: Electronically signed by AYAAN Galicia on 6/11/2021 at 11:46 AM      PHYSICIAN SECTION    Prognosis: Fair    Condition at Discharge: Stable    Rehab Potential (if transferring to Rehab): Fair    Recommended Labs or Other Treatments After Discharge: Home care: RN, PT, OT, [facility]     Wound care: CLEANSE SCROTAL WOUND WITH NSS. PACK SCROTAL WOUND WITH SALINE MOISTENED GAUZE, COVER WITH ABD PAD AND SECURE DSG. CHANGE DSG. TWICE DAILY. Alyse [place] WITH UNDERWEAR. APPLY TRIAD CREAM TO SACRAL WOUND AND ALL WOUNDSTO AKHIL. FEET AND HEELS AND APPLY FOAM DSGS. WITH ADHESIVE BORDERS. CHANGE MON.WED.FRI.  TEACH SPOUSE WOUND CARE. Ortho: Knee immobilizer. Open twice daily to check for skin breakdown. No ROM to knee. 6.10.21 dual Picc                          Out-patient antibiotic therapy (OPAT)/ Antibiotic Infusion orders          Diagnosis: E. coli and multidrug resistant Serratia scrotal abscess, s/p surgical I&D on 6/7/2021, hemodialysis patient       Organism/ culture: See above     Name and dose of Antimicrobial: IV meropenem 500 mg every 24 hours      Antimicrobial start date: calculated from 6/8/2021     Antimicrobial completion date planned: 6/15/2021     Lab monitoring: CBC, LFT once a week, to be       collected every Monday or Tuesday morning until the patient is off IV  antibiotics. Fax weekly lab results to Chente Yun MD's office at        [phone]. Please maintain PICC line until the patient is on IV antibiotics. Ok to administer PICC line normal saline flushes as needed. ** Please notify Chente Yun MD's office with any change in patient's        status, transfer out of facility or to hospital by calling [phone]           Outpatient Follow up:  No outpatient ID f/u needed if continues to do well. Due to COVID 19 pandemic, if needed, a f/u video visit can be arranged via my office at patient's request on as-needed basis. Physician Signature:  Electronically signed by Norbert Freeman MD on                                                      6/8/21 at 2:12 PM EDT         Physician Certification: I certify the above information and transfer of Sree Cordial  is necessary for the continuing treatment of the diagnosis listed and that he requires [address] for less 30 days.      Update Admission H&P: No change in H&P    PHYSICIAN SIGNATURE:  Electronically signed by Hoa Casarez MD on 6/10/21 at 2:03 PM EDT

## 2021-06-01 NOTE — PROGRESS NOTES
Hospitalist Progress Note      PCP: Christophe Torres MD    Date of Admission: 5/31/2021    Chief Complaint: fall     Hospital Course:  Patient seen on dialysis  Denies any pain  Paraplegic wheelchair-bound admitting history reviewed and confirmed  No fever sweats  No chest pain      Medications:  Reviewed      Exam:    BP (!) 98/47   Pulse 65   Temp 97.5 °F (36.4 °C)   Resp 18   Ht 6' (1.829 m)   Wt 205 lb 0.4 oz (93 kg)   SpO2 92%   BMI 27.81 kg/m²     General appearance: No apparent distress, appears stated age and cooperative. HEENT: Pupils equal, round, and reactive to light. Conjunctivae/corneas clear. Neck: Supple, with full range of motion. No jugular venous distention. Trachea midline. Respiratory:  Normal respiratory effort. Clear to auscultation, bilaterally without RALES/WHEEZES/Rhonchi. Cardiovascular: Regular rate and rhythm with normal S1/S2 without MURMURS, rubs or gallops. Abdomen: Soft, non-tender, non-distended with normal bowel sounds. Skin scrotal area edematous patient has no sensation hence unable to check for tenderness no erythema unable to turn patient disease on dialysis at the bottom of the scrotum and the perineal region  Prepubic catheter in place  Musculoskeletal: No clubbing, cyanosis or EDEMA bilaterally. Full range of motion without deformity. Skin: Skin color, texture, turgor normal.  No rashes or lesions. Neurologic:  Neurovascularly intact without any focal sensory/motor deficits.  Cranial nerves: II-XII intact, grossly non-focal.        Labs:   Recent Labs     05/31/21  1507 06/01/21  0836   WBC 10.2 8.3   HGB 10.2* 10.5*   HCT 33.0* 33.9*    171     Recent Labs     05/31/21  1507 06/01/21  0836   * 134*   K 4.6 4.5   CL 93* 94*   CO2 31 30   BUN 54* 58*   CREATININE 3.4* 3.7*   CALCIUM 8.7 8.6   PHOS  --  4.6     Recent Labs     05/31/21  1507 06/01/21  0836   AST 25 22   ALT 12 11   BILITOT 0.4 0.3   ALKPHOS 112 108     No results for input(s): VIEWS)   Final Result   Comminuted displaced fracture distal right femur      Otherwise, no acute bony or joint abnormality         US SCROTUM AND TESTICLES    (Results Pending)       Assessment/Plan:    Active Hospital Problems    Diagnosis Date Noted    Scrotal abscess [N49.2] 05/31/2021       Acute Medical Issues Being Addressed:    78-year-old admitted to the hospital after a fall    Fall mechanical    Right femur distal fracture  Orthopedics consulted    Acute kidney injury on baseline of chronic kidney disease stage V possible end-stage renal disease on dialysis twice a week  Currently getting dialysis  Nephrology consulted    Scrotal abscess and cellulitis suspected with previous recurrent UTIs on background of suprapubic catheter  CT of the abdomen and pelvis noted  Ultrasound of the scrotum pending  Given previous multidrug-resistant organisms will change antibiotics to Vanco meropenem to also give anaerobic coverage  Urology have been consulted    Anemia of chronic kidney disease  Hemoglobin stable    Chronic respiratory failure  On 2 L oxygen  Respiratory status at baseline    DVT Prophylaxis: Subcu heparin  Diet: DIET CARB CONTROL;  Code Status: Full Code      Dispo - once acute medical processes have resolved    Ericka Haines MD

## 2021-06-01 NOTE — ED NOTES
Nursing report called to [facility], RN on receiving unit via Zoom. RN accepts patient and has no further questions.      Hayden Loving RN  05/31/21 2032
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
## 2021-06-01 NOTE — PROGRESS NOTES
Pharmacy Note  Vancomycin Consult    Joey Morris is a 77 y.o. male started on Vancomycin for scrotal abscess; consult received from Dr. Catracho Mcclendon to manage therapy. Also receiving the following antibiotics: cefepime.     Patient Active Problem List   Diagnosis    Diabetes type 2, uncontrolled ([address]    Essential hypertension    Paraplegia ([facility])    Hyperlipidemia    GERD (gastroesophageal reflux disease)    Chronic anemia    Renal stones    Chronic right shoulder pain    Urinary tract infection with hematuria due to chronic urinary catheter    Pulmonary nodule    Coronary artery disease involving native coronary artery of native heart without angina pectoris    Acute renal failure superimposed on chronic kidney disease (HCC)    Chronic diastolic heart failure ([facility])    Non-ischemic cardiomyopathy ([facility])    Diaphragmatic paralysis    Chronic pulmonary aspiration    Neurogenic bladder    CKD (chronic kidney disease) stage 3, GFR 30-59 ml/min    History of DVT (deep vein thrombosis)    Dysphagia    S/P percutaneous endoscopic gastrostomy (PEG) tube placement ([facility])    Decubitus ulcer of right knee, stage 3 ([facility])    Iron deficiency anemia, unspecified    Heart failure, unspecified ([facility])    Quadriplegia, unspecified ([address]    Hypergammaglobulinemia    ESRD (end stage renal disease) ([address]    Other ascites    Aorta aneurysm ([facility])    Dysthymic disorder    Renal osteodystrophy    Paralysis ([address]    S/P CABG (coronary artery bypass graft)    S/P coronary artery stent placement    Type 2 diabetes mellitus ([address]    Anemia due to chronic kidney disease    Amputation of second toe, right, traumatic ([address]    Scrotal abscess     Allergies:  Lisinopril     Temp max: 97.5  Recent Labs     05/31/21  1507   BUN 54*   CREATININE 3.4*   WBC 10.2     No intake or output data in the 24 hours ending 05/31/21 2125  Culture Date      Source                       Results      Ht Readings from Last 1 Encounters:   05/31/21 6' (1.829 m)        [facility] Readings from Last 1 Encounters:   05/31/21 200 lb (90.7 kg)       Body mass index is 27.12 kg/m². Estimated Creatinine Clearance: 23 mL/min (A) (based on SCr of 3.4 mg/dL (H)). Goal Trough Level: 15-20 mcg/mL    Assessment/Plan:  Will initiate Vancomycin with a one time loading dose of 1250 mg x1. Timing of trough level will be determined based on culture results, renal function, and clinical response. Thank you for the consult. Will continue to follow.

## 2021-06-01 NOTE — PLAN OF CARE
Problem: Falls - Risk of:  Goal: Will remain free from falls  Description: Will remain free from falls  6/1/2021 0713 by Oleg Hanson RN  Outcome: Ongoing  6/1/2021 0124 by Ja Rojas RN  Outcome: Ongoing     Problem: Falls - Risk of:  Goal: Absence of physical injury  Description: Absence of physical injury  6/1/2021 0713 by Oleg Hanson RN  Outcome: Ongoing  6/1/2021 0124 by Ja Rojas RN  Outcome: Ongoing     Problem: Skin Integrity:  Goal: Will show no infection signs and symptoms  Description: Will show no infection signs and symptoms  6/1/2021 0713 by Oleg Hanson RN  Outcome: Ongoing  6/1/2021 0124 by Ja Rojas RN  Outcome: Ongoing     Problem: Skin Integrity:  Goal: Absence of new skin breakdown  Description: Absence of new skin breakdown  6/1/2021 0713 by Oleg Hanson RN  Outcome: Ongoing  6/1/2021 0124 by Ja Rojas RN  Outcome: Ongoing

## 2021-06-01 NOTE — FLOWSHEET NOTE
Treatment time: 3.5 hours  Net UF: 1500 ml     Pre weight: 93 kg (bed scale)  Post weight: 91.6 kg (bed scale)  EDW: 92 kg     Access used: R TDC  Access function: Positional with - 350 ml/min     Medications or blood products given: Phenergan 12.5mg     Regular outpatient schedule:      Summary of response to treatment: Tolerated treatment well. No HD complications or complaints. Copy of dialysis treatment record placed in chart, to be scanned into EMR.       06/01/21 0935 06/01/21 1323   Treatment   Time On  --  0944   Time Off  --  1317   Vital Signs   BP (!) 98/47 (!) 108/47   Temp 97.5 °F (36.4 °C) 97 °F (36.1 °C)   Pulse 65 74   Resp 18 20   Weight 205 lb 0.4 oz (93 kg) 201 lb 15.1 oz (91.6 kg)   Weight Method Bed scale Bed scale   Dry Weight 202 lb 13.2 oz (92 kg) 202 lb 13.2 oz (92 kg)   Treatment Initiation   Dialyze Hours 3.5  --    Dialysis Bath   K+ (Potassium) 3  --    Ca+ (Calcium) 2.5  --    Na+ (Sodium) 138  --    HCO3 (Bicarb) 32  --    Hemodialysis Central Access Internal jugular Right   No Placement Date or Time found. Pre-existing: Yes  Access Type: Internal jugular  Orientation: Right   Dressing Intervention New;Dressing changed  --    Dressing Status Clean;Dry; Intact  --    Dressing Change Due 06/08/21  --    Post-Hemodialysis Assessment   Post-Treatment Procedures  --  Blood returned;Catheter capped, clamped and heparinized x 2 ports   Machine Disinfection Process  --  Acid/Vinegar Clean;Heat Disinfect; Exterior Machine Disinfection   Rinseback Volume (ml)  --  400 ml   Total Liters Processed (l/min)  --  62.5 l/min   Dialyzer Clearance  --  Lightly streaked   Duration of Treatment (minutes)  --  210 minutes   Hemodialysis Output (ml)  --  1900 ml   NET Removed (ml)  --  1500 ml   Tolerated Treatment  --  Good

## 2021-06-01 NOTE — PROGRESS NOTES
Patient seen and examined on dialysis. Tolerating well, BP stable, UF goal is set at 1.5L if tolerated. Change bicarbonate in dialysate to 32meq/L from 35meq/L.

## 2021-06-01 NOTE — PROGRESS NOTES
4 Eyes Skin Assessment     NAME:  Julien Florentino  YOB: 1955  MEDICAL RECORD NUMBER:  [id number]    The patient is being assess for  Admission    I agree that 2 RN's have performed a thorough Head to Toe Skin Assessment on the patient. ALL assessment sites listed below have been assessed. Areas assessed by both nurses:    Head, Face, Ears, Shoulders, Back, Chest, Arms, Elbows, Hands, Sacrum. Buttock, Coccyx, Ischium and Legs. Feet and Heels        Does the Patient have a Wound? Yes wound(s) were present on assessment. LDA wound assessment was Initiated and completed        Cameron Prevention initiated:  Yes   Wound Care Orders initiated:   Wound care consult ordered    Pressure Injury (Stage 3,4, Unstageable, DTI, NWPT, and Complex wounds) if present place consult order under [de-identified] Yes    New and Established Ostomies if present place consult order under : No      Nurse 1 eSignature: Electronically signed by Franky Diehl RN on 6/1/21 at 2:46 AM EDT    **SHARE this note so that the co-signing nurse is able to place an eSignature**    Nurse 2 eSignature: Electronically signed by Karlee Parra RN on 6/1/21 at 2:47 AM EDT

## 2021-06-02 NOTE — PROGRESS NOTES
non-distended, non-tender, no masses  Ext: no peripheral edema noted, moves upper and lower extremities spontaneously  Skin: warmand well perfused, no rashes noted on the face, or arms.      Labs:  Lab Results   Component Value Date    WBC 8.5 06/02/2021    HGB 9.5 (L) 06/02/2021    HCT 30.3 (L) 06/02/2021    MCV 88.2 06/02/2021     06/02/2021     Lab Results   Component Value Date    CREATININE 3.0 (H) 06/02/2021    BUN 36 (H) 06/02/2021     (L) 06/02/2021    K 4.4 06/02/2021    CL 96 (L) 06/02/2021    CO2 27 06/02/2021       Herlene Comas, APRN - CNP   6/2/2021

## 2021-06-02 NOTE — PROGRESS NOTES
[facility] Orthopedic Surgery  Progress Note    Subjective: Patient sitting up in the bed. His right knee remains in the immobilizer. He denies any pain. He denies any new issues overnight. MRI of the pelvis is pending to evaluate for possible sinus/fistula. Objective:  Vitals:    06/02/21 0845   BP: 105/65   Pulse: 86   Resp: 16   Temp: 97.4 °F (36.3 °C)   SpO2: 90%      Physical Examination:  GENERAL: No apparent distress, well-nourished  SKIN:  Warm and dry  EYES: Nonicteric. ENT: Mucous membranes moist  HEAD: Normocephalic, atraumatic  RESPIRATORY: Resp easy and unlabored  CARDIOVASCULAR: Regular rate and rhythm  GI: Abdomen soft, nontender  NEURO: Awake and alert. No speech defect  PSYCHIATRIC: Appropriate affect; not agitated  MUSCULOSKELETAL: Right knee  Inspection: On exam there are no ulcerations, rashes or lesions about the right knee. There is no pain to palpation of the right distal femur or knee. Moderate knee effusion. No erythema. Knee immobilizer in place. Loosened and skin examined - no breakdown noted. ACE wrap replaced. Motor: Unable to DF or PF due to T7 paraplegia. Sensation: Insensate throughout the bilateral lower extremities  Vascular: 1+ right DP pulse, sluggish cap refill. Labs reviewed:  Recent Labs     05/31/21  1507 06/01/21  0836 06/02/21  0538   WBC 10.2 8.3 8.5   HGB 10.2* 10.5* 9.5*   HCT 33.0* 33.9* 30.3*    171 165     Recent Labs     05/31/21  1507 06/01/21  0836 06/02/21  0538   * 134* 131*   K 4.6 4.5 4.4   CL 93* 94* 96*   CO2 31 30 27   BUN 54* 58* 36*   CREATININE 3.4* 3.7* 3.0*   GLUCOSE 145* 68* 116*   CALCIUM 8.7 8.6 8.1*   MG  --   --  1.90   PHOS  --  4.6  --      No results for input(s): INR, PROTIME in the last 72 hours.     Lab Results   Component Value Date    COLORU Yellow 05/31/2021    CLARITYU TURBID (A) 05/31/2021    PHUR 6.0 05/31/2021    GLUCOSEU Negative 05/31/2021    BLOODU MODERATE (A) 05/31/2021    LEUKOCYTESUR LARGE (A) 05/31/2021    BILIRUBINUR Negative 05/31/2021    UROBILINOGEN 0.2 05/31/2021    RBCUA see below (A) 05/31/2021    WBCUA >100 (A) 05/31/2021    BACTERIA 2+ (A) 04/01/2021    AMORPHOUS 4+ (A) 11/18/2016       Imaging:  US SCROTUM AND TESTICLES   Final Result   Normal appearing testicles with normal blood flow to both testicles      Scrotal wall thickening with increased vascularity and a heterogeneous   hypoechoic area inferiorly in the midline suggesting an infectious process. No discrete abscess identified         CT ABDOMEN PELVIS W IV CONTRAST Additional Contrast? None   Final Result   1. The scrotum is not routinely included in the field of view on CT pelvis   and is not imaged on this exam.   2. Findings suggesting cystitis, with suprapubic catheter draining the   urinary bladder. 3. Nonspecific abdominal and pelvic ascites may be reactive related to renal   disease. 4. Mild anasarca. 5. Indeterminate 1.9 cm cystic lesion inferior pole right kidney. Additional   characterization with MRI abdomen without and with gadolinium recommended. If unable to perform this exam, then follow-up noncontrast CT abdomen imaging   recommended in 6-12 months to ensure stability. 6. Small volume bilateral pleural effusion with bibasilar relaxation   atelectasis similar in appearance to prior exam.   7. Mild cardiomegaly. RECOMMENDATIONS:   MRI abdomen without and with gadolinium recommended. If unable to perform   this exam, then follow-up noncontrast CT abdomen imaging recommended         XR KNEE LEFT (1-2 VIEWS)   Final Result   Mild osteoarthritic changes medially in the knee with no acute bony   abnormality. Severe diffuse osteopenia. Small suprapatellar effusion.          XR TIBIA FIBULA RIGHT (2 VIEWS)   Final Result   Comminuted displaced fracture distal right femur      Otherwise, no acute bony or joint abnormality         XR FOOT RIGHT (MIN 3 VIEWS)   Final Result   Comminuted displaced fracture distal right femur      Otherwise, no acute bony or joint abnormality         XR KNEE RIGHT (MIN 4 VIEWS)   Final Result   Comminuted displaced fracture distal right femur      Otherwise, no acute bony or joint abnormality         MRI PELVIS WO CONTRAST    (Results Pending)       IMPRESSION:  RIGHT supracondylar distal femur fracture  T7 paraplegia  Renal osteodystrophy  ESRD on HDU  DM II  HTN  Chronic anemia  Hx DVT  Chronic diastolic HF  Active Problems:    Paraplegia (HCC)    End stage renal disease on dialysis [facility])    Renal osteodystrophy    Scrotal abscess    Closed supracondylar fracture of femur, right, initial encounter ([facility] [address]  Resolved Problems:    * No resolved hospital problems. *      PLAN:  Continue non-operative management.   - NWB right leg; continue knee immobilizer with ACE underneath.   Remove three times daily to evaluate for skin breakdown.   - DVT prophylaxis: Heparin as inpatient per primary team.  Would rec 2.5mg Eliquis BID x 30 days upon d/c.  - Dispo: Per primary team.    Follow-up with Dr. Federico Valle in 2-4 weeks    ADRIANNA Crowder - CNP  6/2/2021  11:48 AM

## 2021-06-02 NOTE — PROGRESS NOTES
Shift assessment completed. Medications given per MAR. Tylenol for pain and trazodone for sleep. Knee immobilizer removed and reapplied. Patient resting in bed and denying other needs. Call light within reach and fall precaution in place. The care plan and education has been reviewed.

## 2021-06-02 NOTE — PROGRESS NOTES
06/01/21  0836   AST 25 22   ALT 12 11   BILITOT 0.4 0.3   ALKPHOS 112 108     No results for input(s): INR in the last 72 hours. No results for input(s): Casandra Ends in the last 72 hours. Urinalysis:      Lab Results   Component Value Date    NITRU Negative 05/31/2021    WBCUA >100 05/31/2021    BACTERIA 2+ 04/01/2021    RBCUA see below 05/31/2021    RBCUA 3+ 05/12/2016    BLOODU MODERATE 05/31/2021    SPECGRAV 1.025 05/31/2021    GLUCOSEU Negative 05/31/2021    GLUCOSEU >=1000 05/17/2011       Radiology:  MRI PELVIS WO CONTRAST   Preliminary Result   1. T2 hyperintense collection with layering debris noted within the scrotum   extending slightly left of midline measuring up to 5.2 cm. The superior   aspect of this collection abuts the mid/posterior aspect of the base of the   penis at the corpus spongiosum with possible extension to the penile urethra   in this region. Finding is concerning for urethral fistulization given   provided history. 2. Diffuse soft tissue edema. T2 hyperintensity involving the adductor   musculature bilaterally is concerning for possible myositis. Findings are   incompletely evaluated due to lack of intravenous contrast.         US SCROTUM AND TESTICLES   Final Result   Normal appearing testicles with normal blood flow to both testicles      Scrotal wall thickening with increased vascularity and a heterogeneous   hypoechoic area inferiorly in the midline suggesting an infectious process. No discrete abscess identified         CT ABDOMEN PELVIS W IV CONTRAST Additional Contrast? None   Final Result   1. The scrotum is not routinely included in the field of view on CT pelvis   and is not imaged on this exam.   2. Findings suggesting cystitis, with suprapubic catheter draining the   urinary bladder. 3. Nonspecific abdominal and pelvic ascites may be reactive related to renal   disease. 4. Mild anasarca.    5. Indeterminate 1.9 cm cystic lesion inferior pole right kidney. Additional   characterization with MRI abdomen without and with gadolinium recommended. If unable to perform this exam, then follow-up noncontrast CT abdomen imaging   recommended in 6-12 months to ensure stability. 6. Small volume bilateral pleural effusion with bibasilar relaxation   atelectasis similar in appearance to prior exam.   7. Mild cardiomegaly. RECOMMENDATIONS:   MRI abdomen without and with gadolinium recommended. If unable to perform   this exam, then follow-up noncontrast CT abdomen imaging recommended         XR KNEE LEFT (1-2 VIEWS)   Final Result   Mild osteoarthritic changes medially in the knee with no acute bony   abnormality. Severe diffuse osteopenia. Small suprapatellar effusion. XR TIBIA FIBULA RIGHT (2 VIEWS)   Final Result   Comminuted displaced fracture distal right femur      Otherwise, no acute bony or joint abnormality         XR FOOT RIGHT (MIN 3 VIEWS)   Final Result   Comminuted displaced fracture distal right femur      Otherwise, no acute bony or joint abnormality         XR KNEE RIGHT (MIN 4 VIEWS)   Final Result   Comminuted displaced fracture distal right femur      Otherwise, no acute bony or joint abnormality             Assessment/Plan:    Active Hospital Problems    Diagnosis Date Noted    Closed supracondylar fracture of femur, right, initial encounter ([facility] [address] Naz Inch 06/01/2021    Scrotal abscess [N49.2] 05/31/2021    End stage renal disease on dialysis ([address] [N18.6, Z99.2] 02/01/2021    Renal osteodystrophy [N25.0] 12/04/2019    Paraplegia ([address] [G82.20]        Acute Medical Issues Being Addressed:    64-year-old admitted to the hospital after a fall    Fall mechanical    Right femur distal fracture  -Appreciate orthopedic recommendation  -Nonweightbearing for now.   -Knee immobilizer.  -No acute intervention    Acute kidney injury on baseline of chronic kidney disease stage V possible end-stage renal disease on dialysis twice a week  Currently getting dialysis  Nephrology consulted    Scrotal abscess  -Appreciate urology recommendation  -Continue antibiotics  -Continue gentle irrigation of bladder with sterile saline. -MRI him and there is a concern for fistula.   -Urine culture growing E. coli continue Merrem for now      Anemia of chronic kidney disease  Hemoglobin stable    Chronic respiratory failure  On 2 L oxygen  Respiratory status at baseline    DVT Prophylaxis: Subcu heparin  Diet: DIET CARB CONTROL;  Code Status: Full Code      Dispo - once acute medical processes have resolved    Kady Cedillo MD

## 2021-06-02 NOTE — PLAN OF CARE
Problem: Falls - Risk of:  Goal: Will remain free from falls  Description: Will remain free from falls  6/2/2021 1919 by Alvaro Lesches, RN  Outcome: Ongoing  6/2/2021 0708 by Floyd Nance RN  Outcome: Ongoing  Goal: Absence of physical injury  Description: Absence of physical injury  6/2/2021 1919 by Alvaro Lesches, RN  Outcome: Ongoing  6/2/2021 0708 by Floyd Nance RN  Outcome: Ongoing     Problem: Skin Integrity:  Goal: Will show no infection signs and symptoms  Description: Will show no infection signs and symptoms  6/2/2021 1919 by Alvaro Lesches, RN  Outcome: Ongoing  6/2/2021 0708 by Floyd Nance RN  Outcome: Ongoing  Goal: Absence of new skin breakdown  Description: Absence of new skin breakdown  6/2/2021 1919 by Alvaro Lesches, RN  Outcome: Ongoing  6/2/2021 0708 by Floyd Nance RN  Outcome: Ongoing     Problem: Pain:  Goal: Pain level will decrease  Description: Pain level will decrease  6/2/2021 1919 by Alvaro Lesches, RN  Outcome: Ongoing  6/2/2021 0708 by Floyd Nance RN  Outcome: Ongoing  Goal: Control of acute pain  Description: Control of acute pain  6/2/2021 1919 by Alvaro Lesches, RN  Outcome: Ongoing  6/2/2021 0708 by Floyd Nance RN  Outcome: Ongoing  Goal: Control of chronic pain  Description: Control of chronic pain  6/2/2021 1919 by Alvaro Lesches, RN  Outcome: Ongoing  6/2/2021 0708 by Floyd Nance RN  Outcome: Ongoing

## 2021-06-02 NOTE — PROGRESS NOTES
Nephrology Progress Note  [phone]  [phone]   [email or web address]        Reason for Consult:  ESRD    HISTORY OF PRESENT ILLNESS:                This is a patient with significant past medical history of CHEMO on CKD Stage 4, dialysis dependent on HD M&F, missed HD yesterday,  HTN, CHF, history of suprapubic urinary catheter,  who presented after a fall from [place]. X-ray showed right sided communited displaced fracture of distal femur. He also notes swelling and redness of his scrotal sac, US is pending. Urology is consulted, on vanc/cefepime. Denies fever/chills/N/V/c.hest pain/SON. Last HD on Friday. Subjective:  -pt seen and examined  -PMSHx and meds reviewed  -No family at bedside    Bp stable  S/p HD yesterday  Seen by Urology and ID  Antibiotic changes noted  Scrotal US noted    ROS: neg fever/chills          PHYSICAL EXAM:    Vitals:    BP (!) 119/55   Pulse 81   Temp 98 °F (36.7 °C) (Oral)   Resp 16   Ht 6' (1.829 m)   Wt 208 lb 6 oz (94.5 kg)   SpO2 92%   BMI 28.26 kg/m²   I/O last 3 completed shifts: In: 346.4 [IV Piggyback:346.4]  Out: 2000 [Urine:100]  No intake/output data recorded. Physical Exam:  Gen: Resting in bed, NAD. HEENT: MMM, OP clear. Anicteric, NC/AT  CV: RRR, + S1/S2  Lungs: Good respiratory effort, clear air entry   Abd: S/NT +BS  Ext: trace edema, no cyanosis  RLE ACE wrap  Skin: Warm. No rashes appreciated.   : +suparpubic catheter, draining purulent urine  Neuro: Alert and oriented x 3, BLE paresis  Right TDC C/D/I    DATA:    CBC:   Lab Results   Component Value Date    WBC 8.5 06/02/2021    RBC 3.43 06/02/2021    HGB 9.5 06/02/2021    HCT 30.3 06/02/2021    MCV 88.2 06/02/2021    MCH 27.6 06/02/2021    MCHC 31.3 06/02/2021    RDW 17.0 06/02/2021     06/02/2021    MPV 6.8 06/02/2021     BMP:    Lab Results   Component Value Date     06/02/2021    K 4.4 06/02/2021    K 4.5 06/01/2021    CL 96 06/02/2021    CO2 27 06/02/2021    BUN 36 06/02/2021    LABALBU 3.1 06/01/2021    CREATININE 3.0 06/02/2021    CALCIUM 8.1 06/02/2021    GFRAA 25 06/02/2021    GFRAA >60 05/13/2013    LABGLOM 21 06/02/2021    LABGLOM 58 10/15/2015    GLUCOSE 116 06/02/2021       IMPRESSION/RECOMMENDATIONS:      CHEMO on CKD stage 4: remains dialysis dependent, likely ESKD  -HD M&F-had HD yesterday due to missed HD on 5/31  -pre-wt 93kg, post weight 91.6kg, DW is 92kg  -no HD needed today-will plan for HD on Friday for now    ? Scrotal abscess: US of scrotum noted-n discrete abscess  -appreciate Urology/ID recs  -abx changed to Merrem    Right femur fracture: per ortho-conservative management    CKD stage 4/5: multifactorial-DKD/HTN NS/CHEMO on CKD  -likely dialysis dependent-sees Dr. Dudley Prim  -no signs of renal function recovery    Hypotension: improved - on midodrine daily    DM2: on insulin    Cystic lesion right kidney pole: 1.9cm noted on CT of A/P-will repeat imaging as outpatient    Anemia:add Retacrit with next HD    CKD-MBD: phos is stable, no binders needed    Thank you for allowing me to participate in the care of this patient. I will continue to follow along. Please call with questions.     Tere Velasco MD
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
## 2021-06-02 NOTE — PROGRESS NOTES
Nutrition Assessment     Type and Reason for Visit: Initial, Wound, Consult    Nutrition Recommendations/Plan:   Encourage good sources of protein for wound healing. Nutrition Assessment:  Pt at risk for nutrition compromise AEB increased nutrient needs for multiple areas of skin breakdown. Pt reports is eating well & has a good appetite. Declines need for ONS. Wt is stable per hx. Discussed protein options to promote healing. Spouse requests list of potassium level in foods d/t renal disease. Written guidelines provided. No further nutrition concerns expressed @ this time. Malnutrition Assessment:  Malnutrition Status: No malnutrition    Nutrition Related Findings: LBM 6/1; nonpitting edema: extremities & perineal      Current Nutrition Therapies:    DIET CARB CONTROL;     Anthropometric Measures:  · Height: 6' (182.9 cm)  · Current Body Wt: 208 lb (94.3 kg)   · BMI: 28.2    Nutrition Diagnosis:   · Increased nutrient needs related to increase demand for energy/nutrients as evidenced by wounds      Nutrition Interventions:   Food and/or Nutrient Delivery:  Continue Current Diet  Nutrition Education/Counseling:  Education completed   Coordination of Nutrition Care:  Continue to monitor while inpatient    Goals:  po intake at least 50% of meals       Nutrition Monitoring and Evaluation:   Behavioral-Environmental Outcomes:  None Identified   Food/Nutrient Intake Outcomes:  Food and Nutrient Intake  Physical Signs/Symptoms Outcomes:  Skin, Weight     Discharge Planning:    No discharge needs at this time     Electronically signed by Shaun Vargas RD, CAMILA on 6/2/21 at 2:00 PM EDT    Contact: [phone]

## 2021-06-02 NOTE — PLAN OF CARE
Problem: Falls - Risk of:  Goal: Will remain free from falls  Description: Will remain free from falls  6/2/2021 0708 by Shannan Bernal RN  Outcome: Ongoing  6/1/2021 1939 by Colleen Moralez RN  Outcome: Ongoing     Problem: Falls - Risk of:  Goal: Absence of physical injury  Description: Absence of physical injury  6/2/2021 0708 by Shannan Bernal RN  Outcome: Ongoing  6/1/2021 1939 by Colleen Moralez RN  Outcome: Ongoing     Problem: Skin Integrity:  Goal: Will show no infection signs and symptoms  Description: Will show no infection signs and symptoms  6/2/2021 0708 by Shannan Bernal RN  Outcome: Ongoing  6/1/2021 1939 by Colleen Moralez RN  Outcome: Ongoing     Problem: Skin Integrity:  Goal: Absence of new skin breakdown  Description: Absence of new skin breakdown  6/2/2021 0708 by Shannan Bernal RN  Outcome: Ongoing  6/1/2021 1939 by Colleen Moralez RN  Outcome: Ongoing     Problem: Pain:  Goal: Pain level will decrease  Description: Pain level will decrease  6/2/2021 0708 by Shannan Bernal RN  Outcome: Ongoing  6/1/2021 1939 by Colleen Moralez RN  Outcome: Ongoing     Problem: Pain:  Goal: Control of acute pain  Description: Control of acute pain  6/2/2021 0708 by Shannan Bernal RN  Outcome: Ongoing  6/1/2021 1939 by Colleen Moralez RN  Outcome: Ongoing     Problem: Pain:  Goal: Control of chronic pain  Description: Control of chronic pain  6/2/2021 0708 by Shannan Bernal RN  Outcome: Ongoing  6/1/2021 1939 by Colleen Moralez RN  Outcome: Ongoing

## 2021-06-02 NOTE — PROGRESS NOTES
Physical/Occupational Therapy  Sandhya Khoury New  Orders received, chart reviewed. Attempted PT/OT evaluation this date. Per chart review, pt bedbound at baseline and requires phoenix lift for transfers with spouse assisting with ADLs. Pt and spouse reporting no change in functional mobility status and not requiring any assistance at home this date. Per discussion, will d/c pt from PT/OT caseload. Please re-order should pt status change.    [id number], [address] OTR/L [id number]

## 2021-06-03 NOTE — PROGRESS NOTES
Hospitalist Progress Note      PCP: Citlaly Bailey MD    Date of Admission: 5/31/2021    Chief Complaint: fall     Hospital Course:  Patient seen on dialysis  Denies any pain  Paraplegic wheelchair-bound admitting history reviewed and confirmed  No fever sweats  No chest pain      Medications:  Reviewed      Exam:    BP (!) 108/55   Pulse 64   Temp 97.3 °F (36.3 °C) (Oral)   Resp 16   Ht 6' (1.829 m)   Wt 215 lb 1.6 oz (97.6 kg)   SpO2 94%   BMI 29.17 kg/m²     General appearance: No apparent distress, appears stated age and cooperative. HEENT: Pupils equal, round, and reactive to light. Conjunctivae/corneas clear. Neck: Supple, with full range of motion. No jugular venous distention. Trachea midline. Respiratory:  Normal respiratory effort. Clear to auscultation, bilaterally without RALES/WHEEZES/Rhonchi. Cardiovascular: Regular rate and rhythm with normal S1/S2 without MURMURS, rubs or gallops. Abdomen: Soft, non-tender, non-distended with normal bowel sounds. Skin scrotal area edematous patient has no sensation hence unable to check for tenderness no erythema unable to turn patient disease on dialysis at the bottom of the scrotum and the perineal region  Prepubic catheter in place  Musculoskeletal: No clubbing, cyanosis or EDEMA bilaterally. Full range of motion without deformity. Skin: Skin color, texture, turgor normal.  No rashes or lesions. Neurologic:  Neurovascularly intact without any focal sensory/motor deficits.  Cranial nerves: II-XII intact, grossly non-focal.        Labs:   Recent Labs     05/31/21  1507 06/01/21  0836 06/02/21  0538   WBC 10.2 8.3 8.5   HGB 10.2* 10.5* 9.5*   HCT 33.0* 33.9* 30.3*    171 165     Recent Labs     05/31/21  1507 06/01/21  0836 06/02/21  0538   * 134* 131*   K 4.6 4.5 4.4   CL 93* 94* 96*   CO2 31 30 27   BUN 54* 58* 36*   CREATININE 3.4* 3.7* 3.0*   CALCIUM 8.7 8.6 8.1*   PHOS  --  4.6  --      Recent Labs     05/31/21  1506 kidney. Additional   characterization with MRI abdomen without and with gadolinium recommended. If unable to perform this exam, then follow-up noncontrast CT abdomen imaging   recommended in 6-12 months to ensure stability. 6. Small volume bilateral pleural effusion with bibasilar relaxation   atelectasis similar in appearance to prior exam.   7. Mild cardiomegaly. RECOMMENDATIONS:   MRI abdomen without and with gadolinium recommended. If unable to perform   this exam, then follow-up noncontrast CT abdomen imaging recommended         XR KNEE LEFT (1-2 VIEWS)   Final Result   Mild osteoarthritic changes medially in the knee with no acute bony   abnormality. Severe diffuse osteopenia. Small suprapatellar effusion. XR TIBIA FIBULA RIGHT (2 VIEWS)   Final Result   Comminuted displaced fracture distal right femur      Otherwise, no acute bony or joint abnormality         XR FOOT RIGHT (MIN 3 VIEWS)   Final Result   Comminuted displaced fracture distal right femur      Otherwise, no acute bony or joint abnormality         XR KNEE RIGHT (MIN 4 VIEWS)   Final Result   Comminuted displaced fracture distal right femur      Otherwise, no acute bony or joint abnormality             Assessment/Plan:    Active Hospital Problems    Diagnosis Date Noted    Closed supracondylar fracture of femur, right, initial encounter ([facility] Amanda Do 06/01/2021    Scrotal abscess [N49.2] 05/31/2021    End stage renal disease on dialysis ([address] [N18.6, Z99.2] 02/01/2021    Renal osteodystrophy [N25.0] 12/04/2019    Paraplegia ([address] [G82.20]        Acute Medical Issues Being Addressed:    61-year-old admitted to the hospital after a fall    Fall mechanical    Right femur distal fracture  -Appreciate orthopedic recommendation  -Nonweightbearing for now.   -Knee immobilizer.  -No acute intervention    Acute kidney injury on baseline of chronic kidney disease stage V possible end-stage renal disease on dialysis twice a week  Currently getting dialysis  Nephrology consulted    Scrotal abscess  -Appreciate urology recommendation  -Continue antibiotics  -Continue gentle irrigation of bladder with sterile saline. -MRI him and there is a concern for fistula.   -Urine culture growing E. coli continue Merrem for now      Positive blood culture  -Noted  -Defer further management per ID      Anemia of chronic kidney disease  Hemoglobin stable    Chronic respiratory failure  On 2 L oxygen  Respiratory status at baseline    DVT Prophylaxis: Subcu heparin  Diet: DIET CARB CONTROL;  Code Status: Full Code      Dispo - once acute medical processes have resolved    Julee Lal MD

## 2021-06-03 NOTE — PROGRESS NOTES
[id number] [facility] Orthopedic Surgery  Progress Note    Subjective: Patient sitting up in the bed. His right knee remains in the immobilizer. He denies any pain. He denies any new issues. MRI of the pelvis completed yesterday. Objective:  Vitals:    06/03/21 0815   BP: (!) 108/55   Pulse: 64   Resp: 16   Temp: 97.3 °F (36.3 °C)   SpO2: 94%      Physical Examination:  GENERAL: No apparent distress, well-nourished  SKIN:  Warm and dry  EYES: Nonicteric. ENT: Mucous membranes moist  HEAD: Normocephalic, atraumatic  RESPIRATORY: Resp easy and unlabored  CARDIOVASCULAR: Regular rate and rhythm  GI: Abdomen soft, nontender  NEURO: Awake and alert. No speech defect  PSYCHIATRIC: Appropriate affect; not agitated  MUSCULOSKELETAL: Right knee  Inspection: On exam there are no ulcerations, rashes or lesions about the right knee. There is no pain to palpation of the right distal femur or knee. Small knee effusion. No erythema. Knee immobilizer in place. Opened and skin examined - no breakdown noted. ACE wrap replaced. Motor: Unable to DF or PF due to T7 paraplegia. Sensation: Insensate throughout the bilateral lower extremities  Vascular: 1+ right DP pulse, sluggish cap refill. Labs reviewed:  Recent Labs     05/31/21  1507 06/01/21  0836 06/02/21  0538   WBC 10.2 8.3 8.5   HGB 10.2* 10.5* 9.5*   HCT 33.0* 33.9* 30.3*    171 165     Recent Labs     05/31/21  1507 06/01/21  0836 06/02/21  0538   * 134* 131*   K 4.6 4.5 4.4   CL 93* 94* 96*   CO2 31 30 27   BUN 54* 58* 36*   CREATININE 3.4* 3.7* 3.0*   GLUCOSE 145* 68* 116*   CALCIUM 8.7 8.6 8.1*   MG  --   --  1.90   PHOS  --  4.6  --      No results for input(s): INR, PROTIME in the last 72 hours.     Lab Results   Component Value Date    COLORU Yellow 05/31/2021    CLARITYU TURBID (A) 05/31/2021    PHUR 6.0 05/31/2021    GLUCOSEU Negative 05/31/2021    BLOODU MODERATE (A) 05/31/2021    LEUKOCYTESUR LARGE (A) 05/31/2021    BILIRUBINUR Negative 05/31/2021 UROBILINOGEN 0.2 05/31/2021    RBCUA see below (A) 05/31/2021    WBCUA >100 (A) 05/31/2021    BACTERIA 2+ (A) 04/01/2021    AMORPHOUS 4+ (A) 11/18/2016       Imaging:  MRI PELVIS WO CONTRAST   Preliminary Result   1. T2 hyperintense collection with layering debris noted within the scrotum   extending slightly left of midline measuring up to 5.2 cm. The superior   aspect of this collection abuts the mid/posterior aspect of the base of the   penis at the corpus spongiosum with possible extension to the penile urethra   in this region. Finding is concerning for urethral fistulization given   provided history. 2. Diffuse soft tissue edema. T2 hyperintensity involving the adductor   musculature bilaterally is concerning for possible myositis. Findings are   incompletely evaluated due to lack of intravenous contrast.         US SCROTUM AND TESTICLES   Final Result   Normal appearing testicles with normal blood flow to both testicles      Scrotal wall thickening with increased vascularity and a heterogeneous   hypoechoic area inferiorly in the midline suggesting an infectious process. No discrete abscess identified         CT ABDOMEN PELVIS W IV CONTRAST Additional Contrast? None   Final Result   1. The scrotum is not routinely included in the field of view on CT pelvis   and is not imaged on this exam.   2. Findings suggesting cystitis, with suprapubic catheter draining the   urinary bladder. 3. Nonspecific abdominal and pelvic ascites may be reactive related to renal   disease. 4. Mild anasarca. 5. Indeterminate 1.9 cm cystic lesion inferior pole right kidney. Additional   characterization with MRI abdomen without and with gadolinium recommended. If unable to perform this exam, then follow-up noncontrast CT abdomen imaging   recommended in 6-12 months to ensure stability.    6. Small volume bilateral pleural effusion with bibasilar relaxation   atelectasis similar in appearance to prior exam.   7. Mild cardiomegaly. RECOMMENDATIONS:   MRI abdomen without and with gadolinium recommended. If unable to perform   this exam, then follow-up noncontrast CT abdomen imaging recommended         XR KNEE LEFT (1-2 VIEWS)   Final Result   Mild osteoarthritic changes medially in the knee with no acute bony   abnormality. Severe diffuse osteopenia. Small suprapatellar effusion. XR TIBIA FIBULA RIGHT (2 VIEWS)   Final Result   Comminuted displaced fracture distal right femur      Otherwise, no acute bony or joint abnormality         XR FOOT RIGHT (MIN 3 VIEWS)   Final Result   Comminuted displaced fracture distal right femur      Otherwise, no acute bony or joint abnormality         XR KNEE RIGHT (MIN 4 VIEWS)   Final Result   Comminuted displaced fracture distal right femur      Otherwise, no acute bony or joint abnormality             IMPRESSION:  RIGHT supracondylar distal femur fracture  T7 paraplegia  Renal osteodystrophy  ESRD on HDU  DM II  HTN  Chronic anemia  Hx DVT  Chronic diastolic HF  Active Problems:    Paraplegia (HCC)    End stage renal disease on dialysis [facility])    Renal osteodystrophy    Scrotal abscess    Closed supracondylar fracture of femur, right, initial encounter ([facility]  Resolved Problems:    * No resolved hospital problems. *      PLAN:  Continue non-operative management.   - NWB right leg; continue knee immobilizer with ACE underneath.   Remove three times daily to evaluate for skin breakdown.   - DVT prophylaxis: Heparin as inpatient per primary team.  Would rec 2.5mg Eliquis BID x 30 days upon d/c.  - Dispo: Per primary team.    Follow-up with Dr. Federico Valle in 2-4 weeks    ADRIANNA Crowder - CNP  6/3/2021  9:05 AM
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
## 2021-06-03 NOTE — PLAN OF CARE
Problem: Falls - Risk of:  Goal: Will remain free from falls  Description: Will remain free from falls  6/3/2021 0722 by Jordon Liang RN  Outcome: Ongoing  6/2/2021 1919 by Sarah Mahmood RN  Outcome: Ongoing  Goal: Absence of physical injury  Description: Absence of physical injury  6/3/2021 0722 by Jordon Liang RN  Outcome: Ongoing  6/2/2021 1919 by Sarah Mahmood RN  Outcome: Ongoing     Problem: Skin Integrity:  Goal: Will show no infection signs and symptoms  Description: Will show no infection signs and symptoms  6/3/2021 0722 by Jordon Liang RN  Outcome: Ongoing  6/2/2021 1919 by Sarah Mahmood RN  Outcome: Ongoing  Goal: Absence of new skin breakdown  Description: Absence of new skin breakdown  6/3/2021 0722 by Jordon Liang RN  Outcome: Ongoing  6/2/2021 1919 by Sarah Mahmood RN  Outcome: Ongoing     Problem: Pain:  Goal: Pain level will decrease  Description: Pain level will decrease  6/3/2021 0722 by Jordon Liang RN  Outcome: Ongoing  6/2/2021 1919 by Sarah Mahmood RN  Outcome: Ongoing  Goal: Control of acute pain  Description: Control of acute pain  6/3/2021 0722 by Jordon Liang RN  Outcome: Ongoing  6/2/2021 1919 by Sarah Mahmood RN  Outcome: Ongoing  Goal: Control of chronic pain  Description: Control of chronic pain  6/3/2021 0722 by Jordon Liang RN  Outcome: Ongoing  6/2/2021 1919 by Sarah Mahmood RN  Outcome: Ongoing

## 2021-06-03 NOTE — PROGRESS NOTES
7:24 AM  Patient sleeping in bed. VSS. Shift assessment completed. Suprapubic catheter remains in place. 9:11 AM  AM medications given, see MAR. Plan of care was discussed with patient and patient mutually agreed upon care plan. 11:17 AM  Patient asking for a breathing treatment. RT IVIS called. 1:41 PM  Removed knee immobilizer and ace bandage for about 15 minutes and reapplied. Changed the dressings on his knees and heels. Changed his suprapubic dressing. Exchanged the abdominal pads under his scrotum. Patient repositioned.

## 2021-06-03 NOTE — PROGRESS NOTES
Nephrology Progress Note  [phone]  [phone]   [email or web address]        Reason for Consult:  ESRD    Brief History: This is a patient with significant past medical history of CHEMO on CKD Stage 4, dialysis dependent on HD M&F, missed HD prior to admission,  HTN, CHF, T7 paraplegia with neurogenic bladder s/p of suprapubic urinary catheter,  who presented after a fall from [place]. X-ray showed right sided communited displaced fracture of distal femur. He also notes swelling and redness of his scrotal sac. Urology is consulted, on vanc/cefepime. Interval History (Chart/Data reviewed): Discussed with patient and his wife at bedside would review records to update them more in detail of overall HD and chances of coming off it, reviewed office records, HD records, and unfortunately chances of recovery very low, has been on > 90 days and 24 hr CrCl 5/17/21 was 9 ml/min. Will discuss with patient and his wife in detail after HD tomorrow. When seen in AM had reports Urology planned to go to surgery tomorrow. 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 Agreeable to HD tomorrow. Will discuss with him his wife findings of HD / outpatient records    ROS/  (+) No acute concerns  (-) N/v/d/f/c/cp/sob  Updated at bedside: Patient, his wife, RN  Past medical, family, and social histories were reviewed as previously documented. Updates were made as necessary. Review of Systems ROS with pertinent positives and negatives listed in interval history. PHYSICAL EXAM:    Vitals:    /69   Pulse 66   Temp 97.6 °F (36.4 °C) (Oral)   Resp 16   Ht 6' (1.829 m)   Wt 215 lb 1.6 oz (97.6 kg)   SpO2 94%   BMI 29.17 kg/m²   I/O last 3 completed shifts: In: 99.4 [IV Piggyback:99.4]  Out: 450 [Urine:450]  I/O this shift:  In: 120 [P.O.:120]  Out: -     Physical Exam:  Gen: Resting in bed, NAD. HEENT: MMM, OP clear.  Anicteric, NC/AT  CV: RRR, + S1/S2  Lungs: Good respiratory effort, clear air entry   Abd: S/NT +BS  Ext: trace edema, no cyanosis  RLE ACE wrap  Skin: Warm. No rashes appreciated. : +suparpubic catheter, draining purulent urine  Neuro: Alert and oriented x 3, BLE paresis  Right TDC C/D/I    DATA:    CBC:   Lab Results   Component Value Date    WBC 8.5 06/02/2021    RBC 3.43 06/02/2021    HGB 9.5 06/02/2021    HCT 30.3 06/02/2021    MCV 88.2 06/02/2021    MCH 27.6 06/02/2021    MCHC 31.3 06/02/2021    RDW 17.0 06/02/2021     06/02/2021    MPV 6.8 06/02/2021     BMP:    Lab Results   Component Value Date     06/03/2021    K 5.0 06/03/2021    CL 97 06/03/2021    CO2 19 06/03/2021    BUN 47 06/03/2021    LABALBU 3.1 06/01/2021    CREATININE 3.6 06/03/2021    CALCIUM 8.2 06/03/2021    GFRAA 21 06/03/2021    GFRAA >60 05/13/2013    LABGLOM 17 06/03/2021    LABGLOM 58 10/15/2015    GLUCOSE 118 06/03/2021       IMPRESSION/RECOMMENDATIONS:      # CHEMO on CKD stage 4: remains dialysis dependent, likely ESRD  - On RRT since 2/5/21 [place]. Suha Andrade 85 placed by IR 2/5/21  - Jason Avila M/F 2nd shift with TW 92 kg  - Etiology was progression of CKD due to DN, Hypertension, CHEMO (ATN)    # Scrotal Cellulitis with drainage tract purulent material Cx+ E.  Coli + Corynebacterium +  Serratia; US of scrotum noted- discrete abscess  -appreciate Urology/ID recs  -> on Merrem 500 Q 24  - Urology irrigated bladder with saline and return of clear fluid from scrotal incision concerning for fistula  - SPC changed last week of May 2021  - BCx Staph    # Right femur fracture: per ortho-conservative management    # CKD stage 4/5: multifactorial-DKD/HTN NS/CHEMO on CKD  -likely dialysis dependent-sees Dr. Octavia Pimentel  -no signs of renal function recovery    # Hypotension: improved - on midodrine daily    # DM2: on insulin    # Cystic lesion right kidney pole: 1.9cm noted on CT of A/P; Urology following    # Anemia:add Retacrit with next HD    # CKD-MBD: phos is stable, no binders needed      Recommendations:  Discussed with patient and his wife would review HD and outpatient records; appears likely ESRD at this time with 24 hr cr cl 9 ml/min, was planned for o/p vein mapping. Will discuss this with patient and his wife tomorrow  Given Scr higher than baseline and concern of myositis will get CK tomorrow AM  Will plan for HD tomorrow per schedule M/F while in house. Thank you for allowing me to participate in the care of this patient. I will continue to follow along. Please call with questions.     Murphy Aschoff, MD

## 2021-06-04 NOTE — PLAN OF CARE
Problem: Falls - Risk of:  Goal: Will remain free from falls  Description: Will remain free from falls  6/4/2021 0810 by Lucila Montes RN  Outcome: Ongoing  6/4/2021 0708 by Sohan Go RN  Outcome: Ongoing  Goal: Absence of physical injury  Description: Absence of physical injury  6/4/2021 0810 by Lucila Montes RN  Outcome: Ongoing  6/4/2021 0708 by Sohan Go RN  Outcome: Ongoing     Problem: Skin Integrity:  Goal: Will show no infection signs and symptoms  Description: Will show no infection signs and symptoms  6/4/2021 0810 by Lucila Montes RN  Outcome: Ongoing  6/4/2021 0708 by Sohan Go RN  Outcome: Ongoing  Goal: Absence of new skin breakdown  Description: Absence of new skin breakdown  6/4/2021 0810 by Lucila Montes RN  Outcome: Ongoing  6/4/2021 0708 by Sohan Go RN  Outcome: Ongoing     Problem: Pain:  Goal: Pain level will decrease  Description: Pain level will decrease  6/4/2021 0810 by Lucila Montes RN  Outcome: Ongoing  6/4/2021 0708 by Sohan Go RN  Outcome: Ongoing  Goal: Control of acute pain  Description: Control of acute pain  6/4/2021 0810 by Lucila Montes RN  Outcome: Ongoing  6/4/2021 0708 by Sohan Go RN  Outcome: Ongoing  Goal: Control of chronic pain  Description: Control of chronic pain  6/4/2021 0810 by Lucila Montes RN  Outcome: Ongoing  6/4/2021 0708 by Sohan Go RN  Outcome: Ongoing  fall precautions in place, hourly rounding, call light and belongings in reach, bed in lowest position, wheels locked in place, side rails up x 2, walkways free of clutter    Pt shows signs of wound healing no s/s of infection present including: fever, foul smelling drainage, redness, increased soreness, or increased swelling in any area. Risa Cardona
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
## 2021-06-04 NOTE — PROGRESS NOTES
Urology Progress Note  [facility]    Provider: ADRIANNA Ogden CNP  Patient ID:  Admission Date: 2021 Name: Lindsey Puentes Date: 2021 MRN: [id number]   Patient Location: [id number] : 1955  Attending: Sandhya Rendon MD Date of Service: 2021  PCP: Barbara Trinidad MD     Diagnoses:  1. Scrotal abscess    2. Complicated UTI (urinary tract infection)    3. Closed fracture of distal end of right femur, unspecified fracture morphology, initial encounter ([facility] [address]    4. End stage renal disease on dialysis ([facility]    5. Renal lesion       Assessment/Plan:    78 yo M pmh wheelchair bound, ESRD on HD, Chronic anemia, CHF, CABG, DM    NGB with chronic suprapubic tube  Admitted after a fall 2021 - distal femur fracture  Scrotal swelling/redness      recc  -MRI concerning for scrotal urethra fistula as well as likely 5.2cm abscess  abscess is currently draining, Manually manipulate scrotum 3-4 times per day to ensure adequate drainage.  -Continue abx over the weekend for scrotal infection and uti, appreciate ID recommendations  -Planning Scrotal washout and urethral fistula repair early next week  -Urine cx grew E. Coli, continue merrem - sensitive  -suprapubic tube changed x1 week ago without complication       The patient had a chance to ask questions which were answered. he understands the above plan. Subjective:   Perry Rodas is a 77 y.o. male. He was seen and examined this morning. Today we discussed getting an mri to role out fistula. Discussed sp tube which was changed x1 week ago without problems.       Objective:   Vitals:  Vitals:    21 0400   BP: (!) 122/57   Pulse: 82   Resp: 16   Temp: 98.1 °F (36.7 °C)   SpO2:        Intake/Output Summary (Last 24 hours) at 2021 0958  Last data filed at 2021 0431  Gross per 24 hour   Intake 120 ml   Output 200 ml   Net -80 ml     Physical Exam:  Gen: Obese, Alert and oriented x3, no acute distress  CV: Regular rate   Resp: unlabored respirations  Abd: SP tube in tact, clean dressing, urine yellow with sediment, Soft, non-distended, non-tender, no masses  Ext: no peripheral edema noted, moves upper and lower extremities spontaneously  Skin: warmand well perfused, no rashes noted on the face, or arms.      Labs:  Lab Results   Component Value Date    WBC 8.5 06/02/2021    HGB 9.5 (L) 06/02/2021    HCT 30.3 (L) 06/02/2021    MCV 88.2 06/02/2021     06/02/2021     Lab Results   Component Value Date    CREATININE 4.3 (H) 06/04/2021    BUN 53 (H) 06/04/2021     (L) 06/04/2021    K 5.0 06/04/2021    CL 96 (L) 06/04/2021    CO2 27 06/04/2021       Leta Begum, APRN - CNP   6/4/2021

## 2021-06-04 NOTE — FLOWSHEET NOTE
Treatment time: 3.5 hrs   Net UF: 1.0 kg     Pre weight: 95.3 kg   Post weight: 94.3 kg   EDW: 92.0 kg     Access used: R CVC   Access function: Well     Medications or blood products given: Midodrine; Epogen     Regular outpatient schedule: MWF     Summary of response to treatment: Pt tolerated tx well     Copy of dialysis treatment record placed in chart, to be scanned into EMR.       06/04/21 1100   Post-Hemodialysis Assessment   Rinseback Volume (ml) 400 ml   Total Liters Processed (l/min) 66.8 l/min   Dialyzer Clearance Lightly streaked   Duration of Treatment (minutes) 210 minutes   Hemodialysis Intake (ml) 400 ml   Hemodialysis Output (ml) 1400 ml   NET Removed (ml) 1000 ml

## 2021-06-04 NOTE — PROGRESS NOTES
Shift assessment complete. Pt alert and orientated x4, resting in bed watching tv at this time. Pt repositioned  Dressing: CDI at this time  Drains: Chronic superpubic catheter in place  Pain: Pt denies at this time  Patient expressed no other needs at this time, will continue to educate patient as needed. The care plan and education has been reviewed and mutually agreed upon with the patient.

## 2021-06-04 NOTE — PROGRESS NOTES
Hospitalist Progress Note      PCP: Victor Manuel Arroyo MD    Date of Admission: 5/31/2021    Chief Complaint: fall     Hospital Course:  Patient seen on dialysis  Denies any pain  Paraplegic wheelchair-bound admitting history reviewed and confirmed  No fever sweats  No chest pain      Medications:  Reviewed      Exam:    BP (!) 122/57   Pulse 82   Temp 98.1 °F (36.7 °C) (Infrared)   Resp 16   Ht 6' (1.829 m)   Wt 214 lb 9.6 oz (97.3 kg)   SpO2 95%   BMI 29.10 kg/m²     General appearance: No apparent distress, appears stated age and cooperative. HEENT: Pupils equal, round, and reactive to light. Conjunctivae/corneas clear. Neck: Supple, with full range of motion. No jugular venous distention. Trachea midline. Respiratory:  Normal respiratory effort. Clear to auscultation, bilaterally without RALES/WHEEZES/Rhonchi. Cardiovascular: Regular rate and rhythm with normal S1/S2 without MURMURS, rubs or gallops. Abdomen: Soft, non-tender, non-distended with normal bowel sounds. Skin scrotal area edematous patient has no sensation hence unable to check for tenderness no erythema unable to turn patient disease on dialysis at the bottom of the scrotum and the perineal region  Prepubic catheter in place  Musculoskeletal: No clubbing, cyanosis or EDEMA bilaterally. Full range of motion without deformity. Skin: Skin color, texture, turgor normal.  No rashes or lesions. Neurologic:  Neurovascularly intact without any focal sensory/motor deficits. Cranial nerves: II-XII intact, grossly non-focal.        Labs:   Recent Labs     06/02/21  0538   WBC 8.5   HGB 9.5*   HCT 30.3*        Recent Labs     06/02/21  0538 06/03/21  1125 06/04/21  0549   * 129* 131*   K 4.4 5.0 5.0   CL 96* 97* 96*   CO2 27 19* 27   BUN 36* 47* 53*   CREATININE 3.0* 3.6* 4.3*   CALCIUM 8.1* 8.2* 8.4     No results for input(s): AST, ALT, BILIDIR, BILITOT, ALKPHOS in the last 72 hours.   No results for input(s): INR in the last 72 hours. Recent Labs     06/04/21  0549   CKTOTAL 14*       Urinalysis:      Lab Results   Component Value Date    NITRU Negative 05/31/2021    WBCUA >100 05/31/2021    BACTERIA 2+ 04/01/2021    RBCUA see below 05/31/2021    RBCUA 3+ 05/12/2016    BLOODU MODERATE 05/31/2021    SPECGRAV 1.025 05/31/2021    GLUCOSEU Negative 05/31/2021    GLUCOSEU >=1000 05/17/2011       Radiology:  MRI PELVIS WO CONTRAST   Preliminary Result   1. T2 hyperintense collection with layering debris noted within the scrotum   extending slightly left of midline measuring up to 5.2 cm. The superior   aspect of this collection abuts the mid/posterior aspect of the base of the   penis at the corpus spongiosum with possible extension to the penile urethra   in this region. Finding is concerning for urethral fistulization given   provided history. 2. Diffuse soft tissue edema. T2 hyperintensity involving the adductor   musculature bilaterally is concerning for possible myositis. Findings are   incompletely evaluated due to lack of intravenous contrast.         US SCROTUM AND TESTICLES   Final Result   Normal appearing testicles with normal blood flow to both testicles      Scrotal wall thickening with increased vascularity and a heterogeneous   hypoechoic area inferiorly in the midline suggesting an infectious process. No discrete abscess identified         CT ABDOMEN PELVIS W IV CONTRAST Additional Contrast? None   Final Result   1. The scrotum is not routinely included in the field of view on CT pelvis   and is not imaged on this exam.   2. Findings suggesting cystitis, with suprapubic catheter draining the   urinary bladder. 3. Nonspecific abdominal and pelvic ascites may be reactive related to renal   disease. 4. Mild anasarca. 5. Indeterminate 1.9 cm cystic lesion inferior pole right kidney. Additional   characterization with MRI abdomen without and with gadolinium recommended.    If unable to perform this exam, then follow-up noncontrast CT abdomen imaging   recommended in 6-12 months to ensure stability. 6. Small volume bilateral pleural effusion with bibasilar relaxation   atelectasis similar in appearance to prior exam.   7. Mild cardiomegaly. RECOMMENDATIONS:   MRI abdomen without and with gadolinium recommended. If unable to perform   this exam, then follow-up noncontrast CT abdomen imaging recommended         XR KNEE LEFT (1-2 VIEWS)   Final Result   Mild osteoarthritic changes medially in the knee with no acute bony   abnormality. Severe diffuse osteopenia. Small suprapatellar effusion.          XR TIBIA FIBULA RIGHT (2 VIEWS)   Final Result   Comminuted displaced fracture distal right femur      Otherwise, no acute bony or joint abnormality         XR FOOT RIGHT (MIN 3 VIEWS)   Final Result   Comminuted displaced fracture distal right femur      Otherwise, no acute bony or joint abnormality         XR KNEE RIGHT (MIN 4 VIEWS)   Final Result   Comminuted displaced fracture distal right femur      Otherwise, no acute bony or joint abnormality             Assessment/Plan:    Active Hospital Problems    Diagnosis Date Noted    Closed fracture of right distal femur ([facility] [address] [S72.401A]     Multiple drug resistant organism (MDRO) culture positive [Z16.24]     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 encounter ([facility] [address] Fawad Bunch 06/01/2021    Scrotal abscess [N49.2] 05/31/2021    End stage renal disease on dialysis ([facility] [address] [N18.6, Z99.2] 02/01/2021    Renal osteodystrophy [N25.0] 14/84/9476    Complicated UTI (urinary tract infection) [N39.0] 11/08/2016    Renal lesion [N28.9] 02/17/2015    Paraplegia ([facility] [address] [G82.20]        Acute Medical Issues Being Addressed:    24-year-old admitted to the hospital after a fall    Fall mechanical    Right femur distal
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
## 2021-06-04 NOTE — PROGRESS NOTES
Nephrology Progress Note  [phone]  [phone]   [email or web address]        Reason for Consult:  ESRD    Brief History: This is a patient with significant past medical history of CHEMO on CKD Stage 4, dialysis dependent on HD M&F, missed HD prior to admission,  HTN, CHF, T7 paraplegia with neurogenic bladder s/p of suprapubic urinary catheter,  COPD 2L O2 baseline, who presented after a fall from [place]. X-ray showed right sided communited displaced fracture of distal femur. He also notes swelling and redness of his scrotal sac. Urology is consulted, on vanc/cefepime. Interval History (Chart/Data reviewed): Discussed with patient and his wife possible surgery Monday. He feels much better. Discussed 24 hr urine collection and unfortunately likely at this point and time RRT dependent but has significant residual function. CK is normal.  BP much better. ROS/  (+) No acute concerns  (-) N/v/d/f/c/cp/sob  Updated at bedside: Patient, his wife, RN  Past medical, family, and social histories were reviewed as previously documented. Updates were made as necessary. Review of Systems ROS with pertinent positives and negatives listed in interval history. PHYSICAL EXAM:    Vitals:    BP (!) 122/57   Pulse 82   Temp 98.1 °F (36.7 °C) (Infrared)   Resp 16   Ht 6' (1.829 m)   Wt 214 lb 9.6 oz (97.3 kg)   SpO2 95%   BMI 29.10 kg/m²   I/O last 3 completed shifts: In: 240 [P.O.:240]  Out: 200 [Urine:200]  No intake/output data recorded. Physical Exam:  Gen: Resting in bed, NAD. HEENT: MMM, OP clear. Anicteric, NC/AT  CV: RRR, + S1/S2  Lungs: Good respiratory effort, clear air entry, 2L O2 Baseline NC  Abd: S/NT +BS  Ext: trace edema, no cyanosis  RLE ACE wrap  Skin: Warm. No rashes appreciated.   : +suparpubic catheter  Neuro: Alert and oriented x 3, BLE paresis  Right TDC C/D/I    DATA:    CBC:   Lab Results   Component Value Date    WBC 8.5 06/02/2021    RBC 3.43 06/02/2021    HGB 9.5 done as inpatient because extremely difficult transport; will order  iHD today per schedule  Given Scr higher than baseline and concern of myositis checked CK and was normal.  May be due to decreased residual function s/p contrast/septic episode. Will plan for HD per schedule M/F while in house. Did discuss may need to go to M/W/F if residual function significantly changed  HD today; followed by Monday; will need to monitor if now needs HD Wednesday as well      Thank you for allowing me to participate in the care of this patient. I will continue to follow along. Please call with questions.     Murphy Aschoff, MD

## 2021-06-04 NOTE — PROGRESS NOTES
PELVIS WO CONTRAST   Preliminary Result   1. T2 hyperintense collection with layering debris noted within the scrotum   extending slightly left of midline measuring up to 5.2 cm. The superior   aspect of this collection abuts the mid/posterior aspect of the base of the   penis at the corpus spongiosum with possible extension to the penile urethra   in this region. Finding is concerning for urethral fistulization given   provided history. 2. Diffuse soft tissue edema. T2 hyperintensity involving the adductor   musculature bilaterally is concerning for possible myositis. Findings are   incompletely evaluated due to lack of intravenous contrast.         US SCROTUM AND TESTICLES   Final Result   Normal appearing testicles with normal blood flow to both testicles      Scrotal wall thickening with increased vascularity and a heterogeneous   hypoechoic area inferiorly in the midline suggesting an infectious process. No discrete abscess identified         CT ABDOMEN PELVIS W IV CONTRAST Additional Contrast? None   Final Result   1. The scrotum is not routinely included in the field of view on CT pelvis   and is not imaged on this exam.   2. Findings suggesting cystitis, with suprapubic catheter draining the   urinary bladder. 3. Nonspecific abdominal and pelvic ascites may be reactive related to renal   disease. 4. Mild anasarca. 5. Indeterminate 1.9 cm cystic lesion inferior pole right kidney. Additional   characterization with MRI abdomen without and with gadolinium recommended. If unable to perform this exam, then follow-up noncontrast CT abdomen imaging   recommended in 6-12 months to ensure stability. 6. Small volume bilateral pleural effusion with bibasilar relaxation   atelectasis similar in appearance to prior exam.   7. Mild cardiomegaly. RECOMMENDATIONS:   MRI abdomen without and with gadolinium recommended.  If unable to perform   this exam, then follow-up noncontrast CT abdomen imaging recommended         XR KNEE LEFT (1-2 VIEWS)   Final Result   Mild osteoarthritic changes medially in the knee with no acute bony   abnormality. Severe diffuse osteopenia. Small suprapatellar effusion. XR TIBIA FIBULA RIGHT (2 VIEWS)   Final Result   Comminuted displaced fracture distal right femur      Otherwise, no acute bony or joint abnormality         XR FOOT RIGHT (MIN 3 VIEWS)   Final Result   Comminuted displaced fracture distal right femur      Otherwise, no acute bony or joint abnormality         XR KNEE RIGHT (MIN 4 VIEWS)   Final Result   Comminuted displaced fracture distal right femur      Otherwise, no acute bony or joint abnormality             IMPRESSION:  RIGHT supracondylar distal femur fracture  T7 paraplegia  Renal osteodystrophy  ESRD on HDU  DM II  HTN  Chronic anemia  Hx DVT  Chronic diastolic HF  Active Problems:    Paraplegia (HCC)    Renal lesion    Complicated UTI (urinary tract infection)    End stage renal disease on dialysis [facility])    Renal osteodystrophy    Scrotal abscess    Closed supracondylar fracture of femur, right, initial encounter ([facility] [address]    Closed fracture of right distal femur (HCC)    Multiple drug resistant organism (MDRO) culture positive    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 *      PLAN:  Continue non-operative management.   - NWB right leg; continue knee immobilizer with ACE underneath. Remove three times daily to evaluate for skin breakdown.   - DVT prophylaxis: Heparin as inpatient per primary team.  Would rec 2.5mg Eliquis BID x 30 days upon d/c.  - Dispo: Per primary team.    Follow-up with Dr. Irena Wheat in 2-4 weeks. Please call with questions.      ADRIANNA Wheeler - CNP  6/4/2021  11:04 AM

## 2021-06-05 NOTE — PROGRESS NOTES
8:15 AM  Patient awake in bed, alert and oriented x4. Patient repositioned and pulled up in bed so that he can eat breakfast.     Vein mapping done at bedside. VSS. Shift assessment completed. Student gave patient his AM medications, see MAR. The care plan and education has been reviewed and mutually agreed upon with the patient. Will continue to monitor. 3:17 PM  All of patients wounds were changed, see LDA assessment. Patient cleansed with bath wipes, CHG wipes, and repositioned. Patients knee immobilizer and ace wrap was removed for thirty minutes and then was reapplied.

## 2021-06-05 NOTE — PROGRESS NOTES
Nephrology Progress Note  [phone]   [email or web address]        Reason for Consult:  ESRD    Brief History: This is a patient with significant past medical history of CHEMO on CKD Stage 4, dialysis dependent on HD M&F, missed HD prior to admission,  HTN, CHF, T7 paraplegia with neurogenic bladder s/p of suprapubic urinary catheter,  COPD 2L O2 baseline, who presented after a fall from [place]. X-ray showed right sided communited displaced fracture of distal femur. He also notes swelling and redness of his scrotal sac. Urology is consulted, on vanc/cefepime. ROS/  No dyspnea or pain     Updated family and RN   at bedside               PHYSICAL EXAM:    Vitals:    BP (!) 103/55   Pulse 92   Temp 98.2 °F (36.8 °C) (Oral)   Resp 20   Ht 6' (1.829 m)   Wt 215 lb (97.5 kg)   SpO2 93%   BMI 29.16 kg/m²   I/O last 3 completed shifts: In: 400   Out: 1520 [Urine:120]  I/O this shift:  In: -   Out: 150 [Urine:150]    Physical Exam:  Gen: Resting in bed, NAD. HEENT: MMM, OP clear. Anicteric, NC/AT  CV: RRR, + S1/S2  Lungs: Good respiratory effort, clear air entry, 2L O2 Baseline NC  Abd: S/NT +BS  Ext: trace edema, no cyanosis  RLE ACE wrap  Skin: Warm. No rashes appreciated.   : +suparpubic catheter  Neuro: Alert and oriented x 3, BLE paresis  Right TDC C/D/I    DATA:    CBC:   Lab Results   Component Value Date    WBC 8.5 06/02/2021    RBC 3.43 06/02/2021    HGB 9.5 06/02/2021    HCT 30.3 06/02/2021    MCV 88.2 06/02/2021    MCH 27.6 06/02/2021    MCHC 31.3 06/02/2021    RDW 17.0 06/02/2021     06/02/2021    MPV 6.8 06/02/2021     BMP:    Lab Results   Component Value Date     06/05/2021    K 4.0 06/05/2021    CL 96 06/05/2021    CO2 27 06/05/2021    BUN 30 06/05/2021    LABALBU 3.1 06/01/2021    CREATININE 3.0 06/05/2021    CALCIUM 8.1 06/05/2021    GFRAA 25 06/05/2021    GFRAA >60 05/13/2013    LABGLOM 21 06/05/2021    LABGLOM 58 10/15/2015    GLUCOSE 140 06/05/2021 IMPRESSION/RECOMMENDATIONS:      # CHEMO on CKD stage 4: remains dialysis dependent, likely ESRD  - On RRT since 2/5/21 [address] placed by IR 2/5/21  - Jason Luong M/F 2nd shift with TW 92 kg  - Etiology was progression of CKD due to DN, Hypertension, CHEMO (ATN)    # Scrotal Cellulitis with drainage tract purulent material Cx+ E. Coli + Corynebacterium +  Serratia; US of scrotum noted- discrete abscess  -appreciate Urology/ID recs  -> on Merrem 500 Q 24  - Urology irrigated bladder with saline and return of clear fluid from scrotal incision concerning for fistula plan for intervention next week  - SPC changed last week of May 2021  - BCx Staph    # Right femur fracture: per ortho-conservative management    # CKD stage 4/5: multifactorial-DKD/HTN NS/CHEMO on CKD  -likely dialysis dependent-sees Dr. Kesha Arana  -no signs of renal function recovery    # Hypotension: improved - on midodrine daily    # DM2: on insulin    # Cystic lesion right kidney pole: 1.9cm noted on CT of A/P; Urology following    # COPD / T7 Paralysis; on 2L O2 chronic; at baseline. # Anemia:add Retacrit with next HD    # CKD-MBD: phos is stable, no binders needed          Thank you for allowing me to participate in the care of this patient. I will continue to follow along. Please call with questions.     Kiah Parada MD

## 2021-06-05 NOTE — PLAN OF CARE
Problem: Falls - Risk of:  Goal: Will remain free from falls  Description: Will remain free from falls  Outcome: Ongoing  Goal: Absence of physical injury  Description: Absence of physical injury  Outcome: Ongoing     Problem: Skin Integrity:  Goal: Will show no infection signs and symptoms  Description: Will show no infection signs and symptoms  Outcome: Ongoing  Goal: Absence of new skin breakdown  Description: Absence of new skin breakdown  Outcome: Ongoing     Problem: Pain:  Goal: Pain level will decrease  Description: Pain level will decrease  Outcome: Ongoing  Goal: Control of acute pain  Description: Control of acute pain  Outcome: Ongoing  Goal: Control of chronic pain  Description: Control of chronic pain  Outcome: Ongoing     Fall precautions in place, hourly rounding, call light and belongings in reach, bed in lowest position, wheels locked in place, side rails up x 2, walkways free of clutter    Pain assessed using 0-10 scale, patient able to voice pain level and states pain improved with prn medication administered.      Pt shows signs of wound healing no s/s of infection present including: fever, foul smelling drainage, redness, increased soreness, or increased swelling in any area, including surgical wound

## 2021-06-05 NOTE — PROGRESS NOTES
06/05/21 1928   Cough/Sputum   Sputum How Obtained Cough on request  (vpep)   Cough Dry;Non-productive;Weak   Sputum Amount None

## 2021-06-05 NOTE — PROGRESS NOTES
Hospitalist Progress Note      PCP: Andreina Roche MD    Date of Admission: 5/31/2021    Chief Complaint: fall     Hospital Course:  Patient seen on dialysis  Denies any pain  Paraplegic wheelchair-bound admitting history reviewed and confirmed  No fever sweats  No chest pain      Medications:  Reviewed      Exam:    BP (!) 103/55   Pulse 92   Temp 98.2 °F (36.8 °C) (Oral)   Resp 20   Ht 6' (1.829 m)   Wt 215 lb (97.5 kg)   SpO2 93%   BMI 29.16 kg/m²     General appearance: No apparent distress, appears stated age and cooperative. HEENT: Pupils equal, round, and reactive to light. Conjunctivae/corneas clear. Neck: Supple, with full range of motion. No jugular venous distention. Trachea midline. Respiratory:  Normal respiratory effort. Clear to auscultation, bilaterally without RALES/WHEEZES/Rhonchi. Cardiovascular: Regular rate and rhythm with normal S1/S2 without MURMURS, rubs or gallops. Abdomen: Soft, non-tender, non-distended with normal bowel sounds. Skin scrotal area edematous patient has no sensation hence unable to check for tenderness no erythema unable to turn patient disease on dialysis at the bottom of the scrotum and the perineal region  Prepubic catheter in place  Musculoskeletal: No clubbing, cyanosis or EDEMA bilaterally. Full range of motion without deformity. Skin: Skin color, texture, turgor normal.  No rashes or lesions. Neurologic:  Neurovascularly intact without any focal sensory/motor deficits. Cranial nerves: II-XII intact, grossly non-focal.        Labs:   No results for input(s): WBC, HGB, HCT, PLT in the last 72 hours. Recent Labs     06/03/21  1125 06/04/21  0549 06/05/21  0526   * 131* 132*   K 5.0 5.0 4.0   CL 97* 96* 96*   CO2 19* 27 27   BUN 47* 53* 30*   CREATININE 3.6* 4.3* 3.0*   CALCIUM 8.2* 8.4 8.1*     No results for input(s): AST, ALT, BILIDIR, BILITOT, ALKPHOS in the last 72 hours. No results for input(s): INR in the last 72 hours.   Recent Labs imaging   recommended in 6-12 months to ensure stability. 6. Small volume bilateral pleural effusion with bibasilar relaxation   atelectasis similar in appearance to prior exam.   7. Mild cardiomegaly. RECOMMENDATIONS:   MRI abdomen without and with gadolinium recommended. If unable to perform   this exam, then follow-up noncontrast CT abdomen imaging recommended         XR KNEE LEFT (1-2 VIEWS)   Final Result   Mild osteoarthritic changes medially in the knee with no acute bony   abnormality. Severe diffuse osteopenia. Small suprapatellar effusion.          XR TIBIA FIBULA RIGHT (2 VIEWS)   Final Result   Comminuted displaced fracture distal right femur      Otherwise, no acute bony or joint abnormality         XR FOOT RIGHT (MIN 3 VIEWS)   Final Result   Comminuted displaced fracture distal right femur      Otherwise, no acute bony or joint abnormality         XR KNEE RIGHT (MIN 4 VIEWS)   Final Result   Comminuted displaced fracture distal right femur      Otherwise, no acute bony or joint abnormality         VL PRE OP VEIN MAPPING    (Results Pending)       Assessment/Plan:    Active Hospital Problems    Diagnosis Date Noted    Weight loss counseling, encounter for [Z71.3]     Closed fracture of right distal femur ([facility] [S72.401A]     Multiple drug resistant organism (MDRO) culture positive [Z16.24]     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 encounter ([facility] Cameron Romo 06/01/2021    Scrotal abscess [N49.2] 05/31/2021    End stage renal disease on dialysis ([facility] [N18.6, Z99.2] 02/01/2021    Renal osteodystrophy [N25.0] 68/75/3600    Complicated UTI (urinary tract infection) [N39.0] 11/08/2016    Renal lesion [N28.9] 02/17/2015    Paraplegia ([facility] [G82.20]        Acute Medical Issues Being Addressed:    47-year-old admitted to the hospital after a fall    Fall mechanical    Right femur distal fracture  -Appreciate orthopedic recommendation  -Nonweightbearing for now. -Knee immobilizer.  -No acute intervention. Acute kidney injury on baseline of chronic kidney disease stage V possible end-stage renal disease on dialysis twice a week  Currently getting dialysis  Nephrology consulted    Scrotal abscess  -Culture growing E. coli.   And also C-reactive  -His blood culture also positive  -MRI showed sinus tract drainage.  -Patient also has suprapubic catheter secondary to paraplegia  -Plan for scrotal abscess washout and urethral fistula repair sometime next week  -Continue IV antibiotics per ID      Anemia of chronic kidney disease  Hemoglobin stable    Chronic respiratory failure  On 2 L oxygen  Respiratory status at baseline    DVT Prophylaxis: Subcu heparin  Diet: DIET CARB CONTROL;  Code Status: Full Code      Dispo - once acute medical processes have resolved    Jose Euceda MD

## 2021-06-06 NOTE — PLAN OF CARE
Problem: Falls - Risk of:  Goal: Will remain free from falls  Description: Will remain free from falls  Outcome: Ongoing  Goal: Absence of physical injury  Description: Absence of physical injury  Outcome: Ongoing     Problem: Skin Integrity:  Goal: Will show no infection signs and symptoms  Description: Will show no infection signs and symptoms  Outcome: Ongoing  Goal: Absence of new skin breakdown  Description: Absence of new skin breakdown  Outcome: Ongoing     Problem: Pain:  Goal: Pain level will decrease  Description: Pain level will decrease  Outcome: Ongoing  Goal: Control of acute pain  Description: Control of acute pain  Outcome: Ongoing  Goal: Control of chronic pain  Description: Control of chronic pain  Outcome: Ongoing    Fall precautions in place, hourly rounding, call light and belongings in reach, bed in lowest position, wheels locked in place, side rails up x 2, walkways free of clutter    Pt shows signs of wound healing no s/s of infection present including: fever, foul smelling drainage, redness, increased soreness, or increased swelling in any area. Pain assessed using 0-10 scale, patient able to voice pain level and states pain improved with prn medication administered.

## 2021-06-06 NOTE — PROGRESS NOTES
Hospitalist Progress Note      PCP: Victor Manuel Arroyo MD    Date of Admission: 5/31/2021    Chief Complaint: fall     Hospital Course:  Patient seen on dialysis  Denies any pain  Paraplegic wheelchair-bound admitting history reviewed and confirmed  No fever sweats  No chest pain      Medications:  Reviewed      Exam:    BP (!) 108/51   Pulse 87   Temp 97.8 °F (36.6 °C) (Oral)   Resp 18   Ht 6' (1.829 m)   Wt 221 lb 5 oz (100.4 kg)   SpO2 92%   BMI 30.02 kg/m²     General appearance: No apparent distress, appears stated age and cooperative. HEENT: Pupils equal, round, and reactive to light. Conjunctivae/corneas clear. Neck: Supple, with full range of motion. No jugular venous distention. Trachea midline. Respiratory:  Normal respiratory effort. Clear to auscultation, bilaterally without RALES/WHEEZES/Rhonchi. Cardiovascular: Regular rate and rhythm with normal S1/S2 without MURMURS, rubs or gallops. Abdomen: Soft, non-tender, non-distended with normal bowel sounds. Skin scrotal area edematous patient has no sensation hence unable to check for tenderness no erythema unable to turn patient disease on dialysis at the bottom of the scrotum and the perineal region  Prepubic catheter in place  Musculoskeletal: No clubbing, cyanosis or EDEMA bilaterally. Full range of motion without deformity. Skin: Skin color, texture, turgor normal.  No rashes or lesions. Neurologic:  Neurovascularly intact without any focal sensory/motor deficits. Cranial nerves: II-XII intact, grossly non-focal.        Labs:   No results for input(s): WBC, HGB, HCT, PLT in the last 72 hours. Recent Labs     06/04/21  0549 06/05/21  0526 06/06/21  0518   * 132* 133*   K 5.0 4.0 4.3   CL 96* 96* 97*   CO2 27 27 26   BUN 53* 30* 42*   CREATININE 4.3* 3.0* 3.4*   CALCIUM 8.4 8.1* 8.0*     No results for input(s): AST, ALT, BILIDIR, BILITOT, ALKPHOS in the last 72 hours. No results for input(s): INR in the last 72 hours.   Recent follow-up noncontrast CT abdomen imaging   recommended in 6-12 months to ensure stability. 6. Small volume bilateral pleural effusion with bibasilar relaxation   atelectasis similar in appearance to prior exam.   7. Mild cardiomegaly. RECOMMENDATIONS:   MRI abdomen without and with gadolinium recommended. If unable to perform   this exam, then follow-up noncontrast CT abdomen imaging recommended         XR KNEE LEFT (1-2 VIEWS)   Final Result   Mild osteoarthritic changes medially in the knee with no acute bony   abnormality. Severe diffuse osteopenia. Small suprapatellar effusion.          XR TIBIA FIBULA RIGHT (2 VIEWS)   Final Result   Comminuted displaced fracture distal right femur      Otherwise, no acute bony or joint abnormality         XR FOOT RIGHT (MIN 3 VIEWS)   Final Result   Comminuted displaced fracture distal right femur      Otherwise, no acute bony or joint abnormality         XR KNEE RIGHT (MIN 4 VIEWS)   Final Result   Comminuted displaced fracture distal right femur      Otherwise, no acute bony or joint abnormality             Assessment/Plan:    Active Hospital Problems    Diagnosis Date Noted    Weight loss counseling, encounter for [Z71.3]     Closed fracture of right distal femur ([facility] [S72.401A]     Multiple drug resistant organism (MDRO) culture positive [Z16.24]     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 [address] [N18.6, Z99.2] 02/01/2021    Renal osteodystrophy [N25.0] 00/90/4547    Complicated UTI (urinary tract infection) [N39.0] 11/08/2016    Renal lesion [N28.9] 02/17/2015    Paraplegia ([facility] [address] [G82.20]        Acute Medical Issues Being Addressed:    49-year-old admitted to the hospital after a fall    Fall mechanical    Right femur distal fracture  -Appreciate orthopedic recommendation  -Nonweightbearing for now. -Knee immobilizer.  -No acute intervention. Acute kidney injury on baseline of chronic kidney disease stage V possible end-stage renal disease on dialysis twice a week  Currently getting dialysis  Nephrology consulted    Scrotal abscess  -Culture growing E. coli.   And also C-reactive  -His blood culture also positive  -MRI showed sinus tract drainage.  -Patient also has suprapubic catheter secondary to paraplegia  -Plan for scrotal abscess washout and urethral fistula repair sometime next week  -Continue IV antibiotics per ID      Anemia of chronic kidney disease  Hemoglobin stable    Chronic respiratory failure  On 2 L oxygen  Respiratory status at baseline    DVT Prophylaxis: Subcu heparin  Diet: DIET CARB CONTROL;  Code Status: Full Code      Dispo - once acute medical processes have resolved    Candido Ivan MD

## 2021-06-06 NOTE — PROGRESS NOTES
2047: Shift assessment complete. Meds given per MAR. Pt took meds without difficulty. Pt alert and orientated x4  Dressing: to leg wounds remain clean dry and intact. Patient expressed no other needs at this time, will continue to educate patient as needed. Fall precautions in place, hourly rounding, call light and belongings in reach, bed in lowest position, wheels locked in place, side rails up x 2, walkways free of clutter    The care plan and education has been reviewed and mutually agreed upon with the patient.

## 2021-06-06 NOTE — PROGRESS NOTES
dialysis dependent, likely ESRD  - On RRT since 2/5/21 UlGretchen Andrade 85 placed by IR 2/5/21  - Jason Medellin M/F 2nd shift with TW 92 kg  - Etiology was progression of CKD due to DN, Hypertension, CHEMO (ATN)    # Scrotal Cellulitis with drainage tract purulent material Cx+ E. Coli + Corynebacterium +  Serratia; US of scrotum noted- discrete abscess  -appreciate Urology/ID recs  -> on Merrem 500 Q 24  - Urology irrigated bladder with saline and return of clear fluid from scrotal incision concerning for fistula plan for intervention next week  - SPC changed last week of May 2021  - BCx Staph    # Right femur fracture: per ortho-conservative management    # CKD stage 4/5: multifactorial-DKD/HTN NS/CHEMO on CKD  -likely dialysis dependent-sees Dr. Jacob Alvarado  -no signs of renal function recovery    # Hypotension: improved - on midodrine daily    # DM2: on insulin    # Cystic lesion right kidney pole: 1.9cm noted on CT of A/P; Urology following    # COPD / T7 Paralysis; on 2L O2 chronic; at baseline. # Anemia:add Retacrit with next HD    # CKD-MBD: phos is stable, no binders needed          Thank you for allowing me to participate in the care of this patient. I will continue to follow along. Please call with questions.     Willy Salomon MD

## 2021-06-06 NOTE — PROGRESS NOTES
8:01 AM  Patient awake in bed, alert and oriented. Patient wanting repositioned so that he can eat breakfast. Blood glucose 113, no coverage, see MAR. Wants medications after breakfast.     8:36 AM  VSS. RT in to work with patient. Shift assessment completed. AM medications given, see MAR. The care plan and education has been reviewed and mutually agreed upon with the patient. 8:50 AM  Perfect serve sent to Dr. Shen Houston, regarding patient wanting to know when he will see someone from urology regarding his procedure. Dr. Shen Houston called and said that he believes the procedure will happen tomorrow, keep patient NPO after midnight, if anything changed he will let me know. Would know for sure in the AM. Patient updated and is agreeable. 5:30 PM  Removed patients knee immobilizer and ace bandage from right knee, assessed leg for new breakdown, none noted.  Reapplied the ace bandage and knee immobilizer at 6pm.
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
## 2021-06-07 NOTE — PROGRESS NOTES
Hospitalist Progress Note      PCP: Citlaly Bailey MD    Date of Admission: 5/31/2021    Chief Complaint: fall     Hospital Course:  Patient seen on dialysis  Denies any pain  Paraplegic wheelchair-bound admitting history reviewed and confirmed  No fever sweats  No chest pain      Medications:  Reviewed      Exam:    /65   Pulse 81   Temp 98.2 °F (36.8 °C) (Oral)   Resp 16   Ht 6' (1.829 m)   Wt 235 lb 7 oz (106.8 kg)   SpO2 93%   BMI 31.93 kg/m²     General appearance: No apparent distress, appears stated age and cooperative. HEENT: Pupils equal, round, and reactive to light. Conjunctivae/corneas clear. Neck: Supple, with full range of motion. No jugular venous distention. Trachea midline. Respiratory:  Normal respiratory effort. Clear to auscultation, bilaterally without RALES/WHEEZES/Rhonchi. Cardiovascular: Regular rate and rhythm with normal S1/S2 without MURMURS, rubs or gallops. Abdomen: Soft, non-tender, non-distended with normal bowel sounds. Skin scrotal area edematous patient has no sensation hence unable to check for tenderness no erythema unable to turn patient disease on dialysis at the bottom of the scrotum and the perineal region  Prepubic catheter in place  Musculoskeletal: No clubbing, cyanosis or EDEMA bilaterally. Full range of motion without deformity. Skin: Skin color, texture, turgor normal.  No rashes or lesions. Neurologic:  Neurovascularly intact without any focal sensory/motor deficits. Cranial nerves: II-XII intact, grossly non-focal.        Labs:   Recent Labs     06/07/21  0532   WBC 8.4   HGB 9.3*   HCT 29.6*        Recent Labs     06/05/21  0526 06/06/21  0518 06/07/21  0533   * 133* 131*   K 4.0 4.3 4.6   CL 96* 97* 96*   CO2 27 26 25   BUN 30* 42* 52*   CREATININE 3.0* 3.4* 3.5*   CALCIUM 8.1* 8.0* 8.5   PHOS  --   --  5.7*     No results for input(s): AST, ALT, BILIDIR, BILITOT, ALKPHOS in the last 72 hours.   No results for input(s): INR in the last 72 hours. No results for input(s): Casandra Ends in the last 72 hours. Urinalysis:      Lab Results   Component Value Date    NITRU Negative 05/31/2021    WBCUA >100 05/31/2021    BACTERIA 2+ 04/01/2021    RBCUA see below 05/31/2021    RBCUA 3+ 05/12/2016    BLOODU MODERATE 05/31/2021    SPECGRAV 1.025 05/31/2021    GLUCOSEU Negative 05/31/2021    GLUCOSEU >=1000 05/17/2011       Radiology:  VL PRE OP VEIN MAPPING   Final Result      MRI PELVIS WO CONTRAST   Final Result   1. T2 hyperintense collection with layering debris noted within the scrotum   extending slightly left of midline measuring up to 5.2 cm. The superior   aspect of this collection abuts the mid/posterior aspect of the base of the   penis at the corpus spongiosum with possible extension to the penile urethra   in this region. Finding is concerning for urethral fistulization given   provided history. 2. Diffuse soft tissue edema. T2 hyperintensity involving the adductor   musculature bilaterally is concerning for possible myositis. Findings are   incompletely evaluated due to lack of intravenous contrast.         US SCROTUM AND TESTICLES   Final Result   Normal appearing testicles with normal blood flow to both testicles      Scrotal wall thickening with increased vascularity and a heterogeneous   hypoechoic area inferiorly in the midline suggesting an infectious process. No discrete abscess identified         CT ABDOMEN PELVIS W IV CONTRAST Additional Contrast? None   Final Result   1. The scrotum is not routinely included in the field of view on CT pelvis   and is not imaged on this exam.   2. Findings suggesting cystitis, with suprapubic catheter draining the   urinary bladder. 3. Nonspecific abdominal and pelvic ascites may be reactive related to renal   disease. 4. Mild anasarca. 5. Indeterminate 1.9 cm cystic lesion inferior pole right kidney.   Additional   characterization with MRI abdomen without and with gadolinium recommended. If unable to perform this exam, then follow-up noncontrast CT abdomen imaging   recommended in 6-12 months to ensure stability. 6. Small volume bilateral pleural effusion with bibasilar relaxation   atelectasis similar in appearance to prior exam.   7. Mild cardiomegaly. RECOMMENDATIONS:   MRI abdomen without and with gadolinium recommended. If unable to perform   this exam, then follow-up noncontrast CT abdomen imaging recommended         XR KNEE LEFT (1-2 VIEWS)   Final Result   Mild osteoarthritic changes medially in the knee with no acute bony   abnormality. Severe diffuse osteopenia. Small suprapatellar effusion.          XR TIBIA FIBULA RIGHT (2 VIEWS)   Final Result   Comminuted displaced fracture distal right femur      Otherwise, no acute bony or joint abnormality         XR FOOT RIGHT (MIN 3 VIEWS)   Final Result   Comminuted displaced fracture distal right femur      Otherwise, no acute bony or joint abnormality         XR KNEE RIGHT (MIN 4 VIEWS)   Final Result   Comminuted displaced fracture distal right femur      Otherwise, no acute bony or joint abnormality             Assessment/Plan:    Active Hospital Problems    Diagnosis Date Noted    Weight loss counseling, encounter for [Z71.3]     Closed fracture of right distal femur ([facility] [address] [S72.401A]     Multiple drug resistant organism (MDRO) culture positive [Z16.24]     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 encounter ([facility] [address] Daria Santos 06/01/2021    Scrotal abscess [N49.2] 05/31/2021    End stage renal disease on dialysis ([facility] [address] [N18.6, Z99.2] 02/01/2021    Renal osteodystrophy [N25.0] 85/15/9898    Complicated UTI (urinary tract infection) [N39.0] 11/08/2016    Renal lesion [N28.9] 02/17/2015    Paraplegia ([facility] [address] [G82.20]        Acute Medical Issues Being Addressed:    24-year-old admitted to the hospital after a fall found to have displaced fracture of distal femur. Also found to have initially scrotal sac swelling with scrotal abscess and concern for fistula forming. Patient has been evaluated by nephrology infectious disease urology. Fall mechanical  -Wheelchair-bound due to paraplegia. Right femur distal fracture  -Appreciate orthopedic recommendation  -Nonweightbearing for now. -Knee immobilizer.  -No acute intervention. Scrotal abscess  -Culture growing E. coli. And also C-reactive  -His blood culture also positive  -MRI showed sinus tract drainage.  -Patient also has suprapubic catheter secondary to paraplegia  - underwent scrotal exploration and drainge of abscess with replacement of SPT on 6/7/2021  --Continue IV antibiotics per ID    Paraplegia with neurogenic bladder  -Suprapubic catheter. COPD  -Stable at baseline. End-stage renal disease on hemodialysis  -Per nephrology.       Anemia of chronic kidney disease  Hemoglobin stable    Chronic respiratory failure  On 2 L oxygen  Respiratory status at baseline    DVT Prophylaxis: Subcu heparin  Diet: Diet NPO  Code Status: Full Code      Dispo - once acute medical processes have resolved    Colby Loaiza MD

## 2021-06-07 NOTE — OP NOTE
Urology Operative Report  [facility]    Provider: Zachery Vasquez MD MD Patient ID:  Admission Date: 2021 Name: Julien Bangura Date: 2021 MRN: [id number]   Patient Location: [id number] : 1955  Attending: Keysha Elias MD Date of Service: 2021  PCP: Marisabel Higginbotham MD     Date of Operation: 2021    Preoperative Diagnosis:   1. Scrotal Abscess    Postoperative Diagnosis: same    Procedure:    1. Scrotal Exploration  2. Drainage of scrotal abscess  3. Replacement of SPT      Surgeon:   Zachery Vasquez MD    Anesthesia: Monitored Local Anesthesia with Sedation    Indications: Bayron Wagoner is a 77 y.o. male who presents for the above named surgery. Informed consent was obtained and the risks, benefits, and details of the procedure were explained to the patient who elected to proceed. Details of Procedure: The patient was brought to the operating room and placed in the supine position on the operating room table. SCDs were placed on the lower extremities. Following induction of anesthesia, the genitals and abdomen were prepped and draped in the usual sterile fashion. A routine timeout was performed, confirming the patient, procedure, site, risk of fire, patient allergies and confirming that preoperative antibiotics had been administered prior to incision. We instilled 60 cc through the SPT and cloudy appearing urine drained from a suspected fistula tract in the scrotum near midline. We then sharply made and incision over this tract and inferior and an abscess pocket was encountered. Purulent drainage was irrigated out and blunt dissection opened the abscess cavity. This was sent for culture. We then placed a 16 Fr ball through the meatus and  After about 5-6 cm of insertion, the ball was able to be palpated through a large urethral opening, and was unable to be placed into the bladder.  This was large and the decision was made to allow this infection to be drained and scar down rather than attempting difficult closure. Irrigation was done , hemostasis was achieved and the wound was packed with damp kerlix. The 18 Fr SPT was upsized to a 20 Fr SPT for optimal drainage. At the end of the procedure all needle, lap, instrument and sponge counts were correct. The patient tolerated the procedure well, was extubated without issue in the operating room, and was transported to the PACU in stable condition. Findings: ~4 cm abscess pocked drained to be healed by secondary intention, and large urethral erosion/fistula into the scrotal space. No testicular involvement. SPT upsized    Estimated Blood Loss: 25 cc                  Drains: 20 Fr SPT          Specimens: wound for culture    Complications: none apparent           Disposition:  PACU - hemodynamically stable.      Plan: Continue abx, continue SPT (needs to be changed in ~1 month), will need WTD dressing changes JOSELINE Dorman MD  6/7/2021

## 2021-06-07 NOTE — PROGRESS NOTES
Patients pre-op assessment, checklist, H&P, and problem list reviewed during patient interview prior to going to surgery. Brace to right leg- states broken femur. Dressing to left knee.

## 2021-06-07 NOTE — PROGRESS NOTES
Pt brought down from the floor to preop. Pt was on 2/L but was moved up to 3/L and O2 sat improved. Teaching / education initiated regarding perioperative experience, expectations, and pain management during stay. Patient verbalized understanding. Call bell left within reach.

## 2021-06-07 NOTE — PROGRESS NOTES
Pt meets d/c criteria for phase 1 in PACU and has been seen by anesthesia. Ok to transfer back to [place] [id number]. Will alert anyone in waiting room for them and the nursing unit if applicable. Will continue to monitor for safety and comfort.     Marcie WATSONN, RN, VIA [facility]  Pre-Op/Recovery   Same Day Surgery
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
## 2021-06-07 NOTE — PROGRESS NOTES
Urology Progress Note  [facility]    Provider: Harriet Walker MD MD Patient ID:  Admission Date: 2021 Name: Jessica Kenney Date: 2021 MRN: [id number]   Patient Location: [id number] : 1955  Attending: Robert Brown MD Date of Service: 2021  PCP: Sonido Corcoran MD     Diagnoses:  1. Scrotal abscess    2. Complicated UTI (urinary tract infection)    3. Closed fracture of distal end of right femur, unspecified fracture morphology, initial encounter ([facility] [address]    4. End stage renal disease on dialysis ([facility] [address]    5. Renal lesion       Assessment/Plan:     78 yo M wheelchair bound, with ESRD on HD, Chronic anemia, CHF, CABG, DM    NGB with chronic suprapubic tube  Admitted after a fall 2021 - distal femur fracture  Scrotal swelling/redness and MRI concerning for a 5 cm abscess and likely fistula from urethra     - continue abx for [facility] UTI  - plan on OR for scrotal I&D with possible SPT  exchange     The patient had a chance to ask questions which were answered. he understands the above plan.     Subjective:   Kevin Delacruz is a 77 y.o. male. He was seen and examined this morning.  Discussed plan for scrotal I&D      Objective:   Vitals:  Vitals:    21 0515   BP: 122/65   Pulse: 81   Resp: 16   Temp: 98.2 °F (36.8 °C)   SpO2: 93%       Intake/Output Summary (Last 24 hours) at 2021 0741  Last data filed at 2021 9733  Gross per 24 hour   Intake 480 ml   Output 250 ml   Net 230 ml       Physical Exam:  Gen: Alert and oriented x3, no acute distress  Significant edema with no TTP as no sensation to scrotum      Labs:  Lab Results   Component Value Date    WBC 8.4 2021    HGB 9.3 (L) 2021    HCT 29.6 (L) 2021    MCV 87.6 2021     2021     Lab Results   Component Value Date    CREATININE 3.5 (H) 2021    BUN 52 (H) 2021     (L) 2021    K 4.6 2021    CL 96 (L) 2021    CO2 25 2021       Harriet Walker,

## 2021-06-07 NOTE — PROGRESS NOTES
Pt arrived to PACU from OR in stable condition and is alert. Pt can move extremities to command. Respirations are even on 6L O2 per Sm. Skin warm, dry, and with appropriate for ethnicity\ color. Abd is soft. Pain is tolerable at this time.   Scrotum surgical site(s) intact with dressing= wet to dry kerlix packing with NS, covered with 4X4, ABD, tape; CDI  Suprapubic catheter exchanged in OR; 20fr, 10mL balloon, site WDL      S/P: incision and drainage scrotal abscess, suprapubic catheter exchange with Dr. Minesh Sharif at [address] [facility] BSN, RN, VIA [facility]  PACU, Pre-op, SDS

## 2021-06-08 NOTE — PROGRESS NOTES
Hospitalist Progress Note      PCP: Rosmery Peralta MD    Date of Admission: 5/31/2021    Chief Complaint: fall     Hospital course: 47-year-old admitted to the hospital after a fall found to have displaced fracture of distal femur. Also found to have initially scrotal sac swelling with scrotal abscess and concern for fistula forming. Patient has been evaluated by nephrology infectious disease urology. Subjective: Patient seen and examined at bedside. No acute events reported. Wife accompanying patient during my visit. PT/OT evaluation pending. Patient and wife request for fistula placement prior to discharge. Medications:  Reviewed    Exam:    /65   Pulse 90   Temp 98.4 °F (36.9 °C) (Axillary)   Resp 16   Ht 6' (1.829 m)   Wt 225 lb 4 oz (102.2 kg)   SpO2 92%   BMI 30.55 kg/m²     General appearance: Elderly white male, no apparent distress, appears stated age and cooperative. Respiratory:  Normal respiratory effort. Clear to auscultation, bilaterally without RALES/WHEEZES/Rhonchi. Cardiovascular: Regular rate and rhythm with normal S1/S2 without MURMURS, rubs or gallops. Abdomen: Soft, non-tender, non-distended with normal bowel sounds. Skin scrotal area edematous patient has no sensation hence unable to check for tenderness no erythema unable to turn patient disease on dialysis at the bottom of the scrotum and the perineal region  Prepubic catheter in place  Musculoskeletal: No clubbing, cyanosis or EDEMA bilaterally. Full range of motion without deformity. Skin: Skin color, texture, turgor normal.  No rashes or lesions.   Neurologic: Paraplegic        Labs:   Recent Labs     06/07/21  0532   WBC 8.4   HGB 9.3*   HCT 29.6*        Recent Labs     06/06/21  0518 06/07/21  0533 06/08/21  0532   * 131* 136   K 4.3 4.6 4.6   CL 97* 96* 102   CO2 26 25 27   BUN 42* 52* 31*   CREATININE 3.4* 3.5* 2.5*   CALCIUM 8.0* 8.5 8.6   PHOS  --  5.7*  --      No results for input(s): AST, ALT, BILIDIR, BILITOT, ALKPHOS in the last 72 hours. No results for input(s): INR in the last 72 hours. No results for input(s): Ceci Lemme in the last 72 hours. Urinalysis:      Lab Results   Component Value Date    NITRU Negative 05/31/2021    WBCUA >100 05/31/2021    BACTERIA 2+ 04/01/2021    RBCUA see below 05/31/2021    RBCUA 3+ 05/12/2016    BLOODU MODERATE 05/31/2021    SPECGRAV 1.025 05/31/2021    GLUCOSEU Negative 05/31/2021    GLUCOSEU >=1000 05/17/2011       Radiology:  VL PRE OP VEIN MAPPING   Final Result      MRI PELVIS WO CONTRAST   Final Result   1. T2 hyperintense collection with layering debris noted within the scrotum   extending slightly left of midline measuring up to 5.2 cm. The superior   aspect of this collection abuts the mid/posterior aspect of the base of the   penis at the corpus spongiosum with possible extension to the penile urethra   in this region. Finding is concerning for urethral fistulization given   provided history. 2. Diffuse soft tissue edema. T2 hyperintensity involving the adductor   musculature bilaterally is concerning for possible myositis. Findings are   incompletely evaluated due to lack of intravenous contrast.         US SCROTUM AND TESTICLES   Final Result   Normal appearing testicles with normal blood flow to both testicles      Scrotal wall thickening with increased vascularity and a heterogeneous   hypoechoic area inferiorly in the midline suggesting an infectious process. No discrete abscess identified         CT ABDOMEN PELVIS W IV CONTRAST Additional Contrast? None   Final Result   1. The scrotum is not routinely included in the field of view on CT pelvis   and is not imaged on this exam.   2. Findings suggesting cystitis, with suprapubic catheter draining the   urinary bladder. 3. Nonspecific abdominal and pelvic ascites may be reactive related to renal   disease. 4. Mild anasarca.    5. Indeterminate 1.9 cm cystic lesion inferior pole right kidney. Additional   characterization with MRI abdomen without and with gadolinium recommended. If unable to perform this exam, then follow-up noncontrast CT abdomen imaging   recommended in 6-12 months to ensure stability. 6. Small volume bilateral pleural effusion with bibasilar relaxation   atelectasis similar in appearance to prior exam.   7. Mild cardiomegaly. RECOMMENDATIONS:   MRI abdomen without and with gadolinium recommended. If unable to perform   this exam, then follow-up noncontrast CT abdomen imaging recommended         XR KNEE LEFT (1-2 VIEWS)   Final Result   Mild osteoarthritic changes medially in the knee with no acute bony   abnormality. Severe diffuse osteopenia. Small suprapatellar effusion.          XR TIBIA FIBULA RIGHT (2 VIEWS)   Final Result   Comminuted displaced fracture distal right femur      Otherwise, no acute bony or joint abnormality         XR FOOT RIGHT (MIN 3 VIEWS)   Final Result   Comminuted displaced fracture distal right femur      Otherwise, no acute bony or joint abnormality         XR KNEE RIGHT (MIN 4 VIEWS)   Final Result   Comminuted displaced fracture distal right femur      Otherwise, no acute bony or joint abnormality             Assessment/Plan:    Active Hospital Problems    Diagnosis Date Noted    Weight loss counseling, encounter for [Z71.3]     Closed fracture of right distal femur ([facility] [address] [S72.401A]     Multiple drug resistant organism (MDRO) culture positive [Z16.24]     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 Francisco Javier Hartmann 06/01/2021    Scrotal abscess [N49.2] 05/31/2021    End stage renal disease on dialysis ([facility] [address] [N18.6, Z99.2] 02/01/2021    Renal osteodystrophy [N25.0] 22/64/3017    Complicated UTI (urinary tract infection) [N39.0] 11/08/2016    Renal lesion [N28.9] 02/17/2015    Paraplegia (HCC) [G82.20]        Fall mechanical, right distal femur fracture  -Wheelchair-bound due to paraplegia. -Orthopedics consulted. Nonweightbearing as tolerated. -Knee immobilizer.  -No acute intervention. E. coli bacteremia, scrotal abscess  -MRI showed sinus tract drainage.  -Patient also has suprapubic catheter secondary to paraplegia  - underwent scrotal exploration and drainge of abscess with replacement of SPT on 6/7/2021  --Currently on IV meropenem along with oral probiotic as per ID recommendations.  -Okay for discharge as per urology. Follow-up as outpatient. Paraplegia with neurogenic bladder  -Suprapubic catheter. Follow-up with urology as outpatient. COPD  -Stable at baseline. End-stage renal disease on hemodialysis  -Per nephrology. Last HD was yesterday.  -Labs reviewed. Euvolemic. Anemia of chronic kidney disease  Hemoglobin stable    Chronic respiratory failure  On 2 L oxygen  Respiratory status at baseline    DVT Prophylaxis: Subcu heparin  Diet: ADULT DIET; Regular; 5 carb choices (75 gm/meal)  Code Status: Full Code   PT/OT: Pending  Dispo -possible discharge in 1 to 2 days.     Sarah Spring MD

## 2021-06-08 NOTE — PLAN OF CARE
Had suprapubic cath exchanged and scrotal I&D on day shift. Dressing changed per order once back from dialysis. Tylenol for h/a. Midodrine given due to low Bp. Whispers, encouraging C&DB to prevent pneumonia. Wife does bowel program at home, digital rectal stimulation. Performed some DRS, no stool in rectal vault (only mucous). Offered miralax but declined    Problem: Falls - Risk of:  Goal: Will remain free from falls  Description: Will remain free from falls  Outcome: Ongoing  Goal: Absence of physical injury  Description: Absence of physical injury  Outcome: Ongoing     Problem: Skin Integrity:  Goal: Will show no infection signs and symptoms  Description: Will show no infection signs and symptoms  Outcome: Ongoing  Goal: Absence of new skin breakdown  Description: Absence of new skin breakdown  Outcome: Ongoing  Goal: Demonstration of wound healing without infection will improve  Description: Demonstration of wound healing without infection will improve  Outcome: Ongoing     Problem: Pain:  Description: Pain management should include both nonpharmacologic and pharmacologic interventions.   Goal: Pain level will decrease  Description: Pain level will decrease  Outcome: Ongoing  Goal: Control of acute pain  Description: Control of acute pain  Outcome: Ongoing  Goal: Control of chronic pain  Description: Control of chronic pain  Outcome: Ongoing     Problem: Physical Regulation:  Goal: Will remain free from infection  Description: Will remain free from infection  Outcome: Ongoing     Problem: Respiratory:  Goal: Ability to maintain normal respiratory secretions will improve  Description: Ability to maintain normal respiratory secretions will improve  Outcome: Ongoing

## 2021-06-08 NOTE — PLAN OF CARE
Problem: Falls - Risk of:  Goal: Will remain free from falls  Description: Will remain free from falls  6/8/2021 0746 by Carlito Macias RN  Outcome: Ongoing  6/8/2021 0155 by Casimiro Galvan RN  Outcome: Ongoing  Goal: Absence of physical injury  Description: Absence of physical injury  6/8/2021 0746 by Carlito Macias RN  Outcome: Ongoing  6/8/2021 0155 by Casimiro Galvan RN  Outcome: Ongoing     Problem: Skin Integrity:  Goal: Will show no infection signs and symptoms  Description: Will show no infection signs and symptoms  6/8/2021 0746 by Carlito Macias RN  Outcome: Ongoing  6/8/2021 0155 by Casimiro Galvan RN  Outcome: Ongoing  Goal: Absence of new skin breakdown  Description: Absence of new skin breakdown  6/8/2021 0746 by Carlito Macias RN  Outcome: Ongoing  6/8/2021 0155 by Casimiro Galvan RN  Outcome: Ongoing  Goal: Demonstration of wound healing without infection will improve  Description: Demonstration of wound healing without infection will improve  6/8/2021 0746 by Carlito Macias RN  Outcome: Ongoing  6/8/2021 0155 by Casimiro Galvan RN  Outcome: Ongoing     Problem: Pain:  Goal: Pain level will decrease  Description: Pain level will decrease  6/8/2021 0746 by Carlito Macias RN  Outcome: Ongoing  6/8/2021 0155 by Casimiro Galvan RN  Outcome: Ongoing  Goal: Control of acute pain  Description: Control of acute pain  6/8/2021 0746 by Carlito Macias RN  Outcome: Ongoing  6/8/2021 0155 by Casimiro Galvan RN  Outcome: Ongoing  Goal: Control of chronic pain  Description: Control of chronic pain  6/8/2021 0746 by Carlito Macias RN  Outcome: Ongoing  6/8/2021 0155 by Casimiro Galvan RN  Outcome: Ongoing     Problem: Physical Regulation:  Goal: Will remain free from infection  Description: Will remain free from infection  6/8/2021 0746 by Carlito Macias RN  Outcome: Ongoing  6/8/2021 0155 by Casimiro Galvan RN  Outcome: Ongoing     Problem: Respiratory:  Goal: Ability to maintain

## 2021-06-08 NOTE — PROGRESS NOTES
morphine, traZODone      Vitals:  /65   Pulse 90   Temp 98.4 °F (36.9 °C) (Axillary)   Resp 16   Ht 6' (1.829 m)   Wt 225 lb 4 oz (102.2 kg)   SpO2 92%   BMI 30.55 kg/m²       Physical Exam  General : AAOx3, whispering voice, not in pain or respiratory distress, resting in bed  HEENT : mucosa moist.  CVS: S1 S2 normal, regular rhythm, no murmurs or rubs. Lungs: Clear, no wheezing or crackles. Abd: Soft, distended, bowel sounds normal, non-tender. Ext: LE edema+ RLE>LLE  Skin: Warm. No rashes appreciated.   : SPC+, scrotum with dressing+ post-op, ball bag with scanty bloody urine      Labs:  CBC with Differential:    Lab Results   Component Value Date    WBC 8.4 06/07/2021    RBC 3.38 06/07/2021    HGB 9.3 06/07/2021    HCT 29.6 06/07/2021     06/07/2021    MCV 87.6 06/07/2021    MCH 27.6 06/07/2021    MCHC 31.6 06/07/2021    RDW 17.4 06/07/2021    NRBC CANCELED 10/22/2014    NRBC CANCELED 10/22/2014    SEGSPCT 76.1 10/22/2014    BANDSPCT 2 01/16/2018    BLASTSPCT CANCELED 10/22/2014    METASPCT 1 04/03/2017    LYMPHOPCT 11.6 06/02/2021    PROMYELOPCT CANCELED 10/22/2014    MONOPCT 9.2 06/02/2021    MYELOPCT 1 03/31/2017    EOSPCT 4.6 07/11/2011    BASOPCT 0.8 06/02/2021    MONOSABS 0.8 06/02/2021    LYMPHSABS 1.0 06/02/2021    EOSABS 0.2 06/02/2021    BASOSABS 0.1 06/02/2021    DIFFTYPE Auto 07/11/2011     BMP:    Lab Results   Component Value Date     06/08/2021    K 4.6 06/08/2021     06/08/2021    CO2 27 06/08/2021    BUN 31 06/08/2021    LABALBU 3.1 06/01/2021    CREATININE 2.5 06/08/2021    CALCIUM 8.6 06/08/2021    GFRAA 31 06/08/2021    GFRAA >60 05/13/2013    LABGLOM 26 06/08/2021    LABGLOM 58 10/15/2015    GLUCOSE 112 06/08/2021     Ionized Calcium:  No results found for: IONCA  Magnesium:    Lab Results   Component Value Date    MG 1.90 06/02/2021     Phosphorus:    Lab Results   Component Value Date    PHOS 5.7 06/07/2021     Uric Acid:    Lab Results   Component Value Date    LABURIC 6.8 07/13/2020     Last 3 Troponin:    Lab Results   Component Value Date    TROPONINI 0.07 02/02/2021    TROPONINI 0.08 02/01/2021    TROPONINI 0.12 01/29/2019     U/A:    Lab Results   Component Value Date    COLORU Yellow 05/31/2021    PROTEINU >=300 05/31/2021    PHUR 6.0 05/31/2021    WBCUA >100 05/31/2021    RBCUA see below 05/31/2021    RBCUA 3+ 05/12/2016    YEAST Present 04/01/2021    BACTERIA 2+ 04/01/2021    CLARITYU TURBID 05/31/2021    SPECGRAV 1.025 05/31/2021    LEUKOCYTESUR LARGE 05/31/2021    UROBILINOGEN 0.2 05/31/2021    BILIRUBINUR Negative 05/31/2021    BILIRUBINUR NEGATIVE 05/12/2016    BLOODU MODERATE 05/31/2021    GLUCOSEU Negative 05/31/2021    GLUCOSEU >=1000 05/17/2011    AMORPHOUS 4+ 11/18/2016       Assessment/Plan:    1. CKD IV  Could be ESRD  Dialysis dependent since 2/2021  Does not appear to have adequate urine out-put, Creatinine could be over estimating renal function. Fluid overloaded, may need dialysis three times a week atleast for volume management  Had HD yesterday, will do HD again tomorrow  Midodrine prior to HD, has low normal BPs  2. Right renal cyst  Needs follow-up  3. Ecoli bacteremia/Scrotal abscess  S/p I&D 6/7  On ABx  4. Neurogenic bladder s/p SPC  Exchanged 6/7  5. Right femur fracture  6. Hypotension  Increase midodrine further  Stopped metoprolol  7. DM Karlos Bailey MD, MD.  6/8/2021  Office Phone : [phone]    Thank you for allowing us to participate in the care of this pt. I willcontinue to follow along. Please call with questions or concerns.
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
## 2021-06-08 NOTE — PROGRESS NOTES
8:58 AM  Patient awake in bed, alert and oriented x4, eating breakfast. VSS. Shift assessment completed. AM medications given, see MAR. Patient repositioned. The care plan and education has been reviewed and mutually agreed upon with the patient. 10:59 AM  Changed all of patients dressings (including scrotum, coccyx, bilateral heels, feet, and suprapubic dressing.) Removed ace bandage and knee immobilizer to right leg, left it off for about thirty minutes and reapplied both to right leg. Patients linen and gown changed and patient repositioned. Fall precautions in place, call light and bedside table in reach.

## 2021-06-08 NOTE — FLOWSHEET NOTE
Treatment time: 3.5hours  Net UF: 2200 ml     Pre weight: 100.1 kg   Post weight: 97.9 kg  EDW: 92 kg     Access used: RCW CVC  Access function: Good with  ml/min     Medications or blood products given: Midodrine, Albumin, Retacrit     Regular outpatient schedule: [address], M F only     Summary of response to treatment: Tolerated tx fair.  Some asymptomatic hypotension noted which was managed with Midodrine and Albumin.      Copy of dialysis treatment record placed in chart, to be scanned into EMR.       06/07/21 1717 06/07/21 2055   Treatment   Time On  --  1720   Time Off  --  2051   Vital Signs   BP (!) 104/48 (!) 94/46   Temp 98.1 °F (36.7 °C) 97.4 °F (36.3 °C)   Pulse 81 109   Resp 18 18   Dialysis Bath   K+ (Potassium) 3  --    Ca+ (Calcium) 2.5  --    Na+ (Sodium) 138  --    HCO3 (Bicarb) 35  --

## 2021-06-08 NOTE — PROGRESS NOTES
applied. Ext: no peripheral edema noted, moves upper  extremities spontaneously  Skin: warmand well perfused, no rashes noted on the face, or arms.      Labs:  Lab Results   Component Value Date    WBC 8.4 06/07/2021    HGB 9.3 (L) 06/07/2021    HCT 29.6 (L) 06/07/2021    MCV 87.6 06/07/2021     06/07/2021     Lab Results   Component Value Date    CREATININE 2.5 (H) 06/08/2021    BUN 31 (H) 06/08/2021     06/08/2021    K 4.6 06/08/2021     06/08/2021    CO2 27 06/08/2021       Kristi Birch, ADRIANNA - CNP   6/8/2021
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
## 2021-06-09 NOTE — PROGRESS NOTES
Urology Progress Note  [facility]    Provider: ADRIANNA Hogan CNP  Patient ID:  Admission Date: 2021 Name: Bonnie Multani Date: 2021 MRN: [id number]   Patient Location: [id number] : 1955  Attending: Arthur Montaño MD Date of Service: 2021  PCP: Klaus Ryan MD     Diagnoses:  1. Scrotal abscess    2. Complicated UTI (urinary tract infection)    3. Closed fracture of distal end of right femur, unspecified fracture morphology, initial encounter ([facility] [address]    4. End stage renal disease on dialysis ([facility] [address]    5. Renal lesion           Assessment/Plan:  76 yo M pmh wheelchair bound, ESRD on HD, Chronic anemia, CHF, CABG, DM    NGB with chronic suprapubic tube  Admitted after a fall 2021 - distal femur fractureScrotal Swelling/redness and MRI concerning for a 5 cm abscess and likely fistula from urethra     recc    continue abx for [facility] UTI  S/p scrotal I&D with SPT  exchange to 20F on 21  -BID wet to dry dressing changes  -Continue scrotal elevation for swelling  -OK to discharge when medically clear    The patient had a chance to ask questions which were answered. he understands the above plan. Subjective:   Enid Sabillon is a 77 y.o. male. He was off the unit this morning but all labs, imaging, flow sheets were reviewed and spoke with his nurse. Objective:   Vitals:  Vitals:    21 0900   BP: (!) 105/51   Pulse: 98   Resp:    Temp:    SpO2:        Intake/Output Summary (Last 24 hours) at 2021 0936  Last data filed at 2021 0515  Gross per 24 hour   Intake --   Output 200 ml   Net -200 ml     Physical Exam:  Patient not in room today. Remote rounding.     Labs:  Lab Results   Component Value Date    WBC 9.0 2021    HGB 8.9 (L) 2021    HCT 28.9 (L) 2021    MCV 88.8 2021     2021     Lab Results   Component Value Date    CREATININE 3.2 (H) 2021    BUN 40 (H) 2021     2021    K 5.1 2021  06/09/2021    CO2 24 06/09/2021       Kristofer Mott, APRN - CNP   6/9/2021

## 2021-06-09 NOTE — FLOWSHEET NOTE
Treatment time: 3.5  Net UF: 2100    Pre weight: 105.3  Post weight: 103.3  EDW:     Access used: TDC  Access function: good    Medications or blood products given: retacrit    Regular outpatient schedule: MWF    Summary of response to treatment: Tx joan fair. BP low at beginning but held steady after midodrine. 06/09/21 1145   Vital Signs   BP (!) 115/54   Temp 97.5 °F (36.4 °C)   Pulse 93   Resp 18   Weight 227 lb 11.8 oz (103.3 kg)   Percent Weight Change -1.9   During Hemodialysis Assessment   Access Visible Yes   Hemodialysis Central Access Internal jugular Right   No Placement Date or Time found. Pre-existing: Yes  Access Type: Internal jugular  Orientation: Right   Site Assessment Clean;Dry; Intact   Post-Hemodialysis Assessment   Post-Treatment Procedures Blood returned;Catheter capped, clamped and heparinized x 2 ports   Rinseback Volume (ml) 400 ml   Total Liters Processed (l/min) 63.1 l/min   Dialyzer Clearance Lightly streaked   Duration of Treatment (minutes) 210 minutes   Heparin amount administered during treatment (units) 0 units   Hemodialysis Intake (ml) 400 ml   Hemodialysis Output (ml) 2500 ml   NET Removed (ml) 2100 ml   Tolerated Treatment Good       Copy of dialysis treatment record placed in chart, to be scanned into EMR.

## 2021-06-09 NOTE — PROGRESS NOTES
Recent Labs     06/07/21  0533 06/08/21  0532 06/09/21  0505   * 136 136   K 4.6 4.6 5.1   CL 96* 102 102   CO2 25 27 24   BUN 52* 31* 40*   CREATININE 3.5* 2.5* 3.2*   CALCIUM 8.5 8.6 8.7   PHOS 5.7*  --   --      No results for input(s): AST, ALT, BILIDIR, BILITOT, ALKPHOS in the last 72 hours. No results for input(s): INR in the last 72 hours. No results for input(s): Rejeana Kemps in the last 72 hours. Urinalysis:      Lab Results   Component Value Date    NITRU Negative 05/31/2021    WBCUA >100 05/31/2021    BACTERIA 2+ 04/01/2021    RBCUA see below 05/31/2021    RBCUA 3+ 05/12/2016    BLOODU MODERATE 05/31/2021    SPECGRAV 1.025 05/31/2021    GLUCOSEU Negative 05/31/2021    GLUCOSEU >=1000 05/17/2011       Radiology:  VL PRE OP VEIN MAPPING   Final Result      MRI PELVIS WO CONTRAST   Final Result   1. T2 hyperintense collection with layering debris noted within the scrotum   extending slightly left of midline measuring up to 5.2 cm. The superior   aspect of this collection abuts the mid/posterior aspect of the base of the   penis at the corpus spongiosum with possible extension to the penile urethra   in this region. Finding is concerning for urethral fistulization given   provided history. 2. Diffuse soft tissue edema. T2 hyperintensity involving the adductor   musculature bilaterally is concerning for possible myositis. Findings are   incompletely evaluated due to lack of intravenous contrast.         US SCROTUM AND TESTICLES   Final Result   Normal appearing testicles with normal blood flow to both testicles      Scrotal wall thickening with increased vascularity and a heterogeneous   hypoechoic area inferiorly in the midline suggesting an infectious process. No discrete abscess identified         CT ABDOMEN PELVIS W IV CONTRAST Additional Contrast? None   Final Result   1.  The scrotum is not routinely included in the field of view on CT pelvis   and is not imaged on this exam. 2. Findings suggesting cystitis, with suprapubic catheter draining the   urinary bladder. 3. Nonspecific abdominal and pelvic ascites may be reactive related to renal   disease. 4. Mild anasarca. 5. Indeterminate 1.9 cm cystic lesion inferior pole right kidney. Additional   characterization with MRI abdomen without and with gadolinium recommended. If unable to perform this exam, then follow-up noncontrast CT abdomen imaging   recommended in 6-12 months to ensure stability. 6. Small volume bilateral pleural effusion with bibasilar relaxation   atelectasis similar in appearance to prior exam.   7. Mild cardiomegaly. RECOMMENDATIONS:   MRI abdomen without and with gadolinium recommended. If unable to perform   this exam, then follow-up noncontrast CT abdomen imaging recommended         XR KNEE LEFT (1-2 VIEWS)   Final Result   Mild osteoarthritic changes medially in the knee with no acute bony   abnormality. Severe diffuse osteopenia. Small suprapatellar effusion.          XR TIBIA FIBULA RIGHT (2 VIEWS)   Final Result   Comminuted displaced fracture distal right femur      Otherwise, no acute bony or joint abnormality         XR FOOT RIGHT (MIN 3 VIEWS)   Final Result   Comminuted displaced fracture distal right femur      Otherwise, no acute bony or joint abnormality         XR KNEE RIGHT (MIN 4 VIEWS)   Final Result   Comminuted displaced fracture distal right femur      Otherwise, no acute bony or joint abnormality             Assessment/Plan:    Active Hospital Problems    Diagnosis Date Noted    Diabetes education, encounter for [Z71.89]     Closed fracture of right distal femur ([facility] [address] [S72.401A]     Multiple drug resistant organism (MDRO) culture positive [Z16.24]     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

## 2021-06-09 NOTE — PROGRESS NOTES
-PM assessment complete, VSS on 3L O2 (baseline), Ace wrap to right leg CDI, medications given per MAR. Dressing to scrotum changed per order, patient tolerated well.   -Patient states he worries about being discharged \"too soon\" and states he feels he has [place] lot going on\" in terms of medical care. Patient worried about transportation now that he has a right femur fracture.

## 2021-06-10 NOTE — PROGRESS NOTES
Nutrition Assessment     Type and Reason for Visit: Reassess    Nutrition Recommendations/Plan:   No new nutrition rec's @ this time     Nutrition Assessment:  Pt's nutrition status remains stable AEB good appetite with po intake usually greater than 50% of meals. Intake adequately promotes wound healing. I/O's indicate pt is -5L from admission; wt is up ~20 lb(may be bedscale inaccuracy). No nutrition concerns expressed @ this time. Malnutrition Assessment:  Malnutrition Status: No malnutrition    Nutrition Related Findings: LBM: 6/1 per EMR.  -5L; +2 edema extremities; +3 perineal edema      Current Nutrition Therapies:    ADULT DIET;  Regular; 5 carb choices (75 gm/meal)    Anthropometric Measures:  · Height: 6' (182.9 cm)  · Current Body Wt: 230 lb (104.3 kg)   · BMI: 31.2    Nutrition Diagnosis:   · Increased nutrient needs related to increase demand for energy/nutrients as evidenced by wounds      Nutrition Interventions:   Food and/or Nutrient Delivery:  Continue Current Diet  Nutrition Education/Counseling:  Education completed   Coordination of Nutrition Care:  Continue to monitor while inpatient    Goals:  po intake at least 50% of meals       Nutrition Monitoring and Evaluation:   Behavioral-Environmental Outcomes:  None Identified   Food/Nutrient Intake Outcomes:  Food and Nutrient Intake  Physical Signs/Symptoms Outcomes:  Weight, Skin     Discharge Planning:    No discharge needs at this time     Electronically signed by Rufina Díaz RD, LD on 6/10/21 at 11:50 AM EDT    Contact: [phone]

## 2021-06-10 NOTE — PLAN OF CARE
Problem: Falls - Risk of:  Goal: Will remain free from falls  Description: Will remain free from falls  6/10/2021 0944 by Floyd Nance RN  Outcome: Ongoing  6/10/2021 0653 by Andrew Calix RN  Outcome: Ongoing  Goal: Absence of physical injury  Description: Absence of physical injury  6/10/2021 0944 by Floyd Nance RN  Outcome: Ongoing  6/10/2021 0653 by Andrew Calix RN  Outcome: Ongoing     Problem: Skin Integrity:  Goal: Will show no infection signs and symptoms  Description: Will show no infection signs and symptoms  6/10/2021 0944 by Floyd Nance RN  Outcome: Ongoing  6/10/2021 0653 by Andrew Calix RN  Outcome: Ongoing  Goal: Absence of new skin breakdown  Description: Absence of new skin breakdown  6/10/2021 0944 by Floyd Nance RN  Outcome: Ongoing  6/10/2021 0653 by Andrew Calix RN  Outcome: Ongoing  Goal: Demonstration of wound healing without infection will improve  Description: Demonstration of wound healing without infection will improve  6/10/2021 0944 by Floyd Nance RN  Outcome: Ongoing  6/10/2021 0653 by Andrew Calix RN  Outcome: Ongoing  Goal: Status of oral mucous membranes will improve  Description: Status of oral mucous membranes will improve  6/10/2021 0944 by Floyd Nance RN  Outcome: Ongoing  6/10/2021 0653 by Andrew Calix RN  Outcome: Ongoing  Goal: Skin integrity will be maintained  Description: Skin integrity will be maintained  6/10/2021 0944 by Floyd Nance RN  Outcome: Ongoing  6/10/2021 0653 by Andrew Calix RN  Outcome: Ongoing     Problem: Pain:  Goal: Pain level will decrease  Description: Pain level will decrease  6/10/2021 0944 by Floyd Nance RN  Outcome: Ongoing  6/10/2021 0653 by Andrew Calix RN  Outcome: Ongoing  Goal: Control of acute pain  Description: Control of acute pain  6/10/2021 0944 by Floyd Nance RN  Outcome: Ongoing  6/10/2021 0653 by Andrew Calix RN  Outcome: Ongoing  Goal: Control of chronic pain  Description: Control of chronic pain  6/10/2021 0944 by Dari Gross RN  Outcome: Ongoing  6/10/2021 0653 by Ana Don RN  Outcome: Ongoing     Problem: Physical Regulation:  Goal: Will remain free from infection  Description: Will remain free from infection  6/10/2021 0944 by Dari Gross RN  Outcome: Ongoing  6/10/2021 0653 by Ana Don RN  Outcome: Ongoing  Goal: Ability to maintain clinical measurements within normal limits will improve  Description: Ability to maintain clinical measurements within normal limits will improve  6/10/2021 0944 by Dari Gross RN  Outcome: Ongoing  6/10/2021 0653 by Ana Don RN  Outcome: Ongoing  Goal: Complications related to the disease process, condition or treatment will be avoided or minimized  6/10/2021 0944 by Dari Gross RN  Outcome: Ongoing  6/10/2021 0653 by Ana Don RN  Outcome: Ongoing     Problem: Respiratory:  Goal: Ability to maintain normal respiratory secretions will improve  Description: Ability to maintain normal respiratory secretions will improve  6/10/2021 0944 by Dari Gross RN  Outcome: Ongoing  6/10/2021 0653 by Ana Don RN  Outcome: Ongoing     Problem:  Activity:  Goal: Fatigue will decrease  Description: Fatigue will decrease  6/10/2021 0944 by Dari Gross RN  Outcome: Ongoing  6/10/2021 0653 by Ana Don RN  Outcome: Ongoing  Goal: Risk for activity intolerance will decrease  Description: Risk for activity intolerance will decrease  6/10/2021 0944 by Dari Gross RN  Outcome: Ongoing  6/10/2021 0653 by Ana Don RN  Outcome: Ongoing     Problem: Coping:  Goal: Ability to cope will improve  Description: Ability to cope will improve  6/10/2021 0944 by Dari Gross RN  Outcome: Ongoing  6/10/2021 0653 by Ana Don RN  Outcome: Ongoing     Problem: Fluid Volume:  Goal: Will show no signs or symptoms of fluid imbalance  Description: Will show no signs or symptoms of fluid imbalance  6/10/2021 0944 by Jayleen Saldaña RN  Outcome: Ongoing  6/10/2021 0653 by Cecilio Mishra RN  Outcome: Ongoing     Problem: Health Behavior:  Goal: Ability to manage health-related needs will improve  Description: Ability to manage health-related needs will improve  6/10/2021 0944 by Jayleen Saldaña RN  Outcome: Ongoing  6/10/2021 0653 by Cecilio Mishra RN  Outcome: Ongoing  Goal: Identification of resources available to assist in meeting health care needs will improve  Description: Identification of resources available to assist in meeting health care needs will improve  6/10/2021 0944 by Jayleen Saldaña RN  Outcome: Ongoing  6/10/2021 0653 by Cecilio Mishra RN  Outcome: Ongoing     Problem: Nutritional:  Goal: Ability to identify appropriate dietary choices will improve  Description: Ability to identify appropriate dietary choices will improve  6/10/2021 0944 by Jayleen Saldaña RN  Outcome: Ongoing  6/10/2021 0653 by Cecilio Mishra RN  Outcome: Ongoing     Problem: Sensory:  Goal: General experience of comfort will improve  Description: General experience of comfort will improve  6/10/2021 0944 by Jayleen Saldaña RN  Outcome: Ongoing  6/10/2021 0653 by Cecilio Mishra RN  Outcome: Ongoing

## 2021-06-10 NOTE — PROGRESS NOTES
PM assessment complete, VSS on 3L O2 (baseline), Ace wrap and immobilizer reapplied to right leg after 15 min break for skin, medications given per MAR. Foam dressings to wounds changed, dressing to scrotum changed per order, patient repositioned. Suprapubic catheter intact. The care plan and education has been reviewed and mutually agreed upon with the patient, call light within reach.

## 2021-06-10 NOTE — PROGRESS NOTES
[facility]  Requested to check home infusion benefits. Referral to Basil Vargas /Breann [phone] to run benefits per payer. Discharge planner notified.

## 2021-06-10 NOTE — CARE COORDINATION
Left voicemail yesterday and today w/charge Rn at 's dialysis center to discuss IV meds recommended w/HD session.   Electronically signed by AYAAN Cai on 6/10/2021 at 9:12 AM

## 2021-06-10 NOTE — PROGRESS NOTES
Patient awake in bed, finishing breakfast. Patient is alert and oriented x4. VSS. Shift assessment completed. AM medications given, see MAR. All dressings changed including BLE, scrotal incision, sacral wound. Patients RLE knee immobilizer and ace bandage removed for thirty minutes, assessed skin for breakdown and reapplied. Patient repositioned. The care plan and education has been reviewed and mutually agreed upon with the patient. Patient to have IR tunneled catheter placed for IV antibiotic infusions at home once discharged. 1:10 PM  Patient arrived back from special procedures. Left tunneled two lumen catheter dressing is clean, dry, and intact. No bleeding at the site. VSS. Will continue to monitor.

## 2021-06-10 NOTE — CARE COORDINATION
Met w/spouse to present IMM and discuss needs on dc. Pt will dc to home w/[facility]-checking benefits for the [place]. Called [facility] to arrange stretcher transport for pt beginning Monday. Spouse stated she plans to appeal dc if dc is today. Sw cont to follow.   Electronically signed by AYAAN Haas on 6/10/2021 at 1:12 PM

## 2021-06-10 NOTE — DISCHARGE SUMMARY
[facility]    Patient Demographics    Patient. Joe Maynard  Date of Birth. 1955  MRN. [id number]     Primary care provider. Giovany Person MD  (Tel: [phone])    Admit date: 5/31/2021    Discharge date (blank if same as Note Date): Note Date: 6/10/2021     Reason for Hospitalization. Chief Complaint   Patient presents with   Zulay Gilbert     brought in via EMS; OA foot stuck under electric wc doing to dialysis from a fall         Significant Findings. Active Problems:    Paraplegia ([address]    Renal lesion    Complicated UTI (urinary tract infection)    End stage renal disease on dialysis [facility])    Renal osteodystrophy    Scrotal abscess    Closed supracondylar fracture of femur, right, initial encounter (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 *       Problems and results from this hospitalization that need follow up. 1. None     Significant test results and incidental findings. IR TUNNELED CVC PLACE WO SQ PORT/PUMP > 5 YEARS   Final Result   Successful placement of tunneled internal jugular power line catheter as   above. VL PRE OP VEIN MAPPING   Final Result      MRI PELVIS WO CONTRAST   Final Result   1. T2 hyperintense collection with layering debris noted within the scrotum   extending slightly left of midline measuring up to 5.2 cm. The superior   aspect of this collection abuts the mid/posterior aspect of the base of the   penis at the corpus spongiosum with possible extension to the penile urethra   in this region. Finding is concerning for urethral fistulization given   provided history. 2. Diffuse soft tissue edema.   T2 hyperintensity involving the adductor musculature bilaterally is concerning for possible myositis. Findings are   incompletely evaluated due to lack of intravenous contrast.         US SCROTUM AND TESTICLES   Final Result   Normal appearing testicles with normal blood flow to both testicles      Scrotal wall thickening with increased vascularity and a heterogeneous   hypoechoic area inferiorly in the midline suggesting an infectious process. No discrete abscess identified         CT ABDOMEN PELVIS W IV CONTRAST Additional Contrast? None   Final Result   1. The scrotum is not routinely included in the field of view on CT pelvis   and is not imaged on this exam.   2. Findings suggesting cystitis, with suprapubic catheter draining the   urinary bladder. 3. Nonspecific abdominal and pelvic ascites may be reactive related to renal   disease. 4. Mild anasarca. 5. Indeterminate 1.9 cm cystic lesion inferior pole right kidney. Additional   characterization with MRI abdomen without and with gadolinium recommended. If unable to perform this exam, then follow-up noncontrast CT abdomen imaging   recommended in 6-12 months to ensure stability. 6. Small volume bilateral pleural effusion with bibasilar relaxation   atelectasis similar in appearance to prior exam.   7. Mild cardiomegaly. RECOMMENDATIONS:   MRI abdomen without and with gadolinium recommended. If unable to perform   this exam, then follow-up noncontrast CT abdomen imaging recommended         XR KNEE LEFT (1-2 VIEWS)   Final Result   Mild osteoarthritic changes medially in the knee with no acute bony   abnormality. Severe diffuse osteopenia. Small suprapatellar effusion.          XR TIBIA FIBULA RIGHT (2 VIEWS)   Final Result   Comminuted displaced fracture distal right femur      Otherwise, no acute bony or joint abnormality         XR FOOT RIGHT (MIN 3 VIEWS)   Final Result   Comminuted displaced fracture distal right femur      Otherwise, no acute bony or joint abnormality XR KNEE RIGHT (MIN 4 VIEWS)   Final Result   Comminuted displaced fracture distal right femur      Otherwise, no acute bony or joint abnormality           Invasive procedures and treatments. 1. None    Problem-based Hospital Course.   Pt is a 49-year-old male with history of COPD, ESRD on hemodialysis, anemia of chronic kidney disease, paraplegia with neurogenic bladder who is admitted to the hospital after a fall found to have displaced fracture of distal femur. Also found to have scrotal abscess with fistula. Patient has been evaluated by nephrology infectious disease urology.   Fall mechanical, right distal femur fracture: Patient is wheelchair-bound at baseline due to paraplegia. Orthopedics was consulted on admission. Recommend conservative management of distal femur fracture. Nonweightbearing as tolerated. Knee immobilizer at discharge. PT/OT at home. Recommend follow-up with orthopedics as outpatient.   E. coli bacteremia, scrotal abscess. MRI showed sinus tract drainage. Patient also has suprapubic catheter secondary to paraplegia. Pt underwent scrotal exploration and drainge of abscess with replacement of SPT on 6/7/2021. As per ID recommendations, patient received IV ertapenem as inpatient. Initial recommendation was to discharge patient on IV meropenem with dialysis. However due to cost and lack of supply at the dialysis center, patient had central line placed for him to receive IV meropenem at home. Stop date 6/15/2021. Okay for discharge as per urology and ID service. Follow-up in clinic as outpatient.   Paraplegia with neurogenic bladder: Chronic suprapubic catheter placement. Follow-up with urology as outpatient.   COPD: Stable at baseline.   End-stage renal disease on hemodialysis: Last HD was on the day of discharge. No issues reported with dialysis. Patient requested for AV fistula to be placed while inpatient.   However given his bacteremia, nephrology recommended fistula · medication strength  · how much to take  · when to take this        CONTINUE taking these medications    acetaminophen 325 MG tablet  Commonly known as: TYLENOL  Take 2 tablets by mouth every 4 hours as needed for Pain or Fever     aspirin 81 MG chewable tablet     bisacodyl 5 MG EC tablet  Commonly known as: DULCOLAX     citalopram 20 MG tablet  Commonly known as: CELEXA  Take 1 tablet by mouth daily     Compressor/Nebulizer Misc  Nebulizer and supplies bill under medicare part B dx code J96.01     docusate sodium 100 MG capsule  Commonly known as: COLACE     ferrous sulfate 325 (65 Fe) MG tablet  Commonly known as: IRON 325  Take 1 tablet by mouth daily (with breakfast)     finasteride 5 MG tablet  Commonly known as: PROSCAR  Take 1 tablet by mouth daily     gabapentin 100 MG capsule  Commonly known as: NEURONTIN     glucose 40 % Gel  Commonly known as: GLUTOSE  Take 15 g by mouth as needed (low BS)     insulin lispro 100 UNIT/ML pen  Commonly known as: HUMALOG  Inject 0-12 Units into the skin 3 times daily (with meals)     ipratropium-albuterol 0.5-2.5 (3) MG/3ML Soln nebulizer solution  Commonly known as: DUONEB  Inhale 3 mLs into the lungs every 4 hours as needed for Shortness of Breath DX code J47.1 bronchiectasis     nitroGLYCERIN 0.4 MG SL tablet  Commonly known as: NITROSTAT     OXYGEN     pantoprazole 40 MG tablet  Commonly known as: PROTONIX  Take 1 tablet by mouth daily     Pen Needles 31G X 6 MM Misc  1 each by Does not apply route daily     pravastatin 40 MG tablet  Commonly known as: PRAVACHOL  Take 1 tablet by mouth nightly     torsemide 100 MG tablet  Commonly known as: DEMADEX  Take 1 tablet by mouth daily     traZODone 50 MG tablet  Commonly known as: DESYREL     Vitamin D3 50 MCG (2000 UT) Caps     vitamin E 400 UNIT capsule        STOP taking these medications    metoprolol tartrate 25 MG tablet  Commonly known as: LOPRESSOR           Where to Get Your Medications      These medications were sent to [facility], [place]. - P [phone] - F [phone]  [address] [facility]    Phone: [phone]   · Lantus SoloStar 100 UNIT/ML injection pen  · midodrine 10 MG tablet         Discharge recommendations given to patient. Follow Up. PCP in 1 week   Disposition. home with PT/OT, RN,   Activity. activity as tolerated  Diet: ADULT DIET; Regular; 5 carb choices (75 gm/meal)      Spent 60 minutes in discharge process.     Signed:  Darren Levy MD     6/10/2021 4:08 PM
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
## 2021-06-10 NOTE — PROGRESS NOTES
Urology Progress Note  [facility]    Provider: ADRIANNA Miller CNP  Patient ID:  Admission Date: 2021 Name: Leanne Bowman Date: 2021 MRN: [id number]   Patient Location: [id number] : 1955  Attending: Rustam Olmos MD Date of Service: 6/10/2021  PCP: Hailey Ward MD     Diagnoses:  1. Scrotal abscess    2. Complicated UTI (urinary tract infection)    3. Closed fracture of distal end of right femur, unspecified fracture morphology, initial encounter ([facility] [address]    4. End stage renal disease on dialysis ([facility] [address]    5. Renal lesion           Assessment/Plan:  76 yo M pmh wheelchair bound, ESRD on HD, Chronic anemia, CHF, CABG, DM    NGB with chronic suprapubic tube  Admitted after a fall 2021 - distal femur fractureScrotal Swelling/redness and MRI concerning for a 5 cm abscess and likely fistula from urethra     recc    continue abx for [facility] UTI  S/p scrotal I&D with SPT  exchange to 20F on 21  -BID wet to dry dressing changes  -Continue scrotal elevation for swelling  -OK to discharge when medically clear    The patient had a chance to ask questions which were answered. he understands the above plan. Subjective:   Jaun Graves is a 77 y.o. male. He was off the unit this morning but all labs, imaging, flow sheets were reviewed and spoke with his nurse. Objective:   Vitals:  Vitals:    06/10/21 0930   BP:    Pulse:    Resp:    Temp:    SpO2: 94%       Intake/Output Summary (Last 24 hours) at 6/10/2021 1014  Last data filed at 2021 1930  Gross per 24 hour   Intake 400 ml   Output 2600 ml   Net -2200 ml     Physical Exam:  Patient not in room today. Remote rounding.     Labs:  Lab Results   Component Value Date    WBC 9.0 2021    HGB 8.9 (L) 2021    HCT 28.9 (L) 2021    MCV 88.8 2021     2021     Lab Results   Component Value Date    CREATININE 2.6 (H) 06/10/2021    BUN 30 (H) 06/10/2021     (L) 06/10/2021    K 5.0 06/10/2021  06/10/2021    CO2 27 06/10/2021       ADRIANNA Samson - CNP   6/10/2021
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
## 2021-06-10 NOTE — PLAN OF CARE
Problem: Falls - Risk of:  Goal: Will remain free from falls  Description: Will remain free from falls  Outcome: Ongoing  Goal: Absence of physical injury  Description: Absence of physical injury  Outcome: Ongoing     Problem: Skin Integrity:  Goal: Will show no infection signs and symptoms  Description: Will show no infection signs and symptoms  Outcome: Ongoing  Goal: Absence of new skin breakdown  Description: Absence of new skin breakdown  Outcome: Ongoing  Goal: Demonstration of wound healing without infection will improve  Description: Demonstration of wound healing without infection will improve  Outcome: Ongoing  Goal: Status of oral mucous membranes will improve  Description: Status of oral mucous membranes will improve  Outcome: Ongoing  Goal: Skin integrity will be maintained  Description: Skin integrity will be maintained  Outcome: Ongoing     Problem: Pain:  Goal: Pain level will decrease  Description: Pain level will decrease  Outcome: Ongoing  Goal: Control of acute pain  Description: Control of acute pain  Outcome: Ongoing  Goal: Control of chronic pain  Description: Control of chronic pain  Outcome: Ongoing     Problem: Physical Regulation:  Goal: Will remain free from infection  Description: Will remain free from infection  Outcome: Ongoing  Goal: Ability to maintain clinical measurements within normal limits will improve  Description: Ability to maintain clinical measurements within normal limits will improve  Outcome: Ongoing  Goal: Complications related to the disease process, condition or treatment will be avoided or minimized  Outcome: Ongoing     Problem: Respiratory:  Goal: Ability to maintain normal respiratory secretions will improve  Description: Ability to maintain normal respiratory secretions will improve  Outcome: Ongoing     Problem:  Activity:  Goal: Fatigue will decrease  Description: Fatigue will decrease  Outcome: Ongoing  Goal: Risk for activity intolerance will decrease  Description: Risk for activity intolerance will decrease  Outcome: Ongoing     Problem: Coping:  Goal: Ability to cope will improve  Description: Ability to cope will improve  Outcome: Ongoing     Problem: Fluid Volume:  Goal: Will show no signs or symptoms of fluid imbalance  Description: Will show no signs or symptoms of fluid imbalance  Outcome: Ongoing     Problem: Health Behavior:  Goal: Ability to manage health-related needs will improve  Description: Ability to manage health-related needs will improve  Outcome: Ongoing  Goal: Identification of resources available to assist in meeting health care needs will improve  Description: Identification of resources available to assist in meeting health care needs will improve  Outcome: Ongoing     Problem: Nutritional:  Goal: Ability to identify appropriate dietary choices will improve  Description: Ability to identify appropriate dietary choices will improve  Outcome: Ongoing     Problem: Sensory:  Goal: General experience of comfort will improve  Description: General experience of comfort will improve  Outcome: Ongoing

## 2021-06-10 NOTE — PROGRESS NOTES
Received a call back from [address] at [facility] . Patient's copay for Meropenem is $5.00 weekly. Discharge planner notified.

## 2021-06-10 NOTE — PROGRESS NOTES
Hospitalist Progress Note      PCP: Denise Brewer MD    Date of Admission: 5/31/2021    Chief Complaint: fall     Hospital course: 78-year-old admitted to the hospital after a fall found to have displaced fracture of distal femur. Also found to have scrotal abscess with fistula. Patient has been evaluated by nephrology infectious disease urology. Subjective: Patient seen and examined at bedside. Patient reports that he would like to stay till tomorrow and be discharged after hemodialysis. No acute events reported overnight. Medications:  Reviewed    Exam:    /68   Pulse 82   Temp 98.4 °F (36.9 °C) (Oral)   Resp 20   Ht 6' (1.829 m)   Wt 230 lb 3.2 oz (104.4 kg)   SpO2 97%   BMI 31.22 kg/m²     General appearance: Elderly white male, no apparent distress, pleasant  Respiratory:  Normal respiratory effort. Clear to auscultation, bilaterally without RALES/WHEEZES/Rhonchi. Cardiovascular: Regular rate and rhythm with normal S1/S2 without MURMURS, rubs or gallops. Abdomen: Soft, non-tender, non-distended with normal bowel sounds. Skin scrotal area edematous patient has no sensation hence unable to check for tenderness no erythema unable to turn patient disease on dialysis at the bottom of the scrotum and the perineal region  Prepubic catheter in place  Musculoskeletal: No clubbing, cyanosis or EDEMA bilaterally. Full range of motion without deformity. Skin: Skin color, texture, turgor normal.  No rashes or lesions. Neurologic: Paraplegic        Labs:   Recent Labs     06/09/21  0505   WBC 9.0   HGB 8.9*   HCT 28.9*        Recent Labs     06/08/21  0532 06/09/21  0505 06/10/21  0547    136 135*   K 4.6 5.1 5.0    102 102   CO2 27 24 27   BUN 31* 40* 30*   CREATININE 2.5* 3.2* 2.6*   CALCIUM 8.6 8.7 9.1     No results for input(s): AST, ALT, BILIDIR, BILITOT, ALKPHOS in the last 72 hours. No results for input(s): INR in the last 72 hours.   No results for input(s): Ap Pippins in the last 72 hours. Urinalysis:      Lab Results   Component Value Date    NITRU Negative 05/31/2021    WBCUA >100 05/31/2021    BACTERIA 2+ 04/01/2021    RBCUA see below 05/31/2021    RBCUA 3+ 05/12/2016    BLOODU MODERATE 05/31/2021    SPECGRAV 1.025 05/31/2021    GLUCOSEU Negative 05/31/2021    GLUCOSEU >=1000 05/17/2011       Radiology:  IR TUNNELED CVC PLACE WO SQ PORT/PUMP > 5 YEARS   Final Result   Successful placement of tunneled internal jugular power line catheter as   above. VL PRE OP VEIN MAPPING   Final Result      MRI PELVIS WO CONTRAST   Final Result   1. T2 hyperintense collection with layering debris noted within the scrotum   extending slightly left of midline measuring up to 5.2 cm. The superior   aspect of this collection abuts the mid/posterior aspect of the base of the   penis at the corpus spongiosum with possible extension to the penile urethra   in this region. Finding is concerning for urethral fistulization given   provided history. 2. Diffuse soft tissue edema. T2 hyperintensity involving the adductor   musculature bilaterally is concerning for possible myositis. Findings are   incompletely evaluated due to lack of intravenous contrast.         US SCROTUM AND TESTICLES   Final Result   Normal appearing testicles with normal blood flow to both testicles      Scrotal wall thickening with increased vascularity and a heterogeneous   hypoechoic area inferiorly in the midline suggesting an infectious process. No discrete abscess identified         CT ABDOMEN PELVIS W IV CONTRAST Additional Contrast? None   Final Result   1. The scrotum is not routinely included in the field of view on CT pelvis   and is not imaged on this exam.   2. Findings suggesting cystitis, with suprapubic catheter draining the   urinary bladder. 3. Nonspecific abdominal and pelvic ascites may be reactive related to renal   disease. 4. Mild anasarca.    5. Indeterminate 1.9 cm cystic lesion inferior pole right kidney. Additional   characterization with MRI abdomen without and with gadolinium recommended. If unable to perform this exam, then follow-up noncontrast CT abdomen imaging   recommended in 6-12 months to ensure stability. 6. Small volume bilateral pleural effusion with bibasilar relaxation   atelectasis similar in appearance to prior exam.   7. Mild cardiomegaly. RECOMMENDATIONS:   MRI abdomen without and with gadolinium recommended. If unable to perform   this exam, then follow-up noncontrast CT abdomen imaging recommended         XR KNEE LEFT (1-2 VIEWS)   Final Result   Mild osteoarthritic changes medially in the knee with no acute bony   abnormality. Severe diffuse osteopenia. Small suprapatellar effusion.          XR TIBIA FIBULA RIGHT (2 VIEWS)   Final Result   Comminuted displaced fracture distal right femur      Otherwise, no acute bony or joint abnormality         XR FOOT RIGHT (MIN 3 VIEWS)   Final Result   Comminuted displaced fracture distal right femur      Otherwise, no acute bony or joint abnormality         XR KNEE RIGHT (MIN 4 VIEWS)   Final Result   Comminuted displaced fracture distal right femur      Otherwise, no acute bony or joint abnormality             Assessment/Plan:    Active Hospital Problems    Diagnosis Date Noted    Weight loss counseling, encounter for [Z71.3]     Closed fracture of right distal femur ([facility] [address] [S72.401A]     Multiple drug resistant organism (MDRO) culture positive [Z16.24]     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 Mike Galeano 06/01/2021    Scrotal abscess [N49.2] 05/31/2021    End stage renal disease on dialysis ([facility] [address] [N18.6, Z99.2] 02/01/2021    Renal osteodystrophy [N25.0] 72/47/2076    Complicated UTI (urinary tract infection) [N39.0] 11/08/2016    Renal lesion [N28.9] 02/17/2015    Paraplegia (HCC) [G82.20]        Fall mechanical, right distal femur fracture  -Wheelchair-bound due to paraplegia. -Orthopedics consulted.    -No surgical intervention. Recommend conservative management. -Nonweightbearing as tolerated. -Knee immobilizer.  -Follow-up with orthopedics as outpatient. E. coli bacteremia, scrotal abscess  -MRI showed sinus tract drainage.  -Patient also has suprapubic catheter secondary to paraplegia  - underwent scrotal exploration and drainge of abscess with replacement of SPT on 6/7/2021  -IV Meropenem switched to IV Ertapenem to be given as out-pt with HD.   -However due to cost and inability to care administer IV ertapenem with dialysis. -Patient now has central line in place. Will be discharged on IV meropenem daily to 6/15 as per ID recommendations.  -Okay for discharge as per urology and ID service. Follow-up in clinic as outpatient. Paraplegia with neurogenic bladder  -Suprapubic catheter. Follow-up with urology as outpatient. COPD   -Stable at baseline. End-stage renal disease on hemodialysis  -Last HD was today am. Tolerating well. -AVF to be done only as out-pt as per primary nephro Dr. Griselda Oliver due to active infection. Anemia of chronic kidney disease  Hemoglobin stable    Chronic respiratory failure  On 2 L oxygen. Respiratory status at baseline    DVT Prophylaxis: Subcu heparin  Diet: ADULT DIET; Regular; 5 carb choices (75 gm/meal)  Code Status: Full Code   PT/OT: home care,   Dispo -patient discharge. Wife appealed discharge.     Bob Berry MD

## 2021-06-11 NOTE — CARE COORDINATION
Aware per MD that pt and spouse agreeable to dc after pt has dialysis-discussed w/pt and spouse. Informed [facility] of pt's dc for today-start of care tomorrow afternoon for merepenum. Arranged stretcher transport via [employer] for [facility] received ppwk and stated they will be able to accommodate pt's dialysis needs. Pt has no further needs per CM.   Electronically signed by AYAAN Carmen on 6/11/2021 at 11:45 AM

## 2021-06-11 NOTE — PROGRESS NOTES
glycol, acetaminophen **OR** acetaminophen, morphine, traZODone      Vitals:  BP (!) 130/58   Pulse 95   Temp 98 °F (36.7 °C) (Oral)   Resp 16   Ht 6' (1.829 m)   Wt 232 lb 3.2 oz (105.3 kg)   SpO2 92%   BMI 31.49 kg/m²       Physical Exam  General : AAOx3, whispering voice, not in pain or respiratory distress, on dialysis  HEENT : mucosa moist.  CVS: S1 S2 normal, regular rhythm, no murmurs or rubs. Lungs: Clear,  Decreased breath sounds at the bases  Abd: Soft, distended, bowel sounds normal, non-tender. Ext: LE edema+ RLE>LLE, imprpving  Skin: Warm. No rashes appreciated.   : SPC+, scrotum with dressing+ post-op, ball bag with scanty urine  Access : right IJ TDC+, accessed, left IJ CVC+    Labs:  CBC with Differential:    Lab Results   Component Value Date    WBC 9.0 06/09/2021    RBC 3.25 06/09/2021    HGB 8.9 06/09/2021    HCT 28.9 06/09/2021     06/09/2021    MCV 88.8 06/09/2021    MCH 27.3 06/09/2021    MCHC 30.7 06/09/2021    RDW 18.4 06/09/2021    NRBC CANCELED 10/22/2014    NRBC CANCELED 10/22/2014    SEGSPCT 76.1 10/22/2014    BANDSPCT 2 01/16/2018    BLASTSPCT CANCELED 10/22/2014    METASPCT 1 04/03/2017    LYMPHOPCT 11.6 06/02/2021    PROMYELOPCT CANCELED 10/22/2014    MONOPCT 9.2 06/02/2021    MYELOPCT 1 03/31/2017    EOSPCT 4.6 07/11/2011    BASOPCT 0.8 06/02/2021    MONOSABS 0.8 06/02/2021    LYMPHSABS 1.0 06/02/2021    EOSABS 0.2 06/02/2021    BASOSABS 0.1 06/02/2021    DIFFTYPE Auto 07/11/2011     BMP:    Lab Results   Component Value Date     06/10/2021    K 5.0 06/10/2021     06/10/2021    CO2 27 06/10/2021    BUN 30 06/10/2021    LABALBU 3.1 06/01/2021    CREATININE 2.6 06/10/2021    CALCIUM 9.1 06/10/2021    GFRAA 30 06/10/2021    GFRAA >60 05/13/2013    LABGLOM 25 06/10/2021    LABGLOM 58 10/15/2015    GLUCOSE 101 06/10/2021     Ionized Calcium:  No results found for: IONCA  Magnesium:    Lab Results   Component Value Date    MG 1.90 06/02/2021     Phosphorus: Ro Reynolds MD, MD.  6/11/2021  Office Phone : [phone]    Thank you for allowing us to participate in the care of this pt. I willcontinue to follow along. Please call with questions or concerns.
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
## 2021-06-11 NOTE — PROGRESS NOTES
8:03 AM  Patient in bed, alert and oriented x4. VSS. Shift assessment completed. Dialysis nurse called will put in for transport to get patient for HD this AM. Midodrine given before dialysis, see MAR. Patient repositioned to left side. The care plan and education has been reviewed and mutually agreed upon with the patient. 8:41 AM  Transport here to take patient to dialysis. 1:00 PM  Patient arrives back to unit from Dialysis. VSS, BP on lower end. Midodrine given see MAR. Will continue to monitor. 5:00 PM  Knee immobilizer and ace bandage removed for a half hour and reapplied. Assessed RLE for areas of breakdown, none new noted. All dressings were changed: BLE, sacral, scrotal, suprapubic cath, and thigh. Patient repositioned. New Left chest CVC noted to have some drainage, dressing changed. Right chest vascath for dialysis dated 6/1/2021, dressing changed.

## 2021-06-11 NOTE — PROGRESS NOTES
[employer]  Continue to follow for home care. [facility] in place for home IV needs. [facility] in place for wound care and home infusion. Aware of discharge appeal. Discharge planner notified.

## 2021-06-11 NOTE — PROGRESS NOTES
Patients chart was reviewed post  Tunneled CVC procedure. No complications were noted post procedure.

## 2021-06-11 NOTE — PLAN OF CARE
1958 by Shannan Bernal RN  Outcome: Completed  6/11/2021 0707 by Shannan Bernal RN  Outcome: Ongoing     Problem: Health Behavior:  Goal: Ability to manage health-related needs will improve  Description: Ability to manage health-related needs will improve  6/11/2021 1956 by Shannan Bernal RN  Outcome: Completed  6/11/2021 0707 by Shannan Bernal RN  Outcome: Ongoing  Goal: Identification of resources available to assist in meeting health care needs will improve  Description: Identification of resources available to assist in meeting health care needs will improve  6/11/2021 1956 by Shannan Bernal RN  Outcome: Completed  6/11/2021 0707 by Shannan Bernal RN  Outcome: Ongoing     Problem: Nutritional:  Goal: Ability to identify appropriate dietary choices will improve  Description: Ability to identify appropriate dietary choices will improve  6/11/2021 1956 by Shannan Bernal RN  Outcome: Completed  6/11/2021 0707 by Shannan Bernal RN  Outcome: Ongoing     Problem: Sensory:  Goal: General experience of comfort will improve  Description: General experience of comfort will improve  6/11/2021 1956 by Shannan Bernal RN  Outcome: Completed  6/11/2021 0707 by Shannan Bernal RN  Outcome: Ongoing

## 2021-06-11 NOTE — PLAN OF CARE
Problem: Falls - Risk of:  Goal: Will remain free from falls  Description: Will remain free from falls  Outcome: Ongoing  Goal: Absence of physical injury  Description: Absence of physical injury  Outcome: Ongoing     Problem: Skin Integrity:  Goal: Will show no infection signs and symptoms  Description: Will show no infection signs and symptoms  Outcome: Ongoing  Goal: Absence of new skin breakdown  Description: Absence of new skin breakdown  Outcome: Ongoing  Goal: Demonstration of wound healing without infection will improve  Description: Demonstration of wound healing without infection will improve  Outcome: Ongoing  Goal: Status of oral mucous membranes will improve  Description: Status of oral mucous membranes will improve  Outcome: Ongoing  Goal: Skin integrity will be maintained  Description: Skin integrity will be maintained  Outcome: Ongoing     Problem: Physical Regulation:  Goal: Will remain free from infection  Description: Will remain free from infection  Outcome: Ongoing  Goal: Ability to maintain clinical measurements within normal limits will improve  Description: Ability to maintain clinical measurements within normal limits will improve  Outcome: Ongoing  Goal: Complications related to the disease process, condition or treatment will be avoided or minimized  Outcome: Ongoing     Problem: Respiratory:  Goal: Ability to maintain normal respiratory secretions will improve  Description: Ability to maintain normal respiratory secretions will improve  Outcome: Ongoing     Problem:  Activity:  Goal: Fatigue will decrease  Description: Fatigue will decrease  Outcome: Ongoing  Goal: Risk for activity intolerance will decrease  Description: Risk for activity intolerance will decrease  Outcome: Ongoing     Problem: Coping:  Goal: Ability to cope will improve  Description: Ability to cope will improve  Outcome: Ongoing     Problem: Fluid Volume:  Goal: Will show no signs or symptoms of fluid imbalance  Description: Will show no signs or symptoms of fluid imbalance  Outcome: Ongoing     Problem: Health Behavior:  Goal: Ability to manage health-related needs will improve  Description: Ability to manage health-related needs will improve  Outcome: Ongoing  Goal: Identification of resources available to assist in meeting health care needs will improve  Description: Identification of resources available to assist in meeting health care needs will improve  Outcome: Ongoing     Problem: Nutritional:  Goal: Ability to identify appropriate dietary choices will improve  Description: Ability to identify appropriate dietary choices will improve  Outcome: Ongoing     Problem: Sensory:  Goal: General experience of comfort will improve  Description: General experience of comfort will improve  Outcome: Ongoing

## 2021-06-11 NOTE — PLAN OF CARE
Problem: Falls - Risk of:  Goal: Will remain free from falls  Description: Will remain free from falls  6/11/2021 0707 by Carlito Macias RN  Outcome: Ongoing  6/11/2021 0312 by Annalise Brown RN  Outcome: Ongoing  Goal: Absence of physical injury  Description: Absence of physical injury  6/11/2021 0707 by Carlito Macias RN  Outcome: Ongoing  6/11/2021 0312 by Annalise Brown RN  Outcome: Ongoing     Problem: Skin Integrity:  Goal: Will show no infection signs and symptoms  Description: Will show no infection signs and symptoms  6/11/2021 0707 by Carlito Macias RN  Outcome: Ongoing  6/11/2021 0312 by Annalise Brown RN  Outcome: Ongoing  Goal: Absence of new skin breakdown  Description: Absence of new skin breakdown  6/11/2021 0707 by Carlito Macias RN  Outcome: Ongoing  6/11/2021 0312 by Annalise Brown RN  Outcome: Ongoing  Goal: Demonstration of wound healing without infection will improve  Description: Demonstration of wound healing without infection will improve  6/11/2021 0707 by Carlito Macias RN  Outcome: Ongoing  6/11/2021 0312 by Annalise Brown RN  Outcome: Ongoing  Goal: Status of oral mucous membranes will improve  Description: Status of oral mucous membranes will improve  6/11/2021 0707 by Carlito Macias RN  Outcome: Ongoing  6/11/2021 0312 by Annalise Brown RN  Outcome: Ongoing  Goal: Skin integrity will be maintained  Description: Skin integrity will be maintained  6/11/2021 0707 by Carlito Macias RN  Outcome: Ongoing  6/11/2021 0312 by Annalise Brown RN  Outcome: Ongoing     Problem: Pain:  Goal: Pain level will decrease  Description: Pain level will decrease  Outcome: Ongoing  Goal: Control of acute pain  Description: Control of acute pain  Outcome: Ongoing  Goal: Control of chronic pain  Description: Control of chronic pain  Outcome: Ongoing     Problem: Physical Regulation:  Goal: Will remain free from infection  Description: Will remain free from infection  6/11/2021 0707 by Carlito Macias RN  Outcome: Ongoing  6/11/2021 0312 by Daisy Sue RN  Outcome: Ongoing  Goal: Ability to maintain clinical measurements within normal limits will improve  Description: Ability to maintain clinical measurements within normal limits will improve  6/11/2021 0707 by Ale Manuel RN  Outcome: Ongoing  6/11/2021 0312 by Daisy Sue RN  Outcome: Ongoing  Goal: Complications related to the disease process, condition or treatment will be avoided or minimized  6/11/2021 0707 by Ale Manuel RN  Outcome: Ongoing  6/11/2021 0312 by Daisy Sue RN  Outcome: Ongoing     Problem: Respiratory:  Goal: Ability to maintain normal respiratory secretions will improve  Description: Ability to maintain normal respiratory secretions will improve  6/11/2021 0707 by Ale Manuel RN  Outcome: Ongoing  6/11/2021 0312 by Daisy Sue RN  Outcome: Ongoing     Problem:  Activity:  Goal: Fatigue will decrease  Description: Fatigue will decrease  6/11/2021 0707 by Ale Manuel RN  Outcome: Ongoing  6/11/2021 0312 by Daisy Sue RN  Outcome: Ongoing  Goal: Risk for activity intolerance will decrease  Description: Risk for activity intolerance will decrease  6/11/2021 0707 by Ale Manuel RN  Outcome: Ongoing  6/11/2021 0312 by Daisy Sue RN  Outcome: Ongoing     Problem: Coping:  Goal: Ability to cope will improve  Description: Ability to cope will improve  6/11/2021 0707 by Ale Manuel RN  Outcome: Ongoing  6/11/2021 0312 by Daisy Sue RN  Outcome: Ongoing     Problem: Fluid Volume:  Goal: Will show no signs or symptoms of fluid imbalance  Description: Will show no signs or symptoms of fluid imbalance  6/11/2021 0707 by Ale Manuel RN  Outcome: Ongoing  6/11/2021 0312 by Daisy Sue RN  Outcome: Ongoing     Problem: Health Behavior:  Goal: Ability to manage health-related needs will improve  Description: Ability to manage health-related needs will improve  6/11/2021 0707 by Ale Manuel RN  Outcome: Ongoing  6/11/2021 0312 by Viviane Sr RN  Outcome: Ongoing  Goal: Identification of resources available to assist in meeting health care needs will improve  Description: Identification of resources available to assist in meeting health care needs will improve  6/11/2021 0707 by Cristian Zamora RN  Outcome: Ongoing  6/11/2021 0312 by Viviane Sr RN  Outcome: Ongoing     Problem: Nutritional:  Goal: Ability to identify appropriate dietary choices will improve  Description: Ability to identify appropriate dietary choices will improve  6/11/2021 0707 by Cristian Zamora RN  Outcome: Ongoing  6/11/2021 0312 by Viviane Sr RN  Outcome: Ongoing     Problem: Sensory:  Goal: General experience of comfort will improve  Description: General experience of comfort will improve  6/11/2021 0707 by Cristian Zamora RN  Outcome: Ongoing  6/11/2021 0312 by Viviane Sr RN  Outcome: Ongoing

## 2021-06-11 NOTE — PROGRESS NOTES
Shift assessment completed, pt is alert and oriented, VSS, fall precautions in place, call light within reach, denies any pain at this time, see flowsheets and MAR, will monitor pt. The care plan and education has been reviewed and mutually agreed upon with the patient.

## 2021-06-12 NOTE — PROGRESS NOTES
Pt has left the unit via transport, alert and oriented, all belongings given to patient and wife. All questions are answered.

## 2021-06-14 NOTE — TELEPHONE ENCOUNTER
Spoke with patients wife. He is not have diarrhea so I will extend him Meropenem through 6/21/2021. Patient will be getting his dose at [facility] in [place] and he goes Mon, [place] and Friday. Should patient have a home care nurse come to dose the other days?

## 2021-06-14 NOTE — TELEPHONE ENCOUNTER
Spoke to wife, she helps him with infusions. She is aware meropenem will be done 500 mg Q 24. Wanted to mention that pt c/o a sore tongue. Wants to know if that is a side effect of medication? Advised to maybe try ice water or ice chips to see if that relieves anything.

## 2021-06-14 NOTE — TELEPHONE ENCOUNTER
Called patient to follow up on how he is doing. Patient states he still has swelling but it has gone down some. He states he is feeling better and has not been running a fever. I asked patient if he was having any discharge and he gave the phone to his wife Yunior Sampson. She said he does still have some discharge but not a lot. I asked about his urologist and she said that they signed off after seeing him a couple times after the surgery. She said they were going to let Dr. Sotelo Six follow up.      Patient is due to end 6/15/2021 and is currently on IV Ertapenem 1gm with HD

## 2021-06-14 NOTE — TELEPHONE ENCOUNTER
If ertapenem cannot be arranged, he should be on IV meropenem 500 mg every 24 hours. A tunneled central line was already placed in the hospital for that. Thanks.

## 2021-06-14 NOTE — TELEPHONE ENCOUNTER
Noni White from [facility] HD said that they still can't have IV ertapenem. Is Meropenem 500mg every 2hrs is what Dr. Sergei Martinez ordering?

## 2021-06-14 NOTE — TELEPHONE ENCOUNTER
Noted. Patient actually got discharged on IV meropenem 500 mg every 24 hours as dialysis center could not arrange ertapenem. If the patient is not having any diarrhea, please extend IV meropenem until 6/21/2021.   Advise patient to keep on taking probiotics twice daily and keep on watching for new fevers or diarrhea

## 2021-06-14 NOTE — CARE COORDINATION
Gt 45 Transitions Initial Follow Up Call    Call within 2 business days of discharge: Yes    Patient: Nikko Fast Patient : 1955   MRN: [id number]  Reason for Admission:   Discharge Date: 21 RARS: Readmission Risk Score: 30      Last Discharge [facility]       Complaint Diagnosis Description Type Department Provider    21 Fall Scrotal abscess . .. ED to Hosp-Admission (Discharged) (ADMITTED) Abhishek Brown MD; Marck Cueto . .. Spoke with: pt's wife    Wife states pt is at dialysis, will be home tomorrow, this nurse will continue to f/u.     Follow Up  Future Appointments   Date Time Provider Jeevan Lomas   2021  9:15 AM Yoselyn Delvalle MD FF Cardio [facility]   2021  8:30 AM Vance Kuhn MD PULM & CC [facility]       Lea Giles RN

## 2021-06-15 NOTE — TELEPHONE ENCOUNTER
Porfirio Carl asked for  to send orders for Resumption of care that they started on 6/12     Please call to advise

## 2021-06-15 NOTE — CARE COORDINATION
Gt 45 Transitions Initial Follow Up Call    Call within 2 business days of discharge: Yes    Patient: Mishel Handley Patient : 1955   MRN: [id number]  Reason for Admission:   Discharge Date: 21 RARS: Readmission Risk Score: 30      Last Discharge [facility]       Complaint Diagnosis Description Type Department Provider    21 Fall Scrotal abscess . .. ED to Hosp-Admission (Discharged) (ADMITTED) Yessy Leblanc MD; Christiano Garrido . .. Non-face-to-face services provided:  Obtained and reviewed discharge summary and/or continuity of care documents  [id number] completed   Transitions of Care Initial Call    Was this an external facility discharge? No Discharge Facility:     Challenges to be reviewed by the provider   Additional needs identified to be addressed with provider: No  none             Method of communication with provider : none      Advance Care Planning:   Does patient have an Advance Directive:  reviewed and current. Was this a readmission? No  Patient stated reason for admission: scrotal abcess  Patients top risk factors for readmission: medical condition-scrotal abcess    Care Transition Nurse (CTN) contacted the patient by telephone to perform post hospital discharge assessment. Verified name and  with patient as identifiers. Provided introduction to self, and explanation of the CTN role. CTN reviewed discharge instructions, medical action plan and red flags with patient who verbalized understanding. Patient given an opportunity to ask questions and does not have any further questions or concerns at this time. Were discharge instructions available to patient? Yes. Reviewed appropriate site of care based on symptoms and resources available to patient including: When to call 911. The patient agrees to contact the PCP office for questions related to their healthcare.      Medication reconciliation was performed with patient, who verbalizes understanding of administration of home medications. Advised obtaining a 90-day supply of all daily and as-needed medications. Covid Risk Education     Educated patient about risk for severe COVID-19 due to risk factors according to CDC guidelines. CTN reviewed discharge instructions, medical action plan and red flag symptoms with the patient who verbalized understanding. Discussed COVID vaccination status: No. Education provided on COVID-19 vaccination as appropriate. Discussed exposure protocols and quarantine with CDC Guidelines. Patient was given an opportunity to verbalize any questions and concerns and agrees to contact CTN or health care provider for questions related to their healthcare. Reviewed and educated patient on any new and changed medications related to discharge diagnosis. Was patient discharged with a pulse oximeter? No Discussed and confirmed pulse oximeter discharge instructions and when to notify provider or seek emergency care. Pt states doing pretty good, denies pain in scrotum[facility] to administer IV antibiotics. He has a very sore tongue, states it is on fire. Wife has reached out to Dr. John Thomas office, awaiting a response. F/U appts listed below. They will be scheduling a f/u appt with  as well. Agreed to more CTC f/u calls. CTN provided contact information. Plan for follow-up call in 5-7 days based on severity of symptoms and risk factors.     Care Transitions 24 Hour Call    Do you have any ongoing symptoms?: Yes  Patient-reported symptoms: Other (Comment: sore tongue)  Interventions for patient-reported symptoms: Other (Comment: wife has called MD)  Do you have a copy of your discharge instructions?: Yes  Do you have all of your prescriptions and are they filled?: Yes  Have you been contacted by a [address]?: No  Have you scheduled your follow up appointment?: Yes  How are you going to get to your appointment?: Car - family or friend to transport  Were you discharged with any Home Care or Post Acute Services: Yes  Post Acute Services: Home Health  Patient DME: Wheelchair, Other  Other Patient DME: [employer]  Do you have support at home?: Partner/Spouse/SO  Do you feel like you have everything you need to keep you well at home?: Yes  Are you an active caregiver in your home?: No  Care Transitions Interventions  No Identified Needs         Follow Up  Future Appointments   Date Time Provider Jeevan Lomas   6/21/2021  9:15 AM Liborio Toney MD FF Cardio [facility]   9/24/2021  8:30  Jeffery Mercado MD PULM & CC [facility]       Kayleigh Araujo RN

## 2021-06-15 NOTE — TELEPHONE ENCOUNTER
Anni is requesting a verbal for re-certification for skilled nursing and a . [address]   Fax:[phone]      Please call to advise

## 2021-06-15 NOTE — TELEPHONE ENCOUNTER
I called the patient and talked with the patient's wife. Patient's home health nurse from [place] [id number] was present at the bedside during my call and I was able to talk with the the home health nurse as well. The nurse does not see any obvious thrush in the mouth, she thinks the oral mucosa is slightly erythematous and is painful for the patient. I am ordering the Magic mouthwash swish and spit and nystatin oral swish and swallow for the patient. Continue follow-up with urology for suprapubic catheter care. Maintain good oral hydration. Instructions given to patient's wife and RN    Home health nurse also tells me that she has not received orders for meropenem extension. Please make sure the orders are sent for IV meropenem extension. New stop date is 6/21/2021 as noted in my previous notes. The dose will remain at 500 mg every 24 hour for IV meropenem. Thanks.

## 2021-06-21 NOTE — PATIENT INSTRUCTIONS
Plan:   1. Re START Metoprolol tartrate 12.5mg twice a day. Patient has medication at home. 2.  Continue all other medications. 3.  Virtual Visit in 3 months. 4.  She needs oxygen [facility]) called for oxygen increase to 3 liters. This order came from pulmonology. Media notes reviewed.

## 2021-06-21 NOTE — TELEPHONE ENCOUNTER
If no fever or scrotal discharge, okay to remove the line and stop antibiotics. If it is a tunneled central line, may need to be removed by interventional radiology.
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
## 2021-06-21 NOTE — TELEPHONE ENCOUNTER
Aniyah Pagan, patient's wife stated that patient is not having fever, there is a little bit scrotal discharge: clear, little bit red, little bit yellowish color. Patient at dialysis at the moment. Please advise  If Dr. Dolores Medina wants to D/C abx today, do you still want labs to be drawn tomorrow?

## 2021-06-22 NOTE — TELEPHONE ENCOUNTER
LVM at [facility] procedure to schedule Tunneled cath removal   Patient needs to know few days before procedure because of transportation issue.  Best days are Tuesdays and Thursdays

## 2021-06-22 NOTE — TELEPHONE ENCOUNTER
Patient is not himself since his last hospital encounter. He admitted that he is a little more depressed. Wanted to know if it was something to help with depression or change the dosage of antidepressant. citalopram (CELEXA) 20 MG tablet      Patient also has catheter and he has dark urine sediment was present.                Neha Foster ([employer] [facility])  P: [phone]

## 2021-06-22 NOTE — CARE COORDINATION
Gt 45 Transitions Follow Up Call    2021    Patient: Kajal Krause New  Patient : 1955   MRN: [id number]  Reason for Admission:   Discharge Date: 21 RARS: Readmission Risk Score: 27         Spoke with: [address] Transitions Subsequent and Final Call    Subsequent and Final Calls  Do you have any ongoing symptoms?: No  Have your medications changed?: Yes  Patient Reports: magic mouthwash, nystatin   Do you have any questions related to your medications?: No  Do you currently have any active services?: Yes  Are you currently active with any services?: Home Health  Do you have any needs or concerns that I can assist you with?: No  Identified Barriers: None  Care Transitions Interventions  No Identified Needs  Other Interventions:         Pt states doing pretty well, no new issues or concerns. Antibiotics are complete, awaiting call from hospital to get an appt to have the central line removed. Mouth is improving with the new medications that were ordered.  Agreed to more CTC f/u calls      Follow Up  Future Appointments   Date Time Provider Jeevan Lomas   2021  8:30 AM Donte Macias MD PULM & CC [facility]   2021 10:00 AM Mahnaz Ma MD FF Cardio [facility] Brown RN

## 2021-06-22 NOTE — TELEPHONE ENCOUNTER
Increase the citalopram to 40 mg daily, and give it 4 weeks to see if this helps. Can either take 2 of the 20 mg or call in a 40 mg tablet.     Send UA with reflex to culture

## 2021-06-23 NOTE — TELEPHONE ENCOUNTER
Tunneled central line removal scheduled for tomorrow, team aware that patient needs oxygen, they will provide it.

## 2021-06-24 NOTE — BRIEF OP NOTE
Brief Postoperative Note    Tra Zuluaga  YOB: 1955  [id number]    Pre-operative Diagnosis: Need for IV Antibiotics    Post-operative Diagnosis: Same    Procedure: Removal of Tunneled CVC    Anesthesia: None    Surgeons/Assistants: Teja Bradley MD,  ROMEO PARRA PA-C    Estimated Blood Loss: less than 5    Complications: None    Specimens: Was Not Obtained    Findings: Successful removal of tunneled CVC    Electronically signed by Lissett Sharma PA-C on 6/24/2021 at 2:03 PM

## 2021-06-25 NOTE — CARE COORDINATION
Gt 45 Transitions Follow Up Call    2021    Patient: Segun Garrido New  Patient : 1955   MRN: [id number]  Reason for Admission:   Discharge Date: 21 RARS: Readmission Risk Score: 27         Spoke with: [address] Transitions Subsequent and Final Call    Subsequent and Final Calls  Do you have any ongoing symptoms?: No  Have your medications changed?: No  Do you have any questions related to your medications?: No  Do you currently have any active services?: Yes  Are you currently active with any services?: Home Health  Do you have any needs or concerns that I can assist you with?: No  Identified Barriers: None  Care Transitions Interventions  No Identified Needs  Other Interventions:         Pt states doing well, no issues or concerns. Had central line removed yesterday, site looks good.  Agreed to more CTC f/u calls      Follow Up  Future Appointments   Date Time Provider Jeevan Lomas   2021  8:30 AM Israel Hensley MD PULM & CC [facility]   2021 10:00 AM Maddy Remy MD FF Cardio [facility] Austin RN

## 2021-06-28 NOTE — TELEPHONE ENCOUNTER
Marcie Haines called in to ask about the treatment plan for the labs on 6/25/21 the Microscopic urinalysis and the urin culture as well as the urinalysis     Please call to advise once  has gone over the labs and has treatment

## 2021-06-29 NOTE — TELEPHONE ENCOUNTER
Patients wife called in stating that  did urine culture and asked if Prudence Settler will be treating it with any medications     Please call anything in to     [facility], [place] [address]  [address] [facility] [phone]     Please call to advise

## 2021-07-02 NOTE — CARE COORDINATION
Gt 45 Transitions Follow Up Call    2021    Patient: Nadja Cormier New  Patient : 1955   MRN: [id number]  Reason for Admission:   Discharge Date: 21 RARS: Readmission Risk Score: 30       Follow up call attempted, could not leave contact info no vm.       Follow Up  Future Appointments   Date Time Provider Jeevan Lomas   2021  8:30 AM Sari Mendoza MD PUL & CC [facility]   2021 10:00 AM Bunny Paredes MD FF Cardio [facility]       Ada Yip RN

## 2021-07-13 NOTE — TELEPHONE ENCOUNTER
Marilia(Nurse from [facility]) called and asked for a verbal for a Medical Social Worker.       Dominick Gooden (Nurse)  Z:[phone]      Please call to advise

## 2021-07-26 NOTE — TELEPHONE ENCOUNTER
Oliva Juarez with [employer] called and needs verbal orders for re-certification? Please call to advise.

## 2021-08-24 NOTE — TELEPHONE ENCOUNTER
It appears to be more related to dialysis rather than a true pulmonary problem. As far as I remember his only pulmonary issue is mucus plugging for which he is on cough assist device. ? Related to fluid shift during hemodialysis. It might be worth taking to his nephrologist about this. I think I have an appt to see him next month.

## 2021-09-02 NOTE — TELEPHONE ENCOUNTER
Dereje Sue from [employer] called and stated that the patient has a rash (Thrush) and small in his mouth. She stated that he normally takes an oral Nystatin to get rid of it.           Dereje Sue    [phone]    Please call Dereje Sue or Ana Cristina Burgos to advise

## 2021-09-03 NOTE — ED NOTES
Bed: 01  Expected date:   Expected time:   Means of arrival:   Comments:  tarik Rodriguez, [facility]  09/03/21 [id number]

## 2021-09-03 NOTE — ED TRIAGE NOTES
Pt here with shortness of breath, pt uses 2-3.5 L o2 at home and that was not helping, states this has been an ongoing issue for a few weeks, denies any pain, alert and oriented.
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
## 2021-09-07 NOTE — TELEPHONE ENCOUNTER
Pt is at [facility] and they want to know if [facility] can do the testing. [facility] needs to know what his O2 stats are, they need to be below 88 in order for him to qualify for a bigger tank.

## 2021-09-07 NOTE — TELEPHONE ENCOUNTER
The wife is calling [facility] where the patient is to see if they can do the documentation if not he has an appt 09/14/21 we will evaluate him then
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
## 2021-09-15 NOTE — TELEPHONE ENCOUNTER
[id number] Smi Thomas called regarding recent oxygen increase order    She said that the note needs to say:    \"level drops to 80 % when moving around\"    Wants to know if the note can me amended to say that, if so amend and fax back revised note.

## 2021-09-16 NOTE — TELEPHONE ENCOUNTER
Margarita with Dr. Tania Goldberg office called wanting to speak to Anita Garcia.  They wanted her to know that Dr. Judie Ramirez has cleared pt to have the CTPE with contrast.     If you have any questions you can call Dr. Tania Goldberg office at ph# [phone]

## 2021-09-17 NOTE — TELEPHONE ENCOUNTER
From: Jermain Wade New  To: Dallin Conde MD  Sent: 9/17/2021 10:09 AM EDT  Subject: Prescription Question    Pj Graham had an old Rx of Lorazepam 0.5mg that was given from a hospitalist some time ago for anxiety during dialysis. When he started having so many breathing problems at dialysis, we tried using it on the dialysis days right before his treatments start. He is almost out of medication, so we were hoping Dr. Manju Thomas would prescribe it for his dialysis days which are Monday, Wednesday and Friday. I realize it is a controlled medication but he is in need of something so hoping you can help.  Just let us know and thanks for your help!!  You can send any Rx to [facility] - have a great weekend!!        Marina Salas

## 2021-09-17 NOTE — TELEPHONE ENCOUNTER
From: Loreta Leventhal New  To: Saul Zaidi MD  Sent: 9/17/2021 10:09 AM EDT  Subject: Prescription Question    Tabitha Cochran had an old Rx of Lorazepam 0.5mg that was given from a hospitalist some time ago for anxiety during dialysis. When he started having so many breathing problems at dialysis, we tried using it on the dialysis days right before his treatments start. He is almost out of medication, so we were hoping Dr. Cristóbal Ceballos would prescribe it for his dialysis days which are Monday, Wednesday and Friday. I realize it is a controlled medication but he is in need of something so hoping you can help. Just let us know and thanks for your help!!  You can send any Rx to [facility] - have a great weekend!!        Arden Arce

## 2021-09-20 NOTE — TELEPHONE ENCOUNTER
Spoke to the patients wife   Sent over new o2 reading to them they should be bring out the new concentrator to the patient

## 2021-09-20 NOTE — TELEPHONE ENCOUNTER
Patient called stating she followed up with [facility] re: O2 concentrator but was unable to get an answer when pt would receive it. She is concerned because pt's saturation dropped into the 70's after dialysis today. Please call her at [phone].

## 2021-09-23 NOTE — TELEPHONE ENCOUNTER
Spoke to the patient to let her know that form or the POC and the transportation were both filled out and faxed back

## 2021-09-23 NOTE — TELEPHONE ENCOUNTER
Pt's wife called and wanted to inform us that [facility] is suppose to contact our office about pt's poc. Pt's wife also wanted us to know that they have scheduled pt's CT chest w contrast for 9/28/21 and that transport for this appt might reach out to the office as well to verify some information.      If you have any questions you can reach  Pt's wife at # [phone]

## 2021-09-24 NOTE — TELEPHONE ENCOUNTER
Yahir Brown with [facility] called regarding pt's oxygen order. A new order with a new date is needed because the date on the previous order is older than the testing date. The new date on the order can be 9/14 because that's when the test was done or it can be today's date.     If you have any questions Anamaria's direct line is # [phone]    Her fax for the new order is   # [phone]
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
## 2021-09-27 NOTE — TELEPHONE ENCOUNTER
Non-Urgent Medical Question    Yuridia Livingston \"YDUCZ\"  Paulino Pandey MD 10 hours ago (10:10 PM)   Nikki Person Dr. Kane Bhardwaj is having a CT scan of his lungs on Tuesday at [facility] to try to figure out his breathing issues. I was wondering if you could send an Rx for an X-Ray of his right femur while we are there so we can see if it is healed. It is difficult to arrange for transportation by stretcher so it would be easier to do it while we were there. Just let me know - thank you for all your help.  Also, we scheduled a video visit with you on 10/26.      [place]

## 2021-09-27 NOTE — TELEPHONE ENCOUNTER
Patient informed by phone.  He has an appointment scheduled with  Dr Dannie Branham 8/2019.  Lab order entered and appointment has been scheduled.    Xray ordered   Pt aware

## 2021-09-28 NOTE — TELEPHONE ENCOUNTER
Call from radiology about patient's poor creatinine clearance. Will change CT order to without contrast.  Patient recently had VQ scan performed on 7/21, which was low probability scan.

## 2021-09-29 NOTE — PATIENT INSTRUCTIONS
Plan:   1. Continue same medications. 2.  Will talk with Dr. Bernie Tucker regarding ascites and possible paracentesis. 3.  Plan for Virtual Visit in 4 months.

## 2021-09-30 PROBLEM — J96.21 ACUTE ON CHRONIC RESPIRATORY FAILURE WITH HYPOXIA (HCC): Status: ACTIVE | Noted: 2021-01-01

## 2021-09-30 NOTE — ED NOTES
Bed: 02  Expected date:   Expected time:   Means of arrival:   Comments:  Chloe Delton twp Artis Peabody, RN  09/30/21 1943

## 2021-10-01 NOTE — PROGRESS NOTES
Hospitalist Progress Note      PCP: Scott Gutierrez MD    Date of Admission: 9/30/2021    Chief Complaint: Worsening shortness of breath    Hospital Course:     Patient 77years old male with past medical history significant for end-stage renal disease on hemodialysis, CHF, carotid disease, hypertension, diabetes mellitus type 2, quadriplegia, neurogenic bladder status post suprapubic Macias, COPD, chronic respiratory failure, presented with chest pain/shortness of breath, Covid testing is pending, has been seen by pulmonary [facility] CT chest shows left lower lobe consolidation started on Rocephin. Moderate ascites. Chest x-ray with pulmonary vascular congestion, Covid testing pending    Subjective:   No acute event overnight, Covid testing pending, currently on 6 L nasal cannula, proBNP 70,000, troponin trending down.        Medications:  Reviewed    Infusion Medications    sodium chloride       Scheduled Medications    aspirin  81 mg Oral Daily    citalopram  20 mg Oral BID    ferrous sulfate  325 mg Oral Daily with breakfast    finasteride  5 mg Oral Daily    insulin glargine  7 Units SubCUTAneous Nightly    insulin lispro  0-6 Units SubCUTAneous TID WC    insulin lispro  0-3 Units SubCUTAneous Nightly    torsemide  100 mg Oral Daily    budesonide-formoterol  2 puff Inhalation BID    pravastatin  40 mg Oral Nightly    pantoprazole  40 mg Oral QAM AC    midodrine  10 mg Oral TID WC    metoprolol tartrate  12.5 mg Oral BID    sodium chloride flush  5-40 mL IntraVENous 2 times per day    heparin (porcine)  5,000 Units SubCUTAneous 3 times per day    cefTRIAXone (ROCEPHIN) IV  1,000 mg IntraVENous Q24H     PRN Meds: albuterol, bisacodyl, docusate sodium, traZODone, sodium chloride flush, sodium chloride, ondansetron **OR** ondansetron, polyethylene glycol, acetaminophen **OR** acetaminophen, bisacodyl, simethicone, hydrOXYzine, saline nasal gel, nitroGLYCERIN      Intake/Output Summary (Last 24 hours) at 10/1/2021 1124  Last data filed at 10/1/2021 0601  Gross per 24 hour   Intake --   Output 375 ml   Net -375 ml       Physical Exam Performed:    /65   Pulse 94   Temp 97.6 °F (36.4 °C) (Temporal)   Resp 20   Ht 6' (1.829 m)   Wt 197 lb (89.4 kg)   SpO2 94%   BMI 26.72 kg/m²     General appearance: No apparent distress, appears stated age and cooperative. HEENT: Pupils equal, round, and reactive to light. Conjunctivae/corneas clear. Neck: Supple, with full range of motion. No jugular venous distention. Trachea midline. Respiratory:  Normal respiratory effort. Clear to auscultation, bilaterally without Rales/Wheezes/Rhonchi. Cardiovascular: Regular rate and rhythm with normal S1/S2 without murmurs, rubs or gallops. Abdomen: Soft, non-tender, non-distended with normal bowel sounds. Musculoskeletal: No clubbing, cyanosis or edema bilaterally. Full range of motion without deformity. Skin: Skin color, texture, turgor normal.  No rashes or lesions.   Neurologic: Paraplegic   Psychiatric: Alert and oriented  Capillary Refill: Brisk,3 seconds, normal   Peripheral Pulses: +2 palpable, equal bilaterally       Labs:   Recent Labs     09/30/21  2050 10/01/21  0749   WBC 9.4 7.4   HGB 11.4* 10.8*   HCT 36.4* 34.5*    181     Recent Labs     09/30/21  2050 10/01/21  0748 10/01/21  0749   *  --  133*   K 4.8  --  4.9   CL 94*  --  93*   CO2 27  --  27   BUN 73*  --  78*   CREATININE 2.6*  --  3.0*   CALCIUM 9.2  --  8.9   PHOS  --  4.5  --      Recent Labs     09/30/21  2050 10/01/21  0749   AST 30 25   ALT 15 14   BILITOT 0.3 0.3   ALKPHOS 168* 153*     Recent Labs     09/30/21 2050   INR 1.17*     Recent Labs     09/30/21  2050 10/01/21  0225 10/01/21  0749   TROPONINI 0.12* 0.12* 0.11*       Urinalysis:      Lab Results   Component Value Date    NITRU Negative 10/01/2021    WBCUA >900 10/01/2021    BACTERIA 2+ 10/01/2021    RBCUA see below 10/01/2021    RBCUA 3+ 05/12/2016    BLOODU LARGE 10/01/2021    SPECGRAV 1.020 10/01/2021    GLUCOSEU Negative 10/01/2021    GLUCOSEU >=1000 05/17/2011       Radiology:  XR ABDOMEN (KUB) (SINGLE AP VIEW)   Final Result   1. Ascites throughout the left abdomen. XR CHEST PORTABLE   Final Result   Pulmonary vascular congestion. Stable cardiomegaly. Assessment/Plan:    Active Hospital Problems    Diagnosis     Acute on chronic respiratory failure with hypoxia (HCC) [J96.21]      Acute on chronic hypoxic respiratory failure  End-stage renal disease on hemodialysis  Chronic anemia  Ascites   Urinary tract infection  Sepsis, not in septic shock  Diabetes mellitus type 2  Paraplegia  Neurogenic bladder, suprapubic Macias catheter    Continue inpatient care  Nephrology and pulmonary following appreciate recommendations    Plan for dialysis in a.m, patient had Lasix and torsemide in ER. Acute on chronic respiratory failure-now on 6 L, good saturation, pulmonary following patient supposed to be on home ventilator AC, had diaphragmatic paralysis. Anemia-on iron    Ascites-patient may require paracentesis    Urinary tract infection-chronic suprapubic catheter, currently on ceftriaxone will continue follow-up urine culture. Diabetes mellitus type 2-we will continue insulin, Lantus and lispro    Patient is not vaccinated for Covid-Covid testing is pending, continue isolation    Troponinemia-non-ACS, troponin trending down. Patient denies chest pain. DVT Prophylaxis: Heparin subcu  Diet: ADULT DIET; Regular; 4 carb choices (60 gm/meal); No Added Salt (3-4 gm); Low Potassium (Less than 3000 mg/day)  Code Status: Full Code    PT/OT Eval Status: Needed    Dispo -continue inpatient care, patient came from home, wife is taking care of him.   May need placement upon discharge    Elliot Grover MD

## 2021-10-01 NOTE — PROGRESS NOTES
Pt unable to tolerate Bipap, being anxious and emotional. Atarax given 5' ago, pt said will try Bipap later.
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
## 2021-10-01 NOTE — PROGRESS NOTES
Speech Language Pathology  Attempt/Hold Note    Stephanie Florentino  1955    SLP eval and treat orders received and appreciated for this pt. Chart reviewed, spoke with RN. Pt alert and receptive upon SLP entry, agreeable to repositioning upright in bed but then requested to be lowered d/t increased WOB. Pt on 6lpm HFNC with O2 sats between 90-92% but increased RR ranging from mid 20s to mid 30s/min. Discussed with RN. Will hold evaluation and attempt again as pt is better able to participate.      Ally Irving, [address] [facility]  Speech-Language Pathologist  [phone]

## 2021-10-01 NOTE — CARE COORDINATION
Discharge Planning Assessment  CM spoke with patient's wife Doreen Castleman via phone    Readmission risk score 33%  RN discharge planner met with patient/ (and family member) to discuss reason for admission, current living situation, and potential needs at the time of discharge    Demographics/Insurance verified Yes    Current type of dwelling: single level home has ramped entry    Patient from ECF/SW confirmed with: n/a    Living arrangements:with spouse    Level of function/Support:patient is a paraplegic for many years, spouse assists with all ADL    PCP: Dafne Mendez    Last Visit to PCP:  Encounters tab reflects regular contact    DME:electric wheelchair, oxygen at 3 liters baseline from . [address] with any community resources/agencies/skilled home care: J[address]    Phone [phone]    [facility] [phone]    M-W-F at [phone]  Patient with a fractured femur, he usually uses [employer] transportation but has had to use [employer] recently due to stretcher needs. Patient's wife states he missed on appointment last week due to transportation issues. Medication compliance issues: not identified    Financial issues that could impact healthcare:not identified        Tentative discharge plan: home    Discussed and provided facilities of choice if transition to a skilled nursing facility is required at the time of discharge      Discussed with patient and/or family that on the day of discharge home tentative time of discharge will be between 10 AM and noon.     Transportation at the time of discharge: requires transportation    WALTER KabaN, CCM, [address]  [phone]

## 2021-10-01 NOTE — PROGRESS NOTES
4 Eyes Skin Assessment     NAME:  Deena Florentino  YOB: 1955  MEDICAL RECORD NUMBER:  [id number]    The patient is being assess for  Admission    I agree that 2 RN's have performed a thorough Head to Toe Skin Assessment on the patient. ALL assessment sites listed below have been assessed. Areas assessed by both nurses:    Head, Face, Ears, Shoulders, Back, Chest, Arms, Elbows, Hands, Sacrum. Buttock, Coccyx, Ischium and Legs. Feet and Heels        Does the Patient have a Wound? Yes wound(s) were present on assessment.  LDA wound assessment was Initiated and completed        Cameron Prevention initiated:  Yes   Wound Care Orders initiated:  Yes    Pressure Injury (Stage 3,4, Unstageable, DTI, NWPT, and Complex wounds) if present place consult order under [de-identified] Yes    New and Established Ostomies if present place consult order under : Yes      Nurse 1 eSignature: Electronically signed by Radha Lee RN on 10/1/21 at 6:00 AM EDT    **SHARE this note so that the co-signing nurse is able to place an eSignature**    Nurse 2 eSignature: Electronically signed by Femi France RN on 10/1/21 at 6:01 AM EDT

## 2021-10-01 NOTE — PLAN OF CARE
Problem: Falls - Risk of:  Goal: Will remain free from falls  Description: Will remain free from falls  Outcome: Ongoing  Note: Patient remains absent from falls at this time. Remains alert and oriented, in bed with call light and belongings in reach. Non-slip footwear on and 2/4 siderails raised. Bed remains in lowest/locked position at all times with alarm activated. Fall precautions in place. Patient encouraged to use call light to request assistance, v/u.  Will continue to monitor. Problem: Skin Integrity:  Goal: Absence of new skin breakdown  Description: Absence of new skin breakdown  Note: Patient Cameron score 11, assisted to turn and reposition every 2 hours and as needed with pillow support provided. All dressings remain clean, dry and intact. Suprapubic catheter remains in place and functioning well. No new skin issues noted. Will continue to monitor.

## 2021-10-01 NOTE — PROGRESS NOTES
Message sent to hospitalist: \"pt is here for SOB. pt took Atarax 1 hour ago. I tried to put pt on Bipap again, pt is still unable to tolerate Bipap, saying that he can not breath with Bipap on. Please advise! Thank you! \"
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
## 2021-10-01 NOTE — PROGRESS NOTES
Message sent to hospitalist: \"pt is here for SOB. pt is anxious and can not tolerate Bipap. Can pt have something to help calm him down? pt also has gas-X 125 mg pt supplies that he brought in from home and would like to use it here. Can he have that medication order in [facility] for use please? Thank you! \"  Orders put in for Atarax and Mylicon.

## 2021-10-01 NOTE — FLOWSHEET NOTE
10/01/21 1441 10/01/21 1747   Treatment   Time On 1447  --    Time Off  --  1747   Treatment Goal 2000  --    Vital Signs   /65 127/60   Temp 95.7 °F (35.4 °C) 96.2 °F (35.7 °C)   Pulse 75 83   Resp 18 18   Weight 189 lb 9.5 oz (86 kg) 188 lb 7.9 oz (85.5 kg)   Weight Method Bed scale Bed scale   Percent Weight Change -3.76 -0.58   Technical Checks   Dialysis Machine No. [id number]  --    RO Machine No. [id number]  --    Dialyzer Lot No. [id number]  --    RO Machine Log Sheet Completed Yes  --    Machine Alarm Self Test Completed; Passed  --    Machine Autotest Completed; Passed  --    Air Foam Detector Tested;Proper Function  --    Machine Conductivity 13.6  --    Manual Conductivity 13.9  --    Manual Ph 7.4  --    Bleach Test (Neg) Yes  --    Bath Temperature 98.1 °F (36.7 °C)  --    Treatment Initiation   Dialyze Hours 3  --    Treatment  Initiation Universal Precautions maintained;Lines secured to patient; Connections secured;Prime given;Venous Parameters set; Arterial Parameters set; Air foam detector engaged; Hemosafe Device; Dialysate;Saline line double clamped; Hemo-Safe Applied;Dialyzer;F180  --    Dialysis Bath   K+ (Potassium) 3  --    Ca+ (Calcium) 2.5  --    Na+ (Sodium) 138  --    HCO3 (Bicarb) 35  --    Post-Hemodialysis Assessment   Post-Treatment Procedures  --  Blood returned;Catheter capped, clamped and heparinized x 2 ports   Machine Disinfection Process  --  Exterior Machine Disinfection;Acid/Vinegar Clean;Bleach   Rinseback Volume (ml)  --  500 ml   Total Liters Processed (l/min)  --  67.1 l/min   Dialyzer Clearance  --  Lightly streaked   Duration of Treatment (minutes)  --  180 minutes   Hemodialysis Intake (ml)  --  500 ml   Hemodialysis Output (ml)  --  2000 ml   NET Removed (ml)  --  1500 ml   Tolerated Treatment  --  Fair   Treatment time: 3 hours    Net UF: 1500 ml    Pre weight: 86.0 kg  Post weight:  84.5 kg  EDW: 87.5 kg    Access used: [facility]   Access function: Functioned well with  ml/min    Medications or blood products given: none    Regular outpatient schedule: [facility]    Summary of response to treatment: Tolerated well. UFG decreased d/t legs cramping. Copy of dialysis treatment record placed in chart, to be scanned into EMR.
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
## 2021-10-01 NOTE — CARE COORDINATION
Patient has wound consult. Patient was admitted with wounds. Spoke to nurse [facility], patient on the way to dialysis. Will order specialty mattress and initiate wound care order set.  WALTER OgdenN, RN  [facility]

## 2021-10-01 NOTE — DISCHARGE INSTR - COC
Continuity of Care Form    Patient Name: Duane Medley   :  1955  MRN:  [id number]    Admit date:  2021  Discharge date:  10/7/2021    Code Status Order: Full Code   Advance Directives:      Admitting Physician:  Ashanti Brito MD  PCP: Rishabh Hughes MD    Discharging Nurse: Denise Johnson 23 Unit/Room#: 7TA-0124/6740-93  Discharging Unit Phone Number: [phone]    Emergency Contact:   Extended Emergency Contact Information  Primary Emergency Contact: Deirdre Grant  Address: [address] [place] [address], [facility] [address]  Home Phone: [phone]  Mobile Phone: [phone]  Relation: Spouse  Secondary Emergency Contact: [address] Phone: [phone]  Relation: Child    Past Surgical History:  Past Surgical History:   Procedure Laterality Date    APPENDECTOMY      BRONCHOSCOPY  2018   Verl Raw CARDIAC SURGERY      AORTA 1982     CHOLECYSTECTOMY      CORONARY ANGIOPLASTY WITH STENT PLACEMENT      X1    CORONARY ANGIOPLASTY WITH STENT PLACEMENT      X2 FEMORAL    CYSTOSCOPY Bilateral 2016    cysto bilateral retrogrades, ball exchange    GASTROSTOMY TUBE PLACEMENT  2017    IR TUNNELED CATHETER PLACEMENT GREATER THAN 5 YEARS  2021    IR TUNNELED CATHETER PLACEMENT GREATER THAN 5 YEARS 2021 [facility]    IR TUNNELED CATHETER PLACEMENT GREATER THAN 5 YEARS  6/10/2021    IR TUNNELED CATHETER PLACEMENT GREATER THAN 5 YEARS 6/10/2021 [facility]    LITHOTRIPSY      OTHER SURGICAL HISTORY  2/24/15    right sided percutaneous nephrolithotomy     OTHER SURGICAL HISTORY  2017    suprapubic cath placed     SCROTAL SURGERY N/A 2021    INCISION AND DRAINAGE SCROTAL ABSCESS, SUPRA PUBIC CATHETER EXCHANGE. performed by Cordell Phoenix MD at [place] [address]    TONSILLECTOMY         Immunization History:   Immunization History   Administered Date(s) Administered   Verl Raw Influenza 10/01/2014    Influenza Vaccine, unspecified formulation 10/22/2018    Influenza Virus Vaccine 10/01/2009, 12/18/2013, 10/01/2014, 09/30/2015, 10/22/2018    Influenza, Intradermal, Preservative free 09/30/2015    Influenza, Bryon, IM, PF (6 mo and older Fluzone, Flulaval, Fluarix, and 3 yrs and older Afluria) 10/01/2009, 11/27/2016, 10/19/2017    Influenza, Bryon, adjuvanted, 65 yrs +, IM, PF (Fluad) 10/06/2020    Influenza, Triv, inactivated, subunit, adjuvanted, IM (Fluad 65 yrs and older) 09/13/2019    Pneumococcal Conjugate 13-valent ([id number]) 12/12/2018    Pneumococcal Polysaccharide ([id number]) 03/31/2017       Active Problems:  Patient Active Problem List   Diagnosis Code    Diabetes type 2, uncontrolled ([facility] [address] [facility] E11.65    Essential hypertension I10    Paraplegia ([facility]) G82.20    Hyperlipidemia E78.5    GERD (gastroesophageal reflux disease) K21.9    Chronic anemia D64.9    Renal lesion N28.9    Chronic right shoulder pain M25.511, Q58.87    Complicated UTI (urinary tract infection) N39.0    Pulmonary nodule R91.1    Coronary artery disease involving native coronary artery of native heart without angina pectoris I25.10    Acute renal failure superimposed on chronic kidney disease ([facility]) N17.9, N18.9    Chronic diastolic heart failure ([facility]) I50.32    Non-ischemic cardiomyopathy ([facility]) I42.8    Diaphragmatic paralysis J98.6    Chronic pulmonary aspiration T17.908A    Neurogenic bladder N31.9    CKD (chronic kidney disease) stage 3, GFR 30-59 ml/min ([facility]) N18.30    History of DVT (deep vein thrombosis) Z86.718    Dysphagia R13.10    S/P percutaneous endoscopic gastrostomy (PEG) tube placement ([facility]) Z93.1    Decubitus ulcer of right knee, stage 3 ([facility]) F57.564    Iron deficiency anemia, unspecified D50.9    Heart failure, unspecified ([facility]) I50.9    Quadriplegia, unspecified ([facility]) G82.50    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    End stage renal disease on dialysis ([facility]) N18.6, Z99.2    Other ascites R18.8    Aorta aneurysm (HCC) I71.9    Dysthymic disorder F34.1    Renal osteodystrophy N25.0    Paralysis (HCC) G83.9    S/P CABG (coronary artery bypass graft) Z95.1    S/P coronary artery stent placement Z95.5    Type 2 diabetes mellitus ([facility] [address] E11.9    Anemia due to chronic kidney disease N18.9, D63.1    Amputation of second toe, right, traumatic ([facility] [address] [facility] B73.083Y    Scrotal abscess N49.2    Closed supracondylar fracture of femur, right, initial encounter [facility]) S72.451A    Closed fracture of right distal femur ([facility] [address] [facility] S72.401A    Multiple drug resistant organism (MDRO) culture positive Z16.24    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 A48.8    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    Overweight E66.3    History of abdominal aortic aneurysm Z86.79    Diabetes education, encounter for Z71.89    Acute on chronic respiratory failure with hypoxia ([facility]) J96.21       Isolation/Infection:   Isolation            No Isolation          Patient Infection Status       Infection Onset Added Last Indicated Last Indicated By Review Planned Expiration Resolved Resolved By    None active    Resolved    COVID-19 Rule Out 09/30/21 09/30/21 10/01/21 COVID-19 (Ordered)   10/02/21 Rule-Out Test Resulted    MDRO (multi-drug resistant organism) 03/25/21 03/27/21 05/31/21 Culture, Wound   10/01/21 Tiffanie Rinaldi RN    COVID-19 Rule Out 02/07/21 02/07/21 02/07/21 COVID-19 (Ordered)   02/08/21 Rule-Out Test Resulted    COVID-19 Rule Out 02/07/21 02/07/21 02/07/21 COVID-19 (Ordered)   02/07/21 Rule-Out Test Resulted    MDRO (multi-drug resistant organism)  05/18/16 05/18/16 Tad Pineda RN   02/08/19 Harley Bassett RN    1/7/18 MDRO urine culture. 10/31/17 scrotum.  2/22/17 urine 2/18/17 urine 9/1/16 wound coccxy            Nurse Assessment:  Last Vital Signs: BP (!) 104/33   Pulse 74   Temp 96.2 °F (35.7 °C)   Resp 20   Ht 6' (1.829 m)   Wt 203 lb 14.8 oz (92.5 kg)   SpO2 98%   BMI 27.66 kg/m²     Last documented pain score (0-10 scale): Pain Level: 0  Last Weight:   Wt Readings from Last 1 Encounters:   10/04/21 203 lb 14.8 oz (92.5 kg)     Mental Status:  oriented and alert    IV Access:  - Central Venous Catheter  - site  right and internal jugular, insertion date: pre-existing, no date found    Nursing Mobility/ADLs:  Walking   Dependent  Transfer  Dependent  Bathing  Dependent  Dressing  Dependent  Toileting  Dependent  Feeding  Assisted  Med Admin  Assisted  Med Delivery   whole and prefers mixed with applesauce    Wound Care Documentation and Therapy:  Wound 05/31/21 Sacrum (Active)   Number of days: 125       Wound 05/31/21 Toe (Comment  which one) Right;Lateral (Active)   Number of days: 125       Wound 05/31/21 Heel Right (Active)   Number of days: 125       Wound 05/31/21 Heel Left (Active)   Number of days: 125       Wound 05/31/21 Leg Left;Posterior (Active)   Number of days: 125       Wound 10/01/21 Coccyx unstagable, 4.5cm x 5cm x 1cm (Active)   Wound Etiology Pressure Unstageable 10/04/21 0427   Dressing Status Clean;Dry; Intact 10/04/21 0427   Wound Cleansed Irrigated with saline 10/03/21 1105   Dressing/Treatment Moist to dry;ABD;Tape/Soft cloth adhesive tape 10/04/21 0427   Dressing Change Due 10/03/21 10/04/21 0427   Wound Length (cm) 4.5 cm 10/01/21 0138   Wound Width (cm) 5 cm 10/01/21 0138   Wound Depth (cm) 1 cm 10/01/21 0138   Wound Surface Area (cm^2) 22.5 cm^2 10/01/21 0138   Wound Volume (cm^3) 22.5 cm^3 10/01/21 0138   Wound Assessment Slough 10/04/21 0427   Drainage Amount Moderate 10/04/21 0427   Drainage Description Serosanguinous 10/04/21 0427   Odor Mild 10/04/21 0427   Joanne-wound Assessment Intact 10/04/21 0427   Margins Defined edges 10/04/21 0427   Number of days: 3       Wound 10/01/21 Leg Lower;Left;Proximal stage 3, 2cm x 2cm (Active)   Dressing Status Clean;Dry; Intact 10/04/21 0427   Wound Cleansed Irrigated with saline 10/03/21 6840   Dressing/Treatment Moist to dry;ABD;Gauze dressing/dressing sponge;Tape/Soft cloth adhesive tape 10/04/21 0427   Dressing Change Due 10/03/21 10/04/21 0427   Wound Length (cm) 2 cm 10/01/21 0138   Wound Width (cm) 2 cm 10/01/21 0138   Wound Surface Area (cm^2) 4 cm^2 10/01/21 0138   Wound Assessment Pink/red 10/04/21 0427   Drainage Amount None 10/04/21 0427   Odor None 10/04/21 0427   Joanne-wound Assessment Intact 10/04/21 0427   Number of days: 3       Wound 10/01/21 Leg Lower;Distal;Left 3.5cm x 1cm (Active)   Dressing Status Clean;Dry; Intact 10/04/21 0427   Wound Cleansed Cleansed with saline 10/03/21 1105   Dressing/Treatment Moist to dry;Tape/Soft cloth adhesive tape;Gauze dressing/dressing sponge;ABD 10/04/21 0427   Wound Length (cm) 3.5 cm 10/01/21 0138   Wound Width (cm) 1 cm 10/01/21 0138   Wound Surface Area (cm^2) 3.5 cm^2 10/01/21 0138   Wound Assessment Pink/red 10/04/21 0427   Drainage Amount Scant 10/04/21 0427   Drainage Description Serosanguinous 10/04/21 0427   Odor None 10/04/21 0427   Joanne-wound Assessment Intact 10/04/21 0427   Number of days: 3       Wound 10/01/21 Heel Right DTI, 5cm x 5cm (Active)   Wound Etiology Deep tissue/Injury 10/04/21 0427   Dressing Status Clean;Dry; Intact 10/04/21 0427   Dressing/Treatment Ace wrap 10/04/21 0427   Wound Length (cm) 5 cm 10/01/21 0138   Wound Width (cm) 5 cm 10/01/21 0138   Wound Surface Area (cm^2) 25 cm^2 10/01/21 0138   Drainage Amount None 10/04/21 0427   Number of days: 3       Wound 10/01/21 Toe (Comment  which one) Left 3rd toe, stage 4, 1cm x 1cm (Active)   Wound Etiology Diabetic 10/04/21 0427   Dressing Status Clean;Dry; Intact 10/04/21 0427   Wound Cleansed Cleansed with saline 10/04/21 0427   Dressing/Treatment Foam 10/04/21 [address] [facility] [address]; Purple/maroon 10/04/21 0427   Drainage Amount None 10/04/21 0427   Odor None 10/04/21 0427   Number of days: 3        Elimination:  Continence:   · Bowel: No  · Bladder: No  Urinary Catheter: suprapubic   Colostomy/Ileostomy/Ileal Conduit: No       Date of Last BM: 10/3/2021    Intake/Output Summary (Last 24 hours) at 10/4/2021 1210  Last data filed at 10/4/2021 1014  Gross per 24 hour   Intake 690 ml   Output 1300 ml   Net -610 ml     I/O last 3 completed shifts: In: 18 [P.O.:480]  Out: -     Safety Concerns:     None    Impairments/Disabilities:      Speech and Paralysis - paraplegic    Nutrition Therapy:  Current Nutrition Therapy:   - Oral Diet:  Dysphagia 2 mechanically altered  - Oral Nutrition Supplement:  Standard  daily and Frozen Oral  daily    Routes of Feeding: Oral  Liquids: Nectar Thick Liquids  Daily Fluid Restriction: no  Last Modified Barium Swallow with Video (Video Swallowing Test): not done    Treatments at the Time of Hospital Discharge:   Respiratory Treatments: breathing treatments  Oxygen Therapy:  is on oxygen at 4 L/min per nasal cannula.   Ventilator:    - No ventilator support    Rehab Therapies: RN   Weight Bearing Status/Restrictions: Non-weight bearing on right leg both legs-paraplegic-mechanical lift  Other Medical Equipment (for information only, NOT a DME order):  wheelchair and hospital bed  Other Treatments: none    Patient's personal belongings (please select all that are sent with patient):  None    RN SIGNATURE:  Electronically signed by Rachel Koch RN on 10/7/21 at 12:42 PM EDT    CASE MANAGEMENT/SOCIAL WORK SECTION    Inpatient Status Date: 9/30/2021    Readmission Risk Assessment Score:  Readmission Risk              Risk of Unplanned Readmission:  34           Discharging to Facility/ Agency   · Name: 10-2- 21 home with spouse at 6:00pm via [facility]  · Address:  · Phone:  · Fax:    Name: Jacoby Amato will call for Appointment  Phone: [phone]  Fax: [phone]      Dialysis Facility (if applicable)    [address] Phone [place]   · Fax [phone]:  · Dialysis Schedule: [facility] 1045  ·     /Social Worker signature:Judith Leopoldo Aho, BSN, CCM, RN  [facility]  [phone]  PHYSICIAN SECTION    Prognosis: Good    Condition at Discharge: Stable    Rehab Potential (if transferring to Rehab): Good    Recommended Labs or Other Treatments After Discharge: 'Home Care: [facility]  Lantus has been stopped please use sliding scale insulin only  Continue 4 L oxygen to maintain sats between 90 and 92  Please use BiPAP at bedtime   Wound Care: Cleanse wound/ulcer with Normal Saline Solution (NSS) with each dressing   Coccyx wound. Apply zinc paste around wound to protect from maceration. Apply saline moist dressings, cover with abd pad and medipore tape, twice  daily and as needed.    Priority  Routine         Physician Certification: I certify the above information and transfer of Garcia Peterson  is necessary for the continuing treatment of the diagnosis listed and that he requires [address] for greater 30 days.      Update Admission H&P: No change in H&P    PHYSICIAN SIGNATURE:  Electronically signed by Jason Perez MD on 10/7/21 at 1:18 PM EDT

## 2021-10-01 NOTE — PROGRESS NOTES
Comprehensive Nutrition Assessment    Type and Reason for Visit:  Initial, Positive Nutrition Screen (MST 2; wt loss & poor po)    Nutrition Recommendations/Plan:   Vanilla Ensure High Protein BID    Nutrition Assessment:  Pt is nutritionally compromised AEB poor appetite wt loss reported upon admission. Pt also has increased nutrient needs d/t multiple areas with skin breakdown. Noted pt lost 12 lb in past 4 months, which dialysis may have contributed to.  Pt's wounds are chronic as he has received protein supplements in past to promote healing. Will offer Enusre HP BID & monitor for improved po intake. Malnutrition Assessment:  Malnutrition Status:  No malnutrition    Context:  Acute Illness       Estimated Daily Nutrient Needs:  Energy (kcal):  2974-5689 (20-25 kcal/89kg); Weight Used for Energy Requirements:  Current     Protein (g):  105- 121g (1.3-1.5g/81kg); Weight Used for Protein Requirements:  Ideal        Fluid (ml/day):   ; Method Used for Fluid Requirements:  1 ml/kcal      Nutrition Related Findings:  Na 133; LBM 9/30; +1 pitting edema LLE; nonpitting edema RLE      Wounds:  Multiple, Stage III, Stage IV, Deep Tissue Injury, Unstageable       Current Nutrition Therapies:    ADULT DIET; Regular; 4 carb choices (60 gm/meal); No Added Salt (3-4 gm); Low Potassium (Less than 3000 mg/day)    Anthropometric Measures:  · Height: 6' (182.9 cm)  · Current Body Weight: 197 lb (89.4 kg)   · Admission Body Weight:      · Usual Body Weight: 209 lb (94.8 kg)     · Ideal Body Weight: 178 lbs; % Ideal Body Weight 110.7 %   · BMI: 26.7  · Adjusted Body Weight:  ; No Adjustment   · Adjusted BMI:      · BMI Categories: Overweight (BMI 25.0-29. 9)       Nutrition Diagnosis:   · Inadequate oral intake related to inadequate protein-energy intake as evidenced by poor intake prior to admission    · Increased nutrient needs related to increase demand for energy/nutrients as evidenced by wounds      Nutrition

## 2021-10-02 NOTE — PROGRESS NOTES
PULMONARY AND CRITICAL CARE MEDICINE PROGRESS NOTE    Subjective: Patient has been weaned down to 4 L nasal cannula. Saturating well. Denies any respiratory distress. COVID-19 PCR is negative. REVIEW OF SYSTEMS:   Constitutional symptoms: The patient denies fever, fatigue, night sweats, weight loss or weight gain. HEENT: No vision changes. No tinnitus, Denies sinus pain. No hoarseness, or dysphagia. Neck: Patient denies swelling in the neck. Cardiovascular: Denies chest pain, palpitation, syncope. Respiratory: Denies shortness of breath or cough. Gastrointestinal: Denies nausea, abdominal pain or change in bowel function. Genitourinary: Denies obstructive symptoms. No history of incontinence. Skin: No rashes or itching. Muskuloskeletal: Denies weakness or bone pain. Neurological: No headaches or seizures. Psychiatric: Denies mood swings or depression.      MEDICATIONS:     Scheduled Meds:   aspirin  81 mg Oral Daily    citalopram  20 mg Oral BID    ferrous sulfate  325 mg Oral Daily with breakfast    finasteride  5 mg Oral Daily    insulin glargine  7 Units SubCUTAneous Nightly    insulin lispro  0-6 Units SubCUTAneous TID WC    insulin lispro  0-3 Units SubCUTAneous Nightly    torsemide  100 mg Oral Daily    budesonide-formoterol  2 puff Inhalation BID    pravastatin  40 mg Oral Nightly    pantoprazole  40 mg Oral QAM AC    midodrine  10 mg Oral TID WC    metoprolol tartrate  12.5 mg Oral BID    sodium chloride flush  5-40 mL IntraVENous 2 times per day    heparin (porcine)  5,000 Units SubCUTAneous 3 times per day    cefTRIAXone (ROCEPHIN) IV  1,000 mg IntraVENous Q24H       Current Infusions:    sodium chloride         PRN meds:  albuterol, bisacodyl, docusate sodium, traZODone, sodium chloride flush, sodium chloride, ondansetron **OR** ondansetron, polyethylene glycol, acetaminophen **OR** acetaminophen, bisacodyl, simethicone, hydrOXYzine, saline nasal gel, heparin (porcine), LORazepam, nitroGLYCERIN    PHYSICAL EXAM:  BP (!) 102/53   Pulse 65   Temp 96.5 °F (35.8 °C)   Resp 22   Ht 6' (1.829 m)   Wt 188 lb 7.9 oz (85.5 kg)   SpO2 94%   BMI 25.56 kg/m²  I/O last 3 completed shifts: In: 500   Out: 2200 [Urine:200] No intake/output data recorded. Intake/Output Summary (Last 24 hours) at 10/2/2021 1421  Last data filed at 10/1/2021 2145  Gross per 24 hour   Intake 500 ml   Output 2200 ml   Net -1700 ml       CONSTITUTIONAL: He is a 74y.o.-year-old who appears his stated age. He is alert and oriented x 3 and in no acute distress. CARDIOVASCULAR: S1 S2 RRR. Without murmer  RESPIRATORY & CHEST: Lungs are clear to auscultation and percussion. No wheezing, no crackles. Good air movement  GASTROINTESTINAL & ABDOMEN: Soft, nontender, positive bowel sounds in all quadrants, non-distended, without hepatosplenomegaly. GENITOURINARY: Deferred. MUSCULOSKELETAL: No tenderness to palpation of the axial skeleton. There is no clubbing. No cyanosis. No edema of the lower extremities. SKIN OF BODY: No rash or jaundice. PSYCHIATRIC EVALUATION: Normal affect. Patient answers questions appropriately. HEMATOLOGIC/LYMPHATIC/ IMMUNOLOGIC: No palpable lymphadenopathy. NEUROLOGIC: Alert and oriented x 3. Groslly non-focal. Motor strength is 5+/5 in all muscle groups. The patient has a normal sensorium globally.      LABS:    Recent Labs     09/30/21  2050 10/01/21  0749 10/02/21  1105   WBC 9.4 7.4 9.1   RBC 3.98* 3.79* 3.95*   HGB 11.4* 10.8* 11.4*   HCT 36.4* 34.5* 36.4*   MCH 28.7 28.6 28.9   MCHC 31.4 31.3 31.3   RDW 17.9* 17.9* 18.0*    181 218   MPV 7.0 6.9 6.4   NEUTOPHILPCT 83.5 81.3  --    MONOPCT 8.5 8.4  --    BASOPCT 0.4 0.5  --      Recent Labs     09/30/21  2050 10/01/21  0749 10/02/21  1105   * 133* 131*   K 4.8 4.9 5.2*   CL 94* 93* 94*   ANIONGAP 12 13 13   CO2 27 27 24   BUN 73* 78* 49*   CREATININE 2.6* 3.0* 2.2*   CALCIUM 9.2 8.9 8.9   PROT 8.1 7.7 7. 7   BILITOT 0.3 0.3 0.3   ALKPHOS 168* 153* 154*   ALT 15 14 15   AST 30 25 32   GLUCOSE 127* 106* 76     Recent Labs     10/01/21  0520   PHART 7.291*   TAT4LYI 62.9*   PO2ART 103.0   GZJ7GLR 30.3*   I0UUWHZG 98.2   BEART 2.5       IMAGING:  I reviewed the chest x-ray from 9/30/2021 and my interpretation is as follows. Increased pulmonary vascular congestion.        IMPRESSION:  Acute on chronic hypoxic/hypercapnic respiratory failure  UTI  Paraplegia with chronic suprapubic catheter  End-stage renal disease on hemodialysis  Recent femur fracture        PLAN:  Patient presented with acute on chronic hypoxic and hypercapnic respiratory failure. I believe this is secondary to fluid overload as chest x-ray shows increased signs of pulmonary vascular congestion. Clinical status is improved with hemodialysis. He has been weaned down to 5 L nasal cannula. Titrate FiO2 for saturation of 90 to 92%. Patient takes oxygen at 2 L/min at baseline. Because of his paraplegia and a neuromuscular disorder and chronic hypercapnia, he has been prescribed noninvasive ventilator at night through pulmonary as outpatient which she has not received yet. Patient can sleep with BiPAP while he is here in the hospital. BIPAP 12/6 overnight.      Proteus UTI. Previous history of multiple UTI which I believe is secondary to chronic suprapubic catheter. He is on Rocephin. De-escalate antibiotics based on sensitivities. COVID-19 PCR is negative. Pulmonary and critical care will sign off. Please call back for any further questions or concerns. Beltran Park MD  Pulmonary Critical Care and Sleep Medicine  10/2/2021, 2:21 PM    This note was completed using dragon medical speech recognition software. Grammatical errors, random word insertions, pronoun errors and incomplete sentences are occasional consequences of this technology due to software limitations.  If there are questions or concerns about the content of this note of information contained within the body of this dictation they should be addressed with the provider for clarification.

## 2021-10-02 NOTE — PROGRESS NOTES
Nephrology Consult Note  [phone]   [facility] [employer]       Patient:  Malika Bagley   : 1955     Brief HPI    This is a patient with significant past medical history of ESKD HD MWF, CHF, CAD, HTN, DM2, quadriplegia, neurogenic bladder s/p suprapubic ball, COPD, chronic resp failure, h/o scrotal abscess, presented with worsening SOB. He also reported \"a little\" chest pain. Pt reports symptoms has been progressively getting worse for over one week. He denies any fever/chills. COVID test is pending. He is followed by pulmonary and outpatient home [facility] mode ventilator has been ordered but not set up yet. He had not reached his TW post HD. Stats stable in ED. CT on  showed LLL consolidation and volume loss. Moderate ascites.  CXR pulmonary vascular congestion, abd x-ray showed ascites    Subjective/Interval history    Pt seen and examined on dialysis  Tolerating well  covid-19 test negative  No chest pains  BPs maintained  On 5l NC    Review of Systems   No fevers/chills    SHx:  No visitors at the bed-side    Meds:  Scheduled Meds:   aspirin  81 mg Oral Daily    citalopram  20 mg Oral BID    ferrous sulfate  325 mg Oral Daily with breakfast    finasteride  5 mg Oral Daily    insulin glargine  7 Units SubCUTAneous Nightly    insulin lispro  0-6 Units SubCUTAneous TID WC    insulin lispro  0-3 Units SubCUTAneous Nightly    torsemide  100 mg Oral Daily    budesonide-formoterol  2 puff Inhalation BID    pravastatin  40 mg Oral Nightly    pantoprazole  40 mg Oral QAM AC    midodrine  10 mg Oral TID WC    metoprolol tartrate  12.5 mg Oral BID    sodium chloride flush  5-40 mL IntraVENous 2 times per day    heparin (porcine)  5,000 Units SubCUTAneous 3 times per day    cefTRIAXone (ROCEPHIN) IV  1,000 mg IntraVENous Q24H     Continuous Infusions:   sodium chloride       PRN Meds:.albuterol, bisacodyl, docusate sodium, traZODone, sodium chloride flush, sodium chloride, ondansetron on Wednesday  -clinically hypervolemic, lytes stable  Had HD yesterday, UF planned today for 2 hours but since k is 5.2, change to HD for 2 hours    FEN:  Hyponatremia: mild-hypovolemic  -given lasix in ER-on PO torsemide       Acute on chronic resp failure:   -requiring oxygen  -fluid SOURAV hannah contributing  -plan for home ventilator AC -per pulmonary  Expect improvement with fluid removal      Anemia: continue outpatient AMBAR  -on PO iron, will continue same     Ascites: consider paracentesis     UTI: recurrent  -chronic SP catheter RF  -previous culture E.coli  -on ceftriaxone     Hypotension: on midodrine  on metoprolol     DM2: on insulin       Abeba Kiran MD, MD.  10/2/2021  Office Phone : [phone]    Thank you for allowing us to participate in the care of this pt. I willcontinue to follow along. Please call with questions or concerns.

## 2021-10-02 NOTE — FLOWSHEET NOTE
10/02/21 1534 10/02/21 1734   Vital Signs   BP (!) 115/54 115/60   Temp 96.5 °F (35.8 °C) 97 °F (36.1 °C)   Pulse 65 76   Resp 20 20     Treatment time: 2 hours    Net UF: 2 L    Pre weight: 82.9 kg (bed scale)  Post weight: 80.7 kg (bed scale)  EDW: TBD    Access used: RIJ HD Tunneled cath  Access function: No problems    Medications or blood products given: None    Regular outpatient schedule: [facility]    Summary of response to treatment: 2 hour UF only tx changed to 2 hour HD tx due to K 5.2. SBP . Copy of dialysis treatment record placed in chart, to be scanned into EMR. Report called to Odessa Owen RN.

## 2021-10-02 NOTE — PROGRESS NOTES
Hospitalist Progress Note      PCP: Molina Kennedy MD    Date of Admission: 9/30/2021    Chief Complaint: Worsening shortness of breath    Hospital Course:     Patient 77years old male with past medical history significant for end-stage renal disease on hemodialysis, CHF, carotid disease, hypertension, diabetes mellitus type 2, quadriplegia, neurogenic bladder status post suprapubic Macias, COPD, chronic respiratory failure, presented with chest pain/shortness of breath, Covid testing is pending, has been seen by pulmonary before try patient on [facility] mode [facility]ator, CT chest shows left lower lobe consolidation started on Rocephin. Moderate ascites.   Chest x-ray with pulmonary vascular congestion, Covid testing pending    Subjective:   No acute event overnight, Covid testing pending, currently on 5 L nasal cannula, proBNP 70,000, troponin trending down, had HD yesterday with UF 1.5 L      Medications:  Reviewed    Infusion Medications    sodium chloride       Scheduled Medications    aspirin  81 mg Oral Daily    citalopram  20 mg Oral BID    ferrous sulfate  325 mg Oral Daily with breakfast    finasteride  5 mg Oral Daily    insulin glargine  7 Units SubCUTAneous Nightly    insulin lispro  0-6 Units SubCUTAneous TID WC    insulin lispro  0-3 Units SubCUTAneous Nightly    torsemide  100 mg Oral Daily    budesonide-formoterol  2 puff Inhalation BID    pravastatin  40 mg Oral Nightly    pantoprazole  40 mg Oral QAM AC    midodrine  10 mg Oral TID WC    metoprolol tartrate  12.5 mg Oral BID    sodium chloride flush  5-40 mL IntraVENous 2 times per day    heparin (porcine)  5,000 Units SubCUTAneous 3 times per day    cefTRIAXone (ROCEPHIN) IV  1,000 mg IntraVENous Q24H     PRN Meds: albuterol, bisacodyl, docusate sodium, traZODone, sodium chloride flush, sodium chloride, ondansetron **OR** ondansetron, polyethylene glycol, acetaminophen **OR** acetaminophen, bisacodyl, simethicone, hydrOXYzine, 10/02/21  1105   TROPONINI 0.11* 0.12* 0.14*       Urinalysis:      Lab Results   Component Value Date    NITRU Negative 10/01/2021    WBCUA >900 10/01/2021    BACTERIA 2+ 10/01/2021    RBCUA see below 10/01/2021    RBCUA 3+ 05/12/2016    BLOODU LARGE 10/01/2021    SPECGRAV 1.020 10/01/2021    GLUCOSEU Negative 10/01/2021    GLUCOSEU >=1000 05/17/2011       Radiology:  XR ABDOMEN (KUB) (SINGLE AP VIEW)   Final Result   1. Ascites throughout the left abdomen. XR CHEST PORTABLE   Final Result   Pulmonary vascular congestion. Stable cardiomegaly. XR FEMUR RIGHT (MIN 2 VIEWS)    (Results Pending)           Assessment/Plan:    Active Hospital Problems    Diagnosis     Acute on chronic respiratory failure with hypoxia (HCC) [J96.21]      Acute on chronic hypoxic respiratory failure  End-stage renal disease on hemodialysis  Chronic anemia  Ascites   Urinary tract infection  Sepsis, not in septic shock  Diabetes mellitus type 2  Paraplegia  Neurogenic bladder, suprapubic Macias catheter    Continue inpatient care  Nephrology and pulmonary following appreciate recommendations    Had HD UF 1500 ml yesterday, patient had Lasix and torsemide in ER. Acute on chronic respiratory failure-now on 5 L, good saturation, pulmonary following patient supposed to be on home ventilator AC, had diaphragmatic paralysis,   We will keep her at night on BiPAP    Anemia-on iron    Ascites-patient may require paracentesis, probably after the weekend. Urinary tract infection-chronic suprapubic catheter, currently on ceftriaxone will continue follow-up urine culture. Diabetes mellitus type 2-we will continue insulin, Lantus and lispro    Patient is not vaccinated for Covid-Covid testing is pending, continue isolation    Troponinemia-non-ACS, troponin trending down. Patient denies chest pain. DVT Prophylaxis: Heparin subcu  Diet: ADULT DIET;  Dysphagia - Minced and Moist; Mildly Thick (Nectar)  Code Status: Full Code    PT/OT Eval Status: Needed    Dispo -continue inpatient care, patient came from home, wife is taking care of him.   May need placement upon discharge    Jazmin Weeks MD

## 2021-10-02 NOTE — PROGRESS NOTES
Pt A&OX4 up shift assessment, BP (!) 109/58   Pulse 79   Temp 96.6 °F (35.9 °C)   Resp 28   Ht 6' (1.829 m)   Wt 188 lb 7.9 oz (85.5 kg)   SpO2 96%   BMI 25.56 kg/m²   Anxious and feeling sob, on 4 L NC 90% increased to 5 L NC, called wife to update her, ativan given, pt wants to eat, took pills w/ applesauce well.

## 2021-10-03 NOTE — PROGRESS NOTES
Pt repositioned in bed with pillow support. Oxygen on 4L nasal cannula. Salve put in patients nose due to irritation from oxygen. TV turned on. Pt given nectar thickened apple juice to drink. No needs expressed. Pt states he feels like Joseph James is paranoid and having really bad dreams at night\". Call light in reach. Bed alarm engaged.

## 2021-10-03 NOTE — PROGRESS NOTES
4 Eyes Skin Assessment      The patient is being assess for  Transfer to New Unit    I agree that 2 RN's have performed a thorough Head to Toe Skin Assessment on the patient. ALL assessment sites listed below have been assessed. Areas assessed by both nurses: YES  [x]   Head, Face, and Ears   [x]   Shoulders, Back, and Chest  [x]   Arms, Elbows, and Hands   [x]   Coccyx, Sacrum, and Ischum  [x]   Legs, Feet, and Heels        Does the Patient have Skin Breakdown?   Yes a wound was noted on the Admission Assessment and an WOUND LDA was Initiated documentation include the Joanne-wound, Wound Assessment, Measurements, Dressing Treatment, Drainage, and Color\",         Cameron Prevention initiated:  Yes   Wound Care Orders initiated:  Yes      [id number]  nurse consulted for Pressure Injury (Stage 3,4, Unstageable, DTI, NWPT, and Complex wounds):  Yes      Nurse 1 eSignature: Electronically signed by Mary Russell RN on 10/3/21 at 1:02 AM EDT    **SHARE this note so that the co-signing nurse is able to place an eSignature**    Nurse 2 eSignature: Electronically signed by Pravin Oh on 10/3/21 at 2:48 AM EDT

## 2021-10-03 NOTE — PROGRESS NOTES
Hospitalist Progress Note      PCP: Jaquan Boo MD    Date of Admission: 9/30/2021    Chief Complaint: Worsening shortness of breath    Hospital Course:     Patient 77years old male with past medical history significant for end-stage renal disease on hemodialysis, CHF, carotid disease, hypertension, diabetes mellitus type 2, quadriplegia, neurogenic bladder status post suprapubic Macias, COPD, chronic respiratory failure, presented with chest pain/shortness of breath, Covid testing is pending, [facility] [place] mode ventilator, CT chest shows left lower lobe consolidation started on Rocephin. Moderate ascites. Chest x-ray with pulmonary vascular congestion, Covid testing pending    Subjective:   Currently is on 4 L nasal cannula,No acute event overnight, labs pending urine culture grew Citrobacter and Proteus, currently on Rocephin used BiPAP overnight.     Medications:  Reviewed    Infusion Medications    dextrose      sodium chloride       Scheduled Medications    insulin glargine  5 Units SubCUTAneous Nightly    aspirin  81 mg Oral Daily    citalopram  20 mg Oral BID    ferrous sulfate  325 mg Oral Daily with breakfast    finasteride  5 mg Oral Daily    insulin lispro  0-6 Units SubCUTAneous TID WC    insulin lispro  0-3 Units SubCUTAneous Nightly    torsemide  100 mg Oral Daily    budesonide-formoterol  2 puff Inhalation BID    pravastatin  40 mg Oral Nightly    pantoprazole  40 mg Oral QAM AC    midodrine  10 mg Oral TID WC    metoprolol tartrate  12.5 mg Oral BID    sodium chloride flush  5-40 mL IntraVENous 2 times per day    heparin (porcine)  5,000 Units SubCUTAneous 3 times per day    cefTRIAXone (ROCEPHIN) IV  1,000 mg IntraVENous Q24H     PRN Meds: glucose, dextrose, glucagon (rDNA), dextrose, albuterol, bisacodyl, docusate sodium, traZODone, sodium chloride flush, sodium chloride, ondansetron **OR** ondansetron, polyethylene glycol, acetaminophen **OR** acetaminophen, bisacodyl, simethicone, hydrOXYzine, saline nasal gel, heparin (porcine), LORazepam, nitroGLYCERIN      Intake/Output Summary (Last 24 hours) at 10/3/2021 1200  Last data filed at 10/3/2021 0609  Gross per 24 hour   Intake 570 ml   Output 2400 ml   Net -1830 ml       Physical Exam Performed:    BP (!) 95/52   Pulse 61   Temp 97.6 °F (36.4 °C) (Oral)   Resp 18   Ht 6' (1.829 m)   Wt 177 lb 14.6 oz (80.7 kg)   SpO2 94%   BMI 24.13 kg/m²     General appearance: No apparent distress, appears stated age and cooperative. HEENT: Pupils equal, round, and reactive to light. Conjunctivae/corneas clear. Neck: Supple, with full range of motion. No jugular venous distention. Trachea midline. Respiratory:  Normal respiratory effort. Clear to auscultation, bilaterally without Rales/Wheezes/Rhonchi. Cardiovascular: Regular rate and rhythm with normal S1/S2 without murmurs, rubs or gallops. Abdomen: Soft, non-tender, non-distended with normal bowel sounds. Musculoskeletal: No clubbing, cyanosis or edema bilaterally. Full range of motion without deformity. Skin: Skin color, texture, turgor normal.  No rashes or lesions. Neurologic: Paraplegic   Psychiatric: Alert and oriented  Capillary Refill: Brisk,3 seconds, normal   Peripheral Pulses: +2 palpable, equal bilaterally       Labs:   Recent Labs     10/01/21  0749 10/02/21  1105 10/03/21  0927   WBC 7.4 9.1 8.4   HGB 10.8* 11.4* 11.5*   HCT 34.5* 36.4* 36.6*    218 207     Recent Labs     09/30/21 2050 10/01/21  0748 10/01/21  0749 10/02/21  1105 10/03/21  0927   NA   < >  --  133* 131* 130*   K   < >  --  4.9 5.2* 5.0   CL   < >  --  93* 94* 93*   CO2   < >  --  27 24 26   BUN   < >  --  78* 49* 40*   CREATININE   < >  --  3.0* 2.2* 2.2*   CALCIUM   < >  --  8.9 8.9 8.8   PHOS  --  4.5  --   --   --     < > = values in this interval not displayed.      Recent Labs     09/30/21  2050 10/01/21  0749 10/02/21  1105   AST 30 25 32   ALT 15 14 15   BILIDIR  --   --  <0.2   BILITOT 0.3 0.3 0.3   ALKPHOS 168* 153* 154*     Recent Labs     09/30/21  2050   INR 1.17*     Recent Labs     10/01/21  0749 10/01/21  1429 10/02/21  1105   TROPONINI 0.11* 0.12* 0.14*       Urinalysis:      Lab Results   Component Value Date    NITRU Negative 10/01/2021    WBCUA >900 10/01/2021    BACTERIA 2+ 10/01/2021    RBCUA see below 10/01/2021    RBCUA 3+ 05/12/2016    BLOODU LARGE 10/01/2021    SPECGRAV 1.020 10/01/2021    GLUCOSEU Negative 10/01/2021    GLUCOSEU >=1000 05/17/2011       Radiology:  XR FEMUR RIGHT (MIN 2 VIEWS)   Final Result   Fracture of the distal femoral metaphysis with increased impaction and stable   dorsal displacement of the distal fracture fragment. Callus is present at   the fracture line but the fracture line remains visible. No new acute fracture of the right femur. Osteopenia with degenerative   changes at the hip and knee joint. XR ABDOMEN (KUB) (SINGLE AP VIEW)   Final Result   1. Ascites throughout the left abdomen. XR CHEST PORTABLE   Final Result   Pulmonary vascular congestion. Stable cardiomegaly. Assessment/Plan:    Active Hospital Problems    Diagnosis     Acute on chronic respiratory failure with hypoxia (HCC) [J96.21]      Acute on chronic hypoxic respiratory failure  End-stage renal disease on hemodialysis  Chronic anemia  Ascites   Urinary tract infection  Sepsis, not in septic shock  Diabetes mellitus type 2  Paraplegia  Neurogenic bladder, suprapubic Macias catheter    Continue inpatient care  Nephrology  following appreciate recommendations    Had HD UF 1500 ml , patient had Lasix and torsemide in ER.     Acute on chronic respiratory failure-now on 4 L, good saturation, pulmonary following patient supposed to be on home ventilator AC, had diaphragmatic paralysis,   We will keep her at night on BiPAP    Anemia-on iron    Ascites-patient may require paracentesis, probably after the weekend. Urinary tract infection-chronic suprapubic catheter, currently on ceftriaxone will continue , urine culture growing Citrobacter and Proteus    Diabetes mellitus type 2-we will continue insulin, Lantus and lispro    Patient is not vaccinated for Covid-Covid testing is negative, discontinue isolation    Troponinemia-non-ACS, troponin trending down. Patient denies chest pain. DVT Prophylaxis: Heparin subcu  Diet: ADULT DIET; Dysphagia - Minced and Moist; Mildly Thick (Nectar)  Code Status: Full Code    PT/OT Eval Status: Needed    Dispo -continue inpatient care, patient came from home, wife is taking care of him.   May need placement upon discharge    Taylor Mchugh MD

## 2021-10-03 NOTE — PROGRESS NOTES
Pt dressings changed per wound care orders. Pt tolerated well. Pt repositioned with call light in reach and no other needs expressed at this time.

## 2021-10-03 NOTE — PROGRESS NOTES
Nephrology Consult Note  [phone]   [facility]. [facility]       Patient:  Diana Blum   : 1955     Brief HPI    This is a patient with significant past medical history of ESKD HD MWF, CHF, CAD, HTN, DM2, quadriplegia, neurogenic bladder s/p suprapubic ball, COPD, chronic resp failure, h/o scrotal abscess, presented with worsening SOB. He also reported \"a little\" chest pain.  symptoms has been progressively getting worse for over one week. COVID test is negative    He is followed by pulmonary and outpatient home [facility] mode ventilator has been ordered but not set up yet. He had not reached his TW post HD. [place] CT on  showed LLL consolidation and volume loss. Moderate ascites.  CXR pulmonary vascular congestion, abd x-ray showed ascites    Subjective/Interval history    Pt seen and examined   Had 2 hours of dialysis yesterday  BPslow normal  Has been afebrile  On 4l NC, oxygen requirement better      Review of Systems   No fevers/chills    SHx:   visitors at the bed-side    Meds:  Scheduled Meds:   insulin glargine  5 Units SubCUTAneous Nightly    aspirin  81 mg Oral Daily    citalopram  20 mg Oral BID    ferrous sulfate  325 mg Oral Daily with breakfast    finasteride  5 mg Oral Daily    insulin lispro  0-6 Units SubCUTAneous TID WC    insulin lispro  0-3 Units SubCUTAneous Nightly    torsemide  100 mg Oral Daily    budesonide-formoterol  2 puff Inhalation BID    pravastatin  40 mg Oral Nightly    pantoprazole  40 mg Oral QAM AC    midodrine  10 mg Oral TID WC    metoprolol tartrate  12.5 mg Oral BID    sodium chloride flush  5-40 mL IntraVENous 2 times per day    heparin (porcine)  5,000 Units SubCUTAneous 3 times per day    cefTRIAXone (ROCEPHIN) IV  1,000 mg IntraVENous Q24H     Continuous Infusions:   dextrose      sodium chloride       PRN Meds:.glucose, dextrose, glucagon (rDNA), dextrose, albuterol, bisacodyl, docusate sodium, traZODone, sodium chloride flush, sodium chloride, ondansetron **OR** ondansetron, polyethylene glycol, acetaminophen **OR** acetaminophen, bisacodyl, simethicone, hydrOXYzine, saline nasal gel, heparin (porcine), LORazepam, nitroGLYCERIN      Vitals:  BP (!) 95/52   Pulse 61   Temp 97.6 °F (36.4 °C) (Oral)   Resp 18   Ht 6' (1.829 m)   Wt 177 lb 14.6 oz (80.7 kg)   SpO2 94%   BMI 24.13 kg/m²       Physical Exam  General : awake, alert, not in pain or resp distress, resting in bed  HEENT : no palor or icterus  CVS: S1 S2 normal, regular rhythm  Lungs: Clear,  Decreased breath sounds at the bases  Abd: distended, free fluid+, soft, no tenderness, bowel sounds hypoactive  Ext: LE edema+, b/l heel protectors+  Skin: Warm.  No rashes appreciated.   : SPC+  Access : right TDC+      Labs:  CBC with Differential:    Lab Results   Component Value Date    WBC 8.4 10/03/2021    RBC 4.00 10/03/2021    HGB 11.5 10/03/2021    HCT 36.6 10/03/2021     10/03/2021    MCV 91.4 10/03/2021    MCH 28.8 10/03/2021    MCHC 31.5 10/03/2021    RDW 17.8 10/03/2021    NRBC CANCELED 10/22/2014    NRBC CANCELED 10/22/2014    SEGSPCT 76.1 10/22/2014    BANDSPCT 2 01/16/2018    BLASTSPCT CANCELED 10/22/2014    METASPCT 1 04/03/2017    LYMPHOPCT 9.3 10/03/2021    PROMYELOPCT CANCELED 10/22/2014    MONOPCT 7.3 10/03/2021    MYELOPCT 1 03/31/2017    EOSPCT 4.6 07/11/2011    BASOPCT 0.7 10/03/2021    MONOSABS 0.6 10/03/2021    LYMPHSABS 0.8 10/03/2021    EOSABS 0.0 10/03/2021    BASOSABS 0.1 10/03/2021    DIFFTYPE Auto 07/11/2011     Sodium:    Lab Results   Component Value Date     10/03/2021     BUN/Creatinine:    Lab Results   Component Value Date    BUN 40 10/03/2021    CREATININE 2.2 10/03/2021     Ionized Calcium:  No results found for: IONCA  Magnesium:    Lab Results   Component Value Date    MG 1.90 06/02/2021     Phosphorus:    Lab Results   Component Value Date    PHOS 4.5 10/01/2021       Assessment/Plan:       ESKD on  HD MWF   at [facility]  UNA is 87.5kg, last treatment weight is 89.9kg  Extra treatment on 10/2 for fluid removal and high normal K, Post weight: 80.7 kg  EDW needs to be adjusted  HD again in AM as per schedule    Hyponatremia mild  Chronic  Not much improvement with fluid removal on dialysis  monitor     Acute on chronic resp failure  covid negative  Possibly due to pulm edema  Because of his paraplegia and a neuromuscular disorder and chronic hypercapnia, he has been prescribed noninvasive ventilator at night through pulmonary as outpatient which she has not received yet  biPAP nocturnal   Continue torsemide    UTI  Chronic SPC  ? colonization  On ABx    Chronic hypotension  On midodrine    Ascites  considering paracentesis     Anemia in CKD  Hb at goal  No need of ESAs     DM2       Katerina Shaffer MD, MD.  10/3/2021  Office Phone : [phone]    Thank you for allowing us to participate in the care of this pt. I willcontinue to follow along. Please call with questions or concerns.

## 2021-10-03 NOTE — PLAN OF CARE
Problem: SAFETY  Goal: Free from accidental physical injury  10/3/2021 1443 by Jayshree Marrufo RN  Outcome: Ongoing     Problem: SAFETY  Goal: Free from intentional harm  10/3/2021 1443 by Jayshree Marrufo RN  Outcome: Ongoing     Problem: SKIN INTEGRITY  Goal: Skin integrity is maintained or improved  10/3/2021 1443 by Jayshree Marrufo RN  Outcome: Ongoing     Problem: Skin Integrity:  Goal: Will show no infection signs and symptoms  Description: Will show no infection signs and symptoms  10/3/2021 1443 by Jayshree Marrufo RN  Outcome: Ongoing     Problem: Skin Integrity:  Goal: Absence of new skin breakdown  Description: Absence of new skin breakdown  10/3/2021 1443 by Jayshree Marrufo RN  Outcome: Ongoing     Problem: Falls - Risk of:  Goal: Will remain free from falls  Description: Will remain free from falls  10/3/2021 1443 by Jayshree Marrufo RN  Outcome: Ongoing     Problem: Falls - Risk of:  Goal: Absence of physical injury  Description: Absence of physical injury  10/3/2021 1443 by Jayshree Marrufo RN  Outcome: Ongoing     Problem: Nutrition  Goal: Optimal nutrition therapy  10/3/2021 1443 by Jayshree Marrufo RN  Outcome: Ongoing   Pt free from falls and physical injury at this time. Skin integrity is maintained and wounds changed per wound care orders. Pt repositioned on Q2 turning schedule and on a specialty bed. Pt shows no further signs of infection. Pt eating 25-50% of meals and has adequate liquid intake at this time. See flowsheet for assessment.

## 2021-10-03 NOTE — PLAN OF CARE
Shift assessment complete. VSS. Pt has arrived to 3T room 3357 from [facility]. No signs of distress. No needs expressed. 4 eyes assessment complete, see note. Dressings changed on bilateral lower extremities, right toes, sacrum, and suprapubic catheter incision. Complete bed change and brief change. Pt repositioned with pillow and wedge support for comfort. Pt wearing multi-podus boots to protect feet from injury. Pt given nectar thickened water to drink per request. Call light within reach. Bed alarm engaged. Pt encouraged to call for assistance.      Problem: SAFETY  Goal: Free from accidental physical injury  Outcome: Ongoing  Goal: Free from intentional harm  Outcome: Ongoing     Problem: SKIN INTEGRITY  Goal: Skin integrity is maintained or improved  Outcome: Ongoing     Problem: Skin Integrity:  Goal: Will show no infection signs and symptoms  Description: Will show no infection signs and symptoms  Outcome: Ongoing  Goal: Absence of new skin breakdown  Description: Absence of new skin breakdown  Outcome: Ongoing     Problem: Falls - Risk of:  Goal: Will remain free from falls  Description: Will remain free from falls  Outcome: Ongoing  Goal: Absence of physical injury  Description: Absence of physical injury  Outcome: Ongoing     Problem: Nutrition  Goal: Optimal nutrition therapy  Outcome: Ongoing

## 2021-10-04 NOTE — PROGRESS NOTES
Speech Language Pathology  Dysphagia Treatment Note    Name:  Mitzi Davidson  :   1955  Medical Diagnosis:  Dialysis patient [facility]) [Z99.2]  Acute and chronic respiratory failure with hypoxia ([address] [J96.21]  Acute on chronic respiratory failure with hypoxia ([facility]) [J96.21]  Chest pain, unspecified type [R07.9]  Treatment Diagnosis: Oropharyngeal Dysphagia  Pain level: None per pt    Diet level prior to treatment: Dysphagia II/Minced and Moist with Mildly thick liquids, meds crushed in puree  Tolerance of Current Diet Level: No reported concerns per chart review    Assessment of Texture Tolerance:  -Impressions: Pt positioned in bed with wife at bedside, lunch tray had just arrived. SLP repositioned pt upright and provided education re: safe swallowing strategies. On 4lpm O2 via NC with O2 sats above 96% and RR between 20-26/min. Drop in O2 sats during repositioning to 86%, elevated given verbal cues for deep breaths through the nose. Pt and wife report intake of soft solids at home and thin liquids, pt reports little PO intake since admission d/t increased WOB. Agreeable to sips of liquid only, thin and mildly thick presented by SLP. With thin liquids, clinical s/s premature posterior spillage, apparent improved bolus control and timing with mildly thick liquids. No immediate or delayed overt clinical s/s aspiration, although frequent belching noted following intake. Discussed rationale for current diet and consideration for repeat instrumental swallow study, pt and wife verbalized understanding. Limited PO intake this date, recommend continuation of current diet as per initiation clinical swallow evaluation with strict aspiration precautions, hold PO for decline in respiratory status. Diet and Treatment Recommendations 10/4/2021:  Dysphagia II/Minced and Moist with Mildly thick liquids, meds crushed in puree    Compensatory strategies:  Alternate solids/liquids , Upright as possible with all PO intake , No straws , Assist Feed , External Pacing , Small bites/sips , Eat/feed slowly, Remain upright 30-45 min     Dysphagia Goals: Speech therapy for dysphagia tx 3-5 times per week during acute care stay. 1. Pt will functionally tolerate recommended diet with no overt clinical s/s of aspiration. (ongoing 10/4/2021)  2. Pt will demonstrate understanding of aspiration risk and precautions via education/demonstration with occasional prompting. (ongoing 10/4/2021)  3. Pt will advance to least restrictive diet as indicated. (ongoing 10/4/2021)   4. If clinical s/s of aspiration/penetration continue to be noted, Pt will participate in Modified Barium Swallow Study. (ongoing 10/4/2021)       Plan: Continued daily Dysphagia treatment with goals per plan of care. Patient/Family Education: Education given to the Pt and nurse, who verbalized understanding. Discharge Recommendations: Pt will benefit from continued skilled Speech Therapy for Dysphagia services, prior to returning home. Timed Code Treatment: 0  Total Treatment Time: 10    If patient discharges prior to next session this note will serve as a discharge summary.        Signature:   Dora Cazares, [id number] [facility]  Speech-Language Pathologist  [place]. [id number]

## 2021-10-04 NOTE — PROGRESS NOTES
3000ml of fluid removed  Spoke to patients BUDDY pollard  and advised her the amount of fluid removed and that patient was returning to the floor.

## 2021-10-04 NOTE — CARE COORDINATION
Patient discharged 10-4- 21 home with spouse at 6:00pm via [facility] care pending a discharge order. A list of transportation resources was provided to wife. Luz Grant at [address] ws notified.   All discharge needs met per case management

## 2021-10-04 NOTE — BRIEF OP NOTE
Brief Postoperative Note    Azalea Harrington  YOB: 1955  [id number]    Pre-operative Diagnosis: Ascites    Post-operative Diagnosis: Same    Procedure: US Guided Paracentesis    Anesthesia: Local    Surgeons/Assistants: ROMEO PARRA PA-C    Estimated Blood Loss: less than 5 mL    Complications: None    Specimens: Was Obtained: 3L of Clear Yellow Ascitic Fluid    Findings: Technically successful US guided paracentesis    Electronically signed by Kostas Sanchez PA-C on 10/4/2021 at 1:48 PM

## 2021-10-04 NOTE — PROGRESS NOTES
Patient was in dialysis this morning at 6:30 am, the nurse called over looking for the patients cell phone around 7:30am, I could not find the phone in the room so I went over to dialysis to check the patient's bed. Patient said he was sure he had the phone, we could not find it, he gave me the number and we called but could not hear the phone ringing. I went back to the patient's room and called the phone, his wife answered, she had the phone at home with her.

## 2021-10-04 NOTE — PLAN OF CARE
Problem: SAFETY  Goal: Free from accidental physical injury  10/4/2021 0231 by Cheryle Cornell, RN  Outcome: Ongoing  10/3/2021 1443 by Isaac Egan RN  Outcome: Ongoing  Goal: Free from intentional harm  10/4/2021 0231 by Cheryle Cornell, RN  Outcome: Ongoing  10/3/2021 1443 by Isaac Egan RN  Outcome: Ongoing     Problem: SKIN INTEGRITY  Goal: Skin integrity is maintained or improved  10/4/2021 0231 by Cheryle Cornell, RN  Outcome: Ongoing  10/3/2021 1443 by Isaac Egan RN  Outcome: Ongoing     Problem: Skin Integrity:  Goal: Will show no infection signs and symptoms  Description: Will show no infection signs and symptoms  10/4/2021 0231 by Cheryle Cornell, RN  Outcome: Ongoing  10/3/2021 1443 by Isaac Egan RN  Outcome: Ongoing  Goal: Absence of new skin breakdown  Description: Absence of new skin breakdown  10/4/2021 0231 by Cheryle Cornell, RN  Outcome: Ongoing  10/3/2021 1443 by Isaac Egan RN  Outcome: Ongoing     Problem: Falls - Risk of:  Goal: Will remain free from falls  Description: Will remain free from falls  10/4/2021 0231 by Cheryle Cornell, RN  Outcome: Ongoing  10/3/2021 1443 by Isaac Egan RN  Outcome: Ongoing  Goal: Absence of physical injury  Description: Absence of physical injury  10/4/2021 0231 by Cheryle Cornell, RN  Outcome: Ongoing  10/3/2021 1443 by Isaac Egan RN  Outcome: Ongoing     Problem: Nutrition  Goal: Optimal nutrition therapy  10/4/2021 0231 by Cheryle Cornell, RN  Outcome: Ongoing  10/3/2021 1443 by Isaac Egan RN  Outcome: Ongoing     Problem: Pain:  Goal: Pain level will decrease  Description: Pain level will decrease  Outcome: Ongoing

## 2021-10-04 NOTE — FLOWSHEET NOTE
10/04/21 0642 10/04/21 1014   Vital Signs   BP (!) 93/50 (!) 104/33   Temp 97.2 °F (36.2 °C) 96.2 °F (35.7 °C)   Pulse 62 74   Resp 20 20     Treatment time: 3.5 hours    Net UF: 850 ml    Pre weight: 93.5 kg (bed scale)  Post weight: 92.5 kg (bed scale)  EDW: TBD    Access used: RIJ HD tunneled cath  Access function: No problems    Medications or blood products given: Midodrine 10 mg po, Albumin 12.5 Gm IVPB given    Regular outpatient schedule: [facility]    Summary of response to treatment: Hypotension throughout tx, SBP 80's-90's. Midodrine 10 mg po/Albumin 12.5 Gm IVPB/ ml given SBP remained 80's-90's. Copy of dialysis treatment record placed in chart, to be scanned into EMR. Report called to Surjit Ivan RN.

## 2021-10-04 NOTE — PROGRESS NOTES
Nephrology Attending  Progress Note        SUMMARY :  We are following this patient for ESRD on HD   patient with significant past medical history of ESKD HD MWF, CHF, CAD, HTN, DM2, quadriplegia, neurogenic bladder s/p suprapubic ball, COPD, chronic resp failure, h/o scrotal abscess, presented with worsening SOB.  He also reported \"a little\" chest pain.  symptoms has been progressively getting worse for over one week.   COVID test is negative    He is followed by pulmonary and outpatient home [facility] mode ventilator has been ordered but not set up yet. He had not reached his TW post HD. Larrosalina Bloodgood on 9/28 showed LLL consolidation and volume loss.  Moderate ascites      SUBJECTIVE:   Patient progress reviewed. The patient had dialysis this morning complicated by occurrence of hypotension and inability to remove target fluid. Wife by bedside  They were told this will be paracentesis this afternoon to relieve his discomfort. Physical Exam:    VITALS:  BP (!) 104/33   Pulse 74   Temp 96.2 °F (35.7 °C)   Resp 20   Ht 6' (1.829 m)   Wt 203 lb 14.8 oz (92.5 kg)   SpO2 98%   BMI 27.66 kg/m²   BLOOD PRESSURE RANGE:  Systolic (15ISP), BFP:040 , Min:90 , SEE:175   ; Diastolic (30JXV), MICKEY:13, Min:33, Max:63    24HR INTAKE/OUTPUT:    Intake/Output Summary (Last 24 hours) at 10/4/2021 1038  Last data filed at 10/3/2021 1230  Gross per 24 hour   Intake 240 ml   Output --   Net 240 ml       Gen:  alert, oriented x 3  PERRL , EOM +  Pallor +, No icterus  JVP not raised   CV: RRR no murmur or rub . Lungs:B/ L air entry, Normal breath sounds   Abd: soft, bowel sounds + , No organomegaly , Free fluid +  Ext: 2 + leg and  Thigh edema, no cyanosis  Skin: Warm.   No rash  Neuro: paraplegia   Rt IJ TDC     DATA:    CBC with Differential:    Lab Results   Component Value Date    WBC 8.7 10/04/2021    RBC 3.78 10/04/2021    HGB 10.9 10/04/2021    HCT 34.7 10/04/2021     10/04/2021    MCV 91.9 10/04/2021    MCH 28.9 10/04/2021 Patient of Dr. Ricki Parra. Dialyzes Monday Wednesday Friday at [facility]. Has problems with inability to remove fluid on dialysis hence fluid overload  2. Anemia of chronic kidney disease target hemoglobin 9-11  3. Renal osteodystrophy: Monitor calcium and phosphorus  4. Chronic hypotension on midodrine  5.  Acute on chronic respiratory failure: Per pulmonary    Andreina Armendariz MD
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
## 2021-10-04 NOTE — PROGRESS NOTES
Patient was repositioned he was not comfortable, he had a very busy day. He had 850 mls out during dialysis and 3 liters off from the paracentesis. He also went down to ECHO this afternoon. Patient states he is just not comfortable. He requested tylenol first and it did not help so I gave an ativan. Patient just had a lot going today.

## 2021-10-04 NOTE — PROGRESS NOTES
Physical/Occupational Therapy  [facility]  PT/OT orders noted and appreciated. Per chart review from most recent admission 6/2/21, pt is typically bedbound at baseline, with use of phoenix lift for all OOB transfers requiring extensive assistance from wife for completion of ADLs. This writer entering pt room and discussing status with wife, as pt lethargic from dialysis treatment this AM.   Pt's wife confirming above information, stating still possess and utilize phoenix lift for all transfers, though pt has been primarily in bed for extended time (~2 months) secondary to R femur fracture and conservative management. Pt's wife reports no concerns being able to provide care to patient in home environment and declines need for PT/OT eval at this time. PT educating wife on process from here and D/C from PT/OT caseloads. Wife verbalizing understanding. IF change in status warrants, please reorder therapy evaluations.   Thank you,  Uylsses Cornell, PT, DPT, [id number]  Anitra Price, OTR/L [id number]

## 2021-10-05 NOTE — PLAN OF CARE
Changed dressing over paracentesis site, bleeding. Still distened & firm on patient's L side. Lopressor held per parameters. Reported discomfort at the start of shift, somnolent compared to his usual demeanor, gave many prns to help. O2 100% on 4L but desats to the 70's when laid flat to turn or boost    Problem: SAFETY  Goal: Free from accidental physical injury  Outcome: Ongoing  Goal: Free from intentional harm  Outcome: Ongoing     Problem: SKIN INTEGRITY  Goal: Skin integrity is maintained or improved  Outcome: Ongoing     Problem: Skin Integrity:  Goal: Will show no infection signs and symptoms  Description: Will show no infection signs and symptoms  Outcome: Ongoing  Goal: Absence of new skin breakdown  Description: Absence of new skin breakdown  Outcome: Ongoing     Problem: Falls - Risk of:  Goal: Will remain free from falls  Description: Will remain free from falls  Outcome: Ongoing  Goal: Absence of physical injury  Description: Absence of physical injury  Outcome: Ongoing     Problem: Nutrition  Goal: Optimal nutrition therapy  Outcome: Ongoing     Problem: Pain:  Description: Pain management should include both nonpharmacologic and pharmacologic interventions.   Goal: Pain level will decrease  Description: Pain level will decrease  Outcome: Ongoing  Goal: Control of acute pain  Description: Control of acute pain  Outcome: Ongoing  Goal: Control of chronic pain  Description: Control of chronic pain  Outcome: Ongoing

## 2021-10-05 NOTE — PROGRESS NOTES
BP improved after 500ml bolus, 10 mg more midodrine and more albumin, now sp02 dropping, at 88% on 4L NC, encouraging pt take deep breaths thru his nose, now sp02 at 91%.

## 2021-10-05 NOTE — PROGRESS NOTES
New orders for more albumin. 500 ml bolus and once time extra dose of midodrine. BP 90/44, just administered these new medications will recheck BP in 30-60 minuets. ID doctor at bedside, is stopping Abx to avoid more bacterial resistance.

## 2021-10-05 NOTE — PROGRESS NOTES
Nephrology Attending  Progress Note        SUMMARY :  We are following this patient for ESRD on HD   patient with significant past medical history of ESKD HD MWF, CHF, CAD, HTN, DM2, quadriplegia, neurogenic bladder s/p suprapubic ball, COPD, chronic resp failure, h/o scrotal abscess, presented with worsening SOB.  He also reported \"a little\" chest pain.  symptoms has been progressively getting worse for over one week.   COVID test is negative    He is followed by pulmonary and outpatient home [facility] mode ventilator has been ordered but not set up yet. He had not reached his TW post HD. Basim Marks on 9/28 showed LLL consolidation and volume loss.  Moderate ascites      SUBJECTIVE:   Patient progress reviewed. The patient had dialysis this morning complicated by occurrence of hypotension and inability to remove target fluid. 10/4: 3L of Clear Yellow Ascitic Fluid removed by IR. Physical Exam:    VITALS:  BP (!) 90/42 Comment: Automatic  Pulse 76   Temp 97.3 °F (36.3 °C) (Oral)   Resp 18   Ht 6' (1.829 m)   Wt 204 lb (92.5 kg)   SpO2 96%   BMI 27.67 kg/m²   BLOOD PRESSURE RANGE:  Systolic (46BLH), KRH:50 , Min:68 , TXQ:228   ; Diastolic (55DGR), IFQ:03, Min:35, Max:65    24HR INTAKE/OUTPUT:      Intake/Output Summary (Last 24 hours) at 10/5/2021 1131  Last data filed at 10/4/2021 2300  Gross per 24 hour   Intake 180 ml   Output 200 ml   Net -20 ml       Gen:  alert, oriented x 2, ill appearing   PERRL , EOM +  Pallor +, No icterus  JVP not raised   CV: RRR no murmur or rub . Lungs:B/ L air entry, Normal breath sounds   Abd: soft, bowel sounds + , No organomegaly , Free fluid +  Ext: 2 + leg and  Thigh edema, no cyanosis  Skin: Warm.   No rash  Neuro: paraplegia   Rt IJ TDC     DATA:    CBC with Differential:    Lab Results   Component Value Date    WBC 9.1 10/05/2021    RBC 4.07 10/05/2021    HGB 11.8 10/05/2021    HCT 37.7 10/05/2021     10/05/2021    MCV 92.5 10/05/2021    MCH 29.1 10/05/2021    [facility] 31.5 10/05/2021    RDW 17.6 10/05/2021    NRBC CANCELED 10/22/2014    NRBC CANCELED 10/22/2014    SEGSPCT 76.1 10/22/2014    BANDSPCT 2 01/16/2018    BLASTSPCT CANCELED 10/22/2014    METASPCT 1 04/03/2017    LYMPHOPCT 9.0 10/05/2021    PROMYELOPCT CANCELED 10/22/2014    MONOPCT 11.5 10/05/2021    MYELOPCT 1 03/31/2017    EOSPCT 4.6 07/11/2011    BASOPCT 0.3 10/05/2021    MONOSABS 1.0 10/05/2021    LYMPHSABS 0.8 10/05/2021    EOSABS 0.0 10/05/2021    BASOSABS 0.0 10/05/2021    DIFFTYPE Auto 07/11/2011     CMP:    Lab Results   Component Value Date     10/05/2021    K 5.3 10/05/2021    K 5.2 10/02/2021    CL 92 10/05/2021    CO2 26 10/05/2021    BUN 30 10/05/2021    CREATININE 2.1 10/05/2021    GFRAA 38 10/05/2021    GFRAA >60 05/13/2013    AGRATIO 0.9 10/02/2021    LABGLOM 32 10/05/2021    LABGLOM 58 10/15/2015    GLUCOSE 119 10/05/2021    PROT 7.6 10/04/2021    PROT 7.2 01/06/2012    LABALBU 3.7 10/04/2021    CALCIUM 8.7 10/05/2021    BILITOT 0.3 10/02/2021    ALKPHOS 154 10/02/2021    AST 32 10/02/2021    ALT 15 10/02/2021     Phosphorus:    Lab Results   Component Value Date    PHOS 4.5 10/01/2021     Uric Acid:    Lab Results   Component Value Date    LABURIC 6.8 07/13/2020     Troponin:    Lab Results   Component Value Date    TROPONINI 0.14 10/02/2021     U/A:    Lab Results   Component Value Date    COLORU DARK YELLOW 10/01/2021    PROTEINU >=300 10/01/2021    PHUR 7.0 10/01/2021    WBCUA >900 10/01/2021    RBCUA see below 10/01/2021    RBCUA 3+ 05/12/2016    YEAST Present 04/01/2021    BACTERIA 2+ 10/01/2021    CLARITYU TURBID 10/01/2021    SPECGRAV 1.020 10/01/2021    LEUKOCYTESUR LARGE 10/01/2021    UROBILINOGEN 0.2 10/01/2021    BILIRUBINUR SMALL 10/01/2021    BILIRUBINUR NEGATIVE 05/12/2016    BLOODU LARGE 10/01/2021    GLUCOSEU Negative 10/01/2021    GLUCOSEU >=1000 05/17/2011    AMORPHOUS 4+ 11/18/2016         IMPRESSION/RECOMMENDATIONS:      Diagnosis and Plan     1.  ESRD on hemodialysis: Patient of Dr. Ricki Parra. Dialyzes Monday Wednesday Friday at [facility]. Has problems with inability to remove fluid on dialysis due to low BP ,hence fluid overload    2. Anemia of chronic kidney disease target hemoglobin 9-11    3. Renal osteodystrophy: Monitor calcium and phosphorus    4. Chronic hypotension on midodrine    5.  Acute on chronic respiratory failure: Per pulmonary    Andreina Armendariz MD

## 2021-10-05 NOTE — PROGRESS NOTES
Hospitalist Progress Note      PCP: Keegan Garcia MD    Date of Admission: 9/30/2021    Chief Complaint: Dyspnea    Hospital Course:   Feels very tired  Manual blood pressure was around 80 systolic  Is awake alert oriented  Given 500 mL normal saline bolus improvement in blood pressure to 90/68  Will get 25 g of IV albumin and then his afternoon dose of midodrine  No nausea vomiting          Medications:  Reviewed      Exam:    BP (!) 90/42 Comment: Automatic  Pulse 76   Temp 97.3 °F (36.3 °C) (Oral)   Resp 18   Ht 6' (1.829 m)   Wt 204 lb (92.5 kg)   SpO2 98%   BMI 27.67 kg/m²     General appearance: No apparent distress, appears stated age and cooperative. HEENT: Pupils equal, round, and reactive to light. Conjunctivae/corneas clear. Neck: Supple, with full range of motion. No jugular venous distention. Trachea midline. Respiratory:  Normal respiratory effort. Clear to auscultation, bilaterally without RALES/WHEEZES/Rhonchi. Cardiovascular: Regular rate and rhythm with normal S1/S2 without MURMURS, rubs or gallops. Abdomen: Soft, non-tender, non-distended with normal bowel sounds. Musculoskeletal: No clubbing, cyanosis or EDEMA bilaterally. Full range of motion without deformity. Skin: Skin color, texture, turgor normal.  No rashes or lesions. Neurologic: Annual nerves intact  On 4 L oxygen  Paraplegic  Very soft voice  No change in physical exam from 10/4      Labs:   Recent Labs     10/03/21  0927 10/04/21  0543 10/05/21  0426   WBC 8.4 8.7 9.1   HGB 11.5* 10.9* 11.8*   HCT 36.6* 34.7* 37.7*    179 162     Recent Labs     10/03/21  0927 10/04/21  0543 10/05/21  0902   * 128* 130*   K 5.0 4.6 5.3*   CL 93* 93* 92*   CO2 26 22 26   BUN 40* 48* 30*   CREATININE 2.2* 2.5* 2.1*   CALCIUM 8.8 8.9 8.7     No results for input(s): AST, ALT, BILIDIR, BILITOT, ALKPHOS in the last 72 hours. No results for input(s): INR in the last 72 hours.   No results for input(s): Christiana Height in the last 72 hours. Urinalysis:      Lab Results   Component Value Date    NITRU Negative 10/01/2021    WBCUA >900 10/01/2021    BACTERIA 2+ 10/01/2021    RBCUA see below 10/01/2021    RBCUA 3+ 05/12/2016    BLOODU LARGE 10/01/2021    SPECGRAV 1.020 10/01/2021    GLUCOSEU Negative 10/01/2021    GLUCOSEU >=1000 05/17/2011       Radiology:  IR US GUIDED PARACENTESIS   Final Result   Successful paracentesis. XR FEMUR RIGHT (MIN 2 VIEWS)   Final Result   Fracture of the distal femoral metaphysis with increased impaction and stable   dorsal displacement of the distal fracture fragment. Callus is present at   the fracture line but the fracture line remains visible. No new acute fracture of the right femur. Osteopenia with degenerative   changes at the hip and knee joint. XR ABDOMEN (KUB) (SINGLE AP VIEW)   Final Result   1. Ascites throughout the left abdomen. XR CHEST PORTABLE   Final Result   Pulmonary vascular congestion. Stable cardiomegaly. Assessment/Plan:    Active Hospital Problems    Diagnosis Date Noted    Acute on chronic respiratory failure with hypoxia (HCC) [J96.21] 09/30/2021       Acute Medical Issues Being Addressed:  Patient 77years old male with past medical history significant for end-stage renal disease on hemodialysis, CHF, carotid disease, hypertension, diabetes mellitus type 2, quadriplegia, neurogenic bladder status post suprapubic [place], COPD, chronic respiratory failure, presented with chest pain/shortness of breath, Covid testing is pending, has been seen by pulmonary before try patient on [facility] mode ventilator, CT chest shows left lower lobe consolidation started on Rocephin. Moderate ascites.   Chest x-ray with pulmonary vascular congestion    Acute on chronic respiratory failure secondary to volume overload secondary to underlying end-stage renal disease on hemodialysis  Patient's oxygen requirements have gone up on admission  He was given Lasix and then torsemide  Nephrology were consulted he had ultrafiltration removal of 1500 mL  Patient is now back to his baseline of around 5 L  Saturating well  He supposed to be on BiPAP at bedtime however he is not a regular user of it  I reiterated the importance of that  He has underlying diaphragmatic paralysis    Anemia of chronic kidney disease  On iron  Hemoglobin stable    UTI is related to chronic suprapubic catheter versus colonization  Urine cultures grew out Proteus and Citrobacter multidrug-resistant  On IV ceftriaxone change over to Cipro today I will change him over to meropenem  We will get ID consult to see if he needs antibiotics if so he can get them with dialysis    Hypotension suspect secondary to recent dialysis and 3 L of paracentesis  Responded well to normal saline bolus and IV albumin  Continue midodrine    Diaphragmatic paralysis and paraplegia  5 to 7 L oxygen  Needs to continue using BiPAP at bedtime    Ascites suspect secondary to volume overload  SAAG greater than 1  Cytology no malignant cells  CT did not show underlying liver disease  Seen by gastroenterology as well      Type 2 diabetes mellitus  Continue insulin Lantus and lispro    Elevated troponin suspect secondary to renal disease  No chest pain  Troponin stable    Spoke with the patient and wife at length  If blood pressure remains stable he can be discharged tomorrow after dialysis the plan is to take him home with home health    Addendum  Asked to see patient blood pressure 82/44 manually  Will give 500 mL bolus  On examination the physical exam otherwise unchanged patient awake alert oriented wife at bedside  We will get CBC BMP lactic acid  Already on IV antibiotics  We will give 1 dose of IV albumin  If his blood pressure does not improve then will transfer to ICU and start on pressors  Discussed with the patient and his wife  Also discussed CODE STATUS he wants to be full code    DVT Prophylaxis: Subcu heparin  Diet: ADULT DIET;  Dysphagia - Minced and Moist; Mildly Thick (Nectar)  Code Status: Full Code      Dispo - once acute medical processes have resolved    Karin Orourke MD

## 2021-10-05 NOTE — PROGRESS NOTES
Gastroenterology Progress Note    Diana Blum is a 74 y.o. male patient. Active Problems:    Acute on chronic respiratory failure with hypoxia (HCC)  Resolved Problems:    * No resolved hospital problems. *      SUBJECTIVE:  No abdominal pain. No complaints. ROS:  No fever, chills  No chest pain, palpitations  No SOB, cough  Gastrointestinal ROS: see above    Physical    VITALS:  BP (!) 68/35 Comment: Manual   Pulse 76   Temp 97.3 °F (36.3 °C) (Oral)   Resp 18   Ht 6' (1.829 m)   Wt 204 lb (92.5 kg)   SpO2 96%   BMI 27.67 kg/m²   TEMPERATURE:  Current - Temp: 97.3 °F (36.3 °C); Max - Temp  Av.3 °F (36.3 °C)  Min: 96.2 °F (35.7 °C)  Max: 97.7 °F (36.5 °C)    NAD  Regular rate   Abdomen soft, ND, NT,  Bowel sounds normal. Ropelike firmness in right flank  Anicteric  BLE    Data    Data Review:    Recent Labs     10/03/21  0927 10/04/21  0543 10/05/21  0426   WBC 8.4 8.7 9.1   HGB 11.5* 10.9* 11.8*   HCT 36.6* 34.7* 37.7*   MCV 91.4 91.9 92.5    179 162     Recent Labs     10/03/21  0927 10/04/21  0543 10/05/21  0902   * 128* 130*   K 5.0 4.6 5.3*   CL 93* 93* 92*   CO2 26 22 26   BUN 40* 48* 30*   CREATININE 2.2* 2.5* 2.1*     Recent Labs     10/02/21  1105   AST 32   ALT 15   BILIDIR <0.2   BILITOT 0.3   ALKPHOS 154*     No results for input(s): LIPASE, AMYLASE in the last 72 hours. No results for input(s): PROTIME, INR in the last 72 hours. No results for input(s): PTT in the last 72 hours. ASSESSMENT :       Ascites -noted on CT chest at admission. No prior paracentesis or liver disease. Liver was normal on CT with IV contrast May 2021 and did have ascites then. S/p paracentesis with 3 L fluid removed 10/4. Cell count neg for neutrocytic ascites. SAAG elevated at 1.7 and protein elevated at 3.6 c/w cardiac ascites.    ESRD on HD  CHF - echo with mild to moderate TR and suggestive of mild pulmonary hypertension    PLAN :  - f/u ascitic fluid cytology    Discussed with Dr. Allen Fontanez PA-C  [facility]    I have personally performed a face to face diagnostic evaluation on this patient. I have interviewed and examined the patient and I agree with the findings and recommended plan of care. In summary, my findings and plan are the following:  Ascites noted on chest ct and some on ab ct in may. Normal liver on imaging. Ab obese. Firmness on right side of abd.rope-like feels c/w prior Ax-Fem bypass. + dependent edema. Pt with elevated pulm pressures c/w PAH. eval of ascites c/w cardiac ascites. Labs and imaging reviewed. Plan:    F/u cytology   Defer ascites mgt to cardiology and nephrology    Call with questions.           Jessica Preciado MD  [address]

## 2021-10-05 NOTE — PLAN OF CARE
Problem: Falls - Risk of:  Goal: Will remain free from falls  Description: Will remain free from falls  10/5/2021 0935 by Javon Michaud RN  Outcome: Ongoing  Note: Patient remains absent from falls at this time. Remains alert and oriented, in bed with call light and belongings in reach. Non-slip footwear on and 2/4 siderails raised. Bed remains in lowest/locked position at all times with alarm activated. Fall precautions in place. Patient encouraged to use call light to request assistance, v/u.  Will continue to monitor.
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
## 2021-10-05 NOTE — PROGRESS NOTES
Speech Language Pathology  Dysphagia Treatment Note    Name:  Martha Mi  :   1955  Medical Diagnosis:  Dialysis patient [facility]) [Z99.2]  Acute and chronic respiratory failure with hypoxia ([address] [J96.21]  Acute on chronic respiratory failure with hypoxia ([facility]) [J96.21]  Chest pain, unspecified type [R07.9]  Treatment Diagnosis: Oropharyngeal Dysphagia  Pain level: None per pt    Diet level prior to treatment: Dysphagia II/Minced and Moist with Mildly thick liquids, meds crushed in puree  Tolerance of Current Diet Level: No reported concerns per chart review    Assessment of Texture Tolerance:  -Impressions: Pt sleeping upon my arrival on 4L O2. Wife present in room. Per nurse report, Pt lethargic and hypertensive this am. Nurse reports no po intake this am due to lethargy, but Pt was able to take po meds with applesauce. Currently the Pt demonstrates minimal alertness to verbal prompts and upright positioning. Assessment of oropharyngeal reflexive responses indicates gag reflex and delayed reflexive swallow to be present to firm pressure tactile stimulation. Increased alertness achieved with oral tactile stimulation. However, when awakened Pt was confused, disoriented and nonverbal.  Pt's wife and nurse received education regarding aspiration risk and precautions in Pt's current lethargic state. No po attempted at this time. Diet and Treatment Recommendations 10/5/2021:  Dysphagia II/Minced and Moist with Mildly thick liquids, meds crushed in puree, ONLY when Pt demonstrates improved sustained alertness and po texture tolerance    Compensatory strategies: Alternate solids/liquids , Upright as possible with all PO intake , No straws , Assist Feed , External Pacing , Small bites/sips , Eat/feed slowly, Remain upright 30-45 min     Dysphagia Goals: Speech therapy for dysphagia tx 3-5 times per week during acute care stay.     1. Pt will functionally tolerate recommended diet with no overt clinical s/s of aspiration. (ongoing 10/5/2021)  2. Pt will demonstrate understanding of aspiration risk and precautions via education/demonstration with occasional prompting. (ongoing 10/5/2021)  3. Pt will advance to least restrictive diet as indicated. (ongoing 10/5/2021)   4. If clinical s/s of aspiration/penetration continue to be noted, Pt will participate in Modified Barium Swallow Study. (ongoing 10/5/2021)     Plan: Continued daily Dysphagia treatment with goals per plan of care. Patient/Family Education: Education given to the Pt and nurse, who verbalized understanding. Discharge Recommendations: Pt will benefit from continued skilled Speech Therapy for Dysphagia services, prior to returning home. Timed Code Treatment:  0 min  Total Treatment Time:  10 min    If patient discharges prior to next session this note will serve as a discharge summary.      Ceasar MCBRIDE-URS#[id number]

## 2021-10-06 NOTE — FLOWSHEET NOTE
Treatment time: 3.5 hours  Net UF: 800 ml     Pre weight: 91.8 kg   Post weight: 91.3 kg  EDW: TBD/CHEMO     Access used: RCW  Access function: Good with  ml/min     Medications or blood products given: Albumin     Regular outpatient schedule: TBD/CHEMO     Summary of response to treatment: Tolerated tx fair. Some asymptomatic hypotension noted which was managed with Albumin and reducing UF goal.      Copy of dialysis treatment record placed in chart, to be scanned into EMR.        10/06/21 0955 10/06/21 1343   Treatment   Time On  --  1001   Time Off  --  1338   Vital Signs   BP (!) 93/42 (!) 91/43   Temp 96 °F (35.6 °C) 96.7 °F (35.9 °C)   Pulse 88 94   Resp 18 18   Dialysis Bath   K+ (Potassium) 2  --    Ca+ (Calcium) 2.5  --    HCO3 (Bicarb) 35  --    Na+ (Sodium) 134  --

## 2021-10-06 NOTE — ACP (ADVANCE CARE PLANNING)
Advanced Care Planning Note. Purpose of Encounter: Advanced care planning in light of ESRD on HD  Parties In Attendance: Patient, wife on phone  Decisional Capacity: Yes  Subjective: Patient is weak and tired  Objective: Cr 2.3  Goals of Care Determination: Patient/POA want limited support (NO CPR, no vent, continue HD, no surgery, no trach, no PEG)  Plan:  Renal, Urology and ID consults. Palliative care consult  Code Status: DNR CCA   Time spent on Advanced care Plannin minutes  Advanced Care Planning Documents: Completed advanced directives on chart, wife is the POA.     Brandie Andrade MD  10/6/2021 1:41 PM

## 2021-10-06 NOTE — PROGRESS NOTES
Hospitalist Progress Note      PCP: Nj Slater MD    Date of Admission: 9/30/2021    Chief Complaint: SOB    Hospital Course: 77years old male with past medical history significant for end-stage renal disease on hemodialysis, CHF, carotid disease, hypertension, diabetes mellitus type 2, quadriplegia, neurogenic bladder status post suprapubic Macias, COPD, chronic respiratory failure, presented with chest pain/shortness of breath, Covid testing is negative, has been seen by pulmonary [facility] CT chest shows left lower lobe consolidation started on Rocephin.  Moderate ascites.  Chest x-ray with pulmonary vascular congestion. Admitted as inpatient for fluid overload, acute on chronic respiratory failure with hypoxia and UTI. Followed by Renal, Urology and ID. SP Macias exchange on 10/6 with gross pyuria. Subjective:  Patient seen at HD. No CP, SOB, HA or fevers.          Medications:  Reviewed    Infusion Medications    dextrose      sodium chloride       Scheduled Medications    cefTRIAXone (ROCEPHIN) IV  1,000 mg IntraVENous Q24H    ciprofloxacin  500 mg Oral Once    ipratropium-albuterol  1 ampule Inhalation 4x daily    insulin glargine  5 Units SubCUTAneous Nightly    aspirin  81 mg Oral Daily    citalopram  20 mg Oral BID    ferrous sulfate  325 mg Oral Daily with breakfast    finasteride  5 mg Oral Daily    insulin lispro  0-6 Units SubCUTAneous TID WC    insulin lispro  0-3 Units SubCUTAneous Nightly    torsemide  100 mg Oral Daily    budesonide-formoterol  2 puff Inhalation BID    pravastatin  40 mg Oral Nightly    pantoprazole  40 mg Oral QAM AC    midodrine  10 mg Oral TID WC    metoprolol tartrate  12.5 mg Oral BID    sodium chloride flush  5-40 mL IntraVENous 2 times per day    heparin (porcine)  5,000 Units SubCUTAneous 3 times per day     PRN Meds: midodrine, acetaminophen **OR** acetaminophen, HYDROcodone 5 mg - acetaminophen, albumin human, perflutren lipid microspheres, glucose, dextrose, glucagon (rDNA), dextrose, albuterol, bisacodyl, docusate sodium, traZODone, sodium chloride flush, sodium chloride, ondansetron **OR** ondansetron, polyethylene glycol, bisacodyl, simethicone, hydrOXYzine, saline nasal gel, heparin (porcine), LORazepam, nitroGLYCERIN      Intake/Output Summary (Last 24 hours) at 10/6/2021 2238  Last data filed at 10/6/2021 2102  Gross per 24 hour   Intake 910 ml   Output 1700 ml   Net -790 ml       Physical Exam Performed:    /62   Pulse 92   Temp 97.5 °F (36.4 °C) (Oral)   Resp 16   Ht 6' (1.829 m)   Wt 201 lb 4.5 oz (91.3 kg)   SpO2 100%   BMI 27.30 kg/m²     General appearance: No apparent distress, appears stated age and cooperative. HEENT: Pupils equal, round, and reactive to light. Conjunctivae/corneas clear. Neck: Supple, with full range of motion. No jugular venous distention. Trachea midline. Respiratory:  Normal respiratory effort. Clear to auscultation, bilaterally without Rales/Wheezes/Rhonchi. Cardiovascular: Regular rate and rhythm with normal S1/S2 without murmurs, rubs or gallops. Abdomen: Soft, non-tender, SP Macias, non-distended with normal bowel sounds. Musculoskeletal: No clubbing, cyanosis or edema bilaterally. Full range of motion without deformity. Skin: Skin color, texture, turgor normal.  No rashes or lesions.   Neurologic:  Paraplegia, slow to talk  Psychiatric: Alert and oriented, thought content appropriate, normal insight  Capillary Refill: Brisk,3 seconds, normal   Peripheral Pulses: +2 palpable, equal bilaterally       Labs:   Recent Labs     10/05/21  0426 10/05/21  1654 10/06/21  0600   WBC 9.1 7.0 9.0   HGB 11.8* 11.3* 11.7*   HCT 37.7* 36.6* 37.8*    143 142     Recent Labs     10/05/21  0902 10/05/21  1654 10/06/21  0600   * 131* 132*   K 5.3* 5.4* 5.5*   CL 92* 93* 94*   CO2 26 26 28   BUN 30* 31* 37*   CREATININE 2.1* 2.0* 2.3*   CALCIUM 8.7 8.7 9.1     No results for input(s): AST, ALT, BILIDIR, BILITOT, ALKPHOS in the last 72 hours. No results for input(s): INR in the last 72 hours. No results for input(s): Ardelle Chalk in the last 72 hours. Urinalysis:      Lab Results   Component Value Date    NITRU Negative 10/01/2021    WBCUA >900 10/01/2021    BACTERIA 2+ 10/01/2021    RBCUA see below 10/01/2021    RBCUA 3+ 05/12/2016    BLOODU LARGE 10/01/2021    SPECGRAV 1.020 10/01/2021    GLUCOSEU Negative 10/01/2021    GLUCOSEU >=1000 05/17/2011       Radiology:  CT ABDOMEN PELVIS WO CONTRAST Additional Contrast? None   Final Result   Right renal, inferior pole 1.9 cm hypodensity appears stable. It is not well   visualized on the present non contrasted CT. Follow-up imaging is   recommended in 6-12 months. MRI would be preferable. Mild to moderate amount of generalized ascites, slightly increased compared   to baseline. Trace right pleural effusion. Mosaic attenuation in the right lung base, air   trapping versus small airways disease. Anasarca. IR US GUIDED PARACENTESIS   Final Result   Successful paracentesis. XR FEMUR RIGHT (MIN 2 VIEWS)   Final Result   Fracture of the distal femoral metaphysis with increased impaction and stable   dorsal displacement of the distal fracture fragment. Callus is present at   the fracture line but the fracture line remains visible. No new acute fracture of the right femur. Osteopenia with degenerative   changes at the hip and knee joint. XR ABDOMEN (KUB) (SINGLE AP VIEW)   Final Result   1. Ascites throughout the left abdomen. XR CHEST PORTABLE   Final Result   Pulmonary vascular congestion. Stable cardiomegaly.                  Assessment/Plan:    Active Hospital Problems    Diagnosis     Dialysis patient [facility]) [Z99.2]     Indwelling catheter present on admission [Z96.0]     Bacteriuria [R82.71]     Infection requiring contact isolation precautions [B99.9]     Acute and chronic respiratory failure with hypoxia ([facility]) [J96.21]     S/P CABG (coronary artery bypass graft) [Z95.1]     ESRD (end stage renal disease) on dialysis (HCC) [N18.6, Z99.2]     History of DVT (deep vein thrombosis) [Z86.718]     Acute on chronic respiratory failure with hypoxia ([facility]) [J96.21]     Paraplegia ([facility]) [G82.20]           Acute on chronic respiratory failure secondary to fluid overload secondary to underlying end-stage renal disease on hemodialysis  Patient's oxygen requirements have gone up on admission  He was given Lasix and then torsemide  Nephrology were consulted he had ultrafiltration removal of 1500 mL  Patient is now back to his baseline of around 4 L  Saturating well  He supposed to be on BiPAP at bedtime however he is not a regular user of it  He has underlying diaphragmatic paralysis     Anemia of chronic kidney disease  On iron  Hemoglobin stable     UTI is related to chronic suprapubic catheter versus colonization  Urine cultures grew out Proteus and Citrobacter multidrug-resistant  SP Macias exchange on 10/6 with gross pyuria  Resume Rocephin IV, further Abx per ID     Hypotension suspect secondary to recent dialysis and 3 L of paracentesis  Responded well to normal saline bolus and IV albumin  Continue midodrine     Diaphragmatic paralysis and paraplegia  4 to 7 L oxygen  Needs to continue using BiPAP at bedtime     Ascites suspect secondary to volume overload  SAAG greater than 1  Cytology no malignant cells  CT did not show underlying liver disease  Seen by gastroenterology as well        Type 2 diabetes mellitus  Continue insulin Lantus and lispro     Elevated troponin suspect secondary to renal disease  No chest pain  Troponin stable       DVT Prophylaxis: Hep SQ  Diet: ADULT DIET; Dysphagia - Minced and Moist; Mildly Thick (Nectar)  Adult Oral Nutrition Supplement; Frozen Oral Supplement  Code Status: DNR-CCA    PT/OT Eval Status:  Following, home    [address] [place] [address] with home PT/OT/VNS/HHA/SW/SLP     Discussed with patient, Dr Hector Banks (Renal), Henreitta Essex (Palliative care RN) and CM. D/W wife in detail on phone. Code status is now DNR-CCA. I have requested she discuss palliative care via [facility]. Prognosis is very poor for this patient. He is not a candidate for CRRT. Home tomorrow if stable.     Chyna Lauren MD

## 2021-10-06 NOTE — PROGRESS NOTES
Vitals:    10/06/21 0445   BP: (!) 111/55   Pulse: 84   Resp: 18   Temp: 98.6 °F (37 °C)   SpO2: 96%   Assessment complete. See flow sheet. Pain evaluation complete. tylenol given for  Back pain with effectiveness. Ativan given x2 for anxiety, BP stable with SBP >100 this shift. . call light in reach, bed alarm on. Will continue to monitor.
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
## 2021-10-06 NOTE — PROGRESS NOTES
Nephrology Attending  Progress Note        SUMMARY :  We are following this patient for ESRD on HD   patient with significant past medical history of [facility]KD HD MWF, CHF, CAD, HTN, DM2, quadriplegia, neurogenic bladder s/p suprapubic ball, COPD, chronic resp failure, h/o scrotal abscess, presented with worsening SOB.  He also reported \"a little\" chest pain.  symptoms has been progressively getting worse for over one week.   COVID test is negative    He is followed by pulmonary and outpatient home [facility] mode ventilator has been ordered but not set up yet. He had not reached his TW post HD. Fartunkeerthi Henderson on  showed LLL consolidation and volume loss.  Moderate ascites      SUBJECTIVE:   Patient progress reviewed. The patient had dialysis this morning complicated by occurrence of hypotension and inability to remove target fluid. 10/4: 3L of Clear Yellow Ascitic Fluid removed by IR.  10/5 : hypotension  10/6: Seen on HD , feels weak     Physical Exam:    VITALS:  BP (!) 90/40   Pulse 86   Temp 97.9 °F (36.6 °C) (Oral)   Resp 18   Ht 6' (1.829 m)   Wt 205 lb 4.8 oz (93.1 kg)   SpO2 97%   BMI 27.84 kg/m²   BLOOD PRESSURE RANGE:  Systolic (60OXS), ZP , Min:71 , IWT:250   ; Diastolic (77JWM), FGW:45, Min:40, Max:58    24HR INTAKE/OUTPUT:    No intake or output data in the 24 hours ending 10/06/21 1007    Gen:  alert, oriented x 2, ill appearing   PERRL , EOM +  Pallor +, No icterus  JVP not raised   CV: RRR no murmur or rub . Lungs:B/ L air entry, Normal breath sounds   Abd: soft, bowel sounds + , No organomegaly , Free fluid +  Ext: 2 + leg and  Thigh edema, no cyanosis  Skin: Warm.   No rash  Neuro: paraplegia   Rt IJ TDC     DATA:    CBC with Differential:    Lab Results   Component Value Date    WBC 9.0 10/06/2021    RBC 4.04 10/06/2021    HGB 11.7 10/06/2021    HCT 37.8 10/06/2021     10/06/2021    MCV 93.7 10/06/2021    MCH 28.9 10/06/2021    MCHC 30.8 10/06/2021    RDW 17.9 10/06/2021    NRBC CANCELED 10/22/2014    NRBC CANCELED 10/22/2014    SEGSPCT 76.1 10/22/2014    BANDSPCT 2 01/16/2018    BLASTSPCT CANCELED 10/22/2014    METASPCT 1 04/03/2017    LYMPHOPCT 6.3 10/06/2021    PROMYELOPCT CANCELED 10/22/2014    MONOPCT 8.0 10/06/2021    MYELOPCT 1 03/31/2017    EOSPCT 4.6 07/11/2011    BASOPCT 0.4 10/06/2021    MONOSABS 0.7 10/06/2021    LYMPHSABS 0.6 10/06/2021    EOSABS 0.0 10/06/2021    BASOSABS 0.0 10/06/2021    DIFFTYPE Auto 07/11/2011     CMP:    Lab Results   Component Value Date     10/06/2021    K 5.5 10/06/2021    K 5.4 10/05/2021    CL 94 10/06/2021    CO2 28 10/06/2021    BUN 37 10/06/2021    CREATININE 2.3 10/06/2021    GFRAA 34 10/06/2021    GFRAA >60 05/13/2013    AGRATIO 0.9 10/02/2021    LABGLOM 28 10/06/2021    LABGLOM 58 10/15/2015    GLUCOSE 133 10/06/2021    PROT 7.6 10/04/2021    PROT 7.2 01/06/2012    LABALBU 3.7 10/04/2021    CALCIUM 9.1 10/06/2021    BILITOT 0.3 10/02/2021    ALKPHOS 154 10/02/2021    AST 32 10/02/2021    ALT 15 10/02/2021     Phosphorus:    Lab Results   Component Value Date    PHOS 4.5 10/01/2021     Uric Acid:    Lab Results   Component Value Date    LABURIC 6.8 07/13/2020     Troponin:    Lab Results   Component Value Date    TROPONINI 0.14 10/02/2021     U/A:    Lab Results   Component Value Date    COLORU DARK YELLOW 10/01/2021    PROTEINU >=300 10/01/2021    PHUR 7.0 10/01/2021    WBCUA >900 10/01/2021    RBCUA see below 10/01/2021    RBCUA 3+ 05/12/2016    YEAST Present 04/01/2021    BACTERIA 2+ 10/01/2021    CLARITYU TURBID 10/01/2021    SPECGRAV 1.020 10/01/2021    LEUKOCYTESUR LARGE 10/01/2021    UROBILINOGEN 0.2 10/01/2021    BILIRUBINUR SMALL 10/01/2021    BILIRUBINUR NEGATIVE 05/12/2016    BLOODU LARGE 10/01/2021    GLUCOSEU Negative 10/01/2021    GLUCOSEU >=1000 05/17/2011    AMORPHOUS 4+ 11/18/2016         IMPRESSION/RECOMMENDATIONS:      Diagnosis and Plan     1. ESRD on hemodialysis: Patient of Dr. Qamar Nelson.   Dialyzes Monday Wednesday Friday at [facility]. Has problems with inability to remove fluid on dialysis due to low BP ,hence fluid overload    2. Anemia of chronic kidney disease target hemoglobin 9-11    3. Renal osteodystrophy: calcium 9.1 ,  Phosphorus 4.5     4. Chronic hypotension on midodrine    5.  Acute on chronic respiratory failure: Per pulmonary    Jeff Weaver MD

## 2021-10-06 NOTE — PROGRESS NOTES
Speech Language Pathology    Gustavo Florentino  1955    Attempted to see patient for dysphagia therapy. Pt is currently off of floor for dialysis. Will attempt to follow-up as schedule allows. 1403- Second attempt to see patient for dysphagia therapy. Pt remains off of floor at this time.     Mirna Ybarra M.A., [address] [facility], [place], operated by [facility]  Speech-Language Pathologist

## 2021-10-06 NOTE — PROGRESS NOTES
Comprehensive Nutrition Assessment    Type and Reason for Visit:  Reassess    Nutrition Recommendations/Plan:   1. Diet consistency per SLP  2. Magic cup BID    Nutrition Assessment:  Pt remains nutritionally compromsied AEB RN report of po intake usually less than 50% of meals d/t sleepiness. Pt is off unit for HD @ time of visit. Changed supplements to magic cup BID d/t diet consistency now Dysphagia minced & moist with nectar thick liquids. Will monitor for improved intake & acceptance of supplements as pt becomes more awake/alert. Malnutrition Assessment:  Malnutrition Status:  No malnutrition    Context:  Acute Illness       Estimated Daily Nutrient Needs:  Energy (kcal):  3087-0655 (20-25 kcal/89kg); Weight Used for Energy Requirements:   (con't to use 89 kg)     Protein (g):  105- 121g (1.3-1.5g/81kg); Weight Used for Protein Requirements:  Ideal        Fluid (ml/day):   ; Method Used for Fluid Requirements:  1 ml/kcal      Nutrition Related Findings:  Na 132, elevated k+ 5.5; LBM 10/3; +1 BLE edema      Wounds:  Multiple, Stage III, Stage IV, Deep Tissue Injury, Unstageable       Current Nutrition Therapies:    ADULT DIET; Dysphagia - Minced and Moist; Mildly Thick (Nectar)  Adult Oral Nutrition Supplement; Frozen Oral Supplement    Anthropometric Measures:  · Height: 6' (182.9 cm)  · Current Body Weight: 202 lb (91.6 kg)   · Admission Body Weight:      · Usual Body Weight: 209 lb (94.8 kg)     · Ideal Body Weight: 178 lbs; % Ideal Body Weight 113.5 %   · BMI: 27.4  · Adjusted Body Weight:  ; No Adjustment   · Adjusted BMI:      · BMI Categories: Overweight (BMI 25.0-29. 9)       Nutrition Diagnosis:   · Inadequate oral intake related to inadequate protein-energy intake as evidenced by intake 26-50%, poor intake prior to admission    · Increased nutrient needs related to increase demand for energy/nutrients as evidenced by wounds      Nutrition Interventions:   Food and/or Nutrient Delivery: Continue Current Diet, Modify Oral Nutrition Supplement  Nutrition Education/Counseling:  Education not indicated   Coordination of Nutrition Care:  Continue to monitor while inpatient    Goals:  po intake greater than 50% of meals & supplements       Nutrition Monitoring and Evaluation:   Behavioral-Environmental Outcomes:  None Identified   Food/Nutrient Intake Outcomes:  Food and Nutrient Intake, Supplement Intake  Physical Signs/Symptoms Outcomes:  Skin, Weight     Discharge Planning:     Too soon to determine     Electronically signed by Nadir Phipps RD, LD on 10/6/21 at 12:23 PM EDT    Contact: [phone]

## 2021-10-06 NOTE — PLAN OF CARE
Problem: SAFETY  Goal: Free from accidental physical injury  Outcome: Ongoing     Problem: SKIN INTEGRITY  Goal: Skin integrity is maintained or improved  Outcome: Ongoing     Problem: Falls - Risk of:  Goal: Will remain free from falls  Description: Will remain free from falls  Outcome: Ongoing
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
## 2021-10-07 NOTE — CARE COORDINATION
Transport is scheduled for 3:30pm to home today via [facility] [address]. Chetna Fuentes at [address] Met with patient to offer assistance with discharge, and he has no home care agency preference. Patient referred to [address], pending a home care order.

## 2021-10-07 NOTE — PROGRESS NOTES
Pt and wife report pain in neck at 2355. Pt repositioned for comfort. Tylenol not yet due at the time. BP soft, Norco not administered. At 0110 pt offered tylenol, but declined stating no longer in pain.

## 2021-10-07 NOTE — PROGRESS NOTES
Physician Progress Note      Barby Moreau  CSN #:                  [id number]  :                       1955  ADMIT DATE:       2021 7:43 PM  [address] [place] DATE:  RESPONDING  PROVIDER #:        Allegra Jett MD          QUERY TEXT:    Patient admitted with SOB. Noted documentation of UTI by urology and   bacteriuria by Infectious disease and UTI vs colonization with pyuria by IM. If possible, please document in progress notes and discharge summary if you   are evaluating and /or treating any of the following: The medical record reflects the following:  Risk Factors: Suprapubic catheter  Clinical Indicators: Internal medicine-  UTI is related to chronic suprapubic   catheter versus colonization. Urine cultures grew out Proteus and Citrobacter   multidrug-resistant. SP Macias exchange on 10/6 with gross pyuria. Resume   Rocephin IV, further Abx per ID. Urology-Citropbacter uti resistant to cipro. Would recc stopping cipro, start rocephin -CT non-con for right inferior pole   lesion, UTI, Retention. Infectious disease-Since he is a dialysis patient with   indwelling suprapubic catheter, bacteriuria is expected. He however has been   hypotensive over the past 24 hours. COV  Treatment: Rocephin and cipro. Suprapubic catheter exchange  Options provided:  -- UTI confirmed and bacteriuria ruled out  -- Bacteriuria confirmed and UTI ruled out  -- Other - I will add my own diagnosis  -- Disagree - Not applicable / Not valid  -- Disagree - Clinically unable to determine / Unknown  -- Refer to Clinical Documentation Reviewer    PROVIDER RESPONSE TEXT:    After study, UTI confirmed and bacteriuria ruled out.     Query created by: Leti Stallworth on 10/7/2021 9:54 AM      Electronically signed by:  Allegra Jett MD 10/7/2021 1:47 PM

## 2021-10-07 NOTE — CONSULTS
Palliative Care:     Discussion with patient, wife and daughter at bedside today regarding DC POC. Family want to continue with dialysis as long as patient can tolerate at this time. Education provided on palliative care in home. Family agree to allow consult to be placed. Patient is discharged and transporting @ 1530 today. Per family choice [facility] selected. Consult will be sent today. [facility] to update physician completed.

## 2021-10-07 NOTE — ACP (ADVANCE CARE PLANNING)
Advanced Care Planning Note. Purpose of Encounter: Advanced care planning in light of ESRD on HD  Parties In Attendance: Patient, wife on phone  Decisional Capacity: Yes  Subjective: Patient is anxious  Objective: Cr 1.8  Goals of Care Determination: Patient/POA want limited support (NO CPR, no vent, continue HD, no surgery, no trach, no PEG)  Plan:  Renal, Urology and ID consults. Palliative care consult  Code Status: DNR CCA   Time spent on Advanced care Plannin minutes  Advanced Care Planning Documents: Completed advanced directives on chart, wife is the POA.     Josy Tomlinson MD  10/7/2021 12:19 PM

## 2021-10-07 NOTE — PROGRESS NOTES
BP better today. Pt continues to have poor intake and is lethargic and sleeping a lot. Tylenol and repositioning for back pain. Spouse and daughter at bedside.

## 2021-10-07 NOTE — DISCHARGE SUMMARY
Hospital Medicine Discharge Summary    Patient: Alo Florentino     Gender: male  : 1955   Age: 74 y.o. MRN: [id number]    Admitting Physician: Julio Cesar Dye MD  Discharge Physician: Davida Titus MD     Code Status: DNR-CCA     Admit Date: 2021   Discharge Date:   10/7/21    Disposition:  Home    Discharge Diagnoses: Active Hospital Problems    Diagnosis Date Noted    Dialysis patient [facility]) [Z99.2]     Indwelling catheter present on admission [Z96.0]     Bacteriuria [R82.71]     Infection requiring contact isolation precautions [B99.9]     Acute and chronic respiratory failure with hypoxia ([address] [J96.21] 2021    S/P CABG (coronary artery bypass graft) [Z95.1] 03/10/2021    ESRD (end stage renal disease) on dialysis ([address] [N18.6, Z99.2] 2021    History of DVT (deep vein thrombosis) [Z86.718] 2018    Acute on chronic respiratory failure with hypoxia (HCC) [J96.21]     Paraplegia ([address] [G82.20]        Follow-up appointments:  one week    Outpatient to do list: F/U with PCP, Renal, Urology, GI, Pulm and ID    Condition at Discharge:  Stable    Hospital Course:   77years old male with past medical history significant for end-stage renal disease on hemodialysis, CHF, carotid disease, hypertension, diabetes mellitus type 2, quadriplegia, neurogenic bladder status post suprapubic Macias, COPD, chronic respiratory failure, presented with chest pain/shortness of breath, Covid testing is negative, has been seen by pulmonary before try patient on [facility] mode ventilator, CT chest shows left lower lobe consolidation started on Rocephin.  Moderate ascites.  Chest x-ray with pulmonary vascular congestion. Admitted as inpatient for fluid overload, acute on chronic respiratory failure with hypoxia and UTI. Followed by Renal, Urology and ID. SP Macias exchange on 10/6 with gross pyuria. Changed to DNR-CCA. Seen by Palliative care.  Does not need antibiotics, UCx suspected to be colonizer not pathogen. Discussed with Dr Vern Lopez (Renal) and Dr Lavern Wheat (ID). F/U with PCP, Renal, ID, Urology, GI and Pulm.       Discharge Medications:   Current Discharge Medication List      START taking these medications    Details   !! midodrine (PROAMATINE) 10 MG tablet Take 1 tablet by mouth 2 times daily as needed (For dialysis use only. Give 10mg prior to start of HD, and may repeat mid treatment for SBP less than 100.)  Qty: 60 tablet, Refills: 0      saline nasal gel (AYR) GEL by Nasal route as needed (moisturize dry nose)  Qty: 1 each, Refills: 3      ciprofloxacin (CIPRO) 250 MG tablet Take 1 tablet by mouth every other day for 2 doses  Qty: 2 tablet, Refills: 0       !! - Potential duplicate medications found. Please discuss with provider. Current Discharge Medication List        Current Discharge Medication List      CONTINUE these medications which have NOT CHANGED    Details   metoprolol tartrate (LOPRESSOR) 25 MG tablet Take 0.5 tablets by mouth 2 times daily  Qty: 60 tablet, Refills: 3      pantoprazole (PROTONIX) 40 MG tablet Take 1 tablet by mouth daily  Qty: 90 tablet, Refills: 0      LORazepam (ATIVAN) 0.5 MG tablet Take 1 tablet prior to each dialysis session.   Qty: 15 tablet, Refills: 1    Associated Diagnoses: Anxiety due to invasive procedure; ESRD (end stage renal disease) on dialysis ([facility]      ipratropium-albuterol (DUONEB) 0.5-2.5 (3) MG/3ML SOLN nebulizer solution Inhale 3 mLs into the lungs every 4 hours as needed for Shortness of Breath DX code J47.1 bronchiectasis  Qty: 360 mL, Refills: 11      albuterol sulfate HFA (VENTOLIN HFA) 108 (90 Base) MCG/ACT inhaler Inhale 2 puffs into the lungs 4 times daily as needed for Wheezing  Qty: 18 g, Refills: 5      SYMBICORT 160-4.5 MCG/ACT AERO Inhale 2 puffs into the lungs 2 times daily  Qty: 10.2 g, Refills: 3      nystatin (MYCOSTATIN) 459985 UNIT/ML suspension swish and swallow 5ml 4 times daily  Qty: 240 mL, Refills: 0 finasteride (PROSCAR) 5 MG tablet Take 1 tablet by mouth daily  Qty: 30 tablet, Refills: 3      torsemide (DEMADEX) 100 MG tablet Take 1 tablet by mouth daily  Qty: 90 tablet, Refills: 3      citalopram (CELEXA) 40 MG tablet Take 0.5 tablets by mouth 2 times daily  Qty: 90 tablet, Refills: 1      promethazine (PHENERGAN) 25 MG tablet Take 1 tablet by mouth 2 times daily as needed for Nausea  Qty: 30 tablet, Refills: 1      pravastatin (PRAVACHOL) 40 MG tablet Take 1 tablet by mouth nightly  Qty: 90 tablet, Refills: 3      !! midodrine (PROAMATINE) 10 MG tablet Take 1 tablet by mouth 3 times daily (with meals)  Qty: 90 tablet, Refills: 1      Nebulizers (COMPRESSOR/NEBULIZER) MISC Nebulizer and supplies bill under medicare part B dx code J96.01  Qty: 1 each, Refills: 0      gabapentin (NEURONTIN) 100 MG capsule Take 1 capsule by mouth nightly.   Qty: 90 capsule, Refills: 3      Cholecalciferol (VITAMIN D3) 50 MCG (2000 UT) CAPS Take by mouth      traZODone (DESYREL) 50 MG tablet Take 50 mg by mouth as needed for Sleep      docusate sodium (COLACE) 100 MG capsule Take 100 mg by mouth daily      bisacodyl (DULCOLAX) 5 MG EC tablet Take 5 mg by mouth daily as needed for Constipation      Insulin Pen Needle (PEN NEEDLES) 31G X 6 MM MISC 1 each by Does not apply route daily  Qty: 100 each, Refills: 3      glucose (GLUTOSE) 40 % GEL Take 15 g by mouth as needed (low BS)  Qty: 45 g, Refills: 1      OXYGEN Inhale 7 L into the lungs continuous       acetaminophen (TYLENOL) 325 MG tablet Take 2 tablets by mouth every 4 hours as needed for Pain or Fever  Qty: 120 tablet, Refills: 3      insulin lispro (HUMALOG) 100 UNIT/ML pen Inject 0-12 Units into the skin 3 times daily (with meals)  Qty: 5 Pen, Refills: 3      ferrous sulfate 325 (65 FE) MG tablet Take 1 tablet by mouth daily (with breakfast)  Qty: 30 tablet, Refills: 3      vitamin E 400 UNIT capsule Take 400 Units by mouth daily      nitroGLYCERIN (NITROSTAT) 0.4 MG SL 128 06/29/2020    ALKPHOS 154 10/02/2021    ALT 15 10/02/2021    AST 32 10/02/2021    BILITOT 0.3 10/02/2021    BILIDIR <0.2 10/02/2021    LABALBU 3.7 10/04/2021    LDLCALC 45 02/02/2021    TRIG 97 02/02/2021     Lab Results   Component Value Date    INR 1.17 (H) 09/30/2021    INR 1.17 (H) 09/03/2021    INR 1.14 03/16/2021       Radiology:  Echo Complete    Result Date: 10/4/2021  Transthoracic Echocardiography Report (TTE)  Demographics   Patient Name       Jennyfer MELVIN   Date of Study      10/04/2021         Gender              Male   Patient Number     [id number]         Date of Birth       1955   Visit Number       [id number]          Age                 74 year(s)   Accession Number   [id number]         Room Number         4205   Corporate ID       [id number]           Sonographer         Deshawn Amaro T   Ordering Physician Paige Roche.,  Interpreting        Andrew Vega MD                 Physician           MD, [facility] [place]  Procedure Type of Study   TTE procedure:ECHOCARDIOGRAM COMPLETE 2D W DOPPLER W COLOR. Procedure Date Date: 10/04/2021 Start: 02:54 PM Study Location: [facility] - Echo Lab Technical Quality: Adequate visualization Indications:Dyspnea/SOB. Patient Status: Routine Height: 72 inches Weight: 203 pounds BSA: 2.14 m2 BMI: 27.53 kg/m2 BP: 103/53 mmHg  Conclusions   Summary  -Very technically difficult study  -Left ventricular systolic function is normal with ejection fraction  estimated at 55 %. -There is septal flattening. Other regional abnormalities cannot be  excluded. -The right atrium is moderately dilated. -The right ventricle appears enlarged.  -The aortic root is mildly dilated. 3.7 cm  -Aortic valve appears sclerotic but opens adequately. -Mitral valve leaflets appear mildly thickened. -Mild pulmonic regurgitation present.   -Mild-to-moderate tricuspid regurgitation.  -Systolic pulmonary artery pressure (SPAP) estimated at 41 mmHg (right  atrial pressure 8 mmHg), consistent with mild pulmonary hypertension. Signature   ------------------------------------------------------------------  Electronically signed by Letitia Matute MD, [facility] [place] (Interpreting  physician) on 10/04/2021 at 05:25 PM  ------------------------------------------------------------------   Findings   Left Ventricle  Left ventricular systolic function is normal with ejection fraction  estimated at 55 %. Hypokinesis of the anteroseptal and septal walls. Normal left ventricular wall thickness. Left ventricle size is normal.   Mitral Valve  Mitral valve leaflets appear mildly thickened. Trivial mitral regurgitation. Left Atrium  The left atrium is normal in size. Aortic Valve  Aortic valve appears sclerotic but opens adequately. No evidence of aortic valve regurgitation. Aorta  The aortic root is mildly dilated. 3.7 cm   Right Ventricle  The right ventricle is borderline enlarged. Right ventricular systolic function is reduced . TAPSE is measured at 10.7 mm.  S'' prime velocity is measured at 3.6 cm/s. Tricuspid Valve  Tricuspid valve is structurally normal.  Mild-to-moderate tricuspid regurgitation. Systolic pulmonary artery pressure (SPAP) estimated at 41 mmHg (right atrial  pressure 8 mmHg), consistent with mild pulmonary hypertension. Right Atrium  The right atrium is moderately dilated. Pulmonic Valve  The pulmonic valve is normal in structure. Mild pulmonic regurgitation present. Pericardial Effusion  No pericardial effusion noted. Pleural Effusion  There is a small left pleural effusion. Miscellaneous  IVC not well visualized.   M-Mode/2D Measurements (cm)   LV Diastolic Dimension: 7.63 cm LV Systolic Dimension: 2.97 cm  LV Septum Diastolic: 1 cm  LV PW Diastolic: 5.26 cm        AO Root Dimension: 3.7 cm                                   LA Area: 12.3 cm2  LVOT: 2.1 cm                    LA volume/Index: 25.2 ml /12 ml/m2  Doppler Measurements   AV Peak Velocity: 75.4 cm/s    MV Peak E-Wave: 76.3 cm/s  AV Peak Gradient: 2.27 mmHg    MV Peak A-Wave: 84 cm/s  AV Mean Gradient: 1 mmHg       MV E/A Ratio: 0.91  LVOT Peak Velocity: 51.8 cm/s  AV Area (Continuity):2.69 cm2   TR Velocity:288 cm/s  TR Gradient:33.18 mmHg  Estimated RAP:8 mmHg  Estimated RVSP: 41 mmHg  E' Septal Velocity: 5.25 cm/s  E' Lateral Velocity: 7.87 cm/s  E/E' ratio: 12.1   Aortic Valve   Peak Velocity: 75.4 cm/s    Mean Velocity: 55.6 cm/s  Peak Gradient: 2.27 mmHg    Mean Gradient: 1 mmHg  Area (continuity): 2.69 cm2  AV VTI: 21.2 cm  Aorta   Aortic Root: 3.7 cm  Ascending Aorta: 3 cm  LVOT Diameter: 2.1 cm      CT ABDOMEN PELVIS WO CONTRAST Additional Contrast? None    Result Date: 10/6/2021  EXAMINATION: CT OF THE ABDOMEN AND PELVIS WITHOUT CONTRAST 10/6/2021 4:21 pm TECHNIQUE: CT of the abdomen and pelvis was performed without the administration of intravenous contrast. Multiplanar reformatted images are provided for review. Dose modulation, iterative reconstruction, and/or weight based adjustment of the mA/kV was utilized to reduce the radiation dose to as low as reasonably achievable. COMPARISON: MRI pelvis, 06/04/2021 CT abdomen pelvis, 05/31/2021 HISTORY: ORDERING SYSTEM PROVIDED HISTORY: right renal lession survelience. Urinary Retention. UTI TECHNOLOGIST PROVIDED HISTORY: Reason for exam:->right renal lession survelience. Urinary Retention. UTI Additional Contrast?->None Reason for Exam: right renal lession survelience. Urinary Retention. UTI Acuity: Acute Type of Exam: Initial FINDINGS: Lower Chest: There is a trace right pleural effusion. There is mosaic attenuation in the right lung base. There is mild left basilar atelectasis. Organs: The liver is homogeneous and normal in attenuation. Cholecystectomy clips are present. The pancreas, spleen and adrenal glands are unremarkable. The kidneys are symmetrical in size. 1.9 cm hypodensity in the right kidney is fairly inconspicuous on the non contrasted CT. It appears stable in size since 05/31/2021 (previous study with contrast). There is no hydronephrosis. GI/Bowel: The stomach is unremarkable. There are no dilated loops of small bowel. No focal mural thickening of the colon is identified. Pelvis: Suprapubic Macias catheter is in the urinary bladder, in normal position. Peritoneum/Retroperitoneum: There is a small to moderate amount of generalized ascites, largest pocket in the left abdomen, level of iliac crest.  There is dense aortic calcification. Metallic stents are in the common iliac arteries. Bones/Soft Tissues: There is severe multilevel spondylosis the lumbar spine. There is severe arthropathy of the hips, including small joint effusions. There is a right axillary to femoral graft. Given the lack of contrast patency is not evaluated. It does appear to terminate in the lower abdominal wall, as blind-ending. There is moderate anasarca. A sacral decubitus ulceration is present. There is no obvious erosion of the sacrum or coccyx. Right renal, inferior pole 1.9 cm hypodensity appears stable. It is not well visualized on the present non contrasted CT. Follow-up imaging is recommended in 6-12 months. MRI would be preferable. Mild to moderate amount of generalized ascites, slightly increased compared to baseline. Trace right pleural effusion. Mosaic attenuation in the right lung base, air trapping versus small airways disease. Anasarca. XR FEMUR RIGHT (MIN 2 VIEWS)    Result Date: 10/2/2021  EXAMINATION: 4 XRAY VIEWS OF THE RIGHT FEMUR 10/2/2021 11:52 am COMPARISON: Right knee 05/31/2021. HISTORY: ORDERING SYSTEM PROVIDED HISTORY: Hx of fracture 10 weeks ago TECHNOLOGIST PROVIDED HISTORY: Reason for exam:->Hx of fracture 10 weeks ago Acuity: Unknown FINDINGS: No acute fracture or dislocation.   There is redemonstration of a fracture of the distal femoral metaphysis. There is increased impaction at the fracture with stable posterior displacement of the distal fracture fragment by about 1 cm. The fracture line remains visible with marginal callus which is not bridging at this time. Osteopenia throughout the remainder of the right femur. Degenerative change at the hip joint and knee joint. Prominent vascular calcification with postoperative clips in the inguinal region and stent spanning the distal SFA. Fracture of the distal femoral metaphysis with increased impaction and stable dorsal displacement of the distal fracture fragment. Callus is present at the fracture line but the fracture line remains visible. No new acute fracture of the right femur. Osteopenia with degenerative changes at the hip and knee joint. XR ABDOMEN (KUB) (SINGLE AP VIEW)    Result Date: 10/1/2021  EXAMINATION: ONE SUPINE XRAY VIEW(S) OF THE ABDOMEN 10/1/2021 12:37 am COMPARISON: 05/31/2021 HISTORY: ORDERING SYSTEM PROVIDED HISTORY: abdominal distention TECHNOLOGIST PROVIDED HISTORY: Reason for exam:->abdominal distention Reason for Exam: Abdominal distention Acuity: Unknown Type of Exam: Unknown FINDINGS: The right abdomen is not imaged. There is a nonobstructive bowel gas pattern with stool and air present throughout the colon. Status post cholecystectomy. Ascites is present throughout the left abdomen. The left hemidiaphragm is elevated. Degenerative changes involve the thoracolumbar spine and bilateral hips. Vascular calcifications are noted. Surgical clips overlie the right groin. 1. Ascites throughout the left abdomen. CT CHEST WO CONTRAST    Result Date: 9/29/2021  EXAMINATION: CT OF THE CHEST WITHOUT CONTRAST 9/28/2021 1:48 pm TECHNIQUE: CT of the chest was performed without the administration of intravenous contrast. Multiplanar reformatted images are provided for review.  Dose modulation, iterative reconstruction, and/or weight based adjustment of the mA/kV was utilized to reduce the radiation dose to as low as reasonably achievable. COMPARISON: 02/05/2019. HISTORY: ORDERING SYSTEM PROVIDED HISTORY: PERDUE (dyspnea on exertion) TECHNOLOGIST PROVIDED HISTORY: 2.2 creat Reason for exam:->assess for mucus plugging of airway. Reason for Exam: assess for mucus plugging of airway. Acuity: Acute Type of Exam: Initial FINDINGS: Mediastinum: The thoracic aorta is normal in course and caliber. Heavy calcified plaque in the aortic arch and descending thoracic aorta. Mild cardiomegaly with coronary vascular calcification. No pericardial effusion. Small mediastinal nodes are not pathologic by size criteria. 1.5 cm filling defect within the mid esophagus, likely ingested medication. The esophagus is otherwise unremarkable. Right-sided dialysis catheter. Lungs/pleura: Elevated left hemidiaphragm. Postoperative clips in the left hilum with surgical wire surrounding left lateral ribs. Trace right pleural effusion with no significant left pleural effusion. Consolidation with volume loss of the left lower lobe. There is focal rounded pleural-based opacification in the superior segment of the right lower lobe most consistent with an area of rounded atelectasis. There are additional dependent changes in the posterior right lower lobe. Ground-glass opacification within the remainder of the lungs, most pronounced in the upper lobes, possibly mild edema. No significant retained secretions in the airways. There is collapse of the left lower lobe bronchial tree associated with atelectasis. Upper Abdomen: Moderate amount of upper abdominal ascites with edematous changes in the soft tissues. The visualized upper abdominal viscera are unremarkable. Soft Tissues/Bones: Anasarca within the subcutaneous soft tissues, greatest laterally on the left. Bilateral gynecomastia. Multilevel osteoarthritic changes throughout the thoracic spine.      1. Left lower lobe consolidation and volume loss, likely atelectasis. Additional pleural based opacification in the right lower lobe, superior segment, likely rounded atelectasis. Recommend follow-up imaging to ensure resolution. 2. Additional ground-glass opacification within the lungs, possibly mild edema. Trace right pleural effusion. 3. No significant retained secretions in the central airway. Collapse of the left lower lobe bronchial tree associated with atelectasis. 4. Moderate ascites with third-spacing in the upper abdomen. Mild anasarca. 5. Atherosclerotic calcification in the aorta and coronary circulation. Mild cardiomegaly. 6. Filling defect in the mid esophagus, likely ingested medication. XR CHEST PORTABLE    Result Date: 9/30/2021  EXAMINATION: ONE XRAY VIEW OF THE CHEST 9/30/2021 8:23 pm COMPARISON: Chest x-ray dated 09/03/2021. HISTORY: ORDERING SYSTEM PROVIDED HISTORY: sob TECHNOLOGIST PROVIDED HISTORY: Reason for exam:->sob Reason for Exam: Chest Pain Acuity: Acute Type of Exam: Initial FINDINGS: LINES/TUBES/OTHER: Right IJ tunneled hemodialysis catheter is noted with its tip in the right atrium. HEART/MEDIASTINUM: The cardiac silhouette is enlarged, but stable. PLEURA/LUNGS: There is elevation of the left hemidiaphragm. There is pulmonary vascular congestion. There are no focal consolidations or pleural effusions. There is no appreciable pneumothorax. BONES/SOFT TISSUE: No acute abnormality. Pulmonary vascular congestion. Stable cardiomegaly. IR US GUIDED PARACENTESIS    Result Date: 10/4/2021  PROCEDURE: PARACENTESIS with IMAGE GUIDANCE US ABDOMEN LIMITED 10/4/2021 HISTORY: ORDERING SYSTEM PROVIDED HISTORY: fluid in the peritneal area. TECHNOLOGIST PROVIDED HISTORY: Reason for exam:->fluid in the peritneal area. TECHNIQUE: Informed consent was obtained after a detailed explanation of the procedure including risks, benefits, and alternatives. Universal protocol was followed.   A limited ultrasound of the abdomen was performed. The left abdomen was prepped and draped in sterile fashion and local anesthesia was achieved with lidocaine. An 5 [place] Minda Yueh needle sheath was advanced into ascites under ultrasound guidance and paracentesis was performed. The patient tolerated the procedure well. Procedure was performed by Amarilis Bellamy was present or immediately available during the entire procedure. EBL: Less than 5 cc FINDINGS: Limited ultrasound of the abdomen demonstrates ascites. A total of 3000 cc of clear yellow fluid was removed. Successful paracentesis. The patient was seen and examined on day of discharge and this discharge summary is in conjunction with any daily progress note from day of discharge. Time Spent on discharge is 45 minutes  in the examination, evaluation, counseling and review of medications and discharge plan. Note that more than 30 minutes was spent in preparing discharge papers, discussing discharge with patient, medication review, etc.       Signed:    Judy Najera MD   10/7/2021      Thank you Eden Rosales MD for the opportunity to be involved in this patient's care.  If you have any questions or concerns please feel free to contact me at [address] [place] [address]

## 2021-10-07 NOTE — PROGRESS NOTES
Pt bp low 85/36, trendelenberg position minimal effectiveness 100/40 then dropped back to 86/35. Paged Nephrology and received order for Midodrine 10 mg oral once.

## 2021-10-07 NOTE — PLAN OF CARE
Pt turned in bed and repositioned. Assessments and vital signs completed. BP soft. Pt requested tylenol x2 and water. Nectar thickened water provided. Dressing changes completed. Sacral dressing to be changed BID. Pillow support used.      Vitals:    10/07/21 0345   BP: (!) 91/50   Pulse: 95   Resp: 16   Temp: 97.7 °F (36.5 °C)   SpO2: 95%       Problem: SAFETY  Goal: Free from accidental physical injury  Outcome: Ongoing  Goal: Free from intentional harm  Outcome: Ongoing     Problem: SKIN INTEGRITY  Goal: Skin integrity is maintained or improved  Outcome: Ongoing     Problem: Skin Integrity:  Goal: Will show no infection signs and symptoms  Description: Will show no infection signs and symptoms  Outcome: Ongoing  Goal: Absence of new skin breakdown  Description: Absence of new skin breakdown  Outcome: Ongoing     Problem: Falls - Risk of:  Goal: Will remain free from falls  Description: Will remain free from falls  Outcome: Ongoing  Goal: Absence of physical injury  Description: Absence of physical injury  Outcome: Ongoing     Problem: Nutrition  Goal: Optimal nutrition therapy  Outcome: Ongoing     Problem: Pain:  Goal: Pain level will decrease  Description: Pain level will decrease  Outcome: Ongoing  Goal: Control of acute pain  Description: Control of acute pain  Outcome: Ongoing  Goal: Control of chronic pain  Description: Control of chronic pain  Outcome: Ongoing

## 2021-10-07 NOTE — PROGRESS NOTES
Nephrology Attending  Progress Note        SUMMARY :  We are following this patient for ESRD on HD   patient with significant past medical history of [facility]KD HD MWF, CHF, CAD, HTN, DM2, quadriplegia, neurogenic bladder s/p suprapubic ball, COPD, chronic resp failure, h/o scrotal abscess, presented with worsening SOB.  He also reported \"a little\" chest pain.  symptoms has been progressively getting worse for over one week.   COVID test is negative    He is followed by pulmonary and outpatient home [facility] mode ventilator has been ordered but not set up yet. He had not reached his TW post HD. Luke Fee on 9/28 showed LLL consolidation and volume loss.  Moderate ascites      SUBJECTIVE:   Patient progress reviewed. The patient had dialysis this morning complicated by occurrence of hypotension and inability to remove target fluid. 10/4: 3L of Clear Yellow Ascitic Fluid removed by IR.  10/5 : hypotension  10/6: Seen on HD , feels weak   10/7 : wife by bedside , feels about the same     Physical Exam:    VITALS:  BP (!) 91/50   Pulse 95   Temp 97.7 °F (36.5 °C) (Axillary)   Resp 16   Ht 6' (1.829 m)   Wt 197 lb 12.8 oz (89.7 kg)   SpO2 94%   BMI 26.83 kg/m²   BLOOD PRESSURE RANGE:  Systolic (60XBU), UZJ:68 , Min:90 , CTB:736   ; Diastolic (92WGC), VOF:63, Min:42, Max:64    24HR INTAKE/OUTPUT:      Intake/Output Summary (Last 24 hours) at 10/7/2021 1018  Last data filed at 10/7/2021 0312  Gross per 24 hour   Intake 1030 ml   Output 1700 ml   Net -670 ml       Gen:  alert, oriented x 2, ill appearing   PERRL , EOM +  Pallor +, No icterus  JVP not raised   CV: RRR no murmur or rub . Lungs:B/ L air entry, Normal breath sounds   Abd: soft, bowel sounds + , No organomegaly , Free fluid +, Suprapubic catheter  Ext: 1 + leg and  Thigh edema, no cyanosis  Skin: Warm.   No rash  Neuro: paraplegia   Rt IJ TDC     DATA:    CBC with Differential:    Lab Results   Component Value Date    WBC 12.4 10/07/2021    RBC 3.85 10/07/2021    HGB 11.0 10/07/2021    HCT 36.2 10/07/2021     10/07/2021    MCV 93.9 10/07/2021    MCH 28.5 10/07/2021    MCHC 30.3 10/07/2021    RDW 17.7 10/07/2021    NRBC CANCELED 10/22/2014    NRBC CANCELED 10/22/2014    SEGSPCT 76.1 10/22/2014    BANDSPCT 2 01/16/2018    BLASTSPCT CANCELED 10/22/2014    METASPCT 1 04/03/2017    LYMPHOPCT 4.4 10/07/2021    PROMYELOPCT CANCELED 10/22/2014    MONOPCT 5.9 10/07/2021    MYELOPCT 1 03/31/2017    EOSPCT 4.6 07/11/2011    BASOPCT 0.2 10/07/2021    MONOSABS 0.7 10/07/2021    LYMPHSABS 0.5 10/07/2021    EOSABS 0.0 10/07/2021    BASOSABS 0.0 10/07/2021    DIFFTYPE Auto 07/11/2011     CMP:    Lab Results   Component Value Date     10/07/2021    K 4.7 10/07/2021    K 5.4 10/05/2021    CL 97 10/07/2021    CO2 24 10/07/2021    BUN 20 10/07/2021    CREATININE 1.8 10/07/2021    GFRAA 46 10/07/2021    GFRAA >60 05/13/2013    AGRATIO 0.9 10/02/2021    LABGLOM 38 10/07/2021    LABGLOM 58 10/15/2015    GLUCOSE 121 10/07/2021    PROT 7.6 10/04/2021    PROT 7.2 01/06/2012    LABALBU 3.7 10/04/2021    CALCIUM 8.6 10/07/2021    BILITOT 0.3 10/02/2021    ALKPHOS 154 10/02/2021    AST 32 10/02/2021    ALT 15 10/02/2021     Phosphorus:    Lab Results   Component Value Date    PHOS 4.5 10/01/2021     Uric Acid:    Lab Results   Component Value Date    LABURIC 6.8 07/13/2020     Troponin:    Lab Results   Component Value Date    TROPONINI 0.14 10/02/2021     U/A:    Lab Results   Component Value Date    COLORU DARK YELLOW 10/01/2021    PROTEINU >=300 10/01/2021    PHUR 7.0 10/01/2021    WBCUA >900 10/01/2021    RBCUA see below 10/01/2021    RBCUA 3+ 05/12/2016    YEAST Present 04/01/2021    BACTERIA 2+ 10/01/2021    CLARITYU TURBID 10/01/2021    SPECGRAV 1.020 10/01/2021    LEUKOCYTESUR LARGE 10/01/2021    UROBILINOGEN 0.2 10/01/2021    BILIRUBINUR SMALL 10/01/2021    BILIRUBINUR NEGATIVE 05/12/2016    BLOODU LARGE 10/01/2021    GLUCOSEU Negative 10/01/2021    GLUCOSEU >=1000 05/17/2011

## 2021-10-07 NOTE — PROGRESS NOTES
[facility] home care referral. Spoke with pt's wife and re: home care plan of care/services. Agreeable to resume home care. Demographic's verified. Aware of discharge with home care. Home care orders sent to [facility]. Discharge planner notified.

## 2021-10-07 NOTE — PROGRESS NOTES
Hospitalist Progress Note      PCP: Eden Rosales MD    Date of Admission: 9/30/2021    Chief Complaint: SOB    Hospital Course: 77years old male with past medical history significant for end-stage renal disease on hemodialysis, CHF, carotid disease, hypertension, diabetes mellitus type 2, quadriplegia, neurogenic bladder status post suprapubic Macias, COPD, chronic respiratory failure, presented with chest pain/shortness of breath, Covid testing is negative, has been seen by pulmonary [facility], CT chest shows left lower lobe consolidation started on Rocephin.  Moderate ascites.  Chest x-ray with pulmonary vascular congestion. Admitted as inpatient for fluid overload, acute on chronic respiratory failure with hypoxia and UTI. Followed by Renal, Urology and ID. SP Macias exchange on 10/6 with gross pyuria. ID following. Subjective:  Patient is anxious. No CP, SOB, HA or fevers. Had low grade temp today.       Medications:  Reviewed    Infusion Medications    dextrose      sodium chloride       Scheduled Medications    cefTRIAXone (ROCEPHIN) IV  1,000 mg IntraVENous Q24H    ciprofloxacin  500 mg Oral Once    ipratropium-albuterol  1 ampule Inhalation 4x daily    insulin glargine  5 Units SubCUTAneous Nightly    aspirin  81 mg Oral Daily    citalopram  20 mg Oral BID    ferrous sulfate  325 mg Oral Daily with breakfast    finasteride  5 mg Oral Daily    insulin lispro  0-6 Units SubCUTAneous TID WC    insulin lispro  0-3 Units SubCUTAneous Nightly    torsemide  100 mg Oral Daily    budesonide-formoterol  2 puff Inhalation BID    pravastatin  40 mg Oral Nightly    pantoprazole  40 mg Oral QAM AC    midodrine  10 mg Oral TID WC    metoprolol tartrate  12.5 mg Oral BID    sodium chloride flush  5-40 mL IntraVENous 2 times per day    heparin (porcine)  5,000 Units SubCUTAneous 3 times per day     PRN Meds: midodrine, acetaminophen **OR** acetaminophen, HYDROcodone 5 mg - acetaminophen, albumin human, perflutren lipid microspheres, glucose, dextrose, glucagon (rDNA), dextrose, albuterol, bisacodyl, docusate sodium, traZODone, sodium chloride flush, sodium chloride, ondansetron **OR** ondansetron, polyethylene glycol, bisacodyl, simethicone, hydrOXYzine, saline nasal gel, heparin (porcine), LORazepam, nitroGLYCERIN      Intake/Output Summary (Last 24 hours) at 10/7/2021 1416  Last data filed at 10/7/2021 3460  Gross per 24 hour   Intake 130 ml   Output --   Net 130 ml       Physical Exam Performed:    BP (!) 93/52   Pulse 92   Temp 98.5 °F (36.9 °C) (Oral)   Resp 16   Ht 6' (1.829 m)   Wt 197 lb 12.8 oz (89.7 kg)   SpO2 95%   BMI 26.83 kg/m²     General appearance: No apparent distress, appears stated age and cooperative. HEENT: Pupils equal, round, and reactive to light. Conjunctivae/corneas clear. Neck: Supple, with full range of motion. No jugular venous distention. Trachea midline. Respiratory:  Normal respiratory effort. Clear to auscultation, bilaterally without Rales/Wheezes/Rhonchi. Cardiovascular: Regular rate and rhythm with normal S1/S2 without murmurs, rubs or gallops. Abdomen: Soft, non-tender, SP Macias, non-distended with normal bowel sounds. Musculoskeletal: No clubbing, cyanosis or edema bilaterally. Full range of motion without deformity. Skin: Skin color, texture, turgor normal.  No rashes or lesions.   Neurologic:  Paraplegia, slow to talk  Psychiatric: Alert and oriented, thought content appropriate, normal insight  Capillary Refill: Brisk,3 seconds, normal   Peripheral Pulses: +2 palpable, equal bilaterally       Labs:   Recent Labs     10/05/21  1654 10/06/21  0600 10/07/21  0522   WBC 7.0 9.0 12.4*   HGB 11.3* 11.7* 11.0*   HCT 36.6* 37.8* 36.2*    142 126*     Recent Labs     10/05/21  1654 10/06/21  0600 10/07/21  0421   * 132* 133*   K 5.4* 5.5* 4.7   CL 93* 94* 97*   CO2 26 28 24   BUN 31* 37* 20   CREATININE 2. 0* 2.3* 1.8*   CALCIUM 8.7 9.1 8.6     No results for input(s): AST, ALT, BILIDIR, BILITOT, ALKPHOS in the last 72 hours. No results for input(s): INR in the last 72 hours. No results for input(s): Leticia Shanks in the last 72 hours. Urinalysis:      Lab Results   Component Value Date    NITRU Negative 10/01/2021    WBCUA >900 10/01/2021    BACTERIA 2+ 10/01/2021    RBCUA see below 10/01/2021    RBCUA 3+ 05/12/2016    BLOODU LARGE 10/01/2021    SPECGRAV 1.020 10/01/2021    GLUCOSEU Negative 10/01/2021    GLUCOSEU >=1000 05/17/2011       Radiology:  CT ABDOMEN PELVIS WO CONTRAST Additional Contrast? None   Final Result   Right renal, inferior pole 1.9 cm hypodensity appears stable. It is not well   visualized on the present non contrasted CT. Follow-up imaging is   recommended in 6-12 months. MRI would be preferable. Mild to moderate amount of generalized ascites, slightly increased compared   to baseline. Trace right pleural effusion. Mosaic attenuation in the right lung base, air   trapping versus small airways disease. Anasarca. IR US GUIDED PARACENTESIS   Final Result   Successful paracentesis. XR FEMUR RIGHT (MIN 2 VIEWS)   Final Result   Fracture of the distal femoral metaphysis with increased impaction and stable   dorsal displacement of the distal fracture fragment. Callus is present at   the fracture line but the fracture line remains visible. No new acute fracture of the right femur. Osteopenia with degenerative   changes at the hip and knee joint. XR ABDOMEN (KUB) (SINGLE AP VIEW)   Final Result   1. Ascites throughout the left abdomen. XR CHEST PORTABLE   Final Result   Pulmonary vascular congestion. Stable cardiomegaly.                  Assessment/Plan:    Active Hospital Problems    Diagnosis     Dialysis patient [facility]) [Z99.2]     Indwelling catheter present on admission [Z96.0]     Bacteriuria [R82.71]     Infection requiring contact isolation precautions [B99.9]     Acute and chronic respiratory failure with hypoxia ([facility]) [J96.21]     S/P CABG (coronary artery bypass graft) [Z95.1]     ESRD (end stage renal disease) on dialysis (HCC) [N18.6, Z99.2]     History of DVT (deep vein thrombosis) [Z86.718]     Acute on chronic respiratory failure with hypoxia ([facility]) [J96.21]     Paraplegia ([facility]) [G82.20]           Acute on chronic respiratory failure secondary to fluid overload secondary to underlying end-stage renal disease on hemodialysis  Patient's oxygen requirements have gone up on admission  He was given Lasix and then torsemide  Nephrology were consulted he had ultrafiltration removal of 1500 mL  Patient is now back to his baseline of around 4 L  Saturating well  He supposed to be on BiPAP at bedtime however he is not a regular user of it  He has underlying diaphragmatic paralysis     Anemia of chronic kidney disease  On iron  Hemoglobin stable     UTI is related to chronic suprapubic catheter versus colonization  Urine cultures grew out Proteus and Citrobacter multidrug-resistant  SP Macias exchange on 10/6 with gross pyuria  Resumed Rocephin IV, further Abx per ID as patient has low grade temp today     Hypotension suspect secondary to recent dialysis and 3 L of paracentesis  Responded well to normal saline bolus and IV albumin  Continue midodrine     Diaphragmatic paralysis and paraplegia  4 to 7 L oxygen  Needs to continue using BiPAP at bedtime     Ascites suspect secondary to volume overload  SAAG greater than 1  Cytology no malignant cells  CT did not show underlying liver disease  Seen by gastroenterology as well        Type 2 diabetes mellitus  Continue insulin Lantus and lispro     Elevated troponin suspect secondary to renal disease  No chest pain  Troponin stable       DVT Prophylaxis: Hep SQ  Diet: ADULT DIET;  Dysphagia - Minced and Moist; Mildly Thick (Nectar)  Adult Oral Nutrition Supplement; Frozen Oral Supplement  Code Status: DNR-CCA    PT/OT Eval Status: Following, home    [facility] with home PT/OT/VNS/HHA/SW/SLP     Discussed with patient, Dr Kasi Romero (Renal), Dr Gerardo Castro (ID), Lazarus Zamudio (Palliative care RN) and CM. D/W wife in detail on phone. Will monitor overnight to exclude infection. Abx per ID upon DC if needed.     Judy Najera MD

## 2021-10-08 PROBLEM — J69.0 ASPIRATION PNEUMONIA (HCC): Status: ACTIVE | Noted: 2017-01-02

## 2021-10-08 NOTE — ACP (ADVANCE CARE PLANNING)
Advanced Care Planning Note. Purpose of Encounter: Advanced care planning in light of ESRD on HD  Parties In Attendance: Patient, wife, daughter  Decisional Capacity: Yes  Subjective: Patient is SOB  Objective: Cr 2.3  Goals of Care Determination: Patient/POA want limited support (NO CPR, no vent, continue HD, no surgery, no trach, no PEG)  Plan:  IV Abx. Comfort care. DNR-CC. IV Dilaudid and Ativan PRN. CCM, GI, Renal, Urology and ID consults. Palliative care consult  Code Status: DNR CC   Time spent on Advanced care Plannin minutes  Advanced Care Planning Documents: Completed advanced directives on chart, wife is the POA.     Ok Valerio MD  10/8/2021 1:56 PM

## 2021-10-08 NOTE — PROGRESS NOTES
Hospitalist Progress Note      PCP: Brianna Myers MD    Date of Admission: 9/30/2021    Chief Complaint: SOB    Hospital Course: 77years old male with past medical history significant for end-stage renal disease on hemodialysis, CHF, carotid disease, hypertension, diabetes mellitus type 2, quadriplegia, neurogenic bladder status post suprapubic Macias, COPD, chronic respiratory failure, presented with chest pain/shortness of breath, Covid testing is negative, has been [facility] pulmonary [facility] CT chest shows left lower lobe consolidation started on Rocephin.  Moderate ascites.  Chest x-ray with pulmonary vascular congestion. Admitted as inpatient for fluid overload, acute on chronic respiratory failure with hypoxia and UTI. Followed by Renal, Urology and ID. SP Macias exchange on 10/6 with gross pyuria. ID following. Rapid response called overnight on 10/7 for respiratory distress. Placed on 12 L HFNC. Now septic on 10/8/21. Cannot exclude SBP. CT C/A/P with IV ordered. Transfer to ICU requested on 10/8. Subjective:  Patient is hypotensive in HD, and will be aborted due to hypotension. No CP, SOB, HA or fevers. Now on 12 L HFNC.       Medications:  Reviewed    Infusion Medications    dextrose      sodium chloride       Scheduled Medications    meropenem  1,000 mg IntraVENous Q8H    sodium chloride  1,000 mL IntraVENous Once    ipratropium-albuterol  1 ampule Inhalation 4x daily    insulin glargine  5 Units SubCUTAneous Nightly    aspirin  81 mg Oral Daily    citalopram  20 mg Oral BID    ferrous sulfate  325 mg Oral Daily with breakfast    finasteride  5 mg Oral Daily    insulin lispro  0-6 Units SubCUTAneous TID WC    insulin lispro  0-3 Units SubCUTAneous Nightly    [Held by provider] torsemide  100 mg Oral Daily    budesonide-formoterol  2 puff Inhalation BID    pravastatin  40 mg Oral Nightly    pantoprazole  40 mg Oral QAM AC    midodrine 10 mg Oral TID WC    [Held by provider] metoprolol tartrate  12.5 mg Oral BID    sodium chloride flush  5-40 mL IntraVENous 2 times per day    heparin (porcine)  5,000 Units SubCUTAneous 3 times per day     PRN Meds: perflutren lipid microspheres, midodrine, acetaminophen **OR** acetaminophen, HYDROcodone 5 mg - acetaminophen, albumin human, perflutren lipid microspheres, glucose, dextrose, glucagon (rDNA), dextrose, albuterol, bisacodyl, docusate sodium, traZODone, sodium chloride flush, sodium chloride, ondansetron **OR** ondansetron, polyethylene glycol, bisacodyl, simethicone, hydrOXYzine, saline nasal gel, heparin (porcine), LORazepam, nitroGLYCERIN      Intake/Output Summary (Last 24 hours) at 10/8/2021 0900  Last data filed at 10/7/2021 2021  Gross per 24 hour   Intake 250 ml   Output --   Net 250 ml       Physical Exam Performed:    BP (!) 92/49   Pulse 95   Temp 97.2 °F (36.2 °C)   Resp 18   Ht 6' (1.829 m)   Wt 207 lb 3.7 oz (94 kg)   SpO2 93%   BMI 28.11 kg/m²     General appearance: No apparent distress, appears stated age and cooperative. HEENT: Pupils equal, round, and reactive to light. Conjunctivae/corneas clear. Neck: Supple, with full range of motion. No jugular venous distention. Trachea midline. Respiratory:  Normal respiratory effort. Clear to auscultation, bilaterally without Rales/Wheezes/Rhonchi. Cardiovascular: Regular rate and rhythm with normal S1/S2 without murmurs, rubs or gallops. Abdomen: Soft, mild generalized tenderness, SP Macias, more distended with normal bowel sounds. Musculoskeletal: No clubbing, cyanosis or edema bilaterally. Full range of motion without deformity. Skin: Skin color, texture, turgor normal.  No rashes or lesions.   Neurologic:  Paraplegia, slow to talk  Psychiatric: Alert and oriented, thought content appropriate, normal insight  Capillary Refill: Brisk,3 seconds, normal   Peripheral Pulses: +2 palpable, equal bilaterally       Labs:   Recent Labs     10/06/21  0600 10/06/21  0600 10/07/21  0522 10/07/21  2159 10/08/21  0415   WBC 9.0  --  12.4*  --  27.6*   HGB 11.7*   < > 11.0* 10.9* 10.5*   HCT 37.8*   < > 36.2* 36.0* 34.6*     --  126*  --  135    < > = values in this interval not displayed. Recent Labs     10/06/21  0600 10/07/21  0421 10/08/21  0415   * 133* 136   K 5.5* 4.7 4.8   CL 94* 97* 99   CO2 28 24 28   BUN 37* 20 29*   CREATININE 2.3* 1.8* 2.3*   CALCIUM 9.1 8.6 9.0     No results for input(s): AST, ALT, BILIDIR, BILITOT, ALKPHOS in the last 72 hours. No results for input(s): INR in the last 72 hours. No results for input(s): Amelie Plata in the last 72 hours. Urinalysis:      Lab Results   Component Value Date    NITRU Negative 10/01/2021    WBCUA >900 10/01/2021    BACTERIA 2+ 10/01/2021    RBCUA see below 10/01/2021    RBCUA 3+ 05/12/2016    BLOODU LARGE 10/01/2021    SPECGRAV 1.020 10/01/2021    GLUCOSEU Negative 10/01/2021    GLUCOSEU >=1000 05/17/2011       Radiology:  CT ABDOMEN PELVIS WO CONTRAST Additional Contrast? None   Final Result   Right renal, inferior pole 1.9 cm hypodensity appears stable. It is not well   visualized on the present non contrasted CT. Follow-up imaging is   recommended in 6-12 months. MRI would be preferable. Mild to moderate amount of generalized ascites, slightly increased compared   to baseline. Trace right pleural effusion. Mosaic attenuation in the right lung base, air   trapping versus small airways disease. Anasarca. IR US GUIDED PARACENTESIS   Final Result   Successful paracentesis. XR FEMUR RIGHT (MIN 2 VIEWS)   Final Result   Fracture of the distal femoral metaphysis with increased impaction and stable   dorsal displacement of the distal fracture fragment. Callus is present at   the fracture line but the fracture line remains visible. No new acute fracture of the right femur.   Osteopenia with degenerative   changes at the hip and knee joint. XR ABDOMEN (KUB) (SINGLE AP VIEW)   Final Result   1. Ascites throughout the left abdomen. XR CHEST PORTABLE   Final Result   Pulmonary vascular congestion. Stable cardiomegaly.          CT CHEST W CONTRAST    (Results Pending)   CT ABDOMEN PELVIS W IV CONTRAST Additional Contrast? None    (Results Pending)           Assessment/Plan:    Active Hospital Problems    Diagnosis     Dialysis patient [facility]) [Z99.2]     Indwelling catheter present on admission [Z96.0]     Bacteriuria [R82.71]     Infection requiring contact isolation precautions [B99.9]     Acute and chronic respiratory failure with hypoxia (HCC) [J96.21]     S/P CABG (coronary artery bypass graft) [Z95.1]     ESRD (end stage renal disease) on dialysis (HCC) [N18.6, Z99.2]     History of DVT (deep vein thrombosis) [Z86.718]     Acute on chronic respiratory failure with hypoxia (HCC) [J96.21]     Paraplegia (HCC) [G82.20]           Acute on chronic respiratory failure secondary to fluid overload secondary to underlying end-stage renal disease on hemodialysis  Patient's oxygen requirements have gone up on admission  He was given Lasix and then torsemide  Nephrology were consulted he had ultrafiltration removal of 1500 mL  Patient on baseline of around 4 L; now on 12 L HFNC  Check CT C/A/P; daughter and wife are considering if intubation would be desired  He supposed to be on BiPAP at bedtime however he is not a regular user of it  He has underlying diaphragmatic paralysis     Anemia of chronic kidney disease  On iron  Hemoglobin stable     UTI is related to chronic suprapubic catheter versus colonization  Urine cultures grew out Proteus and Citrobacter multidrug-resistant  SP Macias exchange on 10/6 with gross pyuria  Changed to Merrem and Vanco IV for possible SBP vs aspiration PNA     Hypotension suspect secondary to recent dialysis and 3 L of paracentesis  Recurred  May be secondary to sepsis now  Continue midodrine     Diaphragmatic paralysis and paraplegia  On 12 HFNC  Needs to continue using BiPAP at bedtime     Ascites suspect secondary to volume overload  SAAG greater than 1  Cytology no malignant cells  CT did not show underlying liver disease  Seen by gastroenterology as well        Type 2 diabetes mellitus  Continue insulin Lantus and lispro     Elevated troponin suspect secondary to renal disease  No chest pain  Troponin stable       DVT Prophylaxis: Hep SQ  Diet: Diet NPO  Code Status: DNR-CCA    PT/OT Eval Status: Following, home    [address] [place] [address] with home PT/OT/VNS/HHA/SW/SLP     Discussed with patient, Dr Dominick Alexander (Renal), Dr Khushi Yanez (ID), Dr Aubrey Schultz (CCM), [employer] (GI PA), nursing and CM. D/W wife and daughter. This patient is critically ill and high risk to worsen. If he worsens, he may not survive.     Kera Solorzano MD
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
## 2021-10-08 NOTE — PROGRESS NOTES
Dialysis aborted this am d/t low bp and increase in O2 needs. Orders placed by attending. Accompanied pt to Echo and CT Scan. Started Merrem IV. NPO effective now. PRN Ativan orders in place. GI and Cardiology consulted. Will transfer to ICU when bed available. Pt remains alert to baseline. Family at bedside.

## 2021-10-08 NOTE — PROGRESS NOTES
[facility]  Phone: [phone]  Fax: [phone]  [email or web address] [facility]          Assessment/Plan:    ESRD on HD:  -Outpatient HDU: [facility]; follows with me  -HD aborted this AM after 1hrs 15mins due to worsening hypotension/sepsis despite no UF, IV albumin, midodrine, and IVF boluses  -Agree with imaging below  -Can plan for HD tomorrow for incomplete HD today (if stable). Will consider CRRT if needed  -Outpatient EDW 87.5kg.  Last post-HD weight 94kg (bed scale) given limited UF with hypotension  -Will need TW adjusted on DC    Hypotension:  -Below goal despite chronic midodrine  -IVF boluses PRN  -Concern for worsening sepsis as below    FEN:  -At goal  -Modulate with HD    Anemia of ESRD:  -AMBAR previously held given Hgb>11g/dL  -At goal  -Can restart Epo with next HD when more clinically stable    CKD-MBD:  -At goal when last checked  -Not on Phos binders  -Repeat Phos level today given concerns for cell lysis/sepsis    Acute Hypoxic Resp Failure:  -In setting of volume overload  -UF limited as above    Severe Sepsis:  -Source unclear, though would be concerned for UTI given MDR Citrobacter/Proteus  -S/p ball exchange on 10/6/21 with gross pyuria  -Agree with pan-CT today  -Repeat paracentesis today  -Abx broadened this AM  -Low threshold for ICU transfer  -Management per ID    Ascites:  -Attributed to volume overload  -S/p 3L paracentesis (10/4/21)  -Repeat paracentesis today  -Management per Daphne Gallego MD, LISSETTE  Nephrologist  [facility]                                                                                                                                                                                                                                                                                                    Consult: ESRD on HD  Brief HPI: 74 y.o. male admitted for acute hypoxic resp failure due to pulmonary edema/fluid overload and UTI s/p chronic suprapubic catheter exchange, now with concerns for worsening sepsis      Subjective:  -Developed hypotension overnight prompting RRT/IV boluses  -HD aborted this AM per my instructions as above      Review of Systems: Unable to assess due to patient factors  Social History: Wife at bedside.  All questions answered      Medications:  Scheduled Meds:   meropenem  500 mg IntraVENous Q24H    vancomycin  1,500 mg IntraVENous Once in dialysis    sodium chloride  1,000 mL IntraVENous Once    ipratropium-albuterol  1 ampule Inhalation 4x daily    insulin glargine  5 Units SubCUTAneous Nightly    aspirin  81 mg Oral Daily    citalopram  20 mg Oral BID    ferrous sulfate  325 mg Oral Daily with breakfast    finasteride  5 mg Oral Daily    insulin lispro  0-6 Units SubCUTAneous TID WC    insulin lispro  0-3 Units SubCUTAneous Nightly    [Held by provider] torsemide  100 mg Oral Daily    budesonide-formoterol  2 puff Inhalation BID    pravastatin  40 mg Oral Nightly    pantoprazole  40 mg Oral QAM AC    midodrine  10 mg Oral TID WC    [Held by provider] metoprolol tartrate  12.5 mg Oral BID    sodium chloride flush  5-40 mL IntraVENous 2 times per day    heparin (porcine)  5,000 Units SubCUTAneous 3 times per day     Continuous Infusions:   dextrose      sodium chloride       PRN Meds:.perflutren lipid microspheres, LORazepam, midodrine, acetaminophen **OR** acetaminophen, HYDROcodone 5 mg - acetaminophen, albumin human, perflutren lipid microspheres, glucose, dextrose, glucagon (rDNA), dextrose, albuterol, bisacodyl, docusate sodium, traZODone, sodium chloride flush, sodium chloride, ondansetron **OR** ondansetron, polyethylene glycol, bisacodyl, simethicone, hydrOXYzine, saline nasal gel, heparin (porcine), LORazepam, nitroGLYCERIN        Vitals:  BP (!) 94/46   Pulse 94   Temp 97.4 °F (36.3 °C)   Resp 20   Ht 6' (1.829 m)   Wt 208 lb 5.4 oz (94.5 kg)   SpO2 96%   BMI 28.26 kg/m²   I/O last 3 completed shifts: In: 250 [P.O.:240; I.V.:10]  Out: -   I/O this shift: In: 900   Out: 342       Physical Exam:  Gen: Ill-appearing  HEENT: Sclera anicteric, (+) NC  Neck: Supple. No JVD  CV: RRR, S1/S2  Pulm: Diminished BS B/L, but CTA anteriorly  Abd: Soft, Mildly distended  Ext: 1+ pitting edema in dependent areas  : (+) Suprapubic Macias  Neuro: Somnolent. Opens eyes to voice, but not answering questions  Skin: Warm.  No significant visible rashes appreciated  Access: R IJ TDC        Labs:  CBC with Differential:    Lab Results   Component Value Date    WBC 27.6 10/08/2021    RBC 3.63 10/08/2021    HGB 10.5 10/08/2021    HCT 34.6 10/08/2021     10/08/2021    MCV 95.3 10/08/2021    MCH 28.9 10/08/2021    MCHC 30.4 10/08/2021    RDW 18.1 10/08/2021    NRBC CANCELED 10/22/2014    NRBC CANCELED 10/22/2014    SEGSPCT 76.1 10/22/2014    BANDSPCT 2 01/16/2018    BLASTSPCT CANCELED 10/22/2014    METASPCT 1 04/03/2017    LYMPHOPCT 1.6 10/08/2021    PROMYELOPCT CANCELED 10/22/2014    MONOPCT 4.8 10/08/2021    MYELOPCT 1 03/31/2017    EOSPCT 4.6 07/11/2011    BASOPCT 0.1 10/08/2021    MONOSABS 1.3 10/08/2021    LYMPHSABS 0.4 10/08/2021    EOSABS 0.0 10/08/2021    BASOSABS 0.0 10/08/2021    DIFFTYPE Auto 07/11/2011     BMP:    Lab Results   Component Value Date     10/08/2021    K 4.8 10/08/2021    K 5.4 10/05/2021    CL 99 10/08/2021    CO2 28 10/08/2021    BUN 29 10/08/2021    LABALBU 3.7 10/04/2021    CREATININE 2.3 10/08/2021    CALCIUM 9.0 10/08/2021    GFRAA 34 10/08/2021    GFRAA >60 05/13/2013    LABGLOM 28 10/08/2021    LABGLOM 58 10/15/2015    GLUCOSE 162 10/08/2021     U/A:    Lab Results   Component Value Date    COLORU DARK YELLOW 10/01/2021    PROTEINU >=300 10/01/2021    PHUR 7.0 10/01/2021    WBCUA >900 10/01/2021    RBCUA see below 10/01/2021    RBCUA 3+ 05/12/2016    YEAST Present 04/01/2021    BACTERIA 2+ 10/01/2021    CLARITYU TURBID 10/01/2021    SPECGRAV 1.020 10/01/2021    LEUKOCYTESUR LARGE 10/01/2021    UROBILINOGEN 0.2 10/01/2021    BILIRUBINUR SMALL 10/01/2021    BILIRUBINUR NEGATIVE 05/12/2016    BLOODU LARGE 10/01/2021    GLUCOSEU Negative 10/01/2021    GLUCOSEU >=1000 05/17/2011    AMORPHOUS 4+ 11/18/2016

## 2021-10-08 NOTE — PROGRESS NOTES
3500ml of fluid removed  Spoke to patients BUDDY jiménez and advised her the amount of fluid removed and that patient was returning to the floor.
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
## 2021-10-08 NOTE — PROGRESS NOTES
Rapid Response Quick Summary    Room: MX-0321/9374-96    Assessment of concern / patient:  Patient more lethargic than previous assessment and BP 63/35. Physician involved:  Vignesh    Interventions:  500ml Bolus, Albumin x1, and stat H/H. Disposition:  Remain on 3T and contact physician when medications completed.

## 2021-10-08 NOTE — PROGRESS NOTES
Urology Progress Note  [facility]    Provider: Maria A Hernandez MD MD Patient ID:  Admission Date: 2021 Name: Iris Nithin Date: 2021 MRN: [id number]   Patient Location: [facility]3437/2927-01 : 1955  Attending: Ok Valerio MD Date of Service: 10/8/2021  PCP: Keegan Garcia MD     Diagnoses:  Complicated urinary tract infection  Neurogenic bladder with chronic suprapubic catheter  Altered mental status  Quadriplegic    Assessment/Plan:  Suprapubic catheter has been recently changed  Follow-up urine culture results  Unsure that all of his issues are related to a simple urinary tract infection  CT scan abdomen pelvis is pending    The patient had a chance to ask questions which were answered. he understands the above plan. Subjective:   Joyce Hennessy is a 74 y.o. male. He was seen and examined this morning. He was present today with his wife. He looks much sicker than I have previously seen him. His family noted that he initially presented with not feeling well and then noted shortness of breath even with simple movement of side to side activity. He is currently alert but not particularly interactive. He does regard the when I call his name    Objective:   Vitals:  Vitals:    10/08/21 1100   BP: (!) 96/59   Pulse: 91   Resp: 18   Temp:    SpO2: 100%       Intake/Output Summary (Last 24 hours) at 10/8/2021 1227  Last data filed at 10/8/2021 0900  Gross per 24 hour   Intake 1150 ml   Output 342 ml   Net 808 ml     Physical Exam:  Gen: Alert and oriented x3, no acute distress  CV: Regular rate   Resp: unlabored respirations  Abd: Soft, non-distended, non-tender, no masses  Ext: no peripheral edema noted, moves upper and lower extremities spontaneously  Skin: warm and well perfused, no rashes noted on the face, or arms.   Macias catheter: Clear yellow urine    Labs:  Lab Results   Component Value Date    WBC 27.6 (H) 10/08/2021    HGB 10.5 (L) 10/08/2021    HCT 34.6 (L) 10/08/2021 MCV 95.3 10/08/2021     10/08/2021     Lab Results   Component Value Date    CREATININE 2.3 (H) 10/08/2021    BUN 29 (H) 10/08/2021     10/08/2021    K 4.8 10/08/2021    CL 99 10/08/2021    CO2 28 10/08/2021       Mary Pfeiffer MD MD  10/8/2021

## 2021-10-08 NOTE — PROGRESS NOTES
Speech Language Pathology  Attempt  Imtiaz Robert Chadd  1955    Attempted to see pt for dysphagia treatment. RN reports that the pt is awaiting transport to radiology but is also recommended to be NPO at this time d/t low blood pressure and increased oxygen needs. Will reattempt as schedule allows and pt appropriate for therapy. Prince Miladys M.S. Jd Perry #SP. [id number] [facility]

## 2021-10-08 NOTE — PROGRESS NOTES
Physician Progress Note      Montrell Wong  CSN #:                  [id number]  :                       1955  ADMIT DATE:       2021 7:43 PM  [address] DATE:  RESPONDING  PROVIDER #:        Trupti Su MD          QUERY TEXT:    Pt admitted with SOB and chest pain. Pt noted to have a UTI from a suprapubic   catheter. WBC were WNL until 10/7--12.4 and now 27.6, heart rate greater than   90 and hypotension  If possible, please document in the progress notes and   discharge summary if you are evaluating and /or treating any of the following: The medical record reflects the following:  Risk Factors: UTI-cultures growing multidrug-resistant Citrobacter and Proteus   mirabilis, suprapubic catheter  Clinical Indicators: Pt noted to have a UTI cultures growing   multidrug-resistant Citrobacter and Proteus mirabilis from a suprapubic   catheter. WBC were WNL until 10/7--12.4 and now 27.6, heart rate greater than   90 and hypotension  Treatment: IVF, Albumin, Rocephin, Merrem x1, midodrine  Options provided:  -- Sepsis, not present on admission  -- UTI without Sepsis  -- Other - I will add my own diagnosis  -- Disagree - Not applicable / Not valid  -- Disagree - Clinically unable to determine / Unknown  -- Refer to Clinical Documentation Reviewer    PROVIDER RESPONSE TEXT:    This patient has sepsis that was not present on admission.     Query created by: Stuart Alvarado on 10/8/2021 7:11 AM      Electronically signed by:  Trupti Su MD 10/8/2021 1:49 PM

## 2021-10-08 NOTE — FLOWSHEET NOTE
10/08/21 0735 10/08/21 0900   Vital Signs   BP (!) 92/49 (!) 94/46   Temp 97.2 °F (36.2 °C) 97.4 °F (36.3 °C)   Pulse 95 94   Resp 22 20   Dry Weight   (TBD)  --      Treatment time: 3.5 hours ordered. Tx stopped per Dr. Dale Kruse order due to persistent hypotension. Actual tx time 1:15. RTD 2:15. Net UF: +600 ml    Pre weight: 94 kg (bed scale)  Post weight: 94 kg (bed scale)  EDW: TBD    Access used: RIJ HD tunneled cath  Access function: No problems    Medications or blood products given: Albumin 12.5 Gm. Midodrine 10 mg po. Regular outpatient schedule: [facility]    Summary of response to treatment: Tx stopped with 2:15 hours RTD, actual tx time 1:15. Net + 600 ml. Albumin 12.5 Gm IVPB given, Midodrine 10 mg po given with no improvement in BP. Copy of dialysis treatment record placed in chart, to be scanned into EMR.     Report called to Cynthia Moctezuma RN

## 2021-10-08 NOTE — CARE COORDINATION
Discharge planning note:    Received phone call from Martín Amaro at [facility]. She spoke with wife about Palliative Care vs Hospice. If family takes him home please notify [facility] at [phone].     Jay Lloyd RN BSN  Case Management  [phone]

## 2021-10-08 NOTE — PROGRESS NOTES
Gastroenterology Progress Note    Brian Adkins is a 74 y.o. male patient. Active Problems:    Paraplegia ([address]    Acute on chronic respiratory failure with hypoxia (HCC)    History of DVT (deep vein thrombosis)    ESRD (end stage renal disease) on dialysis ([facility])    S/P CABG (coronary artery bypass graft)    Acute and chronic respiratory failure with hypoxia ([address]    Dialysis patient ([address]    Indwelling catheter present on admission    Bacteriuria    Infection requiring contact isolation precautions  Resolved Problems:    * No resolved hospital problems. *      SUBJECTIVE:  Asked to see patient again for belching and abdominal pain. Was hypotensive overnight. Wife states belching is not new. Pt denies abdominal pain but has no feeling in abdomen after spinal cord injury. No N/V.     ROS:  No fever, chills  No chest pain, palpitations  No SOB, cough  Gastrointestinal ROS: see above    Physical    VITALS:  BP (!) 94/46   Pulse 94   Temp 97.4 °F (36.3 °C)   Resp 20   Ht 6' (1.829 m)   Wt 208 lb 5.4 oz (94.5 kg)   SpO2 96%   BMI 28.26 kg/m²   TEMPERATURE:  Current - Temp: 97.4 °F (36.3 °C); Max - Temp  Av.8 °F (36.6 °C)  Min: 97.2 °F (36.2 °C)  Max: 98.5 °F (36.9 °C)    NAD  Regular rate   Abdomen soft, ND, NT,  Bowel sounds normal.    Anicteric  BLE    Data    Data Review:    Recent Labs     10/06/21  0600 10/06/21  0600 10/07/21  0522 10/07/21  2159 10/08/21  041   WBC 9.0  --  12.4*  --  27.6*   HGB 11.7*   < > 11.0* 10.9* 10.5*   HCT 37.8*   < > 36.2* 36.0* 34.6*   MCV 93.7  --  93.9  --  95.3     --  126*  --  135    < > = values in this interval not displayed. Recent Labs     10/06/21  0600 10/07/21  0421 10/08/21  0415   * 133* 136   K 5.5* 4.7 4.8   CL 94* 97* 99   CO2 28 24 28   BUN 37* 20 29*   CREATININE 2.3* 1.8* 2.3*     No results for input(s): AST, ALT, ALB, BILIDIR, BILITOT, ALKPHOS in the last 72 hours.   No results for input(s): LIPASE, AMYLASE in the last 72 hours. No results for input(s): PROTIME, INR in the last 72 hours. No results for input(s): PTT in the last 72 hours. ASSESSMENT :       Ascites -noted on CT chest at admission. No prior paracentesis or liver disease. Liver was normal on CT with IV contrast May 2021 and did have ascites then. S/p paracentesis with 3 L fluid removed 10/4. Cell count neg for neutrocytic ascites. Culture neg. SAAG elevated at 1.7 and protein elevated at 3.6 c/w cardiac ascites. Sepsis - does have UTI which is likely the cause. Was hypotensive overnight. Antibiotics changed this am. SBP less likely in cardiac ascites but still possible. Will order repeat para. UTI  ESRD on HD  CHF - echo c/w pulmonary hypertension    PLAN :  - repeat para with fluid for cell count and culture     Discussed with Dr. Latasha Connelly, PASyedC  [facility]    I have personally performed a face to face diagnostic evaluation on this patient. I have interviewed and examined the patient and I agree with the findings and recommended plan of care. In summary, my findings and plan are the following:  Pt with some hypotension overnight but has UTI and is now changing abx. Ab distended with some ascites but nt. Will repeat para to relieve ascites volume. rec cont abx for uti. F/u ascites analysis.        David Anand MD  [address]

## 2021-10-08 NOTE — SIGNIFICANT EVENT
Discussed with Dr Boris Russell ([facility]). He does not feel patient is appropriate for critical care given chronicity of condition. Discussed in detail with daughter. He is most appropriate for comfort care as he is recurrently aspirating. Daughter is agreeable to comfort care and will discuss with patient and mother. Changed to [facility]. Ordered comfort feeds as desired. Written for Ativan and Dilaudid IV PRN comfort. Will use remote tele (turn off monitors in room) and have nursing provide comfort medications. Inpatient expiration is expected. Discussed with Manuelito Mancera.     Lois Ayala MD

## 2021-10-09 NOTE — PROGRESS NOTES
Clinical Pharmacy Note: Pharmacy to Dose Vancomcyin    Vancomycin Day: 2  Current Dosing Regimen: dosing by levels - ESRD on HD MWF  Dosing Method: Dosing by random levels    Random: 12.9    Recent Labs     10/08/21  0415 10/09/21  0409   BUN 29* 29*       Recent Labs     10/08/21  0415 10/09/21  0409   CREATININE 2.3* 2.2*       Recent Labs     10/08/21  0415 10/09/21  0409   WBC 27.6* 30.3*         Intake/Output Summary (Last 24 hours) at 10/9/2021 0831  Last data filed at 10/9/2021 0559  Gross per 24 hour   Intake 950 ml   Output 342 ml   Net 608 ml         Ht Readings from Last 1 Encounters:   10/06/21 6' (1.829 m)        Wt Readings from Last 1 Encounters:   10/08/21 208 lb 5.4 oz (94.5 kg)         Body mass index is 28.26 kg/m². Estimated Creatinine Clearance: 38 mL/min (A) (based on SCr of 2.2 mg/dL (H)). Assessment/Plan:  Vancomycin level is subtherapeutic. Level was drawn appropriately in respect to last dose given. Will redose vancomycin 1000 mg IV x 1 today  A vancomycin random level has been ordered on Monday, 10/11 at 0600 prior to next HD session   Changes in regimen will be determined based on culture results, renal function, and clinical response. Pharmacy will continue to monitor and adjust regimen as necessary.     Thank you for the consult,    Augustin Adams, PharmD, [address] [facility] Pharmacist  [id number]

## 2021-10-09 NOTE — PROGRESS NOTES
Nephrology Attending  Progress Note        SUMMARY :  We are following this patient for ESRD on HD   patient with significant past medical history of ESKD HD MWF, CHF, CAD, HTN, DM2, quadriplegia, neurogenic bladder s/p suprapubic ball, COPD, chronic resp failure, h/o scrotal abscess, presented with worsening SOB.  He also reported \"a little\" chest pain.  symptoms has been progressively getting worse for over one week.   COVID test is negative    He is followed by pulmonary and outpatient home [facility] mode ventilator has been ordered but not set up yet. He had not reached his TW post HD. Ree Fruit on 9/28 showed LLL consolidation and volume loss.  Moderate ascites      SUBJECTIVE:   Patient progress reviewed. The patient had dialysis this morning complicated by occurrence of hypotension and inability to remove target fluid.   10/4: 3L of Clear Yellow Ascitic Fluid removed by IR.  10/5 : hypotension  10/6: Seen on HD , feels weak   10/7 : wife by bedside , feels about the same   10/9: doing worse, met with wife and daughter, no pain/ no dyspnea     Physical Exam:    VITALS:  BP (!) 58/30   Pulse 90   Temp 97.6 °F (36.4 °C) (Axillary)   Resp 14   Ht 6' (1.829 m)   Wt 208 lb 5.4 oz (94.5 kg)   SpO2 93%   BMI 28.26 kg/m²   BLOOD PRESSURE RANGE:  Systolic (55GCR), OVK:60 , Min:58 , FZY:65   ; Diastolic (38EKE), BXW:40, Min:30, Max:60    24HR INTAKE/OUTPUT:      Intake/Output Summary (Last 24 hours) at 10/9/2021 0924  Last data filed at 10/9/2021 0559  Gross per 24 hour   Intake 50 ml   Output --   Net 50 ml       Gen:  Drowsy   Chest : reduced air entry at bases   Rt IJ TDC     DATA:    CBC with Differential:    Lab Results   Component Value Date    WBC 30.3 10/09/2021    RBC 3.78 10/09/2021    HGB 10.8 10/09/2021    HCT 36.1 10/09/2021     10/09/2021    MCV 95.4 10/09/2021    MCH 28.7 10/09/2021    MCHC 30.0 10/09/2021    RDW 17.8 10/09/2021    NRBC CANCELED 10/22/2014    NRBC CANCELED 10/22/2014    [facility]    BANDSPCT 2 01/16/2018    BLASTSPCT CANCELED 10/22/2014    METASPCT 1 04/03/2017    LYMPHOPCT 1.5 10/09/2021    PROMYELOPCT CANCELED 10/22/2014    MONOPCT 2.5 10/09/2021    MYELOPCT 1 03/31/2017    EOSPCT 4.6 07/11/2011    BASOPCT 0.2 10/09/2021    MONOSABS 0.7 10/09/2021    LYMPHSABS 0.5 10/09/2021    EOSABS 0.0 10/09/2021    BASOSABS 0.1 10/09/2021    DIFFTYPE Auto 07/11/2011     CMP:    Lab Results   Component Value Date     10/09/2021    K 5.0 10/09/2021    K 5.4 10/05/2021    CL 96 10/09/2021    CO2 28 10/09/2021    BUN 29 10/09/2021    CREATININE 2.2 10/09/2021    GFRAA 36 10/09/2021    GFRAA >60 05/13/2013    AGRATIO 0.9 10/02/2021    LABGLOM 30 10/09/2021    LABGLOM 58 10/15/2015    GLUCOSE 127 10/09/2021    PROT 7.6 10/04/2021    PROT 7.2 01/06/2012    LABALBU 3.7 10/04/2021    CALCIUM 9.1 10/09/2021    BILITOT 0.3 10/02/2021    ALKPHOS 154 10/02/2021    AST 32 10/02/2021    ALT 15 10/02/2021     Phosphorus:    Lab Results   Component Value Date    PHOS 3.7 10/08/2021     Uric Acid:    Lab Results   Component Value Date    LABURIC 6.8 07/13/2020     Troponin:    Lab Results   Component Value Date    TROPONINI 0.14 10/02/2021     U/A:    Lab Results   Component Value Date    COLORU DARK YELLOW 10/01/2021    PROTEINU >=300 10/01/2021    PHUR 7.0 10/01/2021    WBCUA >900 10/01/2021    RBCUA see below 10/01/2021    RBCUA 3+ 05/12/2016    YEAST Present 04/01/2021    BACTERIA 2+ 10/01/2021    CLARITYU TURBID 10/01/2021    SPECGRAV 1.020 10/01/2021    LEUKOCYTESUR LARGE 10/01/2021    UROBILINOGEN 0.2 10/01/2021    BILIRUBINUR SMALL 10/01/2021    BILIRUBINUR NEGATIVE 05/12/2016    BLOODU LARGE 10/01/2021    GLUCOSEU Negative 10/01/2021    GLUCOSEU >=1000 05/17/2011    AMORPHOUS 4+ 11/18/2016         IMPRESSION/RECOMMENDATIONS:      Diagnosis and Plan     1. ESRD on hemodialysis:   Patient of Dr. Margaret Clancy. Dialyzes Monday Wednesday Friday at [facility].   Has problems with inability to remove fluid on dialysis due to low BP ,hence fluid overload    Under Hospice care     Will sign off       Mu Gallegos MD

## 2021-10-09 NOTE — ACP (ADVANCE CARE PLANNING)
Advanced Care Planning Note. Purpose of Encounter: Advanced care planning in light of ESRD on HD  Parties In Attendance: Patient, wife, daughter, brother  Decisional Capacity: No  Subjective: Patient is encephalopathic, cannot provide ROS  Objective: Cr 2.2  Goals of Care Determination: Patient/POA want limited support (NO CPR, no vent, continue HD, no surgery, no trach, no PEG)  Plan:  IV Abx. Comfort care. DNR-CC. IV Dilaudid and Ativan PRN. CCM, GI, Renal, Urology and ID consults. Atropine SL and scopolamine patch  Code Status: DNR CC   Time spent on Advanced care Plannin minutes  Advanced Care Planning Documents: Completed advanced directives on chart, wife is the POA.     Suhail Ennis MD  10/9/2021 12:46 PM

## 2021-10-09 NOTE — PLAN OF CARE
Problem: Falls - Risk of:  Goal: Will remain free from falls  Description: Will remain free from falls  10/9/2021 0958 by Florentino Roe RN  Outcome: Ongoing  Note: Patient remains absent from falls at this time. Remains in bed with eyes closed, family at bedside. Non-slip footwear on and 2/4 siderails raised. Bed remains in lowest/locked position at all times with alarm activated. Fall precautions in place. Will continue to monitor. Problem: Pain:  Goal: Pain level will decrease  Description: Pain level will decrease  10/9/2021 0958 by Florentino Roe RN  Outcome: Ongoing  Note: Patient medicated for chronic pain 10/10 per [facility]. Will continue to monitor.

## 2021-10-09 NOTE — PROGRESS NOTES
Pt IV leaking/infiltrated. Attempted to get orders for oral concentrated morphine and oral concentrated ativan unsuccessfully. New IV placed in right forearm. Dilauded administered. BP remains low. Family at bedside. Plan of care communicated. Side effects of ativan and dilaudid discussed in detail. Family would like all comfort measures taken. All other medication declined. No glucose checks. Family would like vital signs to continue to be assessed. Rounding frequently to address any patient and family needs or concerns.

## 2021-10-09 NOTE — DISCHARGE SUMMARY
Hospital Medicine Discharge Summary    Patient: Randa Florentino     Gender: male  : 1955   Age: 74 y.o. MRN: [id number]    Admitting Physician: Helena Monroy MD  Discharge Physician: Ashley Levine MD     Code Status: DNR-CC     Admit Date: 2021   Expiration Date:   10/9/21 at 13:50 from T7 paraplegia    Disposition:      Discharge Diagnoses: Active Hospital Problems    Diagnosis Date Noted    Dialysis patient [facility]) [Z99.2]     Indwelling catheter present on admission [Z96.0]     Bacteriuria [R82.71]     Infection requiring contact isolation precautions [B99.9]     Acute and chronic respiratory failure with hypoxia ([facility] [address] [J96.21] 2021    S/P CABG (coronary artery bypass graft) [Z95.1] 03/10/2021    ESRD (end stage renal disease) on dialysis ([facility] [address] [N18.6, Z99.2] 2021    History of DVT (deep vein thrombosis) [Z86.718] 2018    Acute respiratory failure with hypoxia (HCC) [J96.01]     Acute on chronic respiratory failure with hypoxia (HCC) [J96.21]     Aspiration pneumonia ([facility] [address] [J69.0] 2017    Paraplegia (HCC) [G82.20]            Condition at Discharge:      Hospital Course:   77years old male with past medical history significant for end-stage renal disease on hemodialysis, CHF, carotid disease, hypertension, diabetes mellitus type 2, quadriplegia, neurogenic bladder status post suprapubic Macias, COPD, chronic respiratory failure, presented with chest pain/shortness of breath, Covid testing is negative, has been seen by pulmonary before try patient on [facility] mode ventilator, CT chest shows left lower lobe consolidation started on Rocephin.  Moderate ascites.  Chest x-ray with pulmonary vascular congestion. Admitted as inpatient for fluid overload, acute on chronic respiratory failure with hypoxia and UTI. Followed by Renal, Urology and ID. SP Macias exchange on 10/6 with gross pyuria. ID following.   Rapid response called overnight on 10/7 for respiratory distress. Placed on 12 L HFNC. Now septic on 10/8/21. Cannot exclude SBP. CT C/A/P with IV ordered. Transfer to ICU requested on 10/8. Seen by CCM. Did not feel appropriate for ICU care. Changed to [facility] on 10/8 and changed to comfort care measures for EOL care. Patient  peacefully with wife and daughter at bedside on 10/9/21 at 13:50 from T7 paraplegia. Discharge Medications:   Current Discharge Medication List      START taking these medications    Details   !! midodrine (PROAMATINE) 10 MG tablet Take 1 tablet by mouth 2 times daily as needed (For dialysis use only. Give 10mg prior to start of HD, and may repeat mid treatment for SBP less than 100.)  Qty: 60 tablet, Refills: 0      saline nasal gel (AYR) GEL by Nasal route as needed (moisturize dry nose)  Qty: 1 each, Refills: 3      ciprofloxacin (CIPRO) 250 MG tablet Take 1 tablet by mouth every other day for 2 doses  Qty: 2 tablet, Refills: 0       !! - Potential duplicate medications found. Please discuss with provider. Current Discharge Medication List        Current Discharge Medication List      CONTINUE these medications which have NOT CHANGED    Details   metoprolol tartrate (LOPRESSOR) 25 MG tablet Take 0.5 tablets by mouth 2 times daily  Qty: 60 tablet, Refills: 3      pantoprazole (PROTONIX) 40 MG tablet Take 1 tablet by mouth daily  Qty: 90 tablet, Refills: 0      LORazepam (ATIVAN) 0.5 MG tablet Take 1 tablet prior to each dialysis session.   Qty: 15 tablet, Refills: 1    Associated Diagnoses: Anxiety due to invasive procedure; ESRD (end stage renal disease) on dialysis ([facility] [address]      ipratropium-albuterol (DUONEB) 0.5-2.5 (3) MG/3ML SOLN nebulizer solution Inhale 3 mLs into the lungs every 4 hours as needed for Shortness of Breath DX code J47.1 bronchiectasis  Qty: 360 mL, Refills: 11      albuterol sulfate HFA (VENTOLIN HFA) 108 (90 Base) MCG/ACT inhaler Inhale 2 puffs into the lungs 4 times daily as needed for Wheezing  Qty: 18 g, Refills: 5      SYMBICORT 160-4.5 MCG/ACT AERO Inhale 2 puffs into the lungs 2 times daily  Qty: 10.2 g, Refills: 3      nystatin (MYCOSTATIN) 417313 UNIT/ML suspension swish and swallow 5ml 4 times daily  Qty: 240 mL, Refills: 0      finasteride (PROSCAR) 5 MG tablet Take 1 tablet by mouth daily  Qty: 30 tablet, Refills: 3      torsemide (DEMADEX) 100 MG tablet Take 1 tablet by mouth daily  Qty: 90 tablet, Refills: 3      citalopram (CELEXA) 40 MG tablet Take 0.5 tablets by mouth 2 times daily  Qty: 90 tablet, Refills: 1      promethazine (PHENERGAN) 25 MG tablet Take 1 tablet by mouth 2 times daily as needed for Nausea  Qty: 30 tablet, Refills: 1      pravastatin (PRAVACHOL) 40 MG tablet Take 1 tablet by mouth nightly  Qty: 90 tablet, Refills: 3      !! midodrine (PROAMATINE) 10 MG tablet Take 1 tablet by mouth 3 times daily (with meals)  Qty: 90 tablet, Refills: 1      Nebulizers (COMPRESSOR/NEBULIZER) MISC Nebulizer and supplies bill under medicare part B dx code J96.01  Qty: 1 each, Refills: 0      gabapentin (NEURONTIN) 100 MG capsule Take 1 capsule by mouth nightly.   Qty: 90 capsule, Refills: 3      Cholecalciferol (VITAMIN D3) 50 MCG (2000 UT) CAPS Take by mouth      traZODone (DESYREL) 50 MG tablet Take 50 mg by mouth as needed for Sleep      docusate sodium (COLACE) 100 MG capsule Take 100 mg by mouth daily      bisacodyl (DULCOLAX) 5 MG EC tablet Take 5 mg by mouth daily as needed for Constipation      Insulin Pen Needle (PEN NEEDLES) 31G X 6 MM MISC 1 each by Does not apply route daily  Qty: 100 each, Refills: 3      glucose (GLUTOSE) 40 % GEL Take 15 g by mouth as needed (low BS)  Qty: 45 g, Refills: 1      OXYGEN Inhale 7 L into the lungs continuous       acetaminophen (TYLENOL) 325 MG tablet Take 2 tablets by mouth every 4 hours as needed for Pain or Fever  Qty: 120 tablet, Refills: 3      insulin lispro (HUMALOG) 100 UNIT/ML pen Inject 0-12 Units into the skin 3 times daily (with meals)  Qty: 5 Pen, Refills: 3      ferrous sulfate 325 (65 FE) MG tablet Take 1 tablet by mouth daily (with breakfast)  Qty: 30 tablet, Refills: 3      vitamin E 400 UNIT capsule Take 400 Units by mouth daily      nitroGLYCERIN (NITROSTAT) 0.4 MG SL tablet Place 0.4 mg under the tongue every 5 minutes as needed for Chest pain. aspirin 81 MG chewable tablet Take 81 mg by mouth daily. !! - Potential duplicate medications found. Please discuss with provider. Current Discharge Medication List      STOP taking these medications       insulin glargine (LANTUS SOLOSTAR) 100 UNIT/ML injection pen Comments:   Reason for Stopping:                   Discharge Exam:    BP (!) 58/30   Pulse 52   Temp 97.6 °F (36.4 °C) (Axillary)   Resp 12   Ht 6' (1.829 m)   Wt 208 lb 5.4 oz (94.5 kg)   SpO2 93%   BMI 28.26 kg/m²   No spontaneous breaths  No heart sounds  No palpable pulses  No gag reflex  No corneal reflexes  No light reflexes    Labs:  For convenience and continuity at follow-up the following most recent labs are provided:    Lab Results   Component Value Date    WBC 30.3 10/09/2021    HGB 10.8 10/09/2021    HCT 36.1 10/09/2021    MCV 95.4 10/09/2021     10/09/2021     10/09/2021    K 5.0 10/09/2021    K 5.4 10/05/2021    CL 96 10/09/2021    CO2 28 10/09/2021    BUN 29 10/09/2021    CREATININE 2.2 10/09/2021    CALCIUM 9.1 10/09/2021    PHOS 3.7 10/08/2021     06/29/2020    ALKPHOS 154 10/02/2021    ALT 15 10/02/2021    AST 32 10/02/2021    BILITOT 0.3 10/02/2021    BILIDIR <0.2 10/02/2021    LABALBU 3.7 10/04/2021    LDLCALC 45 02/02/2021    TRIG 97 02/02/2021     Lab Results   Component Value Date    INR 1.17 (H) 09/30/2021    INR 1.17 (H) 09/03/2021    INR 1.14 03/16/2021       Radiology:  Echo Complete    Result Date: 10/4/2021  Transthoracic Echocardiography Report (TTE)  Demographics   Patient Name       Jennyfer Sanders R   Date of Study      10/04/2021         Gender Male   Patient Number     [id number]         Date of Birth       1955   Visit Number       [id number]          Age                 74 year(s)   Accession Number   [id number]         Room Number         0857   Corporate ID       [id number]           Crissie Soulier, [facility]   Ordering Physician Zackary Holbrook.,  Interpreting        Elliot Funez MD                 Physician           MD, [facility]  Procedure Type of Study   TTE procedure:ECHOCARDIOGRAM COMPLETE 2D W DOPPLER W COLOR. Procedure Date Date: 10/04/2021 Start: 02:54 PM Study Location: [facility] - Echo Lab Technical Quality: Adequate visualization Indications:Dyspnea/SOB. Patient Status: Routine Height: 72 inches Weight: 203 pounds BSA: 2.14 m2 BMI: 27.53 kg/m2 BP: 103/53 mmHg  Conclusions   Summary  -Very technically difficult study  -Left ventricular systolic function is normal with ejection fraction  estimated at 55 %. -There is septal flattening. Other regional abnormalities cannot be  excluded. -The right atrium is moderately dilated. -The right ventricle appears enlarged.  -The aortic root is mildly dilated. 3.7 cm  -Aortic valve appears sclerotic but opens adequately. -Mitral valve leaflets appear mildly thickened. -Mild pulmonic regurgitation present. -Mild-to-moderate tricuspid regurgitation.  -Systolic pulmonary artery pressure (SPAP) estimated at 41 mmHg (right  atrial pressure 8 mmHg), consistent with mild pulmonary hypertension. Signature   ------------------------------------------------------------------  Electronically signed by Elliot Funez MD, [facility] (Interpreting  physician) on 10/04/2021 at 05:25 PM  ------------------------------------------------------------------   Findings   Left Ventricle  Left ventricular systolic function is normal with ejection fraction  estimated at 55 %.   Hypokinesis of the anteroseptal and septal walls. Normal left ventricular wall thickness. Left ventricle size is normal.   Mitral Valve  Mitral valve leaflets appear mildly thickened. Trivial mitral regurgitation. Left Atrium  The left atrium is normal in size. Aortic Valve  Aortic valve appears sclerotic but opens adequately. No evidence of aortic valve regurgitation. Aorta  The aortic root is mildly dilated. 3.7 cm   Right Ventricle  The right ventricle is borderline enlarged. Right ventricular systolic function is reduced . TAPSE is measured at 10.7 mm.  S'' prime velocity is measured at 3.6 cm/s. Tricuspid Valve  Tricuspid valve is structurally normal.  Mild-to-moderate tricuspid regurgitation. Systolic pulmonary artery pressure (SPAP) estimated at 41 mmHg (right atrial  pressure 8 mmHg), consistent with mild pulmonary hypertension. Right Atrium  The right atrium is moderately dilated. Pulmonic Valve  The pulmonic valve is normal in structure. Mild pulmonic regurgitation present. Pericardial Effusion  No pericardial effusion noted. Pleural Effusion  There is a small left pleural effusion. Miscellaneous  IVC not well visualized.   M-Mode/2D Measurements (cm)   LV Diastolic Dimension: 9.97 cm LV Systolic Dimension: 8.62 cm  LV Septum Diastolic: 1 cm  LV PW Diastolic: 0.47 cm        AO Root Dimension: 3.7 cm                                   LA Area: 12.3 cm2  LVOT: 2.1 cm                    LA volume/Index: 25.2 ml /12 ml/m2  Doppler Measurements   AV Peak Velocity: 75.4 cm/s    MV Peak E-Wave: 76.3 cm/s  AV Peak Gradient: 2.27 mmHg    MV Peak A-Wave: 84 cm/s  AV Mean Gradient: 1 mmHg       MV E/A Ratio: 0.91  LVOT Peak Velocity: 51.8 cm/s  AV Area (Continuity):2.69 cm2   TR Velocity:288 cm/s  TR Gradient:33.18 mmHg  Estimated RAP:8 mmHg  Estimated RVSP: 41 mmHg  E' Septal Velocity: 5.25 cm/s  E' Lateral Velocity: 7.87 cm/s  E/E' ratio: 12.1   Aortic Valve   Peak Velocity: 75.4 cm/s    Mean Velocity: 55.6 cm/s  Peak diastole consistent  with RV pressure and volume overload. Normal left ventricular wall  thickness. Left ventricle size is normal.   Aortic Valve  Thickend and calcified. No significant stenosis   Right Ventricle  Severely dilated and hypokinetic. S'' prime velocity is measured at 6.5 cm/s. TAPSE 1.2mm   Right Atrium  The right atrium is mildly dilated. Pericardial Effusion  No pericardial effusion noted. Pleural Effusion  Pleural effusions  M-Mode/2D Measurements (cm)   LV Diastolic Dimension: 6.09 cm LV Systolic Dimension: 8.38 cm  LV Septum Diastolic: 7.98 cm  LV PW Diastolic: 1 cm           AO Root Dimension: 3.6 cm   LVOT: 1.9 cm  Doppler Measurements   E' Septal Velocity: 10.6 cm/s  E' Lateral Velocity: 15 cm/s  Aorta   Aortic Root: 3.6 cm  LVOT Diameter: 1.9 cm      CT ABDOMEN PELVIS WO CONTRAST Additional Contrast? None    Result Date: 10/6/2021  EXAMINATION: CT OF THE ABDOMEN AND PELVIS WITHOUT CONTRAST 10/6/2021 4:21 pm TECHNIQUE: CT of the abdomen and pelvis was performed without the administration of intravenous contrast. Multiplanar reformatted images are provided for review. Dose modulation, iterative reconstruction, and/or weight based adjustment of the mA/kV was utilized to reduce the radiation dose to as low as reasonably achievable. COMPARISON: MRI pelvis, 06/04/2021 CT abdomen pelvis, 05/31/2021 HISTORY: ORDERING SYSTEM PROVIDED HISTORY: right renal lession survelience. Urinary Retention. UTI TECHNOLOGIST PROVIDED HISTORY: Reason for exam:->right renal lession survelience. Urinary Retention. UTI Additional Contrast?->None Reason for Exam: right renal lession survelience. Urinary Retention. UTI Acuity: Acute Type of Exam: Initial FINDINGS: Lower Chest: There is a trace right pleural effusion. There is mosaic attenuation in the right lung base. There is mild left basilar atelectasis. Organs: The liver is homogeneous and normal in attenuation. Cholecystectomy clips are present.   The pancreas, spleen and adrenal glands are unremarkable. The kidneys are symmetrical in size. 1.9 cm hypodensity in the right kidney is fairly inconspicuous on the non contrasted CT. It appears stable in size since 05/31/2021 (previous study with contrast). There is no hydronephrosis. GI/Bowel: The stomach is unremarkable. There are no dilated loops of small bowel. No focal mural thickening of the colon is identified. Pelvis: Suprapubic Macias catheter is in the urinary bladder, in normal position. Peritoneum/Retroperitoneum: There is a small to moderate amount of generalized ascites, largest pocket in the left abdomen, level of iliac crest.  There is dense aortic calcification. Metallic stents are in the common iliac arteries. Bones/Soft Tissues: There is severe multilevel spondylosis the lumbar spine. There is severe arthropathy of the hips, including small joint effusions. There is a right axillary to femoral graft. Given the lack of contrast patency is not evaluated. It does appear to terminate in the lower abdominal wall, as blind-ending. There is moderate anasarca. A sacral decubitus ulceration is present. There is no obvious erosion of the sacrum or coccyx. Right renal, inferior pole 1.9 cm hypodensity appears stable. It is not well visualized on the present non contrasted CT. Follow-up imaging is recommended in 6-12 months. MRI would be preferable. Mild to moderate amount of generalized ascites, slightly increased compared to baseline. Trace right pleural effusion. Mosaic attenuation in the right lung base, air trapping versus small airways disease. Anasarca. XR FEMUR RIGHT (MIN 2 VIEWS)    Result Date: 10/2/2021  EXAMINATION: 4 XRAY VIEWS OF THE RIGHT FEMUR 10/2/2021 11:52 am COMPARISON: Right knee 05/31/2021.  HISTORY: ORDERING SYSTEM PROVIDED HISTORY: Hx of fracture 10 weeks ago TECHNOLOGIST PROVIDED HISTORY: Reason for exam:->Hx of fracture 10 weeks ago Acuity: Unknown FINDINGS: No acute fracture or dislocation. There is redemonstration of a fracture of the distal femoral metaphysis. There is increased impaction at the fracture with stable posterior displacement of the distal fracture fragment by about 1 cm. The fracture line remains visible with marginal callus which is not bridging at this time. Osteopenia throughout the remainder of the right femur. Degenerative change at the hip joint and knee joint. Prominent vascular calcification with postoperative clips in the inguinal region and stent spanning the distal SFA. Fracture of the distal femoral metaphysis with increased impaction and stable dorsal displacement of the distal fracture fragment. Callus is present at the fracture line but the fracture line remains visible. No new acute fracture of the right femur. Osteopenia with degenerative changes at the hip and knee joint. XR ABDOMEN (KUB) (SINGLE AP VIEW)    Result Date: 10/1/2021  EXAMINATION: ONE SUPINE XRAY VIEW(S) OF THE ABDOMEN 10/1/2021 12:37 am COMPARISON: 05/31/2021 HISTORY: ORDERING SYSTEM PROVIDED HISTORY: abdominal distention TECHNOLOGIST PROVIDED HISTORY: Reason for exam:->abdominal distention Reason for Exam: Abdominal distention Acuity: Unknown Type of Exam: Unknown FINDINGS: The right abdomen is not imaged. There is a nonobstructive bowel gas pattern with stool and air present throughout the colon. Status post cholecystectomy. Ascites is present throughout the left abdomen. The left hemidiaphragm is elevated. Degenerative changes involve the thoracolumbar spine and bilateral hips. Vascular calcifications are noted. Surgical clips overlie the right groin. 1. Ascites throughout the left abdomen. CT CHEST WO CONTRAST    Result Date: 9/29/2021  EXAMINATION: CT OF THE CHEST WITHOUT CONTRAST 9/28/2021 1:48 pm TECHNIQUE: CT of the chest was performed without the administration of intravenous contrast. Multiplanar reformatted images are provided for review. Dose modulation, iterative reconstruction, and/or weight based adjustment of the mA/kV was utilized to reduce the radiation dose to as low as reasonably achievable. COMPARISON: 02/05/2019. HISTORY: ORDERING SYSTEM PROVIDED HISTORY: PERDUE (dyspnea on exertion) TECHNOLOGIST PROVIDED HISTORY: 2.2 creat Reason for exam:->assess for mucus plugging of airway. Reason for Exam: assess for mucus plugging of airway. Acuity: Acute Type of Exam: Initial FINDINGS: Mediastinum: The thoracic aorta is normal in course and caliber. Heavy calcified plaque in the aortic arch and descending thoracic aorta. Mild cardiomegaly with coronary vascular calcification. No pericardial effusion. Small mediastinal nodes are not pathologic by size criteria. 1.5 cm filling defect within the mid esophagus, likely ingested medication. The esophagus is otherwise unremarkable. Right-sided dialysis catheter. Lungs/pleura: Elevated left hemidiaphragm. Postoperative clips in the left hilum with surgical wire surrounding left lateral ribs. Trace right pleural effusion with no significant left pleural effusion. Consolidation with volume loss of the left lower lobe. There is focal rounded pleural-based opacification in the superior segment of the right lower lobe most consistent with an area of rounded atelectasis. There are additional dependent changes in the posterior right lower lobe. Ground-glass opacification within the remainder of the lungs, most pronounced in the upper lobes, possibly mild edema. No significant retained secretions in the airways. There is collapse of the left lower lobe bronchial tree associated with atelectasis. Upper Abdomen: Moderate amount of upper abdominal ascites with edematous changes in the soft tissues. The visualized upper abdominal viscera are unremarkable. Soft Tissues/Bones: Anasarca within the subcutaneous soft tissues, greatest laterally on the left. Bilateral gynecomastia.   Multilevel osteoarthritic changes throughout the thoracic spine. 1. Left lower lobe consolidation and volume loss, likely atelectasis. Additional pleural based opacification in the right lower lobe, superior segment, likely rounded atelectasis. Recommend follow-up imaging to ensure resolution. 2. Additional ground-glass opacification within the lungs, possibly mild edema. Trace right pleural effusion. 3. No significant retained secretions in the central airway. Collapse of the left lower lobe bronchial tree associated with atelectasis. 4. Moderate ascites with third-spacing in the upper abdomen. Mild anasarca. 5. Atherosclerotic calcification in the aorta and coronary circulation. Mild cardiomegaly. 6. Filling defect in the mid esophagus, likely ingested medication. CT CHEST W CONTRAST    Result Date: 10/8/2021  EXAMINATION: CT OF THE ABDOMEN AND PELVIS WITH CONTRAST; CT OF THE CHEST WITH CONTRAST 10/8/2021 12:07 pm; 10/8/2021 12:14 pm TECHNIQUE: CT of the abdomen and pelvis was performed with the administration of intravenous contrast. Multiplanar reformatted images are provided for review. Dose modulation, iterative reconstruction, and/or weight based adjustment of the mA/kV was utilized to reduce the radiation dose to as low as reasonably achievable.; CT of the chest was performed with the administration of intravenous contrast. Multiplanar reformatted images are provided for review. Dose modulation, iterative reconstruction, and/or weight based adjustment of the mA/kV was utilized to reduce the radiation dose to as low as reasonably achievable.  COMPARISON: None 10/06/2021 HISTORY: ORDERING SYSTEM PROVIDED HISTORY: Fevers TECHNOLOGIST PROVIDED HISTORY: Reason for exam:->Fevers Additional Contrast?->None Reason for Exam: Fevers Acuity: Unknown Type of Exam: Unknown; ORDERING SYSTEM PROVIDED HISTORY: PNA TECHNOLOGIST PROVIDED HISTORY: Reason for exam:->PNA Reason for Exam: PNA Acuity: Unknown Type of Exam: Unknown FINDINGS: Chest: Mediastinum: Thyroid gland is unremarkable. Tip of calluses catheter projects in the right atrium. Heart is enlarged. Atherosclerotic changes seen in the ascending and descending aorta. Clips marginate the descending aorta. There is apparent Postsurgical change involving the descending thoracic aorta. Main pulmonary artery is mildly enlarged, measuring 3.7 cm. Small mediastinal and hilar nodes are noted. For example, lymph node anterior to the IVC measures 1.5 cm by 1.1 cm. Lungs/pleura: Hazy mosaic attenuation is seen throughout the left upper lobe. There is dense consolidation seen in the left lung base with small left-sided pleural effusion. On the right hazy mosaic attenuation seen throughout the right upper lobe. There is dense consolidation seen in the right lung base. Small right-sided pleural effusion is seen. In the Soft Tissues/Bones: Severe degenerative changes seen in the spine and shoulder joints. An occluded axillary femoral graft is seen in the right chest wall There is mild body wall anasarca. Spurring is seen in the spine. Spurring is seen in the shoulder joints. . Remote appearing rib deformities are seen Abdomen/Pelvis: Organs: There is borderline splenomegaly Adrenal glands appear normal.  There are clips from prior cholecystectomy. Liver is cirrhotic. Mild to moderate intra-abdominal ascites is seen. No hydronephrosis on the left. Scattered subcentimeter hypodense foci are seen in left kidney, likely cyst No hydronephrosis on the right. There is a complex appearing nodule in the right kidney measuring 1.7 cm, with questionable enhancing internal septa. 4-5 mm stone seen in the right kidney No pancreatic ductal dilatation. GI/Bowel: No significant small bowel distention noted. Moderate stool seen in the colon. Scattered colonic diverticula are seen Pelvis: Small amount of free fluid seen within the pelvis. Suprapubic catheter is seen.   Postsurgical changes seen in the inguinal regions. Peritoneum/Retroperitoneum: Severe atherosclerotic changes seen in the aorta. Iliac artery stents are seen. There is severe narrowing of the left common iliac. Bones/Soft Tissues: There is body wall anasarca. Severe degenerative changes are seen in the hips. .  There is fatty atrophy of the musculature. Sacral decubitus ulcer is seen. There is skin thickening. No drainable fluid collection noted     Chest: Bilateral lower lobe consolidation, left greater than right, with small pleural effusions, suggestive of pneumonia. Small mediastinal nodes are seen, likely reactive in the absence of a known primary. Hazy ground-glass opacities are seen throughout the aerated lungs, either postinflammatory-infectious change or due to superimposed early pulmonary edema. Abdomen and pelvis: Abdominal and pelvic ascites and body wall anasarca, similar to prior Stable size complex nodule right kidney. Nonobstructing right renal calculus Sacral decubitus ulcer. No obvious drainable fluid collection or soft tissue abscess. CT ABDOMEN PELVIS W IV CONTRAST Additional Contrast? None    Result Date: 10/8/2021  EXAMINATION: CT OF THE ABDOMEN AND PELVIS WITH CONTRAST; CT OF THE CHEST WITH CONTRAST 10/8/2021 12:07 pm; 10/8/2021 12:14 pm TECHNIQUE: CT of the abdomen and pelvis was performed with the administration of intravenous contrast. Multiplanar reformatted images are provided for review. Dose modulation, iterative reconstruction, and/or weight based adjustment of the mA/kV was utilized to reduce the radiation dose to as low as reasonably achievable.; CT of the chest was performed with the administration of intravenous contrast. Multiplanar reformatted images are provided for review. Dose modulation, iterative reconstruction, and/or weight based adjustment of the mA/kV was utilized to reduce the radiation dose to as low as reasonably achievable.  COMPARISON: None 10/06/2021 HISTORY: ORDERING SYSTEM PROVIDED HISTORY: Fevers TECHNOLOGIST PROVIDED HISTORY: Reason for exam:->Fevers Additional Contrast?->None Reason for Exam: Fevers Acuity: Unknown Type of Exam: Unknown; ORDERING SYSTEM PROVIDED HISTORY: PNA TECHNOLOGIST PROVIDED HISTORY: Reason for exam:->PNA Reason for Exam: PNA Acuity: Unknown Type of Exam: Unknown FINDINGS: Chest: Mediastinum: Thyroid gland is unremarkable. Tip of calluses catheter projects in the right atrium. Heart is enlarged. Atherosclerotic changes seen in the ascending and descending aorta. Clips marginate the descending aorta. There is apparent Postsurgical change involving the descending thoracic aorta. Main pulmonary artery is mildly enlarged, measuring 3.7 cm. Small mediastinal and hilar nodes are noted. For example, lymph node anterior to the IVC measures 1.5 cm by 1.1 cm. Lungs/pleura: Hazy mosaic attenuation is seen throughout the left upper lobe. There is dense consolidation seen in the left lung base with small left-sided pleural effusion. On the right hazy mosaic attenuation seen throughout the right upper lobe. There is dense consolidation seen in the right lung base. Small right-sided pleural effusion is seen. In the Soft Tissues/Bones: Severe degenerative changes seen in the spine and shoulder joints. An occluded axillary femoral graft is seen in the right chest wall There is mild body wall anasarca. Spurring is seen in the spine. Spurring is seen in the shoulder joints. . Remote appearing rib deformities are seen Abdomen/Pelvis: Organs: There is borderline splenomegaly Adrenal glands appear normal.  There are clips from prior cholecystectomy. Liver is cirrhotic. Mild to moderate intra-abdominal ascites is seen. No hydronephrosis on the left. Scattered subcentimeter hypodense foci are seen in left kidney, likely cyst No hydronephrosis on the right. There is a complex appearing nodule in the right kidney measuring 1.7 cm, with questionable enhancing internal septa.  4-5 mm stone seen in the right kidney No pancreatic ductal dilatation. GI/Bowel: No significant small bowel distention noted. Moderate stool seen in the colon. Scattered colonic diverticula are seen Pelvis: Small amount of free fluid seen within the pelvis. Suprapubic catheter is seen. Postsurgical changes seen in the inguinal regions. Peritoneum/Retroperitoneum: Severe atherosclerotic changes seen in the aorta. Iliac artery stents are seen. There is severe narrowing of the left common iliac. Bones/Soft Tissues: There is body wall anasarca. Severe degenerative changes are seen in the hips. .  There is fatty atrophy of the musculature. Sacral decubitus ulcer is seen. There is skin thickening. No drainable fluid collection noted     Chest: Bilateral lower lobe consolidation, left greater than right, with small pleural effusions, suggestive of pneumonia. Small mediastinal nodes are seen, likely reactive in the absence of a known primary. Hazy ground-glass opacities are seen throughout the aerated lungs, either postinflammatory-infectious change or due to superimposed early pulmonary edema. Abdomen and pelvis: Abdominal and pelvic ascites and body wall anasarca, similar to prior Stable size complex nodule right kidney. Nonobstructing right renal calculus Sacral decubitus ulcer. No obvious drainable fluid collection or soft tissue abscess. XR CHEST PORTABLE    Result Date: 9/30/2021  EXAMINATION: ONE XRAY VIEW OF THE CHEST 9/30/2021 8:23 pm COMPARISON: Chest x-ray dated 09/03/2021. HISTORY: ORDERING SYSTEM PROVIDED HISTORY: sob TECHNOLOGIST PROVIDED HISTORY: Reason for exam:->sob Reason for Exam: Chest Pain Acuity: Acute Type of Exam: Initial FINDINGS: LINES/TUBES/OTHER: Right IJ tunneled hemodialysis catheter is noted with its tip in the right atrium. HEART/MEDIASTINUM: The cardiac silhouette is enlarged, but stable. PLEURA/LUNGS: There is elevation of the left hemidiaphragm.   There is pulmonary vascular congestion. There are no focal consolidations or pleural effusions. There is no appreciable pneumothorax. BONES/SOFT TISSUE: No acute abnormality. Pulmonary vascular congestion. Stable cardiomegaly. IR US GUIDED PARACENTESIS    Result Date: 10/8/2021  PROCEDURE: PARACENTESIS with IMAGE GUIDANCE US ABDOMEN LIMITED 10/8/2021 HISTORY: ORDERING SYSTEM PROVIDED HISTORY: sepsis, h/o cardiac ascites, r/o sbp. please send for cell count and cx TECHNOLOGIST PROVIDED HISTORY: Reason for exam:->sepsis, h/o cardiac ascites, r/o sbp. please send for cell count and cx TECHNIQUE: Informed consent was obtained after a detailed explanation of the procedure including risks, benefits, and alternatives. Universal protocol was followed. A limited ultrasound of the abdomen was performed. The left abdomen was prepped and draped in sterile fashion and local anesthesia was achieved with lidocaine. An a 5 Western Minda Yueh needle sheath was advanced into ascites under ultrasound guidance and paracentesis was performed. The patient tolerated the procedure well. Procedure was performed by Fabby Jewell PA-C, Dr. Ion Estrada was present or immediately available during the entire procedure. EBL: Less than 5 cc FINDINGS: Limited ultrasound of the abdomen demonstrates ascites. A total of 3500 cc of clear yellow fluid was removed. Successful paracentesis. IR US GUIDED PARACENTESIS    Result Date: 10/4/2021  PROCEDURE: PARACENTESIS with IMAGE GUIDANCE US ABDOMEN LIMITED 10/4/2021 HISTORY: ORDERING SYSTEM PROVIDED HISTORY: fluid in the peritneal area. TECHNOLOGIST PROVIDED HISTORY: Reason for exam:->fluid in the peritneal area. TECHNIQUE: Informed consent was obtained after a detailed explanation of the procedure including risks, benefits, and alternatives. Universal protocol was followed. A limited ultrasound of the abdomen was performed.  The left abdomen was prepped and draped in sterile fashion and local anesthesia was achieved with lidocaine. An 5 Western Minda Yueh needle sheath was advanced into ascites under ultrasound guidance and paracentesis was performed. The patient tolerated the procedure well. Procedure was performed by Staci Leyva was present or immediately available during the entire procedure. EBL: Less than 5 cc FINDINGS: Limited ultrasound of the abdomen demonstrates ascites. A total of 3000 cc of clear yellow fluid was removed. Successful paracentesis. The patient was seen and examined on day of discharge and this discharge summary is in conjunction with any daily progress note from day of discharge. Time Spent on discharge is 45 minutes  in the examination, evaluation, counseling and review of medications and discharge plan. Note that more than 30 minutes was spent in preparing discharge papers, discussing discharge with patient, medication review, etc.       Signed:    Anali Louise MD   10/9/2021      Thank you Scott Gutierrez MD for the opportunity to be involved in this patient's care.  If you have any questions or concerns please feel free to contact me at [address] [place] [address]

## 2021-10-09 NOTE — PROGRESS NOTES
Gastroenterology Progress Note            Iman Lyon is a 74 y.o. male patient. 1. Acute and chronic respiratory failure with hypoxia (HCC)    2. Dialysis patient ([address]    3. Chest pain, unspecified type        SUBJECTIVE:  Patient obtunded/sleeping - sedated. Physical    VITALS:  BP (!) 58/30   Pulse 52   Temp 97.6 °F (36.4 °C) (Axillary)   Resp 12   Ht 6' (1.829 m)   Wt 208 lb 5.4 oz (94.5 kg)   SpO2 93%   BMI 28.26 kg/m²   TEMPERATURE:  Current - Temp: 97.6 °F (36.4 °C); Max - Temp  Av.7 °F (36.5 °C)  Min: 97.6 °F (36.4 °C)  Max: 97.8 °F (36.6 °C)    Abdomen soft, ND, NT, no HSM, Bowel sounds sparce. Data      Recent Labs     10/07/21  0522 10/07/21  0522 10/07/21  2159 10/08/21  0415 10/09/21  0409   WBC 12.4*  --   --  27.6* 30.3*   HGB 11.0*   < > 10.9* 10.5* 10.8*   HCT 36.2*   < > 36.0* 34.6* 36.1*   MCV 93.9  --   --  95.3 95.4   *  --   --  135 151    < > = values in this interval not displayed. Recent Labs     10/07/21  0421 10/08/21  0415 10/09/21  0409   * 136 134*   K 4.7 4.8 5.0   CL 97* 99 96*   CO2 24 28 28   PHOS  --  3.7  --    BUN 20 29* 29*   CREATININE 1.8* 2.3* 2.2*     No results for input(s): AST, ALT, ALB, BILIDIR, BILITOT, ALKPHOS in the last 72 hours. No results for input(s): LIPASE, AMYLASE in the last 72 hours. ASSESSMENT :     Ascites -noted on CT chest at admission.  No prior paracentesis or liver disease. Hyacinth Shall was normal on CT with IV contrast May 2021 and did have ascites then. S/p paracentesis with 3 L fluid removed 10/4. Cell count neg for neutrocytic ascites. Culture neg. SAAG elevated at 1.7 and protein elevated at 3.6 c/w cardiac ascites. Repeat Paracentesis yesterday shows non neutrocytic ascites    Sepsis - does have UTI which is likely the cause. Was hypotensive overnight. Antibiotics changed this am. SBP less likely in cardiac ascites but still possible. Will order repeat para.   UTI  ESRD on HD  CHF - echo c/w pulmonary hypertension     PLAN :    Discussed with daughter and spouse their decision to proceed with Hospice and comfort care. This is appropriate and support was given. Will sign off for now. Please call with questions.     Darren Telles MD  [address]  10/9/2021

## 2021-10-09 NOTE — PLAN OF CARE
Pt remains in bed. Comfort meds administered. Family at bedside. Only comfort medications per family. No glucose checks or insulin.    Problem: SAFETY  Goal: Free from accidental physical injury  Outcome: Ongoing  Goal: Free from intentional harm  Outcome: Ongoing     Problem: SKIN INTEGRITY  Goal: Skin integrity is maintained or improved  Outcome: Ongoing     Problem: Skin Integrity:  Goal: Will show no infection signs and symptoms  Description: Will show no infection signs and symptoms  Outcome: Ongoing  Goal: Absence of new skin breakdown  Description: Absence of new skin breakdown  Outcome: Ongoing     Problem: Falls - Risk of:  Goal: Will remain free from falls  Description: Will remain free from falls  Outcome: Ongoing  Goal: Absence of physical injury  Description: Absence of physical injury  Outcome: Ongoing     Problem: Nutrition  Goal: Optimal nutrition therapy  Outcome: Ongoing     Problem: Pain:  Goal: Pain level will decrease  Description: Pain level will decrease  Outcome: Ongoing  Goal: Control of acute pain  Description: Control of acute pain  Outcome: Ongoing  Goal: Control of chronic pain  Description: Control of chronic pain  Outcome: Ongoing

## 2021-10-09 NOTE — PROGRESS NOTES
Hospitalist Progress Note      PCP: Bassam Mesa MD    Date of Admission: 9/30/2021    Chief Complaint: SOB    Hospital Course: 77years old male with past medical history significant for end-stage renal disease on hemodialysis, CHF, carotid disease, hypertension, diabetes mellitus type 2, quadriplegia, neurogenic bladder status post suprapubic Macias, COPD, chronic respiratory failure, presented with chest pain/shortness of breath, Covid testing is negative, has been seen by pulmonary before [facility], CT chest shows left lower lobe consolidation started on Rocephin.  Moderate ascites.  Chest x-ray with pulmonary vascular congestion. Admitted as inpatient for fluid overload, acute on chronic respiratory failure with hypoxia and UTI. Followed by Renal, Urology and ID. SP Macias exchange on 10/6 with gross pyuria. ID following. Rapid response called overnight on 10/7 for respiratory distress. Placed on 12 L HFNC. Now septic on 10/8/21. Cannot exclude SBP. CT C/A/P with IV ordered. Transfer to ICU requested on 10/8. Seen by CCM. Did not feel appropriate for ICU care. Changed to [facility] on 10/8 and changed to comfort care measures for EOL care. Subjective:  Patient is encephalopathic with wife, daughter and brother at bedside. Appears to be calm and comfortable with secretions noted. Otherwise cannot obtain ROS as patient is encephalopathic.       Medications:  Reviewed    Infusion Medications    dextrose      sodium chloride 25 mL (10/09/21 0944)     Scheduled Medications    vancomycin (VANCOCIN) intermittent dosing (placeholder)   Other RX Placeholder    scopolamine  1 patch TransDERmal Q72H    meropenem  500 mg IntraVENous Q24H    sodium chloride  1,000 mL IntraVENous Once    ipratropium-albuterol  1 ampule Inhalation 4x daily    insulin glargine  5 Units SubCUTAneous Nightly    insulin lispro  0-6 Units SubCUTAneous TID WC    insulin lispro  0-3 Units mediastinal nodes are   seen, likely reactive in the absence of a known primary. Hazy ground-glass   opacities are seen throughout the aerated lungs, either   postinflammatory-infectious change or due to superimposed early pulmonary   edema. Abdomen and pelvis:      Abdominal and pelvic ascites and body wall anasarca, similar to prior      Stable size complex nodule right kidney. Nonobstructing right renal calculus      Sacral decubitus ulcer. No obvious drainable fluid collection or soft tissue   abscess. CT ABDOMEN PELVIS WO CONTRAST Additional Contrast? None   Final Result   Right renal, inferior pole 1.9 cm hypodensity appears stable. It is not well   visualized on the present non contrasted CT. Follow-up imaging is   recommended in 6-12 months. MRI would be preferable. Mild to moderate amount of generalized ascites, slightly increased compared   to baseline. Trace right pleural effusion. Mosaic attenuation in the right lung base, air   trapping versus small airways disease. Anasarca. IR US GUIDED PARACENTESIS   Final Result   Successful paracentesis. XR FEMUR RIGHT (MIN 2 VIEWS)   Final Result   Fracture of the distal femoral metaphysis with increased impaction and stable   dorsal displacement of the distal fracture fragment. Callus is present at   the fracture line but the fracture line remains visible. No new acute fracture of the right femur. Osteopenia with degenerative   changes at the hip and knee joint. XR ABDOMEN (KUB) (SINGLE AP VIEW)   Final Result   1. Ascites throughout the left abdomen. XR CHEST PORTABLE   Final Result   Pulmonary vascular congestion. Stable cardiomegaly.                  Assessment/Plan:    Active Hospital Problems    Diagnosis     Dialysis patient [facility]) [Z99.2]     Indwelling catheter present on admission [Z96.0]     Bacteriuria [R82.71]     Infection requiring contact isolation precautions [B99.9]  Acute and chronic respiratory failure with hypoxia (HCC) [J96.21]     S/P CABG (coronary artery bypass graft) [Z95.1]     ESRD (end stage renal disease) on dialysis (HCC) [N18.6, Z99.2]     History of DVT (deep vein thrombosis) [Z86.718]     Acute respiratory failure with hypoxia (HCC) [J96.01]     Acute on chronic respiratory failure with hypoxia (HCC) [J96.21]     Aspiration pneumonia (HCC) [J69.0]     Paraplegia (HCC) [G82.20]           Acute on chronic respiratory failure secondary to fluid overload secondary to recurrent aspiration PNA from paraplegia and diaphragmatic paralysis in underlying setting of end-stage renal disease on hemodialysis  Patient's oxygen requirements have gone up on admission  He was given Lasix and then torsemide  Nephrology were consulted he had ultrafiltration removal of 1500 mL  Patient on baseline of around 4 L; now on 8 L HFNC  Comfort care is now appropriate    He supposed to be on BiPAP at bedtime however he is not a regular user of it  He has underlying diaphragmatic paralysis     Anemia of chronic kidney disease  On iron  Hemoglobin stable     UTI is related to chronic suprapubic catheter versus colonization  Urine cultures grew out Proteus and Citrobacter multidrug-resistant  SP Macias exchange on 10/6 with gross pyuria  Continue Merrem and Vanco IV for  aspiration PNA as comfort measure     Hypotension suspect secondary to recent dialysis and 3 L of paracentesis  Recurred  May be secondary to sepsis now  Stop midodrine     Diaphragmatic paralysis and paraplegia  On 8 HFNC  Needs to continue using BiPAP at bedtime     Ascites suspect secondary to volume overload  SAAG greater than 1  Cytology no malignant cells  CT did not show underlying liver disease  Seen by gastroenterology as well        Type 2 diabetes mellitus  Continue insulin Lantus and lispro     Elevated troponin suspect secondary to renal disease  No chest pain  Troponin stable    Secretions  Start Scopolamine patch and Atropine SL PRN       DVT Prophylaxis: Hep SQ  Diet: ADULT DIET; Regular  Code Status: DNR-CC    PT/OT Eval Status: Following, home    Dispo - Expiration anticipated as inpatient    Discussed with patient and nursing. D/W wife, daughter and brother. We anticipate expiration as inpatient. Continue comfort care.     Jordon Goldsmith MD

## 2021-10-09 NOTE — PROGRESS NOTES
[facility] Pulmonary/CCM Progress note      Admit Date: 9/30/2021    Chief Complaint: Altered mental status and shortness of breath    Subjective: Interval History: I was reconsulted today for recommendations about goals of care. Patient appears extremely fatigued, lethargic, now nonverbal and has low blood pressure. Continued decline noted throughout this hospitalization.     Scheduled Meds:   meropenem  500 mg IntraVENous Q24H    sodium chloride  1,000 mL IntraVENous Once    ipratropium-albuterol  1 ampule Inhalation 4x daily    insulin glargine  5 Units SubCUTAneous Nightly    aspirin  81 mg Oral Daily    citalopram  20 mg Oral BID    ferrous sulfate  325 mg Oral Daily with breakfast    finasteride  5 mg Oral Daily    insulin lispro  0-6 Units SubCUTAneous TID WC    insulin lispro  0-3 Units SubCUTAneous Nightly    [Held by provider] torsemide  100 mg Oral Daily    budesonide-formoterol  2 puff Inhalation BID    pravastatin  40 mg Oral Nightly    pantoprazole  40 mg Oral QAM AC    midodrine  10 mg Oral TID WC    [Held by provider] metoprolol tartrate  12.5 mg Oral BID    sodium chloride flush  5-40 mL IntraVENous 2 times per day    heparin (porcine)  5,000 Units SubCUTAneous 3 times per day     Continuous Infusions:   dextrose      sodium chloride 25 mL (10/08/21 1648)     PRN Meds:perflutren lipid microspheres, LORazepam, HYDROmorphone, midodrine, acetaminophen **OR** acetaminophen, HYDROcodone 5 mg - acetaminophen, albumin human, perflutren lipid microspheres, glucose, dextrose, glucagon (rDNA), dextrose, albuterol, bisacodyl, docusate sodium, traZODone, sodium chloride flush, sodium chloride, ondansetron **OR** ondansetron, polyethylene glycol, bisacodyl, simethicone, hydrOXYzine, saline nasal gel, heparin (porcine), nitroGLYCERIN    Review of Systems  Unable to obtain since the patient is fatigued and now nonverbal    Objective:     Patient Vitals for the past 8 hrs:   BP Temp Temp src Pulse Resp SpO2   10/08/21 2034 -- -- -- 89 -- --   10/08/21 1945 (!) 78/42 97.8 °F (36.6 °C) Axillary 90 16 --   10/08/21 1645 (!) 92/55 97.8 °F (36.6 °C) Axillary 92 16 99 %     I/O last 3 completed shifts: In: 1150 [P.O.:240; I.V.:10]  Out: 342   I/O this shift:  In: 10 [I.V.:10]  Out: -     General Appearance: Fatigued, lethargic, nonverbal  Skin: warm and dry, no rash or erythema  Head: normocephalic and atraumatic  Eyes: pupils equal, round, and reactive to light, extraocular eye movements intact, conjunctivae normal  ENT: external ear and ear canal normal bilaterally, nose without deformity, nasal mucosa and turbinates normal  Neck: supple and non-tender without mass, no cervical lymphadenopathy  Pulmonary/Chest: clear to auscultation bilaterally- no wheezes, rales or rhonchi, normal air movement, no respiratory distress  Cardiovascular: normal rate, regular rhythm,  no murmurs, rubs, distal pulses intact, no carotid bruits  Abdomen: soft, non-tender, non-distended, normal bowel sounds, no masses or organomegaly  Lymph Nodes: Cervical, supraclavicular normal  Extremities: no cyanosis, clubbing or edema  Musculoskeletal: normal range of motion, no joint swelling, deformity or tenderness  Neurologic: Nonverbal, no focal neurologic deficits    Data Review:  CBC:   Lab Results   Component Value Date    WBC 27.6 10/08/2021    RBC 3.63 10/08/2021     BMP:   Lab Results   Component Value Date    GLUCOSE 162 10/08/2021    CO2 28 10/08/2021    BUN 29 10/08/2021    CREATININE 2.3 10/08/2021    CALCIUM 9.0 10/08/2021     ABG:   Lab Results   Component Value Date    DKD7BHB 30.3 10/01/2021    BEART 2.5 10/01/2021    F7UTWYSL 98.2 10/01/2021    PHART 7.291 10/01/2021    DVX7SMD 62.9 10/01/2021    PO2ART 103.0 10/01/2021    DMV1EZA 72.2 10/01/2021       Radiology: All pertinent images / reports were reviewed as a part of this visit.     Narrative   EXAMINATION:   CT OF THE ABDOMEN AND PELVIS WITH CONTRAST; CT OF THE CHEST WITH CONTRAST   10/8/2021 12:07 pm; 10/8/2021 12:14 pm       TECHNIQUE:   CT of the abdomen and pelvis was performed with the administration of   intravenous contrast. Multiplanar reformatted images are provided for review. Dose modulation, iterative reconstruction, and/or weight based adjustment of   the mA/kV was utilized to reduce the radiation dose to as low as reasonably   achievable.; CT of the chest was performed with the administration of   intravenous contrast. Multiplanar reformatted images are provided for review. Dose modulation, iterative reconstruction, and/or weight based adjustment of   the mA/kV was utilized to reduce the radiation dose to as low as reasonably   achievable.       COMPARISON:   None       10/06/2021       HISTORY:   ORDERING SYSTEM PROVIDED HISTORY: Fevers   TECHNOLOGIST PROVIDED HISTORY:   Reason for exam:->Fevers   Additional Contrast?->None   Reason for Exam: Fevers   Acuity: Unknown   Type of Exam: Unknown; ORDERING SYSTEM PROVIDED HISTORY: PNA   TECHNOLOGIST PROVIDED HISTORY:   Reason for exam:->PNA   Reason for Exam: PNA   Acuity: Unknown   Type of Exam: Unknown       FINDINGS:       Chest:       Mediastinum: Thyroid gland is unremarkable.  Tip of calluses catheter   projects in the right atrium.  Heart is enlarged.  Atherosclerotic changes   seen in the ascending and descending aorta.  Clips marginate the descending   aorta.  There is apparent Postsurgical change involving the descending   thoracic aorta.  Main pulmonary artery is mildly enlarged, measuring 3.7 cm.       Small mediastinal and hilar nodes are noted.  For example, lymph node   anterior to the IVC measures 1.5 cm by 1.1 cm.       Lungs/pleura: Hazy mosaic attenuation is seen throughout the left upper lobe. There is dense consolidation seen in the left lung base with small left-sided   pleural effusion.       On the right hazy mosaic attenuation seen throughout the right upper lobe.    There is dense consolidation seen in the right lung base.  Small right-sided   pleural effusion is seen.  In the       Soft Tissues/Bones:       Severe degenerative changes seen in the spine and shoulder joints. An   occluded axillary femoral graft is seen in the right chest wall       There is mild body wall anasarca.       Spurring is seen in the spine. Spurring is seen in the shoulder joints. .   Remote appearing rib deformities are seen           Abdomen/Pelvis:       Organs: There is borderline splenomegaly       Adrenal glands appear normal.  There are clips from prior cholecystectomy. Liver is cirrhotic.  Mild to moderate intra-abdominal ascites is seen.  No   hydronephrosis on the left.  Scattered subcentimeter hypodense foci are seen   in left kidney, likely cyst       No hydronephrosis on the right. Cottie Sayres is a complex appearing nodule in the   right kidney measuring 1.7 cm, with questionable enhancing internal septa. 4-5 mm stone seen in the right kidney       No pancreatic ductal dilatation.       GI/Bowel: No significant small bowel distention noted.  Moderate stool seen   in the colon.  Scattered colonic diverticula are seen       Pelvis: Small amount of free fluid seen within the pelvis.  Suprapubic   catheter is seen.  Postsurgical changes seen in the inguinal regions.       Peritoneum/Retroperitoneum: Severe atherosclerotic changes seen in the aorta. Iliac artery stents are seen. Cottie Sayres is severe narrowing of the left common   iliac.       Bones/Soft Tissues: There is body wall anasarca.  Severe degenerative changes   are seen in the hips. Ardella Vania is fatty atrophy of the musculature.  Sacral   decubitus ulcer is seen. Cottie Sayres is skin thickening.  No drainable fluid   collection noted           Impression   Chest:       Bilateral lower lobe consolidation, left greater than right, with small   pleural effusions, suggestive of pneumonia.  Small mediastinal nodes are   seen, likely reactive in the absence of a known primary.  Hazy ground-glass   opacities are seen throughout the aerated lungs, either   postinflammatory-infectious change or due to superimposed early pulmonary   edema.       Abdomen and pelvis:       Abdominal and pelvic ascites and body wall anasarca, similar to prior       Stable size complex nodule right kidney.       Nonobstructing right renal calculus       Sacral decubitus ulcer.  No obvious drainable fluid collection or soft tissue   abscess. Problem List:     Acute on chronic hypoxic respiratory failure  Diaphragmatic paralysis  Mucous plugging of airway/aspiration pneumonia  Volume overload/end-stage renal disease on hemodialysis    Assessment/Plan:     Patient well-known to me. Upon review of chart and examination, patient has been noted to have a significant decline. Now has multiorgan failure. Hypotensive possibly from sepsis-aspiration pneumonia. There is mucous plugging of airways particularly of left lower lobe with associated bilateral infiltrates. O2 requirements are gradually increasing. Patient is more lethargic and is now nonverbal.    Attempted hemodialysis today with hypotension. Blood pressure is currently borderline. Patient also had ascites drainage today with 3.5 L of clear yellow fluid removed. Long discussion with patient's daughter and wife, address goals of care-given continued decline patient would be comfort care/hospice appropriate.   Discussed with hospitalist.    Delilah Hardin MD

## 2021-10-11 ENCOUNTER — CARE COORDINATION (OUTPATIENT)
Dept: CARE COORDINATION | Age: 66
End: 2021-10-11

## 2021-10-11 LAB
BODY FLUID CULTURE, STERILE: NORMAL
GRAM STAIN RESULT: NORMAL

## 2021-12-12 NOTE — PLAN OF CARE
Nutrition Problem #1: Inadequate oral intake  Intervention: Food and/or Nutrient Delivery: Continue Current Diet, Start Oral Nutrition Supplement  Nutritional Goals: po intake greater than 50% of meals & supplements back pain/cough

## 2023-11-17 NOTE — TELEPHONE ENCOUNTER
Pt is no longer getting dialysis, last dialysis treatment was yesterday 4/1/19. They took him off hydralzine and cut coreg in half to 6.25 mg/ bid and added torsemide 20 mg/ qd. Pt had his last labs done on Monday 4/1/19 and wanting to know if [facility] would like to order any labs. Please call to advise. Finasteride Male Counseling: Finasteride Counseling:  I discussed with the patient the risks of use of finasteride including but not limited to decreased libido, decreased ejaculate volume, gynecomastia, and depression. Women should not handle medication.  All of the patient's questions and concerns were addressed. Finasteride Counseling:  I discussed with the patient the risks of use of finasteride including but not limited to decreased libido, decreased ejaculate volume, gynecomastia, and depression. Women should not handle medication.  All of the patient's questions and concerns were addressed.

## 2025-01-28 NOTE — PROGRESS NOTES
Bed: H303  Expected date:   Expected time:   Means of arrival:   Comments:  25   Patient SPO2, 77 on 3l/nc baseline , O2 increased to 5L/nc spo2 94%,  spouse at  Bedside will continue to monitor  And wean as needed

## (undated) DEVICE — CONTAINER,SPECIMEN,OR STERILE,4OZ: Brand: MEDLINE

## (undated) DEVICE — MERCY FAIRFIELD TURNOVER KIT: Brand: MEDLINE INDUSTRIES, INC.

## (undated) DEVICE — CATHETER,FOLEY,SILI-ELAST,LTX,20FR,10ML: Brand: MEDLINE

## (undated) DEVICE — PAD,ABDOMINAL,8"X10",ST,LF: Brand: MEDLINE

## (undated) DEVICE — DRAPE ADOLESCENT  LAPAROTOMY

## (undated) DEVICE — SOLUTION IV IRRIG POUR BRL 0.9% SODIUM CHL 2F7124

## (undated) DEVICE — GLOVE ORANGE PI 7 1/2   MSG9075

## (undated) DEVICE — GAUZE,SPONGE,4"X4",8PLY,STRL,LF,10/TRAY: Brand: MEDLINE

## (undated) DEVICE — MAJOR SET UP PK

## (undated) DEVICE — BANDAGE,GAUZE,BULKEE II,4.5"X4.1YD,STRL: Brand: MEDLINE

## (undated) DEVICE — MEDICINE CUP, GRADUATED, STER: Brand: MEDLINE

## (undated) DEVICE — TRAY PREP DRY W/ PREM GLV 2 APPL 6 SPNG 2 UNDPD 1 OVERWRAP

## (undated) DEVICE — ELECTRODE PT RET AD L9FT HI MOIST COND ADH HYDRGEL CORDED